# Patient Record
Sex: FEMALE | Race: WHITE | NOT HISPANIC OR LATINO | Employment: OTHER | URBAN - METROPOLITAN AREA
[De-identification: names, ages, dates, MRNs, and addresses within clinical notes are randomized per-mention and may not be internally consistent; named-entity substitution may affect disease eponyms.]

---

## 2017-03-01 ENCOUNTER — LAB REQUISITION (OUTPATIENT)
Dept: LAB | Facility: HOSPITAL | Age: 72
End: 2017-03-01
Payer: COMMERCIAL

## 2017-03-01 DIAGNOSIS — I10 ESSENTIAL (PRIMARY) HYPERTENSION: ICD-10-CM

## 2017-03-01 DIAGNOSIS — I50.9 HEART FAILURE (HCC): ICD-10-CM

## 2017-03-01 DIAGNOSIS — E11.9 TYPE 2 DIABETES MELLITUS WITHOUT COMPLICATIONS (HCC): ICD-10-CM

## 2017-03-01 LAB
ALBUMIN SERPL BCP-MCNC: 3.3 G/DL (ref 3.5–5)
ALP SERPL-CCNC: 77 U/L (ref 46–116)
ALT SERPL W P-5'-P-CCNC: 27 U/L (ref 12–78)
ANION GAP SERPL CALCULATED.3IONS-SCNC: 11 MMOL/L (ref 4–13)
AST SERPL W P-5'-P-CCNC: 18 U/L (ref 5–45)
BILIRUB SERPL-MCNC: 0.34 MG/DL (ref 0.2–1)
BUN SERPL-MCNC: 20 MG/DL (ref 5–25)
CALCIUM SERPL-MCNC: 9 MG/DL (ref 8.3–10.1)
CHLORIDE SERPL-SCNC: 106 MMOL/L (ref 100–108)
CO2 SERPL-SCNC: 24 MMOL/L (ref 21–32)
CREAT SERPL-MCNC: 0.83 MG/DL (ref 0.6–1.3)
EST. AVERAGE GLUCOSE BLD GHB EST-MCNC: 166 MG/DL
GFR SERPL CREATININE-BSD FRML MDRD: >60 ML/MIN/1.73SQ M
GLUCOSE SERPL-MCNC: 140 MG/DL (ref 65–140)
HBA1C MFR BLD: 7.4 % (ref 4.2–6.3)
POTASSIUM SERPL-SCNC: 3.9 MMOL/L (ref 3.5–5.3)
PROT SERPL-MCNC: 7.1 G/DL (ref 6.4–8.2)
SODIUM SERPL-SCNC: 141 MMOL/L (ref 136–145)

## 2017-03-01 PROCEDURE — 83036 HEMOGLOBIN GLYCOSYLATED A1C: CPT | Performed by: INTERNAL MEDICINE

## 2017-03-01 PROCEDURE — 80053 COMPREHEN METABOLIC PANEL: CPT | Performed by: INTERNAL MEDICINE

## 2017-05-01 ENCOUNTER — GENERIC CONVERSION - ENCOUNTER (OUTPATIENT)
Dept: OTHER | Facility: OTHER | Age: 72
End: 2017-05-01

## 2017-05-11 ENCOUNTER — GENERIC CONVERSION - ENCOUNTER (OUTPATIENT)
Dept: OTHER | Facility: OTHER | Age: 72
End: 2017-05-11

## 2017-10-30 ENCOUNTER — ALLSCRIPTS OFFICE VISIT (OUTPATIENT)
Dept: OTHER | Facility: OTHER | Age: 72
End: 2017-10-30

## 2017-10-30 ENCOUNTER — LAB REQUISITION (OUTPATIENT)
Dept: LAB | Facility: HOSPITAL | Age: 72
End: 2017-10-30
Payer: COMMERCIAL

## 2017-10-30 DIAGNOSIS — Z01.419 ENCOUNTER FOR GYNECOLOGICAL EXAMINATION WITHOUT ABNORMAL FINDING: ICD-10-CM

## 2017-10-30 PROCEDURE — G0145 SCR C/V CYTO,THINLAYER,RESCR: HCPCS | Performed by: OBSTETRICS & GYNECOLOGY

## 2017-10-31 ENCOUNTER — TRANSCRIBE ORDERS (OUTPATIENT)
Dept: LAB | Facility: HOSPITAL | Age: 72
End: 2017-10-31

## 2017-10-31 ENCOUNTER — APPOINTMENT (OUTPATIENT)
Dept: LAB | Facility: HOSPITAL | Age: 72
End: 2017-10-31
Payer: COMMERCIAL

## 2017-10-31 DIAGNOSIS — I10 ESSENTIAL HYPERTENSION, BENIGN: ICD-10-CM

## 2017-10-31 DIAGNOSIS — E55.9 VITAMIN D DEFICIENCY: ICD-10-CM

## 2017-10-31 DIAGNOSIS — E11.9 DIABETES MELLITUS WITHOUT COMPLICATION (HCC): Primary | ICD-10-CM

## 2017-10-31 DIAGNOSIS — E11.9 DIABETES MELLITUS WITHOUT COMPLICATION (HCC): ICD-10-CM

## 2017-10-31 LAB
25(OH)D3 SERPL-MCNC: 30.5 NG/ML (ref 30–100)
ALBUMIN SERPL BCP-MCNC: 3.4 G/DL (ref 3.5–5)
ALP SERPL-CCNC: 82 U/L (ref 46–116)
ALT SERPL W P-5'-P-CCNC: 31 U/L (ref 12–78)
ANION GAP SERPL CALCULATED.3IONS-SCNC: 7 MMOL/L (ref 4–13)
AST SERPL W P-5'-P-CCNC: 25 U/L (ref 5–45)
BILIRUB SERPL-MCNC: 0.38 MG/DL (ref 0.2–1)
BUN SERPL-MCNC: 19 MG/DL (ref 5–25)
CALCIUM SERPL-MCNC: 9.7 MG/DL (ref 8.3–10.1)
CHLORIDE SERPL-SCNC: 101 MMOL/L (ref 100–108)
CO2 SERPL-SCNC: 28 MMOL/L (ref 21–32)
CREAT SERPL-MCNC: 1.11 MG/DL (ref 0.6–1.3)
EST. AVERAGE GLUCOSE BLD GHB EST-MCNC: 163 MG/DL
GFR SERPL CREATININE-BSD FRML MDRD: 49 ML/MIN/1.73SQ M
GLUCOSE P FAST SERPL-MCNC: 158 MG/DL (ref 65–99)
HBA1C MFR BLD: 7.3 % (ref 4.2–6.3)
POTASSIUM SERPL-SCNC: 4.3 MMOL/L (ref 3.5–5.3)
PROT SERPL-MCNC: 8.1 G/DL (ref 6.4–8.2)
SODIUM SERPL-SCNC: 136 MMOL/L (ref 136–145)
TSH SERPL DL<=0.05 MIU/L-ACNC: 1.92 UIU/ML (ref 0.36–3.74)

## 2017-10-31 PROCEDURE — 82306 VITAMIN D 25 HYDROXY: CPT

## 2017-10-31 PROCEDURE — 80053 COMPREHEN METABOLIC PANEL: CPT

## 2017-10-31 PROCEDURE — 84443 ASSAY THYROID STIM HORMONE: CPT

## 2017-10-31 PROCEDURE — 36415 COLL VENOUS BLD VENIPUNCTURE: CPT

## 2017-10-31 PROCEDURE — 83036 HEMOGLOBIN GLYCOSYLATED A1C: CPT

## 2017-10-31 NOTE — PROGRESS NOTES
Assessment  1  History of Encounter for routine gynecological examination (V72 31) (Z01 419)   2  Encounter for routine gynecological examination (V72 31) (Z01 419)   3  History of Pap smear, as part of routine gynecological examination (V76 2) (Z01 419)   4  Pap smear, as part of routine gynecological examination (V76 2) (Z01 419)   5  Screening for osteoporosis (V82 81) (Z13 820)   6  Visit for screening mammogram (V76 12) (Z12 31)    Plan  Encounter for routine gynecological examination    · Follow-up visit in 1 year Evaluation and Treatment  Follow-up  Status: Hold For -  Scheduling  Requested for: [id number]   Ordered; For: Encounter for routine gynecological examination; Ordered By: Shavon Ortiz Performed:  Due: [id number]  Encounter for routine gynecological examination, Pap smear, as part of routine  gynecological examination    · (1) THIN PREP PAP WITH IMAGING; Status: In Progress - Specimen/Data  Collected,Retrospective By Protocol Authorization;   Done: [id number]   Perform:[facility] Lab In Office Collection; Order Comments:Cervical/Endocervical; WMU:[id number]; Last Updated By:Ragini Rios; 10/30/2017 9:15:07 AM;Ordered;For:Encounter for routine gynecological examination, Pap smear, as part of routine gynecological examination; Ordered By:Philly Trinidad; Maturation index required? : No  HPV? : Not Requested  Screening for osteoporosis    · * DXA BONE DENSITY SPINE HIP AND PELVIS; Status:Hold For - Scheduling,Exact  Date,Retrospective By Protocol Authorization; Requested for:May 2018; Perform:[facility]; QJC:[id number]; Last Updated By:Ragini Rios; 10/30/2017 9:15:07 AM;Ordered; For:Screening for osteoporosis; Ordered By:Philly Trinidad; Visit for screening mammogram    · * MAMMO SCREENING BILATERAL W CAD; Status:Hold For - Scheduling,Exact  Date,Retrospective By Protocol Authorization; Requested for:May 2018; Perform:[facility]; HKM:[id number];  Last Updated By:Gabriel [facility]; 10/30/2017 9:15:07 AM;Ordered; For:Visit for screening mammogram; Ordered By:Robles Trinidad; Unlinked    · MetFORMIN HCl TABS   Dispense: 0 Days ; #: Sufficient Tablet; Refill: 0; ALENA = N; Record; Last Updated By: Felisa Mittal; 10/30/2017 9:11:51 AM    Discussion/Summary  healthy adult female Currently, she eats a healthy diet  cervical cancer screening is current Pap test was done today Breast cancer screening: monthly self breast exam was advised, mammogram is current and mammogram has been ordered  Colorectal cancer screening: colorectal cancer screening is current  Osteoporosis screening: bone mineral density testing is current and bone mineral density testing has been ordered  Advice and education were given regarding nutrition, aerobic exercise, calcium supplements, vitamin D supplements and sunscreen use  Normal GYN Exam  Stressed  ordered  [facility]  GYN Exam in 1 year  if any problems develop during the interim    The patient has the current Goals: [address]  The patent has the current Barriers: None  Patient is able to Self-Care  PATIENT EDUCATION RECORD She was given the following educational materials:  Ideas for a Healthy Life Style  The treatment plan was reviewed with the patient/guardian  The patient/guardian understands and agrees with the treatment plan     Self Referrals: No      Chief Complaint  Annual Visithas no problem or concernis doing very good      History of Present Illness  HPI: Followed medically for HTN * DM    Denies any vaginal bleeding    No vasomotor symptoms  Bowel & Bladder habits  any pelvic or abdominal pain      GYN , Adult Female [facility]: The patient is being seen for a gynecology evaluation  The last health maintenance visit was 2 year(s) ago  General Health: The patient's health since the last visit is described as good  She has regular dental visits  -- She denies vision problems  -- She denies hearing loss  Lifestyle:   She consumes a diverse and healthy diet  -- She does not have any weight concerns  -- She exercises regularly  -- She does not use tobacco -- She denies drug use  Reproductive health: the patient is postmenopausal    Screening: cancer screening reviewed and current  metabolic screening reviewed and current  risk screening reviewed and current  Review of Systems    Constitutional: No fever, no chills, feels well, no tiredness, no recent weight gain or loss  ENT: no ear ache, no loss of hearing, no nosebleeds or nasal discharge, no sore throat or hoarseness  Cardiovascular: DM * HTN, but-- no complaints of slow or fast heart rate, no chest pain, no palpitations, no leg claudication or lower extremity edema  Respiratory: no complaints of shortness of breath, no wheezing, no dyspnea on exertion, no orthopnea or PND  Breasts: no complaints of breast pain, breast lump or nipple discharge  Gastrointestinal: no complaints of abdominal pain, no constipation, no nausea or diarrhea, no vomiting, no bloody stools  Genitourinary: no complaints of dysuria, no incontinence, no pelvic pain, no dysmenorrhea, no vaginal discharge or abnormal vaginal bleeding  Musculoskeletal: no complaints of arthralgia, no myalgia, no joint swelling or stiffness, no limb pain or swelling  Integumentary: no complaints of skin rash or lesion, no itching or dry skin, no skin wounds  Neurological: no complaints of headache, no confusion, no numbness or tingling, no dizziness or fainting  Active Problems  1  Abnormal finding on mammography (793 80) (R92 8)   2  Encounter for routine gynecological examination (V72 31) (Z01 419)   3  Pap smear, as part of routine gynecological examination (V76 2) (Z01 419)   4  Screening for osteoporosis (V82 81) (Z13 820)   5   Visit for screening mammogram (V76 12) (Z12 31)    Past Medical History   · History of Encounter for routine gynecological examination (V72 31) (Z01 419)   · History of diastolic dysfunction (V12 59) (Z86 79)   · History of esophageal reflux (V12 79) (Z87 19)   · History of hypertension (V12 59) (Z86 79)   · History of Pap smear, as part of routine gynecological examination (V76 2) (Z01 419)   · History of Umbilical hernia (316 1) (K42 9)    Surgical History   · History of Umbilical Hernia Repair    Family History  Mother    · No pertinent family history    Social History   · Never a smoker    Current Meds   1  Atenolol 25 MG Oral Tablet; TAKE 1 TABLET DAILY; Therapy: (Sandria Breath) to Recorded   2  Calcium + D TABS; Therapy: (Sandria Breath) to Recorded   3  Lasix 20 MG Oral Tablet; Therapy: (Sandria Breath) to Recorded   4  MetFORMIN HCl TABS; TAKE 1 TABLET TWICE DAILY  MDD:400mg; Therapy: (Sandria Breath) to Recorded   5  Moduretic 5-50 MG TABS; TAKE TABLET  TDD:0 25; Therapy: (Sandria Breath) to Recorded   6  Multi-Vitamin TABS; Therapy: (Sandria Breath) to Recorded   7  Vitamin D 1000 UNIT CAPS; Therapy: (Recorded:35Uit4210) to Recorded    Allergies  1  No Known Drug Allergies    Vitals   Recorded: 16MEK1122 02:41FT   Systolic 731   Diastolic 72   Height 5 ft    Weight 232 lb    BMI Calculated 45 31   BSA Calculated 1 99     Physical Exam    Constitutional   General appearance: No acute distress, well appearing and well nourished  Neck   Neck: Normal, supple, trachea midline, no masses  Thyroid: Normal, no thyromegaly  Pulmonary   Respiratory effort: No increased work of breathing or signs of respiratory distress  Auscultation of lungs: Clear to auscultation  Cardiovascular   Auscultation of heart: Normal rate and rhythm, normal S1 and S2, no murmurs  Peripheral vascular exam: Normal pulses Throughout  Genitourinary   External genitalia: Normal and no lesions appreciated  Vagina: Normal, no lesions or dryness appreciated      Urethra: Normal     Urethral meatus: Normal     Bladder: Normal, soft, non-tender and no prolapse or masses appreciated  Cervix: Normal, no palpable masses  Uterus: Normal, non-tender, not enlarged, and no palpable masses  Adnexa/parametria: Normal, non-tender and no fullness or masses appreciated  Anus, perineum, and rectum: Normal sphincter tone, no masses, and no prolapse  Chest   Breasts: Normal and no dimpling or skin changes noted  Abdomen   Abdomen: Normal, non-tender, and no organomegaly noted  Liver and spleen: No hepatomegaly or splenomegaly  Examination for hernias: No hernias appreciated  Stool sample for occult blood: Negative  Lymphatic   Palpation of lymph nodes in neck, axillae, groin and/or other locations: No lymphadenopathy or masses noted  Skin   Skin and subcutaneous tissue: Normal skin turgor and no rashes      Palpation of skin and subcutaneous tissue: Normal     Psychiatric   Orientation to person, place, and time: Normal     Mood and affect: Normal        Provider Comments  Provider Comments:   Family doing well      Signatures   Electronically signed by : JAYME Leon ; Oct 30 2017 12:34PM EST                       (Author)

## 2017-11-03 LAB
LAB AP GYN PRIMARY INTERPRETATION: NORMAL
Lab: NORMAL

## 2017-11-07 ENCOUNTER — GENERIC CONVERSION - ENCOUNTER (OUTPATIENT)
Dept: OTHER | Facility: OTHER | Age: 72
End: 2017-11-07

## 2018-01-14 VITALS
WEIGHT: 232 LBS | DIASTOLIC BLOOD PRESSURE: 72 MMHG | SYSTOLIC BLOOD PRESSURE: 120 MMHG | HEIGHT: 60 IN | BODY MASS INDEX: 45.55 KG/M2

## 2018-01-15 NOTE — MISCELLANEOUS
Message  Spoke with patient regarding mammogram results  New opacity noted in L breast on mammogram, but not imaged on L breast U/S  Follow-up diagnostic mammogram of L breast in 6 months  Patient has appt scheduled for May  Slip will be mailed        Plan  Abnormal finding on mammography    · MAMMO DIAGNOSTIC LEFT W CAD; Status:Hold For - Scheduling; Requested  for:18Nov2016;     Signatures   Electronically signed by : Roxy Hinkle [facility]; Nov 18 2016  4:29PM EST                       (Author)

## 2018-01-16 NOTE — MISCELLANEOUS
Message  Spoke with patient regarding L breast mammogram results  Density is stable  F/u diagnostic mammogram of L breast at time of screening mammogram in October        Signatures   Electronically signed by : [facility]; May 11 2017  4:16PM EST                       (Author)

## 2018-02-19 ENCOUNTER — LAB REQUISITION (OUTPATIENT)
Dept: LAB | Facility: HOSPITAL | Age: 73
End: 2018-02-19
Payer: COMMERCIAL

## 2018-02-19 DIAGNOSIS — I34.0 NONRHEUMATIC MITRAL VALVE INSUFFICIENCY: ICD-10-CM

## 2018-02-19 DIAGNOSIS — I10 ESSENTIAL (PRIMARY) HYPERTENSION: ICD-10-CM

## 2018-02-19 DIAGNOSIS — E11.9 TYPE 2 DIABETES MELLITUS WITHOUT COMPLICATIONS (HCC): ICD-10-CM

## 2018-02-19 DIAGNOSIS — I50.9 HEART FAILURE (HCC): ICD-10-CM

## 2018-02-19 DIAGNOSIS — H66.90 OTITIS MEDIA: ICD-10-CM

## 2018-02-19 LAB
ANION GAP SERPL CALCULATED.3IONS-SCNC: 9 MMOL/L (ref 4–13)
BUN SERPL-MCNC: 23 MG/DL (ref 5–25)
CALCIUM SERPL-MCNC: 9.1 MG/DL (ref 8.3–10.1)
CHLORIDE SERPL-SCNC: 100 MMOL/L (ref 100–108)
CO2 SERPL-SCNC: 29 MMOL/L (ref 21–32)
CREAT SERPL-MCNC: 0.81 MG/DL (ref 0.6–1.3)
EST. AVERAGE GLUCOSE BLD GHB EST-MCNC: 171 MG/DL
GFR SERPL CREATININE-BSD FRML MDRD: 72 ML/MIN/1.73SQ M
GLUCOSE SERPL-MCNC: 118 MG/DL (ref 65–140)
HBA1C MFR BLD: 7.6 % (ref 4.2–6.3)
POTASSIUM SERPL-SCNC: 4 MMOL/L (ref 3.5–5.3)
SODIUM SERPL-SCNC: 138 MMOL/L (ref 136–145)

## 2018-02-19 PROCEDURE — 83036 HEMOGLOBIN GLYCOSYLATED A1C: CPT | Performed by: INTERNAL MEDICINE

## 2018-02-19 PROCEDURE — 80048 BASIC METABOLIC PNL TOTAL CA: CPT | Performed by: INTERNAL MEDICINE

## 2018-05-01 DIAGNOSIS — Z12.31 ENCOUNTER FOR SCREENING MAMMOGRAM FOR MALIGNANT NEOPLASM OF BREAST: ICD-10-CM

## 2018-05-01 DIAGNOSIS — Z13.820 ENCOUNTER FOR SCREENING FOR OSTEOPOROSIS: ICD-10-CM

## 2018-07-02 ENCOUNTER — LAB REQUISITION (OUTPATIENT)
Dept: LAB | Facility: HOSPITAL | Age: 73
End: 2018-07-02
Payer: COMMERCIAL

## 2018-07-02 DIAGNOSIS — E11.9 TYPE 2 DIABETES MELLITUS WITHOUT COMPLICATIONS (HCC): ICD-10-CM

## 2018-07-02 LAB
ALBUMIN SERPL BCP-MCNC: 3.2 G/DL (ref 3.5–5)
ALP SERPL-CCNC: 77 U/L (ref 46–116)
ALT SERPL W P-5'-P-CCNC: 26 U/L (ref 12–78)
ANION GAP SERPL CALCULATED.3IONS-SCNC: 10 MMOL/L (ref 4–13)
AST SERPL W P-5'-P-CCNC: 19 U/L (ref 5–45)
BILIRUB SERPL-MCNC: 0.34 MG/DL (ref 0.2–1)
BUN SERPL-MCNC: 22 MG/DL (ref 5–25)
CALCIUM SERPL-MCNC: 8.8 MG/DL (ref 8.3–10.1)
CHLORIDE SERPL-SCNC: 104 MMOL/L (ref 100–108)
CO2 SERPL-SCNC: 24 MMOL/L (ref 21–32)
CREAT SERPL-MCNC: 0.9 MG/DL (ref 0.6–1.3)
EST. AVERAGE GLUCOSE BLD GHB EST-MCNC: 189 MG/DL
GFR SERPL CREATININE-BSD FRML MDRD: 63 ML/MIN/1.73SQ M
GLUCOSE P FAST SERPL-MCNC: 154 MG/DL (ref 65–99)
HBA1C MFR BLD: 8.2 % (ref 4.2–6.3)
LDLC SERPL DIRECT ASSAY-MCNC: 126 MG/DL (ref 0–100)
POTASSIUM SERPL-SCNC: 4.2 MMOL/L (ref 3.5–5.3)
PROT SERPL-MCNC: 6.8 G/DL (ref 6.4–8.2)
SODIUM SERPL-SCNC: 138 MMOL/L (ref 136–145)

## 2018-07-02 PROCEDURE — 83721 ASSAY OF BLOOD LIPOPROTEIN: CPT | Performed by: INTERNAL MEDICINE

## 2018-07-02 PROCEDURE — 83036 HEMOGLOBIN GLYCOSYLATED A1C: CPT | Performed by: INTERNAL MEDICINE

## 2018-07-02 PROCEDURE — 80053 COMPREHEN METABOLIC PANEL: CPT | Performed by: INTERNAL MEDICINE

## 2018-07-27 PROBLEM — I10 ESSENTIAL HYPERTENSION: Status: ACTIVE | Noted: 2018-07-27

## 2018-07-27 PROBLEM — I51.89 DIASTOLIC DYSFUNCTION: Status: ACTIVE | Noted: 2018-07-27

## 2018-07-27 PROBLEM — K21.9 GERD (GASTROESOPHAGEAL REFLUX DISEASE): Status: ACTIVE | Noted: 2018-07-27

## 2018-07-27 PROBLEM — E66.813 OBESITY, CLASS III, BMI 40-49.9 (MORBID OBESITY): Status: ACTIVE | Noted: 2018-07-27

## 2018-07-27 PROBLEM — E66.9 DIABETES MELLITUS TYPE 2 IN OBESE (HCC): Status: ACTIVE | Noted: 2018-07-27

## 2018-07-27 PROBLEM — E11.69 DIABETES MELLITUS TYPE 2 IN OBESE (HCC): Status: ACTIVE | Noted: 2018-07-27

## 2018-07-27 PROBLEM — E55.9 VITAMIN D DEFICIENCY: Status: ACTIVE | Noted: 2018-07-27

## 2018-07-27 PROBLEM — E66.01 OBESITY, CLASS III, BMI 40-49.9 (MORBID OBESITY) (HCC): Status: ACTIVE | Noted: 2018-07-27

## 2018-07-27 NOTE — ASSESSMENT & PLAN NOTE
Lab Results   Component Value Date    HGBA1C 8 2 (H) 07/02/2018       -not optimally controlled  Unable to tolerate Metformin   Was off of meds for last year and a half and recently started on Jardiance last month by PCP  -avoid refined carbohydrates  -likely to improve with weight loss  -recommended she obtain glucometer as she should monitor BS with weight loss to ensure avoidance of hypoglycemia

## 2018-07-27 NOTE — PROGRESS NOTES
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) ([address] )  -Discussed options of HealthyCORE-Intensive Lifestyle Intervention Program, Very Low Calorie Diet-VLCD and Conservative Program and the role of weight loss medications  Patient reports she will not have any surgeries and did not want me to discuss surgical options  -Initial weight loss goal of 5-10% weight loss for improved health  -Screening labs: reviewed, LDL and HgbA1c noted to be elevated  -Patient is interested in pursuing conservative program + menu planning  Interest in doing partial meal replacement  Sample chocolate shake and pudding given as she may want to use our products  For now will use 2 premier protein shakes    +STOP BANG (4/8):  -reviewed risks of undiagnosed/untreated sleep apnea  -Recommended referral to sleep medicine provider for further evaluation   -Patient declined and would like to see if risks improve with weight loss  Repeat STOP BANG 4 months    Essential hypertension  -continue antihypertensives  -likely will improve with 5-10% weight loss    Hyperlipidemia  -LDL elevated  -may improve with weight loss  -continue follow up w/ PCP    Vitamin D deficiency  -continue on daily D3 1000 IU, last level WNL    Goals:    Food log (ie ) www myfitnesspal com,sparkpeople  com,loseit com,calorieking  com,etc    No sugary beverages  At least 64oz of water daily  Increase physical activity by 10 minutes daily   Gradually increase physical activity to a goal of 5 days per week for 30 minutes of MODERATE intensity PLUS 2 days per week of FULL BODY resistance training  9252-0349 calories per day  5-10 servings of fruits and vegetables per day  2 premier protein shakes to replace 2 of your meals + serving of fruit or vegetable      Follow up in approximately 2 weeks with Non-Surgical Dietician  6 weeks with PA-C    Diagnoses and all orders for this visit:    Obesity, Class III, 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) ([facility])    Essential hypertension    Diabetes mellitus type 2 in obese ([facility])    Hyperlipidemia, unspecified hyperlipidemia type    Other orders  -     AMILoride-hydrochlorothiazide (MODURETIC) 5-50 mg per tablet; daily 1/2 tab daily   -     Discontinue: atorvastatin (LIPITOR) 10 mg tablet; atorvastatin 10 mg tablet  -     Discontinue: azithromycin (ZITHROMAX) 250 mg tablet; azithromycin 250 mg tablet  -     chlorhexidine (PERIDEX) 0 12 % solution; chlorhexidine gluconate 0 12 % mouthwash  -     Discontinue: clonazePAM (KlonoPIN) 0 5 mg tablet; clonazepam 0 5 mg tablet  -     Discontinue: cyclobenzaprine (FLEXERIL) 10 mg tablet; cyclobenzaprine 10 mg tablet  -     Discontinue: diazepam (VALIUM) 10 mg tablet; diazepam 10 mg tablet  -     Sodium Hyaluronate (EUFLEXXA) 20 MG/2ML SOSY; 4 mL  -     Omega-3 Fatty Acids (FISH OIL) 1,000 mg; Fish Oil  -     Discontinue: gabapentin (NEURONTIN) 300 mg capsule; gabapentin 300 mg capsule  -     hydrocortisone 2 5 % cream; as needed  -     Empagliflozin (JARDIANCE) 10 MG TABS; 10 mg Daily    -     ketoconazole (NIZORAL) 2 % cream; as needed  -     Discontinue: potassium chloride (KLOR-CON) 20 mEq packet; Klor-Con 20 mEq oral packet  -     losartan (COZAAR) 50 mg tablet; losartan 50 mg tablet  -     Discontinue: methocarbamol (ROBAXIN) 750 mg tablet; methocarbamol 750 mg tablet  -     metoprolol succinate (TOPROL-XL) 100 mg 24 hr tablet;  Take 100 mg by mouth daily  -     Discontinue: morphine (MS CONTIN) 15 mg 12 hr tablet; morphine ER 15 mg tablet,extended release  -     Multiple Vitamins-Minerals (CENTRUM SILVER PO); multivitamin  -     Discontinue: naproxen (NAPROSYN) 500 mg tablet; naproxen 500 mg tablet  -     Discontinue: ondansetron (ZOFRAN) 4 mg tablet; ondansetron HCl 4 mg tablet  -     Discontinue: oxyCODONE-acetaminophen (PERCOCET) 5-325 mg per tablet; oxycodone-acetaminophen 5 mg-325 mg tablet  -     Discontinue: promethazine (PHENERGAN) 25 mg/mL injection; GA- suppos i q 6 hrs prn nausea/vomiting  -     KLOR-CON M20 20 MEQ tablet; Take 20 mEq by mouth as needed    -     Discontinue: pravastatin (PRAVACHOL) 10 mg tablet; pravastatin 10 mg tablet  -     SODIUM FLUORIDE, DENTAL GEL, (PREVIDENT) 1 1 % GEL; PreviDent 5000 Booster Plus 1 1 % dental paste  -     Discontinue: sulfamethoxazole-trimethoprim (BACTRIM DS) 800-160 mg per tablet; sulfamethoxazole 800 mg-trimethoprim 160 mg tablet  -     Discontinue: topiramate (TOPAMAX) 25 mg tablet; topiramate 25 mg tablet  -     Cholecalciferol (VITAMIN D3) 1000 units CAPS; Take by mouth daily    -     Discontinue: lidocaine, cardiac, (XYLOCAINE) 10 mg/mL; 4 mL  -     ibuprofen (MOTRIN) 200 mg tablet; Take by mouth every 6 (six) hours as needed for mild pain          Subjective:   Chief Complaint   Patient presents with    Consult     Pt is here for MWM consult       Patient ID: Solange Sarah  is a 75 y o  female with excess weight/obesity here to pursue weight managment  Past Medical History:   Diagnosis Date    Arthritis     Colorectal polyps     Constipation     Diabetes mellitus ([facility] [address] )     Diverticulosis of colon     Hiatal hernia     Hypertension     Increased urinary frequency     Lumbar disc disease     SOB (shortness of breath)     Stress incontinence        HPI:  Obesity/Excess Weight:  Severity: Severe  Onset:   In 20's after pregnancy  Modifiers: Physician Supervised Weight Loss Program and reports losing 82 pounds between 7901-5504 - started gaining when no longer following w/ physician, weight watchers  Contributing factors: Pregnancy and 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  Associated symptoms: comorbid conditions and fatigue    Goals: to get to a healthy weight    Breakfast: protein shake premier or 2 eggs white hardboiled  Snack: none  Lunch: half of ham or turkey/cheese  + mustard sandwich rye or italian bread, chips  Snack: none  Dinner: protein + starch + salad  Snack: ice cream or SF jello    Eats out for 4-5x per week  Fluid: 16 oz water, 64 oz seltzer water, unsweetened ice tea 8 oz    The following portions of the patient's history were reviewed and updated as appropriate: allergies, current medications, past family history, past medical history, past social history, past surgical history and problem list     Review of Systems   Constitutional: Negative for chills and fever  HENT: Negative for sore throat  Respiratory: Positive for shortness of breath (if walks too far or goes up steps)  Negative for cough  Cardiovascular: Negative for chest pain and palpitations  Gastrointestinal: Negative for abdominal pain, constipation, diarrhea, nausea and vomiting  Genitourinary: Negative for dysuria  Musculoskeletal: Positive for arthralgias (knee pain) and back pain (3 back surgeries in 2010)  Skin: Negative for rash  Neurological: Negative for headaches  Psychiatric/Behavioral: The patient is not nervous/anxious  Denies depression     Objective:    /72 (BP Location: Left arm, Patient Position: Sitting, Cuff Size: Large)   Pulse 60   Temp 98 °F (36 7 °C) (Tympanic)   Resp 16   Ht 5' 3 5" (1 613 m)   Wt 105 kg (231 lb 2 oz)   BMI 40 30 kg/m²     Physical Exam   Nursing note and vitals reviewed  Constitutional   General appearance: Abnormal   well developed and morbidly obese  Eyes No conjunctival pallor     Ears, Nose, Mouth, and Throat Oral mucosa moist    Pulmonary   Respiratory effort: No increased work of breathing or signs of respiratory distress  Auscultation of lungs: Clear to auscultation, equal breath sounds bilaterally, no wheezes, no rales, no rhonci  Cardiovascular   Auscultation of heart: Normal rate and rhythm, normal S1 and S2, without murmurs  Examination of extremities for edema and/or varicosities: Normal   +LE edema bilaterally  Abdomen   Abdomen: Abnormal   The abdomen was obese  Bowel sounds were normal  The abdomen was soft and nontender     Musculoskeletal   Gait and station: Normal     Psychiatric   Orientation to person, place and time: Normal     Affect: appropriate

## 2018-07-27 NOTE — ASSESSMENT & PLAN NOTE
-Discussed options of HealthyCORE-Intensive Lifestyle Intervention Program, Very Low Calorie Diet-VLCD and Conservative Program and the role of weight loss medications  Patient reports she will not have any surgeries and did not want me to discuss surgical options  -Initial weight loss goal of 5-10% weight loss for improved health  -Screening labs: reviewed, LDL and HgbA1c noted to be elevated  -Patient is interested in pursuing conservative program + menu planning  Interest in doing partial meal replacement  Sample chocolate shake and pudding given as she may want to use our products  For now will use 2 premier protein shakes    +STOP BANG (4/8):  -reviewed risks of undiagnosed/untreated sleep apnea  -Recommended referral to sleep medicine provider for further evaluation   -Patient declined and would like to see if risks improve with weight loss   Repeat STOP BANG 4 months

## 2018-07-30 ENCOUNTER — OFFICE VISIT (OUTPATIENT)
Dept: BARIATRICS | Facility: CLINIC | Age: 73
End: 2018-07-30
Payer: COMMERCIAL

## 2018-07-30 VITALS
TEMPERATURE: 98 F | BODY MASS INDEX: 39.46 KG/M2 | HEART RATE: 60 BPM | SYSTOLIC BLOOD PRESSURE: 120 MMHG | WEIGHT: 231.13 LBS | HEIGHT: 64 IN | RESPIRATION RATE: 16 BRPM | DIASTOLIC BLOOD PRESSURE: 72 MMHG

## 2018-07-30 DIAGNOSIS — E78.5 HYPERLIPIDEMIA, UNSPECIFIED HYPERLIPIDEMIA TYPE: ICD-10-CM

## 2018-07-30 DIAGNOSIS — I10 ESSENTIAL HYPERTENSION: ICD-10-CM

## 2018-07-30 DIAGNOSIS — E66.9 DIABETES MELLITUS TYPE 2 IN OBESE (HCC): ICD-10-CM

## 2018-07-30 DIAGNOSIS — E66.01 OBESITY, CLASS III, BMI 40-49.9 (MORBID OBESITY) (HCC): Primary | ICD-10-CM

## 2018-07-30 DIAGNOSIS — E11.69 DIABETES MELLITUS TYPE 2 IN OBESE (HCC): ICD-10-CM

## 2018-07-30 PROCEDURE — 99204 OFFICE O/P NEW MOD 45 MIN: CPT | Performed by: PHYSICIAN ASSISTANT

## 2018-07-30 RX ORDER — CYCLOBENZAPRINE HCL 10 MG
TABLET ORAL
COMMUNITY
End: 2018-07-30 | Stop reason: ALTCHOICE

## 2018-07-30 RX ORDER — HYALURONATE SODIUM 10 MG/ML
4 SYRINGE (ML) INTRAARTICULAR
COMMUNITY
End: 2021-01-13

## 2018-07-30 RX ORDER — METHOCARBAMOL 750 MG/1
TABLET, FILM COATED ORAL
COMMUNITY
End: 2018-07-30 | Stop reason: ALTCHOICE

## 2018-07-30 RX ORDER — MORPHINE SULFATE 15 MG/1
TABLET, FILM COATED, EXTENDED RELEASE ORAL
COMMUNITY
End: 2018-07-30 | Stop reason: ALTCHOICE

## 2018-07-30 RX ORDER — DIAZEPAM 10 MG/1
TABLET ORAL
COMMUNITY
End: 2018-07-30 | Stop reason: ALTCHOICE

## 2018-07-30 RX ORDER — KETOCONAZOLE 20 MG/G
CREAM TOPICAL
Refills: 3 | COMMUNITY
Start: 2018-06-11

## 2018-07-30 RX ORDER — ATORVASTATIN CALCIUM 10 MG/1
TABLET, FILM COATED ORAL
COMMUNITY
End: 2018-07-30 | Stop reason: ALTCHOICE

## 2018-07-30 RX ORDER — CHLORHEXIDINE GLUCONATE 0.12 MG/ML
RINSE ORAL
COMMUNITY
End: 2021-01-13

## 2018-07-30 RX ORDER — PROMETHAZINE HYDROCHLORIDE 25 MG/ML
INJECTION, SOLUTION INTRAMUSCULAR; INTRAVENOUS
COMMUNITY
End: 2018-07-30 | Stop reason: ALTCHOICE

## 2018-07-30 RX ORDER — PRAVASTATIN SODIUM 10 MG
TABLET ORAL
COMMUNITY
End: 2018-07-30 | Stop reason: ALTCHOICE

## 2018-07-30 RX ORDER — OXYCODONE HYDROCHLORIDE AND ACETAMINOPHEN 5; 325 MG/1; MG/1
TABLET ORAL
COMMUNITY
End: 2018-07-30 | Stop reason: ALTCHOICE

## 2018-07-30 RX ORDER — NAPROXEN 500 MG/1
TABLET ORAL
COMMUNITY
End: 2018-07-30 | Stop reason: ALTCHOICE

## 2018-07-30 RX ORDER — IBUPROFEN 200 MG
TABLET ORAL EVERY 6 HOURS PRN
COMMUNITY

## 2018-07-30 RX ORDER — LOSARTAN POTASSIUM 50 MG/1
100 TABLET ORAL
COMMUNITY

## 2018-07-30 RX ORDER — CLONAZEPAM 0.5 MG/1
TABLET ORAL
COMMUNITY
End: 2018-07-30 | Stop reason: ALTCHOICE

## 2018-07-30 RX ORDER — SULFAMETHOXAZOLE AND TRIMETHOPRIM 800; 160 MG/1; MG/1
TABLET ORAL
COMMUNITY
End: 2018-07-30 | Stop reason: ALTCHOICE

## 2018-07-30 RX ORDER — SODIUM FLUORIDE 5 MG/G
GEL, DENTIFRICE DENTAL
COMMUNITY
End: 2021-01-13

## 2018-07-30 RX ORDER — GABAPENTIN 300 MG/1
CAPSULE ORAL
COMMUNITY
End: 2018-07-30 | Stop reason: ALTCHOICE

## 2018-07-30 RX ORDER — METOPROLOL SUCCINATE 100 MG/1
100 TABLET, EXTENDED RELEASE ORAL DAILY
Refills: 3 | COMMUNITY
Start: 2018-07-05

## 2018-07-30 RX ORDER — TOPIRAMATE 25 MG/1
TABLET ORAL
COMMUNITY
End: 2018-07-30 | Stop reason: ALTCHOICE

## 2018-07-30 RX ORDER — ONDANSETRON 4 MG/1
TABLET, FILM COATED ORAL
COMMUNITY
End: 2018-07-30 | Stop reason: ALTCHOICE

## 2018-07-30 RX ORDER — AZITHROMYCIN 250 MG/1
TABLET, FILM COATED ORAL
COMMUNITY
End: 2018-07-30 | Stop reason: ALTCHOICE

## 2018-07-30 RX ORDER — POTASSIUM CHLORIDE 1500 MG/1
20 TABLET, EXTENDED RELEASE ORAL AS NEEDED
Refills: 3 | COMMUNITY
Start: 2018-04-23

## 2018-07-30 RX ORDER — POTASSIUM CHLORIDE 1.5 G/1
POWDER, FOR SOLUTION ORAL
COMMUNITY
End: 2018-07-30 | Stop reason: SDUPTHER

## 2018-07-30 RX ORDER — BIOTIN 1 MG
TABLET ORAL DAILY
COMMUNITY

## 2018-07-30 RX ORDER — CHLORAL HYDRATE 500 MG
CAPSULE ORAL
COMMUNITY
End: 2021-01-13

## 2018-07-30 RX ORDER — AMILORIDE HYDROCHLORIDE AND HYDROCHLOROTHIAZIDE 5; 50 MG/1; MG/1
TABLET ORAL DAILY
COMMUNITY

## 2018-07-30 NOTE — PATIENT INSTRUCTIONS
Goals: Food log (ie ) www myfitnesspal com,sparkpeople  com,loseit com,calorieking  com,etc    No sugary beverages  At least 64oz of water daily  Increase physical activity by 10 minutes daily   Gradually increase physical activity to a goal of 5 days per week for 30 minutes of MODERATE intensity PLUS 2 days per week of FULL BODY resistance training  0738-6595 calories per day  5-10 servings of fruits and vegetables per day  2 premier protein shakes to replace 2 of your meals + serving of fruit or vegetable

## 2018-09-05 ENCOUNTER — OFFICE VISIT (OUTPATIENT)
Dept: NEUROLOGY | Facility: CLINIC | Age: 73
End: 2018-09-05
Payer: COMMERCIAL

## 2018-09-05 VITALS
BODY MASS INDEX: 39.61 KG/M2 | HEIGHT: 64 IN | SYSTOLIC BLOOD PRESSURE: 169 MMHG | DIASTOLIC BLOOD PRESSURE: 76 MMHG | WEIGHT: 232 LBS | HEART RATE: 102 BPM

## 2018-09-05 DIAGNOSIS — R25.1 TREMOR OF HANDS AND FACE: ICD-10-CM

## 2018-09-05 DIAGNOSIS — R26.9 UNSPECIFIED ABNORMALITIES OF GAIT AND MOBILITY: ICD-10-CM

## 2018-09-05 DIAGNOSIS — G25.0 BENIGN ESSENTIAL TREMOR: Primary | ICD-10-CM

## 2018-09-05 PROCEDURE — 99205 OFFICE O/P NEW HI 60 MIN: CPT | Performed by: PSYCHIATRY & NEUROLOGY

## 2018-09-05 RX ORDER — DIAZEPAM 10 MG/1
TABLET ORAL
Qty: 2 TABLET | Refills: 0 | Status: SHIPPED | OUTPATIENT
Start: 2018-09-05 | End: 2019-05-23 | Stop reason: ALTCHOICE

## 2018-09-05 NOTE — PROGRESS NOTES
Patient ID: Kelly Barragan is a 75 y o  female  Assessment/Plan:   Problem List Items Addressed This Visit        Nervous and Auditory    Benign essential tremor - Primary       Other    Unspecified abnormalities of gait and mobility    Relevant Medications    diazepam (VALIUM) 10 mg tablet    Other Relevant Orders    CT head wo contrast    Tremor of hands and face    Relevant Orders    CT head wo contrast         Today I had the pleasure of seeing your patient, Jacobo Grimm, in consultation at [address]  Mrs Butch Ingram has presented for evaluation of balance disturbances with head and arm tremor  Her clinical presentation is consistent with benign essential tremor with no signs of parkinsonism noted on exam    Patient has been on metoprolol, propranolol will not be advised, and primidone side effects are not in favor at this point, so patient agreed to have observational approach to her tremors  MRI was not able to be tolerated due to severe claustrophobia, patient will be scheduled for CT head only  Patient has significant lumbar disorder and knee arthropathy with abnormal gait is likely related to her underlying arthralgia  No focal weakness or numbness has been noted on her otherwise  Patient refused Balance therapy at this point  Follow up in 6 months - if progression of tremors noted, she will follow with Movement disorder specialist      Greater than 50% of the 60 minutes evaluation was a face-to-face discussion regarding  the pathophysiology of his current symptoms and further plan, as well as counseling, educating, and coordinating the patient's care  Subjective:balance dysfunction, head tremor    HPI/History of Present Illness  Mrs Jaquez is a 75year old right handed female who presents for gait disturbance  Patient complains of balance problems, tremors of head and hands  Patient has had like symptoms x 1 year  Family hx of Parkinson's disease      Referred by Dr Dasia Bradford   Mrs Jaquez has a history of HTN, diastolic dysfunction, HLD, arthralgia, lumbar spondylosis  S/p 3 surgery; DM with HbA1C 8 2, who presented for evaluations of tremor and balance disorder  Balance issue, with leaning to either side while walking  Balance disorder is not related to distance; patient has a walker for her lumbar surgery  2009, 2010 twice she had lumbar surgery for lower back and legs  Pain  Surgery was repeated due to worsening pain in left leg as she was barely walking  Lumbar vertebra fusion with cadaver bone was completed; Patient has knees arthroscopy and injections required on frequent bases  Patient has head twitches and hands tremors for 4-5 years, with no worsening noted, and no ADLs issues  Head tremor is random  No weakness or numbness in arms, some mild urge incontinence  Patient has diabetes with HbA1C 8 2  B12 538  The following portions of the patient's history were reviewed and updated as appropriate: She  has a past medical history of Arthritis; Colorectal polyps; Constipation; Diabetes mellitus ([facility] ); Diverticulosis of colon; Hiatal hernia; Hypertension; Increased urinary frequency; Lumbar disc disease; SOB (shortness of breath); and Stress incontinence  She   Patient Active Problem List    Diagnosis Date Noted    Unspecified abnormalities of gait and mobility 09/05/2018    Tremor of hands and face 09/05/2018    Benign essential tremor 09/05/2018    Hyperlipidemia 07/30/2018    Diabetes mellitus type 2 in obese ([facility] ) 07/27/2018    Obesity, Class III, 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) ([facility] ) 07/27/2018    Essential hypertension 10/11/4014    Diastolic dysfunction 04/61/3847    GERD (gastroesophageal reflux disease) 07/27/2018    Vitamin D deficiency 07/27/2018     She  has a past surgical history that includes Back surgery; Tubal ligation; Hernia repair; pr colonoscopy flx dx w/collj spec when pfrmd (N/A, 2/24/2016);  Colonoscopy w/ endoscopic US (N/A, 2/24/2016); Cholecystectomy; Carpal tunnel release (Left); Cataract extraction (Bilateral); and Tonsillectomy  Her family history includes Diabetes in her mother; Heart attack in her mother; Hypertension in her mother; No Known Problems in her brother; Prostate cancer in her father; Stroke in her maternal grandmother, paternal grandfather, and paternal grandmother  She  reports that she has never smoked  She has never used smokeless tobacco  She reports that she does not drink alcohol or use drugs  Current Outpatient Prescriptions   Medication Sig Dispense Refill    AMILoride-hydrochlorothiazide (MODURETIC) 5-50 mg per tablet daily 1/2 tab daily       Cholecalciferol (VITAMIN D3) 1000 units CAPS Take by mouth daily        Empagliflozin (JARDIANCE) 10 MG TABS 10 mg Daily        furosemide (LASIX) 40 mg tablet Take 40 mg by mouth as needed        ibuprofen (MOTRIN) 200 mg tablet Take by mouth every 6 (six) hours as needed for mild pain      KLOR-CON M20 20 MEQ tablet Take 20 mEq by mouth as needed    3    losartan (COZAAR) 50 mg tablet losartan 50 mg tablet      metoprolol succinate (TOPROL-XL) 100 mg 24 hr tablet Take 100 mg by mouth daily  3    Multiple Vitamins-Minerals (CENTRUM SILVER PO) multivitamin      chlorhexidine (PERIDEX) 0 12 % solution chlorhexidine gluconate 0 12 % mouthwash      diazepam (VALIUM) 10 mg tablet Take 1 tab 30 min prior to imaging with a second dose immediately prior to imaging 2 tablet 0    hydrocortisone 2 5 % cream as needed  3    ketoconazole (NIZORAL) 2 % cream as needed  3    Omega-3 Fatty Acids (FISH OIL) 1,000 mg Fish Oil      SODIUM FLUORIDE, DENTAL GEL, (PREVIDENT) 1 1 % GEL PreviDent 5000 Booster Plus 1 1 % dental paste      Sodium Hyaluronate (EUFLEXXA) 20 MG/2ML SOSY 4 mL       No current facility-administered medications for this visit        Current Outpatient Prescriptions on File Prior to Visit   Medication Sig    AMILoride-hydrochlorothiazide (MODURETIC) 5-50 mg per tablet daily 1/2 tab daily     Cholecalciferol (VITAMIN D3) 1000 units CAPS Take by mouth daily      Empagliflozin (JARDIANCE) 10 MG TABS 10 mg Daily      furosemide (LASIX) 40 mg tablet Take 40 mg by mouth as needed      ibuprofen (MOTRIN) 200 mg tablet Take by mouth every 6 (six) hours as needed for mild pain    KLOR-CON M20 20 MEQ tablet Take 20 mEq by mouth as needed      losartan (COZAAR) 50 mg tablet losartan 50 mg tablet    metoprolol succinate (TOPROL-XL) 100 mg 24 hr tablet Take 100 mg by mouth daily    Multiple Vitamins-Minerals (CENTRUM SILVER PO) multivitamin    chlorhexidine (PERIDEX) 0 12 % solution chlorhexidine gluconate 0 12 % mouthwash    hydrocortisone 2 5 % cream as needed    ketoconazole (NIZORAL) 2 % cream as needed    Omega-3 Fatty Acids (FISH OIL) 1,000 mg Fish Oil    SODIUM FLUORIDE, DENTAL GEL, (PREVIDENT) 1 1 % GEL PreviDent 5000 Booster Plus 1 1 % dental paste    Sodium Hyaluronate (EUFLEXXA) 20 MG/2ML SOSY 4 mL     No current facility-administered medications on file prior to visit  She is allergic to other            Objective:    Blood pressure 169/76, pulse 102, height 5' 3 5" (1 613 m), weight 105 kg (232 lb)  Physical Exam/Neurological Exam  CONSTITUTIONAL: NAD, pleasant  NECK: supple, no lymphadenopathy, no thyromegaly, no JVD  CARDIOVASCULAR: RRR, normal S1S2, no murmurs, no rubs  RESP: clear to auscultation bilaterally, no wheezes/rhonchi/rales  ABDOMEN: soft, non tender, non distended  SKIN: no rash or skin lesions  EXTREMITIES: no edema, pulses 2+bilaterally  PSYCH: appropriate mood and affect  NEUROLOGIC COMPREHENSIVE EXAM: Patient is oriented to person, place and time, NAD; appropriate affect  CN II, III, IV, V, VI, VII,VIII,IX,X,XI-XII intact with EOMI, PERRLA, OKN intact, VF grossly intact, fundi poorly visualized secondary to pupillary constriction; symmetric face noted   Motor: 5/5 UE/LE bilateral symmetric; Sensory: intact to light touch and pinprick bilaterally; normal vibration sensation feet bilaterally; Coordination within normal limits on FTN and OTONIEL testing; DTR: 2/4 through, no Babinski, no clonus  Tandem gait is abnormal  Romberg: negative  ROS:  12 points of review of system was reviewed with the patient and was unremarkable with exception: see HPI  Review of Systems   Constitutional: Negative  Negative for appetite change and fever  HENT: Negative  Negative for hearing loss, tinnitus, trouble swallowing and voice change  Eyes: Negative  Negative for photophobia and pain  Dry eyes   Respiratory: Negative  Negative for shortness of breath  Cardiovascular: Negative  Negative for palpitations  Gastrointestinal: Negative  Negative for nausea and vomiting  Endocrine: Negative  Negative for cold intolerance and heat intolerance  Genitourinary: Negative  Negative for dysuria, frequency and urgency  Loss of bladder control   Musculoskeletal: Positive for back pain and gait problem  Negative for myalgias and neck pain  Joint pain and pain when walking   Skin: Negative  Negative for rash  Neurological: Positive for tremors  Negative for dizziness, seizures, syncope, facial asymmetry, speech difficulty, weakness, light-headedness, numbness and headaches  Twitching, waking up at night and trouble falling asleep   Hematological: Negative  Does not bruise/bleed easily  Psychiatric/Behavioral: Negative  Negative for confusion, hallucinations and sleep disturbance

## 2018-09-24 ENCOUNTER — HOSPITAL ENCOUNTER (OUTPATIENT)
Dept: RADIOLOGY | Facility: HOSPITAL | Age: 73
Discharge: HOME | End: 2018-09-24
Attending: PSYCHIATRY & NEUROLOGY
Payer: COMMERCIAL

## 2018-09-24 ENCOUNTER — TELEPHONE (OUTPATIENT)
Dept: NEUROLOGY | Facility: CLINIC | Age: 73
End: 2018-09-24

## 2018-09-24 DIAGNOSIS — R26.9 UNSPECIFIED ABNORMALITIES OF GAIT AND MOBILITY: ICD-10-CM

## 2018-09-24 DIAGNOSIS — R25.1 TREMOR OF HANDS AND FACE: ICD-10-CM

## 2018-09-24 DIAGNOSIS — I63.513 CEREBROVASCULAR ACCIDENT (CVA) DUE TO BILATERAL STENOSIS OF MIDDLE CEREBRAL ARTERIES (HCC): Primary | ICD-10-CM

## 2018-09-24 PROCEDURE — 70450 CT HEAD/BRAIN W/O DYE: CPT

## 2018-09-24 NOTE — TELEPHONE ENCOUNTER
Called phone number- discussed results of CT head with her daughter - patient has chronic small lacunar strokes in basal ganglia , likely de to HTN  Patient was recommended to start aspirin 81 mg unless it is contraindicated       Please help the patient scheduling Carotid Doppler US

## 2018-09-26 NOTE — TELEPHONE ENCOUNTER
Pt called and advised pt of all of the below  She verbalized clear understanding of all instructions  Carotid doppler US scheduled 10/2/18 @ noon

## 2018-10-02 ENCOUNTER — HOSPITAL ENCOUNTER (OUTPATIENT)
Dept: NON INVASIVE DIAGNOSTICS | Facility: CLINIC | Age: 73
Discharge: HOME | End: 2018-10-02
Payer: COMMERCIAL

## 2018-10-02 DIAGNOSIS — I63.513 CEREBROVASCULAR ACCIDENT (CVA) DUE TO BILATERAL STENOSIS OF MIDDLE CEREBRAL ARTERIES (HCC): ICD-10-CM

## 2018-10-02 PROCEDURE — 93880 EXTRACRANIAL BILAT STUDY: CPT

## 2018-10-02 PROCEDURE — 93880 EXTRACRANIAL BILAT STUDY: CPT | Performed by: SURGERY

## 2018-11-26 ENCOUNTER — TRANSCRIBE ORDERS (OUTPATIENT)
Dept: ADMINISTRATIVE | Facility: HOSPITAL | Age: 73
End: 2018-11-26

## 2018-11-26 ENCOUNTER — LAB REQUISITION (OUTPATIENT)
Dept: LAB | Facility: HOSPITAL | Age: 73
End: 2018-11-26
Payer: COMMERCIAL

## 2018-11-26 DIAGNOSIS — E03.9 HYPOTHYROIDISM: ICD-10-CM

## 2018-11-26 DIAGNOSIS — I34.8 MYXOID TRANSFORMATION OF MITRAL VALVE: ICD-10-CM

## 2018-11-26 DIAGNOSIS — E78.5 HYPERLIPIDEMIA: ICD-10-CM

## 2018-11-26 DIAGNOSIS — I50.9 HEART FAILURE (HCC): ICD-10-CM

## 2018-11-26 DIAGNOSIS — E11.9 TYPE 2 DIABETES MELLITUS WITHOUT COMPLICATIONS (HCC): ICD-10-CM

## 2018-11-26 DIAGNOSIS — I34.0 NONRHEUMATIC MITRAL VALVE INSUFFICIENCY: ICD-10-CM

## 2018-11-26 DIAGNOSIS — I10 ESSENTIAL (PRIMARY) HYPERTENSION: ICD-10-CM

## 2018-11-26 DIAGNOSIS — I50.9 HEART FAILURE, UNSPECIFIED HF CHRONICITY, UNSPECIFIED HEART FAILURE TYPE (HCC): Primary | ICD-10-CM

## 2018-12-28 ENCOUNTER — HOSPITAL ENCOUNTER (OUTPATIENT)
Dept: NON INVASIVE DIAGNOSTICS | Facility: CLINIC | Age: 73
Discharge: HOME | End: 2018-12-28
Payer: COMMERCIAL

## 2018-12-28 DIAGNOSIS — I50.9 HEART FAILURE, UNSPECIFIED HF CHRONICITY, UNSPECIFIED HEART FAILURE TYPE (HCC): ICD-10-CM

## 2018-12-28 DIAGNOSIS — I34.8 MYXOID TRANSFORMATION OF MITRAL VALVE: ICD-10-CM

## 2018-12-28 PROCEDURE — 93306 TTE W/DOPPLER COMPLETE: CPT | Performed by: INTERNAL MEDICINE

## 2018-12-28 PROCEDURE — 93306 TTE W/DOPPLER COMPLETE: CPT

## 2019-03-22 ENCOUNTER — LAB REQUISITION (OUTPATIENT)
Dept: LAB | Facility: HOSPITAL | Age: 74
End: 2019-03-22
Payer: COMMERCIAL

## 2019-03-22 DIAGNOSIS — E78.5 HYPERLIPIDEMIA: ICD-10-CM

## 2019-03-22 DIAGNOSIS — I10 ESSENTIAL (PRIMARY) HYPERTENSION: ICD-10-CM

## 2019-03-22 DIAGNOSIS — E11.65 TYPE 2 DIABETES MELLITUS WITH HYPERGLYCEMIA (HCC): ICD-10-CM

## 2019-03-22 LAB
ALBUMIN SERPL BCP-MCNC: 3.6 G/DL (ref 3.5–5)
ALP SERPL-CCNC: 97 U/L (ref 46–116)
ALT SERPL W P-5'-P-CCNC: 26 U/L (ref 12–78)
ANION GAP SERPL CALCULATED.3IONS-SCNC: 7 MMOL/L (ref 4–13)
AST SERPL W P-5'-P-CCNC: 16 U/L (ref 5–45)
BILIRUB SERPL-MCNC: 0.28 MG/DL (ref 0.2–1)
BUN SERPL-MCNC: 23 MG/DL (ref 5–25)
CALCIUM SERPL-MCNC: 9.3 MG/DL (ref 8.3–10.1)
CHLORIDE SERPL-SCNC: 103 MMOL/L (ref 100–108)
CO2 SERPL-SCNC: 26 MMOL/L (ref 21–32)
CREAT SERPL-MCNC: 1.09 MG/DL (ref 0.6–1.3)
EST. AVERAGE GLUCOSE BLD GHB EST-MCNC: 171 MG/DL
GFR SERPL CREATININE-BSD FRML MDRD: 50 ML/MIN/1.73SQ M
GLUCOSE SERPL-MCNC: 132 MG/DL (ref 65–140)
HBA1C MFR BLD: 7.6 % (ref 4.2–6.3)
LDLC SERPL DIRECT ASSAY-MCNC: 161 MG/DL (ref 0–100)
POTASSIUM SERPL-SCNC: 4.6 MMOL/L (ref 3.5–5.3)
PROT SERPL-MCNC: 7.4 G/DL (ref 6.4–8.2)
SODIUM SERPL-SCNC: 136 MMOL/L (ref 136–145)

## 2019-03-22 PROCEDURE — 80053 COMPREHEN METABOLIC PANEL: CPT | Performed by: INTERNAL MEDICINE

## 2019-03-22 PROCEDURE — 83721 ASSAY OF BLOOD LIPOPROTEIN: CPT | Performed by: INTERNAL MEDICINE

## 2019-03-22 PROCEDURE — 83036 HEMOGLOBIN GLYCOSYLATED A1C: CPT | Performed by: INTERNAL MEDICINE

## 2019-05-23 ENCOUNTER — OFFICE VISIT (OUTPATIENT)
Dept: NEUROLOGY | Facility: CLINIC | Age: 74
End: 2019-05-23
Payer: COMMERCIAL

## 2019-05-23 VITALS — HEIGHT: 63 IN | WEIGHT: 226 LBS | BODY MASS INDEX: 40.04 KG/M2

## 2019-05-23 DIAGNOSIS — I63.9 CEREBROVASCULAR ACCIDENT (CVA), UNSPECIFIED MECHANISM (HCC): ICD-10-CM

## 2019-05-23 DIAGNOSIS — R26.9 GAIT DISTURBANCE: ICD-10-CM

## 2019-05-23 DIAGNOSIS — G25.0 TREMOR, ESSENTIAL: Primary | ICD-10-CM

## 2019-05-23 PROBLEM — I63.00 CEREBROVASCULAR ACCIDENT (CVA) DUE TO THROMBOSIS OF PRECEREBRAL ARTERY (HCC): Status: ACTIVE | Noted: 2019-05-23

## 2019-05-23 PROCEDURE — 99214 OFFICE O/P EST MOD 30 MIN: CPT | Performed by: PHYSICIAN ASSISTANT

## 2019-07-22 ENCOUNTER — LAB REQUISITION (OUTPATIENT)
Dept: LAB | Facility: HOSPITAL | Age: 74
End: 2019-07-22
Payer: COMMERCIAL

## 2019-07-22 DIAGNOSIS — I10 ESSENTIAL (PRIMARY) HYPERTENSION: ICD-10-CM

## 2019-07-22 DIAGNOSIS — E78.5 HYPERLIPIDEMIA: ICD-10-CM

## 2019-07-22 DIAGNOSIS — E11.9 TYPE 2 DIABETES MELLITUS WITHOUT COMPLICATIONS (HCC): ICD-10-CM

## 2019-07-22 LAB
ALBUMIN SERPL BCP-MCNC: 3.3 G/DL (ref 3.5–5)
ALP SERPL-CCNC: 88 U/L (ref 46–116)
ALT SERPL W P-5'-P-CCNC: 21 U/L (ref 12–78)
ANION GAP SERPL CALCULATED.3IONS-SCNC: 10 MMOL/L (ref 4–13)
AST SERPL W P-5'-P-CCNC: 18 U/L (ref 5–45)
BILIRUB SERPL-MCNC: 0.35 MG/DL (ref 0.2–1)
BUN SERPL-MCNC: 22 MG/DL (ref 5–25)
CALCIUM SERPL-MCNC: 8.9 MG/DL (ref 8.3–10.1)
CHLORIDE SERPL-SCNC: 107 MMOL/L (ref 100–108)
CO2 SERPL-SCNC: 24 MMOL/L (ref 21–32)
CREAT SERPL-MCNC: 1.01 MG/DL (ref 0.6–1.3)
EST. AVERAGE GLUCOSE BLD GHB EST-MCNC: 169 MG/DL
GFR SERPL CREATININE-BSD FRML MDRD: 55 ML/MIN/1.73SQ M
GLUCOSE SERPL-MCNC: 128 MG/DL (ref 65–140)
HBA1C MFR BLD: 7.5 % (ref 4.2–6.3)
LDLC SERPL DIRECT ASSAY-MCNC: 136 MG/DL (ref 0–100)
POTASSIUM SERPL-SCNC: 4.2 MMOL/L (ref 3.5–5.3)
PROT SERPL-MCNC: 6.6 G/DL (ref 6.4–8.2)
SODIUM SERPL-SCNC: 141 MMOL/L (ref 136–145)

## 2019-07-22 PROCEDURE — 80053 COMPREHEN METABOLIC PANEL: CPT | Performed by: INTERNAL MEDICINE

## 2019-07-22 PROCEDURE — 83036 HEMOGLOBIN GLYCOSYLATED A1C: CPT | Performed by: INTERNAL MEDICINE

## 2019-07-22 PROCEDURE — 83721 ASSAY OF BLOOD LIPOPROTEIN: CPT | Performed by: INTERNAL MEDICINE

## 2019-12-17 ENCOUNTER — LAB REQUISITION (OUTPATIENT)
Dept: LAB | Facility: HOSPITAL | Age: 74
End: 2019-12-17
Payer: COMMERCIAL

## 2019-12-17 DIAGNOSIS — E11.9 TYPE 2 DIABETES MELLITUS WITHOUT COMPLICATIONS (HCC): ICD-10-CM

## 2019-12-17 DIAGNOSIS — I10 ESSENTIAL (PRIMARY) HYPERTENSION: ICD-10-CM

## 2019-12-17 DIAGNOSIS — E87.6 HYPOKALEMIA: ICD-10-CM

## 2019-12-17 DIAGNOSIS — E78.5 HYPERLIPIDEMIA, UNSPECIFIED: ICD-10-CM

## 2019-12-17 LAB
ALBUMIN SERPL BCP-MCNC: 3.6 G/DL (ref 3.5–5)
ALP SERPL-CCNC: 91 U/L (ref 46–116)
ALT SERPL W P-5'-P-CCNC: 27 U/L (ref 12–78)
ANION GAP SERPL CALCULATED.3IONS-SCNC: 7 MMOL/L (ref 4–13)
AST SERPL W P-5'-P-CCNC: 17 U/L (ref 5–45)
BILIRUB SERPL-MCNC: 0.39 MG/DL (ref 0.2–1)
BUN SERPL-MCNC: 24 MG/DL (ref 5–25)
CALCIUM SERPL-MCNC: 9.6 MG/DL (ref 8.3–10.1)
CHLORIDE SERPL-SCNC: 105 MMOL/L (ref 100–108)
CO2 SERPL-SCNC: 26 MMOL/L (ref 21–32)
CREAT SERPL-MCNC: 0.94 MG/DL (ref 0.6–1.3)
EST. AVERAGE GLUCOSE BLD GHB EST-MCNC: 180 MG/DL
GFR SERPL CREATININE-BSD FRML MDRD: 60 ML/MIN/1.73SQ M
GLUCOSE SERPL-MCNC: 114 MG/DL (ref 65–140)
HBA1C MFR BLD: 7.9 % (ref 4.2–6.3)
LDLC SERPL DIRECT ASSAY-MCNC: 142 MG/DL (ref 0–100)
POTASSIUM SERPL-SCNC: 4.1 MMOL/L (ref 3.5–5.3)
PROT SERPL-MCNC: 7.5 G/DL (ref 6.4–8.2)
SODIUM SERPL-SCNC: 138 MMOL/L (ref 136–145)

## 2019-12-17 PROCEDURE — 80053 COMPREHEN METABOLIC PANEL: CPT | Performed by: INTERNAL MEDICINE

## 2019-12-17 PROCEDURE — 83721 ASSAY OF BLOOD LIPOPROTEIN: CPT | Performed by: INTERNAL MEDICINE

## 2019-12-17 PROCEDURE — 83036 HEMOGLOBIN GLYCOSYLATED A1C: CPT | Performed by: INTERNAL MEDICINE

## 2020-06-22 ENCOUNTER — LAB REQUISITION (OUTPATIENT)
Dept: LAB | Facility: HOSPITAL | Age: 75
End: 2020-06-22
Payer: COMMERCIAL

## 2020-06-22 DIAGNOSIS — E11.9 TYPE 2 DIABETES MELLITUS WITHOUT COMPLICATIONS (HCC): ICD-10-CM

## 2020-06-22 DIAGNOSIS — I10 ESSENTIAL (PRIMARY) HYPERTENSION: ICD-10-CM

## 2020-06-22 DIAGNOSIS — E78.5 HYPERLIPIDEMIA, UNSPECIFIED: ICD-10-CM

## 2020-06-22 LAB
ALBUMIN SERPL BCP-MCNC: 3.5 G/DL (ref 3.5–5)
ALP SERPL-CCNC: 90 U/L (ref 46–116)
ALT SERPL W P-5'-P-CCNC: 20 U/L (ref 12–78)
ANION GAP SERPL CALCULATED.3IONS-SCNC: 8 MMOL/L (ref 4–13)
AST SERPL W P-5'-P-CCNC: 16 U/L (ref 5–45)
BILIRUB SERPL-MCNC: 0.38 MG/DL (ref 0.2–1)
BUN SERPL-MCNC: 24 MG/DL (ref 5–25)
CALCIUM SERPL-MCNC: 9.8 MG/DL (ref 8.3–10.1)
CHLORIDE SERPL-SCNC: 103 MMOL/L (ref 100–108)
CHOLEST SERPL-MCNC: 249 MG/DL (ref 50–200)
CO2 SERPL-SCNC: 24 MMOL/L (ref 21–32)
CREAT SERPL-MCNC: 1.03 MG/DL (ref 0.6–1.3)
EST. AVERAGE GLUCOSE BLD GHB EST-MCNC: 163 MG/DL
GFR SERPL CREATININE-BSD FRML MDRD: 53 ML/MIN/1.73SQ M
GLUCOSE SERPL-MCNC: 111 MG/DL (ref 65–140)
HBA1C MFR BLD: 7.3 %
HDLC SERPL-MCNC: 46 MG/DL
LDLC SERPL CALC-MCNC: 135 MG/DL (ref 0–100)
NONHDLC SERPL-MCNC: 203 MG/DL
POTASSIUM SERPL-SCNC: 4.5 MMOL/L (ref 3.5–5.3)
PROT SERPL-MCNC: 7.1 G/DL (ref 6.4–8.2)
SODIUM SERPL-SCNC: 135 MMOL/L (ref 136–145)
TRIGL SERPL-MCNC: 340 MG/DL

## 2020-06-22 PROCEDURE — 80061 LIPID PANEL: CPT | Performed by: INTERNAL MEDICINE

## 2020-06-22 PROCEDURE — 83036 HEMOGLOBIN GLYCOSYLATED A1C: CPT | Performed by: INTERNAL MEDICINE

## 2020-06-22 PROCEDURE — 80053 COMPREHEN METABOLIC PANEL: CPT | Performed by: INTERNAL MEDICINE

## 2020-10-30 ENCOUNTER — LAB REQUISITION (OUTPATIENT)
Dept: LAB | Facility: HOSPITAL | Age: 75
End: 2020-10-30
Payer: COMMERCIAL

## 2020-10-30 DIAGNOSIS — E11.9 TYPE 2 DIABETES MELLITUS WITHOUT COMPLICATIONS (HCC): ICD-10-CM

## 2020-10-30 DIAGNOSIS — E78.5 HYPERLIPIDEMIA, UNSPECIFIED: ICD-10-CM

## 2020-10-30 LAB
ANION GAP SERPL CALCULATED.3IONS-SCNC: 7 MMOL/L (ref 4–13)
BUN SERPL-MCNC: 21 MG/DL (ref 5–25)
CALCIUM SERPL-MCNC: 9 MG/DL (ref 8.3–10.1)
CHLORIDE SERPL-SCNC: 102 MMOL/L (ref 100–108)
CO2 SERPL-SCNC: 28 MMOL/L (ref 21–32)
CREAT SERPL-MCNC: 0.97 MG/DL (ref 0.6–1.3)
EST. AVERAGE GLUCOSE BLD GHB EST-MCNC: 146 MG/DL
GFR SERPL CREATININE-BSD FRML MDRD: 57 ML/MIN/1.73SQ M
GLUCOSE SERPL-MCNC: 86 MG/DL (ref 65–140)
HBA1C MFR BLD: 6.7 %
POTASSIUM SERPL-SCNC: 4.6 MMOL/L (ref 3.5–5.3)
SODIUM SERPL-SCNC: 137 MMOL/L (ref 136–145)

## 2020-10-30 PROCEDURE — 80048 BASIC METABOLIC PNL TOTAL CA: CPT | Performed by: INTERNAL MEDICINE

## 2020-10-30 PROCEDURE — 83036 HEMOGLOBIN GLYCOSYLATED A1C: CPT | Performed by: INTERNAL MEDICINE

## 2020-11-20 ENCOUNTER — NURSE TRIAGE (OUTPATIENT)
Dept: OTHER | Facility: OTHER | Age: 75
End: 2020-11-20

## 2020-11-20 DIAGNOSIS — Z11.59 SPECIAL SCREENING EXAMINATION FOR VIRAL DISEASE: Primary | ICD-10-CM

## 2020-11-23 DIAGNOSIS — Z11.59 SPECIAL SCREENING EXAMINATION FOR VIRAL DISEASE: ICD-10-CM

## 2020-11-23 PROCEDURE — U0003 INFECTIOUS AGENT DETECTION BY NUCLEIC ACID (DNA OR RNA); SEVERE ACUTE RESPIRATORY SYNDROME CORONAVIRUS 2 (SARS-COV-2) (CORONAVIRUS DISEASE [COVID-19]), AMPLIFIED PROBE TECHNIQUE, MAKING USE OF HIGH THROUGHPUT TECHNOLOGIES AS DESCRIBED BY CMS-2020-01-R: HCPCS | Performed by: FAMILY MEDICINE

## 2020-11-24 LAB — SARS-COV-2 RNA SPEC QL NAA+PROBE: NOT DETECTED

## 2020-11-27 ENCOUNTER — TELEPHONE (OUTPATIENT)
Dept: OTHER | Facility: OTHER | Age: 75
End: 2020-11-27

## 2020-11-30 ENCOUNTER — TELEPHONE (OUTPATIENT)
Dept: OTHER | Facility: OTHER | Age: 75
End: 2020-11-30

## 2020-12-01 ENCOUNTER — TELEPHONE (OUTPATIENT)
Dept: OTHER | Facility: OTHER | Age: 75
End: 2020-12-01

## 2021-01-08 DIAGNOSIS — R07.0 THROAT PAIN: ICD-10-CM

## 2021-01-08 DIAGNOSIS — R05.9 COUGH: ICD-10-CM

## 2021-01-08 PROCEDURE — U0003 INFECTIOUS AGENT DETECTION BY NUCLEIC ACID (DNA OR RNA); SEVERE ACUTE RESPIRATORY SYNDROME CORONAVIRUS 2 (SARS-COV-2) (CORONAVIRUS DISEASE [COVID-19]), AMPLIFIED PROBE TECHNIQUE, MAKING USE OF HIGH THROUGHPUT TECHNOLOGIES AS DESCRIBED BY CMS-2020-01-R: HCPCS | Performed by: INTERNAL MEDICINE

## 2021-01-08 PROCEDURE — U0005 INFEC AGEN DETEC AMPLI PROBE: HCPCS | Performed by: INTERNAL MEDICINE

## 2021-01-09 LAB — SARS-COV-2 RNA SPEC QL NAA+PROBE: DETECTED

## 2021-01-12 RX ORDER — ALBUTEROL SULFATE 90 UG/1
3 AEROSOL, METERED RESPIRATORY (INHALATION) ONCE AS NEEDED
Status: DISCONTINUED | OUTPATIENT
Start: 2021-01-13 | End: 2021-01-16 | Stop reason: HOSPADM

## 2021-01-12 RX ORDER — ACETAMINOPHEN 325 MG/1
650 TABLET ORAL ONCE AS NEEDED
Status: DISCONTINUED | OUTPATIENT
Start: 2021-01-13 | End: 2021-01-16 | Stop reason: HOSPADM

## 2021-01-12 RX ORDER — SODIUM CHLORIDE 9 MG/ML
20 INJECTION, SOLUTION INTRAVENOUS CONTINUOUS
Status: DISCONTINUED | OUTPATIENT
Start: 2021-01-13 | End: 2021-01-16 | Stop reason: HOSPADM

## 2021-01-12 NOTE — PROGRESS NOTES
Called and confirmed appt with pt for tomorrow  Pt has paper orders that were faxed to pharmacy and scanned into Epic on 1/12/2021  Positive test results with in time frame parameters

## 2021-01-13 ENCOUNTER — HOSPITAL ENCOUNTER (OUTPATIENT)
Dept: INFUSION CENTER | Facility: HOSPITAL | Age: 76
Discharge: HOME | End: 2021-01-13
Payer: COMMERCIAL

## 2021-01-13 VITALS
OXYGEN SATURATION: 96 % | RESPIRATION RATE: 17 BRPM | HEART RATE: 69 BPM | TEMPERATURE: 97.5 F | SYSTOLIC BLOOD PRESSURE: 181 MMHG | DIASTOLIC BLOOD PRESSURE: 72 MMHG

## 2021-01-13 PROCEDURE — M0239 BAMLANIVIMAB-XXXX INFUSION: HCPCS | Performed by: INTERNAL MEDICINE

## 2021-01-13 RX ORDER — GLIMEPIRIDE 1 MG/1
1 TABLET ORAL
COMMUNITY

## 2021-01-13 RX ADMIN — SODIUM CHLORIDE 700 MG: 9 INJECTION, SOLUTION INTRAVENOUS at 10:42

## 2021-01-13 RX ADMIN — SODIUM CHLORIDE 700 MG: 9 INJECTION, SOLUTION INTRAVENOUS at 11:47

## 2021-01-13 RX ADMIN — SODIUM CHLORIDE 20 ML/HR: 0.9 INJECTION, SOLUTION INTRAVENOUS at 10:29

## 2021-01-13 NOTE — PROGRESS NOTES
Patient arrived to infusion center, verbal consent given by patient, provided EUA, patient has no questions or concerns, IV established, fluids infusing, pending arrival of Feliciano Davenport [phone] from pharmacy

## 2021-01-13 NOTE — PLAN OF CARE
Problem: SAFETY ADULT  Goal: Patient will remain free of falls  Description: INTERVENTIONS:  - Assess patient frequently for physical needs  -  Identify cognitive and physical deficits and behaviors that affect risk of falls    -  [place] fall precautions as indicated by assessment   - Educate patient/family on patient safety including physical limitations  - Instruct patient to call for assistance with activity based on assessment  - Modify environment to reduce risk of injury  - Consider OT/PT consult to assist with strengthening/mobility  Outcome: Progressing     Problem: SAFETY ADULT  Goal: Maintain or return to baseline ADL function  Description: INTERVENTIONS:  -  Assess patient's ability to carry out ADLs; assess patient's baseline for ADL function and identify physical deficits which impact ability to perform ADLs (bathing, care of mouth/teeth, toileting, grooming, dressing, etc )  - Assess/evaluate cause of self-care deficits   - Assess range of motion  - Assess patient's mobility; develop plan if impaired  - Assess patient's need for assistive devices and provide as appropriate  - Encourage maximum independence but intervene and supervise when necessary  - Involve family in performance of ADLs  - Assess for home care needs following discharge   - Consider OT consult to assist with ADL evaluation and planning for discharge  - Provide patient education as appropriate  Outcome: Progressing     Problem: SAFETY ADULT  Goal: Maintain or return mobility status to optimal level  Description: INTERVENTIONS:  - Assess patient's baseline mobility status (ambulation, transfers, stairs, etc )    - Identify cognitive and physical deficits and behaviors that affect mobility  - Identify mobility aids required to assist with transfers and/or ambulation (gait belt, sit-to-stand, lift, walker, cane, etc )  - [place] fall precautions as indicated by assessment  - Record patient progress and toleration of activity level on Mobility SBAR; progress patient to next Phase/Stage  - Instruct patient to call for assistance with activity based on assessment  - Consider rehabilitation consult to assist with strengthening/weightbearing, etc   Outcome: Progressing

## 2021-01-13 NOTE — PROGRESS NOTES
Drug noted to be disconnected from patients IV, puddle noted to floor, patient did not receive any of drug, pharmacy aware and remixing and sending new drug

## 2021-01-13 NOTE — PROGRESS NOTES
[employer] completed at this time, patient offers no complaints, no immediate adverse reactions noted, one hour observation in progress

## 2021-01-20 DIAGNOSIS — Z23 ENCOUNTER FOR IMMUNIZATION: ICD-10-CM

## 2021-03-10 ENCOUNTER — LAB REQUISITION (OUTPATIENT)
Dept: LAB | Facility: HOSPITAL | Age: 76
End: 2021-03-10
Payer: COMMERCIAL

## 2021-03-10 DIAGNOSIS — E78.5 HYPERLIPIDEMIA, UNSPECIFIED: ICD-10-CM

## 2021-03-10 DIAGNOSIS — I10 ESSENTIAL (PRIMARY) HYPERTENSION: ICD-10-CM

## 2021-03-10 DIAGNOSIS — E11.9 TYPE 2 DIABETES MELLITUS WITHOUT COMPLICATIONS (HCC): ICD-10-CM

## 2021-03-10 LAB
ALBUMIN SERPL BCP-MCNC: 3.6 G/DL (ref 3.5–5)
ALP SERPL-CCNC: 95 U/L (ref 46–116)
ALT SERPL W P-5'-P-CCNC: 22 U/L (ref 12–78)
ANION GAP SERPL CALCULATED.3IONS-SCNC: 6 MMOL/L (ref 4–13)
AST SERPL W P-5'-P-CCNC: 14 U/L (ref 5–45)
BILIRUB SERPL-MCNC: 0.51 MG/DL (ref 0.2–1)
BUN SERPL-MCNC: 20 MG/DL (ref 5–25)
CALCIUM SERPL-MCNC: 9.2 MG/DL (ref 8.3–10.1)
CHLORIDE SERPL-SCNC: 107 MMOL/L (ref 100–108)
CHOLEST SERPL-MCNC: 243 MG/DL (ref 50–200)
CO2 SERPL-SCNC: 28 MMOL/L (ref 21–32)
CREAT SERPL-MCNC: 0.97 MG/DL (ref 0.6–1.3)
EST. AVERAGE GLUCOSE BLD GHB EST-MCNC: 134 MG/DL
GFR SERPL CREATININE-BSD FRML MDRD: 57 ML/MIN/1.73SQ M
GLUCOSE SERPL-MCNC: 89 MG/DL (ref 65–140)
HBA1C MFR BLD: 6.3 %
HDLC SERPL-MCNC: 51 MG/DL
LDLC SERPL CALC-MCNC: 154 MG/DL (ref 0–100)
NONHDLC SERPL-MCNC: 192 MG/DL
POTASSIUM SERPL-SCNC: 4.5 MMOL/L (ref 3.5–5.3)
PROT SERPL-MCNC: 7.4 G/DL (ref 6.4–8.2)
SODIUM SERPL-SCNC: 141 MMOL/L (ref 136–145)
TRIGL SERPL-MCNC: 190 MG/DL

## 2021-03-10 PROCEDURE — 80053 COMPREHEN METABOLIC PANEL: CPT | Performed by: INTERNAL MEDICINE

## 2021-03-10 PROCEDURE — 80061 LIPID PANEL: CPT | Performed by: INTERNAL MEDICINE

## 2021-03-10 PROCEDURE — 83036 HEMOGLOBIN GLYCOSYLATED A1C: CPT | Performed by: INTERNAL MEDICINE

## 2021-04-12 ENCOUNTER — IMMUNIZATIONS (OUTPATIENT)
Dept: FAMILY MEDICINE CLINIC | Facility: HOSPITAL | Age: 76
End: 2021-04-12

## 2021-04-12 DIAGNOSIS — Z23 ENCOUNTER FOR IMMUNIZATION: Primary | ICD-10-CM

## 2021-04-12 PROCEDURE — 91300 SARS-COV-2 / COVID-19 MRNA VACCINE (PFIZER-BIONTECH) 30 MCG: CPT

## 2021-04-12 PROCEDURE — 0001A SARS-COV-2 / COVID-19 MRNA VACCINE (PFIZER-BIONTECH) 30 MCG: CPT

## 2021-04-13 ENCOUNTER — APPOINTMENT (EMERGENCY)
Dept: RADIOLOGY | Facility: HOSPITAL | Age: 76
End: 2021-04-13
Payer: COMMERCIAL

## 2021-04-13 ENCOUNTER — HOSPITAL ENCOUNTER (EMERGENCY)
Facility: HOSPITAL | Age: 76
Discharge: HOME | End: 2021-04-13
Attending: EMERGENCY MEDICINE | Admitting: EMERGENCY MEDICINE
Payer: COMMERCIAL

## 2021-04-13 VITALS
BODY MASS INDEX: 41.1 KG/M2 | DIASTOLIC BLOOD PRESSURE: 78 MMHG | RESPIRATION RATE: 17 BRPM | OXYGEN SATURATION: 99 % | WEIGHT: 232 LBS | SYSTOLIC BLOOD PRESSURE: 182 MMHG | HEART RATE: 68 BPM | TEMPERATURE: 98.8 F

## 2021-04-13 DIAGNOSIS — M54.9 BACK PAIN: Primary | ICD-10-CM

## 2021-04-13 DIAGNOSIS — Z79.82 ASPIRIN LONG-TERM USE: ICD-10-CM

## 2021-04-13 DIAGNOSIS — S09.90XA CLOSED HEAD INJURY, INITIAL ENCOUNTER: ICD-10-CM
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
## 2021-04-13 PROCEDURE — 99284 EMERGENCY DEPT VISIT MOD MDM: CPT | Performed by: EMERGENCY MEDICINE

## 2021-04-13 PROCEDURE — 99284 EMERGENCY DEPT VISIT MOD MDM: CPT

## 2021-04-13 PROCEDURE — 72125 CT NECK SPINE W/O DYE: CPT

## 2021-04-13 PROCEDURE — 70450 CT HEAD/BRAIN W/O DYE: CPT

## 2021-04-13 PROCEDURE — G1004 CDSM NDSC: HCPCS

## 2021-04-13 PROCEDURE — 72128 CT CHEST SPINE W/O DYE: CPT

## 2021-04-13 RX ORDER — ASPIRIN 81 MG/1
81 TABLET, CHEWABLE ORAL DAILY
COMMUNITY

## 2021-04-13 RX ORDER — ACETAMINOPHEN 325 MG/1
650 TABLET ORAL ONCE
Status: COMPLETED | OUTPATIENT
Start: 2021-04-13 | End: 2021-04-13

## 2021-04-13 RX ORDER — LIDOCAINE 50 MG/G
1 PATCH TOPICAL ONCE
Status: DISCONTINUED | OUTPATIENT
Start: 2021-04-13 | End: 2021-04-14 | Stop reason: HOSPADM

## 2021-04-13 RX ADMIN — ACETAMINOPHEN 650 MG: 325 TABLET ORAL at 20:58

## 2021-04-13 RX ADMIN — LIDOCAINE 1 PATCH: 50 PATCH TOPICAL at 21:53

## 2021-04-13 NOTE — Clinical Note
Roni Matute was seen and treated in our emergency department on 4/13/2021  Diagnosis:     Lynn Corporal    She may return on this date: 04/15/2021    Or sooner     If you have any questions or concerns, please don't hesitate to call        Lisa Nazario MD    ______________________________           _______________          _______________  Hospital Representative                              Date                                Time

## 2021-04-13 NOTE — Clinical Note
accompanied Bam Morales to the emergency department on 4/13/2021  Return date if applicable: 07/20/0651    Or sooner    If you have any questions or concerns, please don't hesitate to call        Krystal Duane, MD

## 2021-04-13 NOTE — Clinical Note
accompanied Deisi Zaidi to the emergency department on 4/13/2021  Return date if applicable: 52/94/6100    Or sooner    If you have any questions or concerns, please don't hesitate to call        Philip Sam MD

## 2021-04-13 NOTE — Clinical Note
accompanied Edis Velázquez to the emergency department on 4/13/2021  Return date if applicable: If you have any questions or concerns, please don't hesitate to call        Candelaria Gilliland MD

## 2021-04-13 NOTE — Clinical Note
accompanied Bess Fitch to the emergency department on 4/13/2021  Return date if applicable: If you have any questions or concerns, please don't hesitate to call        Mari Hinton MD

## 2021-04-14 NOTE — ED ATTENDING ATTESTATION
4/13/2021  IHodan MD, saw and evaluated the patient  I have discussed the patient with the resident/non-physician practitioner and agree with the resident's/non-physician practitioner's findings, Plan of Care, and MDM as documented in the resident's/non-physician practitioner's note, except where noted  All available labs and Radiology studies were reviewed  I was present for key portions of any procedure(s) performed by the resident/non-physician practitioner and I was immediately available to provide assistance  At this point I agree with the current assessment done in the Emergency Department  I have conducted an independent evaluation of this patient a history and physical is as follows:  65-year-old female with prior stroke which causes gait instability  Patient states that she lost her balance, which is common for her, falling backwards and striking her head  Patient states she did not lose consciousness  Complains of posterior head pain as well as severe thoracic pain  Patient states that her back pain is severe, and when she moves, it radiates towards her chest   Patient denies numbness, tingling, weakness, or shortness of breath  No other complaints at this time  On exam the patient has a hematoma to her occiput  She also has midline thoracic tenderness  The remainder of her exam is unremarkable    Will plan to do imaging, symptomatic treatment  ED Course         Critical Care Time  Procedures

## 2021-04-14 NOTE — DISCHARGE INSTRUCTIONS
Patient Instructions: Today you were seen in the emergency department for after a fall  We reviewed your Cts and determined that you would be able to be discharged  You should used lidocaine patches and tylenol as needed for your pain  You should follow up with your primary care doctor  Please return to the emergency department if your symptoms get worse, you develop a fever, or you have any other symptoms we discussed today prior to discharge  Nice to meet you! Best of luck with everything!

## 2021-04-14 NOTE — ED CARE HANDOFF
Emergency Department Sign Out Note        Sign out and transfer of care from  Dr Gabino Hanson  See Separate Emergency Department note  The patient, Jaciel Kuhn, was evaluated by the previous provider for  Fall  On aspirin  Workup Completed:  CT head, CT Cervical, CT thoracic    ED Course / Workup Pending (followup):    Pending formal read of CT scans, pain control,  Have to walk patient and make sure that she is at her baseline ambulatory status                                  ED Course as of Apr 14 0553   Tue Apr 13, 2021   2136 66 yo ASA, baseline ambulatory dysfunction   [ ] pending CT        2140 No evidence of acute thoracic spine fracture or paraspinal hematoma  CT thoracic spine without contrast   2143 No acute intracranial abnormality       CT head without contrast     Procedures  MDM  Number of Diagnoses or Management Options  Aspirin long-term use:   Back pain:   Closed head injury, initial encounter:   Fall, initial encounter:   Diagnosis management comments:  21-year-old female who fell  Patient is on aspirin  She did complain of back pain as well  Her CT scans have been done, but not formally read  CT head as read by me is grossly negative  CTs as read by Radiology are negative  Patient able to ambulate using a walker  Discussed with her daughter and and her that she can go home or be admitted to the hospital for observation and PT/ OT evaluation  Patient prefers to go home  She still says that she is able to use a walker at home  She says that she has pain medications from 3 prior back surgeries  She notes improvement with the lidocaine patch  Patient was reassessed  Vital signs stable  Patient and/or family given discharge instructions and return precautions  Patient and/or family was reassured  The patient and/or family vocalizes understanding  Answered all of the patient's and/or family's questions  Will follow up with  her primary care doctor   Patient and/or family are agreeable to the plan  Amount and/or Complexity of Data Reviewed  Tests in the radiology section of CPT®: reviewed  Review and summarize past medical records: yes  Independent visualization of images, tracings, or specimens: yes        Disposition  Final diagnoses:   Back pain   Closed head injury, initial encounter   Fall, initial encounter   Aspirin long-term use     Time reflects when diagnosis was documented in both MDM as applicable and the Disposition within this note     Time User Action Codes Description Comment    4/13/2021 10:15 PM Jenna Mallorie Add [M54 9] Back pain     4/13/2021 10:15 PM Jenna Mallorie Add [S09 90XA] Closed head injury, initial encounter     4/13/2021 10:15 PM Jenna Mallorie Add Arabella Giron Beckers Fall, initial encounter     4/13/2021 10:15 PM Jenna Mallorie Add [Z79 82] Aspirin long-term use       ED Disposition     ED Disposition Condition Date/Time Comment    Discharge Stable Tue Apr 13, 2021 10:15 PM Thee Jaquez discharge to home/self care              Follow-up Information     Follow up With Specialties Details Why Aniceto Cooney MD Internal Medicine Call   [address] [facility] [address] [place] [address]          Discharge Medication List as of 4/13/2021 10:19 PM      CONTINUE these medications which have NOT CHANGED    Details   AMILoride-hydrochlorothiazide (MODURETIC) 5-50 mg per tablet daily 1/2 tab daily , Historical Med      aspirin 81 mg chewable tablet Chew 81 mg daily, Historical Med      Cholecalciferol (VITAMIN D3) 1000 units CAPS Take by mouth daily  , Historical Med      furosemide (LASIX) 40 mg tablet Take 40 mg by mouth as needed  , Historical Med      glimepiride (AMARYL) 1 mg tablet Take 1 mg by mouth every morning before breakfast, Historical Med      ibuprofen (MOTRIN) 200 mg tablet Take by mouth every 6 (six) hours as needed for mild pain, Historical Med      ketoconazole (NIZORAL) 2 % cream as needed, Historical Med      KLOR-CON M20 20 MEQ tablet Take 20 mEq by mouth as needed  , Starting Mon 4/23/2018, Historical Med      losartan (COZAAR) 50 mg tablet losartan 50 mg tablet, Historical Med      metoprolol succinate (TOPROL-XL) 100 mg 24 hr tablet Take 100 mg by mouth daily, Starting Thu 7/5/2018, Historical Med      Multiple Vitamins-Minerals (CENTRUM SILVER PO) multivitamin, Historical Med      hydrocortisone 2 5 % cream as needed, Historical Med           No discharge procedures on file         ED Provider  Electronically Signed by     Philip Sam MD  04/14/21 8295

## 2021-04-16 NOTE — ED PROVIDER NOTES
Emergency Department Trauma Note  Michelle Garcia 67 y o  female MRN: [id number]  Unit/Bed#: ED 09/ED 09 Encounter: [id number]      Trauma Alert: Trauma Acuity: C  Model of Arrival:   via    Trauma Team: Current Providers  Attending Provider: Olivia Key MD  Resident: Aiden Patel MD  Registered Nurse: Kimberly Laguerre RN  Resident: Zack Mc MD  Registered Nurse: Andreina Ross RN  Consultants: None      History of Present Illness     Chief Complaint:   Chief Complaint   Patient presents with   [facility] Fall     fall from standing at home onto carpeting  - LOC + head strike daily ASA  c/o head and back pain      HPI:  Michelle Garcia is a 67 y o  female who presents with back pain after mechanical fall at home  Mechanism:Details of Incident: fall from standing in her home head strike on ASA, complains of head and back pain Injury Date: 04/13/21 Injury Time: 200      65-year-old female with past medical history of HTN, DM, prior strokes (residual balance problems, on ASA daily), and back surgery presents to the ED via EMS after mechanical fall at home this evening  She states that she has chronic balance problems at baseline, thought to be due to prior strokes for which she is on aspirin daily  She notes that she was at home carpeted floor, with cement under the floor, when she lost her balance and fell backwards, striking the back of her head against the floor  She currently complains of mild posterior head pain as well as severe mid upper back pain that radiates through to her chest at times  She denies any loss of consciousness and any prodromal symptoms prior to, during, or after the fall  She normally ambulates with a walking stick at baseline, however she does admit that she should use it more frequently  She denies fever, chills, shortness of breath, abdominal pain, N/V/D, urinary symptoms, neck pain, numbness, and focal weakness            Review of Systems  Review of Systems Constitutional: Negative for chills and fever  HENT: Negative for congestion, rhinorrhea and sore throat  Respiratory: Negative for cough and shortness of breath  Cardiovascular: Negative for palpitations and leg swelling  Pain radiation from back to chest s/p fall  Gastrointestinal: Negative for abdominal pain, diarrhea, nausea and vomiting  Genitourinary: Negative for dysuria and hematuria  Musculoskeletal: Positive for back pain  Negative for neck pain  Skin: Negative for color change and rash  Neurological: Positive for headaches  Negative for dizziness, syncope, weakness, light-headedness and numbness  All other systems reviewed and are negative  Historical Information     Immunizations:   Immunization History   Administered Date(s) Administered    SARS-CoV-2 / COVID-19 mRNA IM ([employer]) 04/12/2021       Past Medical History:   Diagnosis Date    Arthritis     Colorectal polyps     Constipation     Diabetes mellitus ([facility] [address] [facility]     Diverticulosis of colon     Hiatal hernia     Hypertension     Increased urinary frequency     Lumbar disc disease     SOB (shortness of breath)     Stress incontinence        Family History   Problem Relation Age of Onset    Hypertension Mother     Heart attack Mother     Diabetes Mother     Prostate cancer Father     No Known Problems Brother     Stroke Maternal Grandmother     Stroke Paternal Grandmother     Stroke Paternal Grandfather     Thyroid cancer Neg Hx      Past Surgical History:   Procedure Laterality Date    BACK SURGERY      CARPAL TUNNEL RELEASE Left     CATARACT EXTRACTION Bilateral     CHOLECYSTECTOMY      COLONOSCOPY W/ ENDOSCOPIC US N/A 2/24/2016    Procedure: ANAL ENDOSCOPIC U/S;  Surgeon: Chantel Jean MD;  [facility];   Service:     HERNIA REPAIR      NV COLONOSCOPY FLX DX W/COLLJ SPEC WHEN PFRMD N/A 2/24/2016    Procedure: COLONOSCOPY;  Surgeon: Chantel Jean MD;  [facility];  Service: Colorectal    TONSILLECTOMY      TUBAL LIGATION       Social History     Tobacco Use    Smoking status: Never Smoker    Smokeless tobacco: Never Used   Substance Use Topics    Alcohol use: No    Drug use: No     E-Cigarette/Vaping     E-Cigarette/Vaping Substances       Family History: non-contributory    Meds/Allergies   Prior to Admission Medications   Prescriptions Last Dose Informant Patient Reported? Taking?    AMILoride-hydrochlorothiazide (MODURETIC) 5-50 mg per tablet  Self Yes Yes   Sig: daily 1/2 tab daily    Cholecalciferol (VITAMIN D3) 1000 units CAPS  Self Yes Yes   Sig: Take by mouth daily     KLOR-CON M20 20 MEQ tablet  Self Yes Yes   Sig: Take 20 mEq by mouth as needed     Multiple Vitamins-Minerals (CENTRUM SILVER PO)  Self Yes Yes   Sig: multivitamin   aspirin 81 mg chewable tablet   Yes Yes   Sig: Chew 81 mg daily   furosemide (LASIX) 40 mg tablet  Self Yes Yes   Sig: Take 40 mg by mouth as needed     glimepiride (AMARYL) 1 mg tablet   Yes Yes   Sig: Take 1 mg by mouth every morning before breakfast   hydrocortisone 2 5 % cream  Self Yes No   Sig: as needed   ibuprofen (MOTRIN) 200 mg tablet  Self Yes Yes   Sig: Take by mouth every 6 (six) hours as needed for mild pain   ketoconazole (NIZORAL) 2 % cream  Self Yes Yes   Sig: as needed   losartan (COZAAR) 50 mg tablet  Self Yes Yes   Sig: losartan 50 mg tablet   metoprolol succinate (TOPROL-XL) 100 mg 24 hr tablet  Self Yes Yes   Sig: Take 100 mg by mouth daily      Facility-Administered Medications: None       Allergies   Allergen Reactions    Other Cough and Sneezing     Seasonal allergies       PHYSICAL EXAM    PE limited by: none    Objective   Vitals:   First set: Temperature: 98 8 °F (37 1 °C) (04/13/21 2004)  Pulse: 78 (04/13/21 2004)  Respirations: 18 (04/13/21 2004)  Blood Pressure: 167/86 (04/13/21 2004)  SpO2: 99 % (04/13/21 2004)    Primary Survey:   (A) Airway: Intact, patent  (B) Breathing: spontaneous, unlabored  (C) Circulation: Pulses:   normal  (D) Disabliity:  GCS Total:  15  (E) Expose:  Completed    Secondary Survey: (Click on Physical Exam tab above)  Physical Exam  Physical Exam  Vitals signs and nursing note reviewed  Constitutional:       Appearance: Normal appearance  She is obese  She is not diaphoretic  HENT:      Head: Normocephalic  Comments: Posterior occipital scalp abrasion, no active bleeding, no bony deformity, non-boggy  Right Ear: External ear normal       Left Ear: External ear normal       Nose: Nose normal       Mouth/Throat:      Mouth: Mucous membranes are moist       Pharynx: Oropharynx is clear  Eyes:      Extraocular Movements: Extraocular movements intact  Conjunctiva/sclera: Conjunctivae normal       Pupils: Pupils are equal, round, and reactive to light  Neck:      Musculoskeletal: Normal range of motion and neck supple  No muscular tenderness  Cardiovascular:      Rate and Rhythm: Normal rate and regular rhythm  Pulses: Normal pulses  Heart sounds: Normal heart sounds  No murmur  Pulmonary:      Effort: Pulmonary effort is normal  No respiratory distress  Breath sounds: Normal breath sounds  No wheezing or rales  Chest:      Chest wall: No tenderness  Abdominal:      General: Abdomen is flat  Bowel sounds are normal       Palpations: Abdomen is soft  Tenderness: There is no abdominal tenderness  There is no right CVA tenderness, left CVA tenderness or guarding  Musculoskeletal:         General: No swelling  Comments: No midline cervical spine tenderness  Diffuse midline thoracic spine tenderness without bony step-off  Mild lower lumbar paraspinal tenderness, unchanged from baseline  No bony deformity of the bilateral upper/lower extremities  Pelvis stable  FROM  Back pain with bilateral leg lifts  Skin:     General: Skin is warm and dry  Capillary Refill: Capillary refill takes less than 2 seconds     Neurological: General: No focal deficit present  Mental Status: She is alert and oriented to person, place, and time  Mental status is at baseline  Cranial Nerves: No cranial nerve deficit  Sensory: No sensory deficit  Motor: No weakness  Cervical spine cleared by clinical criteria? Yes     Invasive Devices     None                 Lab Results:   Results Reviewed     None                 Imaging Studies:   Direct to CT: Yes  CT head without contrast   Final Result by Daniel Wu MD (04/13 2140)      No acute intracranial abnormality  Workstation performed: [id number]         CT cervical spine without contrast   Final Result by Daniel Wu MD (04/13 2143)      No acute spine fracture or traumatic malalignment  Workstation performed: [id number]         CT thoracic spine without contrast   Final Result by Daniel Wu MD (04/13 2138)      No evidence of acute thoracic spine fracture or paraspinal hematoma  Workstation performed: [id number]               Procedures  Procedures         ED Course           MDM  Number of Diagnoses or Management Options  Aspirin long-term use:   Back pain:   Closed head injury, initial encounter:   Fall, initial encounter:   Diagnosis management comments: 67 y F with hx of HTN, DM, prior strokes (residual balance problems, on ASA daily), and back surgery presenting to the ED via EMS after mechanical fall at home this evening  She has chronic balance problems at baseline, thought to be due to prior strokes for which she is on aspirin daily  She was at home carpeted floor, with cement under the floor, when she lost her balance and fell backwards, striking the back of her head against the floor  She currently complains of mild posterior head pain as well as severe mid upper back pain that radiates through to her chest at times    She denies any loss of consciousness and any prodromal symptoms prior to, during, or after the fall  She normally ambulates with a walking stick at baseline, however she does admit that she should use it more frequently  No other symptoms or complaints  On exam she is afebrile, hypertensive, otherwise VSS, with a posterior occipital scalp abrasion, no active bleeding, no bony deformity, non-boggy  No midline cervical spine tenderness  Diffuse midline thoracic spine tenderness without bony step-off  Mild lower lumbar paraspinal tenderness, unchanged from baseline  No bony deformity of the bilateral upper/lower extremities  Pelvis stable  FROM  Back pain with bilateral leg lifts  No focal neurological deficits  Care for the patient was signed over to Dr Kayleen Loya prior to completion of imaging results  Imaging studies negative for acute injury  Pain controlled, patient able to ambulate, discharged with return precautions and outpatient follow up instructions  Disposition  [facility] Transfer: No  Final diagnoses:   Back pain   Closed head injury, initial encounter   Fall, initial encounter   Aspirin long-term use     Time reflects when diagnosis was documented in both MDM as applicable and the Disposition within this note     Time User Action Codes Description Comment    4/13/2021 10:15 PM Lige [place] Add [M54 9] Back pain     4/13/2021 10:15 PM Lige [place] Add [S09 90XA] Closed head injury, initial encounter     4/13/2021 10:15 PM Lige [place] Add [facility] Fall, initial encounter     4/13/2021 10:15 PM Lige [place] Add [Z79 82] Aspirin long-term use       ED Disposition     ED Disposition Condition Date/Time Comment    Discharge Stable Tue Apr 13, 2021 10:15 PM Kyaw Jaquez discharge to home/self care              Follow-up Information     Follow up With Specialties Details Why Contact Info    Lilly Carter MD Internal Medicine Call   [address]  [phone]          Discharge Medication List as of 4/13/2021 10:19 PM      CONTINUE these medications which have NOT CHANGED    Details   AMILoride-hydrochlorothiazide (MODURETIC) 5-50 mg per tablet daily 1/2 tab daily , Historical Med      aspirin 81 mg chewable tablet Chew 81 mg daily, Historical Med      Cholecalciferol (VITAMIN D3) 1000 units CAPS Take by mouth daily  , Historical Med      furosemide (LASIX) 40 mg tablet Take 40 mg by mouth as needed  , Historical Med      glimepiride (AMARYL) 1 mg tablet Take 1 mg by mouth every morning before breakfast, Historical Med      ibuprofen (MOTRIN) 200 mg tablet Take by mouth every 6 (six) hours as needed for mild pain, Historical Med      ketoconazole (NIZORAL) 2 % cream as needed, Historical Med      KLOR-CON M20 20 MEQ tablet Take 20 mEq by mouth as needed  , Starting Mon 4/23/2018, Historical Med      losartan (COZAAR) 50 mg tablet losartan 50 mg tablet, Historical Med      metoprolol succinate (TOPROL-XL) 100 mg 24 hr tablet Take 100 mg by mouth daily, Starting Thu 7/5/2018, Historical Med      Multiple Vitamins-Minerals (CENTRUM SILVER PO) multivitamin, Historical Med      hydrocortisone 2 5 % cream as needed, Historical Med           No discharge procedures on file      PDMP Review     None          ED Provider  Electronically Signed by         Carito Borja MD  04/16/21 9537

## 2021-05-03 ENCOUNTER — OFFICE VISIT (OUTPATIENT)
Dept: WOUND CARE | Facility: HOSPITAL | Age: 76
End: 2021-05-03
Payer: COMMERCIAL

## 2021-05-03 VITALS
RESPIRATION RATE: 20 BRPM | TEMPERATURE: 96.2 F | DIASTOLIC BLOOD PRESSURE: 74 MMHG | HEIGHT: 63 IN | BODY MASS INDEX: 40.75 KG/M2 | HEART RATE: 62 BPM | WEIGHT: 230 LBS | SYSTOLIC BLOOD PRESSURE: 176 MMHG

## 2021-05-03 DIAGNOSIS — S31.109A OPEN WOUND OF ABDOMEN, INITIAL ENCOUNTER: Primary | ICD-10-CM

## 2021-05-03 PROCEDURE — 99213 OFFICE O/P EST LOW 20 MIN: CPT | Performed by: SURGERY

## 2021-05-03 PROCEDURE — 17250 CHEM CAUT OF GRANLTJ TISSUE: CPT | Performed by: SURGERY

## 2021-05-03 PROCEDURE — 99203 OFFICE O/P NEW LOW 30 MIN: CPT | Performed by: SURGERY

## 2021-05-03 PROCEDURE — G0463 HOSPITAL OUTPT CLINIC VISIT: HCPCS | Performed by: SURGERY

## 2021-05-03 RX ORDER — LIDOCAINE HYDROCHLORIDE 40 MG/ML
5 SOLUTION TOPICAL ONCE
Status: COMPLETED | OUTPATIENT
Start: 2021-05-03 | End: 2021-05-03

## 2021-05-03 RX ADMIN — LIDOCAINE HYDROCHLORIDE 5 ML: 40 SOLUTION TOPICAL at 13:25

## 2021-05-03 NOTE — PROGRESS NOTES
Patient ID: Ulises Baez is a 67 y o  female Date of Birth 1945       Chief Complaint   Patient presents with    New Patient Visit     abdominal wound        Allergies: Other    Diagnosis:  1  Open wound of abdomen, initial encounter  Assessment & Plan:  Right sided wound slightly hypergranular so silver nitrate applied, will use Puracol    Orders:  -     lidocaine (XYLOCAINE) 4 % topical solution 5 mL  -     Wound cleansing and dressings; Future     Diagnosis ICD-10-CM Associated Orders   1  Open wound of abdomen, initial encounter  S31 109A lidocaine (XYLOCAINE) 4 % topical solution 5 mL     Wound cleansing and dressings          Subjective:   67year old female who notices a wound on her right side  Seen by family doctor and dermatology and sent here for further care      Here for wound follow up  The following portions of the patient's history were reviewed and updated as appropriate:   Patient Active Problem List   Diagnosis    Diabetes mellitus type 2 in obese ([facility] [address] )    Obesity, Class III, 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) ([facility] [address] )    Essential hypertension    Diastolic dysfunction    GERD (gastroesophageal reflux disease)    Vitamin D deficiency    Hyperlipidemia    Unspecified abnormalities of gait and mobility    Tremor of hands and face    Benign essential tremor    Cerebrovascular accident (CVA) due to thrombosis of precerebral artery ([address] )    Open wound of abdomen     Past Medical History:   Diagnosis Date    Arthritis     Colorectal polyps     Constipation     Diabetes mellitus ([address]     Diverticulosis of colon     Hiatal hernia     Hypertension     Increased urinary frequency     Lumbar disc disease     SOB (shortness of breath)     Stress incontinence      Past Surgical History:   Procedure Laterality Date    BACK SURGERY      CARPAL TUNNEL RELEASE Left     CATARACT EXTRACTION Bilateral     CHOLECYSTECTOMY      COLONOSCOPY W/ ENDOSCOPIC US N/A 2/24/2016    Procedure: ANAL ENDOSCOPIC U/S;  Surgeon: Laird Baumgarten, MD;  [facility]; Service:     HERNIA REPAIR      PA COLONOSCOPY FLX DX W/COLLJ SPEC WHEN PFRMD N/A 2/24/2016    Procedure: COLONOSCOPY;  Surgeon: Laird Baumgarten, MD;  [facility];   Service: Colorectal    TONSILLECTOMY      TUBAL LIGATION       Social History     Socioeconomic History    Marital status: /Civil Union     Spouse name: Not on file    Number of children: Not on file    Years of education: Not on file    Highest education level: Not on file   Occupational History    Not on file   Social Needs    Financial resource strain: Not on file    Food insecurity     Worry: Not on file     Inability: Not on file   [employer] needs     Medical: Not on file     Non-medical: Not on file   Tobacco Use    Smoking status: Never Smoker    Smokeless tobacco: Never Used   Substance and Sexual Activity    Alcohol use: No    Drug use: No    Sexual activity: Not on file   Lifestyle    Physical activity     Days per week: Not on file     Minutes per session: Not on file    Stress: Not on file   Relationships    Social connections     Talks on phone: Not on file     Gets together: Not on file     Attends Zoroastrianism service: Not on file     Active member of club or organization: Not on file     Attends meetings of clubs or organizations: Not on file     Relationship status: Not on file    Intimate partner violence     Fear of current or ex partner: Not on file     Emotionally abused: Not on file     Physically abused: Not on file     Forced sexual activity: Not on file   Other Topics Concern    Not on file   Social History Narrative    Not on file        Current Outpatient Medications:     AMILoride-hydrochlorothiazide (MODURETIC) 5-50 mg per tablet, daily 1/2 tab daily , Disp: , Rfl:     aspirin 81 mg chewable tablet, Chew 81 mg daily, Disp: , Rfl:     Cholecalciferol (VITAMIN D3) 1000 units CAPS, Take by mouth daily  , Disp: , Rfl:     furosemide (LASIX) 40 mg tablet, Take 40 mg by mouth as needed  , Disp: , Rfl:     glimepiride (AMARYL) 1 mg tablet, Take 1 mg by mouth every morning before breakfast, Disp: , Rfl:     ibuprofen (MOTRIN) 200 mg tablet, Take by mouth every 6 (six) hours as needed for mild pain, Disp: , Rfl:     ketoconazole (NIZORAL) 2 % cream, as needed, Disp: , Rfl: 3    KLOR-CON M20 20 MEQ tablet, Take 20 mEq by mouth as needed  , Disp: , Rfl: 3    losartan (COZAAR) 50 mg tablet, losartan 50 mg tablet, Disp: , Rfl:     metoprolol succinate (TOPROL-XL) 100 mg 24 hr tablet, Take 100 mg by mouth daily, Disp: , Rfl: 3    Multiple Vitamins-Minerals (CENTRUM SILVER PO), multivitamin, Disp: , Rfl:     hydrocortisone 2 5 % cream, as needed, Disp: , Rfl: 3  No current facility-administered medications for this visit  Family History   Problem Relation Age of Onset    Hypertension Mother     Heart attack Mother     Diabetes Mother     Prostate cancer Father     No Known Problems Brother     Stroke Maternal Grandmother     Stroke Paternal Grandmother     Stroke Paternal Grandfather     Thyroid cancer Neg Hx       Review of Systems   Constitutional: Negative for chills and fever  HENT: Negative for trouble swallowing and voice change  Eyes: Negative for pain and visual disturbance  Respiratory: Negative for cough and shortness of breath  Cardiovascular: Negative for chest pain and leg swelling  Gastrointestinal: Negative for abdominal pain, nausea and vomiting  Endocrine: Negative for cold intolerance, heat intolerance, polydipsia, polyphagia and polyuria  Genitourinary: Negative for difficulty urinating and flank pain  Musculoskeletal: Negative for arthralgias and gait problem  Skin: Negative for rash and wound  Allergic/Immunologic: Negative for food allergies  Neurological: Negative for seizures, syncope, weakness and headaches  Hematological: Negative for adenopathy  Psychiatric/Behavioral: Negative for confusion  All other systems reviewed and are negative  Objective:  BP (!) 176/74   Pulse 62   Temp (!) 96 2 °F (35 7 °C)   Resp 20   Ht 5' 3" (1 6 m)   Wt 104 kg (230 lb)   BMI 40 74 kg/m²     Physical Exam  Vitals signs and nursing note reviewed  Constitutional:       General: She is not in acute distress  Appearance: She is well-developed  She is not diaphoretic  HENT:      Head: Normocephalic and atraumatic  Right Ear: External ear normal       Left Ear: External ear normal    Eyes:      General: No scleral icterus  Conjunctiva/sclera: Conjunctivae normal    Neck:      Musculoskeletal: Neck supple  Thyroid: No thyromegaly  Trachea: No tracheal deviation  Cardiovascular:      Rate and Rhythm: Normal rate and regular rhythm  Heart sounds: Normal heart sounds  No murmur  No friction rub  Pulmonary:      Effort: Pulmonary effort is normal  No respiratory distress  Breath sounds: Normal breath sounds  No wheezing or rales  Abdominal:      General: There is no distension  Palpations: Abdomen is soft  There is no mass  Tenderness: There is no abdominal tenderness  There is no guarding or rebound  Musculoskeletal: Normal range of motion  Lymphadenopathy:      Cervical: No cervical adenopathy  Skin:     General: Skin is warm and dry  Comments: Right side of abdomen with open, granulating wound   Neurological:      Mental Status: She is alert and oriented to person, place, and time  Psychiatric:         Behavior: Behavior normal          Thought Content: Thought content normal          Judgment: Judgment normal              Wound 05/03/21 Abdomen Right;Upper;Quadrant (Active)   Wound Image   05/03/21 1310   Wound Description Granulation tissue; Hypergranulation;Yellow;Pink 05/03/21 1315   Cheri-wound Assessment Intact; Pink 05/03/21 1315   Wound Length (cm) 1 cm 05/03/21 1315   Wound Width (cm) 2 9 cm 05/03/21 1315   Wound Depth (cm) 0 2 cm 05/03/21 1315   Wound Surface Area (cm^2) 2 9 cm^2 05/03/21 1315   Wound Volume (cm^3) 0 58 cm^3 05/03/21 1315   Calculated Wound Volume (cm^3) 0 58 cm^3 05/03/21 1315   Drainage Amount Moderate 05/03/21 1315   Drainage Description Serosanguineous 05/03/21 1315   Non-staged Wound Description Full thickness 05/03/21 1315                         Procedures   After permission and placement of topical local anesthetic, 1 silver nitrate sticks were used and applied to the open areas of the wound  A dressing was applied  Patient tolerated procedure well  Wound Instructions:  Orders Placed This Encounter   Procedures    Wound cleansing and dressings     Abdominal wound     Wash your hands with soap and water  Remove old dressing, discard into plastic bag and place in trash  Cleanse the wound with mild soap and water prior to applying a clean dressing  Do not use tissue or cotton balls  Do not scrub the wound  Pat dry using gauze  Shower yes     Apply puracol to wound bed  Cut to fit  Cover with guaze  Secure with medfix tape   Change dressing daily   Wound mechanically debrided with saline moistened gauze    This was performed at wound care center today     Standing Status:   Future     Standing Expiration Date:   5/3/2022         Racheal Marte MD      Portions of the record may have been created with voice recognition software  Occasional wrong word or "sound a like" substitutions may have occurred due to the inherent limitations of voice recognition software  Read the chart carefully and recognize, using context, where substitutions have occurred

## 2021-05-03 NOTE — PATIENT INSTRUCTIONS
Orders Placed This Encounter   Procedures    Wound cleansing and dressings     Abdominal wound     Wash your hands with soap and water  Remove old dressing, discard into plastic bag and place in trash  Cleanse the wound with mild soap and water prior to applying a clean dressing  Do not use tissue or cotton balls  Do not scrub the wound  Pat dry using gauze  Shower yes     Apply puracol to wound bed  Cut to fit  Cover with guaze    Secure with medfix tape   Change dressing daily   Wound mechanically debrided with saline moistened gauze    This was performed at wound care center today     Standing Status:   Future     Standing Expiration Date:   5/3/2022

## 2021-05-06 ENCOUNTER — IMMUNIZATIONS (OUTPATIENT)
Dept: FAMILY MEDICINE CLINIC | Facility: HOSPITAL | Age: 76
End: 2021-05-06

## 2021-05-06 DIAGNOSIS — Z23 ENCOUNTER FOR IMMUNIZATION: Primary | ICD-10-CM

## 2021-05-06 PROCEDURE — 0002A SARS-COV-2 / COVID-19 MRNA VACCINE (PFIZER-BIONTECH) 30 MCG: CPT

## 2021-05-06 PROCEDURE — 91300 SARS-COV-2 / COVID-19 MRNA VACCINE (PFIZER-BIONTECH) 30 MCG: CPT

## 2021-05-17 ENCOUNTER — OFFICE VISIT (OUTPATIENT)
Dept: WOUND CARE | Facility: HOSPITAL | Age: 76
End: 2021-05-17
Payer: COMMERCIAL

## 2021-05-17 VITALS
HEART RATE: 64 BPM | TEMPERATURE: 97.4 F | RESPIRATION RATE: 18 BRPM | DIASTOLIC BLOOD PRESSURE: 88 MMHG | SYSTOLIC BLOOD PRESSURE: 134 MMHG

## 2021-05-17 DIAGNOSIS — S31.109A OPEN WOUND OF ABDOMEN, INITIAL ENCOUNTER: Primary | ICD-10-CM

## 2021-05-17 PROCEDURE — 99213 OFFICE O/P EST LOW 20 MIN: CPT | Performed by: SURGERY

## 2021-05-17 PROCEDURE — G0463 HOSPITAL OUTPT CLINIC VISIT: HCPCS | Performed by: SURGERY

## 2021-05-17 PROCEDURE — 99212 OFFICE O/P EST SF 10 MIN: CPT | Performed by: SURGERY

## 2021-05-17 RX ORDER — LIDOCAINE HYDROCHLORIDE 40 MG/ML
5 SOLUTION TOPICAL ONCE
Status: COMPLETED | OUTPATIENT
Start: 2021-05-17 | End: 2021-05-17

## 2021-05-17 RX ADMIN — LIDOCAINE HYDROCHLORIDE 5 ML: 40 SOLUTION TOPICAL at 11:35

## 2021-05-17 NOTE — PATIENT INSTRUCTIONS
Orders Placed This Encounter   Procedures    Wound cleansing and dressings     Abdominal wound      Wash your hands with soap and water  Remove old dressing, discard into plastic bag and place in trash  Cleanse the wound with mild soap and water prior to applying a clean dressing  Do not use tissue or cotton balls  Do not scrub the wound  Pat dry using gauze  Shower yes      Apply puracol to wound bed  Cut to fit  Cover with guaze    Secure with medfix tape   Change dressing daily   Wound mechanically debrided with saline moistened gauze     This was performed at wound care center today    Follow up in 3 weeks     Standing Status:   Future     Standing Expiration Date:   5/17/2022

## 2021-05-17 NOTE — PROGRESS NOTES
Patient ID: Katharina Baig is a 67 y o  female Date of Birth 1945       Chief Complaint   Patient presents with    Follow Up Wound Care Visit     Abdominal wound       Allergies: Other    Diagnosis:  1  Open wound of abdomen, initial encounter  Assessment & Plan:  Wound clean and granulating well, measures smaller, continue Puracol    Orders:  -     lidocaine (XYLOCAINE) 4 % topical solution 5 mL  -     Wound cleansing and dressings; Future     Diagnosis ICD-10-CM Associated Orders   1  Open wound of abdomen, initial encounter  S31 109A lidocaine (XYLOCAINE) 4 % topical solution 5 mL     Wound cleansing and dressings          Subjective:   HPI  Here for wound follow up  The following portions of the patient's history were reviewed and updated as appropriate:   Patient Active Problem List   Diagnosis    Diabetes mellitus type 2 in obese [facility]    Obesity, Class III, 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) ([facility] )    Essential hypertension    Diastolic dysfunction    GERD (gastroesophageal reflux disease)    Vitamin D deficiency    Hyperlipidemia    Unspecified abnormalities of gait and mobility    Tremor of hands and face    Benign essential tremor    Cerebrovascular accident (CVA) due to thrombosis of precerebral artery ([facility] )    Open wound of abdomen     Past Medical History:   Diagnosis Date    Arthritis     Colorectal polyps     Constipation     Diabetes mellitus ([facility]     Diverticulosis of colon     Hiatal hernia     Hypertension     Increased urinary frequency     Lumbar disc disease     SOB (shortness of breath)     Stress incontinence      Past Surgical History:   Procedure Laterality Date    BACK SURGERY      CARPAL TUNNEL RELEASE Left     CATARACT EXTRACTION Bilateral     CHOLECYSTECTOMY      COLONOSCOPY W/ ENDOSCOPIC US N/A 2/24/2016    Procedure: ANAL ENDOSCOPIC U/S;  Surgeon: Matthias Gandhi MD;  [facility];   Service:     HERNIA REPAIR      [place] COLONOSCOPY FLX DX W/COLLJ [facility] WHEN PFRMD N/A 2/24/2016    Procedure: COLONOSCOPY;  Surgeon: Solitario Greer MD;  [facility];   Service: Colorectal    TONSILLECTOMY      TUBAL LIGATION       Social History     Socioeconomic History    Marital status: /Civil Union     Spouse name: None    Number of children: None    Years of education: None    Highest education level: None   Occupational History    None   Social Needs    Financial resource strain: None    Food insecurity     Worry: None     Inability: None    Transportation needs     Medical: None     Non-medical: None   Tobacco Use    Smoking status: Never Smoker    Smokeless tobacco: Never Used   Substance and Sexual Activity    Alcohol use: No    Drug use: No    Sexual activity: None   Lifestyle    Physical activity     Days per week: None     Minutes per session: None    Stress: None   Relationships    Social connections     Talks on phone: None     Gets together: None     Attends Sabianist service: None     Active member of club or organization: None     Attends meetings of clubs or organizations: None     Relationship status: None    Intimate partner violence     Fear of current or ex partner: None     Emotionally abused: None     Physically abused: None     Forced sexual activity: None   Other Topics Concern    None   Social History Narrative    None        Current Outpatient Medications:     AMILoride-hydrochlorothiazide (MODURETIC) 5-50 mg per tablet, daily 1/2 tab daily , Disp: , Rfl:     aspirin 81 mg chewable tablet, Chew 81 mg daily, Disp: , Rfl:     Cholecalciferol (VITAMIN D3) 1000 units CAPS, Take by mouth daily  , Disp: , Rfl:     furosemide (LASIX) 40 mg tablet, Take 40 mg by mouth as needed  , Disp: , Rfl:     glimepiride (AMARYL) 1 mg tablet, Take 1 mg by mouth every morning before breakfast, Disp: , Rfl:     hydrocortisone 2 5 % cream, as needed, Disp: , Rfl: 3    ibuprofen (MOTRIN) 200 mg tablet, Take by mouth every 6 (six) hours as needed for mild pain, Disp: , Rfl:     ketoconazole (NIZORAL) 2 % cream, as needed, Disp: , Rfl: 3    KLOR-CON M20 20 MEQ tablet, Take 20 mEq by mouth as needed  , Disp: , Rfl: 3    losartan (COZAAR) 50 mg tablet, losartan 50 mg tablet, Disp: , Rfl:     metoprolol succinate (TOPROL-XL) 100 mg 24 hr tablet, Take 100 mg by mouth daily, Disp: , Rfl: 3    Multiple Vitamins-Minerals (CENTRUM SILVER PO), multivitamin, Disp: , Rfl:   No current facility-administered medications for this visit  Family History   Problem Relation Age of Onset    Hypertension Mother     Heart attack Mother     Diabetes Mother     Prostate cancer Father     No Known Problems Brother     Stroke Maternal Grandmother     Stroke Paternal Grandmother     Stroke Paternal Grandfather     Thyroid cancer Neg Hx       Review of Systems      Objective:  /88   Pulse 64   Temp (!) 97 4 °F (36 3 °C)   Resp 18     Physical Exam        Wound 05/03/21 Abdomen Right;Upper;Quadrant (Active)   Wound Image   05/17/21 1130   Wound Description Yellow;Pink 05/17/21 1130   Cheri-wound Assessment Scar Tissue 05/17/21 1130   Wound Length (cm) 0 7 cm 05/17/21 1130   Wound Width (cm) 1 5 cm 05/17/21 1130   Wound Depth (cm) 0 1 cm 05/17/21 1130   Wound Surface Area (cm^2) 1 05 cm^2 05/17/21 1130   Wound Volume (cm^3) 0 11 cm^3 05/17/21 1130   Calculated Wound Volume (cm^3) 0 11 cm^3 05/17/21 1130   Change in Wound Size % 81 03 05/17/21 1130   Drainage Amount Scant 05/17/21 1130   Drainage Description Serosanguineous 05/17/21 1130   Non-staged Wound Description Full thickness 05/17/21 1130     Right upper quadrant wound clean and superficial                    Procedures             Wound Instructions:  Orders Placed This Encounter   Procedures    Wound cleansing and dressings     Abdominal wound      Wash your hands with soap and water  Remove old dressing, discard into plastic bag and place in trash      Cleanse the wound with mild soap and water prior to applying a clean dressing  Do not use tissue or cotton balls  Do not scrub the wound  Pat dry using gauze  Shower yes      Apply puracol to wound bed  Cut to fit  Cover with guaze  Secure with medfix tape   Change dressing daily   Wound mechanically debrided with saline moistened gauze     This was performed at wound care center today    Follow up in 3 weeks     Standing Status:   Future     Standing Expiration Date:   5/17/2022         Yasir Hewitt MD      Portions of the record may have been created with voice recognition software  Occasional wrong word or "sound a like" substitutions may have occurred due to the inherent limitations of voice recognition software  Read the chart carefully and recognize, using context, where substitutions have occurred

## 2021-06-07 ENCOUNTER — OFFICE VISIT (OUTPATIENT)
Dept: WOUND CARE | Facility: HOSPITAL | Age: 76
End: 2021-06-07
Payer: COMMERCIAL

## 2021-06-07 VITALS
RESPIRATION RATE: 16 BRPM | SYSTOLIC BLOOD PRESSURE: 172 MMHG | DIASTOLIC BLOOD PRESSURE: 84 MMHG | HEART RATE: 73 BPM | TEMPERATURE: 97.7 F

## 2021-06-07 DIAGNOSIS — S31.109A OPEN WOUND OF ABDOMEN, INITIAL ENCOUNTER: Primary | ICD-10-CM

## 2021-06-07 PROCEDURE — 99212 OFFICE O/P EST SF 10 MIN: CPT | Performed by: SURGERY

## 2021-06-07 PROCEDURE — G0463 HOSPITAL OUTPT CLINIC VISIT: HCPCS | Performed by: SURGERY

## 2021-06-07 NOTE — PROGRESS NOTES
Patient ID: Ulises Baez is a 67 y o  female Date of Birth 1945       Chief Complaint   Patient presents with    Follow Up Wound Care Visit     abd wound       Allergies: Other    Diagnosis:  1  Open wound of abdomen, initial encounter  Assessment & Plan:  Wound healed,  See back if needed    Orders:  -     Wound cleansing and dressings; Future     Diagnosis ICD-10-CM Associated Orders   1  Open wound of abdomen, initial encounter  S31 109A Wound cleansing and dressings          Subjective:   HPI  Here for wound follow up  The following portions of the patient's history were reviewed and updated as appropriate:   Patient Active Problem List   Diagnosis    [facility]    Obesity, Class III, 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) ([facility] )    Essential hypertension    Diastolic dysfunction    GERD (gastroesophageal reflux disease)    Vitamin D deficiency    Hyperlipidemia    Unspecified abnormalities of gait and mobility    Tremor of hands and face    Benign essential tremor    Cerebrovascular accident (CVA) due to thrombosis of precerebral artery ([facility] )    Open wound of abdomen     Past Medical History:   Diagnosis Date    Arthritis     Colorectal polyps     Constipation     [facility] mellitus [facility]     Diverticulosis of colon     Hiatal hernia     Hypertension     Increased urinary frequency     Lumbar disc disease     SOB (shortness of breath)     Stress incontinence      Past Surgical History:   Procedure Laterality Date    BACK SURGERY      CARPAL TUNNEL RELEASE Left     CATARACT EXTRACTION Bilateral     CHOLECYSTECTOMY      COLONOSCOPY W/ ENDOSCOPIC US N/A 2/24/2016    Procedure: ANAL ENDOSCOPIC U/S;  Surgeon: Sanchez Mccormick MD;  [facility]; Service:     HERNIA REPAIR      [place] COLONOSCOPY FLX DX W/COLLJ SPEC WHEN PFRMD N/A 2/24/2016    Procedure: COLONOSCOPY;  Surgeon: Sanchez Mccormick MD;  [facility];   Service: Colorectal    TONSILLECTOMY      TUBAL LIGATION       Social History     Socioeconomic History    Marital status: /Civil Union     Spouse name: Not on file    Number of children: Not on file    Years of education: Not on file    Highest education level: Not on file   Occupational History    Not on file   Social Needs    Financial resource strain: Not on file    Food insecurity     Worry: Not on file     Inability: Not on file    Transportation needs     Medical: Not on file     Non-medical: Not on file   Tobacco Use    Smoking status: Never Smoker    Smokeless tobacco: Never Used   Substance and Sexual Activity    Alcohol use: No    Drug use: No    Sexual activity: Not on file   Lifestyle    Physical activity     Days per week: Not on file     Minutes per session: Not on file    Stress: Not on file   Relationships    Social connections     Talks on phone: Not on file     Gets together: Not on file     Attends Yazdanism service: Not on file     Active member of club or organization: Not on file     Attends meetings of clubs or organizations: Not on file     Relationship status: Not on file    Intimate partner violence     Fear of current or ex partner: Not on file     Emotionally abused: Not on file     Physically abused: Not on file     Forced sexual activity: Not on file   Other Topics Concern    Not on file   Social History Narrative    Not on file        Current Outpatient Medications:     losartan (COZAAR) 50 mg tablet, 100 mg , Disp: , Rfl:     AMILoride-hydrochlorothiazide (MODURETIC) 5-50 mg per tablet, daily 1/2 tab daily , Disp: , Rfl:     aspirin 81 mg chewable tablet, Chew 81 mg daily, Disp: , Rfl:     Cholecalciferol (VITAMIN D3) 1000 units CAPS, Take by mouth daily  , Disp: , Rfl:     furosemide (LASIX) 40 mg tablet, Take 40 mg by mouth as needed  , Disp: , Rfl:     glimepiride (AMARYL) 1 mg tablet, Take 1 mg by mouth every morning before breakfast, Disp: , Rfl:     hydrocortisone 2 5 % cream, as needed, Disp: , Rfl: 3    ibuprofen (MOTRIN) 200 mg tablet, Take by mouth every 6 (six) hours as needed for mild pain, Disp: , Rfl:     ketoconazole (NIZORAL) 2 % cream, as needed, Disp: , Rfl: 3    KLOR-CON M20 20 MEQ tablet, Take 20 mEq by mouth as needed  , Disp: , Rfl: 3    metoprolol succinate (TOPROL-XL) 100 mg 24 hr tablet, Take 100 mg by mouth daily, Disp: , Rfl: 3    Multiple Vitamins-Minerals (CENTRUM SILVER PO), multivitamin, Disp: , Rfl:   Family History   Problem Relation Age of Onset    Hypertension Mother     Heart attack Mother     Diabetes Mother     Prostate cancer Father     No Known Problems Brother     Stroke Maternal Grandmother     Stroke Paternal Grandmother     Stroke Paternal Grandfather     Thyroid cancer Neg Hx       Review of Systems      Objective:  BP (!) 172/84   Pulse 73   Temp 97 7 °F (36 5 °C)   Resp 16     Physical Exam        Wound 05/03/21 Abdomen Right;Upper;Quadrant (Active)   Wound Image   06/07/21 1413   Wound Description Pink 06/07/21 1414   Cheri-wound Assessment Dry;Pink;Scar Tissue 06/07/21 1414   Wound Length (cm) 0 cm 06/07/21 1414   Wound Width (cm) 0 cm 06/07/21 1414   Wound Depth (cm) 0 cm 06/07/21 1414   Wound Surface Area (cm^2) 0 cm^2 06/07/21 1414   Wound Volume (cm^3) 0 cm^3 06/07/21 1414   Calculated Wound Volume (cm^3) 0 cm^3 06/07/21 1414   Change in Wound Size % 100 06/07/21 1414   Drainage Amount None 06/07/21 1414   Drainage Description Serosanguineous 05/17/21 1130   Non-staged Wound Description Full thickness 05/17/21 1130   Dressing Status Intact 06/07/21 1414     Abdominal wound healed                    Procedures             Wound Instructions:  Orders Placed This Encounter   Procedures    Wound cleansing and dressings     You wound is healed  Apply sunscreen if in the sun and be careful with fragile new skin  Thank you for visiting the [place] wound center       Standing Status:   Future     Standing Expiration Date:   6/7/2022         Nasim White MD      Portions of the record may have been created with voice recognition software  Occasional wrong word or "sound a like" substitutions may have occurred due to the inherent limitations of voice recognition software  Read the chart carefully and recognize, using context, where substitutions have occurred

## 2021-06-07 NOTE — PATIENT INSTRUCTIONS
Orders Placed This Encounter   Procedures    Wound cleansing and dressings     You wound is healed  Apply sunscreen if in the sun and be careful with fragile new skin  Thank you for visiting the [place] wound center       Standing Status:   Future     Standing Expiration Date:   6/7/2022

## 2021-10-12 ENCOUNTER — TELEPHONE (OUTPATIENT)
Dept: OTHER | Facility: OTHER | Age: 76
End: 2021-10-12

## 2021-10-15 ENCOUNTER — LAB REQUISITION (OUTPATIENT)
Dept: LAB | Facility: HOSPITAL | Age: 76
End: 2021-10-15
Payer: COMMERCIAL

## 2021-10-15 DIAGNOSIS — E78.5 HYPERLIPIDEMIA, UNSPECIFIED: ICD-10-CM

## 2021-10-15 DIAGNOSIS — I10 ESSENTIAL (PRIMARY) HYPERTENSION: ICD-10-CM

## 2021-10-15 DIAGNOSIS — E11.9 TYPE 2 DIABETES MELLITUS WITHOUT COMPLICATIONS (HCC): ICD-10-CM

## 2021-10-15 LAB
ALBUMIN SERPL BCP-MCNC: 3.5 G/DL (ref 3.5–5)
ALP SERPL-CCNC: 93 U/L (ref 46–116)
ALT SERPL W P-5'-P-CCNC: 20 U/L (ref 12–78)
ANION GAP SERPL CALCULATED.3IONS-SCNC: 8 MMOL/L (ref 4–13)
AST SERPL W P-5'-P-CCNC: 14 U/L (ref 5–45)
BILIRUB SERPL-MCNC: 0.44 MG/DL (ref 0.2–1)
BUN SERPL-MCNC: 22 MG/DL (ref 5–25)
CALCIUM SERPL-MCNC: 9.5 MG/DL (ref 8.3–10.1)
CHLORIDE SERPL-SCNC: 103 MMOL/L (ref 100–108)
CO2 SERPL-SCNC: 25 MMOL/L (ref 21–32)
CREAT SERPL-MCNC: 1.06 MG/DL (ref 0.6–1.3)
EST. AVERAGE GLUCOSE BLD GHB EST-MCNC: 143 MG/DL
GFR SERPL CREATININE-BSD FRML MDRD: 51 ML/MIN/1.73SQ M
GLUCOSE SERPL-MCNC: 109 MG/DL (ref 65–140)
HBA1C MFR BLD: 6.6 %
LDLC SERPL DIRECT ASSAY-MCNC: 129 MG/DL (ref 0–100)
POTASSIUM SERPL-SCNC: 4.3 MMOL/L (ref 3.5–5.3)
PROT SERPL-MCNC: 7.6 G/DL (ref 6.4–8.2)
SODIUM SERPL-SCNC: 136 MMOL/L (ref 136–145)

## 2021-10-15 PROCEDURE — 83721 ASSAY OF BLOOD LIPOPROTEIN: CPT | Performed by: INTERNAL MEDICINE

## 2021-10-15 PROCEDURE — 83036 HEMOGLOBIN GLYCOSYLATED A1C: CPT | Performed by: INTERNAL MEDICINE

## 2021-10-15 PROCEDURE — 80053 COMPREHEN METABOLIC PANEL: CPT | Performed by: INTERNAL MEDICINE

## 2021-10-18 ENCOUNTER — PREPPED CHART (OUTPATIENT)
Dept: URBAN - METROPOLITAN AREA CLINIC 6 | Facility: CLINIC | Age: 76
End: 2021-10-18

## 2021-11-13 ENCOUNTER — IMMUNIZATIONS (OUTPATIENT)
Dept: FAMILY MEDICINE CLINIC | Facility: HOSPITAL | Age: 76
End: 2021-11-13

## 2021-11-13 DIAGNOSIS — Z23 ENCOUNTER FOR IMMUNIZATION: Primary | ICD-10-CM

## 2021-11-13 PROCEDURE — 91300 COVID-19 PFIZER VACC 0.3 ML: CPT

## 2021-11-13 PROCEDURE — 0001A COVID-19 PFIZER VACC 0.3 ML: CPT

## 2021-12-06 ENCOUNTER — IOP CHECK (OUTPATIENT)
Dept: URBAN - METROPOLITAN AREA CLINIC 6 | Facility: CLINIC | Age: 76
End: 2021-12-06

## 2021-12-06 DIAGNOSIS — Z96.1: ICD-10-CM

## 2021-12-06 DIAGNOSIS — H40.1131: ICD-10-CM

## 2021-12-06 PROCEDURE — 92020 GONIOSCOPY: CPT

## 2021-12-06 PROCEDURE — G8427 DOCREV CUR MEDS BY ELIG CLIN: HCPCS

## 2021-12-06 PROCEDURE — 92014 COMPRE OPH EXAM EST PT 1/>: CPT

## 2021-12-06 PROCEDURE — 92202 OPSCPY EXTND ON/MAC DRAW: CPT

## 2021-12-06 PROCEDURE — 92133 CPTRZD OPH DX IMG PST SGM ON: CPT

## 2021-12-06 ASSESSMENT — VISUAL ACUITY
OS_CC: 20/20
OD_CC: 20/20-1
OU_CC: J1+

## 2021-12-06 ASSESSMENT — TONOMETRY
OD_IOP_MMHG: 16
OS_IOP_MMHG: 15

## 2022-02-22 ENCOUNTER — LAB REQUISITION (OUTPATIENT)
Dept: LAB | Facility: HOSPITAL | Age: 77
End: 2022-02-22
Payer: COMMERCIAL

## 2022-02-22 DIAGNOSIS — E11.65 TYPE 2 DIABETES MELLITUS WITH HYPERGLYCEMIA (HCC): ICD-10-CM

## 2022-02-22 DIAGNOSIS — I10 ESSENTIAL (PRIMARY) HYPERTENSION: ICD-10-CM

## 2022-02-22 LAB
ANION GAP SERPL CALCULATED.3IONS-SCNC: 8 MMOL/L (ref 4–13)
BUN SERPL-MCNC: 49 MG/DL (ref 5–25)
CALCIUM SERPL-MCNC: 9.7 MG/DL (ref 8.3–10.1)
CHLORIDE SERPL-SCNC: 108 MMOL/L (ref 100–108)
CO2 SERPL-SCNC: 21 MMOL/L (ref 21–32)
CREAT SERPL-MCNC: 1.22 MG/DL (ref 0.6–1.3)
EST. AVERAGE GLUCOSE BLD GHB EST-MCNC: 146 MG/DL
GFR SERPL CREATININE-BSD FRML MDRD: 42 ML/MIN/1.73SQ M
GLUCOSE SERPL-MCNC: 100 MG/DL (ref 65–140)
HBA1C MFR BLD: 6.7 %
POTASSIUM SERPL-SCNC: 5 MMOL/L (ref 3.5–5.3)
SODIUM SERPL-SCNC: 137 MMOL/L (ref 136–145)

## 2022-02-22 PROCEDURE — 83036 HEMOGLOBIN GLYCOSYLATED A1C: CPT | Performed by: INTERNAL MEDICINE

## 2022-02-22 PROCEDURE — 80048 BASIC METABOLIC PNL TOTAL CA: CPT | Performed by: INTERNAL MEDICINE

## 2022-03-21 ENCOUNTER — TRANSCRIBE ORDERS (OUTPATIENT)
Dept: LAB | Facility: CLINIC | Age: 77
End: 2022-03-21

## 2022-03-21 ENCOUNTER — APPOINTMENT (OUTPATIENT)
Dept: LAB | Facility: CLINIC | Age: 77
End: 2022-03-21
Payer: COMMERCIAL

## 2022-03-21 DIAGNOSIS — N39.0 URINARY TRACT INFECTION WITHOUT HEMATURIA, SITE UNSPECIFIED: ICD-10-CM

## 2022-03-21 DIAGNOSIS — I10 ESSENTIAL HYPERTENSION: Primary | ICD-10-CM

## 2022-03-21 DIAGNOSIS — I10 ESSENTIAL HYPERTENSION: ICD-10-CM

## 2022-03-21 LAB
ANION GAP SERPL CALCULATED.3IONS-SCNC: 5 MMOL/L (ref 4–13)
BUN SERPL-MCNC: 26 MG/DL (ref 5–25)
CALCIUM SERPL-MCNC: 9.8 MG/DL (ref 8.3–10.1)
CHLORIDE SERPL-SCNC: 109 MMOL/L (ref 100–108)
CO2 SERPL-SCNC: 24 MMOL/L (ref 21–32)
CREAT SERPL-MCNC: 1.14 MG/DL (ref 0.6–1.3)
ERYTHROCYTE [DISTWIDTH] IN BLOOD BY AUTOMATED COUNT: 14.7 % (ref 11.6–15.1)
GFR SERPL CREATININE-BSD FRML MDRD: 46 ML/MIN/1.73SQ M
GLUCOSE P FAST SERPL-MCNC: 122 MG/DL (ref 65–99)
HCT VFR BLD AUTO: 42.5 % (ref 34.8–46.1)
HGB BLD-MCNC: 13.6 G/DL (ref 11.5–15.4)
MCH RBC QN AUTO: 27.6 PG (ref 26.8–34.3)
MCHC RBC AUTO-ENTMCNC: 32 G/DL (ref 31.4–37.4)
MCV RBC AUTO: 86 FL (ref 82–98)
PLATELET # BLD AUTO: 337 THOUSANDS/UL (ref 149–390)
PMV BLD AUTO: 9.1 FL (ref 8.9–12.7)
POTASSIUM SERPL-SCNC: 4.7 MMOL/L (ref 3.5–5.3)
RBC # BLD AUTO: 4.93 MILLION/UL (ref 3.81–5.12)
SODIUM SERPL-SCNC: 138 MMOL/L (ref 136–145)
WBC # BLD AUTO: 7.66 THOUSAND/UL (ref 4.31–10.16)

## 2022-03-21 PROCEDURE — 87186 SC STD MICRODIL/AGAR DIL: CPT

## 2022-03-21 PROCEDURE — 87086 URINE CULTURE/COLONY COUNT: CPT

## 2022-03-21 PROCEDURE — 36415 COLL VENOUS BLD VENIPUNCTURE: CPT

## 2022-03-21 PROCEDURE — 80048 BASIC METABOLIC PNL TOTAL CA: CPT

## 2022-03-21 PROCEDURE — 85027 COMPLETE CBC AUTOMATED: CPT

## 2022-03-21 PROCEDURE — 87077 CULTURE AEROBIC IDENTIFY: CPT

## 2022-03-23 LAB
BACTERIA UR CULT: ABNORMAL
BACTERIA UR CULT: ABNORMAL

## 2022-05-10 ENCOUNTER — IOP CHECK (OUTPATIENT)
Dept: URBAN - METROPOLITAN AREA CLINIC 6 | Facility: CLINIC | Age: 77
End: 2022-05-10

## 2022-05-10 DIAGNOSIS — H40.1131: ICD-10-CM

## 2022-05-10 DIAGNOSIS — Z96.1: ICD-10-CM

## 2022-05-10 PROCEDURE — 92012 INTRM OPH EXAM EST PATIENT: CPT

## 2022-05-10 PROCEDURE — 92083 EXTENDED VISUAL FIELD XM: CPT

## 2022-05-10 ASSESSMENT — VISUAL ACUITY
OD_CC: 20/20
OS_CC: 20/20-2
OU_CC: J1+

## 2022-05-10 ASSESSMENT — TONOMETRY
OD_IOP_MMHG: 21
OS_IOP_MMHG: 19

## 2022-05-16 ENCOUNTER — OFFICE VISIT (OUTPATIENT)
Dept: WOUND CARE | Facility: HOSPITAL | Age: 77
End: 2022-05-16
Payer: COMMERCIAL

## 2022-05-16 VITALS
DIASTOLIC BLOOD PRESSURE: 83 MMHG | HEART RATE: 98 BPM | RESPIRATION RATE: 20 BRPM | BODY MASS INDEX: 41.11 KG/M2 | WEIGHT: 232 LBS | TEMPERATURE: 97.3 F | SYSTOLIC BLOOD PRESSURE: 177 MMHG | HEIGHT: 63 IN

## 2022-05-16 DIAGNOSIS — S31.109A OPEN WOUND OF ABDOMEN, INITIAL ENCOUNTER: Primary | ICD-10-CM

## 2022-05-16 PROCEDURE — 99203 OFFICE O/P NEW LOW 30 MIN: CPT | Performed by: SURGERY

## 2022-05-16 PROCEDURE — 99213 OFFICE O/P EST LOW 20 MIN: CPT | Performed by: SURGERY

## 2022-05-16 PROCEDURE — G0463 HOSPITAL OUTPT CLINIC VISIT: HCPCS | Performed by: SURGERY

## 2022-05-16 RX ORDER — LIDOCAINE 40 MG/G
CREAM TOPICAL ONCE
Status: COMPLETED | OUTPATIENT
Start: 2022-05-16 | End: 2022-05-16

## 2022-05-16 RX ADMIN — LIDOCAINE 1 APPLICATION: 40 CREAM TOPICAL at 15:48

## 2022-05-16 NOTE — PROGRESS NOTES
Patient ID: Kristian Barrios is a 65 y o  female Date of Birth 1945       Chief Complaint   Patient presents with   [address] [facility] Patient Visit     Abdominal wound       Allergies: Other    Diagnosis:  1  Open wound of abdomen, initial encounter  Assessment & Plan:  Wound is fairly clean but dry  Will use [place] daily    Orders:  -     lidocaine (LMX) 4 % cream  -     Wound cleansing and dressings; Future       Diagnosis ICD-10-CM Associated Orders   1  Open wound of abdomen, initial encounter  S31 109A lidocaine (LMX) 4 % cream     Wound cleansing and dressings          Subjective:   65year old female who previously had a wound right lower quadrant which opened again a few days ago    Seen by family doctor and referred here for further care    Here for wound follow up  The following portions of the patient's history were reviewed and updated as appropriate:   Patient Active Problem List   Diagnosis    Diabetes mellitus type 2 in obese ([facility]    Obesity, Class III, 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) ([facility]    Essential hypertension    Diastolic dysfunction    GERD (gastroesophageal reflux disease)    Vitamin D deficiency    Hyperlipidemia    Unspecified abnormalities of gait and mobility    Tremor of hands and face    Benign essential tremor    Cerebrovascular accident (CVA) due to thrombosis of precerebral artery ([facility]    Open wound of abdomen     Past Medical History:   Diagnosis Date    Arthritis     Colorectal polyps     Constipation     Diabetes mellitus ([facility]     Diverticulosis of colon     Hiatal hernia     Hypertension     Increased urinary frequency     Lumbar disc disease     SOB (shortness of breath)     Stress incontinence      Past Surgical History:   Procedure Laterality Date    BACK SURGERY      CARPAL TUNNEL RELEASE Left     CATARACT EXTRACTION Bilateral     CHOLECYSTECTOMY      COLONOSCOPY W/ ENDOSCOPIC US N/A 2/24/2016    Procedure: ANAL ENDOSCOPIC U/S;  Surgeon: Michaelle Gruber Sisto Siemens, MD;  [facility]; Service:     HERNIA REPAIR      [place] COLONOSCOPY FLX DX W/COLLJ SPEC WHEN PFRMD N/A 2/24/2016    Procedure: COLONOSCOPY;  Surgeon: Sarmad Garber MD;  [facility];   Service: Colorectal    TONSILLECTOMY      TUBAL LIGATION       Social History     Socioeconomic History    Marital status: /Civil Union     Spouse name: None    Number of children: None    Years of education: None    Highest education level: None   Occupational History    None   Tobacco Use    Smoking status: Never Smoker    Smokeless tobacco: Never Used   Substance and Sexual Activity    Alcohol use: No    Drug use: No    Sexual activity: None   Other Topics Concern    None   Social History Narrative    None     Social Determinants of Health     Financial Resource Strain: Not on file   Food Insecurity: Not on file   Transportation Needs: Not on file   Physical Activity: Not on file   Stress: Not on file   Social Connections: Not on file   Intimate Partner Violence: Not on file   Housing Stability: Not on file        Current Outpatient Medications:     AMILoride-hydrochlorothiazide (MODURETIC) 5-50 mg per tablet, daily 1/2 tab daily , Disp: , Rfl:     Cholecalciferol (VITAMIN D3) 1000 units CAPS, Take by mouth daily  , Disp: , Rfl:     furosemide (LASIX) 40 mg tablet, Take 40 mg by mouth as needed  , Disp: , Rfl:     glimepiride (AMARYL) 1 mg tablet, Take 1 mg by mouth every morning before breakfast, Disp: , Rfl:     hydrocortisone 2 5 % cream, as needed, Disp: , Rfl: 3    ibuprofen (MOTRIN) 200 mg tablet, Take by mouth every 6 (six) hours as needed for mild pain, Disp: , Rfl:     ketoconazole (NIZORAL) 2 % cream, as needed, Disp: , Rfl: 3    KLOR-CON M20 20 MEQ tablet, Take 20 mEq by mouth as needed  , Disp: , Rfl: 3    losartan (COZAAR) 50 mg tablet, 100 mg , Disp: , Rfl:     metoprolol succinate (TOPROL-XL) 100 mg 24 hr tablet, Take 100 mg by mouth daily, Disp: , Rfl: 3   Multiple Vitamins-Minerals (CENTRUM SILVER PO), multivitamin, Disp: , Rfl:     aspirin 81 mg chewable tablet, Chew 81 mg daily (Patient not taking: Reported on 5/16/2022), Disp: , Rfl:   No current facility-administered medications for this visit  Family History   Problem Relation Age of Onset    Hypertension Mother     Heart attack Mother     Diabetes Mother     Prostate cancer Father     No Known Problems Brother     Stroke Maternal Grandmother     Stroke Paternal Grandmother     Stroke Paternal Grandfather     Thyroid cancer Neg Hx       Review of Systems   Constitutional: Negative for chills and fever  HENT: Negative for trouble swallowing and voice change  Eyes: Negative for pain and visual disturbance  Respiratory: Negative for cough and shortness of breath  Cardiovascular: Negative for chest pain and leg swelling  Gastrointestinal: Negative for abdominal pain, nausea and vomiting  Endocrine: Negative for cold intolerance, heat intolerance, polydipsia, polyphagia and polyuria  Genitourinary: Negative for difficulty urinating and flank pain  Musculoskeletal: Negative for arthralgias and gait problem  Skin: Negative for rash and wound  Allergic/Immunologic: Negative for food allergies  Neurological: Negative for seizures, syncope, weakness and headaches  Hematological: Negative for adenopathy  Psychiatric/Behavioral: Negative for confusion  All other systems reviewed and are negative  Objective:  BP (!) 177/83   Pulse 98   Temp (!) 97 3 °F (36 3 °C)   Resp 20   Ht 5' 3" (1 6 m)   Wt 105 kg (232 lb)   BMI 41 10 kg/m²     Physical Exam  Vitals and nursing note reviewed  Constitutional:       General: She is not in acute distress  Appearance: She is well-developed  She is not diaphoretic  HENT:      Head: Normocephalic and atraumatic  Right Ear: External ear normal       Left Ear: External ear normal    Eyes:      General: No scleral icterus  Conjunctiva/sclera: Conjunctivae normal    Neck:      Thyroid: No thyromegaly  Trachea: No tracheal deviation  Cardiovascular:      Rate and Rhythm: Normal rate and regular rhythm  Heart sounds: Normal heart sounds  No murmur heard  No friction rub  Pulmonary:      Effort: Pulmonary effort is normal  No respiratory distress  Breath sounds: Normal breath sounds  No wheezing or rales  Abdominal:      General: There is no distension  Palpations: Abdomen is soft  There is no mass  Tenderness: There is no abdominal tenderness  There is no guarding or rebound  Musculoskeletal:         General: Normal range of motion  Cervical back: Neck supple  Lymphadenopathy:      Cervical: No cervical adenopathy  Skin:     General: Skin is warm and dry  Comments: See wound documentation   Neurological:      Mental Status: She is alert and oriented to person, place, and time  Psychiatric:         Behavior: Behavior normal          Thought Content: Thought content normal          Judgment: Judgment normal              Wound 05/16/22 Abdomen Right;Upper (Active)   Wound Image   05/16/22 1526   Wound Description Epithelialization; Beefy red;Yellow 05/16/22 1528   Cheri-wound Assessment Scar Tissue 05/16/22 1528   Wound Length (cm) 1 cm 05/16/22 1528   Wound Width (cm) 2 2 cm 05/16/22 1528   Wound Depth (cm) 0 1 cm 05/16/22 1528   Wound Surface Area (cm^2) 2 2 cm^2 05/16/22 1528   Wound Volume (cm^3) 0 22 cm^3 05/16/22 1528   Calculated Wound Volume (cm^3) 0 22 cm^3 05/16/22 1528   Drainage Amount Small 05/16/22 1528   Drainage Description Serosanguineous 05/16/22 1528   Non-staged Wound Description Full thickness 05/16/22 1528   Dressing Status Intact 05/16/22 1528                         Procedures             Wound Instructions:  Orders Placed This Encounter   Procedures    Wound cleansing and dressings     Abdominal wound:  Wash your hands with soap and water      Cleanse the wound with mild soap and water, rinse well and pad dry prior to applying a clean dressing  Shower yes     Apply Woundress to the wound  Cover with gauze and tape  Change dressing daily and as needed    Wound mechanically debrided today with gauze and NSS      Treatments above were completed today at the [facility]      Supplies ordered with [employer]---#[phone]     Standing Status:   Future     Standing Expiration Date:   5/16/2023         Angelina Ghosh MD      Portions of the record may have been created with voice recognition software  Occasional wrong word or "sound a like" substitutions may have occurred due to the inherent limitations of voice recognition software  Read the chart carefully and recognize, using context, where substitutions have occurred

## 2022-05-16 NOTE — PATIENT INSTRUCTIONS
Orders Placed This Encounter   Procedures    Wound cleansing and dressings     Abdominal wound:  Wash your hands with soap and water  Cleanse the wound with mild soap and water, rinse well and pad dry prior to applying a clean dressing  Shower yes     Apply Woundress to the wound    Cover with gauze and tape  Change dressing daily and as needed    Wound mechanically debrided today with gauze and NSS      Treatments above were completed today at the [facility]      Supplies ordered with [facility]---#[phone]     Standing Status:   Future     Standing Expiration Date:   5/16/2023

## 2022-06-06 ENCOUNTER — OFFICE VISIT (OUTPATIENT)
Dept: WOUND CARE | Facility: HOSPITAL | Age: 77
End: 2022-06-06
Payer: COMMERCIAL

## 2022-06-06 VITALS
DIASTOLIC BLOOD PRESSURE: 72 MMHG | TEMPERATURE: 97 F | SYSTOLIC BLOOD PRESSURE: 171 MMHG | RESPIRATION RATE: 18 BRPM | HEART RATE: 63 BPM

## 2022-06-06 DIAGNOSIS — S31.109A OPEN WOUND OF ABDOMEN, INITIAL ENCOUNTER: Primary | ICD-10-CM

## 2022-06-06 PROCEDURE — G0463 HOSPITAL OUTPT CLINIC VISIT: HCPCS | Performed by: SURGERY

## 2022-06-06 PROCEDURE — 99212 OFFICE O/P EST SF 10 MIN: CPT | Performed by: SURGERY

## 2022-06-06 PROCEDURE — 99213 OFFICE O/P EST LOW 20 MIN: CPT | Performed by: SURGERY

## 2022-06-06 RX ORDER — LIDOCAINE 40 MG/G
CREAM TOPICAL ONCE
Status: COMPLETED | OUTPATIENT
Start: 2022-06-06 | End: 2022-06-06

## 2022-06-06 RX ADMIN — LIDOCAINE 1 APPLICATION: 40 CREAM TOPICAL at 08:26

## 2022-06-06 NOTE — PROGRESS NOTES
Patient ID: Zahira Hamilton is a 65 y o  female Date of Birth 1945       Chief Complaint   Patient presents with    Follow Up Wound Care Visit     Abdominal wound       Allergies: Other    Diagnosis:  1  Open wound of abdomen, initial encounter  Assessment & Plan:  Wound looks pink, will continue Woun'Dres    Orders:  -     lidocaine (LMX) 4 % cream  -     Wound cleansing and dressings; Future       Diagnosis ICD-10-CM Associated Orders   1  Open wound of abdomen, initial encounter  S31 109A lidocaine (LMX) 4 % cream     Wound cleansing and dressings          Subjective:   HPI  Here for wound follow up  The following portions of the patient's history were reviewed and updated as appropriate:   Patient Active Problem List   Diagnosis    [facility] mellitus type 2 in obese [facility]    Obesity, Class III, 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) [facility]    Essential hypertension    Diastolic dysfunction    GERD (gastroesophageal reflux disease)    Vitamin D deficiency    Hyperlipidemia    Unspecified abnormalities of gait and mobility    Tremor of hands and face    Benign essential tremor    Cerebrovascular accident (CVA) due to thrombosis of precerebral artery ([facility] )    Open wound of abdomen     Past Medical History:   Diagnosis Date    Arthritis     Colorectal polyps     Constipation     Diabetes mellitus [facility]     Diverticulosis of colon     Hiatal hernia     Hypertension     Increased urinary frequency     Lumbar disc disease     SOB (shortness of breath)     Stress incontinence      Past Surgical History:   Procedure Laterality Date    BACK SURGERY      CARPAL TUNNEL RELEASE Left     CATARACT EXTRACTION Bilateral     CHOLECYSTECTOMY      COLONOSCOPY W/ ENDOSCOPIC US N/A 2/24/2016    Procedure: ANAL ENDOSCOPIC U/S;  Surgeon: Tona Quick MD;  [facility];   Service:     HERNIA REPAIR      [place] COLONOSCOPY FLX DX W/COLLJ SPEC WHEN PFRMD N/A 2/24/2016    Procedure: COLONOSCOPY;  Surgeon: Milton Landeros Crow Lantigua MD;  [facility];   Service: Colorectal    TONSILLECTOMY      TUBAL LIGATION       Social History     Socioeconomic History    Marital status: /Civil Union     Spouse name: None    Number of children: None    Years of education: None    Highest education level: None   Occupational History    None   Tobacco Use    Smoking status: Never Smoker    Smokeless tobacco: Never Used   Substance and Sexual Activity    Alcohol use: No    Drug use: No    Sexual activity: None   Other Topics Concern    None   Social History Narrative    None     Social Determinants of Health     Financial Resource Strain: Not on file   Food Insecurity: Not on file   Transportation Needs: Not on file   Physical Activity: Not on file   Stress: Not on file   Social Connections: Not on file   Intimate Partner Violence: Not on file   Housing Stability: Not on file        Current Outpatient Medications:     AMILoride-hydrochlorothiazide (MODURETIC) 5-50 mg per tablet, daily 1/2 tab daily , Disp: , Rfl:     aspirin 81 mg chewable tablet, Chew 81 mg daily (Patient not taking: Reported on 5/16/2022), Disp: , Rfl:     Cholecalciferol (VITAMIN D3) 1000 units CAPS, Take by mouth daily  , Disp: , Rfl:     furosemide (LASIX) 40 mg tablet, Take 40 mg by mouth as needed  , Disp: , Rfl:     glimepiride (AMARYL) 1 mg tablet, Take 1 mg by mouth every morning before breakfast, Disp: , Rfl:     hydrocortisone 2 5 % cream, as needed, Disp: , Rfl: 3    ibuprofen (MOTRIN) 200 mg tablet, Take by mouth every 6 (six) hours as needed for mild pain, Disp: , Rfl:     ketoconazole (NIZORAL) 2 % cream, as needed, Disp: , Rfl: 3    KLOR-CON M20 20 MEQ tablet, Take 20 mEq by mouth as needed  , Disp: , Rfl: 3    losartan (COZAAR) 50 mg tablet, 100 mg , Disp: , Rfl:     metoprolol succinate (TOPROL-XL) 100 mg 24 hr tablet, Take 100 mg by mouth daily, Disp: , Rfl: 3    Multiple Vitamins-Minerals (CENTRUM SILVER PO), multivitamin, Disp: , Rfl:   No current facility-administered medications for this visit  Family History   Problem Relation Age of Onset    Hypertension Mother     Heart attack Mother     Diabetes Mother     Prostate cancer Father     No Known Problems Brother     Stroke Maternal Grandmother     Stroke Paternal Grandmother     Stroke Paternal Grandfather     Thyroid cancer Neg Hx       Review of Systems      Objective:  BP (!) 171/72   Pulse 63   Temp (!) 97 °F (36 1 °C)   Resp 18     Physical Exam        Wound 05/16/22 Abdomen Right;Upper (Active)   Wound Image   06/06/22 0824   Wound Description Granulation tissue; Epithelialization;Yellow;Slough 06/06/22 0823   Cheri-wound Assessment Scar Tissue 06/06/22 0823   Wound Length (cm) 0 9 cm 06/06/22 0823   Wound Width (cm) 2 1 cm 06/06/22 0823   Wound Depth (cm) 0 1 cm 06/06/22 0823   Wound Surface Area (cm^2) 1 89 cm^2 06/06/22 0823   Wound Volume (cm^3) 0 189 cm^3 06/06/22 0823   Calculated Wound Volume (cm^3) 0 19 cm^3 06/06/22 0823   Change in Wound Size % 13 64 06/06/22 0823   Drainage Amount Small 06/06/22 0823   Drainage Description Serosanguineous 06/06/22 0823   Non-staged Wound Description Full thickness 06/06/22 0823   Dressing Status Intact 06/06/22 0823         Right abdominal wall wound mostly pink with very little slough, periwound clean                Procedures             Wound Instructions:  Orders Placed This Encounter   Procedures    Wound cleansing and dressings     Abdominal wound:  Wash your hands with soap and water  Cleanse the wound with mild soap and water, rinse well and pad dry prior to applying a clean dressing        Shower yes      Apply Woundress to the wound    Cover with gauze and tape  Change dressing daily and as needed           Treatments above were completed today at the [facility]           Standing Status:   Future     Standing Expiration Date:   6/6/2023         Jerzy Nelson MD      Portions of the record may have been created with voice recognition software  Occasional wrong word or "sound a like" substitutions may have occurred due to the inherent limitations of voice recognition software  Read the chart carefully and recognize, using context, where substitutions have occurred

## 2022-06-06 NOTE — PATIENT INSTRUCTIONS
Orders Placed This Encounter   Procedures    Wound cleansing and dressings     Abdominal wound:  Wash your hands with soap and water  Cleanse the wound with mild soap and water, rinse well and pad dry prior to applying a clean dressing  Shower yes      Apply Woundress to the wound    Cover with gauze and tape  Change dressing daily and as needed           Treatments above were completed today at the [facility]           Standing Status:   Future     Standing Expiration Date:   6/6/2023

## 2022-07-11 ENCOUNTER — OFFICE VISIT (OUTPATIENT)
Dept: WOUND CARE | Facility: HOSPITAL | Age: 77
End: 2022-07-11
Payer: COMMERCIAL

## 2022-07-11 VITALS
DIASTOLIC BLOOD PRESSURE: 75 MMHG | HEART RATE: 80 BPM | TEMPERATURE: 97.4 F | SYSTOLIC BLOOD PRESSURE: 140 MMHG | RESPIRATION RATE: 16 BRPM

## 2022-07-11 DIAGNOSIS — S31.109A OPEN WOUND OF ABDOMEN, INITIAL ENCOUNTER: Primary | ICD-10-CM

## 2022-07-11 PROCEDURE — 97597 DBRDMT OPN WND 1ST 20 CM/<: CPT | Performed by: SURGERY

## 2022-07-11 NOTE — PATIENT INSTRUCTIONS
Orders Placed This Encounter   Procedures    Wound cleansing and dressings     Abdominal wound:    Wash your hands with soap and water  Remove old dressing, discard into plastic bag and place in trash  Cleanse the wound with mild soap and water prior to applying a clean dressing  Do not use tissue or cotton balls  Do not scrub the wound  Pat dry using gauze  Shower yes  Apply woundres to the wound bed  Cover with dry gauze  Secure with paper tape or medfix tape  Change dressing daily  Follow up at the wound center in two weeks                      Standing Status:   Future     Standing Expiration Date:   7/11/2023

## 2022-07-11 NOTE — PROGRESS NOTES
Patient ID: Carlitos Arroyo is a 65 y o  female Date of Birth 1945       Chief Complaint   Patient presents with    Follow Up Wound Care Visit     Abdominal wound       Allergies: Other    Diagnosis:  1  Open wound of abdomen, initial encounter  Assessment & Plan:  Wound fairly clean, will continue Woun'Dres    Orders:  -     Wound cleansing and dressings; Future  -     Debridement       Diagnosis ICD-10-CM Associated Orders   1  Open wound of abdomen, initial encounter  S31 109A Wound cleansing and dressings     Debridement          Subjective:   HPI  Here for wound follow up  The following portions of the patient's history were reviewed and updated as appropriate:   Patient Active Problem List   Diagnosis    Diabetes mellitus type 2 in obese ([facility] )    Obesity, Class III, 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    Essential hypertension    Diastolic dysfunction    GERD (gastroesophageal reflux disease)    Vitamin D deficiency    Hyperlipidemia    Unspecified abnormalities of gait and mobility    Tremor of hands and face    Benign essential tremor    Cerebrovascular accident (CVA) due to thrombosis of precerebral artery [facility]    Open wound of abdomen     Past Medical History:   Diagnosis Date    Arthritis     Colorectal polyps     Constipation     Diabetes mellitus ([facility]     Diverticulosis of colon     Hiatal hernia     Hypertension     Increased urinary frequency     Lumbar disc disease     SOB (shortness of breath)     Stress incontinence      Past Surgical History:   Procedure Laterality Date    BACK SURGERY      CARPAL TUNNEL RELEASE Left     CATARACT EXTRACTION Bilateral     CHOLECYSTECTOMY      COLONOSCOPY W/ ENDOSCOPIC US N/A 2/24/2016    Procedure: ANAL ENDOSCOPIC U/S;  Surgeon: Keily Benson MD;  [facility];   Service:     HERNIA REPAIR      [place] COLONOSCOPY FLX DX W/COLLJ SPEC WHEN PFRMD N/A 2/24/2016    Procedure: COLONOSCOPY;  Surgeon: Keily Benson MD; [facility];   Service: Colorectal    TONSILLECTOMY      TUBAL LIGATION       Social History     Socioeconomic History    Marital status: /Civil Union     Spouse name: None    Number of children: None    Years of education: None    Highest education level: None   Occupational History    None   Tobacco Use    Smoking status: Never Smoker    Smokeless tobacco: Never Used   Substance and Sexual Activity    Alcohol use: No    Drug use: No    Sexual activity: None   Other Topics Concern    None   Social History Narrative    None     Social Determinants of Health     Financial Resource Strain: Not on file   Food Insecurity: Not on file   Transportation Needs: Not on file   Physical Activity: Not on file   Stress: Not on file   Social Connections: Not on file   Intimate Partner Violence: Not on file   Housing Stability: Not on file        Current Outpatient Medications:     AMILoride-hydrochlorothiazide (MODURETIC) 5-50 mg per tablet, daily 1/2 tab daily , Disp: , Rfl:     aspirin 81 mg chewable tablet, Chew 81 mg daily (Patient not taking: Reported on 5/16/2022), Disp: , Rfl:     Cholecalciferol (VITAMIN D3) 1000 units CAPS, Take by mouth daily  , Disp: , Rfl:     furosemide (LASIX) 40 mg tablet, Take 40 mg by mouth as needed  , Disp: , Rfl:     glimepiride (AMARYL) 1 mg tablet, Take 1 mg by mouth every morning before breakfast, Disp: , Rfl:     hydrocortisone 2 5 % cream, as needed, Disp: , Rfl: 3    ibuprofen (MOTRIN) 200 mg tablet, Take by mouth every 6 (six) hours as needed for mild pain, Disp: , Rfl:     ketoconazole (NIZORAL) 2 % cream, as needed, Disp: , Rfl: 3    KLOR-CON M20 20 MEQ tablet, Take 20 mEq by mouth as needed  , Disp: , Rfl: 3    losartan (COZAAR) 50 mg tablet, 100 mg , Disp: , Rfl:     metoprolol succinate (TOPROL-XL) 100 mg 24 hr tablet, Take 100 mg by mouth daily, Disp: , Rfl: 3    Multiple Vitamins-Minerals (CENTRUM SILVER PO), multivitamin, Disp: , Rfl: Family History   Problem Relation Age of Onset    Hypertension Mother     Heart attack Mother     Diabetes Mother     Prostate cancer Father     No Known Problems Brother     Stroke Maternal Grandmother     Stroke Paternal Grandmother     Stroke Paternal Grandfather     Thyroid cancer Neg Hx       Review of Systems      Objective:  /75   Pulse 80   Temp (!) 97 4 °F (36 3 °C)   Resp 16     Physical Exam        Wound 05/16/22 Abdomen Right;Upper (Active)   Wound Image   07/11/22 0813   Wound Description Pink;Granulation tissue; Yellow 07/11/22 0812   Cheri-wound Assessment Intact; Pink 07/11/22 0812   Wound Length (cm) 0 5 cm 07/11/22 0812   Wound Width (cm) 1 1 cm 07/11/22 0812   Wound Depth (cm) 0 1 cm 07/11/22 0812   Wound Surface Area (cm^2) 0 55 cm^2 07/11/22 0812   Wound Volume (cm^3) 0 055 cm^3 07/11/22 0812   Calculated Wound Volume (cm^3) 0 06 cm^3 07/11/22 0812   Change in Wound Size % 72 73 07/11/22 0812   Drainage Amount Scant 07/11/22 0812   Drainage Description Serosanguineous 07/11/22 0812   Non-staged Wound Description Full thickness 07/11/22 0812   Dressing Status Intact 06/06/22 0823         Right lateral abdomen wound with pink tissue present, minimal slough                Debridement   Wound 05/16/22 Abdomen Right;Upper    Universal Protocol:  Consent: Verbal consent not obtained  Written consent obtained  Risks and benefits: risks, benefits and alternatives were discussed  Consent given by: patient  Time out: Immediately prior to procedure a "time out" was called to verify the correct patient, procedure, equipment, support staff and site/side marked as required    Patient identity confirmed: verbally with patient      Performed by: physician  Debridement type: selective  Pain control: lidocaine 4%  Post-debridement measurements  Length (cm): 0 5  Width (cm): 1 1  Depth (cm): 0 1  Percent debrided: 100%  Surface Area (cm^2): 0 55  Area debrided (cm^2): 0 55  Volume (cm^3): 0 06  Devitalized tissue debrided: slough  Instrument(s) utilized: curette  Bleeding: small  Hemostasis obtained with: pressure  Procedural pain (0-10): 0  Post-procedural pain: 0   Response to treatment: procedure was tolerated well                   Wound Instructions:  Orders Placed This Encounter   Procedures    Wound cleansing and dressings     Abdominal wound:    Wash your hands with soap and water  Remove old dressing, discard into plastic bag and place in trash  Cleanse the wound with mild soap and water prior to applying a clean dressing  Do not use tissue or cotton balls  Do not scrub the wound  Pat dry using gauze  Shower yes  Apply woundres to the wound bed  Cover with dry gauze  Secure with paper tape or medfix tape  Change dressing daily  Follow up at the wound center in two weeks                     Standing Status:   Future     Standing Expiration Date:   7/11/2023    Debridement     This order was created via procedure documentation         Bee Steele MD      Portions of the record may have been created with voice recognition software  Occasional wrong word or "sound a like" substitutions may have occurred due to the inherent limitations of voice recognition software  Read the chart carefully and recognize, using context, where substitutions have occurred

## 2022-07-25 ENCOUNTER — OFFICE VISIT (OUTPATIENT)
Dept: WOUND CARE | Facility: HOSPITAL | Age: 77
End: 2022-07-25
Payer: COMMERCIAL

## 2022-07-25 ENCOUNTER — LAB REQUISITION (OUTPATIENT)
Dept: LAB | Facility: HOSPITAL | Age: 77
End: 2022-07-25
Payer: COMMERCIAL

## 2022-07-25 VITALS
SYSTOLIC BLOOD PRESSURE: 178 MMHG | TEMPERATURE: 97.2 F | DIASTOLIC BLOOD PRESSURE: 77 MMHG | HEART RATE: 57 BPM | RESPIRATION RATE: 20 BRPM

## 2022-07-25 DIAGNOSIS — E11.9 TYPE 2 DIABETES MELLITUS WITHOUT COMPLICATIONS (HCC): ICD-10-CM

## 2022-07-25 DIAGNOSIS — I10 ESSENTIAL (PRIMARY) HYPERTENSION: ICD-10-CM

## 2022-07-25 DIAGNOSIS — S31.109A OPEN WOUND OF ABDOMEN, INITIAL ENCOUNTER: Primary | ICD-10-CM

## 2022-07-25 LAB
ALBUMIN SERPL BCP-MCNC: 3.3 G/DL (ref 3.5–5)
ALP SERPL-CCNC: 94 U/L (ref 46–116)
ALT SERPL W P-5'-P-CCNC: 22 U/L (ref 12–78)
ANION GAP SERPL CALCULATED.3IONS-SCNC: 6 MMOL/L (ref 4–13)
AST SERPL W P-5'-P-CCNC: 15 U/L (ref 5–45)
BILIRUB SERPL-MCNC: 0.64 MG/DL (ref 0.2–1)
BUN SERPL-MCNC: 29 MG/DL (ref 5–25)
CALCIUM ALBUM COR SERPL-MCNC: 10.1 MG/DL (ref 8.3–10.1)
CALCIUM SERPL-MCNC: 9.5 MG/DL (ref 8.3–10.1)
CHLORIDE SERPL-SCNC: 104 MMOL/L (ref 96–108)
CO2 SERPL-SCNC: 26 MMOL/L (ref 21–32)
CREAT SERPL-MCNC: 1.12 MG/DL (ref 0.6–1.3)
GFR SERPL CREATININE-BSD FRML MDRD: 47 ML/MIN/1.73SQ M
GLUCOSE SERPL-MCNC: 75 MG/DL (ref 65–140)
LDLC SERPL DIRECT ASSAY-MCNC: 142 MG/DL (ref 0–100)
POTASSIUM SERPL-SCNC: 4.9 MMOL/L (ref 3.5–5.3)
PROT SERPL-MCNC: 7 G/DL (ref 6.4–8.4)
SODIUM SERPL-SCNC: 136 MMOL/L (ref 135–147)

## 2022-07-25 PROCEDURE — G0463 HOSPITAL OUTPT CLINIC VISIT: HCPCS | Performed by: SURGERY

## 2022-07-25 PROCEDURE — 83721 ASSAY OF BLOOD LIPOPROTEIN: CPT | Performed by: INTERNAL MEDICINE

## 2022-07-25 PROCEDURE — 99213 OFFICE O/P EST LOW 20 MIN: CPT | Performed by: SURGERY

## 2022-07-25 PROCEDURE — 99212 OFFICE O/P EST SF 10 MIN: CPT | Performed by: SURGERY

## 2022-07-25 PROCEDURE — 83036 HEMOGLOBIN GLYCOSYLATED A1C: CPT | Performed by: INTERNAL MEDICINE

## 2022-07-25 PROCEDURE — 80053 COMPREHEN METABOLIC PANEL: CPT | Performed by: INTERNAL MEDICINE

## 2022-07-25 RX ORDER — LIDOCAINE 40 MG/G
CREAM TOPICAL ONCE
Status: COMPLETED | OUTPATIENT
Start: 2022-07-25 | End: 2022-07-25

## 2022-07-25 RX ADMIN — LIDOCAINE 1 APPLICATION: 40 CREAM TOPICAL at 08:29

## 2022-07-25 NOTE — PROGRESS NOTES
Patient ID: Michelle Garcia is a 65 y o  female Date of Birth 1945       No chief complaint on file  Allergies: Other    Diagnosis:  1  Open wound of abdomen, initial encounter  Assessment & Plan:  Wound is clean, will continue Woun'Dres  Explained she needs to avoid clothing rubbing on that area    Orders:  -     lidocaine (LMX) 4 % cream  -     Wound cleansing and dressings; Future       Diagnosis ICD-10-CM Associated Orders   1  Open wound of abdomen, initial encounter  S31 109A lidocaine (LMX) 4 % cream     Wound cleansing and dressings          Subjective:   HPI  Here for wound follow up  The following portions of the patient's history were reviewed and updated as appropriate:   Patient Active Problem List   Diagnosis    [facility] mellitus type [facility] obese [facility]    Obesity, Class III, 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) ([facility]    Essential hypertension    Diastolic dysfunction    GERD (gastroesophageal reflux disease)    Vitamin D deficiency    Hyperlipidemia    Unspecified abnormalities of gait and mobility    Tremor of hands and face    Benign essential tremor    [facility]    Open wound of abdomen     Past Medical History:   Diagnosis Date    Arthritis     Colorectal polyps     Constipation     Diabetes mellitus [facility]     Diverticulosis of colon     Hiatal hernia     Hypertension     Increased urinary frequency     Lumbar disc disease     SOB (shortness of breath)     Stress incontinence      Past Surgical History:   Procedure Laterality Date    BACK SURGERY      CARPAL TUNNEL RELEASE Left     CATARACT EXTRACTION Bilateral     CHOLECYSTECTOMY      COLONOSCOPY W/ ENDOSCOPIC US N/A 2/24/2016    Procedure: ANAL ENDOSCOPIC U/S;  Surgeon: Og Mendes MD;  [facility];   Service:     HERNIA REPAIR      [place] COLONOSCOPY FLX DX W/COLLJ SPEC WHEN PFRMD N/A 2/24/2016    Procedure: COLONOSCOPY;  Surgeon: Bud Harrison Minoo Canales MD;  [facility];   Service: Colorectal    TONSILLECTOMY      TUBAL LIGATION       Social History     Socioeconomic History    Marital status: /Civil Union     Spouse name: None    Number of children: None    Years of education: None    Highest education level: None   Occupational History    None   Tobacco Use    Smoking status: Never Smoker    Smokeless tobacco: Never Used   Substance and Sexual Activity    Alcohol use: No    Drug use: No    Sexual activity: None   Other Topics Concern    None   Social History Narrative    None     Social Determinants of Health     Financial Resource Strain: Not on file   Food Insecurity: Not on file   Transportation Needs: Not on file   Physical Activity: Not on file   Stress: Not on file   Social Connections: Not on file   Intimate Partner Violence: Not on file   Housing Stability: Not on file        Current Outpatient Medications:     AMILoride-hydrochlorothiazide (MODURETIC) 5-50 mg per tablet, daily 1/2 tab daily , Disp: , Rfl:     aspirin 81 mg chewable tablet, Chew 81 mg daily (Patient not taking: Reported on 5/16/2022), Disp: , Rfl:     Cholecalciferol (VITAMIN D3) 1000 units CAPS, Take by mouth daily  , Disp: , Rfl:     furosemide (LASIX) 40 mg tablet, Take 40 mg by mouth as needed  , Disp: , Rfl:     glimepiride (AMARYL) 1 mg tablet, Take 1 mg by mouth every morning before breakfast, Disp: , Rfl:     hydrocortisone 2 5 % cream, as needed, Disp: , Rfl: 3    ibuprofen (MOTRIN) 200 mg tablet, Take by mouth every 6 (six) hours as needed for mild pain, Disp: , Rfl:     ketoconazole (NIZORAL) 2 % cream, as needed, Disp: , Rfl: 3    KLOR-CON M20 20 MEQ tablet, Take 20 mEq by mouth as needed  , Disp: , Rfl: 3    losartan (COZAAR) 50 mg tablet, 100 mg , Disp: , Rfl:     metoprolol succinate (TOPROL-XL) 100 mg 24 hr tablet, Take 100 mg by mouth daily, Disp: , Rfl: 3    Multiple Vitamins-Minerals (CENTRUM SILVER PO), multivitamin, Disp: , Rfl:   No current facility-administered medications for this visit  Family History   Problem Relation Age of Onset    Hypertension Mother     Heart attack Mother     Diabetes Mother     Prostate cancer Father     No Known Problems Brother     Stroke Maternal Grandmother     Stroke Paternal Grandmother     Stroke Paternal Grandfather     Thyroid cancer Neg Hx       Review of Systems      Objective:  BP (!) 178/77   Pulse 57   Temp (!) 97 2 °F (36 2 °C)   Resp 20     Physical Exam        Wound 05/16/22 Abdomen Right;Upper (Active)   Wound Image   07/25/22 0824   Wound Description Pink;Granulation tissue; Yellow 07/25/22 0824   Cheri-wound Assessment Scar Tissue;Pink; Intact 07/25/22 0824   Wound Length (cm) 0 5 cm 07/25/22 0824   Wound Width (cm) 1 5 cm 07/25/22 0824   Wound Depth (cm) 0 1 cm 07/25/22 0824   Wound Surface Area (cm^2) 0 75 cm^2 07/25/22 0824   Wound Volume (cm^3) 0 075 cm^3 07/25/22 0824   Calculated Wound Volume (cm^3) 0 08 cm^3 07/25/22 0824   Change in Wound Size % 63 64 07/25/22 0824   Drainage Amount Scant 07/25/22 0824   Drainage Description Serosanguineous 07/25/22 0824   Non-staged Wound Description Full thickness 07/25/22 0824   Dressing Status Intact 07/25/22 0824         Right lower quadrant wounds with clean pink tissue on base, minimal slough                Procedures             Wound Instructions:  Orders Placed This Encounter   Procedures    Wound cleansing and dressings     Abdominal wound:     Wash your hands with soap and water  Remove old dressing, discard into plastic bag and place in trash  Cleanse the wound with mild soap and water prior to applying a clean dressing  Shower yes         Apply woundres to the wound bed  Cover with dry gauze  Secure with paper tape or medfix tape    Change dressing daily                    Standing Status:   Future     Standing Expiration Date:   7/25/2023         Jose Cruz MD      Portions of the record may have been created with voice recognition software  Occasional wrong word or "sound a like" substitutions may have occurred due to the inherent limitations of voice recognition software  Read the chart carefully and recognize, using context, where substitutions have occurred

## 2022-07-25 NOTE — PATIENT INSTRUCTIONS
Orders Placed This Encounter   Procedures    Wound cleansing and dressings     Abdominal wound:     Wash your hands with soap and water  Remove old dressing, discard into plastic bag and place in trash  Cleanse the wound with mild soap and water prior to applying a clean dressing  Shower yes  Apply woundres to the wound bed  Cover with dry gauze  Secure with paper tape or medfix tape  Change dressing daily                     Standing Status:   Future     Standing Expiration Date:   7/25/2023

## 2022-07-26 LAB
EST. AVERAGE GLUCOSE BLD GHB EST-MCNC: 143 MG/DL
HBA1C MFR BLD: 6.6 %

## 2022-08-22 ENCOUNTER — OFFICE VISIT (OUTPATIENT)
Dept: WOUND CARE | Facility: HOSPITAL | Age: 77
End: 2022-08-22
Payer: COMMERCIAL

## 2022-08-22 VITALS
HEART RATE: 79 BPM | RESPIRATION RATE: 18 BRPM | TEMPERATURE: 98 F | DIASTOLIC BLOOD PRESSURE: 79 MMHG | SYSTOLIC BLOOD PRESSURE: 185 MMHG

## 2022-08-22 DIAGNOSIS — S31.109A OPEN WOUND OF ABDOMEN, INITIAL ENCOUNTER: Primary | ICD-10-CM

## 2022-08-22 PROCEDURE — 99212 OFFICE O/P EST SF 10 MIN: CPT | Performed by: SURGERY

## 2022-08-22 PROCEDURE — G0463 HOSPITAL OUTPT CLINIC VISIT: HCPCS | Performed by: SURGERY

## 2022-08-22 NOTE — PROGRESS NOTES
Patient ID: Salvador hare a 65 y o  female Date of Birth 1945       Chief Complaint   Patient presents with    Follow Up Wound Care Visit     abdomen       Allergies: Other    Diagnosis:  1  Open wound of abdomen, initial encounter  Assessment & Plan:  Wound almost closed, continue Woun'Dres    Orders:  -     Wound cleansing and dressings; Future       Diagnosis ICD-10-CM Associated Orders   1  Open wound of abdomen, initial encounter  S31 109A Wound cleansing and dressings          Subjective:   HPI  Here for wound follow up  The following portions of the patient's history were reviewed and updated as appropriate:   Patient Active Problem List   Diagnosis    Diabetes mellitus type 2 in obese ([facility] [place] [facility] )    Obesity, Class III, 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) ([place] [address] )    Essential hypertension    Diastolic dysfunction    GERD (gastroesophageal reflux disease)    Vitamin D deficiency    Hyperlipidemia    Unspecified abnormalities of gait and mobility    Tremor of hands and face    Benign essential tremor    Cerebrovascular accident (CVA) due to thrombosis of precerebral artery ([facility] [place] [facility] )    Open wound of abdomen     Past Medical History:   Diagnosis Date    Arthritis     Colorectal polyps     Constipation     Diabetes mellitus ([facility] [place] [facility] )     Diverticulosis of colon     Hiatal hernia     Hypertension     Increased urinary frequency     Lumbar disc disease     SOB (shortness of breath)     Stress incontinence      Past Surgical History:   Procedure Laterality Date    BACK SURGERY      CARPAL TUNNEL RELEASE Left     CATARACT EXTRACTION Bilateral     CHOLECYSTECTOMY      COLONOSCOPY W/ ENDOSCOPIC US N/A 2/24/2016    Procedure: ANAL ENDOSCOPIC U/S;  Surgeon: Nanette Mustafa MD;  [facility]; Service:     HERNIA REPAIR      [place] COLONOSCOPY FLX DX W/COLLJ SPEC WHEN PFRMD N/A 2/24/2016    Procedure: COLONOSCOPY;  Surgeon: Nanette Mustafa MD;  [facility];   Service: Colorectal    TONSILLECTOMY      TUBAL LIGATION       Social History     Socioeconomic History    Marital status: /Civil Union     Spouse name: None    Number of children: None    Years of education: None    Highest education level: None   Occupational History    None   Tobacco Use    Smoking status: Never Smoker    Smokeless tobacco: Never Used   Substance and Sexual Activity    Alcohol use: No    Drug use: No    Sexual activity: None   Other Topics Concern    None   Social History Narrative    None     Social Determinants of Health     Financial Resource Strain: Not on file   Food Insecurity: Not on file   Transportation Needs: Not on file   Physical Activity: Not on file   Stress: Not on file   Social Connections: Not on file   Intimate Partner Violence: Not on file   Housing Stability: Not on file        Current Outpatient Medications:     AMILoride-hydrochlorothiazide (MODURETIC) 5-50 mg per tablet, daily 1/2 tab daily , Disp: , Rfl:     aspirin 81 mg chewable tablet, Chew 81 mg daily (Patient not taking: Reported on 5/16/2022), Disp: , Rfl:     Cholecalciferol (VITAMIN D3) 1000 units CAPS, Take by mouth daily  , Disp: , Rfl:     furosemide (LASIX) 40 mg tablet, Take 40 mg by mouth as needed  , Disp: , Rfl:     glimepiride (AMARYL) 1 mg tablet, Take 1 mg by mouth every morning before breakfast, Disp: , Rfl:     hydrocortisone 2 5 % cream, as needed, Disp: , Rfl: 3    ibuprofen (MOTRIN) 200 mg tablet, Take by mouth every 6 (six) hours as needed for mild pain, Disp: , Rfl:     ketoconazole (NIZORAL) 2 % cream, as needed, Disp: , Rfl: 3    KLOR-CON M20 20 MEQ tablet, Take 20 mEq by mouth as needed  , Disp: , Rfl: 3    losartan (COZAAR) 50 mg tablet, 100 mg , Disp: , Rfl:     metoprolol succinate (TOPROL-XL) 100 mg 24 hr tablet, Take 100 mg by mouth daily, Disp: , Rfl: 3    Multiple Vitamins-Minerals (CENTRUM SILVER PO), multivitamin, Disp: , Rfl:   Family History   Problem Relation Age of Onset  Hypertension Mother     Heart attack Mother     Diabetes Mother     Prostate cancer Father     No Known Problems Brother     Stroke Maternal Grandmother     Stroke Paternal Grandmother     Stroke Paternal Grandfather     Thyroid cancer Neg Hx       Review of Systems      Objective:  BP (!) 185/79   Pulse 79   Temp 98 °F (36 7 °C)   Resp 18     Physical Exam        Wound 05/16/22 Abdomen Right;Upper (Active)   Wound Image   08/22/22 0828   Wound Description Pink;Granulation tissue; Yellow 08/22/22 0836   Elisha-wound Assessment Scar Tissue;Pink; Intact 08/22/22 0836   Wound Length (cm) 0 1 cm 08/22/22 0836   Wound Width (cm) 1 cm 08/22/22 0836   Wound Depth (cm) 0 1 cm 08/22/22 0836   Wound Surface Area (cm^2) 0 1 cm^2 08/22/22 0836   Wound Volume (cm^3) 0 01 cm^3 08/22/22 0836   Calculated Wound Volume (cm^3) 0 01 cm^3 08/22/22 0836   Change in Wound Size % 95 45 08/22/22 0836   Drainage Amount Scant 08/22/22 0836   Drainage Description Serosanguineous 08/22/22 0836   Non-staged Wound Description Full thickness 08/22/22 0836   Dressing Status Intact 08/22/22 0836     Right lower quadrant abdominal wound with very small, superficial open area with good granulation tissue, elisha wound clean                    Procedures             Wound Instructions:  Orders Placed This Encounter   Procedures    Wound cleansing and dressings     Abdominal wound:     Ok to remove dressing and shower  Redress immediately after shower  Apply woundres to the wound bed  Cover with dry gauze  Secure with paper tape or medfix tape  Change dressing daily  This was applied today             Standing Status:   Future     Standing Expiration Date:   8/22/2023         Yasir Hewitt MD      Portions of the record may have been created with voice recognition software  Occasional wrong word or "sound a like" substitutions may have occurred due to the inherent limitations of voice recognition software   Read the chart carefully and recognize, using context, where substitutions have occurred

## 2022-08-22 NOTE — PATIENT INSTRUCTIONS
Orders Placed This Encounter   Procedures    Wound cleansing and dressings     Abdominal wound:     Ok to remove dressing and shower  Redress immediately after shower  Apply woundres to the wound bed  Cover with dry gauze  Secure with paper tape or medfix tape  Change dressing daily  This was applied today              Standing Status:   Future     Standing Expiration Date:   8/22/2023

## 2022-09-12 ENCOUNTER — OFFICE VISIT (OUTPATIENT)
Dept: WOUND CARE | Facility: HOSPITAL | Age: 77
End: 2022-09-12
Payer: COMMERCIAL

## 2022-09-12 VITALS
TEMPERATURE: 97.5 F | RESPIRATION RATE: 16 BRPM | HEART RATE: 66 BPM | DIASTOLIC BLOOD PRESSURE: 65 MMHG | SYSTOLIC BLOOD PRESSURE: 151 MMHG

## 2022-09-12 DIAGNOSIS — S31.109A OPEN WOUND OF ABDOMEN, INITIAL ENCOUNTER: Primary | ICD-10-CM

## 2022-09-12 PROCEDURE — 99212 OFFICE O/P EST SF 10 MIN: CPT | Performed by: SURGERY

## 2022-09-12 PROCEDURE — G0463 HOSPITAL OUTPT CLINIC VISIT: HCPCS | Performed by: SURGERY

## 2022-09-12 NOTE — PATIENT INSTRUCTIONS
Orders Placed This Encounter   Procedures    Wound cleansing and dressings     Your wound is healed and you are discharged from the wound center today    Continue to cover the healed area with a silicone dressing to protect the healed area      Thank you for choosing [facility] center     Standing Status:   Future     Standing Expiration Date:   9/12/2023

## 2022-09-12 NOTE — PROGRESS NOTES
Patient ID: Silvestre Smith is a 65 y o  female Date of Birth 1945       Chief Complaint   Patient presents with    Follow Up Wound Care Visit     Abdomen       Allergies: Other    Diagnosis:  1  Open wound of abdomen, initial encounter  Assessment & Plan:  Wound now healed, may use protective dressing to prevent this from opening again    Orders:  -     Wound cleansing and dressings; Future       Diagnosis ICD-10-CM Associated Orders   1  Open wound of abdomen, initial encounter  S31 109A Wound cleansing and dressings          Subjective:   HPI  Here for wound follow up  The following portions of the patient's history were reviewed and updated as appropriate:   Patient Active Problem List   Diagnosis    Diabetes mellitus type 2 in obese ([facility] [place] [facility] )    Obesity, Class III, 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    Essential hypertension    Diastolic dysfunction    GERD (gastroesophageal reflux disease)    Vitamin D deficiency    Hyperlipidemia    Unspecified abnormalities of gait and mobility    Tremor of hands and face    Benign essential tremor    Cerebrovascular accident (CVA) due to thrombosis of precerebral artery [facility]    Open wound of abdomen     Past Medical History:   Diagnosis Date    Arthritis     Colorectal polyps     Constipation     Diabetes mellitus ([facility] [facility] )     Diverticulosis of colon     Hiatal hernia     Hypertension     Increased urinary frequency     Lumbar disc disease     SOB (shortness of breath)     Stress incontinence      Past Surgical History:   Procedure Laterality Date    BACK SURGERY      CARPAL TUNNEL RELEASE Left     CATARACT EXTRACTION Bilateral     CHOLECYSTECTOMY      COLONOSCOPY W/ ENDOSCOPIC US N/A 2/24/2016    Procedure: ANAL ENDOSCOPIC U/S;  Surgeon: Arnoldo Miguel MD;  [facility];   Service:     HERNIA REPAIR      [place] COLONOSCOPY FLX DX W/COLLJ SPEC WHEN PFRMD N/A 2/24/2016    Procedure: COLONOSCOPY;  Surgeon: Arnoldo Miguel MD;  [facility];   Service: Colorectal    TONSILLECTOMY      TUBAL LIGATION       Social History     Socioeconomic History    Marital status: /Civil Union     Spouse name: None    Number of children: None    Years of education: None    Highest education level: None   Occupational History    None   Tobacco Use    Smoking status: Never Smoker    Smokeless tobacco: Never Used   Substance and Sexual Activity    Alcohol use: No    Drug use: No    Sexual activity: None   Other Topics Concern    None   Social History Narrative    None     Social Determinants of Health     Financial Resource Strain: Not on file   Food Insecurity: Not on file   Transportation Needs: Not on file   Physical Activity: Not on file   Stress: Not on file   Social Connections: Not on file   Intimate Partner Violence: Not on file   Housing Stability: Not on file        Current Outpatient Medications:     AMILoride-hydrochlorothiazide (MODURETIC) 5-50 mg per tablet, daily 1/2 tab daily , Disp: , Rfl:     aspirin 81 mg chewable tablet, Chew 81 mg daily (Patient not taking: Reported on 5/16/2022), Disp: , Rfl:     Cholecalciferol (VITAMIN D3) 1000 units CAPS, Take by mouth daily  , Disp: , Rfl:     furosemide (LASIX) 40 mg tablet, Take 40 mg by mouth as needed  , Disp: , Rfl:     glimepiride (AMARYL) 1 mg tablet, Take 1 mg by mouth every morning before breakfast, Disp: , Rfl:     hydrocortisone 2 5 % cream, as needed, Disp: , Rfl: 3    ibuprofen (MOTRIN) 200 mg tablet, Take by mouth every 6 (six) hours as needed for mild pain, Disp: , Rfl:     ketoconazole (NIZORAL) 2 % cream, as needed, Disp: , Rfl: 3    KLOR-CON M20 20 MEQ tablet, Take 20 mEq by mouth as needed  , Disp: , Rfl: 3    losartan (COZAAR) 50 mg tablet, 100 mg , Disp: , Rfl:     metoprolol succinate (TOPROL-XL) 100 mg 24 hr tablet, Take 100 mg by mouth daily, Disp: , Rfl: 3    Multiple Vitamins-Minerals (CENTRUM SILVER PO), multivitamin, Disp: , Rfl:   Family History   Problem Relation Age of Onset    Hypertension Mother     Heart attack Mother     Diabetes Mother     Prostate cancer Father     No Known Problems Brother     Stroke Maternal Grandmother     Stroke Paternal Grandmother     Stroke Paternal Grandfather     Thyroid cancer Neg Hx       Review of Systems      Objective:  /65   Pulse 66   Temp 97 5 °F (36 4 °C)   Resp 16     Physical Exam        Wound 05/16/22 Abdomen Right;Upper (Active)   Wound Image   09/12/22 0815   Wound Description Epithelialization;Fragile 09/12/22 0800   Cheri-wound Assessment Intact;Scar Tissue 09/12/22 0800   Wound Length (cm) 0 cm 09/12/22 0800   Wound Width (cm) 0 cm 09/12/22 0800   Wound Depth (cm) 0 cm 09/12/22 0800   Wound Surface Area (cm^2) 0 cm^2 09/12/22 0800   Wound Volume (cm^3) 0 cm^3 09/12/22 0800   Calculated Wound Volume (cm^3) 0 cm^3 09/12/22 0800   Change in Wound Size % 100 09/12/22 0800   Drainage Amount None 09/12/22 0800   Drainage Description Serosanguineous 08/22/22 0836   Non-staged Wound Description Not applicable 02/60/67 9035   Dressing Status Intact 08/22/22 0836       Right abdomen wound covered with skin                  Procedures             Wound Instructions:  Orders Placed This Encounter   Procedures    Wound cleansing and dressings     Your wound is healed and you are discharged from the wound center today    Continue to cover the healed area with a silicone dressing to protect the healed area  Thank you for choosing [facility]     Standing Status:   Future     Standing Expiration Date:   9/12/2023         Yoli Gonzalez MD      Portions of the record may have been created with voice recognition software  Occasional wrong word or "sound a like" substitutions may have occurred due to the inherent limitations of voice recognition software  Read the chart carefully and recognize, using context, where substitutions have occurred

## 2022-11-22 ENCOUNTER — APPOINTMENT (OUTPATIENT)
Dept: LAB | Facility: CLINIC | Age: 77
End: 2022-11-22

## 2022-11-22 DIAGNOSIS — N39.0 URINARY TRACT INFECTION WITHOUT HEMATURIA, SITE UNSPECIFIED: ICD-10-CM

## 2022-11-23 LAB — BACTERIA UR CULT: NORMAL

## 2022-12-01 ENCOUNTER — IOP CHECK (OUTPATIENT)
Dept: URBAN - METROPOLITAN AREA CLINIC 6 | Facility: CLINIC | Age: 77
End: 2022-12-01

## 2022-12-01 DIAGNOSIS — Z96.1: ICD-10-CM

## 2022-12-01 DIAGNOSIS — H40.1131: ICD-10-CM

## 2022-12-01 PROCEDURE — 92014 COMPRE OPH EXAM EST PT 1/>: CPT

## 2022-12-01 PROCEDURE — 92202 OPSCPY EXTND ON/MAC DRAW: CPT

## 2022-12-01 PROCEDURE — 92133 CPTRZD OPH DX IMG PST SGM ON: CPT

## 2022-12-01 ASSESSMENT — TONOMETRY
OS_IOP_MMHG: 24
OD_IOP_MMHG: 24

## 2022-12-01 ASSESSMENT — VISUAL ACUITY
OS_CC: 20/25+2
OD_CC: 20/30-2

## 2023-02-13 ENCOUNTER — APPOINTMENT (OUTPATIENT)
Dept: LAB | Age: 78
End: 2023-02-13

## 2023-02-13 ENCOUNTER — HOSPITAL ENCOUNTER (OUTPATIENT)
Dept: RADIOLOGY | Age: 78
Discharge: HOME | End: 2023-02-13

## 2023-02-13 DIAGNOSIS — E11.9 TYPE 2 DIABETES MELLITUS WITHOUT COMPLICATION, WITHOUT LONG-TERM CURRENT USE OF INSULIN (HCC): ICD-10-CM

## 2023-02-13 DIAGNOSIS — I10 ESSENTIAL HYPERTENSION, BENIGN: ICD-10-CM

## 2023-02-13 DIAGNOSIS — N39.0 RECURRENT UTI: ICD-10-CM

## 2023-02-13 DIAGNOSIS — N39.0 URINARY TRACT INFECTION WITHOUT HEMATURIA, SITE UNSPECIFIED: ICD-10-CM

## 2023-02-13 LAB
ALBUMIN SERPL BCP-MCNC: 3.4 G/DL (ref 3.5–5)
ALP SERPL-CCNC: 88 U/L (ref 46–116)
ALT SERPL W P-5'-P-CCNC: 22 U/L (ref 12–78)
ANION GAP SERPL CALCULATED.3IONS-SCNC: 7 MMOL/L (ref 4–13)
AST SERPL W P-5'-P-CCNC: 20 U/L (ref 5–45)
BILIRUB SERPL-MCNC: 0.41 MG/DL (ref 0.2–1)
BUN SERPL-MCNC: 22 MG/DL (ref 5–25)
CALCIUM ALBUM COR SERPL-MCNC: 10.2 MG/DL (ref 8.3–10.1)
CALCIUM SERPL-MCNC: 9.7 MG/DL (ref 8.3–10.1)
CHLORIDE SERPL-SCNC: 105 MMOL/L (ref 96–108)
CO2 SERPL-SCNC: 24 MMOL/L (ref 21–32)
CREAT SERPL-MCNC: 1.03 MG/DL (ref 0.6–1.3)
GFR SERPL CREATININE-BSD FRML MDRD: 52 ML/MIN/1.73SQ M
GLUCOSE P FAST SERPL-MCNC: 108 MG/DL (ref 65–99)
POTASSIUM SERPL-SCNC: 4.7 MMOL/L (ref 3.5–5.3)
PROT SERPL-MCNC: 7.4 G/DL (ref 6.4–8.4)
SODIUM SERPL-SCNC: 136 MMOL/L (ref 135–147)

## 2023-02-15 LAB
BACTERIA UR CULT: ABNORMAL
BACTERIA UR CULT: ABNORMAL

## 2023-05-04 ENCOUNTER — IOP CHECK (OUTPATIENT)
Dept: URBAN - METROPOLITAN AREA CLINIC 6 | Facility: CLINIC | Age: 78
End: 2023-05-04

## 2023-05-04 DIAGNOSIS — Z96.1: ICD-10-CM

## 2023-05-04 DIAGNOSIS — H40.1131: ICD-10-CM

## 2023-05-04 PROCEDURE — 92012 INTRM OPH EXAM EST PATIENT: CPT

## 2023-05-04 PROCEDURE — 92083 EXTENDED VISUAL FIELD XM: CPT

## 2023-05-04 PROCEDURE — 92020 GONIOSCOPY: CPT

## 2023-05-04 ASSESSMENT — VISUAL ACUITY
OD_CC: 20/20
OS_CC: 20/20-2

## 2023-05-04 ASSESSMENT — TONOMETRY
OS_IOP_MMHG: 23
OD_IOP_MMHG: 25

## 2023-06-26 ENCOUNTER — APPOINTMENT (OUTPATIENT)
Dept: LAB | Facility: CLINIC | Age: 78
End: 2023-06-26
Payer: COMMERCIAL

## 2023-06-26 ENCOUNTER — TRANSCRIBE ORDERS (OUTPATIENT)
Dept: LAB | Facility: CLINIC | Age: 78
End: 2023-06-26

## 2023-06-26 DIAGNOSIS — I10 ESSENTIAL HYPERTENSION: Primary | ICD-10-CM

## 2023-06-26 DIAGNOSIS — I10 ESSENTIAL HYPERTENSION: ICD-10-CM

## 2023-06-26 DIAGNOSIS — E11.9 DIABETES MELLITUS, STABLE (HCC): ICD-10-CM

## 2023-06-26 LAB
ALBUMIN SERPL BCP-MCNC: 3 G/DL (ref 3.5–5)
ALP SERPL-CCNC: 82 U/L (ref 46–116)
ALT SERPL W P-5'-P-CCNC: 21 U/L (ref 12–78)
ANION GAP SERPL CALCULATED.3IONS-SCNC: 6 MMOL/L
AST SERPL W P-5'-P-CCNC: 12 U/L (ref 5–45)
BILIRUB SERPL-MCNC: 0.4 MG/DL (ref 0.2–1)
BUN SERPL-MCNC: 28 MG/DL (ref 5–25)
CALCIUM ALBUM COR SERPL-MCNC: 9.8 MG/DL (ref 8.3–10.1)
CALCIUM SERPL-MCNC: 9 MG/DL (ref 8.3–10.1)
CHLORIDE SERPL-SCNC: 111 MMOL/L (ref 96–108)
CO2 SERPL-SCNC: 24 MMOL/L (ref 21–32)
CREAT SERPL-MCNC: 1.09 MG/DL (ref 0.6–1.3)
ERYTHROCYTE [DISTWIDTH] IN BLOOD BY AUTOMATED COUNT: 14.5 % (ref 11.6–15.1)
GFR SERPL CREATININE-BSD FRML MDRD: 48 ML/MIN/1.73SQ M
GLUCOSE P FAST SERPL-MCNC: 109 MG/DL (ref 65–99)
HCT VFR BLD AUTO: 38 % (ref 34.8–46.1)
HGB BLD-MCNC: 11.8 G/DL (ref 11.5–15.4)
MCH RBC QN AUTO: 28.5 PG (ref 26.8–34.3)
MCHC RBC AUTO-ENTMCNC: 31.1 G/DL (ref 31.4–37.4)
MCV RBC AUTO: 92 FL (ref 82–98)
PLATELET # BLD AUTO: 306 THOUSANDS/UL (ref 149–390)
PMV BLD AUTO: 9.5 FL (ref 8.9–12.7)
POTASSIUM SERPL-SCNC: 4.2 MMOL/L (ref 3.5–5.3)
PROT SERPL-MCNC: 6.9 G/DL (ref 6.4–8.4)
RBC # BLD AUTO: 4.14 MILLION/UL (ref 3.81–5.12)
SODIUM SERPL-SCNC: 141 MMOL/L (ref 135–147)
WBC # BLD AUTO: 7.03 THOUSAND/UL (ref 4.31–10.16)

## 2023-06-26 PROCEDURE — 36415 COLL VENOUS BLD VENIPUNCTURE: CPT

## 2023-06-26 PROCEDURE — 85027 COMPLETE CBC AUTOMATED: CPT

## 2023-06-26 PROCEDURE — 80053 COMPREHEN METABOLIC PANEL: CPT

## 2023-10-02 ENCOUNTER — FOLLOW UP (OUTPATIENT)
Dept: URBAN - METROPOLITAN AREA CLINIC 6 | Facility: CLINIC | Age: 78
End: 2023-10-02

## 2023-10-02 DIAGNOSIS — Z96.1: ICD-10-CM

## 2023-10-02 DIAGNOSIS — H40.1131: ICD-10-CM

## 2023-10-02 DIAGNOSIS — E11.9: ICD-10-CM

## 2023-10-02 PROCEDURE — 92014 COMPRE OPH EXAM EST PT 1/>: CPT

## 2023-10-02 PROCEDURE — 92202 OPSCPY EXTND ON/MAC DRAW: CPT

## 2023-10-02 PROCEDURE — 92133 CPTRZD OPH DX IMG PST SGM ON: CPT

## 2023-10-02 ASSESSMENT — TONOMETRY
OS_IOP_MMHG: 17
OD_IOP_MMHG: 17

## 2023-10-02 ASSESSMENT — VISUAL ACUITY
OD_CC: 20/30
OS_CC: 20/25-2
OU_CC: 20/30

## 2023-10-10 ENCOUNTER — APPOINTMENT (OUTPATIENT)
Dept: LAB | Facility: CLINIC | Age: 78
End: 2023-10-10
Payer: COMMERCIAL

## 2023-10-10 DIAGNOSIS — N39.0 URINARY TRACT INFECTION WITHOUT HEMATURIA, SITE UNSPECIFIED: ICD-10-CM

## 2023-10-10 PROCEDURE — 87186 SC STD MICRODIL/AGAR DIL: CPT

## 2023-10-10 PROCEDURE — 87086 URINE CULTURE/COLONY COUNT: CPT

## 2023-10-10 PROCEDURE — 87077 CULTURE AEROBIC IDENTIFY: CPT

## 2023-10-12 LAB
BACTERIA UR CULT: ABNORMAL
BACTERIA UR CULT: ABNORMAL

## 2024-01-22 ENCOUNTER — LAB REQUISITION (OUTPATIENT)
Dept: LAB | Facility: HOSPITAL | Age: 79
End: 2024-01-22
Payer: COMMERCIAL

## 2024-01-22 DIAGNOSIS — E11.9 TYPE 2 DIABETES MELLITUS WITHOUT COMPLICATIONS (HCC): ICD-10-CM

## 2024-01-22 DIAGNOSIS — E78.5 HYPERLIPIDEMIA, UNSPECIFIED: ICD-10-CM

## 2024-01-22 DIAGNOSIS — I10 ESSENTIAL (PRIMARY) HYPERTENSION: ICD-10-CM

## 2024-01-22 LAB
ALBUMIN SERPL BCP-MCNC: 4 G/DL (ref 3.5–5)
ALP SERPL-CCNC: 85 U/L (ref 34–104)
ALT SERPL W P-5'-P-CCNC: 12 U/L (ref 7–52)
ANION GAP SERPL CALCULATED.3IONS-SCNC: 11 MMOL/L
AST SERPL W P-5'-P-CCNC: 16 U/L (ref 13–39)
BILIRUB SERPL-MCNC: 0.47 MG/DL (ref 0.2–1)
BUN SERPL-MCNC: 26 MG/DL (ref 5–25)
CALCIUM SERPL-MCNC: 9.4 MG/DL (ref 8.4–10.2)
CHLORIDE SERPL-SCNC: 104 MMOL/L (ref 96–108)
CO2 SERPL-SCNC: 25 MMOL/L (ref 21–32)
CREAT SERPL-MCNC: 1.07 MG/DL (ref 0.6–1.3)
GFR SERPL CREATININE-BSD FRML MDRD: 49 ML/MIN/1.73SQ M
GLUCOSE SERPL-MCNC: 125 MG/DL (ref 65–140)
HGB BLD-MCNC: 12.2 G/DL (ref 11.5–15.4)
LDLC SERPL DIRECT ASSAY-MCNC: 130 MG/DL (ref 0–100)
POTASSIUM SERPL-SCNC: 4.2 MMOL/L (ref 3.5–5.3)
PROT SERPL-MCNC: 7 G/DL (ref 6.4–8.4)
SODIUM SERPL-SCNC: 140 MMOL/L (ref 135–147)

## 2024-01-22 PROCEDURE — 83036 HEMOGLOBIN GLYCOSYLATED A1C: CPT | Performed by: INTERNAL MEDICINE

## 2024-01-22 PROCEDURE — 83721 ASSAY OF BLOOD LIPOPROTEIN: CPT | Performed by: INTERNAL MEDICINE

## 2024-01-22 PROCEDURE — 80053 COMPREHEN METABOLIC PANEL: CPT | Performed by: INTERNAL MEDICINE

## 2024-01-23 LAB
EST. AVERAGE GLUCOSE BLD GHB EST-MCNC: 151 MG/DL
HBA1C MFR BLD: 6.9 %

## 2024-01-26 ENCOUNTER — PROBLEM (OUTPATIENT)
Dept: URBAN - METROPOLITAN AREA CLINIC 6 | Facility: CLINIC | Age: 79
End: 2024-01-26

## 2024-01-26 DIAGNOSIS — H00.15: ICD-10-CM

## 2024-01-26 PROCEDURE — 92012 INTRM OPH EXAM EST PATIENT: CPT

## 2024-01-26 ASSESSMENT — TONOMETRY
OD_IOP_MMHG: 20
OS_IOP_MMHG: 18

## 2024-01-26 ASSESSMENT — VISUAL ACUITY
OS_CC: 20/20
OD_CC: 20/20-1

## 2024-02-12 ENCOUNTER — APPOINTMENT (OUTPATIENT)
Dept: LAB | Facility: CLINIC | Age: 79
End: 2024-02-12
Payer: COMMERCIAL

## 2024-02-12 DIAGNOSIS — N39.0 URINARY TRACT INFECTION WITHOUT HEMATURIA, SITE UNSPECIFIED: ICD-10-CM

## 2024-02-12 LAB
BACTERIA UR QL AUTO: ABNORMAL /HPF
BILIRUB UR QL STRIP: NEGATIVE
CLARITY UR: ABNORMAL
COLOR UR: ABNORMAL
GLUCOSE UR STRIP-MCNC: NEGATIVE MG/DL
HGB UR QL STRIP.AUTO: ABNORMAL
KETONES UR STRIP-MCNC: NEGATIVE MG/DL
LEUKOCYTE ESTERASE UR QL STRIP: ABNORMAL
MUCOUS THREADS UR QL AUTO: ABNORMAL
NITRITE UR QL STRIP: NEGATIVE
NON-SQ EPI CELLS URNS QL MICRO: ABNORMAL /HPF
PH UR STRIP.AUTO: 6 [PH]
PROT UR STRIP-MCNC: ABNORMAL MG/DL
RBC #/AREA URNS AUTO: ABNORMAL /HPF
SP GR UR STRIP.AUTO: 1.02 (ref 1–1.03)
UROBILINOGEN UR STRIP-ACNC: <2 MG/DL
WBC #/AREA URNS AUTO: ABNORMAL /HPF

## 2024-02-12 PROCEDURE — 87186 SC STD MICRODIL/AGAR DIL: CPT

## 2024-02-12 PROCEDURE — 87086 URINE CULTURE/COLONY COUNT: CPT

## 2024-02-12 PROCEDURE — 81001 URINALYSIS AUTO W/SCOPE: CPT

## 2024-02-12 PROCEDURE — 87077 CULTURE AEROBIC IDENTIFY: CPT

## 2024-02-14 LAB
BACTERIA UR CULT: ABNORMAL
BACTERIA UR CULT: ABNORMAL

## 2024-03-04 ENCOUNTER — APPOINTMENT (EMERGENCY)
Dept: RADIOLOGY | Facility: HOSPITAL | Age: 79
DRG: 552 | End: 2024-03-04
Payer: COMMERCIAL

## 2024-03-04 ENCOUNTER — HOSPITAL ENCOUNTER (INPATIENT)
Facility: HOSPITAL | Age: 79
LOS: 6 days | Discharge: HOME HEALTH | DRG: 552 | End: 2024-03-11
Attending: EMERGENCY MEDICINE | Admitting: FAMILY MEDICINE
Payer: COMMERCIAL

## 2024-03-04 DIAGNOSIS — I48.91 NEW ONSET ATRIAL FIBRILLATION (HCC): ICD-10-CM

## 2024-03-04 DIAGNOSIS — M54.16 LUMBAR RADICULOPATHY: ICD-10-CM

## 2024-03-04 DIAGNOSIS — I48.91 ATRIAL FIBRILLATION (HCC): ICD-10-CM

## 2024-03-04 DIAGNOSIS — M54.50 LOWER BACK PAIN: ICD-10-CM

## 2024-03-04 DIAGNOSIS — M25.552 LEFT HIP PAIN: Primary | ICD-10-CM

## 2024-03-04 DIAGNOSIS — R26.2 AMBULATORY DYSFUNCTION: ICD-10-CM

## 2024-03-04 PROBLEM — I50.32 CHRONIC DIASTOLIC CHF (CONGESTIVE HEART FAILURE) (HCC): Chronic | Status: ACTIVE | Noted: 2018-07-27

## 2024-03-04 PROBLEM — Z86.73 HISTORY OF STROKE: Status: ACTIVE | Noted: 2024-03-04

## 2024-03-04 LAB
ANION GAP SERPL CALCULATED.3IONS-SCNC: 10 MMOL/L
BASOPHILS # BLD AUTO: 0.03 THOUSANDS/ÂΜL (ref 0–0.1)
BASOPHILS NFR BLD AUTO: 0 % (ref 0–1)
BUN SERPL-MCNC: 24 MG/DL (ref 5–25)
CALCIUM SERPL-MCNC: 9.9 MG/DL (ref 8.4–10.2)
CHLORIDE SERPL-SCNC: 100 MMOL/L (ref 96–108)
CO2 SERPL-SCNC: 25 MMOL/L (ref 21–32)
CREAT SERPL-MCNC: 0.94 MG/DL (ref 0.6–1.3)
EOSINOPHIL # BLD AUTO: 0.17 THOUSAND/ÂΜL (ref 0–0.61)
EOSINOPHIL NFR BLD AUTO: 2 % (ref 0–6)
ERYTHROCYTE [DISTWIDTH] IN BLOOD BY AUTOMATED COUNT: 14 % (ref 11.6–15.1)
GFR SERPL CREATININE-BSD FRML MDRD: 57 ML/MIN/1.73SQ M
GLUCOSE SERPL-MCNC: 160 MG/DL (ref 65–140)
GLUCOSE SERPL-MCNC: 292 MG/DL (ref 65–140)
HCT VFR BLD AUTO: 39.7 % (ref 34.8–46.1)
HGB BLD-MCNC: 12.7 G/DL (ref 11.5–15.4)
IMM GRANULOCYTES # BLD AUTO: 0.05 THOUSAND/UL (ref 0–0.2)
IMM GRANULOCYTES NFR BLD AUTO: 0 % (ref 0–2)
LYMPHOCYTES # BLD AUTO: 1.18 THOUSANDS/ÂΜL (ref 0.6–4.47)
LYMPHOCYTES NFR BLD AUTO: 11 % (ref 14–44)
MCH RBC QN AUTO: 27.9 PG (ref 26.8–34.3)
MCHC RBC AUTO-ENTMCNC: 32 G/DL (ref 31.4–37.4)
MCV RBC AUTO: 87 FL (ref 82–98)
MONOCYTES # BLD AUTO: 0.36 THOUSAND/ÂΜL (ref 0.17–1.22)
MONOCYTES NFR BLD AUTO: 3 % (ref 4–12)
NEUTROPHILS # BLD AUTO: 9.45 THOUSANDS/ÂΜL (ref 1.85–7.62)
NEUTS SEG NFR BLD AUTO: 84 % (ref 43–75)
NRBC BLD AUTO-RTO: 0 /100 WBCS
PLATELET # BLD AUTO: 342 THOUSANDS/UL (ref 149–390)
PMV BLD AUTO: 9.2 FL (ref 8.9–12.7)
POTASSIUM SERPL-SCNC: 4.4 MMOL/L (ref 3.5–5.3)
RBC # BLD AUTO: 4.55 MILLION/UL (ref 3.81–5.12)
SODIUM SERPL-SCNC: 135 MMOL/L (ref 135–147)
WBC # BLD AUTO: 11.24 THOUSAND/UL (ref 4.31–10.16)

## 2024-03-04 PROCEDURE — 74176 CT ABD & PELVIS W/O CONTRAST: CPT

## 2024-03-04 PROCEDURE — 99285 EMERGENCY DEPT VISIT HI MDM: CPT

## 2024-03-04 PROCEDURE — 36415 COLL VENOUS BLD VENIPUNCTURE: CPT

## 2024-03-04 PROCEDURE — 99222 1ST HOSP IP/OBS MODERATE 55: CPT | Performed by: INTERNAL MEDICINE

## 2024-03-04 PROCEDURE — 85025 COMPLETE CBC W/AUTO DIFF WBC: CPT

## 2024-03-04 PROCEDURE — 96374 THER/PROPH/DIAG INJ IV PUSH: CPT

## 2024-03-04 PROCEDURE — 99285 EMERGENCY DEPT VISIT HI MDM: CPT | Performed by: EMERGENCY MEDICINE

## 2024-03-04 PROCEDURE — 80048 BASIC METABOLIC PNL TOTAL CA: CPT

## 2024-03-04 PROCEDURE — 82948 REAGENT STRIP/BLOOD GLUCOSE: CPT

## 2024-03-04 RX ORDER — LOSARTAN POTASSIUM 50 MG/1
100 TABLET ORAL DAILY
Status: DISCONTINUED | OUTPATIENT
Start: 2024-03-04 | End: 2024-03-11 | Stop reason: HOSPADM

## 2024-03-04 RX ORDER — AMILORIDE HYDROCHLORIDE 5 MG/1
2.5 TABLET ORAL DAILY
Status: DISCONTINUED | OUTPATIENT
Start: 2024-03-05 | End: 2024-03-11 | Stop reason: HOSPADM

## 2024-03-04 RX ORDER — OXYCODONE HYDROCHLORIDE 5 MG/1
5 TABLET ORAL EVERY 4 HOURS PRN
Status: DISCONTINUED | OUTPATIENT
Start: 2024-03-04 | End: 2024-03-11 | Stop reason: HOSPADM

## 2024-03-04 RX ORDER — ONDANSETRON 2 MG/ML
4 INJECTION INTRAMUSCULAR; INTRAVENOUS EVERY 6 HOURS PRN
Status: DISCONTINUED | OUTPATIENT
Start: 2024-03-04 | End: 2024-03-11 | Stop reason: HOSPADM

## 2024-03-04 RX ORDER — INSULIN LISPRO 100 [IU]/ML
1-6 INJECTION, SOLUTION INTRAVENOUS; SUBCUTANEOUS
Status: DISCONTINUED | OUTPATIENT
Start: 2024-03-05 | End: 2024-03-11 | Stop reason: HOSPADM

## 2024-03-04 RX ORDER — OXYCODONE HYDROCHLORIDE 5 MG/1
5 TABLET ORAL ONCE
Status: COMPLETED | OUTPATIENT
Start: 2024-03-04 | End: 2024-03-04

## 2024-03-04 RX ORDER — METHOCARBAMOL 500 MG/1
500 TABLET, FILM COATED ORAL EVERY 6 HOURS SCHEDULED
Status: DISCONTINUED | OUTPATIENT
Start: 2024-03-04 | End: 2024-03-11 | Stop reason: HOSPADM

## 2024-03-04 RX ORDER — ASPIRIN 81 MG/1
81 TABLET, CHEWABLE ORAL DAILY
Status: DISCONTINUED | OUTPATIENT
Start: 2024-03-04 | End: 2024-03-11 | Stop reason: HOSPADM

## 2024-03-04 RX ORDER — AMOXICILLIN 250 MG
1 CAPSULE ORAL
Status: DISCONTINUED | OUTPATIENT
Start: 2024-03-04 | End: 2024-03-11 | Stop reason: HOSPADM

## 2024-03-04 RX ORDER — ENOXAPARIN SODIUM 100 MG/ML
40 INJECTION SUBCUTANEOUS 2 TIMES DAILY
Status: DISCONTINUED | OUTPATIENT
Start: 2024-03-04 | End: 2024-03-05

## 2024-03-04 RX ORDER — ACETAMINOPHEN 325 MG/1
975 TABLET ORAL EVERY 8 HOURS SCHEDULED
Status: DISCONTINUED | OUTPATIENT
Start: 2024-03-04 | End: 2024-03-11 | Stop reason: HOSPADM

## 2024-03-04 RX ORDER — AMILORIDE HYDROCHLORIDE 5 MG/1
2.5 TABLET ORAL DAILY
COMMUNITY

## 2024-03-04 RX ORDER — BIMATOPROST 0.1 MG/ML
1 SOLUTION/ DROPS OPHTHALMIC
COMMUNITY

## 2024-03-04 RX ORDER — BIMATOPROST 0.3 MG/ML
1 SOLUTION/ DROPS OPHTHALMIC
Status: DISCONTINUED | OUTPATIENT
Start: 2024-03-04 | End: 2024-03-11 | Stop reason: HOSPADM

## 2024-03-04 RX ORDER — HYDROMORPHONE HCL IN WATER/PF 6 MG/30 ML
0.2 PATIENT CONTROLLED ANALGESIA SYRINGE INTRAVENOUS EVERY 2 HOUR PRN
Status: DISCONTINUED | OUTPATIENT
Start: 2024-03-04 | End: 2024-03-11 | Stop reason: HOSPADM

## 2024-03-04 RX ORDER — INSULIN LISPRO 100 [IU]/ML
1-5 INJECTION, SOLUTION INTRAVENOUS; SUBCUTANEOUS
Status: DISCONTINUED | OUTPATIENT
Start: 2024-03-04 | End: 2024-03-11 | Stop reason: HOSPADM

## 2024-03-04 RX ORDER — METOPROLOL SUCCINATE 100 MG/1
100 TABLET, EXTENDED RELEASE ORAL DAILY
Status: DISCONTINUED | OUTPATIENT
Start: 2024-03-04 | End: 2024-03-06

## 2024-03-04 RX ORDER — METHOCARBAMOL 500 MG/1
1000 TABLET, FILM COATED ORAL ONCE
Status: COMPLETED | OUTPATIENT
Start: 2024-03-04 | End: 2024-03-04

## 2024-03-04 RX ORDER — EZETIMIBE 10 MG/1
10 TABLET ORAL
Status: DISCONTINUED | OUTPATIENT
Start: 2024-03-05 | End: 2024-03-11 | Stop reason: HOSPADM

## 2024-03-04 RX ORDER — ACETAMINOPHEN 325 MG/1
975 TABLET ORAL ONCE
Status: COMPLETED | OUTPATIENT
Start: 2024-03-04 | End: 2024-03-04

## 2024-03-04 RX ORDER — DIAZEPAM 5 MG/ML
5 INJECTION, SOLUTION INTRAMUSCULAR; INTRAVENOUS ONCE
Status: COMPLETED | OUTPATIENT
Start: 2024-03-04 | End: 2024-03-04

## 2024-03-04 RX ORDER — AMLODIPINE BESYLATE 5 MG/1
5 TABLET ORAL
Status: DISCONTINUED | OUTPATIENT
Start: 2024-03-04 | End: 2024-03-11 | Stop reason: HOSPADM

## 2024-03-04 RX ADMIN — METHOCARBAMOL 1000 MG: 500 TABLET ORAL at 14:28

## 2024-03-04 RX ADMIN — ENOXAPARIN SODIUM 40 MG: 40 INJECTION SUBCUTANEOUS at 21:41

## 2024-03-04 RX ADMIN — LOSARTAN POTASSIUM 100 MG: 50 TABLET, FILM COATED ORAL at 21:42

## 2024-03-04 RX ADMIN — AMLODIPINE BESYLATE 5 MG: 5 TABLET ORAL at 21:39

## 2024-03-04 RX ADMIN — OXYCODONE HYDROCHLORIDE 5 MG: 5 TABLET ORAL at 21:41

## 2024-03-04 RX ADMIN — ACETAMINOPHEN 975 MG: 325 TABLET, FILM COATED ORAL at 14:28

## 2024-03-04 RX ADMIN — OXYCODONE HYDROCHLORIDE 5 MG: 5 TABLET ORAL at 14:28

## 2024-03-04 RX ADMIN — ASPIRIN 81 MG CHEWABLE TABLET 81 MG: 81 TABLET CHEWABLE at 21:40

## 2024-03-04 RX ADMIN — METHOCARBAMOL 500 MG: 500 TABLET ORAL at 21:44

## 2024-03-04 RX ADMIN — INSULIN LISPRO 2 UNITS: 100 INJECTION, SOLUTION INTRAVENOUS; SUBCUTANEOUS at 21:45

## 2024-03-04 RX ADMIN — BIMATOPROST 1 DROP: 0.3 SOLUTION/ DROPS OPHTHALMIC at 21:42

## 2024-03-04 RX ADMIN — DIAZEPAM 5 MG: 10 INJECTION, SOLUTION INTRAMUSCULAR; INTRAVENOUS at 15:30

## 2024-03-04 RX ADMIN — METOPROLOL SUCCINATE 100 MG: 100 TABLET, EXTENDED RELEASE ORAL at 21:45

## 2024-03-04 NOTE — ED ATTENDING ATTESTATION
"3/4/2024   I, Sakshi Jordan MD, saw and evaluated the patient. I have discussed the patient with the resident/non-physician practitioner and agree with the resident's/non-physician practitioner's findings, Plan of Care, and MDM as documented in the resident's/non-physician practitioner's note, except where noted. All available labs and Radiology studies were reviewed.  I was present for key portions of any procedure(s) performed by the resident/non-physician practitioner and I was immediately available to provide assistance.       At this point I agree with the current assessment done in the Emergency Department.  I have conducted an independent evaluation of this patient a history and physical is as follows:    Unit/Bed#: ED 07 Encounter: [id number]    Chief Complaint   Patient presents with    Hip Pain     Per ems pt gets weekly lidocaine and steroid shots into left hip for pain management, today she went to the dr for a shot when she got home she was unable to walk due to pain. Pt stated she \"has not gotten relief from the shots for the past 3 weeks.\"     75-year-old female with past medical history of diabetes, hypertension, arthritis, obesity, presenting with left hip and thigh pain.  Patient follows with [facility] and has undergone injections in left hip.  She could not get out of the car on the way home due to severity of pain.   No bowel or bladder incontinence.  Voltaren and Tylenol have not been helpful.     Per [facility] note: \"The patient has no improvement in pain since the last visit. She reports needing something for her pain. Has long discussion with the patient and her family. Continues to not be clearly hip related given distribution and severity of symptoms. She has not scheduled or been seen by spine team which was discussed last visit. She wants to try diagnostic injection which was completed today but severe positioning pain again raises concern for non hip related source. recommended if severity " "unchanged to potentially seek ER evaluation for better pain control and possible further diagnostic study to rule out lumbar source, atypical stress fractures or non MSK sources. \"    Physical Exam  ED Triage Vitals   Temperature Pulse Respirations Blood Pressure SpO2   03/04/24 1358 03/04/24 1354 03/04/24 1354 03/04/24 1354 03/04/24 1354   97.8 °F (36.6 °C) 73 20 166/72 98 %      Temp Source Heart Rate Source Patient Position - Orthostatic VS BP Location FiO2 (%)   03/04/24 1358 03/04/24 1354 03/04/24 1354 03/04/24 1354 --   Oral Monitor Lying Left arm       Pain Score       03/04/24 1354       10 - Worst Possible Pain           Vital signs and nursing notes reviewed    CONSTITUTIONAL: female appearing stated age resting in bed, appears uncomfortable due to left hip pain.  HEENT: atraumatic, normocephalic. Sclera anicteric, conjunctiva are not injected. Moist oral mucosa  CARDIOVASCULAR/CHEST: Appears well-perfused, tachycardic  PULMONARY: Breathing comfortably on RA. Breath sounds are equal and clear to auscultation  ABDOMEN: non-distended. BS present, normoactive. Non-tender  MSK: moves all extremities, no deformities, 2+ pitting peripheral edema in bilateral lower extremities, no calf asymmetry.  There is tenderness to palpation along left lateral greater trochanter and left anterior hip joint.  NEURO: Awake, alert, and oriented x 3. Face symmetric. Moves all extremities spontaneously, though movement in left lower extremity is limited by pain. No focal neurologic deficits  SKIN: Warm, appears well-perfused  MENTAL STATUS: Normal affect      Labs and Imaging  Labs Reviewed   CBC AND DIFFERENTIAL - Abnormal       Result Value Ref Range Status    WBC 11.24 (*) 4.31 - 10.16 Thousand/uL Final    RBC 4.55  3.81 - 5.12 Million/uL Final    Hemoglobin 12.7  11.5 - 15.4 g/dL Final    Hematocrit 39.7  34.8 - 46.1 % Final    MCV 87  82 - 98 fL Final    MCH 27.9  26.8 - 34.3 pg Final    MCHC 32.0  31.4 - 37.4 g/dL Final    " RDW 14.0  11.6 - 15.1 % Final    MPV 9.2  8.9 - 12.7 fL Final    Platelets 342  149 - 390 Thousands/uL Final    nRBC 0  /100 WBCs Final    Neutrophils Relative 84 (*) 43 - 75 % Final    Immat GRANS % 0  0 - 2 % Final    Lymphocytes Relative 11 (*) 14 - 44 % Final    Monocytes Relative 3 (*) 4 - 12 % Final    Eosinophils Relative 2  0 - 6 % Final    Basophils Relative 0  0 - 1 % Final    Neutrophils Absolute 9.45 (*) 1.85 - 7.62 Thousands/µL Final    Immature Grans Absolute 0.05  0.00 - 0.20 Thousand/uL Final    Lymphocytes Absolute 1.18  0.60 - 4.47 Thousands/µL Final    Monocytes Absolute 0.36  0.17 - 1.22 Thousand/µL Final    Eosinophils Absolute 0.17  0.00 - 0.61 Thousand/µL Final    Basophils Absolute 0.03  0.00 - 0.10 Thousands/µL Final   BASIC METABOLIC PANEL - Abnormal    Sodium 135  135 - 147 mmol/L Final    Potassium 4.4  3.5 - 5.3 mmol/L Final    Chloride 100  96 - 108 mmol/L Final    CO2 25  21 - 32 mmol/L Final    ANION GAP 10  mmol/L Final    BUN 24  5 - 25 mg/dL Final    Creatinine 0.94  0.60 - 1.30 mg/dL Final    Comment: Standardized to IDMS reference method    Glucose 160 (*) 65 - 140 mg/dL Final    Comment: If the patient is fasting, the ADA then defines impaired fasting glucose as > 100 mg/dL and diabetes as > or equal to 123 mg/dL.    Calcium 9.9  8.4 - 10.2 mg/dL Final    eGFR 57  ml/min/1.73sq m Final    Narrative:     National Kidney Disease Foundation guidelines for Chronic Kidney Disease (CKD):     Stage 1 with normal or high GFR (GFR > 90 mL/min/1.73 square meters)    Stage 2 Mild CKD (GFR = 60-89 mL/min/1.73 square meters)    Stage 3A Moderate CKD (GFR = 45-59 mL/min/1.73 square meters)    Stage 3B Moderate CKD (GFR = 30-44 mL/min/1.73 square meters)    Stage 4 Severe CKD (GFR = 15-29 mL/min/1.73 square meters)    Stage 5 End Stage CKD (GFR <15 mL/min/1.73 square meters)  Note: GFR calculation is accurate only with a steady state creatinine       CT abdomen pelvis wo contrast   Final  Result      1.  Diverticulosis coli with suggestion of trace stranding adjacent to the proximal sigmoid colon. Very mild acute diverticulitis is not excluded.      2.  No acute fracture.      3.  Chronic appearing seroma associated with the large anterior abdominal wall hernia mesh.      The study was marked in EPIC for immediate notification.      Workstation performed: [id number]         MRI lumbar spine w wo contrast    (Results Pending)         Procedures  Procedures        ED Course  Medications   acetaminophen (TYLENOL) tablet 975 mg (975 mg Oral Given 3/4/24 1428)   oxyCODONE (ROXICODONE) IR tablet 5 mg (5 mg Oral Given 3/4/24 1428)   methocarbamol (ROBAXIN) tablet 1,000 mg (1,000 mg Oral Given 3/4/24 1428)   diazepam (VALIUM) injection 5 mg (5 mg Intravenous Given 3/4/24 1530)     78-year-old female presenting with exacerbation of left hip pain.  Vital signs reviewed, tachycardic, within normal limits otherwise.  Differential diagnosis includes arthritis, lumbar radiculopathy, referred pain from intra-abdominal source, versus another etiology of symptoms.  Patient treated symptomatically as above but continues to have significant pain and still unable to get out of bed to ambulate.  Plan to admit for intractable pain and ambulatory dysfunction.

## 2024-03-05 LAB
ALBUMIN SERPL BCP-MCNC: 3.4 G/DL (ref 3.5–5)
ALP SERPL-CCNC: 75 U/L (ref 34–104)
ALT SERPL W P-5'-P-CCNC: 15 U/L (ref 7–52)
ANION GAP SERPL CALCULATED.3IONS-SCNC: 8 MMOL/L
APTT PPP: 33 SECONDS (ref 23–37)
AST SERPL W P-5'-P-CCNC: 15 U/L (ref 13–39)
BASOPHILS # BLD AUTO: 0.01 THOUSANDS/ÂΜL (ref 0–0.1)
BASOPHILS NFR BLD AUTO: 0 % (ref 0–1)
BILIRUB SERPL-MCNC: 0.42 MG/DL (ref 0.2–1)
BUN SERPL-MCNC: 32 MG/DL (ref 5–25)
CALCIUM ALBUM COR SERPL-MCNC: 9.3 MG/DL (ref 8.3–10.1)
CALCIUM SERPL-MCNC: 8.8 MG/DL (ref 8.4–10.2)
CHLORIDE SERPL-SCNC: 102 MMOL/L (ref 96–108)
CO2 SERPL-SCNC: 24 MMOL/L (ref 21–32)
CREAT SERPL-MCNC: 0.91 MG/DL (ref 0.6–1.3)
EOSINOPHIL # BLD AUTO: 0 THOUSAND/ÂΜL (ref 0–0.61)
EOSINOPHIL NFR BLD AUTO: 0 % (ref 0–6)
ERYTHROCYTE [DISTWIDTH] IN BLOOD BY AUTOMATED COUNT: 13.4 % (ref 11.6–15.1)
ERYTHROCYTE [DISTWIDTH] IN BLOOD BY AUTOMATED COUNT: 13.4 % (ref 11.6–15.1)
GFR SERPL CREATININE-BSD FRML MDRD: 60 ML/MIN/1.73SQ M
GLUCOSE P FAST SERPL-MCNC: 212 MG/DL (ref 65–99)
GLUCOSE SERPL-MCNC: 205 MG/DL (ref 65–140)
GLUCOSE SERPL-MCNC: 212 MG/DL (ref 65–140)
GLUCOSE SERPL-MCNC: 269 MG/DL (ref 65–140)
GLUCOSE SERPL-MCNC: 277 MG/DL (ref 65–140)
GLUCOSE SERPL-MCNC: 312 MG/DL (ref 65–140)
HCT VFR BLD AUTO: 36.3 % (ref 34.8–46.1)
HCT VFR BLD AUTO: 36.8 % (ref 34.8–46.1)
HGB BLD-MCNC: 11.6 G/DL (ref 11.5–15.4)
HGB BLD-MCNC: 11.7 G/DL (ref 11.5–15.4)
IMM GRANULOCYTES # BLD AUTO: 0.07 THOUSAND/UL (ref 0–0.2)
IMM GRANULOCYTES NFR BLD AUTO: 1 % (ref 0–2)
INR PPP: 1.09 (ref 0.84–1.19)
LYMPHOCYTES # BLD AUTO: 0.97 THOUSANDS/ÂΜL (ref 0.6–4.47)
LYMPHOCYTES NFR BLD AUTO: 10 % (ref 14–44)
MAGNESIUM SERPL-MCNC: 2 MG/DL (ref 1.9–2.7)
MCH RBC QN AUTO: 28.1 PG (ref 26.8–34.3)
MCH RBC QN AUTO: 28.2 PG (ref 26.8–34.3)
MCHC RBC AUTO-ENTMCNC: 31.8 G/DL (ref 31.4–37.4)
MCHC RBC AUTO-ENTMCNC: 32 G/DL (ref 31.4–37.4)
MCV RBC AUTO: 88 FL (ref 82–98)
MCV RBC AUTO: 89 FL (ref 82–98)
MONOCYTES # BLD AUTO: 0.48 THOUSAND/ÂΜL (ref 0.17–1.22)
MONOCYTES NFR BLD AUTO: 5 % (ref 4–12)
NEUTROPHILS # BLD AUTO: 8.44 THOUSANDS/ÂΜL (ref 1.85–7.62)
NEUTS SEG NFR BLD AUTO: 84 % (ref 43–75)
NRBC BLD AUTO-RTO: 0 /100 WBCS
PLATELET # BLD AUTO: 349 THOUSANDS/UL (ref 149–390)
PLATELET # BLD AUTO: 354 THOUSANDS/UL (ref 149–390)
PMV BLD AUTO: 9.1 FL (ref 8.9–12.7)
PMV BLD AUTO: 9.2 FL (ref 8.9–12.7)
POTASSIUM SERPL-SCNC: 4.3 MMOL/L (ref 3.5–5.3)
PROT SERPL-MCNC: 6.3 G/DL (ref 6.4–8.4)
PROTHROMBIN TIME: 14 SECONDS (ref 11.6–14.5)
RBC # BLD AUTO: 4.13 MILLION/UL (ref 3.81–5.12)
RBC # BLD AUTO: 4.15 MILLION/UL (ref 3.81–5.12)
SODIUM SERPL-SCNC: 134 MMOL/L (ref 135–147)
TSH SERPL DL<=0.05 MIU/L-ACNC: 0.64 UIU/ML (ref 0.45–4.5)
WBC # BLD AUTO: 13.47 THOUSAND/UL (ref 4.31–10.16)
WBC # BLD AUTO: 9.97 THOUSAND/UL (ref 4.31–10.16)

## 2024-03-05 PROCEDURE — 83735 ASSAY OF MAGNESIUM: CPT | Performed by: INTERNAL MEDICINE

## 2024-03-05 PROCEDURE — 99232 SBSQ HOSP IP/OBS MODERATE 35: CPT | Performed by: FAMILY MEDICINE

## 2024-03-05 PROCEDURE — 82948 REAGENT STRIP/BLOOD GLUCOSE: CPT

## 2024-03-05 PROCEDURE — 97163 PT EVAL HIGH COMPLEX 45 MIN: CPT

## 2024-03-05 PROCEDURE — 80053 COMPREHEN METABOLIC PANEL: CPT | Performed by: INTERNAL MEDICINE

## 2024-03-05 PROCEDURE — 85025 COMPLETE CBC W/AUTO DIFF WBC: CPT | Performed by: INTERNAL MEDICINE

## 2024-03-05 PROCEDURE — 85027 COMPLETE CBC AUTOMATED: CPT | Performed by: STUDENT IN AN ORGANIZED HEALTH CARE EDUCATION/TRAINING PROGRAM

## 2024-03-05 PROCEDURE — 85730 THROMBOPLASTIN TIME PARTIAL: CPT | Performed by: STUDENT IN AN ORGANIZED HEALTH CARE EDUCATION/TRAINING PROGRAM

## 2024-03-05 PROCEDURE — 99223 1ST HOSP IP/OBS HIGH 75: CPT | Performed by: PHYSICIAN ASSISTANT

## 2024-03-05 PROCEDURE — 85610 PROTHROMBIN TIME: CPT | Performed by: STUDENT IN AN ORGANIZED HEALTH CARE EDUCATION/TRAINING PROGRAM

## 2024-03-05 PROCEDURE — 93005 ELECTROCARDIOGRAM TRACING: CPT

## 2024-03-05 PROCEDURE — 97166 OT EVAL MOD COMPLEX 45 MIN: CPT

## 2024-03-05 PROCEDURE — 84443 ASSAY THYROID STIM HORMONE: CPT | Performed by: STUDENT IN AN ORGANIZED HEALTH CARE EDUCATION/TRAINING PROGRAM

## 2024-03-05 RX ORDER — HEPARIN SODIUM 1000 [USP'U]/ML
8800 INJECTION, SOLUTION INTRAVENOUS; SUBCUTANEOUS ONCE
Status: COMPLETED | OUTPATIENT
Start: 2024-03-05 | End: 2024-03-05

## 2024-03-05 RX ORDER — METOPROLOL TARTRATE 1 MG/ML
5 INJECTION, SOLUTION INTRAVENOUS EVERY 6 HOURS PRN
Status: DISCONTINUED | OUTPATIENT
Start: 2024-03-05 | End: 2024-03-11 | Stop reason: HOSPADM

## 2024-03-05 RX ORDER — DIAZEPAM 5 MG/ML
5 INJECTION, SOLUTION INTRAMUSCULAR; INTRAVENOUS
Status: DISCONTINUED | OUTPATIENT
Start: 2024-03-05 | End: 2024-03-11 | Stop reason: HOSPADM

## 2024-03-05 RX ORDER — HEPARIN SODIUM 1000 [USP'U]/ML
4400 INJECTION, SOLUTION INTRAVENOUS; SUBCUTANEOUS EVERY 6 HOURS PRN
Status: DISCONTINUED | OUTPATIENT
Start: 2024-03-05 | End: 2024-03-11

## 2024-03-05 RX ORDER — HEPARIN SODIUM 1000 [USP'U]/ML
8800 INJECTION, SOLUTION INTRAVENOUS; SUBCUTANEOUS EVERY 6 HOURS PRN
Status: DISCONTINUED | OUTPATIENT
Start: 2024-03-05 | End: 2024-03-11

## 2024-03-05 RX ORDER — HEPARIN SODIUM 10000 [USP'U]/100ML
3-30 INJECTION, SOLUTION INTRAVENOUS
Status: DISPENSED | OUTPATIENT
Start: 2024-03-05 | End: 2024-03-07

## 2024-03-05 RX ADMIN — METHOCARBAMOL 500 MG: 500 TABLET ORAL at 08:49

## 2024-03-05 RX ADMIN — INSULIN LISPRO 5 UNITS: 100 INJECTION, SOLUTION INTRAVENOUS; SUBCUTANEOUS at 11:28

## 2024-03-05 RX ADMIN — METHOCARBAMOL 500 MG: 500 TABLET ORAL at 11:28

## 2024-03-05 RX ADMIN — ACETAMINOPHEN 975 MG: 325 TABLET, FILM COATED ORAL at 14:51

## 2024-03-05 RX ADMIN — SENNOSIDES, DOCUSATE SODIUM 1 TABLET: 8.6; 5 TABLET ORAL at 21:57

## 2024-03-05 RX ADMIN — ASPIRIN 81 MG CHEWABLE TABLET 81 MG: 81 TABLET CHEWABLE at 08:48

## 2024-03-05 RX ADMIN — AMLODIPINE BESYLATE 5 MG: 5 TABLET ORAL at 21:57

## 2024-03-05 RX ADMIN — INSULIN LISPRO 2 UNITS: 100 INJECTION, SOLUTION INTRAVENOUS; SUBCUTANEOUS at 06:39

## 2024-03-05 RX ADMIN — INSULIN LISPRO 4 UNITS: 100 INJECTION, SOLUTION INTRAVENOUS; SUBCUTANEOUS at 17:23

## 2024-03-05 RX ADMIN — INSULIN LISPRO 2 UNITS: 100 INJECTION, SOLUTION INTRAVENOUS; SUBCUTANEOUS at 22:32

## 2024-03-05 RX ADMIN — ACETAMINOPHEN 975 MG: 325 TABLET, FILM COATED ORAL at 21:57

## 2024-03-05 RX ADMIN — ENOXAPARIN SODIUM 40 MG: 40 INJECTION SUBCUTANEOUS at 17:23

## 2024-03-05 RX ADMIN — EZETIMIBE 10 MG: 10 TABLET ORAL at 21:57

## 2024-03-05 RX ADMIN — HEPARIN SODIUM 18 UNITS/KG/HR: 10000 INJECTION, SOLUTION INTRAVENOUS at 19:58

## 2024-03-05 RX ADMIN — AMILORIDE HYDROCLORIDE 2.5 MG: 5 TABLET ORAL at 08:47

## 2024-03-05 RX ADMIN — ENOXAPARIN SODIUM 40 MG: 40 INJECTION SUBCUTANEOUS at 08:48

## 2024-03-05 RX ADMIN — METHOCARBAMOL 500 MG: 500 TABLET ORAL at 17:23

## 2024-03-05 RX ADMIN — HEPARIN SODIUM 8800 UNITS: 1000 INJECTION INTRAVENOUS; SUBCUTANEOUS at 19:58

## 2024-03-05 RX ADMIN — LOSARTAN POTASSIUM 100 MG: 50 TABLET, FILM COATED ORAL at 08:48

## 2024-03-05 RX ADMIN — METOPROLOL SUCCINATE 100 MG: 100 TABLET, EXTENDED RELEASE ORAL at 08:48

## 2024-03-05 RX ADMIN — METHOCARBAMOL 500 MG: 500 TABLET ORAL at 03:45

## 2024-03-05 RX ADMIN — BIMATOPROST 1 DROP: 0.3 SOLUTION/ DROPS OPHTHALMIC at 22:32

## 2024-03-05 NOTE — PLAN OF CARE
Problem: PHYSICAL THERAPY ADULT  Goal: Performs mobility at highest level of function for planned discharge setting.  See evaluation for individualized goals.  Description: Treatment/Interventions: ADL retraining, Functional transfer training, LE strengthening/ROM, Therapeutic exercise, Endurance training, Patient/family training, Equipment eval/education, Bed mobility, Gait training, Spoke to nursing, Spoke to case management, OT, Elevations  Equipment Recommended: Walker       See flowsheet documentation for full assessment, interventions and recommendations.  Outcome: Progressing  Note: Prognosis: Good  Problem List: Decreased strength, Decreased endurance, Impaired balance, Decreased mobility, Pain  Assessment: Pt is a 78 y.o. female seen for a high complexity PT evaluation due to Ongoing medical management for primary dx, Decreased activity tolerance compared to baseline, Fall risk, Increased assistance needed from caregiver at current time. Patient is s/p admit to [facility] on 3/4/2024 for Lower back pain (M54.50)  Hip pain (M25.559)  Left hip pain (M25.552)  Ambulatory dysfunction (R26.2). Patient  has a past medical history of Arthritis, Colorectal polyps, Constipation, Diabetes mellitus (HCC), Diverticulosis of colon, Hiatal hernia, Hypertension, Increased urinary frequency, Lumbar disc disease, SOB (shortness of breath), and Stress incontinence..     PT now consulted to assess functional mobility and needs for safe d/c planning. Prior to admission, pt independent with functional mobility, independent ADLs, and independent IADLs. Personal factors affecting status include fall risk, steps to enter home, steps to negotiate within home, limited caregiver/social support, assist for IADLs, and medical status     Currently pt requires moderate assistance x1-2 for bed mobility, minimal assistance x1 for functional transfers with rolling walker ; minimal assistance x1 for ambulation with rolling  walker. Pt presents functioning below baseline and w/ overall mobility deficits 2* to: decreased strength, decreased endurance, decreased mobility, impaired balance. These impairments place pt at risk for falls.     Pt will continue to benefit from skilled PT interventions to address stated impairments; to maximize functional potential; for ongoing pt/family education; and DME needs. The patient's AM-PAC Basic Mobility Inpatient Short Form Raw Score Is 14. PT is currently recommending Level 2 - Moderate Resource Intensity pending progress on d/c from hospital. Will continue to follow as able.        Rehab Resource Intensity Level, PT: II (Moderate Resource Intensity) (pending progress)    See flowsheet documentation for full assessment.

## 2024-03-05 NOTE — ASSESSMENT & PLAN NOTE
Left groin pain radiating down LLE since end of January 2024, gradually worsening  H/o 3 spinal surgeries in 2010 (L3-4 decompression, fusion) for recurrent disc herniation with LLE radiculopathy  Has been following with Dr Loja ortho at [facility] for bilateral knee OA and gets corticosteroid injections every 3 months  Xray hip had shown mild degenerative changes  At visit on 2/22/24 pain was not localized to knee and he advised spinal evaluation  Seen by Dr Loja today - pain did not seem hip related. Patient wanted to try diagnostic injection which was given but pain and ambulatory dysfunction worsened and her family called EMS  CT A/P - Diverticulosis coli with suggestion of trace stranding adjacent to the proximal sigmoid colon. No acute fracture. Chronic appearing seroma associated with the large anterior abdominal wall hernia mesh.  Pain control - scheduled Acetaminophen/Robaxin, Oxycodone/Dilaudid prn  Neurosurgery consult appreciated, pending final recommendation based on MRI L spine.  Pending MRI Lumbar spine.

## 2024-03-05 NOTE — H&P
"[facility]  H&P  Name: Jessica Jaquez 78 y.o. female I MRN: [id number]  Unit/Bed#: CW2 210-01 I Date of Admission: 3/4/2024   Date of Service: 3/4/2024 I Hospital Day: 0      Assessment/Plan   * Lumbar radiculopathy  Assessment & Plan  Left groin pain radiating down LLE since end of January 2024, gradually worsening  H/o 3 spinal surgeries in 2010 (L3-4 decompression, fusion) for recurrent disc herniation with LLE radiculopathy  Has been following with Dr Loja ortho at [facility] for bilateral knee OA and gets corticosteroid injections every 3 months  Xray hip had shown mild degenerative changes  At visit on 2/22/24 pain was not localized to knee and he advised spinal evaluation  Seen by Dr Loja today - pain did not seem hip related. Patient wanted to try diagnostic injection which was given but pain and ambulatory dysfunction worsened and her family called EMS  CT A/P - Diverticulosis coli with suggestion of trace stranding adjacent to the proximal sigmoid colon. No acute fracture. Chronic appearing seroma associated with the large anterior abdominal wall hernia mesh.  Check MRI lumbar spine  Pain control - scheduled Acetaminophen/Robaxin, Oxycodone/Dilaudid prn  Neurosurgery consult    Diabetes mellitus type 2 in obese   Assessment & Plan  Lab Results   Component Value Date    HGBA1C 6.9 (H) 01/22/2024       No results for input(s): \"POCGLU\" in the last 72 hours.    Blood Sugar Average: Last 72 hrs:    Home med Glimepiride held  SSI  Monitor blood sugars and adjust insulin accordingly    Essential hypertension  Assessment & Plan  Ct home meds Amiloride 2.5 mg daily, Losartan 100 mg daily, Metoprolol succinate 100 mg daily, Amlodipine 5 mg hs  Monitor BP    Chronic diastolic CHF (congestive heart failure) (HCC)  Assessment & Plan  Wt Readings from Last 3 Encounters:   03/04/24 110 kg (243 lb)   05/16/22 105 kg (232 lb)   05/03/21 104 kg (230 lb)     Echo(12/28/18) - EF 60%, " grade 1 diatolic dysfunction  Euvolemic  Ct home med Amiloride 2.5 mg daily  Also take Furosemide 20 mg and additional Amiloride 2.5 mg as needed at home for volume overload  Monitor volume status          Obesity, Class III, BMI 40-49.9 (morbid obesity) ([facility])  Assessment & Plan  Lifestyle modification    History of stroke  Assessment & Plan  Ct ASA, Zetia  Does not tolerate statins         VTE Pharmacologic Prophylaxis: VTE Score: 5 High Risk (Score >/= 5) - Pharmacological DVT Prophylaxis Ordered: enoxaparin (Lovenox). Sequential Compression Devices Ordered.  Code Status: Level 1 - Full Code   Discussion with family: Patient declined call to .     Anticipated Length of Stay: Patient will be admitted on an observation basis with an anticipated length of stay of less than 2 midnights secondary to worsening lumbar radiculopathy.    Total Time Spent on Date of Encounter in care of patient: 75 mins. This time was spent on one or more of the following: performing physical exam; counseling and coordination of care; obtaining or reviewing history; documenting in the medical record; reviewing/ordering tests, medications or procedures; communicating with other healthcare professionals and discussing with patient's family/caregivers.    Chief Complaint: Left lower extremity pain and difficulty walking    History of Present Illness:  Jessica Jaquez is a 78 y.o. 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 Her pain began in the end of January 2024 with left groin pain radiating down her left lower extremity to her foot. She was advised spinal evaluation by her orthopedic surgeon at visit on 2/22/24. He saw her again today and felt her pain was not hip related but she wanted to try a  diagnostic injection. On going home after her injection her pain worsened and she was unable to walk on her own. Her son called EMS and she was brought to the ED. No bowel or bladder issues. No fever or chills. Intermittent chronic low back pain.     Review of Systems:  Review of Systems   Musculoskeletal:  Positive for back pain.        Left lower extremity pain of lumbar radiculopathy   All other systems reviewed and are negative.      Past Medical and Surgical History:   Past Medical History:   Diagnosis Date    Arthritis     Colorectal polyps     Constipation     Diabetes mellitus (HCC)     Diverticulosis of colon     Hiatal hernia     Hypertension     Increased urinary frequency     Lumbar disc disease     SOB (shortness of breath)     Stress incontinence        Past Surgical History:   Procedure Laterality Date    BACK SURGERY      CARPAL TUNNEL RELEASE Left     CATARACT EXTRACTION Bilateral     CHOLECYSTECTOMY      COLONOSCOPY W/ ENDOSCOPIC US N/A 2/24/2016    Procedure: ANAL ENDOSCOPIC U/S;  Surgeon: HOLLIS Washington MD;  Location: [facility];  Service:     HERNIA REPAIR      KS COLONOSCOPY FLX DX W/COLLJ SPEC WHEN PFRMD N/A 2/24/2016    Procedure: COLONOSCOPY;  Surgeon: HOLLIS Washington MD;  Location: [facility];  Service: Colorectal    TONSILLECTOMY      TUBAL LIGATION         Meds/Allergies:  Prior to Admission medications    Medication Sig Start Date End Date Taking? Authorizing Provider   AMILoride-hydrochlorothiazide (MODURETIC) 5-50 mg per tablet daily 1/2 tab daily     Historical Provider, MD   aspirin 81 mg chewable tablet Chew 81 mg daily  Patient not taking: Reported on 5/16/2022    Historical Provider, MD   Cholecalciferol (VITAMIN D3) 1000 units CAPS Take by mouth daily      Historical Provider, MD   furosemide (LASIX) 40 mg tablet Take 40 mg by mouth as needed      Historical Provider, MD   glimepiride (AMARYL) 1 mg tablet Take 1 mg by mouth every morning before breakfast    Historical  Provider, MD   hydrocortisone 2.5 % cream as needed 6/11/18   Historical Provider, MD   ibuprofen (MOTRIN) 200 mg tablet Take by mouth every 6 (six) hours as needed for mild pain    Historical Provider, MD   ketoconazole (NIZORAL) 2 % cream as needed 6/11/18   Historical Provider, MD   KLOR-CON M20 20 MEQ tablet Take 20 mEq by mouth as needed   4/23/18   Historical Provider, MD   losartan (COZAAR) 50 mg tablet 100 mg     Historical Provider, MD   metoprolol succinate (TOPROL-XL) 100 mg 24 hr tablet Take 100 mg by mouth daily 7/5/18   Historical Provider, MD   Multiple Vitamins-Minerals (CENTRUM SILVER PO) multivitamin    Historical Provider, MD     I have reviewed home medications with patient personally.    Allergies:   Allergies   Allergen Reactions    Other Cough and Sneezing     Seasonal allergies       Social History:  Marital Status: /Civil Union   Substance Use History:   Social History     Substance and Sexual Activity   Alcohol Use No     Social History     Tobacco Use   Smoking Status Never   Smokeless Tobacco Never     Social History     Substance and Sexual Activity   Drug Use No       Family History:  Family History   Problem Relation Age of Onset    Hypertension Mother     Heart attack Mother     Diabetes Mother     Prostate cancer Father     No Known Problems Brother     Stroke Maternal Grandmother     Stroke Paternal Grandmother     Stroke Paternal Grandfather     Thyroid cancer Neg Hx        Physical Exam:     Vitals:   Blood Pressure: 138/92 (03/04/24 1838)  Pulse: (!) 125 (03/04/24 1838)  Temperature: 97.8 °F (36.6 °C) (03/04/24 1358)  Temp Source: Oral (03/04/24 1358)  Respirations: 20 (03/04/24 1354)  Weight - Scale: 110 kg (243 lb) (03/04/24 1836)  SpO2: 96 % (03/04/24 1838)    Physical Exam  Vitals reviewed.   HENT:      Head: Normocephalic.      Nose: Nose normal.      Mouth/Throat:      Mouth: Mucous membranes are moist.   Eyes:      Extraocular Movements: Extraocular movements  intact.   Cardiovascular:      Rate and Rhythm: Normal rate and regular rhythm.   Pulmonary:      Effort: Pulmonary effort is normal. No respiratory distress.      Breath sounds: Normal breath sounds. No wheezing.   Abdominal:      General: Bowel sounds are normal. There is no distension.      Palpations: Abdomen is soft.      Tenderness: There is no abdominal tenderness.   Musculoskeletal:         General: No swelling.      Cervical back: Neck supple.   Skin:     General: Skin is warm and dry.   Neurological:      General: No focal deficit present.      Mental Status: She is oriented to person, place, and time.   Psychiatric:         Mood and Affect: Mood normal.         Behavior: Behavior normal.          Additional Data:     Lab Results:  Results from last 7 days   Lab Units 03/04/24  1527   WBC Thousand/uL 11.24*   HEMOGLOBIN g/dL 12.7   HEMATOCRIT % 39.7   PLATELETS Thousands/uL 342   NEUTROS PCT % 84*   LYMPHS PCT % 11*   MONOS PCT % 3*   EOS PCT % 2     Results from last 7 days   Lab Units 03/04/24  1527   SODIUM mmol/L 135   POTASSIUM mmol/L 4.4   CHLORIDE mmol/L 100   CO2 mmol/L 25   BUN mg/dL 24   CREATININE mg/dL 0.94   ANION GAP mmol/L 10   CALCIUM mg/dL 9.9   GLUCOSE RANDOM mg/dL 160*                       Lines/Drains:  Invasive Devices       Peripheral Intravenous Line  Duration             Peripheral IV 03/04/24 Left Antecubital <1 day                        Imaging: Reviewed radiology reports from this admission including: abdominal/pelvic CT  CT abdomen pelvis wo contrast   Final Result by Rodolfo Friend MD (03/04 3925)      1.  Diverticulosis coli with suggestion of trace stranding adjacent to the proximal sigmoid colon. Very mild acute diverticulitis is not excluded.      2.  No acute fracture.      3.  Chronic appearing seroma associated with the large anterior abdominal wall hernia mesh.      The study was marked in EPIC for immediate notification.      Workstation performed: [id number]          MRI inpatient order    (Results Pending)         ** Please Note: This note has been constructed using a voice recognition system. **

## 2024-03-05 NOTE — ED PROVIDER NOTES
"History  Chief Complaint   Patient presents with    Hip Pain     Per ems pt gets weekly lidocaine and steroid shots into left hip for pain management, today she went to the dr for a shot when she got home she was unable to walk due to pain. Pt stated she \"has not gotten relief from the shots for the past 3 weeks.\"     78-year-old woman with relevant past medical history of chronic bilateral knee pain presents with left hip and back pain.  Patient reports a history of bilateral knee pain which she sees orthopedics for.  In the last couple days she has developed worsening left hip pain.  It comes from the buttocks and wraps around to the anterior thigh.  She has not had numbness tingling of her lower extremity.  She has not had bowel or bladder incontinence.  She has not had fever.  Patient has tried multiple different medications for the pain with no improvement.  She went to a grocery store earlier and was walking around and overexerted herself.  She was unable to get back into the car, so EMS was called due to severe left-sided back pain.  Patient has a history of back surgery.  She also gets knee injections with orthopedics.  Today she got a left hip injection earlier today.  She denies dysuria.  She presents today due to concerns of something else being wrong besides her chronic pain.        Prior to Admission Medications   Prescriptions Last Dose Informant Patient Reported? Taking?   AMILoride 5 mg tablet   Yes Yes   Sig: Take 2.5 mg by mouth daily   Cholecalciferol (VITAMIN D3) 1000 units CAPS  Self Yes No   Sig: Take by mouth daily     KLOR-CON M20 20 MEQ tablet  Self Yes No   Sig: Take 20 mEq by mouth as needed     Multiple Vitamins-Minerals (CENTRUM SILVER PO)  Self Yes No   Sig: multivitamin   aspirin 81 mg chewable tablet   Yes No   Sig: Chew 81 mg daily   Patient not taking: Reported on 5/16/2022   bimatoprost (Lumigan) 0.01 % ophthalmic drops   Yes Yes   Sig: Administer 1 drop to both eyes daily at " bedtime   furosemide (LASIX) 40 mg tablet  Self Yes No   Sig: Take 40 mg by mouth as needed     glimepiride (AMARYL) 1 mg tablet   Yes No   Sig: Take 1 mg by mouth every morning before breakfast   hydrocortisone 2.5 % cream  Self Yes No   Sig: as needed   ibuprofen (MOTRIN) 200 mg tablet  Self Yes No   Sig: Take by mouth every 6 (six) hours as needed for mild pain   ketoconazole (NIZORAL) 2 % cream  Self Yes No   Sig: as needed   losartan (COZAAR) 50 mg tablet  Self Yes No   Si mg    metoprolol succinate (TOPROL-XL) 100 mg 24 hr tablet  Self Yes No   Sig: Take 100 mg by mouth daily      Facility-Administered Medications: None       Past Medical History:   Diagnosis Date    Arthritis     Colorectal polyps     Constipation     Diabetes mellitus (HCC)     Diverticulosis of colon     Hiatal hernia     Hypertension     Increased urinary frequency     Lumbar disc disease     SOB (shortness of breath)     Stress incontinence        Past Surgical History:   Procedure Laterality Date    BACK SURGERY      CARPAL TUNNEL RELEASE Left     CATARACT EXTRACTION Bilateral     CHOLECYSTECTOMY      COLONOSCOPY W/ ENDOSCOPIC US N/A 2016    Procedure: ANAL ENDOSCOPIC U/S;  Surgeon: HOLLIS Washington MD;  Location: [facility];  Service:     HERNIA REPAIR      GA COLONOSCOPY FLX DX W/COLLJ SPEC WHEN PFRMD N/A 2016    Procedure: COLONOSCOPY;  Surgeon: HOLLIS Washington MD;  Location: [facility];  Service: Colorectal    TONSILLECTOMY      TUBAL LIGATION         Family History   Problem Relation Age of Onset    Hypertension Mother     Heart attack Mother     Diabetes Mother     Prostate cancer Father     No Known Problems Brother     Stroke Maternal Grandmother     Stroke Paternal Grandmother     Stroke Paternal Grandfather     Thyroid cancer Neg Hx      I have reviewed and agree with the history as documented.    E-Cigarette/Vaping     E-Cigarette/Vaping Substances     Social History     Tobacco Use    Smoking status: Never     Smokeless tobacco: Never   Substance Use Topics    Alcohol use: No    Drug use: No        Review of Systems    Physical Exam  ED Triage Vitals   Temperature Pulse Respirations Blood Pressure SpO2   03/04/24 1358 03/04/24 1354 03/04/24 1354 03/04/24 1354 03/04/24 1354   97.8 °F (36.6 °C) 73 20 166/72 98 %      Temp Source Heart Rate Source Patient Position - Orthostatic VS BP Location FiO2 (%)   03/04/24 1358 03/04/24 1354 03/04/24 1354 03/04/24 1354 --   Oral Monitor Lying Left arm       Pain Score       03/04/24 1354       10 - Worst Possible Pain             Orthostatic Vital Signs  Vitals:    03/04/24 1354 03/04/24 1838   BP: 166/72 138/92   Pulse: 73 (!) 125   Patient Position - Orthostatic VS: Lying        Physical Exam  Vitals and nursing note reviewed.   Constitutional:       Appearance: Normal appearance. She is obese. She is not ill-appearing.   HENT:      Head: Normocephalic and atraumatic.      Right Ear: External ear normal.      Left Ear: External ear normal.      Nose: Nose normal. No congestion.   Cardiovascular:      Rate and Rhythm: Normal rate.   Pulmonary:      Effort: Pulmonary effort is normal. No respiratory distress.      Breath sounds: Normal breath sounds.   Abdominal:      Palpations: Abdomen is soft.      Tenderness: There is no abdominal tenderness. There is no guarding or rebound.   Musculoskeletal:      Cervical back: Normal range of motion and neck supple.      Comments: Will not allow manipulation of the left hip.  Palpable tenderness of the left anterior thigh.  Tenderness of the left lower back.  No lumbar spinal tenderness.  5 out of 5 strength of the bilateral dorsiflexors and plantar flexors.  Sensation of the lower extremities intact bilaterally.  Band-Aid present overlying the injection of the left hip.  No hematoma of the left hip.   Skin:     General: Skin is warm and dry.   Neurological:      Mental Status: She is alert and oriented to person, place, and time. Mental status  is at baseline.   Psychiatric:         Mood and Affect: Mood normal.         Behavior: Behavior normal.         ED Medications  Medications   AMILoride tablet 2.5 mg (has no administration in time range)   losartan (COZAAR) tablet 100 mg (has no administration in time range)   metoprolol succinate (TOPROL-XL) 24 hr tablet 100 mg (has no administration in time range)   enoxaparin (LOVENOX) subcutaneous injection 40 mg (has no administration in time range)   ondansetron (ZOFRAN) injection 4 mg (has no administration in time range)   acetaminophen (TYLENOL) tablet 975 mg (has no administration in time range)   insulin lispro (HumALOG/ADMELOG) 100 units/mL subcutaneous injection 1-6 Units (has no administration in time range)   insulin lispro (HumALOG/ADMELOG) 100 units/mL subcutaneous injection 1-5 Units (has no administration in time range)   oxyCODONE (ROXICODONE) split tablet 2.5 mg (has no administration in time range)     Or   oxyCODONE (ROXICODONE) IR tablet 5 mg (has no administration in time range)   HYDROmorphone HCl (DILAUDID) injection 0.2 mg (has no administration in time range)   senna-docusate sodium (SENOKOT S) 8.6-50 mg per tablet 1 tablet (has no administration in time range)   methocarbamol (ROBAXIN) tablet 500 mg (has no administration in time range)   bimatoprost (LUMIGAN) 0.03 % ophthalmic drops 1 drop (has no administration in time range)   ezetimibe (ZETIA) tablet 10 mg (has no administration in time range)   aspirin chewable tablet 81 mg (has no administration in time range)   amLODIPine (NORVASC) tablet 5 mg (has no administration in time range)   acetaminophen (TYLENOL) tablet 975 mg (975 mg Oral Given 3/4/24 1428)   oxyCODONE (ROXICODONE) IR tablet 5 mg (5 mg Oral Given 3/4/24 1428)   methocarbamol (ROBAXIN) tablet 1,000 mg (1,000 mg Oral Given 3/4/24 1428)   diazepam (VALIUM) injection 5 mg (5 mg Intravenous Given 3/4/24 1530)       Diagnostic Studies  Results Reviewed       Procedure  Component Value Units Date/Time    Basic metabolic panel [[id number]]  (Abnormal) Collected: 03/04/24 1527    Lab Status: Final result Specimen: Blood from Arm, Left Updated: 03/04/24 1707     Sodium 135 mmol/L      Potassium 4.4 mmol/L      Chloride 100 mmol/L      CO2 25 mmol/L      ANION GAP 10 mmol/L      BUN 24 mg/dL      Creatinine 0.94 mg/dL      Glucose 160 mg/dL      Calcium 9.9 mg/dL      eGFR 57 ml/min/1.73sq m     Narrative:      National Kid[employer] Disease Foundation guidelines for Chronic Kidney Disease (CKD):     Stage 1 with normal or high GFR (GFR > 90 mL/min/1.73 square meters)    Stage 2 Mild CKD (GFR = 60-89 mL/min/1.73 square meters)    Stage 3A Moderate CKD (GFR = 45-59 mL/min/1.73 square meters)    Stage 3B Moderate CKD (GFR = 30-44 mL/min/1.73 square meters)    Stage 4 Severe CKD (GFR = 15-29 mL/min/1.73 square meters)    Stage 5 End Stage CKD (GFR <15 mL/min/1.73 square meters)  Note: GFR calculation is accurate only with a steady state creatinine    CBC and differential [[id number]]  (Abnormal) Collected: 03/04/24 1527    Lab Status: Final result Specimen: Blood from Arm, Left Updated: 03/04/24 1537     WBC 11.24 Thousand/uL      RBC 4.55 Million/uL      Hemoglobin 12.7 g/dL      Hematocrit 39.7 %      MCV 87 fL      MCH 27.9 pg      MCHC 32.0 g/dL      RDW 14.0 %      MPV 9.2 fL      Platelets 342 Thousands/uL      nRBC 0 /100 WBCs      Neutrophils Relative 84 %      Immat GRANS % 0 %      Lymphocytes Relative 11 %      Monocytes Relative 3 %      Eosinophils Relative 2 %      Basophils Relative 0 %      Neutrophils Absolute 9.45 Thousands/µL      Immature Grans Absolute 0.05 Thousand/uL      Lymphocytes Absolute 1.18 Thousands/µL      Monocytes Absolute 0.36 Thousand/µL      Eosinophils Absolute 0.17 Thousand/µL      Basophils Absolute 0.03 Thousands/µL                    CT abdomen pelvis wo contrast   Final Result by Rodolfo Friend MD (03/04 1615)      1.  Diverticulosis coli with  "suggestion of trace stranding adjacent to the proximal sigmoid colon. Very mild acute diverticulitis is not excluded.      2.  No acute fracture.      3.  Chronic appearing seroma associated with the large anterior abdominal wall hernia mesh.      The study was marked in EPIC for immediate notification.      Workstation performed: [id number]         MRI inpatient order    (Results Pending)         Procedures  Procedures      ED Course                             SBIRT 20yo+      Flowsheet Row Most Recent Value   Initial Alcohol Screen: US AUDIT-C     1. How often do you have a drink containing alcohol? 0 Filed at: 03/04/2024 6263   Audit-C Score 0 Filed at: 03/04/2024 1354   SHANNON: How many times in the past year have you...    Used an illegal drug or used a prescription medication for non-medical reasons? Never Filed at: 03/04/2024 0198                  Medical Decision Making  Presents with worsening left lower back and hip pain.  Patient unable to tolerate or comply with exam.  Will obtain CT scan to evaluate for other cause of her back pain.  Will treat symptoms.    CT scan shows no emergent cause of her symptoms.  This questionable diverticulitis is likely not the cause of her symptoms.  This abdominal wall seroma is likely chronic.  Patient given multiple different medications and still unable to get out of bed and ambulate.  She will be admitted for ambulatory dysfunction from acute exacerbation of low back pain. Patient in agreement with plan and questions were answered.     Previous charting was reviewed.  History obtained from patient.    Portions or all of this note were generated using voice recognition software.  Occasional wrong word or \"sound a like\" substitutions may have occurred due to the inherent limitations of voice recognition software.  Please interpret any errors within the intended context of the whole sentence or idea.      Amount and/or Complexity of Data Reviewed  Labs: " ordered.  Radiology: ordered.    Risk  OTC drugs.  Prescription drug management.  Decision regarding hospitalization.          Disposition  Final diagnoses:   Left hip pain   Lower back pain   Ambulatory dysfunction     Time reflects when diagnosis was documented in both MDM as applicable and the Disposition within this note       Time User Action Codes Description Comment    3/4/2024  5:35 PM Jorden Sigala [M25.552] Left hip pain     3/4/2024  5:35 PM Jorden Sigala [M54.50] Lower back pain     3/4/2024  5:35 PM Jorden Sigala [R26.2] Ambulatory dysfunction           ED Disposition       ED Disposition   Admit    Condition   Stable    Date/Time   Mon Mar 4, 2024 8168    Comment   Case was discussed with AMARI and the patient's admission status was agreed to be Admission Status: observation status to the service of Dr. Fierro .               Follow-up Information    None         Current Discharge Medication List        CONTINUE these medications which have NOT CHANGED    Details   AMILoride 5 mg tablet Take 2.5 mg by mouth daily      bimatoprost (Lumigan) 0.01 % ophthalmic drops Administer 1 drop to both eyes daily at bedtime      aspirin 81 mg chewable tablet Chew 81 mg daily      Cholecalciferol (VITAMIN D3) 1000 units CAPS Take by mouth daily        furosemide (LASIX) 40 mg tablet Take 40 mg by mouth as needed        glimepiride (AMARYL) 1 mg tablet Take 1 mg by mouth every morning before breakfast      hydrocortisone 2.5 % cream as needed  Refills: 3      ibuprofen (MOTRIN) 200 mg tablet Take by mouth every 6 (six) hours as needed for mild pain      ketoconazole (NIZORAL) 2 % cream as needed  Refills: 3      KLOR-CON M20 20 MEQ tablet Take 20 mEq by mouth as needed    Refills: 3      losartan (COZAAR) 50 mg tablet 100 mg       metoprolol succinate (TOPROL-XL) 100 mg 24 hr tablet Take 100 mg by mouth daily  Refills: 3      Multiple Vitamins-Minerals (CENTRUM SILVER PO) multivitamin            No discharge procedures on file.    PDMP Review       None             ED Provider  Attending physically available and evaluated Jessica Jaquez. I managed the patient along with the ED Attending.    Electronically Signed by           Jorden Sigala MD  03/04/24 2028

## 2024-03-05 NOTE — ASSESSMENT & PLAN NOTE
Ct home meds Amiloride 2.5 mg daily, Losartan 100 mg daily, Metoprolol succinate 100 mg daily, Amlodipine 5 mg hs  Monitor BP

## 2024-03-05 NOTE — ASSESSMENT & PLAN NOTE
Acute on chronic LBP w/ LLE radiculopathy   Presented to ED on 3/4/24 due to gradually worsening back, hip, and L thigh pain x 3 weeks   Describes a shooting/sharp pain from the L low back into the L hip and L groin/thigh   Denies any radiation of this pain to her lower leg, weakness, numbness, BBI, urinary retention, or saddle anesthesia   Hx of 3 prior lumbar surgeries at [facility] w/ Dr. Hicks in 2010   pt is unsure which levels but knows they were all lumbar  Reports prior to surgery she has similar sx with L sided LBP radiating to the L thigh   Has been receiving regular corticosteroid injections in carroll hips and knees at [facility]  most recently underwent L hip injection yesterday, 3/4, prior to arrival in the ER --> no relief   Pain severely limiting her ability to walk/function so she came to ER for further evaluation     Imaging:   MRI pending   3/4 CT c/a/p: No acute fracture or suspicious osseous lesion. There are pedicle screws at L3 and L4 which appear intact.     Plan:   Continue to closely monitor neuro exam   Frequent neuro checks per primary team   Maintain normotensive BP goals, MAP > 65   Recommend further evaluation with an MRI L-spine w/wo contrast   Complaints appear to be consistent with an L2/L3 radiculopathy   Pt does have previous spinal hardware at L3-4, possible component of adjacent segment disease at the level above this fusion   Recommend multimodal pain medication regimen   Currently getting:   Tylenol 975mg PO q 8hrs   Robaxin 500mg PO q 6hrs   Prn oxy 2.5/5mg and prn dilaudid 0.2mg IV   Could consider adding gabapentin scheduled and a short course of steroids   Would recommend holding pt's ASA 81mg should MRI reveal surgical pathology   DVT ppx: SCDs, SQ lovenox   Medical management per primary team   Pain control per primary team --> noted above   PT/OT   Social work following for assistance with dispo once medically cleared     Neurosurgery will follow from the periphery and review MRI once  completed. Please reach out with any further questions or concerns.

## 2024-03-05 NOTE — ASSESSMENT & PLAN NOTE
Left groin pain radiating down LLE since end of January 2024, gradually worsening  H/o 3 spinal surgeries in 2010 (L3-4 decompression, fusion) for recurrent disc herniation with LLE radiculopathy  Has been following with Dr Loja ortho at [facility] for bilateral knee OA and gets corticosteroid injections every 3 months  Xray hip had shown mild degenerative changes  At visit on 2/22/24 pain was not localized to knee and he advised spinal evaluation  Seen by Dr Loja today - pain did not seem hip related. Patient wanted to try diagnostic injection which was given but pain and ambulatory dysfunction worsened and her family called EMS  CT A/P - Diverticulosis coli with suggestion of trace stranding adjacent to the proximal sigmoid colon. No acute fracture. Chronic appearing seroma associated with the large anterior abdominal wall hernia mesh.  Check MRI lumbar spine  Pain control - scheduled Acetaminophen/Robaxin, Oxycodone/Dilaudid prn  Neurosurgery consult

## 2024-03-05 NOTE — PHYSICAL THERAPY NOTE
Physical Therapy Evaluation     Patient's Name: Jessica Jaquez    Admitting Diagnosis  Lower back pain [M54.50]  Hip pain [M25.559]  Left hip pain [M25.552]  Ambulatory dysfunction [R26.2]    Problem List  Patient Active Problem List   Diagnosis    Diabetes mellitus type 2 in obese     Obesity, Class III, BMI 40-49.9 (morbid obesity) (HCC)    Essential hypertension    Chronic diastolic CHF (congestive heart failure) (HCC)    GERD (gastroesophageal reflux disease)    Vitamin D deficiency    Hyperlipidemia    Unspecified abnormalities of gait and mobility    Tremor of hands and face    Benign essential tremor    Cerebrovascular accident (CVA) due to thrombosis of precerebral artery ([facility])    Open wound of abdomen    Lumbar radiculopathy    History of stroke       Past Medical History  Past Medical History:   Diagnosis Date    Arthritis     Colorectal polyps     Constipation     Diabetes mellitus (HCC)     Diverticulosis of colon     Hiatal hernia     Hypertension     Increased urinary frequency     Lumbar disc disease     SOB (shortness of breath)     Stress incontinence        Past Surgical History  Past Surgical History:   Procedure Laterality Date    BACK SURGERY      CARPAL TUNNEL RELEASE Left     CATARACT EXTRACTION Bilateral     CHOLECYSTECTOMY      COLONOSCOPY W/ ENDOSCOPIC US N/A 2/24/2016    Procedure: ANAL ENDOSCOPIC U/S;  Surgeon: HOLLIS Washington MD;  Location: [facility];  Service:     HERNIA REPAIR      MD COLONOSCOPY FLX DX W/COLLJ SPEC WHEN PFRMD N/A 2/24/2016    Procedure: COLONOSCOPY;  Surgeon: HOLLIS Washington MD;  Location: [facility];  Service: Colorectal    TONSILLECTOMY      TUBAL LIGATION            03/05/24 0955   PT Last Visit   PT Visit Date 03/05/24   Note Type   Note type Evaluation   Pain Assessment   Pain Assessment Tool 0-10   Pain Onset/Description Onset: Ongoing;Descriptor: Sharp;Descriptor: Stabbing   Effect of Pain on Daily Activities limits comfort and mobility   Patient's Stated  Pain Goal No pain   Hospital Pain Intervention(s) Repositioned;Ambulation/increased activity;Emotional support   Restrictions/Precautions   Weight Bearing Precautions Per Order No   Other Precautions Fall Risk;Pain   Home Living   Type of Home House   Home Layout Multi-level;Other (Comment);1/2 bath on main level;Bed/bath upstairs;Access  (split level home - 0 [address] through garage which leads to living room level and has 6 steps to kitchen and additional 6 steps to main bed/bath; has 1/2 bath on 1st floor. Normally sleeps in recliner.)   Bathroom Shower/Tub Walk-in shower  (& tub/shower)   Bathroom Toilet Standard   Bathroom Equipment Shower chair;Grab bars in shower;Grab bars around toilet   Bathroom Accessibility Accessible   Home Equipment Cane;Walker  (used PRN)   Prior Function   Level of [place] Independent with ADLs;Independent with functional mobility;Needs assistance with IADLS   Lives With Spouse  (reports spouse has dementia)   Receives Help From Family  (son lives 5 mins away and checks in daily; dght in law also stops in.)   IADLs Independent with driving;Independent with medication management;Independent with meal prep  (has cleaning lady once every 2 weeks to assist w/ laundry/cleaning. pt drives and goes grocery shopping herself)   Falls in the last 6 months 0   Vocational Retired   Cognition   Overall Cognitive Status WFL   Arousal/Participation Alert   Attention Within functional limits   Orientation Level Oriented X4   Memory Within functional limits   Following Commands Follows all commands and directions without difficulty   Comments Patient pleasant and cooperative   Subjective   Subjective Patient agreeable to PT eval   RUE Assessment   RUE Assessment WFL   LUE Assessment   LUE Assessment WFL   RLE Assessment   RLE Assessment X   Strength RLE   RLE Overall Strength 3+/5   LLE Assessment   LLE Assessment X   Strength LLE   LLE Overall Strength 3+/5   Vision-Basic Assessment   Current Vision  Wears glasses all the time   Bed Mobility   Supine to Sit 3  Moderate assistance   Additional items Assist x 2;Increased time required;Verbal cues;LE management;HOB elevated;Bedrails   Sit to Supine 3  Moderate assistance   Additional items Assist x 1;Increased time required;Verbal cues;LE management;HOB elevated;Bedrails   Additional Comments pt sat EOB w/ CGA for sitting balance/trunk control   Transfers   Sit to Stand 4  Minimal assistance   Additional items Assist x 1;Increased time required;Verbal cues   Stand to Sit 4  Minimal assistance   Additional items Assist x 1;Increased time required;Verbal cues   Additional Comments RW   Ambulation/Elevation   Gait pattern Decreased foot clearance;Shuffling;Short stride;Excessively slow   Gait Assistance 4  Minimal assist   Additional items Assist x 1;Verbal cues   Assistive Device Rolling walker   Distance 50'x2   Stair Management Assistance Not tested   Balance   Static Sitting Fair   Dynamic Sitting Fair -   Static Standing Poor +   Dynamic Standing Poor +   Ambulatory Poor +   Endurance Deficit   Endurance Deficit Yes   Endurance Deficit Description fatigue, weakness, pain   Activity Tolerance   Activity Tolerance Patient limited by fatigue;Patient limited by pain   Medical Staff Made Aware OT & NsxPuja. via OT Puja GONZALEZ who reported it was reasonable for therapy to work with pt prior to further NSx evaluation as long as pt could tolerate session.   Nurse Made Aware RN cleared   Assessment   Prognosis Good   Problem List Decreased strength;Decreased endurance;Impaired balance;Decreased mobility;Pain   Assessment Pt is a 78 y.o. female seen for a high complexity PT evaluation due to Ongoing medical management for primary dx, Decreased activity tolerance compared to baseline, Fall risk, Increased assistance needed from caregiver at current time. Patient is s/p admit to [facility] on 3/4/2024 for Lower back pain (M54.50)  Hip pain (M25.559)  Left hip pain  (M25.552)  Ambulatory dysfunction (R26.2). Patient  has a past medical history of Arthritis, Colorectal polyps, Constipation, Diabetes mellitus (HCC), Diverticulosis of colon, Hiatal hernia, Hypertension, Increased urinary frequency, Lumbar disc disease, SOB (shortness of breath), and Stress incontinence..     PT now consulted to assess functional mobility and needs for safe d/c planning. Prior to admission, pt independent with functional mobility, independent ADLs, and independent IADLs. Personal factors affecting status include fall risk, steps to enter home, steps to negotiate within home, limited caregiver/social support, assist for IADLs, and medical status     Currently pt requires moderate assistance x1-2 for bed mobility, minimal assistance x1 for functional transfers with rolling walker ; minimal assistance x1 for ambulation with rolling walker. Pt presents functioning below baseline and w/ overall mobility deficits 2* to: decreased strength, decreased endurance, decreased mobility, impaired balance. These impairments place pt at risk for falls.     Pt will continue to benefit from skilled PT interventions to address stated impairments; to maximize functional potential; for ongoing pt/family education; and DME needs. The patient's AM-PAC Basic Mobility Inpatient Short Form Raw Score Is 14. PT is currently recommending Level 2 - Moderate Resource Intensity pending progress on d/c from hospital. Will continue to follow as able.   Goals   Patient Goals to have less pain   STG Expiration Date 03/19/24   Short Term Goal #1 In 14 days, patient will 1) increase strength in BUE/BLE by 1/2 to 1 full grade for increased strength and stability needed for functional mobility 2) improve bed mobility to MI for improved mobility and decreased need for assist 3) sit EOB x30' with MI to facilitate trunk stability and safety for completion of ADL tasks 4) increase functional transfers to MI for improved safety and functional  mobility 5) ambulate 250ft with MI using LRAD for increased endurance and safety ambulating home and community environments 6) improve balance by 1 grade for improved safety and stability and decreased risk for falls. 7) ascend/descend at least 6 stairs using HR with MI in order to safely enter home   PT Treatment Day 0   Plan   Treatment/Interventions ADL retraining;Functional transfer training;LE strengthening/ROM;Therapeutic exercise;Endurance training;Patient/family training;Equipment eval/education;Bed mobility;Gait training;Spoke to nursing;Spoke to case management;OT;Elevations   PT Frequency 3-5x/wk   Discharge Recommendation   Rehab Resource Intensity Level, PT II (Moderate Resource Intensity)  (pending progress)   Equipment Recommended Walker   Walker Package Recommended Wheeled walker   AM-PAC Basic Mobility Inpatient   Turning in Flat Bed Without Bedrails 2   Lying on Back to Sitting on Edge of Flat Bed Without Bedrails 1   Moving Bed to Chair 3   Standing Up From Chair Using Arms 3   Walk in Room 3   Climb 3-5 Stairs With Railing 2   Basic Mobility Inpatient Raw Score 14   Basic Mobility Standardized Score 35.55   Highest Level Of Mobility   JH-HLM Goal 4: Move to chair/commode   JH-HLM Achieved 7: Walk 25 feet or more   Modified Chadwick Scale   Modified Gilberto Scale 4   End of Consult   Patient Position at End of Consult All needs within reach;Supine;Bed/Chair alarm activated         Hermila Son, PT, DPT

## 2024-03-05 NOTE — UTILIZATION REVIEW
"Initial Clinical Review    OBSERVATION WRITTEN 3/4/24 @ 1736 CONVERTED TO INPATIENT ADMISSION 3/5/24 @ 1522 DUE TO FURTHER DIAGNOSTIC WORKUP REQUIRED FOR LUMBAR RADICULOPATHY AND NEED FOR MRI, REQUIRING AT LEAST A 2 MIDNIGHT STAY.    Admission: Date/Time/Statement:   Admission Orders (From admission, onward)       Ordered        03/05/24 1522  Inpatient Admission  Once            03/04/24 1736  Place in Observation  Once                          Orders Placed This Encounter   Procedures    Inpatient Admission     Standing Status:   Standing     Number of Occurrences:   1     Order Specific Question:   Level of Care     Answer:   Med Surg [16]     Order Specific Question:   Estimated length of stay     Answer:   More than 2 Midnights     Order Specific Question:   Certification     Answer:   I certify that inpatient services are medically necessary for this patient for a duration of greater than two midnights. See H&P and MD Progress Notes for additional information about the patient's course of treatment.     ED Arrival Information       Expected   -    Arrival   3/4/2024 13:40    Acuity   Less Urgent              Means of arrival   Ambulance    Escorted by   [facility]    Service   Hospitalist    Admission type   Emergency              Arrival complaint   Hip Pain             Chief Complaint   Patient presents with    Hip Pain     Per ems pt gets weekly lidocaine and steroid shots into left hip for pain management, today she went to the dr for a shot when she got home she was unable to walk due to pain. Pt stated she \"has not gotten relief from the shots for the past 3 weeks.\"       Initial Presentation: 78 y.o. female who presented to [facility] ED via EMs initially admitted Observation status then converted to Inpatient due to Lumbar radiculopathy.  Presented due to severe left lower extremity pain since January 2024 with left groin pain radiating down her left lower extremity to her foot. " Has been seeing orthopedic surgeon as outpt with visit on 2/22/24. He saw her again today and felt her pain was not hip related but she wanted to try a diagnostic injection. On going home after her injection her pain worsened and she was unable to walk on her own.   PMH: chronic diastolic heart failure, recurrent lumbar disc herniations s/p 3 surgeries in 2010, bilateral knee osteoarthritis on 3-monthly corticosteroid injections, Type 2 diabetes, hypertension, glaucoma Plan: med surg, Neurosurgery consult, order MRI lumbar spine, continue pain control with scheduled Acetaminophen/Robaxin, Oxycodone/Dilaudid prn. Start accuchecks w/ SSI, monitor BP and continue home antihypertensives, monitor volume status and continue home lasix and add amiloride prn for volume overload. PT OT bala.    3/5/2024 Inpatient Admission:  Per Neurosurgery: Acute on chronic LBP w/ LLE radiculopathy: await MRI Lspine, freq neuro checks, further recommends following review of MRI. Continue pain control, consider gabapentin for pain control, PT OT bala. Hold home ASA.     3/5 Hospitalist Update: Notified by RN that EKG obtained for tachycardia showed A fib with HR in the 120s. Reviewed EKG and a fib confirmed. No other EKG on chart for comparison. I have asked RN to upload EKG to chart. No hx of a fib. On metoprolol 100 mg daily, continue. Add lopressor IV PRN, order TTE, TSH and obtain cardiology consult. Start telemetry. Heparin gtt ordered since high brit vasc and hx of stroke.     Date:  3/6 Day 3: Has surpassed a 2nd midnight with active treatments and services, which include MRI Lumbar spine was now ordered with anesthesia dt severe claustrophobia but now not medically clear for anesthesia as below with new onset a-fib with RVR. Continue telemetry, Cardiology following, continue IV heparin infusion, repeat echo, monitor volume status. PT, OT bala, CM following for dispo planning.     3/6 Await Cardiology consult and MRI which is  currently not scheduled.     ED Triage Vitals   Temperature Pulse Respirations Blood Pressure SpO2   03/04/24 1358 03/04/24 1354 03/04/24 1354 03/04/24 1354 03/04/24 1354   97.8 °F (36.6 °C) 73 20 166/72 98 %      Temp Source Heart Rate Source Patient Position - Orthostatic VS BP Location FiO2 (%)   03/04/24 1358 03/04/24 1354 03/04/24 1354 03/04/24 1354 --   Oral Monitor Lying Left arm       Pain Score       03/04/24 1354       10 - Worst Possible Pain          Wt Readings from Last 1 Encounters:   03/06/24 109 kg (240 lb 4.8 oz)     Additional Vital Signs:   Date/Time Temp Pulse Resp BP MAP (mmHg) SpO2 O2 Device Patient Position - Orthostatic VS   03/06/24 07:14:19 97.6 °F (36.4 °C) 88 18 149/99 116 96 % -- --   03/06/24 05:59:47 98.1 °F (36.7 °C) 110 Abnormal  16 153/82 106 96 % -- --   03/06/24 01:57:24 97.7 °F (36.5 °C) 121 Abnormal  18 133/69 90 94 % -- --   03/05/24 21:56:03 -- 115 Abnormal  -- 135/70 92 94 % -- --   03/05/24 2030 -- -- -- -- -- -- None (Room air) --   03/05/24 1914 -- 126 Abnormal  -- -- -- 96 % -- --   03/05/24 18:57:47 98.1 °F (36.7 °C) 100 16 139/68 92 94 % -- --   03/05/24 15:35:50 -- 96 -- 135/65 88 95 % -- --   03/05/24 0840 -- -- -- -- -- -- None (Room air) --   03/05/24 07:18:11 97.9 °F (36.6 °C) 115 Abnormal  -- 141/87 105 94 % -- --   03/04/24 22:56:39 98.3 °F (36.8 °C) 125 Abnormal  -- 116/66 83 93 % None (Room air) Lying   03/04/24 2141 -- -- -- -- -- -- None (Room air) --   03/04/24 21:37:19 -- 137 Abnormal  -- 148/109 Abnormal  122 92 % -- --   03/04/24 18:38:33 -- 125 Abnormal  -- 138/92 107 96 % -- --   03/04/24 1358 97.8 °F (36.6 °C) -- -- -- -- -- -- --   03/04/24 1354 -- 73 20 166/72 103 98 % None (Room air) Lying     Pertinent Labs/Diagnostic Test Results:   3/5 ECHO:      CT abdomen pelvis wo contrast   Final Result by Rodolfo Friend MD (03/04 8296)      1.  Diverticulosis coli with suggestion of trace stranding adjacent to the proximal sigmoid colon. Very mild acute  diverticulitis is not excluded.      2.  No acute fracture.      3.  Chronic appearing seroma associated with the large anterior abdominal wall hernia mesh.      The study was marked in EPIC for immediate notification.      Workstation performed: [id number]         MRI lumbar spine w wo contrast    (Results Pending)         Results from last 7 days   Lab Units 03/06/24 0422 03/05/24 2008 03/05/24  0643 03/04/24  1527   WBC Thousand/uL 13.68* 13.47* 9.97 11.24*   HEMOGLOBIN g/dL 11.8 11.6 11.7 12.7   HEMATOCRIT % 35.6 36.3 36.8 39.7   PLATELETS Thousands/uL 370 349 354 342   NEUTROS ABS Thousands/µL  --   --  8.44* 9.45*         Results from last 7 days   Lab Units 03/06/24 0422 03/05/24  0645 03/04/24  1527   SODIUM mmol/L 133* 134* 135   POTASSIUM mmol/L 4.4 4.3 4.4   CHLORIDE mmol/L 101 102 100   CO2 mmol/L 24 24 25   ANION GAP mmol/L 8 8 10   BUN mg/dL 32* 32* 24   CREATININE mg/dL 0.99 0.91 0.94   EGFR ml/min/1.73sq m 54 60 57   CALCIUM mg/dL 8.8 8.8 9.9   MAGNESIUM mg/dL  --  2.0  --      Results from last 7 days   Lab Units 03/05/24  0645   AST U/L 15   ALT U/L 15   ALK PHOS U/L 75   TOTAL PROTEIN g/dL 6.3*   ALBUMIN g/dL 3.4*   TOTAL BILIRUBIN mg/dL 0.42     Results from last 7 days   Lab Units 03/06/24  1133 03/06/24  0712 03/05/24 2015 03/05/24  1703 03/05/24  1118 03/05/24  0637 03/04/24  2049   POC GLUCOSE mg/dl 180* 190* 269* 277* 312* 205* 292*     Results from last 7 days   Lab Units 03/06/24 0422 03/05/24  0645 03/04/24  1527   GLUCOSE RANDOM mg/dL 211* 212* 160*       ED Treatment:   Medication Administration from 03/04/2024 1340 to 03/04/2024 1835         Date/Time Order Dose Route Action     03/04/2024 1428 EST acetaminophen (TYLENOL) tablet 975 mg 975 mg Oral Given     03/04/2024 1428 EST oxyCODONE (ROXICODONE) IR tablet 5 mg 5 mg Oral Given     03/04/2024 1428 EST methocarbamol (ROBAXIN) tablet 1,000 mg 1,000 mg Oral Given     03/04/2024 1530 EST diazepam (VALIUM) injection 5 mg 5 mg  Intravenous Given          Past Medical History:   Diagnosis Date    Arthritis     Colorectal polyps     Constipation     Diabetes mellitus (HCC)     Diverticulosis of colon     Hiatal hernia     Hypertension     Increased urinary frequency     Lumbar disc disease     SOB (shortness of breath)     Stress incontinence     Vitamin D deficiency 07/27/2018     Present on Admission:   Lumbar radiculopathy   Diabetes mellitus type 2 in obese    Essential hypertension   Chronic diastolic CHF (congestive heart failure) ([facility])   Obesity, Class III, BMI 40-49.9 (morbid obesity) ([facility])   Tremor of hands and face      Admitting Diagnosis: Lower back pain [M54.50]  Hip pain [M25.559]  Left hip pain [M25.552]  Ambulatory dysfunction [R26.2]  Age/Sex: 78 y.o. female  Admission Orders:  Scheduled Medications:  acetaminophen, 975 mg, Oral, Q8H NALINI  AMILoride, 2.5 mg, Oral, Daily  amLODIPine, 5 mg, Oral, HS  aspirin, 81 mg, Oral, Daily  bimatoprost, 1 drop, Both Eyes, HS  diazepam, 5 mg, Intravenous, 30 min pre-procedure  ezetimibe, 10 mg, Oral, HS  gabapentin, 100 mg, Oral, TID  insulin lispro, 1-5 Units, Subcutaneous, HS  insulin lispro, 1-6 Units, Subcutaneous, TID AC  losartan, 100 mg, Oral, Daily  methocarbamol, 500 mg, Oral, Q6H NALINI  [START ON 3/7/2024] methylPREDNISolone, 20 mg, Oral, Daily   Followed by  [START ON 3/8/2024] methylPREDNISolone, 16 mg, Oral, Daily   Followed by  [START ON 3/9/2024] methylPREDNISolone, 12 mg, Oral, Daily   Followed by  [START ON 3/10/2024] methylPREDNISolone, 8 mg, Oral, Daily   Followed by  [START ON 3/11/2024] methylPREDNISolone, 4 mg, Oral, Daily  metoprolol succinate, 100 mg, Oral, Daily  senna-docusate sodium, 1 tablet, Oral, HS      Continuous IV Infusions:  heparin (porcine), 3-30 Units/kg/hr (Order-Specific), Intravenous, Titrated      PRN Meds:  heparin (porcine), 4,400 Units, Intravenous, Q6H PRN  heparin (porcine), 8,800 Units, Intravenous, Q6H PRN  HYDROmorphone, 0.2 mg, Intravenous,  Q2H PRN  metoprolol, 5 mg, Intravenous, Q6H PRN  ondansetron, 4 mg, Intravenous, Q6H PRN  oxyCODONE, 2.5 mg, Oral, Q4H PRN   Or  oxyCODONE, 5 mg, Oral, Q4H PRN x1 thus far    Scd  IO, daily wts        IP CONSULT TO NEUROSURGERY  IP CONSULT TO CARDIOLOGY    Network Utilization Review Department  ATTENTION: Please call with any questions or concerns to [phone] and carefully listen to the prompts so that you are directed to the right person. All voicemails are confidential.   For Discharge needs, contact Care Management DC Support Team at [phone] opt. 2  Send all requests for admission clinical reviews, approved or denied determinations and any other requests to dedicated fax number below belonging to the campus where the patient is receiving treatment. List of dedicated fax numbers for the Facilities:  FACILITY NAME UR FAX NUMBER   ADMISSION DENIALS (Administrative/Medical Necessity) [phone]   DISCHARGE SUPPORT TEAM (NETWORK) [phone]   PARENT CHILD HEALTH (Maternity/NICU/Pediatrics) [phone]   [facility] [phone]   [facility] [phone]   [facility] [phone]   [facility] [phone]   [facility] [phone]   [facility] [phone]   [facility] [phone]   [facility] [phone]   [facility] [phone]   [facility] [phone]   [facility] [phone]   [facility] [phone]

## 2024-03-05 NOTE — ASSESSMENT & PLAN NOTE
"Lab Results   Component Value Date    HGBA1C 6.9 (H) 01/22/2024       No results for input(s): \"POCGLU\" in the last 72 hours.    Blood Sugar Average: Last 72 hrs:    Home med Glimepiride held  SSI  Monitor blood sugars and adjust insulin accordingly  "

## 2024-03-05 NOTE — CONSULTS
[facility]  Consult  Name: Jessica Jaquez 78 y.o. female I MRN: [id number]  Unit/Bed#: CW2 210-01 I Date of Admission: 3/4/2024   Date of Service: 3/5/2024 I Hospital Day: 0    Inpatient consult to Neurosurgery  Consult performed by: Puja Centeno PA-C  Consult ordered by: Lady Cherry MD        Assessment/Plan   * Lumbar radiculopathy  Assessment & Plan  Acute on chronic LBP w/ LLE radiculopathy   Presented to ED on 3/4/24 due to gradually worsening back, hip, and L thigh pain x 3 weeks   Describes a shooting/sharp pain from the L low back into the L hip and L groin/thigh   Denies any radiation of this pain to her lower leg, weakness, numbness, BBI, urinary retention, or saddle anesthesia   Hx of 3 prior lumbar surgeries at [facility] w/ Dr. Hicks in 2010   pt is unsure which levels but knows they were all lumbar  Reports prior to surgery she has similar sx with L sided LBP radiating to the L thigh   Has been receiving regular corticosteroid injections in carroll hips and knees at [facility]  most recently underwent L hip injection yesterday, 3/4, prior to arrival in the ER --> no relief   Pain severely limiting her ability to walk/function so she came to ER for further evaluation     Imaging:   MRI pending   3/4 CT c/a/p: No acute fracture or suspicious osseous lesion. There are pedicle screws at L3 and L4 which appear intact.     Plan:   Continue to closely monitor neuro exam   Frequent neuro checks per primary team   Maintain normotensive BP goals, MAP > 65   Recommend further evaluation with an MRI L-spine w/wo contrast   Complaints appear to be consistent with an L2/L3 radiculopathy   Pt does have previous spinal hardware at L3-4, possible component of adjacent segment disease at the level above this fusion   Recommend multimodal pain medication regimen   Currently getting:   Tylenol 975mg PO q 8hrs   Robaxin 500mg PO q 6hrs   Prn oxy 2.5/5mg and prn dilaudid 0.2mg IV  [TextBox_4] : 47 year old female with PAF, asthma AQUILES came for initial evaluation.\par PMHX: HTN\par She brought her past medical records.\par Pulmonary notes from Dr Reilly reviewed: she was on PAP until 2019 when it was stopped.\par Asthma was well controlled on Q kris.\par Last PFT's 2019: my interpretation: Normal spirometry, normal TLC and DLCO\par Sleep study 2017: moderate AQUILES AHI 17. Titrated to CPAP 11 cm h2O\par \par New labs: CBC with increased eos > 500\par Echo: normal PA pressures, normal EF\par \par She is here to establish care after she moved in [place].\par She feels that her asthma is well controlled on Q kris 80 2 puffs once daily. Last time she saw Dr Reilly was in 2019.\par She exercises without dyspnea. Has some tightness when she goes up hill.\par No exacerbations past year.\par No hospitalizations. \par Has seasonal allergies, treated with Flonase and Claritin.\par \par She is not on AQUILES treatment for 3 years. She was not compliant with PAP and tried MAD no success.\par Lost weight 30 lbs since the last sleep study.\par Does not know if she snores.\par BT 1 am\par WT 9 am\par SLT short\par Has mild nocturia.\par No daytime somnolence no naps.\par No leg movements.\par \par SHX: never smoked\par FHX: sleep apnea\par Has cats and dogs "  Could consider adding gabapentin scheduled and a short course of steroids   Would recommend holding pt's ASA 81mg should MRI reveal surgical pathology   DVT ppx: SCDs, SQ lovenox   Medical management per primary team   Pain control per primary team --> noted above   PT/OT   Social work following for assistance with dispo once medically cleared     Neurosurgery will follow from the periphery and review MRI once completed. Please reach out with any further questions or concerns.          History of Present Illness   HPI: Jessica Jaquez is a 78 y.o. female with PMH including DMT2, chronic diastolic HF, HTN, B/L knee OA, and lumbar disc herniations s/p 3 prior lumbar surgeries (one of which being a L3-4 decompression and fusion) in 2010 who presents with a complaint of lower back pain radiating into her L hip, groin, and anterior thigh. She states that this pain first began in January of this year and had been gradually worsening over the last 3 weeks until yesterday when it became unbearable and was unable to ambulate. She describes it as \"sharp and stabbing.\" She did see her pain management doctor yesterday and received a CSI in her L hip. The doctor advised her to go to the ED if her pain did not resolve. She admits to having this same type of pain once before just prior to her first lumbar surgery and it resolved after surgery.     Today she rates her pain a 5/10 compared to a 10/10 yesterday. The pain medication has been helping, while ambulating and moving her L leg exacerbates the pain. She was mobilizing with PT this morning and says it felt okay once she got out of bed. She denies numbness or tingling in her upper or lower extremities, weakness, changes in bladder and bowel habits including retention and incontinence, saddle anesthesia, headache, dizziness, N/V, chest pain, shortness of breath, and recent falls or trauma to the area.     Review of Systems   Constitutional:  Negative for activity change, " [ESS] : 6 chills and fatigue.   Respiratory:  Negative for shortness of breath.    Cardiovascular:  Positive for leg swelling. Negative for chest pain.   Gastrointestinal:  Negative for abdominal distention.   Genitourinary:  Negative for decreased urine volume and difficulty urinating.        No urinary or bladder incontinence or retention.    Musculoskeletal:  Positive for arthralgias (B/L knee OA and L hip pain), back pain (lumbar region) and joint swelling. Negative for neck pain.   Skin:  Negative for color change and wound.   Neurological:  Negative for dizziness, tremors, speech difficulty, weakness, light-headedness, numbness and headaches.   Psychiatric/Behavioral:  Negative for agitation, behavioral problems and confusion.      Historical Information   Past Medical History:   Diagnosis Date    Arthritis     Colorectal polyps     Constipation     Diabetes mellitus (HCC)     Diverticulosis of colon     Hiatal hernia     Hypertension     Increased urinary frequency     Lumbar disc disease     SOB (shortness of breath)     Stress incontinence      Past Surgical History:   Procedure Laterality Date    BACK SURGERY      CARPAL TUNNEL RELEASE Left     CATARACT EXTRACTION Bilateral     CHOLECYSTECTOMY      COLONOSCOPY W/ ENDOSCOPIC US N/A 2/24/2016    Procedure: ANAL ENDOSCOPIC U/S;  Surgeon: HOLLIS Washington MD;  Location: [facility];  Service:     HERNIA REPAIR      [place] COLONOSCOPY FLX DX W/COLLJ SPEC WHEN PFRMD N/A 2/24/2016    Procedure: COLONOSCOPY;  Surgeon: HOLLIS Washington MD;  Location: [facility];  Service: Colorectal    TONSILLECTOMY      TUBAL LIGATION       Social History     Substance and Sexual Activity   Alcohol Use No     Social History     Substance and Sexual Activity   Drug Use No     Social History     Tobacco Use   Smoking Status Never   Smokeless Tobacco Never     Family History   Problem Relation Age of Onset    Hypertension Mother     Heart attack Mother     Diabetes Mother     Prostate cancer Father      No Known Problems Brother     Stroke Maternal Grandmother     Stroke Paternal Grandmother     Stroke Paternal Grandfather     Thyroid cancer Neg Hx      Meds/Allergies   all current active meds have been reviewed and current meds:   Current Facility-Administered Medications   Medication Dose Route Frequency    acetaminophen (TYLENOL) tablet 975 mg  975 mg Oral Q8H NALINI    AMILoride tablet 2.5 mg  2.5 mg Oral Daily    amLODIPine (NORVASC) tablet 5 mg  5 mg Oral HS    aspirin chewable tablet 81 mg  81 mg Oral Daily    bimatoprost (LUMIGAN) 0.03 % ophthalmic drops 1 drop  1 drop Both Eyes HS    diazepam (VALIUM) injection 5 mg  5 mg Intravenous 30 min pre-procedure    enoxaparin (LOVENOX) subcutaneous injection 40 mg  40 mg Subcutaneous BID    ezetimibe (ZETIA) tablet 10 mg  10 mg Oral HS    HYDROmorphone HCl (DILAUDID) injection 0.2 mg  0.2 mg Intravenous Q2H PRN    insulin lispro (HumALOG/ADMELOG) 100 units/mL subcutaneous injection 1-5 Units  1-5 Units Subcutaneous HS    insulin lispro (HumALOG/ADMELOG) 100 units/mL subcutaneous injection 1-6 Units  1-6 Units Subcutaneous TID AC    losartan (COZAAR) tablet 100 mg  100 mg Oral Daily    methocarbamol (ROBAXIN) tablet 500 mg  500 mg Oral Q6H NALINI    metoprolol succinate (TOPROL-XL) 24 hr tablet 100 mg  100 mg Oral Daily    ondansetron (ZOFRAN) injection 4 mg  4 mg Intravenous Q6H PRN    oxyCODONE (ROXICODONE) split tablet 2.5 mg  2.5 mg Oral Q4H PRN    Or    oxyCODONE (ROXICODONE) IR tablet 5 mg  5 mg Oral Q4H PRN    senna-docusate sodium (SENOKOT S) 8.6-50 mg per tablet 1 tablet  1 tablet Oral HS     Allergies   Allergen Reactions    Other Cough and Sneezing     Seasonal allergies     Objective   I/O         03/03 0701 03/04 0700 03/04 0701 03/05 0700 03/05 0701 03/06 0700    P.O.   540    Total Intake(mL/kg)   540 (4.8)    Urine (mL/kg/hr)  300     Total Output  300     Net  -300 +540                 Physical Exam  Constitutional:       General: She is not in  acute distress.     Appearance: Normal appearance. She is obese. She is not ill-appearing or toxic-appearing.      Comments: Pleasant, obese, elderly female sitting up comfortably in bed  No acute distress   HENT:      Head: Normocephalic and atraumatic.      Right Ear: External ear normal.      Left Ear: External ear normal.      Nose: Nose normal. No congestion.      Mouth/Throat:      Mouth: Mucous membranes are moist.   Eyes:      General: No scleral icterus.        Right eye: No discharge.         Left eye: No discharge.      Extraocular Movements: Extraocular movements intact.      Conjunctiva/sclera: Conjunctivae normal.      Pupils: Pupils are equal, round, and reactive to light.   Cardiovascular:      Rate and Rhythm: Normal rate.   Pulmonary:      Effort: Pulmonary effort is normal. No respiratory distress.      Comments: No resp distress on room air   Abdominal:      General: Abdomen is flat.   Musculoskeletal:         General: Tenderness (Lumbar region tender to palpation. Hip and anterior thigh tender to touch.) present.      Cervical back: Normal range of motion and neck supple. No rigidity or tenderness.      Left lower leg: Edema present.      Comments: Chronic carroll LE pitting edema, rated 2+ carroll LE     Mild tenderness noted over midline lumbar spine    Skin:     General: Skin is warm and dry.      Capillary Refill: Capillary refill takes less than 2 seconds.      Comments: Previous vertical, midline lumbar incision is well healed   No surrounding erythema, edema, evidence of dehiscence, drainage    Neurological:      General: No focal deficit present.      Mental Status: She is alert and oriented to person, place, and time. Mental status is at baseline.      Cranial Nerves: No cranial nerve deficit.      Sensory: No sensory deficit.      Motor: No weakness.      Coordination: Coordination normal. Romberg Test normal.      Comments: GCS 15   A&Ox3   Appropriately answering questions and following  "commands   No dysarthria or aphasia   No appreciated CN deficits   Strength 5/5 throughout to carroll UE   RLE: strength intact, 5/5 throughout   LLE: pain limited weakness, unable to lift off bed  - with support, strength is intact at each muscle group   Sensation intact to light touch to carroll UE and carroll LE   No drift or ataxia appreciated carroll      Psychiatric:         Mood and Affect: Mood normal.         Behavior: Behavior normal.         Thought Content: Thought content normal.         Judgment: Judgment normal.       Neurologic Exam     Mental Status   Oriented to person, place, and time.   Attention: normal.     Cranial Nerves     CN III, IV, VI   Pupils are equal, round, and reactive to light.    Motor Exam   Muscle bulk: normal  Overall muscle tone: normal    Sensory Exam   Light touch normal.   Grossly intact to crude touch x4     Gait, Coordination, and Reflexes     Coordination   Romberg: negative    Tremor   Resting tremor: absent    Reflexes   Right Davila: absent  Right ankle clonus: absent      Vitals:Blood pressure 141/87, pulse (!) 115, temperature 97.9 °F (36.6 °C), resp. rate 20, weight 113 kg (249 lb 1.9 oz), SpO2 94%.,Body mass index is 44.13 kg/m².     Lab Results:   Results from last 7 days   Lab Units 03/05/24  0643 03/04/24  1527   WBC Thousand/uL 9.97 11.24*   HEMOGLOBIN g/dL 11.7 12.7   HEMATOCRIT % 36.8 39.7   PLATELETS Thousands/uL 354 342   NEUTROS PCT % 84* 84*   MONOS PCT % 5 3*   EOS PCT % 0 2     Results from last 7 days   Lab Units 03/05/24  0645 03/04/24  1527   POTASSIUM mmol/L 4.3 4.4   CHLORIDE mmol/L 102 100   CO2 mmol/L 24 25   BUN mg/dL 32* 24   CREATININE mg/dL 0.91 0.94   CALCIUM mg/dL 8.8 9.9   ALK PHOS U/L 75  --    ALT U/L 15  --    AST U/L 15  --      Results from last 7 days   Lab Units 03/05/24  0645   MAGNESIUM mg/dL 2.0             No results found for: \"TROPONINT\"  ABG:No results found for: \"PHART\", \"UWP3YSM\", \"PO2ART\", \"GIX1SWO\", \"E7UZWREP\", \"BEART\", " "\"SOURCE\"    Imaging Studies: I have personally reviewed pertinent reports.      CT abdomen pelvis wo contrast    Result Date: 3/4/2024  Impression: 1.  Diverticulosis coli with suggestion of trace stranding adjacent to the proximal sigmoid colon. Very mild acute diverticulitis is not excluded. 2.  No acute fracture. 3.  Chronic appearing seroma associated with the large anterior abdominal wall hernia mesh. The study was marked in EPIC for immediate notification. Workstation performed: [id number]     EKG, Pathology, and Other Studies: I have personally reviewed pertinent reports.      VTE Prophylaxis: Sequential compression device (Venodyne)  and Enoxaparin (Lovenox)    Code Status: Level 1 - Full Code  Advance Directive and Living Will:      Power of :    POLST:      Counseling / Coordination of Care  I spent 30 minutes with the patient.       "

## 2024-03-05 NOTE — CASE MANAGEMENT
Case Management Assessment & Discharge Planning Note    Patient name Jessica Jaquez  Location 2 210/2 210- MRN [id number]  : 1945 Date 3/5/2024       Current Admission Date: 3/4/2024  Current Admission Diagnosis:Lumbar radiculopathy   Patient Active Problem List    Diagnosis Date Noted    Lumbar radiculopathy 2024    History of stroke 2024    Open wound of abdomen 2021    Cerebrovascular accident (CVA) due to thrombosis of precerebral artery ([facility]) 2019    Unspecified abnormalities of gait and mobility 2018    Tremor of hands and face 2018    Benign essential tremor 2018    Hyperlipidemia 2018    Diabetes mellitus type 2 in obese  2018    Obesity, Class III, BMI 40-49.9 (morbid obesity) ([facility]) 2018    Essential hypertension 2018    Chronic diastolic CHF (congestive heart failure) ([facility]) 2018    GERD (gastroesophageal reflux disease) 2018    Vitamin D deficiency 2018      LOS (days): 0  Geometric Mean LOS (GMLOS) (days):   Days to GMLOS:     OBJECTIVE:    Risk of Unplanned Readmission Score: 7.82         Current admission status: Inpatient       Preferred Pharmacy:   [id number] - [place] - [address] [address]  Phone: [phone] Fax: [phone]    Primary Care Provider: Jorden Holt MD    Primary Insurance: [employer] [facility]  Secondary Insurance:     ASSESSMENT:  Active Health Care Proxies    There are no active Health Care Proxies on file.         Readmission Root Cause  30 Day Readmission: No    Patient Information  Admitted from:: Home  Mental Status: Alert  During Assessment patient was accompanied by: Not accompanied during assessment  Assessment information provided by:: Patient  Primary Caregiver: Self  Support Systems: Family members  Type of Current Residence: Other (Comment) (4 level home)  Living Arrangements: Lives w/ Spouse/significant  other    Activities of Daily Living Prior to Admission  Functional Status: Independent  Completes ADLs independently?: Yes  Ambulates independently?: Yes  Does patient use assisted devices?: Yes  Assisted Devices (DME) used: Walker (Walking Stick)  Does patient have a history of Outpatient Therapy (PT/OT)?: No  Does the patient have a history of Short-Term Rehab?: No  Does patient have a history of HHC?: No  Does patient currently have HHC?: No         Patient Information Continued  Income Source: SSI/SSD  Does patient receive dialysis treatments?: No  Does patient have a history of substance abuse?: No  Does patient have a history of Mental Health Diagnosis?: No         Means of Transportation  Means of Transport to Appts:: Drives Self      Social Determinants of Health (SDOH)      Flowsheet Row Most Recent Value   Housing Stability    In the last 12 months, was there a time when you were not able to pay the mortgage or rent on time? N   In the last 12 months, how many places have you lived? 1   In the last 12 months, was there a time when you did not have a steady place to sleep or slept in a shelter (including now)? N   Transportation Needs    In the past 12 months, has lack of transportation kept you from medical appointments or from getting medications? no   In the past 12 months, has lack of transportation kept you from meetings, work, or from getting things needed for daily living? No   Food Insecurity    Within the past 12 months, you worried that your food would run out before you got the money to buy more. Never true   Within the past 12 months, the food you bought just didn't last and you didn't have money to get more. Never true   Utilities    In the past 12 months has the electric, gas, oil, or water company threatened to shut off services in your home? No            DISCHARGE DETAILS:    Discharge planning discussed with:: Patient  Freedom of Choice: Yes  Comments - Freedom of Choice: CM spoke to  patient regarding PT recommendations. Patient is only agreeable to [facility] for PT/OT. Patient prefers [facility].  CM contacted family/caregiver?: No- see comments (Patient alert)          Requested Home Health Care         Is the patient interested in HHC at discharge?: Yes  Home Health Discipline requested:: Occupational Therapy, Physical Therapy         Other Referral/Resources/Interventions Provided:  Interventions: HHC  Referral Comments: [facility] referral being placed for HHC.               Additional Comments: CM met with patient at bedside and introduced self and explained role. Patient lives in [address] [place] home with her . Patient reports that  has dementia. Patient reports that she was independent prior to admission.

## 2024-03-05 NOTE — ASSESSMENT & PLAN NOTE
Wt Readings from Last 3 Encounters:   03/05/24 113 kg (249 lb 1.9 oz)   05/16/22 105 kg (232 lb)   05/03/21 104 kg (230 lb)     Echo(12/28/18) - EF 60%, grade 1 diatolic dysfunction  Euvolemic  Ct home med Amiloride 2.5 mg daily  Also take Furosemide 20 mg and additional Amiloride 2.5 mg as needed at home for volume overload  Monitor volume status

## 2024-03-05 NOTE — PLAN OF CARE
Problem: OCCUPATIONAL THERAPY ADULT  Goal: Performs self-care activities at highest level of function for planned discharge setting.  See evaluation for individualized goals.  Description: Treatment Interventions: ADL retraining, Functional transfer training, Endurance training, Equipment evaluation/education, Compensatory technique education, Continued evaluation, Energy conservation, Activityengagement          See flowsheet documentation for full assessment, interventions and recommendations.   Note: Limitation: Decreased ADL status, Decreased endurance, Decreased high-level ADLs, Decreased self-care trans  Prognosis: Fair  Assessment: Pt is a 77 y/o female seen for OT eval s/p adm to [facility] w/ severe LLE pain. Pt is dx'd w/ lumbar radiculopathy. Pt  has a past medical history of Arthritis, Colorectal polyps, Constipation, Diabetes mellitus (HCC), Diverticulosis of colon, Hiatal hernia, Hypertension, Increased urinary frequency, Lumbar disc disease, SOB (shortness of breath), and Stress incontinence. Pt with active OT orders and activity as tolerated orders. Pt lives with her spouse in split level home w/ 0 [place]; 6 steps between floors; main bed/bath w/ 12 steps up. Pt was I w/  ADLS and IADLS (except cleaning), drove, & required occasional use of DME PTA including SPC. Pt is currently demonstrating the following occupational deficits: S UB ADLS, Min A LB ADLS, Mod A x1-2 bed mobility, Min A transfers and functional mobility w/ RW. These deficits that are impacting pt's baseline areas of occupation are a result of the following impairments: pain, endurance, activity tolerance, functional mobility, forward functional reach, balance, trunk control, functional standing tolerance, and decreased I w/ ADLS/IADLS.The following Occupational Performance Areas to address include: bathing/shower, toilet hygiene, dressing, functional mobility, community mobility, clothing management, and household maintenance. Recommend inpt  rehab vs. Home OT, pending continued progress and pain control, upon D/C. Pt to continue to benefit from acute immediate OT services to address the following goals 2-3x/week to  w/in 10-14 days:     Rehab Resource Intensity Level, OT: II (Moderate Resource Intensity)   Priti Mcqueen MS, OTR/L

## 2024-03-05 NOTE — ASSESSMENT & PLAN NOTE
Wt Readings from Last 3 Encounters:   03/04/24 110 kg (243 lb)   05/16/22 105 kg (232 lb)   05/03/21 104 kg (230 lb)     Echo(12/28/18) - EF 60%, grade 1 diatolic dysfunction  Euvolemic  Ct home med Amiloride 2.5 mg daily  Also take Furosemide 20 mg and additional Amiloride 2.5 mg as needed at home for volume overload  Monitor volume status

## 2024-03-05 NOTE — ASSESSMENT & PLAN NOTE
Lab Results   Component Value Date    HGBA1C 6.9 (H) 01/22/2024       Recent Labs     03/04/24 2049 03/05/24  0637 03/05/24  1118   POCGLU 292* 205* 312*       Blood Sugar Average: Last 72 hrs:  (P) [id number]  Home med Glimepiride held  SSI  Monitor blood sugars and adjust insulin accordingly

## 2024-03-05 NOTE — OCCUPATIONAL THERAPY NOTE
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.9 (morbid obesity) ([facility])    Essential hypertension    Chronic diastolic CHF (congestive heart failure) ([facility])    History of stroke    Past Medical History  Past Medical History:   Diagnosis Date    Arthritis     Colorectal polyps     Constipation     Diabetes mellitus (HCC)     Diverticulosis of colon     Hiatal hernia     Hypertension     Increased urinary frequency     Lumbar disc disease     SOB (shortness of breath)     Stress incontinence      Past Surgical History  Past Surgical History:   Procedure Laterality Date    BACK SURGERY      CARPAL TUNNEL RELEASE Left     CATARACT EXTRACTION Bilateral     CHOLECYSTECTOMY      COLONOSCOPY W/ ENDOSCOPIC US N/A 2/24/2016    Procedure: ANAL ENDOSCOPIC U/S;  Surgeon: HOLLIS Washington MD;  Location: [facility];  Service:     HERNIA REPAIR      MD COLONOSCOPY FLX DX W/COLLJ SPEC WHEN PFRMD N/A 2/24/2016    Procedure: COLONOSCOPY;  Surgeon: HOLLIS Washington MD;  Location: [facility];  Service: Colorectal    TONSILLECTOMY      TUBAL LIGATION           03/05/24 0950   OT Last Visit   OT Visit Date 03/05/24   Note Type   Note type Evaluation   Pain Assessment   Pain Assessment Tool 0-10   Pain Score 5   Pain Location/Orientation Orientation: Left;Location: Leg   Pain Onset/Description Onset: Ongoing;Descriptor: Sharp;Descriptor: Stabbing   Patient's Stated Pain Goal No pain   Hospital Pain Intervention(s) Repositioned;Ambulation/increased activity   Restrictions/Precautions   Weight Bearing Precautions Per Order No   Other Precautions Fall Risk;Pain   Home Living   Type of Home House   Home Layout Multi-level;Other (Comment);1/2 bath on main level;Bed/bath upstairs  (0 CYNTHIA through garage)   Bathroom Shower/Tub Walk-in shower  (& tub/shower)    Bathroom Toilet Standard   Bathroom Equipment Shower chair;Grab bars in shower;Grab bars around toilet   Home Equipment Cane  (used PRN)   Additional Comments Pt reports living in split level home w/ 0 CYNTHIA through garage; enters to living room level and has 6 steps to kitchen and additional 6 steps to main bed/bath; has 1/2 bath on 1st floor. Normally sleeps in recliner.   Prior Function   Level of [place] Independent with ADLs;Independent with functional mobility;Needs assistance with IADLS   Lives With Spouse;Other (Comment)  (reports spouse has dementia)   Receives Help From Family;Other (Comment)  (son lives 5 mins away and checks in daily; dght in law also stops in.)   IADLs Independent with driving;Independent with medication management;Independent with meal prep  (has cleaning lady once every 2 weeks to assist w/ laundry/cleaning. pt drives and goes grocery shopping herself)   Falls in the last 6 months 0   Vocational Retired   Comments I w/ ADLS and most IADLS aside from cleaning lady; (+) ; I w/ transfers and functional mobility PTA   Lifestyle   Autonomy I w/ ADLS, assist IADLS, Mod I w/ transfers and functional mobility w/ occasional use of SPC   Reciprocal Relationships Pt lives w/ her spouse who is has dementia; son lives nearby   Service to Others Retired RN; worked for [employer]   Intrinsic Gratification Cooking   ADL   Eating Assistance 5  Supervision/Setup   Grooming Assistance 5  Supervision/Setup   UB Bathing Assistance 5  Supervision/Setup   LB Bathing Assistance 4  Minimal Assistance   UB Dressing Assistance 5  Supervision/Setup   LB Dressing Assistance 4  Minimal Assistance   Toileting Assistance  4  Minimal Assistance   Functional Assistance 4  Minimal Assistance   Functional Deficit Steadying;Verbal cueing;Supervision/safety;Increased time to complete   Bed Mobility   Supine to Sit 3  Moderate assistance   Additional items Assist x 2;HOB elevated;Increased time required;Verbal cues;LE  management   Sit to Supine 3  Moderate assistance   Additional items Assist x 1;Increased time required;Verbal cues;LE management   Additional Comments pt sat EOB w/ CGA for sitting balance/trunk control   Transfers   Sit to Stand 4  Minimal assistance   Additional items Assist x 1;Increased time required;Verbal cues   Stand to Sit 4  Minimal assistance   Additional items Assist x 1;Increased time required;Verbal cues   Additional Comments w/ RW   Functional Mobility   Functional Mobility 4  Minimal assistance   Additional Comments pt engaged in short household distance functional mobility w/ Min A x1; use of RW   Additional items Rolling walker   Balance   Static Sitting Fair   Dynamic Sitting Fair -   Static Standing Poor +   Dynamic Standing Poor +   Ambulatory Poor +   Activity Tolerance   Activity Tolerance Patient limited by fatigue;Patient limited by pain   Medical Staff Made Aware PT & Nsx, Pjua. TTPuja who reported it was reasonable for therapy to work with pt prior to further NSx evaluation as long as pt could tolerate session.   Nurse Made Aware yes   RUE Assessment   RUE Assessment WFL   LUE Assessment   LUE Assessment WFL   Hand Function   Gross Motor Coordination Functional   Fine Motor Coordination Functional   Sensation   Light Touch No apparent deficits   Vision-Basic Assessment   Current Vision Wears glasses all the time   Psychosocial   Psychosocial (WDL) WDL   Cognition   Overall Cognitive Status WFL   Arousal/Participation Responsive;Cooperative   Attention Within functional limits   Orientation Level Oriented X4   Memory Within functional limits   Following Commands Follows all commands and directions without difficulty   Comments pt is pleasant and cooperative   Assessment   Limitation Decreased ADL status;Decreased endurance;Decreased high-level ADLs;Decreased self-care trans   Prognosis Fair   Assessment Pt is a 77 y/o female seen for OT eval s/p adm to [facility] w/ severe LLE pain. Pt is dx'd  w/ lumbar radiculopathy. Pt  has a past medical history of Arthritis, Colorectal polyps, Constipation, Diabetes mellitus (HCC), Diverticulosis of colon, Hiatal hernia, Hypertension, Increased urinary frequency, Lumbar disc disease, SOB (shortness of breath), and Stress incontinence. Pt with active OT orders and activity as tolerated orders. Pt lives with her spouse in split level home w/ 0 CYNTHIA; 6 steps between floors; main bed/bath w/ 12 steps up. Pt was I w/  ADLS and IADLS (except cleaning), drove, & required occasional use of DME PTA including SPC. Pt is currently demonstrating the following occupational deficits: S UB ADLS, Min A LB ADLS, Mod A x1-2 bed mobility, Min A transfers and functional mobility w/ RW. These deficits that are impacting pt's baseline areas of occupation are a result of the following impairments: pain, endurance, activity tolerance, functional mobility, forward functional reach, balance, trunk control, functional standing tolerance, and decreased I w/ ADLS/IADLS.The following Occupational Performance Areas to address include: bathing/shower, toilet hygiene, dressing, functional mobility, community mobility, clothing management, and household maintenance. Recommend inpt rehab vs. Home OT, pending continued progress and pain control, upon D/C. Pt to continue to benefit from acute immediate OT services to address the following goals 2-3x/week to  w/in 10-14 days:   Goals   Patient Goals to have less pain   LTG Time Frame 10-14   Long Term Goal #1 see below listed goals   Plan   Treatment Interventions ADL retraining;Functional transfer training;Endurance training;Equipment evaluation/education;Compensatory technique education;Continued evaluation;Energy conservation;Activityengagement   Goal Expiration Date 24   OT Frequency 2-3x/wk   Discharge Recommendation   Rehab Resource Intensity Level, OT II (Moderate Resource Intensity)   Additional Comments  The patient's raw score on the  AM-PAC Daily Activity Inpatient Short Form is 21. A raw score of greater than or equal to 19 suggests the patient may benefit from discharge to home. Please refer to the recommendation of the Occupational Therapist for safe discharge planning.   Additional Comments 2 Pt seen as a co-session due to the patient's co-morbidities, clinically unstable presentation, and present impairments which are a regression from the patient's baseline   AM-PAC Daily Activity Inpatient   Lower Body Dressing 3   Bathing 3   Toileting 3   Upper Body Dressing 4   Grooming 4   Eating 4   Daily Activity Raw Score 21   Daily Activity Standardized Score (Calc for Raw Score >=11) 44.27   AM-PAC Applied Cognition Inpatient   Following a Speech/Presentation 4   Understanding Ordinary Conversation 4   Taking Medications 4   Remembering Where Things Are Placed or Put Away 4   Remembering List of 4-5 Errands 4   Taking Care of Complicated Tasks 4   Applied Cognition Raw Score 24   Applied Cognition Standardized Score 62.21   End of Consult   Education Provided Yes   Patient Position at End of Consult Supine;All needs within reach   Nurse Communication Nurse aware of consult     GOALS    1) Pt will improve activity tolerance to G for 30 min txment sessions for increase engagement in functional tasks  2) Pt will complete UB/LB dressing/self care w/ mod I using adaptive device and DME as needed  3) Pt will complete bathing w/ Mod I w/ use of AE and DME as needed  4) Pt will complete toileting w/ mod I w/ G hygiene/thoroughness using DME as needed  5) Pt will improve functional transfers to Mod I on/off all surfaces using DME as needed w/ G balance/safety   6) Pt will improve functional mobility during ADL/IADL/leisure tasks to Mod I using DME as needed w/ G balance/safety   7) Pt will participate in simulated IADL management task to increase independence to Mod I w/ G safety and endurance  8) Pt will be attentive 100% of the time during ongoing  cognitive assessment w/ G participation to assist w/ safe d/c planning/recommendations  9) Pt will demonstrate G carryover of pt/caregiver education and training as appropriate w/o cues w/ good tolerance to increase safety during functional tasks  10) Pt will demonstrate 100% carryover of energy conservation techniques t/o functional I/ADL/leisure tasks w/o cues s/p skilled education to increase endurance during functional tasks     Priti Mcqueen MS, OTR/L

## 2024-03-05 NOTE — PROGRESS NOTES
[facility]  Progress Note  Name: Jessica Jaquez I  MRN: [id number]  Unit/Bed#: CW2 210-01 I Date of Admission: 3/4/2024   Date of Service: 3/5/2024 I Hospital Day: 0    Assessment/Plan   History of stroke  Assessment & Plan  Ct ASA, Zetia  Does not tolerate statins    Chronic diastolic CHF (congestive heart failure) ([facility])  Assessment & Plan  Wt Readings from Last 3 Encounters:   03/05/24 113 kg (249 lb 1.9 oz)   05/16/22 105 kg (232 lb)   05/03/21 104 kg (230 lb)     Echo(12/28/18) - EF 60%, grade 1 diatolic dysfunction  Euvolemic  Ct home med Amiloride 2.5 mg daily  Also take Furosemide 20 mg and additional Amiloride 2.5 mg as needed at home for volume overload  Monitor volume status          Essential hypertension  Assessment & Plan  Ct home meds Amiloride 2.5 mg daily, Losartan 100 mg daily, Metoprolol succinate 100 mg daily, Amlodipine 5 mg hs  Monitor BP    Obesity, Class III, BMI 40-49.9 (morbid obesity) ([facility])  Assessment & Plan  Lifestyle modification    Diabetes mellitus type 2 in obese   Assessment & Plan  Lab Results   Component Value Date    HGBA1C 6.9 (H) 01/22/2024       Recent Labs     03/04/24  2049 03/05/24  0637 03/05/24  1118   POCGLU 292* 205* 312*       Blood Sugar Average: Last 72 hrs:  (P) [id number]  Home med Glimepiride held  SSI  Monitor blood sugars and adjust insulin accordingly    * Lumbar radiculopathy  Assessment & Plan  Left groin pain radiating down LLE since end of January 2024, gradually worsening  H/o 3 spinal surgeries in 2010 (L3-4 decompression, fusion) for recurrent disc herniation with LLE radiculopathy  Has been following with Dr Loja ortho at [facility] for bilateral knee OA and gets corticosteroid injections every 3 months  Xray hip had shown mild degenerative changes  At visit on 2/22/24 pain was not localized to knee and he advised spinal evaluation  Seen by Dr Loja today - pain did not seem hip related. Patient wanted to  try diagnostic injection which was given but pain and ambulatory dysfunction worsened and her family called EMS  CT A/P - Diverticulosis coli with suggestion of trace stranding adjacent to the proximal sigmoid colon. No acute fracture. Chronic appearing seroma associated with the large anterior abdominal wall hernia mesh.  Pain control - scheduled Acetaminophen/Robaxin, Oxycodone/Dilaudid prn  Neurosurgery consult appreciated, pending final recommendation based on MRI L spine.  Pending MRI Lumbar spine.            VTE Pharmacologic Prophylaxis:   Pharmacologic: Enoxaparin (Lovenox)  Mechanical VTE Prophylaxis in Place: Yes    Patient Centered Rounds: I have performed bedside rounds with nursing staff today.       Current Length of Stay: 0 day(s)    Current Patient Status: Inpatient   Certification Statement: The patient will continue to require additional inpatient hospital stay due to pending MRI lumbar spine, final neurosx recs    Discharge Plan: 24 hours    Code Status: Level 1 - Full Code      Subjective:     This morning patient reports she had slight improvement in pain.  She was able to sleep overnight after a long time as she had difficulty sleeping at home due to pain.    Objective:     Vitals:   Temp (24hrs), Av.1 °F (36.7 °C), Min:97.9 °F (36.6 °C), Max:98.3 °F (36.8 °C)    Temp:  [97.9 °F (36.6 °C)-98.3 °F (36.8 °C)] 97.9 °F (36.6 °C)  HR:  [115-137] 115  BP: (116-148)/() 141/87  SpO2:  [92 %-96 %] 94 %  Body mass index is 44.13 kg/m².     Input and Output Summary (last 24 hours):       Intake/Output Summary (Last 24 hours) at 3/5/2024 1523  Last data filed at 3/5/2024 1100  Gross per 24 hour   Intake 540 ml   Output 300 ml   Net 240 ml       Physical Exam:     Physical Exam  Constitutional:       Appearance: She is well-developed. She is obese.   HENT:      Head: Normocephalic and atraumatic.   Pulmonary:      Effort: Pulmonary effort is normal. No respiratory distress.      Breath sounds:  Normal breath sounds.   Musculoskeletal:      Cervical back: Normal range of motion.   Skin:     General: Skin is warm and dry.            Additional Data:     Labs:    Results from last 7 days   Lab Units 03/05/24  0643   WBC Thousand/uL 9.97   HEMOGLOBIN g/dL 11.7   HEMATOCRIT % 36.8   PLATELETS Thousands/uL 354   NEUTROS PCT % 84*   LYMPHS PCT % 10*   MONOS PCT % 5   EOS PCT % 0     Results from last 7 days   Lab Units 03/05/24  0645   SODIUM mmol/L 134*   POTASSIUM mmol/L 4.3   CHLORIDE mmol/L 102   CO2 mmol/L 24   BUN mg/dL 32*   CREATININE mg/dL 0.91   ANION GAP mmol/L 8   CALCIUM mg/dL 8.8   ALBUMIN g/dL 3.4*   TOTAL BILIRUBIN mg/dL 0.42   ALK PHOS U/L 75   ALT U/L 15   AST U/L 15   GLUCOSE RANDOM mg/dL 212*         Results from last 7 days   Lab Units 03/05/24  1118 03/05/24  0637 03/04/24  2049   POC GLUCOSE mg/dl 312* 205* 292*                        Recent Cultures (last 7 days):           Last 24 Hours Medication List:   Current Facility-Administered Medications   Medication Dose Route Frequency Provider Last Rate    acetaminophen  975 mg Oral Q8H NALINI Cherry MD      AMILoride  2.5 mg Oral Daily Lady Cherry MD      amLODIPine  5 mg Oral HS Lady Cherry MD      aspirin  81 mg Oral Daily Lady Cherry MD      bimatoprost  1 drop Both Eyes HS Lady Cherry MD      diazepam  5 mg Intravenous 30 min pre-procedure Aly Fierro MD      enoxaparin  40 mg Subcutaneous BID Lady Cherry MD      ezetimibe  10 mg Oral HS Lady Cherry MD      HYDROmorphone  0.2 mg Intravenous Q2H PRN Lady Cherry MD      insulin lispro  1-5 Units Subcutaneous HS Lady Cherry MD      insulin lispro  1-6 Units Subcutaneous TID AC Lady Cherry MD      losartan  100 mg Oral Daily Lady Cherry MD      methocarbamol  500 mg Oral Q6H NALINI Lady Cherry MD      metoprolol succinate  100 mg Oral Daily Lady Cherry MD      ondansetron  4 mg Intravenous Q6H PRN Lady Cherry  MD      oxyCODONE  2.5 mg Oral Q4H PRN Lady Cherry MD      Or    oxyCODONE  5 mg Oral Q4H PRN Lady Cherry MD      senna-docusate sodium  1 tablet Oral HS Lady Cherry MD          Today, Patient Was Seen By: Aly Fierro MD    ** Please Note: Dictation voice to text software may have been used in the creation of this document. **

## 2024-03-06 ENCOUNTER — ANESTHESIA EVENT (OUTPATIENT)
Dept: ANESTHESIOLOGY | Facility: HOSPITAL | Age: 79
End: 2024-03-06

## 2024-03-06 ENCOUNTER — APPOINTMENT (INPATIENT)
Dept: NON INVASIVE DIAGNOSTICS | Facility: HOSPITAL | Age: 79
DRG: 552 | End: 2024-03-06
Attending: STUDENT IN AN ORGANIZED HEALTH CARE EDUCATION/TRAINING PROGRAM
Payer: COMMERCIAL

## 2024-03-06 ENCOUNTER — ANESTHESIA (OUTPATIENT)
Dept: ANESTHESIOLOGY | Facility: HOSPITAL | Age: 79
End: 2024-03-06

## 2024-03-06 ENCOUNTER — HOME HEALTH ADMISSION (OUTPATIENT)
Dept: HOME HEALTH SERVICES | Facility: HOME HEALTHCARE | Age: 79
End: 2024-03-06
Payer: COMMERCIAL

## 2024-03-06 PROBLEM — I48.91 NEW ONSET ATRIAL FIBRILLATION (HCC): Status: ACTIVE | Noted: 2024-03-06

## 2024-03-06 PROBLEM — E55.9 VITAMIN D DEFICIENCY: Status: RESOLVED | Noted: 2018-07-27 | Resolved: 2024-03-06

## 2024-03-06 LAB
ANION GAP SERPL CALCULATED.3IONS-SCNC: 8 MMOL/L
AORTIC ROOT: 2.9 CM
APICAL FOUR CHAMBER EJECTION FRACTION: 59 %
APTT PPP: >210 SECONDS (ref 23–37)
APTT PPP: >210 SECONDS (ref 23–37)
ASCENDING AORTA: 3.2 CM
BSA FOR ECHO PROCEDURE: 2.09 M2
BUN SERPL-MCNC: 32 MG/DL (ref 5–25)
CALCIUM SERPL-MCNC: 8.8 MG/DL (ref 8.4–10.2)
CHLORIDE SERPL-SCNC: 101 MMOL/L (ref 96–108)
CO2 SERPL-SCNC: 24 MMOL/L (ref 21–32)
CREAT SERPL-MCNC: 0.99 MG/DL (ref 0.6–1.3)
ERYTHROCYTE [DISTWIDTH] IN BLOOD BY AUTOMATED COUNT: 13.5 % (ref 11.6–15.1)
FRACTIONAL SHORTENING: 28 (ref 28–44)
GFR SERPL CREATININE-BSD FRML MDRD: 54 ML/MIN/1.73SQ M
GLUCOSE SERPL-MCNC: 180 MG/DL (ref 65–140)
GLUCOSE SERPL-MCNC: 190 MG/DL (ref 65–140)
GLUCOSE SERPL-MCNC: 211 MG/DL (ref 65–140)
GLUCOSE SERPL-MCNC: 252 MG/DL (ref 65–140)
GLUCOSE SERPL-MCNC: 319 MG/DL (ref 65–140)
HCT VFR BLD AUTO: 35.6 % (ref 34.8–46.1)
HGB BLD-MCNC: 11.8 G/DL (ref 11.5–15.4)
INTERVENTRICULAR SEPTUM IN DIASTOLE (PARASTERNAL SHORT AXIS VIEW): 1.3 CM
INTERVENTRICULAR SEPTUM: 1.3 CM (ref 0.6–1.1)
IVC: 18 MM
LAAS-AP2: 18.9 CM2
LAAS-AP4: 20.1 CM2
LEFT ATRIUM SIZE: 3.5 CM
LEFT ATRIUM VOLUME (MOD BIPLANE): 50 ML
LEFT ATRIUM VOLUME INDEX (MOD BIPLANE): 23.9 ML/M2
LEFT INTERNAL DIMENSION IN SYSTOLE: 2.9 CM (ref 2.1–4)
LEFT VENTRICULAR INTERNAL DIMENSION IN DIASTOLE: 4 CM (ref 3.5–6)
LEFT VENTRICULAR POSTERIOR WALL IN END DIASTOLE: 1.3 CM
LEFT VENTRICULAR STROKE VOLUME: 36 ML
LVSV (TEICH): 36 ML
MCH RBC QN AUTO: 28.4 PG (ref 26.8–34.3)
MCHC RBC AUTO-ENTMCNC: 33.1 G/DL (ref 31.4–37.4)
MCV RBC AUTO: 86 FL (ref 82–98)
PLATELET # BLD AUTO: 370 THOUSANDS/UL (ref 149–390)
PMV BLD AUTO: 9.2 FL (ref 8.9–12.7)
POTASSIUM SERPL-SCNC: 4.4 MMOL/L (ref 3.5–5.3)
RA PRESSURE ESTIMATED: 8 MMHG
RBC # BLD AUTO: 4.16 MILLION/UL (ref 3.81–5.12)
RIGHT ATRIUM AREA SYSTOLE A4C: 14.9 CM2
RIGHT VENTRICLE ID DIMENSION: 3.2 CM
RV PSP: 22 MMHG
SL CV LEFT ATRIUM LENGTH A2C: 6.3 CM
SL CV LV EF: 60
SL CV PED ECHO LEFT VENTRICLE DIASTOLIC VOLUME (MOD BIPLANE) 2D: 69 ML
SL CV PED ECHO LEFT VENTRICLE SYSTOLIC VOLUME (MOD BIPLANE) 2D: 33 ML
SODIUM SERPL-SCNC: 133 MMOL/L (ref 135–147)
TR MAX PG: 14 MMHG
TR PEAK VELOCITY: 1.9 M/S
TRICUSPID ANNULAR PLANE SYSTOLIC EXCURSION: 2 CM
TRICUSPID VALVE PEAK REGURGITATION VELOCITY: 1.86 M/S
WBC # BLD AUTO: 13.68 THOUSAND/UL (ref 4.31–10.16)

## 2024-03-06 PROCEDURE — 80048 BASIC METABOLIC PNL TOTAL CA: CPT | Performed by: FAMILY MEDICINE

## 2024-03-06 PROCEDURE — 93306 TTE W/DOPPLER COMPLETE: CPT | Performed by: INTERNAL MEDICINE

## 2024-03-06 PROCEDURE — 93306 TTE W/DOPPLER COMPLETE: CPT

## 2024-03-06 PROCEDURE — 99232 SBSQ HOSP IP/OBS MODERATE 35: CPT | Performed by: INTERNAL MEDICINE

## 2024-03-06 PROCEDURE — 85730 THROMBOPLASTIN TIME PARTIAL: CPT | Performed by: FAMILY MEDICINE

## 2024-03-06 PROCEDURE — 99223 1ST HOSP IP/OBS HIGH 75: CPT | Performed by: INTERNAL MEDICINE

## 2024-03-06 PROCEDURE — 82948 REAGENT STRIP/BLOOD GLUCOSE: CPT

## 2024-03-06 PROCEDURE — 85027 COMPLETE CBC AUTOMATED: CPT | Performed by: FAMILY MEDICINE

## 2024-03-06 RX ORDER — GABAPENTIN 100 MG/1
100 CAPSULE ORAL 3 TIMES DAILY
Status: DISCONTINUED | OUTPATIENT
Start: 2024-03-06 | End: 2024-03-11 | Stop reason: HOSPADM

## 2024-03-06 RX ORDER — METHYLPREDNISOLONE 4 MG/1
12 TABLET ORAL DAILY
Status: COMPLETED | OUTPATIENT
Start: 2024-03-09 | End: 2024-03-09

## 2024-03-06 RX ORDER — METHYLPREDNISOLONE 4 MG/1
8 TABLET ORAL DAILY
Status: COMPLETED | OUTPATIENT
Start: 2024-03-10 | End: 2024-03-10

## 2024-03-06 RX ORDER — METOPROLOL SUCCINATE 100 MG/1
100 TABLET, EXTENDED RELEASE ORAL 2 TIMES DAILY
Status: DISCONTINUED | OUTPATIENT
Start: 2024-03-06 | End: 2024-03-08

## 2024-03-06 RX ORDER — METHYLPREDNISOLONE 4 MG/1
16 TABLET ORAL DAILY
Status: COMPLETED | OUTPATIENT
Start: 2024-03-08 | End: 2024-03-08

## 2024-03-06 RX ORDER — METHYLPREDNISOLONE 4 MG/1
4 TABLET ORAL DAILY
Status: COMPLETED | OUTPATIENT
Start: 2024-03-11 | End: 2024-03-11

## 2024-03-06 RX ADMIN — GABAPENTIN 100 MG: 100 CAPSULE ORAL at 17:53

## 2024-03-06 RX ADMIN — INSULIN LISPRO 3 UNITS: 100 INJECTION, SOLUTION INTRAVENOUS; SUBCUTANEOUS at 17:54

## 2024-03-06 RX ADMIN — METHOCARBAMOL 500 MG: 500 TABLET ORAL at 11:15

## 2024-03-06 RX ADMIN — METHYLPREDNISOLONE 24 MG: 16 TABLET ORAL at 11:16

## 2024-03-06 RX ADMIN — GABAPENTIN 100 MG: 100 CAPSULE ORAL at 09:53

## 2024-03-06 RX ADMIN — SENNOSIDES, DOCUSATE SODIUM 1 TABLET: 8.6; 5 TABLET ORAL at 22:19

## 2024-03-06 RX ADMIN — METHOCARBAMOL 500 MG: 500 TABLET ORAL at 17:53

## 2024-03-06 RX ADMIN — METOROPROLOL TARTRATE 5 MG: 5 INJECTION, SOLUTION INTRAVENOUS at 19:39

## 2024-03-06 RX ADMIN — METHOCARBAMOL 500 MG: 500 TABLET ORAL at 23:35

## 2024-03-06 RX ADMIN — INSULIN LISPRO 3 UNITS: 100 INJECTION, SOLUTION INTRAVENOUS; SUBCUTANEOUS at 22:21

## 2024-03-06 RX ADMIN — HEPARIN SODIUM 15 UNITS/KG/HR: 10000 INJECTION, SOLUTION INTRAVENOUS at 09:56

## 2024-03-06 RX ADMIN — AMILORIDE HYDROCLORIDE 2.5 MG: 5 TABLET ORAL at 11:17

## 2024-03-06 RX ADMIN — ASPIRIN 81 MG CHEWABLE TABLET 81 MG: 81 TABLET CHEWABLE at 09:53

## 2024-03-06 RX ADMIN — GABAPENTIN 100 MG: 100 CAPSULE ORAL at 22:19

## 2024-03-06 RX ADMIN — EZETIMIBE 10 MG: 10 TABLET ORAL at 22:19

## 2024-03-06 RX ADMIN — OXYCODONE HYDROCHLORIDE 5 MG: 5 TABLET ORAL at 19:39

## 2024-03-06 RX ADMIN — ACETAMINOPHEN 975 MG: 325 TABLET, FILM COATED ORAL at 22:19

## 2024-03-06 RX ADMIN — METOPROLOL SUCCINATE 100 MG: 100 TABLET, EXTENDED RELEASE ORAL at 16:51

## 2024-03-06 RX ADMIN — METHOCARBAMOL 500 MG: 500 TABLET ORAL at 00:39

## 2024-03-06 RX ADMIN — AMLODIPINE BESYLATE 5 MG: 5 TABLET ORAL at 22:19

## 2024-03-06 RX ADMIN — BIMATOPROST 1 DROP: 0.3 SOLUTION/ DROPS OPHTHALMIC at 22:30

## 2024-03-06 RX ADMIN — LOSARTAN POTASSIUM 100 MG: 50 TABLET, FILM COATED ORAL at 09:53

## 2024-03-06 RX ADMIN — METOPROLOL SUCCINATE 100 MG: 100 TABLET, EXTENDED RELEASE ORAL at 09:53

## 2024-03-06 NOTE — QUICK NOTE
Notified by RN that EKG obtained for tachycardia showed A fib with HR in the 120s. Reviewed EKG and a fib confirmed. No other EKG on chart for comparison. I have asked RN to upload EKG to chart. No hx of a fib. BRIT VASC 8 (age, sex,chf,htn,dm)  On metoprolol 100 mg daily, continue. Add lopressor IV PRN, order TTE, TSH and obtain cardiology consult. Start telemetry. Heparin gtt ordered since high brit vasc and hx of stroke. D/w patient

## 2024-03-06 NOTE — PROGRESS NOTES
[facility]  Progress Note  Name: Jessica Jaquez I  MRN: [id number]  Unit/Bed#: [facility] 626-01 I Date of Admission: 3/4/2024   Date of Service: 3/6/2024 I Hospital Day: 1    Assessment/Plan   * Lumbar radiculopathy  Assessment & Plan  Presented with left groin pain radiating down LLE since end of January 2024, gradually worsening  H/o 3 spinal surgeries in 2010 (L3-4 decompression, fusion) for recurrent disc herniation with LLE radiculopathy  Has been following with Dr Freedom bonilla at [facility] for bilateral knee OA and gets corticosteroid injections every 3 months  Xray hip had shown mild degenerative changes  CT A/P - Diverticulosis coli with suggestion of trace stranding adjacent to the proximal sigmoid colon. No acute fracture. Chronic appearing seroma associated with the large anterior abdominal wall hernia mesh.  Pain control - scheduled Acetaminophen/Robaxin, Oxycodone/Dilaudid prn  Added gabapentin and Medrol dose holger  Neurosurgery consult appreciated, pending final recommendation based on MRI L spine which was ordered with anesthesia due to severe claustrophobia  Now not medically clear for anesthesia as below with new onset a-fib with RVR    New onset atrial fibrillation (HCC)  Assessment & Plan  With RVR; noted on 3/5 evening. Patient without known history of afib  TSH WNL  Pending echo  Given stroke hx was started on IV heparin  Continue home Toprol XL, PRN Lopressor  Continue telemetry, monitor electrolytes   Cards consult pending--will need cardiac clearance for MRI with anesthesia     History of stroke  Assessment & Plan  Ct ASA, Zetia  Does not tolerate statins  On IV heparin now with new onset afib with RVR     Tremor of hands and face  Assessment & Plan  Noted hx, previously followed with neurology few years ago  Not on medication     Chronic diastolic CHF (congestive heart failure) (HCC)  Assessment & Plan  Wt Readings from Last 3 Encounters:   03/06/24 109 kg (240  lb 4.8 oz)   05/16/22 105 kg (232 lb)   05/03/21 104 kg (230 lb)     Echo(12/28/18) - EF 60%, grade 1 diatolic dysfunction  Euvolemic  Ct home med Amiloride 2.5 mg daily  Also take Furosemide 20 mg and additional Amiloride 2.5 mg as needed at home for volume overload  Monitor volume status  Repeat echo pending due to afib with RVR           Essential hypertension  Assessment & Plan  Ct home meds Amiloride 2.5 mg daily, Losartan 100 mg daily, Metoprolol succinate 100 mg daily, Amlodipine 5 mg hs  Monitor BP    Obesity, Class III, BMI 40-49.9 (morbid obesity) ([facility])  Assessment & Plan  Lifestyle modification    Diabetes mellitus type 2 in obese   Assessment & Plan  Lab Results   Component Value Date    HGBA1C 6.9 (H) 01/22/2024       Recent Labs     03/05/24  1118 03/05/24  1703 03/05/24 2015 03/06/24  0712   POCGLU 312* 277* 269* 190*         Blood Sugar Average: Last 72 hrs:  (P) 257.5  Home med Glimepiride held  SSI  Monitor blood sugars and adjust insulin accordingly             VTE Pharmacologic Prophylaxis: VTE Score: 5 Moderate Risk (Score 3-4) - Pharmacological DVT Prophylaxis Ordered: heparin drip.    Mobility:   Basic Mobility Inpatient Raw Score: 14  JH-HLM Goal: 4: Move to chair/commode  JH-HLM Achieved: 4: Move to chair/commode  HLM Goal achieved. Continue to encourage appropriate mobility.    Patient Centered Rounds: I performed bedside rounds with nursing staff today.   Discussions with Specialists or Other Care Team Provider: appreciate cards and Nsx input, d/w attending     Education and Discussions with Family / Patient: Patient declined call to .     Total Time Spent on Date of Encounter in care of patient: 46 mins. This time was spent on one or more of the following: performing physical exam; counseling and coordination of care; obtaining or reviewing history; documenting in the medical record; reviewing/ordering tests, medications or procedures; communicating with other healthcare  professionals and discussing with patient's family/caregivers.    Current Length of Stay: 1 day(s)  Current Patient Status: Inpatient   Certification Statement: The patient will continue to require additional inpatient hospital stay due to afib with RVR, pain control   Discharge Plan: Anticipate discharge in >72 hrs to discharge location to be determined pending rehab evaluations.    Code Status: Level 1 - Full Code    Subjective:   Pt feels okay today. Pain in LLE and groin is improved slightly. She denies any symptoms whatsoever from her new onset afib.     Objective:     Vitals:   Temp (24hrs), Av.8 °F (36.6 °C), Min:97.5 °F (36.4 °C), Max:98.1 °F (36.7 °C)    Temp:  [97.5 °F (36.4 °C)-98.1 °F (36.7 °C)] 97.5 °F (36.4 °C)  HR:  [] 102  Resp:  [16-18] 18  BP: (133-160)/(65-99) 160/99  SpO2:  [94 %-96 %] 94 %  Body mass index is 42.57 kg/m².     Input and Output Summary (last 24 hours):     Intake/Output Summary (Last 24 hours) at 3/6/2024 1113  Last data filed at 3/6/2024 1000  Gross per 24 hour   Intake 236.34 ml   Output 1800 ml   Net -1563.66 ml       Physical Exam:   Physical Exam  Vitals and nursing note reviewed.   Constitutional:       Appearance: She is obese.      Comments: On RA    Cardiovascular:      Rate and Rhythm: Tachycardia present. Rhythm irregular.   Pulmonary:      Effort: No respiratory distress.   Abdominal:      General: Bowel sounds are normal. There is no distension.      Palpations: Abdomen is soft.   Musculoskeletal:      Right lower leg: Edema present.      Left lower leg: Edema present.   Skin:     Coloration: Skin is pale.   Neurological:      Mental Status: She is alert and oriented to person, place, and time.      Comments: Baseline tremors of upper extremities and face    Psychiatric:         Mood and Affect: Mood normal.          Additional Data:     Labs:  Results from last 7 days   Lab Units 24  0422 24  0643   WBC Thousand/uL 13.68*   < >  9.97   HEMOGLOBIN g/dL 11.8   < > 11.7   HEMATOCRIT % 35.6   < > 36.8   PLATELETS Thousands/uL 370   < > 354   NEUTROS PCT %  --   --  84*   LYMPHS PCT %  --   --  10*   MONOS PCT %  --   --  5   EOS PCT %  --   --  0    < > = values in this interval not displayed.     Results from last 7 days   Lab Units 03/06/24  0422 03/05/24  0645   SODIUM mmol/L 133* 134*   POTASSIUM mmol/L 4.4 4.3   CHLORIDE mmol/L 101 102   CO2 mmol/L 24 24   BUN mg/dL 32* 32*   CREATININE mg/dL 0.99 0.91   ANION GAP mmol/L 8 8   CALCIUM mg/dL 8.8 8.8   ALBUMIN g/dL  --  3.4*   TOTAL BILIRUBIN mg/dL  --  0.42   ALK PHOS U/L  --  75   ALT U/L  --  15   AST U/L  --  15   GLUCOSE RANDOM mg/dL 211* 212*     Results from last 7 days   Lab Units 03/05/24 2008   INR  1.09     Results from last 7 days   Lab Units 03/06/24  0712 03/05/24 2015 03/05/24  1703 03/05/24  1118 03/05/24  0637 03/04/24  2049   POC GLUCOSE mg/dl 190* 269* 277* 312* 205* 292*               Lines/Drains:  Invasive Devices       Peripheral Intravenous Line  Duration             Peripheral IV 03/04/24 Left Antecubital 1 day              Drain  Duration             External Urinary Catheter <1 day                      Telemetry:  Telemetry Orders (From admission, onward)               24 Hour Telemetry Monitoring  Continuous x 24 Hours (Telem)        Question:  Reason for 24 Hour Telemetry  Answer:  Arrhythmias requiring acute medical intervention / PPM or ICD malfunction                     Telemetry Reviewed: Atrial fibrillation. HR averaging 120s  Indication for Continued Telemetry Use: Arrthymias requiring medical therapy             Imaging: Reviewed radiology reports from this admission including: abdominal/pelvic CT    Recent Cultures (last 7 days):         Last 24 Hours Medication List:   Current Facility-Administered Medications   Medication Dose Route Frequency Provider Last Rate    acetaminophen  975 mg Oral Q8H Pending sale to [facility] Lady Cherry MD      AMILoride  2.5 mg Oral  Daily Lady Cherry MD      amLODIPine  5 mg Oral HS Lady Cherry MD      aspirin  81 mg Oral Daily Lady Cherry MD      bimatoprost  1 drop Both Eyes HS Lady Cherry MD      diazepam  5 mg Intravenous 30 min pre-procedure Aly Fierro MD      ezetimibe  10 mg Oral HS Lady Cherry MD      gabapentin  100 mg Oral TID Cathy Morgan PA-C      heparin (porcine)  3-30 Units/kg/hr (Order-Specific) Intravenous Titrated Sabi Naik MD 15 Units/kg/hr (03/06/24 0956)    heparin (porcine)  4,400 Units Intravenous Q6H PRN Sabi Naik MD      heparin (porcine)  8,800 Units Intravenous Q6H PRN Sabi Naik MD      HYDROmorphone  0.2 mg Intravenous Q2H PRN Lady Cherry MD      insulin lispro  1-5 Units Subcutaneous HS Lady Cherry MD      insulin lispro  1-6 Units Subcutaneous TID AC Lady Cherry MD      losartan  100 mg Oral Daily Lady Cherry MD      methocarbamol  500 mg Oral Q6H NALINI Lady Cherry MD      methylPREDNISolone  24 mg Oral Daily Cathy Morgan PA-C      Followed by    [START ON 3/7/2024] methylPREDNISolone  20 mg Oral Daily Cathy Morgan PA-C      Followed by    [START ON 3/8/2024] methylPREDNISolone  16 mg Oral Daily Cathy Morgan PA-C      Followed by    [START ON 3/9/2024] methylPREDNISolone  12 mg Oral Daily Cathy Morgan PA-C      Followed by    [START ON 3/10/2024] methylPREDNISolone  8 mg Oral Daily Cathy Morgan PA-C      Followed by    [START ON 3/11/2024] methylPREDNISolone  4 mg Oral Daily Cathy Mogran PA-C      metoprolol  5 mg Intravenous Q6H PRN Sabi Naik MD      metoprolol succinate  100 mg Oral Daily Lady Cherry MD      ondansetron  4 mg Intravenous Q6H PRN Lady Cherry MD      oxyCODONE  2.5 mg Oral Q4H PRN Lady Cherry MD      Or    oxyCODONE  5 mg Oral Q4H PRN Lady Cherry MD      senna-docusate sodium  1 tablet Oral HS Lady Cherry MD          Today, Patient Was Seen By: Cathy Morgan,  PADemetriusC    **Please Note: This note may have been constructed using a voice recognition system.**

## 2024-03-06 NOTE — PROGRESS NOTES
Patient:    MRN:  [id number]    Imtiaz Request ID:  [id number]    Level of care reserved:  Home Health Agency    Partner Reserved:  [facility], [place] [address] [phone]    Clinical needs requested:    Geography searched:  [phone]    Start of Service:    Request sent:  4:40pm EST on 3/5/2024 by Yolette Cabrera    Partner reserved:  11:21am EST on 3/6/2024 by Lety Li    Choice list shared:  11:20am EST on 3/6/2024 by Lety Li

## 2024-03-06 NOTE — CONSULTS
Consultation - Cardiology  Jessica Jaquez 78 y.o. female MRN: [id number]  Unit/Bed#: [facility] 626-01 Encounter: [id number]      Inpatient consult to Cardiology  Consult performed by: Shahram Mckinney MD  Consult ordered by: Sabi Naik MD          History of Present Illness   Physician Requesting Consult: Kate Phillips MD  Reason for Consult / Principal Problem: afib    Assessment/Plan   Assessment/Plan:  Jessica Jaquez is a 78 y.o. year old female with past medical history of HFpEF, hx of CVA, DM2, HTN, multiple back surgeries for lumbar disc herniation, who presented for the left lower extremity pain found to have new diagnosis of atrial fibrillation.    #Afib, new diagnosis  [id number] (age X2, stroke x 2 ,female, CHF, hypertension, diabetes)  Patient found to have A-fib with RVR which was completely asymptomatic.  She could have been having A-fib prior to this hospitalization.  Will start with rate control.  AC has been started with heparin drip. TSH wnl  Echo shows LVEF 60% with mildly dilated right atrium  -metoprolol succinate increased to 100mg BID  -if further rate control needed start diltiazem 30mg q6hr.   -continue heparin gtt. Will eventually transition to apixaban once neurosurgery plan established.   -avoid rhythm control unless PHU done given unclear start time of afib.     #HTN  Home meds: metoprolol 100mg daily, losartan 50mg daily,  amiloride 2.5mg daily, lasix 40mg daily PRN  Will increase metoprolol as above.     #HFpEF: takes lasix 40mg daily prn  at home.  She is euvolemic now.    #DM2    # Lumbar radiculopathy  Presented with severe leg pain  Neurosurgery following.  MRI pending    #hx of stroke  Patient had an incidental stroke seen on imaging about 15 years ago after she was hypotensive and severely anemic in the setting of internal bleeding after ventral hernia repair.    HPI: Jessica Jaquez is a 78 y.o. year old female with past medical history of HFpEF, hx of CVA, DM2,  [Dear  ___] : Dear  [unfilled], HTN, multiple back surgeries for lumbar disc herniation, who presented for the left lower extremity pain found to have new diagnosis of atrial fibrillation.  EKG showed afib with RVR with known RBBB.   Patient was completely asymptomatic from an A-fib perspective.  She denies any palpitations, shortness of breath, lightheadedness.  When she told her heart rate was high she had no symptoms of this.  In regards to her leg pain she is still feeling significant pain.  She was going to have an MRI last night but was stopped because she needs anesthesia and it was not done due to her A-fib with RVR.    Historical Information   Past Medical History:   Diagnosis Date    Arthritis     Colorectal polyps     Constipation     Diabetes mellitus (HCC)     Diverticulosis of colon     Hiatal hernia     Hypertension     Increased urinary frequency     Lumbar disc disease     SOB (shortness of breath)     Stress incontinence     Vitamin D deficiency 07/27/2018     Past Surgical History:   Procedure Laterality Date    BACK SURGERY      CARPAL TUNNEL RELEASE Left     CATARACT EXTRACTION Bilateral     CHOLECYSTECTOMY      COLONOSCOPY W/ ENDOSCOPIC US N/A 2/24/2016    Procedure: ANAL ENDOSCOPIC U/S;  Surgeon: HOLLIS Washington MD;  Location: [facility];  Service:     HERNIA REPAIR      WY COLONOSCOPY FLX DX W/COLLJ SPEC WHEN PFRMD N/A 2/24/2016    Procedure: COLONOSCOPY;  Surgeon: HOLLIS Washington MD;  Location: [facility];  Service: Colorectal    TONSILLECTOMY      TUBAL LIGATION       Social History     Substance and Sexual Activity   Alcohol Use No     Social History     Substance and Sexual Activity   Drug Use No     Social History     Tobacco Use   Smoking Status Never   Smokeless Tobacco Never     Family History: mother had heart attack    Meds/Allergies   Hospital Medications:   Current Facility-Administered Medications   Medication Dose Route Frequency    acetaminophen (TYLENOL) tablet 975 mg  975 mg Oral Q8H NALINI    AMILoride  [Consult Letter:] : I had the pleasure of evaluating your patient, [unfilled]. [Please see my note below.] : Please see my note below. tablet 2.5 mg  2.5 mg Oral Daily    amLODIPine (NORVASC) tablet 5 mg  5 mg Oral HS    aspirin chewable tablet 81 mg  81 mg Oral Daily    bimatoprost (LUMIGAN) 0.03 % ophthalmic drops 1 drop  1 drop Both Eyes HS    diazepam (VALIUM) injection 5 mg  5 mg Intravenous 30 min pre-procedure    ezetimibe (ZETIA) tablet 10 mg  10 mg Oral HS    gabapentin (NEURONTIN) capsule 100 mg  100 mg Oral TID    heparin (porcine) 25,000 units in 0.45% NaCl 250 mL infusion (premix)  3-30 Units/kg/hr (Order-Specific) Intravenous Titrated    heparin (porcine) injection 4,400 Units  4,400 Units Intravenous Q6H PRN    heparin (porcine) injection 8,800 Units  8,800 Units Intravenous Q6H PRN    HYDROmorphone HCl (DILAUDID) injection 0.2 mg  0.2 mg Intravenous Q2H PRN    insulin lispro (HumALOG/ADMELOG) 100 units/mL subcutaneous injection 1-5 Units  1-5 Units Subcutaneous HS    insulin lispro (HumALOG/ADMELOG) 100 units/mL subcutaneous injection 1-6 Units  1-6 Units Subcutaneous TID AC    losartan (COZAAR) tablet 100 mg  100 mg Oral Daily    methocarbamol (ROBAXIN) tablet 500 mg  500 mg Oral Q6H NALINI    methylprednisolone (MEDROL) tablet 24 mg  24 mg Oral Daily    Followed by    [START ON 3/7/2024] methylprednisolone (MEDROL) tablet 20 mg  20 mg Oral Daily    Followed by    [START ON 3/8/2024] methylprednisolone (MEDROL) tablet 16 mg  16 mg Oral Daily    Followed by    [START ON 3/9/2024] methylprednisolone (MEDROL) tablet 12 mg  12 mg Oral Daily    Followed by    [START ON 3/10/2024] methylprednisolone (MEDROL) tablet 8 mg  8 mg Oral Daily    Followed by    [START ON 3/11/2024] methylprednisolone (MEDROL) tablet 4 mg  4 mg Oral Daily    metoprolol (LOPRESSOR) injection 5 mg  5 mg Intravenous Q6H PRN    metoprolol succinate (TOPROL-XL) 24 hr tablet 100 mg  100 mg Oral Daily    ondansetron (ZOFRAN) injection 4 mg  4 mg Intravenous Q6H PRN    oxyCODONE (ROXICODONE) split tablet 2.5 mg  2.5 mg Oral Q4H PRN    Or    oxyCODONE (ROXICODONE) IR tablet  "5 mg  5 mg Oral Q4H PRN    senna-docusate sodium (SENOKOT S) 8.6-50 mg per tablet 1 tablet  1 tablet Oral HS     Home Medications:   Medications Prior to Admission   Medication    AMILoride 5 mg tablet    bimatoprost (Lumigan) 0.01 % ophthalmic drops    aspirin 81 mg chewable tablet    Cholecalciferol (VITAMIN D3) 1000 units CAPS    furosemide (LASIX) 40 mg tablet    glimepiride (AMARYL) 1 mg tablet    hydrocortisone 2.5 % cream    ibuprofen (MOTRIN) 200 mg tablet    ketoconazole (NIZORAL) 2 % cream    KLOR-CON M20 20 MEQ tablet    losartan (COZAAR) 50 mg tablet    metoprolol succinate (TOPROL-XL) 100 mg 24 hr tablet    Multiple Vitamins-Minerals (CENTRUM SILVER PO)       Allergies   Allergen Reactions    Other Cough and Sneezing     Seasonal allergies       Objective   Vitals: Blood pressure 160/99, pulse 102, temperature 97.5 °F (36.4 °C), resp. rate 18, height 5' 3\" (1.6 m), weight 109 kg (240 lb 4.8 oz), SpO2 94%.  Orthostatic Blood Pressures      Flowsheet Row Most Recent Value   Blood Pressure 160/99 filed at 03/06/2024 1000   Patient Position - Orthostatic VS Lying filed at 03/04/2024 2256              Intake/Output Summary (Last 24 hours) at 3/6/2024 1114  Last data filed at 3/6/2024 1000  Gross per 24 hour   Intake 236.34 ml   Output 1800 ml   Net -1563.66 ml       Invasive Devices       Peripheral Intravenous Line  Duration             Peripheral IV 03/04/24 Left Antecubital 1 day              Drain  Duration             External Urinary Catheter <1 day                    Review of Systems:  ROS  ROS as noted above, otherwise 12 point review of systems was performed and is negative.     Physical Exam:   Physical Exam  Constitutional:       Appearance: Normal appearance.   HENT:      Head: Normocephalic and atraumatic.   Neck:      Vascular: No JVD   Cardiovascular:      Rate and Rhythm: irregularly irreg, no murmurs  Pulmonary:      Effort: CTA-b  Abdominal:      General: Abdomen is flat. Bowel sounds are " [Consult Closing:] : Thank you very much for allowing me to participate in the care of this patient.  If you have any questions, please do not hesitate to contact me. [Sincerely,] : Sincerely, normal.      Palpations: Abdomen is soft.   Musculoskeletal:     No edema  Skin:     General: Skin is warm.   Neurological:      Mental Status: alert and oriented to person, place, and time.   Psychiatric:         Behavior: Behavior normal.       Lab Results: I have personally reviewed pertinent lab results.    Results from last 7 days   Lab Units 03/06/24 0422 03/05/24 2008 03/05/24  0643   WBC Thousand/uL 13.68* 13.47* 9.97   HEMOGLOBIN g/dL 11.8 11.6 11.7   HEMATOCRIT % 35.6 36.3 36.8   PLATELETS Thousands/uL 370 349 354     Results from last 7 days   Lab Units 03/06/24  0422 03/05/24  0645 03/04/24  1527   POTASSIUM mmol/L 4.4 4.3 4.4   CHLORIDE mmol/L 101 102 100   CO2 mmol/L 24 24 25   BUN mg/dL 32* 32* 24   CREATININE mg/dL 0.99 0.91 0.94   CALCIUM mg/dL 8.8 8.8 9.9     Results from last 7 days   Lab Units 03/06/24  0957 03/06/24  0207 03/05/24 2008   INR   --   --  1.09   PTT seconds >210* >210* 33     Results from last 7 days   Lab Units 03/05/24  0645   MAGNESIUM mg/dL 2.0        [FreeTextEntry3] : Samson Rice MD

## 2024-03-06 NOTE — ASSESSMENT & PLAN NOTE
With RVR; noted on 3/5 evening. Patient without known history of afib  TSH WNL  Pending echo  Given stroke hx was started on IV heparin  Continue home Toprol XL, PRN Lopressor  Continue telemetry, monitor electrolytes   Cards consult pending--will need cardiac clearance for MRI with anesthesia

## 2024-03-06 NOTE — ASSESSMENT & PLAN NOTE
Wt Readings from Last 3 Encounters:   03/06/24 109 kg (240 lb 4.8 oz)   05/16/22 105 kg (232 lb)   05/03/21 104 kg (230 lb)     Echo(12/28/18) - EF 60%, grade 1 diatolic dysfunction  Euvolemic  Ct home med Amiloride 2.5 mg daily  Also take Furosemide 20 mg and additional Amiloride 2.5 mg as needed at home for volume overload  Monitor volume status  Repeat echo pending due to afib with RVR

## 2024-03-06 NOTE — PLAN OF CARE
Problem: Prexisting or High Potential for Compromised Skin Integrity  Goal: Skin integrity is maintained or improved  Description: INTERVENTIONS:  - Identify patients at risk for skin breakdown  - Assess and monitor skin integrity  - Assess and monitor nutrition and hydration status  - Monitor labs   - Assess for incontinence   - Turn and reposition patient  - Assist with mobility/ambulation  - Relieve pressure over bony prominences  - Avoid friction and shearing  - Provide appropriate hygiene as needed including keeping skin clean and dry  - Evaluate need for skin moisturizer/barrier cream  - Collaborate with interdisciplinary team   - Patient/family teaching  - Consider wound care consult   Outcome: Progressing     Problem: PAIN - ADULT  Goal: Verbalizes/displays adequate comfort level or baseline comfort level  Description: Interventions:  - Encourage patient to monitor pain and request assistance  - Assess pain using appropriate pain scale  - Administer analgesics based on type and severity of pain and evaluate response  - Implement non-pharmacological measures as appropriate and evaluate response  - Consider cultural and social influences on pain and pain management  - Notify physician/advanced practitioner if interventions unsuccessful or patient reports new pain  Outcome: Progressing     Problem: INFECTION - ADULT  Goal: Absence or prevention of progression during hospitalization  Description: INTERVENTIONS:  - Assess and monitor for signs and symptoms of infection  - Monitor lab/diagnostic results  - Monitor all insertion sites, i.e. indwelling lines, tubes, and drains  - Monitor endotracheal if appropriate and nasal secretions for changes in amount and color  - [place] appropriate cooling/warming therapies per order  - Administer medications as ordered  - Instruct and encourage patient and family to use good hand hygiene technique  - Identify and instruct in appropriate isolation precautions for  identified infection/condition  Outcome: Progressing  Goal: Absence of fever/infection during neutropenic period  Description: INTERVENTIONS:  - Monitor WBC    Outcome: Progressing

## 2024-03-06 NOTE — QUICK NOTE
Patient NPO per notes for tentative MRI with anesthesia. Called MRI, patient is not currently on the schedule for today.  I further explained that she has A-fib with RVR so she would not be medically cleared for anesthesia anyway.  Pending cardiac clearance.    Per MRI, can make n.p.o. at midnight in the event that patient is medically cleared and that they have availability for tomorrow.    Full note to follow.    D/w attending.    Cathy Morgan PA-C

## 2024-03-06 NOTE — ASSESSMENT & PLAN NOTE
Lab Results   Component Value Date    HGBA1C 6.9 (H) 01/22/2024       Recent Labs     03/05/24  1118 03/05/24  1703 03/05/24 2015 03/06/24  0712   POCGLU 312* 277* 269* 190*         Blood Sugar Average: Last 72 hrs:  (P) 257.5  Home med Glimepiride held  SSI  Monitor blood sugars and adjust insulin accordingly

## 2024-03-06 NOTE — ASSESSMENT & PLAN NOTE
Presented with left groin pain radiating down LLE since end of January 2024, gradually worsening  H/o 3 spinal surgeries in 2010 (L3-4 decompression, fusion) for recurrent disc herniation with LLE radiculopathy  Has been following with Dr Freedom bonilla at [facility] for bilateral knee OA and gets corticosteroid injections every 3 months  Xray hip had shown mild degenerative changes  CT A/P - Diverticulosis coli with suggestion of trace stranding adjacent to the proximal sigmoid colon. No acute fracture. Chronic appearing seroma associated with the large anterior abdominal wall hernia mesh.  Pain control - scheduled Acetaminophen/Robaxin, Oxycodone/Dilaudid prn  Added gabapentin and Medrol dose holger  Neurosurgery consult appreciated, pending final recommendation based on MRI L spine which was ordered with anesthesia due to severe claustrophobia  Now not medically clear for anesthesia as below with new onset a-fib with RVR

## 2024-03-06 NOTE — PLAN OF CARE
Problem: Prexisting or High Potential for Compromised Skin Integrity  Goal: Skin integrity is maintained or improved  Description: INTERVENTIONS:  - Identify patients at risk for skin breakdown  - Assess and monitor skin integrity  - Assess and monitor nutrition and hydration status  - Monitor labs   - Assess for incontinence   - Turn and reposition patient  - Assist with mobility/ambulation  - Relieve pressure over bony prominences  - Avoid friction and shearing  - Provide appropriate hygiene as needed including keeping skin clean and dry  - Evaluate need for skin moisturizer/barrier cream  - Collaborate with interdisciplinary team   - Patient/family teaching  - Consider wound care consult   3/6/2024 1050 by Kem Maria RN  Outcome: Progressing  3/6/2024 1049 by Kem Maria RN  Outcome: Progressing     Problem: PAIN - ADULT  Goal: Verbalizes/displays adequate comfort level or baseline comfort level  Description: Interventions:  - Encourage patient to monitor pain and request assistance  - Assess pain using appropriate pain scale  - Administer analgesics based on type and severity of pain and evaluate response  - Implement non-pharmacological measures as appropriate and evaluate response  - Consider cultural and social influences on pain and pain management  - Notify physician/advanced practitioner if interventions unsuccessful or patient reports new pain  3/6/2024 1050 by Kem Maria RN  Outcome: Progressing  3/6/2024 1049 by Kem Maria RN  Outcome: Progressing     Problem: SAFETY ADULT  Goal: Patient will remain free of falls  Description: INTERVENTIONS:  - Educate patient/family on patient safety including physical limitations  - Instruct patient to call for assistance with activity   - Consult OT/PT to assist with strengthening/mobility   - Keep Call bell within reach  - Keep bed low and locked with side rails adjusted as appropriate  - Keep care items and personal belongings  within reach  - Initiate and maintain comfort rounds  - Make Fall Risk Sign visible to staff  - Offer Toileting every 2 Hours, in advance of need  - Initiate/Maintain on alarm  - Obtain necessary fall risk management equipment:   - Apply yellow socks and bracelet for high fall risk patients  - Consider moving patient to room near nurses station  3/6/2024 1050 by Kem Maria RN  Outcome: Progressing  3/6/2024 1049 by Kem Maria RN  Outcome: Progressing  Goal: Maintain or return to baseline ADL function  Description: INTERVENTIONS:  -  Assess patient's ability to carry out ADLs; assess patient's baseline for ADL function and identify physical deficits which impact ability to perform ADLs (bathing, care of mouth/teeth, toileting, grooming, dressing, etc.)  - Assess/evaluate cause of self-care deficits   - Assess range of motion  - Assess patient's mobility; develop plan if impaired  - Assess patient's need for assistive devices and provide as appropriate  - Encourage maximum independence but intervene and supervise when necessary  - Involve family in performance of ADLs  - Assess for home care needs following discharge   - Consider OT consult to assist with ADL evaluation and planning for discharge  - Provide patient education as appropriate  3/6/2024 1050 by Kem Maria RN  Outcome: Progressing  3/6/2024 1049 by Kem Maria RN  Outcome: Progressing  Goal: Maintains/Returns to pre admission functional level  Description: INTERVENTIONS:  - Perform AM-PAC 6 Click Basic Mobility/ Daily Activity assessment daily.  - Set and communicate daily mobility goal to care team and patient/family/caregiver.   - Collaborate with rehabilitation services on mobility goals if consulted  - Perform Range of Motion 3 times a day.  - Reposition patient every 3 hours.  - Dangle patient 3 times a day  - Stand patient 3 times a day  - Ambulate patient 3 times a day  - Out of bed to chair 3 times a day   - Out of  bed for meals 3 times a day  - Out of bed for toileting  - Record patient progress and toleration of activity level   3/6/2024 1050 by Kem Maria RN  Outcome: Progressing  3/6/2024 1049 by Kem Maria RN  Outcome: Progressing     Problem: CARDIOVASCULAR - ADULT  Goal: Maintains optimal cardiac output and hemodynamic stability  Description: INTERVENTIONS:  - Monitor I/O, vital signs and rhythm  - Monitor for S/S and trends of decreased cardiac output  - Administer and titrate ordered vasoactive medications to optimize hemodynamic stability  - Assess quality of pulses, skin color and temperature  - Assess for signs of decreased coronary artery perfusion  - Instruct patient to report change in severity of symptoms  Outcome: Progressing  Goal: Absence of cardiac dysrhythmias or at baseline rhythm  Description: INTERVENTIONS:  - Continuous cardiac monitoring, vital signs, obtain 12 lead EKG if ordered  - Administer antiarrhythmic and heart rate control medications as ordered  - Monitor electrolytes and administer replacement therapy as ordered  Outcome: Progressing     Problem: HEMATOLOGIC - ADULT  Goal: Maintains hematologic stability  Description: INTERVENTIONS  - Assess for signs and symptoms of bleeding or hemorrhage  - Monitor labs  - Administer supportive blood products/factors as ordered and appropriate  Outcome: Progressing

## 2024-03-06 NOTE — CASE MANAGEMENT
Case Management Discharge Planning Note    Patient name Jessica Jaquez  Location [facility] 626/[facility] 6[facility]-01 MRN [id number]  : 1945 Date 3/6/2024       Current Admission Date: 3/4/2024  Current Admission Diagnosis:Lumbar radiculopathy   Patient Active Problem List    Diagnosis Date Noted    New onset atrial fibrillation (HCC) 2024    Lumbar radiculopathy 2024    History of stroke 2024    Open wound of abdomen 2021    Cerebrovascular accident (CVA) due to thrombosis of precerebral artery (HCC) 2019    Unspecified abnormalities of gait and mobility 2018    Tremor of hands and face 2018    Benign essential tremor 2018    Hyperlipidemia 2018    Diabetes mellitus type 2 in obese  2018    Obesity, Class III, BMI 40-49.9 (morbid obesity) ([facility]) 2018    Essential hypertension 2018    Chronic diastolic CHF (congestive heart failure) ([facility]) 2018      LOS (days): 1  Geometric Mean LOS (GMLOS) (days): 2.9  Days to GMLOS:2.1     OBJECTIVE:  Risk of Unplanned Readmission Score: 8.27         Current admission status: Inpatient   Preferred Pharmacy:   [facility]/pharmacy #[id number] - [place], [place] - [address] [address]  Phone: [phone] Fax: [phone]    Primary Care Provider: Jorden Holt MD    Primary Insurance: [facility]  Secondary Insurance:     DISCHARGE DETAILS:    Discharge planning discussed with:: Patient  Freedom of Choice: Yes  Comments - Freedom of Choice: Discussed FOC  CM contacted family/caregiver?: No- see comments (Declined)  Were Treatment Team discharge recommendations reviewed with patient/caregiver?: Yes  Did patient/caregiver verbalize understanding of patient care needs?: Yes  Were patient/caregiver advised of the risks associated with not following Treatment Team discharge recommendations?: Yes    Requested Home Health Care         Is the patient interested in [facility] at  discharge?: Yes  Home Health Discipline requested:: Occupational Therapy, Physical Therapy  Home Health Agency Name:: [facility]  Home Health Follow-Up Provider:: PCP  Home Health Services Needed:: Evaluate Functional Status and Safety, Gait/ADL Training, Strengthening/Theraputic Exercises to Improve Function  Homebound Criteria Met:: Requires the Assistance of Another Person for Safe Ambulation or to Leave the Home, Uses an Assist Device (i.e. cane, walker, etc)  Supporting Clincal Findings:: Limited Endurance, Fatigues Easliy in Short Distances    Other Referral/Resources/Interventions Provided:  Referral Comments: This CM discussed with patient therapy rec for STR, patient refusing, aware of risks of not following therapy recommendations.  Patient would like HHC, blanket referral to multiple agencies.   HHC able to accept, which is patient's preference, reserved in [facility]

## 2024-03-07 ENCOUNTER — APPOINTMENT (OUTPATIENT)
Dept: RADIOLOGY | Facility: HOSPITAL | Age: 79
DRG: 552 | End: 2024-03-07
Payer: COMMERCIAL

## 2024-03-07 ENCOUNTER — ANESTHESIA (INPATIENT)
Dept: RADIOLOGY | Facility: HOSPITAL | Age: 79
DRG: 552 | End: 2024-03-07
Payer: COMMERCIAL

## 2024-03-07 ENCOUNTER — ANESTHESIA EVENT (INPATIENT)
Dept: RADIOLOGY | Facility: HOSPITAL | Age: 79
DRG: 552 | End: 2024-03-07
Payer: COMMERCIAL

## 2024-03-07 LAB
ANION GAP SERPL CALCULATED.3IONS-SCNC: 10 MMOL/L
APTT PPP: 111 SECONDS (ref 23–37)
APTT PPP: 202 SECONDS (ref 23–37)
APTT PPP: 48 SECONDS (ref 23–37)
BASOPHILS # BLD AUTO: 0.02 THOUSANDS/ÂΜL (ref 0–0.1)
BASOPHILS NFR BLD AUTO: 0 % (ref 0–1)
BUN SERPL-MCNC: 36 MG/DL (ref 5–25)
CALCIUM SERPL-MCNC: 9.2 MG/DL (ref 8.4–10.2)
CHLORIDE SERPL-SCNC: 100 MMOL/L (ref 96–108)
CO2 SERPL-SCNC: 23 MMOL/L (ref 21–32)
CREAT SERPL-MCNC: 1.01 MG/DL (ref 0.6–1.3)
EOSINOPHIL # BLD AUTO: 0 THOUSAND/ÂΜL (ref 0–0.61)
EOSINOPHIL NFR BLD AUTO: 0 % (ref 0–6)
ERYTHROCYTE [DISTWIDTH] IN BLOOD BY AUTOMATED COUNT: 13.2 % (ref 11.6–15.1)
GFR SERPL CREATININE-BSD FRML MDRD: 53 ML/MIN/1.73SQ M
GLUCOSE SERPL-MCNC: 184 MG/DL (ref 65–140)
GLUCOSE SERPL-MCNC: 202 MG/DL (ref 65–140)
GLUCOSE SERPL-MCNC: 224 MG/DL (ref 65–140)
GLUCOSE SERPL-MCNC: 244 MG/DL (ref 65–140)
GLUCOSE SERPL-MCNC: 354 MG/DL (ref 65–140)
HCT VFR BLD AUTO: 39.4 % (ref 34.8–46.1)
HGB BLD-MCNC: 12.4 G/DL (ref 11.5–15.4)
IMM GRANULOCYTES # BLD AUTO: 0.13 THOUSAND/UL (ref 0–0.2)
IMM GRANULOCYTES NFR BLD AUTO: 1 % (ref 0–2)
LYMPHOCYTES # BLD AUTO: 2 THOUSANDS/ÂΜL (ref 0.6–4.47)
LYMPHOCYTES NFR BLD AUTO: 13 % (ref 14–44)
MCH RBC QN AUTO: 27.9 PG (ref 26.8–34.3)
MCHC RBC AUTO-ENTMCNC: 31.5 G/DL (ref 31.4–37.4)
MCV RBC AUTO: 89 FL (ref 82–98)
MONOCYTES # BLD AUTO: 0.87 THOUSAND/ÂΜL (ref 0.17–1.22)
MONOCYTES NFR BLD AUTO: 6 % (ref 4–12)
NEUTROPHILS # BLD AUTO: 12.34 THOUSANDS/ÂΜL (ref 1.85–7.62)
NEUTS SEG NFR BLD AUTO: 80 % (ref 43–75)
NRBC BLD AUTO-RTO: 0 /100 WBCS
PLATELET # BLD AUTO: 404 THOUSANDS/UL (ref 149–390)
PMV BLD AUTO: 9.1 FL (ref 8.9–12.7)
POTASSIUM SERPL-SCNC: 4.8 MMOL/L (ref 3.5–5.3)
RBC # BLD AUTO: 4.44 MILLION/UL (ref 3.81–5.12)
SODIUM SERPL-SCNC: 133 MMOL/L (ref 135–147)
WBC # BLD AUTO: 15.36 THOUSAND/UL (ref 4.31–10.16)

## 2024-03-07 PROCEDURE — 97116 GAIT TRAINING THERAPY: CPT

## 2024-03-07 PROCEDURE — 97112 NEUROMUSCULAR REEDUCATION: CPT

## 2024-03-07 PROCEDURE — 99232 SBSQ HOSP IP/OBS MODERATE 35: CPT | Performed by: PHYSICIAN ASSISTANT

## 2024-03-07 PROCEDURE — 85730 THROMBOPLASTIN TIME PARTIAL: CPT | Performed by: FAMILY MEDICINE

## 2024-03-07 PROCEDURE — 99232 SBSQ HOSP IP/OBS MODERATE 35: CPT | Performed by: INTERNAL MEDICINE

## 2024-03-07 PROCEDURE — A9585 GADOBUTROL INJECTION: HCPCS | Performed by: INTERNAL MEDICINE

## 2024-03-07 PROCEDURE — 97530 THERAPEUTIC ACTIVITIES: CPT

## 2024-03-07 PROCEDURE — 80048 BASIC METABOLIC PNL TOTAL CA: CPT | Performed by: INTERNAL MEDICINE

## 2024-03-07 PROCEDURE — 82948 REAGENT STRIP/BLOOD GLUCOSE: CPT

## 2024-03-07 PROCEDURE — 72158 MRI LUMBAR SPINE W/O & W/DYE: CPT

## 2024-03-07 PROCEDURE — 85025 COMPLETE CBC W/AUTO DIFF WBC: CPT | Performed by: INTERNAL MEDICINE

## 2024-03-07 RX ORDER — PROPOFOL 10 MG/ML
INJECTION, EMULSION INTRAVENOUS AS NEEDED
Status: DISCONTINUED | OUTPATIENT
Start: 2024-03-07 | End: 2024-03-07

## 2024-03-07 RX ORDER — GADOBUTROL 604.72 MG/ML
10 INJECTION INTRAVENOUS
Status: COMPLETED | OUTPATIENT
Start: 2024-03-07 | End: 2024-03-07

## 2024-03-07 RX ORDER — DEXAMETHASONE SODIUM PHOSPHATE 10 MG/ML
INJECTION, SOLUTION INTRAMUSCULAR; INTRAVENOUS AS NEEDED
Status: DISCONTINUED | OUTPATIENT
Start: 2024-03-07 | End: 2024-03-07

## 2024-03-07 RX ORDER — ONDANSETRON 2 MG/ML
INJECTION INTRAMUSCULAR; INTRAVENOUS AS NEEDED
Status: DISCONTINUED | OUTPATIENT
Start: 2024-03-07 | End: 2024-03-07

## 2024-03-07 RX ORDER — METOPROLOL TARTRATE 1 MG/ML
INJECTION, SOLUTION INTRAVENOUS AS NEEDED
Status: DISCONTINUED | OUTPATIENT
Start: 2024-03-07 | End: 2024-03-07

## 2024-03-07 RX ORDER — SODIUM CHLORIDE, SODIUM LACTATE, POTASSIUM CHLORIDE, CALCIUM CHLORIDE 600; 310; 30; 20 MG/100ML; MG/100ML; MG/100ML; MG/100ML
INJECTION, SOLUTION INTRAVENOUS CONTINUOUS PRN
Status: DISCONTINUED | OUTPATIENT
Start: 2024-03-07 | End: 2024-03-07

## 2024-03-07 RX ADMIN — METOROPROLOL TARTRATE 2.5 MG: 5 INJECTION, SOLUTION INTRAVENOUS at 12:41

## 2024-03-07 RX ADMIN — INSULIN LISPRO 3 UNITS: 100 INJECTION, SOLUTION INTRAVENOUS; SUBCUTANEOUS at 17:21

## 2024-03-07 RX ADMIN — METHOCARBAMOL 500 MG: 500 TABLET ORAL at 17:19

## 2024-03-07 RX ADMIN — GABAPENTIN 100 MG: 100 CAPSULE ORAL at 21:23

## 2024-03-07 RX ADMIN — METHOCARBAMOL 500 MG: 500 TABLET ORAL at 23:25

## 2024-03-07 RX ADMIN — PROPOFOL 130 MG: 10 INJECTION, EMULSION INTRAVENOUS at 12:42

## 2024-03-07 RX ADMIN — INSULIN LISPRO 3 UNITS: 100 INJECTION, SOLUTION INTRAVENOUS; SUBCUTANEOUS at 21:21

## 2024-03-07 RX ADMIN — AMLODIPINE BESYLATE 5 MG: 5 TABLET ORAL at 21:23

## 2024-03-07 RX ADMIN — HEPARIN SODIUM 6 UNITS/KG/HR: 10000 INJECTION, SOLUTION INTRAVENOUS at 10:08

## 2024-03-07 RX ADMIN — SODIUM CHLORIDE, SODIUM LACTATE, POTASSIUM CHLORIDE, AND CALCIUM CHLORIDE: .6; .31; .03; .02 INJECTION, SOLUTION INTRAVENOUS at 12:34

## 2024-03-07 RX ADMIN — SENNOSIDES, DOCUSATE SODIUM 1 TABLET: 8.6; 5 TABLET ORAL at 21:23

## 2024-03-07 RX ADMIN — PHENYLEPHRINE HYDROCHLORIDE 50 MCG/MIN: 10 INJECTION INTRAVENOUS at 12:51

## 2024-03-07 RX ADMIN — GADOBUTROL 10 ML: 604.72 INJECTION INTRAVENOUS at 14:15

## 2024-03-07 RX ADMIN — BIMATOPROST 1 DROP: 0.3 SOLUTION/ DROPS OPHTHALMIC at 21:33

## 2024-03-07 RX ADMIN — ACETAMINOPHEN 975 MG: 325 TABLET, FILM COATED ORAL at 23:24

## 2024-03-07 RX ADMIN — ACETAMINOPHEN 975 MG: 325 TABLET, FILM COATED ORAL at 17:20

## 2024-03-07 RX ADMIN — GABAPENTIN 100 MG: 100 CAPSULE ORAL at 17:20

## 2024-03-07 RX ADMIN — METOPROLOL SUCCINATE 100 MG: 100 TABLET, EXTENDED RELEASE ORAL at 21:23

## 2024-03-07 RX ADMIN — DEXAMETHASONE SODIUM PHOSPHATE 4 MG: 10 INJECTION, SOLUTION INTRAMUSCULAR; INTRAVENOUS at 12:44

## 2024-03-07 RX ADMIN — METOROPROLOL TARTRATE 5 MG: 5 INJECTION, SOLUTION INTRAVENOUS at 18:05

## 2024-03-07 RX ADMIN — APIXABAN 5 MG: 5 TABLET, FILM COATED ORAL at 17:20

## 2024-03-07 RX ADMIN — PROPOFOL 20 MG: 10 INJECTION, EMULSION INTRAVENOUS at 12:43

## 2024-03-07 RX ADMIN — ONDANSETRON 4 MG: 2 INJECTION INTRAMUSCULAR; INTRAVENOUS at 12:41

## 2024-03-07 RX ADMIN — PHENYLEPHRINE HYDROCHLORIDE 100 MCG: 10 INJECTION INTRAVENOUS at 12:43

## 2024-03-07 RX ADMIN — EZETIMIBE 10 MG: 10 TABLET ORAL at 21:23

## 2024-03-07 NOTE — ASSESSMENT & PLAN NOTE
New onset a-fib with RVR (3/5/24)  Cardiology consulted and appreciate their input.  Continue with metoprolol 100 mg BID  Transitioned from IV heparin to Eliquis  Plan for PHU/DCCV tomorrow

## 2024-03-07 NOTE — ASSESSMENT & PLAN NOTE
"Acute on chronic LBP w/ LLE radiculopathy   Presented to ED on 3/4/24 due to gradually worsening back, hip, and L thigh pain x 3 weeks   Describes a shooting/sharp pain from the L low back into the L hip and L groin/thigh   Denies any radiation of this pain to her lower leg, weakness, numbness, BBI, urinary retention, or saddle anesthesia   Hx of 3 prior lumbar surgeries at [facility] include [facility] beds at the [facility] w/ Dr. Hicks in 2010   pt is unsure which levels but knows they were all lumbar  Reports prior to surgery she has similar sx with L sided LBP radiating to the L thigh   Has been receiving regular corticosteroid injections in carroll hips and knees at [facility] include 234 beds at the [facility]  most recently underwent L hip injection yesterday, 3/4, prior to arrival in the ER --> no relief   Pt reassessed today after her MRI and reports she is doing better. She states the pain medications have been helping and she has now 4-5/10 pain at max. She was able to ambulate in the halls as well as on the stairs with PT/OT. Pt continues with some pain limited weakness at the L hip but otherwise intact without \"red flags\"     Imaging:   3/7 MRI L-spine w/wo: pending final radiology read   3/4 CT c/a/p: No acute fracture or suspicious osseous lesion. There are pedicle screws at L3 and L4 which appear intact.     Plan:   Continue to closely monitor neuro exam   Frequent neuro checks per primary team   Maintain normotensive BP goals, MAP > 65   No acute neurosurgical intervention indicated at this time   Case and imaging reviewed this afternoon with Dr. Goldberg   MRI pending final radiology read, but per our review, pt with noted previous L3-4 posterior fusion that appears intact. She has some chronic appearing degenerative changes at the level above and below as well as the L3-4 level itself.  There is no significant central stenosis or compression noted.  These degenerative changes do appear more pronounced on the right side versus the left side.  There is some left-sided foraminal stenosis at " L3-4 that may be contributing to the patient's pain however I do not feel that these entirely explain the patient's symptoms.  Patient doing better today with pain medications and is able to ambulate on the stairs and in the hallway with PT/OT today.  Pt reports improved pain.  Continues to deny any red flag signs or symptoms.  Recommend ongoing conservative management at this time  Patient with new onset A-fib this admission requiring heparin infusion and cardioversion which is planned for tomorrow with cardiology team.  Given this new diagnosis and plan cardioversion, patient is not medically cleared for neurosurgical intervention at this time.  Per cardiology, patient will require strict anticoagulation for at least 1 month post cardioversion for not a candidate for hold for surgery.  Patient also poor candidate due to elevated BMI and other medical comorbidities.  Given her improvement, recommend a trial of ongoing conservative management.  Patient was in agreement with this.  Per discussion with patient, she would like to avoid any additional neurosurgical procedures  Recommend ongoing multimodal pain medication regimen, recommend referral to [facility]'s spine and pain upon discharge.  Will plan to follow-up with the patient about 4 to 6 weeks in the neurosurgery office to assess her progress with conservative management  Recommend multimodal pain medication regimen   Currently getting:   Tylenol 975mg PO q 8hrs   Robaxin 500mg PO q 6hrs   Gabapentin 100mg PO TID   Complete steroid course   Prn oxy 2.5/5mg and prn dilaudid 0.2mg IV   Okay for AC/AP meds at this time from a nsgy standpoint   DVT ppx: SCDs, heparin gtt   Medical management per primary team   Pain control per primary team --> noted above   PT/OT   Social work following for assistance with dispo once medically cleared     Neurosurgery will sign off at this time. Will plan to follow-up with the pt in approx 4-6 weeks to assess her progress with  conservative measures. Please reach out with any further questions or concerns.

## 2024-03-07 NOTE — ASSESSMENT & PLAN NOTE
Stable on Amiloride 2.5 mg daily, Losartan 100 mg daily, Metoprolol succinate 100 mg daily, Amlodipine 5 mg daily.

## 2024-03-07 NOTE — ASSESSMENT & PLAN NOTE
Presented with left groin pain radiating down LLE since end of January 2024, gradually worsening  H/o 3 spinal surgeries in 2010 (L3-4 decompression, fusion) for recurrent disc herniation with LLE radiculopathy  Has been following with Dr Freedom bonilla at [facility] for bilateral knee OA and gets corticosteroid injections every 3 months  Xray hip had shown mild degenerative changes  CT A/P - Diverticulosis coli with suggestion of trace stranding adjacent to the proximal sigmoid colon. No acute fracture. Chronic appearing seroma associated with the large anterior abdominal wall hernia mesh.  Improved pain control on current regimen:  Scheduled Acetaminophen 975 mg every 8 hours and Robaxin 500 mg every 6 hours.  Medrol dose pack  Gabapentin 100 mg TID  Neurosurgery consulted and appreciate their recommendations.  MRI lumbar spine done today.  Official read is pending but per neurosurgery, there are no significant findings to suggest need for surgery.  They will follow up as an out-patient.

## 2024-03-07 NOTE — PHYSICAL THERAPY NOTE
Physical Therapy Progress Note     03/07/24 1156   PT Last Visit   PT Visit Date 03/07/24   Note Type   Note Type Treatment   Pain Assessment   Pain Assessment Tool FLACC   Pain Rating: FLACC (Rest) - Face 1   Pain Rating: FLACC (Rest) - Legs 0   Pain Rating: FLACC (Rest) - Activity 0   Pain Rating: FLACC (Rest) - Cry 1   Pain Rating: FLACC (Rest) - Consolability 0   Score: FLACC (Rest) 2   Pain Rating: FLACC (Activity) - Face 2   Pain Rating: FLACC (Activity) - Legs 1   Pain Rating: FLACC (Activity) - Activity 1   Pain Rating: FLACC (Activity) - Cry 1   Pain Rating: FLACC (Activity) - Consolability 1   Score: FLACC (Activity) 6   Restrictions/Precautions   Other Precautions Pain;Fall Risk;Multiple lines;Telemetry   Subjective   Subjective Pt encountered seated in recliner, pleasant and agreeable to treatment.  Reports improving back pain & no new complaints since last session.  Is hopeful to get MRI & get answers as to where pain is coming from.  Transport arrived & pt assisted to stretcher post session with RN informed of the same.   Bed Mobility   Supine to Sit Unable to assess   Sit to Supine 4  Minimal assistance   Additional items Assist x 1;Increased time required;LE management   Transfers   Sit to Stand 5  Supervision   Additional items Assist x 1;Armrests;Increased time required   Stand to Sit 5  Supervision   Additional items Assist x 1;Armrests;Increased time required   Toilet transfer 5  Supervision   Additional items Assist x 1;Increased time required;Standard toilet;Other  (wall rail)   Ambulation/Elevation   Gait pattern Short stride;Inconsistent marley;Foward flexed;Shuffling;Decreased foot clearance;Antalgic;Improper Weight shift   Gait Assistance 5  Supervision   Additional items Assist x 1   Assistive Device Rolling walker   Distance 40' x 2   Stair Management Assistance 4  Minimal assist   Additional items Assist x 1   Stair Management Technique One rail L;Step to  pattern;Foreward;Sideways  (forward ascending/sideways descending)   Number of Stairs 4   Balance   Static Sitting Fair   Static Standing Fair   Ambulatory Fair -   Activity Tolerance   Activity Tolerance Patient tolerated treatment well;Patient limited by fatigue;Patient limited by pain   Nurse Made Aware NIR [place]   Assessment   Prognosis Good   Problem List Decreased strength;Decreased endurance;Impaired balance;Decreased mobility;Pain   Assessment pt demonstrated improvement in all aspects of mobilty this session, performing transfers & ambulating up to household distances with supervision assist & no LOB.  Remains reliant on RW at this time, but did so without incident despite pain & reported LE weakness.  She was instructed in & negotiated steps as noted above without incident, especially after corrections made for LE sequencing to descend to reduce strain on affected LLE safely.  Primary concern from mobiilty perspective is pt's available social support given that her  has dementia & she is primary caregiver to him.  If pt can secure requisite social support to assist at d/c, she may be able to return home with follow up services there vs rehab if this is unavailable at that time.  PT will continue to follow and treat as appropriate to ensure safe return to OF pending medical POC likely after results of anticipated MRI.   Goals   Patient Goals to know where the pain is coming from   STG Expiration Date 03/19/24   PT Treatment Day 1   Plan   Treatment/Interventions Functional transfer training;LE strengthening/ROM;Elevations;Therapeutic exercise;Endurance training;Patient/family training;Equipment eval/education;Bed mobility;Gait training   Progress Progressing toward goals   PT Frequency 3-5x/wk   Discharge Recommendation   Rehab Resource Intensity Level, PT II (Moderate Resource Intensity)   Equipment Recommended Walker   Walker Package Recommended Wheeled walker   AM-PAC Basic Mobility Inpatient    Turning in Flat Bed Without Bedrails 2   Lying on Back to Sitting on Edge of Flat Bed Without Bedrails 2   Moving Bed to Chair 3   Standing Up From Chair Using Arms 3   Walk in Room 3   Climb 3-5 Stairs With Railing 3   Basic Mobility Inpatient Raw Score 16   Basic Mobility Standardized Score 38.32   Highest Level Of Mobility   JH-HLM Goal 5: Stand one or more mins   JH-HLM Achieved 7: Walk 25 feet or more     Sravan Farley PTA    An [facility] Basic Mobility Raw Score less than 17 suggests pt would benefit from post acute rehab.  Please also refer to the recommendation of the Physical Therapist for safe discharge planning.

## 2024-03-07 NOTE — ASSESSMENT & PLAN NOTE
Lab Results   Component Value Date    HGBA1C 6.9 (H) 01/22/2024     Recent Labs     03/06/24  1750 03/06/24  2115 03/07/24  0731 03/07/24  1149   POCGLU 252* 319* 202* 184*       Blood Sugar Average: Last 72 hrs:  (P) 243  Hold Glimepiride during hospitalization.  Resume at discharge.  Patient with steroid-induced hyperglycemia.  Continue SSI  Monitor blood sugars and adjust insulin accordingly

## 2024-03-07 NOTE — PROGRESS NOTES
"Progress Note - Jessica Jaquez 78 y.o. female MRN: [id number]    [facility] Encounter: [id number]    EVENTS  MRI done with anesthesia today.   No plan for intervention per neurosurgery right now.   Plan for PHU DCCV tomorrow.     Assessment/Plan  Jessica Jaquez is a 78 y.o. year old female with past medical history of HFpEF, hx of CVA, DM2, HTN, multiple back surgeries for lumbar disc herniation, who presented for the left lower extremity pain found to have new diagnosis of atrial fibrillation.     #Afib, new diagnosis  [id number] (age X2, stroke x 2 ,female, CHF, hypertension, diabetes)  Patient found to have A-fib with RVR which was completely asymptomatic.   AC has been started with heparin drip. TSH wnl  Echo shows LVEF 60% with mildly dilated right atrium  Patient still has elevated HR with higher dose of metoprolol. Will not increase further right now given plan for PHU/DCCV tomorrow.   -heparin gtt--> apixaban 5mg BID tonight  -PHU/DCCV tomorrow, npo midnight.   -continue metoprolol 100mg BID.      #HTN  Home meds: metoprolol 100mg daily, losartan 50mg daily,  amiloride 2.5mg daily, lasix 40mg daily PRN     #HFpEF: takes lasix 40mg daily prn  at home.  She is euvolemic now.     #DM2     # Lumbar radiculopathy  Presented with severe leg pain which is improving now.   Neurosurgery following- no plan for acute intervention- they plan to see in follow up in one month.     #hx of stroke  Patient had an incidental stroke seen on imaging about 15 years ago after she was hypotensive and severely anemic in the setting of internal bleeding after ventral hernia repair.    Subjective:   Patient still without cardiac symptoms. Still having leg and back pain which is improving.     Objective:     Vitals: Blood pressure 110/78, pulse 65, temperature 97.5 °F (36.4 °C), resp. rate 18, height 5' 3\" (1.6 m), weight 109 kg (240 lb 4.8 oz), SpO2 97%.,Body mass index is 42.57 kg/m².      Intake/Output Summary " (Last 24 hours) at 3/7/2024 1523  Last data filed at 3/7/2024 1416  Gross per 24 hour   Intake 250 ml   Output 2725 ml   Net -2475 ml       Physical Exam:   Constitutional:       Appearance: Normal appearance.   HENT:      Head: Normocephalic and atraumatic.   Neck:      Vascular: No JVD   Cardiovascular:      Rate and Rhythm: tachycardic, irregular.   Pulmonary:      Effort: CTA-b  Abdominal:      General: Abdomen is flat. Bowel sounds are normal.      Palpations: Abdomen is soft.   Musculoskeletal:     No edema  Skin:     General: Skin is warm.   Neurological:      Mental Status: alert and oriented to person, place, and time.   Psychiatric:         Behavior: Behavior normal.

## 2024-03-07 NOTE — PLAN OF CARE
Problem: Prexisting or High Potential for Compromised Skin Integrity  Goal: Skin integrity is maintained or improved  Description: INTERVENTIONS:  - Identify patients at risk for skin breakdown  - Assess and monitor skin integrity  - Assess and monitor nutrition and hydration status  - Monitor labs   - Assess for incontinence   - Turn and reposition patient  - Assist with mobility/ambulation  - Relieve pressure over bony prominences  - Avoid friction and shearing  - Provide appropriate hygiene as needed including keeping skin clean and dry  - Evaluate need for skin moisturizer/barrier cream  - Collaborate with interdisciplinary team   - Patient/family teaching  - Consider wound care consult   Outcome: Progressing     Problem: PAIN - ADULT  Goal: Verbalizes/displays adequate comfort level or baseline comfort level  Description: Interventions:  - Encourage patient to monitor pain and request assistance  - Assess pain using appropriate pain scale  - Administer analgesics based on type and severity of pain and evaluate response  - Implement non-pharmacological measures as appropriate and evaluate response  - Consider cultural and social influences on pain and pain management  - Notify physician/advanced practitioner if interventions unsuccessful or patient reports new pain  Outcome: Progressing     Problem: INFECTION - ADULT  Goal: Absence or prevention of progression during hospitalization  Description: INTERVENTIONS:  - Assess and monitor for signs and symptoms of infection  - Monitor lab/diagnostic results  - Monitor all insertion sites, i.e. indwelling lines, tubes, and drains  - Monitor endotracheal if appropriate and nasal secretions for changes in amount and color  - [place] appropriate cooling/warming therapies per order  - Administer medications as ordered  - Instruct and encourage patient and family to use good hand hygiene technique  - Identify and instruct in appropriate isolation precautions for  identified infection/condition  Outcome: Progressing  Goal: Absence of fever/infection during neutropenic period  Description: INTERVENTIONS:  - Monitor WBC    Outcome: Progressing     Problem: SAFETY ADULT  Goal: Patient will remain free of falls  Description: INTERVENTIONS:  - Educate patient/family on patient safety including physical limitations  - Instruct patient to call for assistance with activity   - Consult OT/PT to assist with strengthening/mobility   - Keep Call bell within reach  - Keep bed low and locked with side rails adjusted as appropriate  - Keep care items and personal belongings within reach  - Initiate and maintain comfort rounds  - Make Fall Risk Sign visible to staff  - Offer Toileting every 2 Hours, in advance of need  - Initiate/Maintain on alarm  - Obtain necessary fall risk management equipment:   - Apply yellow socks and bracelet for high fall risk patients  - Consider moving patient to room near nurses station  Outcome: Progressing  Goal: Maintain or return to baseline ADL function  Description: INTERVENTIONS:  -  Assess patient's ability to carry out ADLs; assess patient's baseline for ADL function and identify physical deficits which impact ability to perform ADLs (bathing, care of mouth/teeth, toileting, grooming, dressing, etc.)  - Assess/evaluate cause of self-care deficits   - Assess range of motion  - Assess patient's mobility; develop plan if impaired  - Assess patient's need for assistive devices and provide as appropriate  - Encourage maximum independence but intervene and supervise when necessary  - Involve family in performance of ADLs  - Assess for home care needs following discharge   - Consider OT consult to assist with ADL evaluation and planning for discharge  - Provide patient education as appropriate  Outcome: Progressing  Goal: Maintains/Returns to pre admission functional level  Description: INTERVENTIONS:  - Perform AM-PAC 6 Click Basic Mobility/ Daily Activity  assessment daily.  - Set and communicate daily mobility goal to care team and patient/family/caregiver.   - Collaborate with rehabilitation services on mobility goals if consulted  - Perform Range of Motion 3 times a day.  - Reposition patient every 3 hours.  - Dangle patient 3 times a day  - Stand patient 3 times a day  - Ambulate patient 3 times a day  - Out of bed to chair 3 times a day   - Out of bed for meals 3 times a day  - Out of bed for toileting  - Record patient progress and toleration of activity level   Outcome: Progressing     Problem: DISCHARGE PLANNING  Goal: Discharge to home or other facility with appropriate resources  Description: INTERVENTIONS:  - Identify barriers to discharge w/patient and caregiver  - Arrange for needed discharge resources and transportation as appropriate  - Identify discharge learning needs (meds, wound care, etc.)  - Arrange for interpretive services to assist at discharge as needed  - Refer to Case Management Department for coordinating discharge planning if the patient needs post-hospital services based on physician/advanced practitioner order or complex needs related to functional status, cognitive ability, or social support system  Outcome: Progressing     Problem: Knowledge Deficit  Goal: Patient/family/caregiver demonstrates understanding of disease process, treatment plan, medications, and discharge instructions  Description: Complete learning assessment and assess knowledge base.  Interventions:  - Provide teaching at level of understanding  - Provide teaching via preferred learning methods  Outcome: Progressing     Problem: CARDIOVASCULAR - ADULT  Goal: Maintains optimal cardiac output and hemodynamic stability  Description: INTERVENTIONS:  - Monitor I/O, vital signs and rhythm  - Monitor for S/S and trends of decreased cardiac output  - Administer and titrate ordered vasoactive medications to optimize hemodynamic stability  - Assess quality of pulses, skin color  and temperature  - Assess for signs of decreased coronary artery perfusion  - Instruct patient to report change in severity of symptoms  Outcome: Progressing  Goal: Absence of cardiac dysrhythmias or at baseline rhythm  Description: INTERVENTIONS:  - Continuous cardiac monitoring, vital signs, obtain 12 lead EKG if ordered  - Administer antiarrhythmic and heart rate control medications as ordered  - Monitor electrolytes and administer replacement therapy as ordered  Outcome: Progressing     Problem: HEMATOLOGIC - ADULT  Goal: Maintains hematologic stability  Description: INTERVENTIONS  - Assess for signs and symptoms of bleeding or hemorrhage  - Monitor labs  - Administer supportive blood products/factors as ordered and appropriate  Outcome: Progressing

## 2024-03-07 NOTE — ASSESSMENT & PLAN NOTE
Wt Readings from Last 3 Encounters:   03/06/24 109 kg (240 lb 4.8 oz)   05/16/22 105 kg (232 lb)   05/03/21 104 kg (230 lb)     Echo(12/28/18) - EF 60%, grade 1 diatolic dysfunction  Euvolemic  Continue Amiloride 2.5 mg daily  Also take Furosemide 20 mg and additional Amiloride 2.5 mg as needed at home for volume overload  Monitor volume status  TTE (3/6/24): EF 60%.  Diastolic function unable to be assessed secondary to A-fib  PHU scheduled 3/8/24

## 2024-03-07 NOTE — ANESTHESIA POSTPROCEDURE EVALUATION
Post-Op Assessment Note    CV Status:  Stable    Pain management: adequate       Mental Status:  Alert and awake   Hydration Status:  Euvolemic   PONV Controlled:  Controlled   Airway Patency:  Patent     Post Op Vitals Reviewed: Yes    No anethesia notable event occurred.    Staff: Anesthesiologist, CRNA               BP   137/82   Temp  98.6   Pulse  129   Resp   14   SpO2   98

## 2024-03-07 NOTE — PLAN OF CARE
Problem: PHYSICAL THERAPY ADULT  Goal: Performs mobility at highest level of function for planned discharge setting.  See evaluation for individualized goals.  Description: Treatment/Interventions: ADL retraining, Functional transfer training, LE strengthening/ROM, Therapeutic exercise, Endurance training, Patient/family training, Equipment eval/education, Bed mobility, Gait training, Spoke to nursing, Spoke to case management, OT, Elevations  Equipment Recommended: Walker       See flowsheet documentation for full assessment, interventions and recommendations.  Outcome: Progressing  Note: Prognosis: Good  Problem List: Decreased strength, Decreased endurance, Impaired balance, Decreased mobility, Pain  Assessment: pt demonstrated improvement in all aspects of mobilty this session, performing transfers & ambulating up to household distances with supervision assist & no LOB.  Remains reliant on RW at this time, but did so without incident despite pain & reported LE weakness.  She was instructed in & negotiated steps as noted above without incident, especially after corrections made for LE sequencing to descend to reduce strain on affected LLE safely.  Primary concern from mobiilty perspective is pt's available social support given that her  has dementia & she is primary caregiver to him.  If pt can secure requisite social support to assist at d/c, she may be able to return home with follow up services there vs rehab if this is unavailable at that time.  PT will continue to follow and treat as appropriate to ensure safe return to [facility] pending medical POC likely after results of anticipated MRI.        Rehab Resource Intensity Level, PT: II (Moderate Resource Intensity)    See flowsheet documentation for full assessment.

## 2024-03-07 NOTE — PROGRESS NOTES
"APTT collected on patient at 1922. Clinical Lab called at 2301 to inquire about results not being posted. Clinical lab informed RN that sample collected was \"lost\". It was noted by staff/lab that pneumatic tube station was down shortly after collection/send and may have contributed to lab being lost.  Incoming RN was notified, repeat APTT was put in STAT and is to be drawn immediately.   "

## 2024-03-07 NOTE — ASSESSMENT & PLAN NOTE
Stable on ASA, Zetia  Does not tolerate statins  Was on IV heparin, now transitioned to Eliquis given new onset A-fib

## 2024-03-07 NOTE — PROGRESS NOTES
[facility]  Progress Note  Name: Jessica Jaquez I  MRN: [id number]  Unit/Bed#: [facility] 626-01 I Date of Admission: 3/4/2024   Date of Service: 3/7/2024 I Hospital Day: 2    Assessment/Plan   * New onset atrial fibrillation (HCC)  Assessment & Plan  New onset a-fib with RVR (3/5/24)  Cardiology consulted and appreciate their input.  Continue with metoprolol 100 mg BID  Transitioned from IV heparin to Eliquis  Plan for PHU/DCCV tomorrow    History of stroke  Assessment & Plan  Stable on ASA, Zetia  Does not tolerate statins  Was on IV heparin, now transitioned to Eliquis given new onset A-fib    Lumbar radiculopathy  Assessment & Plan  Presented with left groin pain radiating down LLE since end of January 2024, gradually worsening  H/o 3 spinal surgeries in 2010 (L3-4 decompression, fusion) for recurrent disc herniation with LLE radiculopathy  Has been following with Dr Freedom bonilla at [facility] for bilateral knee OA and gets corticosteroid injections every 3 months  Xray hip had shown mild degenerative changes  CT A/P - Diverticulosis coli with suggestion of trace stranding adjacent to the proximal sigmoid colon. No acute fracture. Chronic appearing seroma associated with the large anterior abdominal wall hernia mesh.  Improved pain control on current regimen:  Scheduled Acetaminophen 975 mg every 8 hours and Robaxin 500 mg every 6 hours.  Medrol dose pack  Gabapentin 100 mg TID  Neurosurgery consulted and appreciate their recommendations.  MRI lumbar spine done today.  Official read is pending but per neurosurgery, there are no significant findings to suggest need for surgery.  They will follow up as an out-patient.    Chronic diastolic CHF (congestive heart failure) (HCC)  Assessment & Plan  Wt Readings from Last 3 Encounters:   03/06/24 109 kg (240 lb 4.8 oz)   05/16/22 105 kg (232 lb)   05/03/21 104 kg (230 lb)     Echo(12/28/18) - EF 60%, grade 1 diatolic  dysfunction  Euvolemic  Continue Amiloride 2.5 mg daily  Also take Furosemide 20 mg and additional Amiloride 2.5 mg as needed at home for volume overload  Monitor volume status  TTE (3/6/24): EF 60%.  Diastolic function unable to be assessed secondary to A-fib  PHU scheduled 3/8/24    Essential hypertension  Assessment & Plan  Stable on Amiloride 2.5 mg daily, Losartan 100 mg daily, Metoprolol succinate 100 mg daily, Amlodipine 5 mg daily.    Obesity, Class III, BMI 40-49.9 (morbid obesity) (HCC)  Assessment & Plan  Lifestyle modification    Diabetes mellitus type 2 in obese   Assessment & Plan  Lab Results   Component Value Date    HGBA1C 6.9 (H) 01/22/2024     Recent Labs     03/06/24  1750 03/06/24  2115 03/07/24  0731 03/07/24  1149   POCGLU 252* 319* 202* 184*       Blood Sugar Average: Last 72 hrs:  (P) 243  Hold Glimepiride during hospitalization.  Resume at discharge.  Patient with steroid-induced hyperglycemia.  Continue SSI  Monitor blood sugars and adjust insulin accordingly      VTE Pharmacologic Prophylaxis: VTE Score: 5 High Risk (Score >/= 5) - Pharmacological DVT Prophylaxis Ordered: apixaban (Eliquis). Sequential Compression Devices Ordered.    Mobility:   Basic Mobility Inpatient Raw Score: 14  -HLM Goal: 4: Move to chair/commode  JH-HLM Achieved: 2: Bed activities/Dependent transfer  HLM Goal achieved. Continue to encourage appropriate mobility.    Patient Centered Rounds: I evaluated the patient without nursing staff present due to nurse unavailable    Discussions with Specialists or Other Care Team Provider: None    Education and Discussions with Family / Patient: Patient declined call to .     Total Time Spent on Date of Encounter in care of patient:  mins. This time was spent on one or more of the following: performing physical exam; counseling and coordination of care; obtaining or reviewing history; documenting in the medical record; reviewing/ordering tests, medications or  procedures; communicating with other healthcare professionals and discussing with patient's family/caregivers.    Current Length of Stay: 2 day(s)  Current Patient Status: Inpatient   Certification Statement: The patient will continue to require additional inpatient hospital stay due to need for PHU  Discharge Plan: Anticipate discharge in 24-48 hrs to home.    Code Status: Level 1 - Full Code    Subjective:   Patient returned from MRI without complaints.  Feels her pain is improving.    Objective:     Vitals:   Temp (24hrs), Av.6 °F (36.4 °C), Min:97.2 °F (36.2 °C), Max:98.6 °F (37 °C)    Temp:  [97.2 °F (36.2 °C)-98.6 °F (37 °C)] 97.5 °F (36.4 °C)  HR:  [] 65  Resp:  [17-21] 18  BP: (108-151)/(78-98) 110/78  SpO2:  [94 %-97 %] 97 %  Body mass index is 42.57 kg/m².     Input and Output Summary (last 24 hours):     Intake/Output Summary (Last 24 hours) at 3/7/2024 1633  Last data filed at 3/7/2024 1416  Gross per 24 hour   Intake 250 ml   Output 2725 ml   Net -2475 ml       Physical Exam:   Physical Exam  Vitals and nursing note reviewed.   Constitutional:       General: She is not in acute distress.     Appearance: She is well-developed.   HENT:      Head: Normocephalic and atraumatic.   Eyes:      Conjunctiva/sclera: Conjunctivae normal.   Cardiovascular:      Rate and Rhythm: Normal rate. Rhythm irregular.      Heart sounds: No murmur heard.  Pulmonary:      Effort: Pulmonary effort is normal. No respiratory distress.      Breath sounds: Normal breath sounds. No wheezing, rhonchi or rales.   Abdominal:      General: Bowel sounds are normal. There is no distension.      Palpations: Abdomen is soft.      Tenderness: There is no abdominal tenderness.   Skin:     General: Skin is warm and dry.   Neurological:      Mental Status: She is alert.   Psychiatric:         Mood and Affect: Mood normal.          Additional Data:     Labs:  Results from last 7 days   Lab Units 24  0434   WBC Thousand/uL 15.36*    HEMOGLOBIN g/dL 12.4   HEMATOCRIT % 39.4   PLATELETS Thousands/uL 404*   NEUTROS PCT % 80*   LYMPHS PCT % 13*   MONOS PCT % 6   EOS PCT % 0     Results from last 7 days   Lab Units 03/07/24  0434 03/06/24  0422 03/05/24  0645   SODIUM mmol/L 133*   < > 134*   POTASSIUM mmol/L 4.8   < > 4.3   CHLORIDE mmol/L 100   < > 102   CO2 mmol/L 23   < > 24   BUN mg/dL 36*   < > 32*   CREATININE mg/dL 1.01   < > 0.91   ANION GAP mmol/L 10   < > 8   CALCIUM mg/dL 9.2   < > 8.8   ALBUMIN g/dL  --   --  3.4*   TOTAL BILIRUBIN mg/dL  --   --  0.42   ALK PHOS U/L  --   --  75   ALT U/L  --   --  15   AST U/L  --   --  15   GLUCOSE RANDOM mg/dL 224*   < > 212*    < > = values in this interval not displayed.     Results from last 7 days   Lab Units 03/05/24  2008   INR  1.09     Results from last 7 days   Lab Units 03/07/24  1149 03/07/24  0731 03/06/24  2115 03/06/24  1750 03/06/24  1133 03/06/24  0712 03/05/24  2015 03/05/24  1703 03/05/24  1118 03/05/24  0637 03/04/24  2049   POC GLUCOSE mg/dl 184* 202* 319* 252* 180* 190* 269* 277* 312* 205* 292*               Lines/Drains:  Invasive Devices       Peripheral Intravenous Line  Duration             Peripheral IV 03/04/24 Left Antecubital 3 days    Peripheral IV 03/07/24 Right Hand <1 day              Drain  Duration             External Urinary Catheter 1 day                      Telemetry:  Telemetry Orders (From admission, onward)               24 Hour Telemetry Monitoring  Continuous x 24 Hours (Telem)        Question:  Reason for 24 Hour Telemetry  Answer:  Arrhythmias requiring acute medical intervention / PPM or ICD malfunction                     Telemetry Reviewed: Atrial fibrillation. HR averaging 90s  Indication for Continued Telemetry Use: Arrthymias requiring medical therapy             Imaging: Reviewed radiology reports from this admission including: MRI spine    Recent Cultures (last 7 days):         Last 24 Hours Medication List:   Current Facility-Administered  Medications   Medication Dose Route Frequency Provider Last Rate    acetaminophen  975 mg Oral Q8H [facility] Lady Cherry MD      AMILoride  2.5 mg Oral Daily Lady Cherry MD      amLODIPine  5 mg Oral HS Lady Cherry MD      apixaban  5 mg Oral BID Shahram Mckinney MD      aspirin  81 mg Oral Daily Lady Cherry MD      bimatoprost  1 drop Both Eyes HS Lady Cherry MD      diazepam  5 mg Intravenous 30 min pre-procedure Aly Fierro MD      ezetimibe  10 mg Oral HS Lady Cherry MD      gabapentin  100 mg Oral TID Cathy Morgan PA-C      heparin (porcine)  3-30 Units/kg/hr (Order-Specific) Intravenous Titrated Shahram Mckinney MD 6 Units/kg/hr (03/07/24 1232)    heparin (porcine)  4,400 Units Intravenous Q6H PRN Sabi Naik MD      heparin (porcine)  8,800 Units Intravenous Q6H PRN Sabi Naik MD      HYDROmorphone  0.2 mg Intravenous Q2H PRN Lady Cherry MD      insulin lispro  1-5 Units Subcutaneous HS Lady Cherry MD      insulin lispro  1-6 Units Subcutaneous TID AC Lady Cherry MD      losartan  100 mg Oral Daily Lady Cherry MD      methocarbamol  500 mg Oral Q6H [facility] Lady Cherry MD      methylPREDNISolone  20 mg Oral Daily Cathy Morgan PA-C      Followed by    [START ON 3/8/2024] methylPREDNISolone  16 mg Oral Daily Cathy Morgan PA-C      Followed by    [START ON 3/9/2024] methylPREDNISolone  12 mg Oral Daily Cathy Morgan PA-C      Followed by    [START ON 3/10/2024] methylPREDNISolone  8 mg Oral Daily Cathy Morgan PA-C      Followed by    [START ON 3/11/2024] methylPREDNISolone  4 mg Oral Daily Cathy Morgan PA-C      metoprolol  5 mg Intravenous Q6H PRN Sabi Naik MD      metoprolol succinate  100 mg Oral BID Shahram Mckinney MD      ondansetron  4 mg Intravenous Q6H PRN Lady Cherry MD      oxyCODONE  2.5 mg Oral Q4H PRN Lady Cherry MD      Or    oxyCODONE  5 mg Oral Q4H PRN Lady Cherry MD       senna-docusate sodium  1 tablet Oral HS Lady Cherry MD          Today, Patient Was Seen By: Cathy Wolfe PA-C    **Please Note: This note may have been constructed using a voice recognition system.**

## 2024-03-07 NOTE — NURSING NOTE
Lumbar spine MRI with and w/o contrast completed and patient tolerated procedure well. Post-procedure vital signs taken and recorded. Report given to P6 nurse Madonna via phone. Patient placed in for transport to be taken back to room 626. Patient's glasses given back to patient and she placed them on her face. Patient offers no complaints or verbalizes any issues upon leaving MRI.

## 2024-03-07 NOTE — PROGRESS NOTES
"[facility]  Progress Note  Name: Jessica Jaquez I  MRN: [id number]  Unit/Bed#: [facility]PHP 626-01 I Date of Admission: 3/4/2024   Date of Service: 3/7/2024 I Hospital Day: 2    Assessment/Plan   * Lumbar radiculopathy  Assessment & Plan  Acute on chronic LBP w/ LLE radiculopathy   Presented to ED on 3/4/24 due to gradually worsening back, hip, and L thigh pain x 3 weeks   Describes a shooting/sharp pain from the L low back into the L hip and L groin/thigh   Denies any radiation of this pain to her lower leg, weakness, numbness, BBI, urinary retention, or saddle anesthesia   Hx of 3 prior lumbar surgeries at [facility] w/ Dr. Hicks in 2010   pt is unsure which levels but knows they were all lumbar  Reports prior to surgery she has similar sx with L sided LBP radiating to the L thigh   Has been receiving regular corticosteroid injections in [place]roll hips and knees at [facility]  most recently underwent L hip injection yesterday, 3/4, prior to arrival in the ER --> no relief   Pt reassessed today after her MRI and reports she is doing better. She states the pain medications have been helping and she has now 4-5/10 pain at max. She was able to ambulate in the halls as well as on the stairs with PT/OT. Pt continues with some pain limited weakness at the L hip but otherwise intact without \"red flags\"     Imaging:   3/7 MRI L-spine w/wo: pending final radiology read   3/4 CT c/a/p: No acute fracture or suspicious osseous lesion. There are pedicle screws at L3 and L4 which appear intact.     Plan:   Continue to closely monitor neuro exam   Frequent neuro checks per primary team   Maintain normotensive BP goals, MAP > 65   No acute neurosurgical intervention indicated at this time   Case and imaging reviewed this afternoon with Dr. Goldberg   MRI pending final radiology read, but per our review, pt with noted previous L3-4 posterior fusion that appears intact. She has some chronic appearing degenerative " changes at the level above and below as well as the L3-4 level itself.  There is no significant central stenosis or compression noted.  These degenerative changes do appear more pronounced on the right side versus the left side.  There is some left-sided foraminal stenosis at L3-4 that may be contributing to the patient's pain however I do not feel that these entirely explain the patient's symptoms.  Patient doing better today with pain medications and is able to ambulate on the stairs and in the hallway with PT/OT today.  Pt reports improved pain.  Continues to deny any red flag signs or symptoms.  Recommend ongoing conservative management at this time  Patient with new onset A-fib this admission requiring heparin infusion and cardioversion which is planned for tomorrow with cardiology team.  Given this new diagnosis and plan cardioversion, patient is not medically cleared for neurosurgical intervention at this time.  Per cardiology, patient will require strict anticoagulation for at least 1 month post cardioversion for not a candidate for hold for surgery.  Patient also poor candidate due to elevated BMI and other medical comorbidities.  Given her improvement, recommend a trial of ongoing conservative management.  Patient was in agreement with this.  Per discussion with patient, she would like to avoid any additional neurosurgical procedures  Recommend ongoing multimodal pain medication regimen, recommend referral to [facility] upon discharge.  Will plan to follow-up with the patient about 4 to 6 weeks in the neurosurgery office to assess her progress with conservative management  Recommend multimodal pain medication regimen   Currently getting:   Tylenol 975mg PO q 8hrs   Robaxin 500mg PO q 6hrs   Gabapentin 100mg PO TID   Complete steroid course   Prn oxy 2.5/5mg and prn dilaudid 0.2mg IV   Okay for AC/AP meds at this time from a nsgy standpoint   DVT ppx: SCDs, heparin gtt   Medical management  "per primary team   Pain control per primary team --> noted above   PT/OT   Social work following for assistance with dispo once medically cleared     Neurosurgery will sign off at this time. Will plan to follow-up with the pt in approx 4-6 weeks to assess her progress with conservative measures. Please reach out with any further questions or concerns.         Subjective/Objective   Chief Complaint: \" Good to see you again\"    Subjective: Pt seen and examined this afternoon.  No acute events overnight.  Patient reports improvement to her pain with current pain medication regimen.  She states she was able to ambulate in the hallway with PT and was able to ambulate on the stairs without significant difficulty.  Patient reports her pain is like a 4 out of 10 when it was previously a 10 out of 10.  Patient continues to deny any red flag signs or symptoms.    Objective: Pleasant, obese, elderly female sitting up comfortably in the chair.  No acute distress.    I/O         03/05 0701 03/06 0700 03/06 0701 03/07 0700 03/07 0701 03/08 0700    P.O. 540 480 0    I.V. (mL/kg)  253.9 (2.3) 250 (2.3)    Total Intake(mL/kg) 540 (5) 733.9 (6.7) 250 (2.3)    Urine (mL/kg/hr) 1000 (0.4) 2800 (1.1) 725 (0.7)    Stool  0 0    Total Output 1000 2800 725    Net -460 -2066.1 -475           Unmeasured Urine Occurrence  1 x 4 x    Unmeasured Stool Occurrence  0 x 0 x          Invasive Devices       Peripheral Intravenous Line  Duration             Peripheral IV 03/04/24 Left Antecubital 3 days    Peripheral IV 03/07/24 Right Hand <1 day              Drain  Duration             External Urinary Catheter 1 day                  Physical Exam:  Vitals: Blood pressure 110/78, pulse 65, temperature 97.5 °F (36.4 °C), resp. rate 18, height 5' 3\" (1.6 m), weight 109 kg (240 lb 4.8 oz), SpO2 97%.,Body mass index is 42.57 kg/m².    General appearance: alert, appears stated age, cooperative and no distress  Head: Normocephalic, without obvious " abnormality, atraumatic  Eyes: EOMI, PERRL  Neck: supple, symmetrical, trachea midline and NT  Back: no kyphosis present, no tenderness to percussion or palpation  -Previous midline lumbar incision is clean, dry, intact and well-healed surgical scar  Lungs: non labored breathing, no respiratory distress on room air  Heart: regular heart rate  Neurologic:   Mental status: Alert, oriented x3, thought content appropriate  Cranial nerves: grossly intact (Cranial nerves II-XII)  Sensory: normal to light touch bilaterally throughout  Motor: moving all extremities without focal weakness   -Some pain limited weakness noted at the left hip that improves with pain medications and support, but otherwise strength is intact rated 5/5 throughout  Reflexes: 2+ and symmetric  Coordination: finger to nose normal bilaterally, no drift bilaterally    Lab Results:  Results from last 7 days   Lab Units 03/07/24 0434 03/06/24 0422 03/05/24 2008 03/05/24 0643 03/04/24  1527   WBC Thousand/uL 15.36* 13.68* 13.47* 9.97 11.24*   HEMOGLOBIN g/dL 12.4 11.8 11.6 11.7 12.7   HEMATOCRIT % 39.4 35.6 36.3 36.8 39.7   PLATELETS Thousands/uL 404* 370 349 354 342   NEUTROS PCT % 80*  --   --  84* 84*   MONOS PCT % 6  --   --  5 3*   EOS PCT % 0  --   --  0 2     Results from last 7 days   Lab Units 03/07/24 0434 03/06/24 0422 03/05/24  0645   SODIUM mmol/L 133* 133* 134*   POTASSIUM mmol/L 4.8 4.4 4.3   CHLORIDE mmol/L 100 101 102   CO2 mmol/L 23 24 24   BUN mg/dL 36* 32* 32*   CREATININE mg/dL 1.01 0.99 0.91   CALCIUM mg/dL 9.2 8.8 8.8   ALK PHOS U/L  --   --  75   ALT U/L  --   --  15   AST U/L  --   --  15     Results from last 7 days   Lab Units 03/05/24  0645   MAGNESIUM mg/dL 2.0         Results from last 7 days   Lab Units 03/07/24  0735 03/06/24  2338 03/06/24  0957 03/06/24  0207 03/05/24 2008   INR   --   --   --   --  1.09   PTT seconds 111* 202* >210*   < > 33    < > = values in this interval not displayed.     No results found for:  "\"TROPONINT\"  ABG:No results found for: \"PHART\", \"VOH8MKB\", \"PO2ART\", \"IQG8MZX\", \"W3YOKDMY\", \"BEART\", \"SOURCE\"    Imaging Studies: I have personally reviewed pertinent reports.   and I have personally reviewed pertinent films in PACS    CT abdomen pelvis wo contrast    Result Date: 3/4/2024  Impression: 1.  Diverticulosis coli with suggestion of trace stranding adjacent to the proximal sigmoid colon. Very mild acute diverticulitis is not excluded. 2.  No acute fracture. 3.  Chronic appearing seroma associated with the large anterior abdominal wall hernia mesh. The study was marked in EPIC for immediate notification. Workstation performed: [id number]     EKG, Pathology, and Other Studies: I have personally reviewed pertinent reports.      VTE Pharmacologic Prophylaxis: Sequential compression device (Venodyne)  and Heparin    VTE Mechanical Prophylaxis: sequential compression device   "

## 2024-03-07 NOTE — ANESTHESIA PREPROCEDURE EVALUATION
Procedure:  MRI LUMBAR SPINE W WO CONTRAST    Relevant Problems   ANESTHESIA (within normal limits)      CARDIO   (+) Essential hypertension   (+) Hyperlipidemia   (+) New onset atrial fibrillation (HCC)      ENDO   (+) Diabetes mellitus type 2 in obese       GI/HEPATIC (within normal limits)      /RENAL (within normal limits)      HEMATOLOGY (within normal limits)      NEURO/PSYCH   (+) Cerebrovascular accident (CVA) due to thrombosis of precerebral artery (HCC)      PULMONARY (within normal limits)      Cardiovascular and Mediastinum   (+) Chronic diastolic CHF (congestive heart failure) (HCC)      Nervous and Auditory   (+) Lumbar radiculopathy      Other   (+) History of stroke   (+) Obesity, Class III, BMI 40-49.9 (morbid obesity) (HCC)   (+) Tremor of hands and face   (+) Unspecified abnormalities of gait and mobility      Heparin gtt  Metoprolol 100 mg PO last 3/6/24 @ 1651    TTE 3/6/2024:    Left Ventricle: Left ventricular cavity size is normal. Wall thickness is mildly increased. There is mild concentric hypertrophy. The left ventricular ejection fraction is 60%. Systolic function is normal. Although no diagnostic regional wall motion abnormality was identified, this possibility cannot be completely excluded on the basis of this study.    Right Ventricle: Right ventricular cavity size is normal. Systolic function is normal.    Right Atrium: The atrium is mildly dilated.    Aortic Valve: There is aortic valve sclerosis.    Mitral Valve: There is annular calcification. There is mild regurgitation.    Tricuspid Valve: There is mild regurgitation.    CTAP 3/4/2024:  1.  Diverticulosis coli with suggestion of trace stranding adjacent to the proximal sigmoid colon. Very mild acute diverticulitis is not excluded.     2.  No acute fracture.     3.  Chronic appearing seroma associated with the large anterior abdominal wall hernia mesh.       Lab Results   Component Value Date    WBC 15.36 (H) 03/07/2024    HGB  12.4 03/07/2024    HCT 39.4 03/07/2024    MCV 89 03/07/2024     (H) 03/07/2024     Lab Results   Component Value Date    SODIUM 133 (L) 03/07/2024    K 4.8 03/07/2024     03/07/2024    CO2 23 03/07/2024    BUN 36 (H) 03/07/2024    CREATININE 1.01 03/07/2024    GLUC 224 (H) 03/07/2024    CALCIUM 9.2 03/07/2024     Lab Results   Component Value Date    INR 1.09 03/05/2024    PROTIME 14.0 03/05/2024     Lab Results   Component Value Date    HGBA1C 6.9 (H) 01/22/2024          Physical Exam    Airway    Mallampati score: II  TM Distance: >3 FB  Neck ROM: full     Dental       Cardiovascular  Cardiovascular exam normal    Pulmonary  Pulmonary exam normal     Other Findings  post-pubertal.      Anesthesia Plan  ASA Score- 3     Anesthesia Type- general with ASA Monitors.         Additional Monitors:     Airway Plan: LMA.           Plan Factors-Exercise tolerance (METS): >4 METS.    Chart reviewed. EKG reviewed. Imaging results reviewed. Existing labs reviewed. Patient summary reviewed.                  Induction- intravenous.    Postoperative Plan-     Informed Consent- Anesthetic plan and risks discussed with patient.  I personally reviewed this patient with the CRNA. Discussed and agreed on the Anesthesia Plan with the CRNA..

## 2024-03-08 ENCOUNTER — APPOINTMENT (INPATIENT)
Dept: NON INVASIVE DIAGNOSTICS | Facility: HOSPITAL | Age: 79
DRG: 552 | End: 2024-03-08
Payer: COMMERCIAL

## 2024-03-08 ENCOUNTER — TELEPHONE (OUTPATIENT)
Dept: NEUROSURGERY | Facility: CLINIC | Age: 79
End: 2024-03-08

## 2024-03-08 LAB
ANION GAP SERPL CALCULATED.3IONS-SCNC: 8 MMOL/L
AORTIC ROOT: 2.5 CM
BUN SERPL-MCNC: 40 MG/DL (ref 5–25)
CALCIUM SERPL-MCNC: 9.3 MG/DL (ref 8.4–10.2)
CHLORIDE SERPL-SCNC: 99 MMOL/L (ref 96–108)
CO2 SERPL-SCNC: 25 MMOL/L (ref 21–32)
CREAT SERPL-MCNC: 1.18 MG/DL (ref 0.6–1.3)
GFR SERPL CREATININE-BSD FRML MDRD: 43 ML/MIN/1.73SQ M
GLUCOSE SERPL-MCNC: 178 MG/DL (ref 65–140)
GLUCOSE SERPL-MCNC: 183 MG/DL (ref 65–140)
GLUCOSE SERPL-MCNC: 190 MG/DL (ref 65–140)
GLUCOSE SERPL-MCNC: 261 MG/DL (ref 65–140)
GLUCOSE SERPL-MCNC: 309 MG/DL (ref 65–140)
MAGNESIUM SERPL-MCNC: 2.2 MG/DL (ref 1.9–2.7)
POTASSIUM SERPL-SCNC: 4.9 MMOL/L (ref 3.5–5.3)
SL CV LV EF: 55
SODIUM SERPL-SCNC: 132 MMOL/L (ref 135–147)

## 2024-03-08 PROCEDURE — 93312 ECHO TRANSESOPHAGEAL: CPT

## 2024-03-08 PROCEDURE — 5A2204Z RESTORATION OF CARDIAC RHYTHM, SINGLE: ICD-10-PCS | Performed by: INTERNAL MEDICINE

## 2024-03-08 PROCEDURE — 80048 BASIC METABOLIC PNL TOTAL CA: CPT | Performed by: PHYSICIAN ASSISTANT

## 2024-03-08 PROCEDURE — 93312 ECHO TRANSESOPHAGEAL: CPT | Performed by: INTERNAL MEDICINE

## 2024-03-08 PROCEDURE — 93325 DOPPLER ECHO COLOR FLOW MAPG: CPT | Performed by: INTERNAL MEDICINE

## 2024-03-08 PROCEDURE — 82948 REAGENT STRIP/BLOOD GLUCOSE: CPT

## 2024-03-08 PROCEDURE — 93005 ELECTROCARDIOGRAM TRACING: CPT

## 2024-03-08 PROCEDURE — 83735 ASSAY OF MAGNESIUM: CPT | Performed by: PHYSICIAN ASSISTANT

## 2024-03-08 PROCEDURE — 99232 SBSQ HOSP IP/OBS MODERATE 35: CPT | Performed by: PHYSICIAN ASSISTANT

## 2024-03-08 PROCEDURE — 99232 SBSQ HOSP IP/OBS MODERATE 35: CPT | Performed by: INTERNAL MEDICINE

## 2024-03-08 PROCEDURE — 92960 CARDIOVERSION ELECTRIC EXT: CPT

## 2024-03-08 PROCEDURE — 93320 DOPPLER ECHO COMPLETE: CPT | Performed by: INTERNAL MEDICINE

## 2024-03-08 PROCEDURE — B24BZZ4 ULTRASONOGRAPHY OF HEART WITH AORTA, TRANSESOPHAGEAL: ICD-10-PCS | Performed by: INTERNAL MEDICINE

## 2024-03-08 PROCEDURE — 92960 CARDIOVERSION ELECTRIC EXT: CPT | Performed by: INTERNAL MEDICINE

## 2024-03-08 RX ORDER — METOPROLOL SUCCINATE 100 MG/1
100 TABLET, EXTENDED RELEASE ORAL DAILY
Status: DISCONTINUED | OUTPATIENT
Start: 2024-03-09 | End: 2024-03-11 | Stop reason: HOSPADM

## 2024-03-08 RX ORDER — SODIUM CHLORIDE 9 MG/ML
INJECTION, SOLUTION INTRAVENOUS CONTINUOUS PRN
Status: DISCONTINUED | OUTPATIENT
Start: 2024-03-08 | End: 2024-03-08

## 2024-03-08 RX ORDER — PROPOFOL 10 MG/ML
INJECTION, EMULSION INTRAVENOUS AS NEEDED
Status: DISCONTINUED | OUTPATIENT
Start: 2024-03-08 | End: 2024-03-08

## 2024-03-08 RX ADMIN — AMLODIPINE BESYLATE 5 MG: 5 TABLET ORAL at 22:12

## 2024-03-08 RX ADMIN — GABAPENTIN 100 MG: 100 CAPSULE ORAL at 15:08

## 2024-03-08 RX ADMIN — ACETAMINOPHEN 975 MG: 325 TABLET, FILM COATED ORAL at 22:13

## 2024-03-08 RX ADMIN — ASPIRIN 81 MG CHEWABLE TABLET 81 MG: 81 TABLET CHEWABLE at 08:49

## 2024-03-08 RX ADMIN — SENNOSIDES, DOCUSATE SODIUM 1 TABLET: 8.6; 5 TABLET ORAL at 22:12

## 2024-03-08 RX ADMIN — GABAPENTIN 100 MG: 100 CAPSULE ORAL at 08:50

## 2024-03-08 RX ADMIN — EZETIMIBE 10 MG: 10 TABLET ORAL at 22:12

## 2024-03-08 RX ADMIN — INSULIN LISPRO 2 UNITS: 100 INJECTION, SOLUTION INTRAVENOUS; SUBCUTANEOUS at 22:14

## 2024-03-08 RX ADMIN — ACETAMINOPHEN 975 MG: 325 TABLET, FILM COATED ORAL at 15:08

## 2024-03-08 RX ADMIN — AMILORIDE HYDROCLORIDE 2.5 MG: 5 TABLET ORAL at 08:52

## 2024-03-08 RX ADMIN — PROPOFOL 50 MG: 10 INJECTION, EMULSION INTRAVENOUS at 13:49

## 2024-03-08 RX ADMIN — LOSARTAN POTASSIUM 100 MG: 50 TABLET, FILM COATED ORAL at 08:50

## 2024-03-08 RX ADMIN — APIXABAN 5 MG: 5 TABLET, FILM COATED ORAL at 08:50

## 2024-03-08 RX ADMIN — METHOCARBAMOL 500 MG: 500 TABLET ORAL at 05:04

## 2024-03-08 RX ADMIN — GABAPENTIN 100 MG: 100 CAPSULE ORAL at 22:13

## 2024-03-08 RX ADMIN — METOPROLOL SUCCINATE 100 MG: 100 TABLET, EXTENDED RELEASE ORAL at 08:50

## 2024-03-08 RX ADMIN — PROPOFOL 20 MG: 10 INJECTION, EMULSION INTRAVENOUS at 13:56

## 2024-03-08 RX ADMIN — METHOCARBAMOL 500 MG: 500 TABLET ORAL at 17:38

## 2024-03-08 RX ADMIN — APIXABAN 5 MG: 5 TABLET, FILM COATED ORAL at 17:38

## 2024-03-08 RX ADMIN — ACETAMINOPHEN 975 MG: 325 TABLET, FILM COATED ORAL at 05:04

## 2024-03-08 RX ADMIN — METHYLPREDNISOLONE 16 MG: 4 TABLET ORAL at 08:51

## 2024-03-08 RX ADMIN — PROPOFOL 100 MG: 10 INJECTION, EMULSION INTRAVENOUS at 13:47

## 2024-03-08 RX ADMIN — SODIUM CHLORIDE: 0.9 INJECTION, SOLUTION INTRAVENOUS at 13:17

## 2024-03-08 RX ADMIN — INSULIN LISPRO 1 UNITS: 100 INJECTION, SOLUTION INTRAVENOUS; SUBCUTANEOUS at 12:06

## 2024-03-08 RX ADMIN — INSULIN LISPRO 4 UNITS: 100 INJECTION, SOLUTION INTRAVENOUS; SUBCUTANEOUS at 17:38

## 2024-03-08 RX ADMIN — PROPOFOL 50 MG: 10 INJECTION, EMULSION INTRAVENOUS at 13:53

## 2024-03-08 RX ADMIN — BIMATOPROST 1 DROP: 0.3 SOLUTION/ DROPS OPHTHALMIC at 22:14

## 2024-03-08 RX ADMIN — METHOCARBAMOL 500 MG: 500 TABLET ORAL at 12:05

## 2024-03-08 RX ADMIN — INSULIN LISPRO 2 UNITS: 100 INJECTION, SOLUTION INTRAVENOUS; SUBCUTANEOUS at 08:54

## 2024-03-08 RX ADMIN — METHOCARBAMOL 500 MG: 500 TABLET ORAL at 23:35

## 2024-03-08 NOTE — ASSESSMENT & PLAN NOTE
Presented with left groin pain radiating down LLE since end of January 2024, gradually worsening  H/o 3 spinal surgeries in 2010 (L3-4 decompression, fusion) for recurrent disc herniation with LLE radiculopathy  Has been following with Dr Freedom bonilla at [facility] for bilateral knee OA and gets corticosteroid injections every 3 months  Xray hip had shown mild degenerative changes  CT A/P - Diverticulosis coli with suggestion of trace stranding adjacent to the proximal sigmoid colon. No acute fracture. Chronic appearing seroma associated with the large anterior abdominal wall hernia mesh.  Improved pain control on current regimen:  Scheduled Acetaminophen 975 mg every 8 hours and Robaxin 500 mg every 6 hours.  Medrol dose pack  Gabapentin 100 mg TID  Neurosurgery consulted and will follow up with patient as an out-patient.  MRI lumbar spine: Disc and facet degenerative changes throughout the lumbar spine, most prominent at L4-5.

## 2024-03-08 NOTE — ASSESSMENT & PLAN NOTE
Lab Results   Component Value Date    HGBA1C 6.9 (H) 01/22/2024     Recent Labs     03/07/24  1149 03/07/24  1641 03/07/24  2109 03/08/24  0838   POCGLU 184* 244* 354* 190*       Blood Sugar Average: Last 72 hrs:  (P) 243  Hold Glimepiride during hospitalization.  Resume at discharge.  Patient with steroid-induced hyperglycemia.  Continue SSI  Monitor blood sugars and adjust insulin accordingly

## 2024-03-08 NOTE — PROGRESS NOTES
Spiritual Care Progress Note    3/8/2024  Patient: Jessica Jaquez : 1945  Admission Date & Time: 3/4/2024 1341  MRN: [id number] CSN: [id number]      Fr Nguyễn met with the pt and conducted a pastoral visit.  also administered prayers and blessings. No further needs were expressed at this time.    Chaplains still remain available.       24 1100   Clinical Encounter Type   Visited With Patient   Synagogue Encounters   Synagogue Needs Prayer

## 2024-03-08 NOTE — ASSESSMENT & PLAN NOTE
Wt Readings from Last 3 Encounters:   03/08/24 105 kg (231 lb 0.7 oz)   05/16/22 105 kg (232 lb)   05/03/21 104 kg (230 lb)     Echo(12/28/18) - EF 60%, grade 1 diatolic dysfunction  Euvolemic  Continue Amiloride 2.5 mg daily  Also take Furosemide 20 mg and additional Amiloride 2.5 mg as needed at home for volume overload  Monitor volume status  TTE (3/6/24): EF 60%.  Diastolic function unable to be assessed secondary to A-fib  PHU scheduled today (3/8/24)

## 2024-03-08 NOTE — PROGRESS NOTES
[facility]  Progress Note  Name: Jessica Jaquez I  MRN: [id number]  Unit/Bed#: [facility] 626-01 I Date of Admission: 3/4/2024   Date of Service: 3/8/2024 I Hospital Day: 3    Assessment/Plan   * New onset atrial fibrillation (HCC)  Assessment & Plan  New onset a-fib with RVR (3/5/24)  Cardiology consulted and appreciate their input.  Continue with metoprolol 100 mg BID  Transitioned from IV heparin to Eliquis.  I sent an Rx to pharmacy for price check.  Plan for PHU/DCCV today.    History of stroke  Assessment & Plan  Stable on ASA, Zetia  Does not tolerate statins    Lumbar radiculopathy  Assessment & Plan  Presented with left groin pain radiating down LLE since end of January 2024, gradually worsening  H/o 3 spinal surgeries in 2010 (L3-4 decompression, fusion) for recurrent disc herniation with LLE radiculopathy  Has been following with Dr Freedom bonilla at [facility] for bilateral knee OA and gets corticosteroid injections every 3 months  Xray hip had shown mild degenerative changes  CT A/P - Diverticulosis coli with suggestion of trace stranding adjacent to the proximal sigmoid colon. No acute fracture. Chronic appearing seroma associated with the large anterior abdominal wall hernia mesh.  Improved pain control on current regimen:  Scheduled Acetaminophen 975 mg every 8 hours and Robaxin 500 mg every 6 hours.  Medrol dose pack  Gabapentin 100 mg TID  Neurosurgery consulted and will follow up with patient as an out-patient.  MRI lumbar spine: Disc and facet degenerative changes throughout the lumbar spine, most prominent at L4-5.    Tremor of hands and face  Assessment & Plan  Noted hx, previously followed with neurology few years ago  Not on medication     Chronic diastolic CHF (congestive heart failure) (HCC)  Assessment & Plan  Wt Readings from Last 3 Encounters:   03/08/24 105 kg (231 lb 0.7 oz)   05/16/22 105 kg (232 lb)   05/03/21 104 kg (230 lb)     Echo(12/28/18) - EF 60%,  grade 1 diatolic dysfunction  Euvolemic  Continue Amiloride 2.5 mg daily  Also take Furosemide 20 mg and additional Amiloride 2.5 mg as needed at home for volume overload  Monitor volume status  TTE (3/6/24): EF 60%.  Diastolic function unable to be assessed secondary to A-fib  PHU scheduled today (3/8/24)    Essential hypertension  Assessment & Plan  Stable on Amiloride 2.5 mg daily, Losartan 100 mg daily, Metoprolol succinate 100 mg daily, Amlodipine 5 mg daily.    Obesity, Class III, BMI 40-49.9 (morbid obesity) ([facility])  Assessment & Plan  Lifestyle modification    Diabetes mellitus type 2 in obese   Assessment & Plan  Lab Results   Component Value Date    HGBA1C 6.9 (H) 01/22/2024     Recent Labs     03/07/24  1149 03/07/24  1641 03/07/24  2109 03/08/24  0838   POCGLU 184* 244* 354* 190*       Blood Sugar Average: Last 72 hrs:  (P) 243  Hold Glimepiride during hospitalization.  Resume at discharge.  Patient with steroid-induced hyperglycemia.  Continue SSI  Monitor blood sugars and adjust insulin accordingly      VTE Pharmacologic Prophylaxis: VTE Score: 5 High Risk (Score >/= 5) - Pharmacological DVT Prophylaxis Ordered: apixaban (Eliquis). Sequential Compression Devices Ordered.    Mobility:   Basic Mobility Inpatient Raw Score: 16  JH-HLM Goal: 5: Stand one or more mins  JH-HLM Achieved: 6: Walk 10 steps or more  HLM Goal achieved. Continue to encourage appropriate mobility.    Patient Centered Rounds: I performed bedside rounds with nursing staff today.   Discussions with Specialists or Other Care Team Provider: None    Education and Discussions with Family / Patient: Patient declined call to .     Total Time Spent on Date of Encounter in care of patient:  mins. This time was spent on one or more of the following: performing physical exam; counseling and coordination of care; obtaining or reviewing history; documenting in the medical record; reviewing/ordering tests, medications or procedures;  communicating with other healthcare professionals and discussing with patient's family/caregivers.    Current Length of Stay: 3 day(s)  Current Patient Status: Inpatient   Certification Statement: The patient will continue to require additional inpatient hospital stay due to need for cardioversion  Discharge Plan: Anticipate discharge tomorrow to home.    Code Status: Level 1 - Full Code    Subjective:   Patient is doing well overall stating her left leg pain is much improved.  She is ready to have the cardioversion done and hopes it works.    Objective:     Vitals:   Temp (24hrs), Av.4 °F (36.3 °C), Min:97.1 °F (36.2 °C), Max:97.6 °F (36.4 °C)    Temp:  [97.1 °F (36.2 °C)-97.6 °F (36.4 °C)] 97.4 °F (36.3 °C)  HR:  [] 54  Resp:  [16-18] 18  BP: (110-141)/(62-93) 123/62  SpO2:  [94 %-97 %] 97 %  Body mass index is 40.93 kg/m².     Input and Output Summary (last 24 hours):     Intake/Output Summary (Last 24 hours) at 3/8/2024 1423  Last data filed at 3/8/2024 1400  Gross per 24 hour   Intake 488.2 ml   Output 1100 ml   Net -611.8 ml       Physical Exam:   Physical Exam  Vitals and nursing note reviewed.   Constitutional:       General: She is not in acute distress.     Appearance: She is well-developed.   HENT:      Head: Normocephalic and atraumatic.   Eyes:      Conjunctiva/sclera: Conjunctivae normal.   Cardiovascular:      Rate and Rhythm: Normal rate. Rhythm irregular.      Heart sounds: No murmur heard.  Pulmonary:      Effort: Pulmonary effort is normal. No respiratory distress.      Breath sounds: Normal breath sounds. No wheezing, rhonchi or rales.   Abdominal:      General: Bowel sounds are normal. There is no distension.      Palpations: Abdomen is soft.      Tenderness: There is no abdominal tenderness.   Skin:     General: Skin is warm and dry.   Neurological:      Mental Status: She is alert.   Psychiatric:         Mood and Affect: Mood normal.          Additional Data:     Labs:  Results from  last 7 days   Lab Units 03/07/24  0434   WBC Thousand/uL 15.36*   HEMOGLOBIN g/dL 12.4   HEMATOCRIT % 39.4   PLATELETS Thousands/uL 404*   NEUTROS PCT % 80*   LYMPHS PCT % 13*   MONOS PCT % 6   EOS PCT % 0     Results from last 7 days   Lab Units 03/08/24  0906 03/06/24  0422 03/05/24  0645   SODIUM mmol/L 132*   < > 134*   POTASSIUM mmol/L 4.9   < > 4.3   CHLORIDE mmol/L 99   < > 102   CO2 mmol/L 25   < > 24   BUN mg/dL 40*   < > 32*   CREATININE mg/dL 1.18   < > 0.91   ANION GAP mmol/L 8   < > 8   CALCIUM mg/dL 9.3   < > 8.8   ALBUMIN g/dL  --   --  3.4*   TOTAL BILIRUBIN mg/dL  --   --  0.42   ALK PHOS U/L  --   --  75   ALT U/L  --   --  15   AST U/L  --   --  15   GLUCOSE RANDOM mg/dL 178*   < > 212*    < > = values in this interval not displayed.     Results from last 7 days   Lab Units 03/05/24  2008   INR  1.09     Results from last 7 days   Lab Units 03/08/24  1128 03/08/24  0838 03/07/24  2109 03/07/24  1641 03/07/24  1149 03/07/24  0731 03/06/24  2115 03/06/24  1750 03/06/24  1133 03/06/24  0712 03/05/24  2015 03/05/24  1703   POC GLUCOSE mg/dl 183* 190* 354* 244* 184* 202* 319* 252* 180* 190* 269* 277*               Lines/Drains:  Invasive Devices       Peripheral Intravenous Line  Duration             Peripheral IV 03/04/24 Left Antecubital 3 days              Drain  Duration             External Urinary Catheter 2 days                      Telemetry:  Telemetry Orders (From admission, onward)               24 Hour Telemetry Monitoring  Continuous x 24 Hours (Telem)        Question:  Reason for 24 Hour Telemetry  Answer:  Arrhythmias requiring acute medical intervention / PPM or ICD malfunction                     Telemetry Reviewed: Atrial fibrillation. HR averaging 80s  Indication for Continued Telemetry Use: Arrthymias requiring medical therapy             Imaging: No pertinent imaging reviewed.    Recent Cultures (last 7 days):         Last 24 Hours Medication List:   Current Facility-Administered  Medications   Medication Dose Route Frequency Provider Last Rate    acetaminophen  975 mg Oral Q8H [facility] Lady Cherry MD      AMILoride  2.5 mg Oral Daily Lady Cherry MD      amLODIPine  5 mg Oral HS Lady Cherry MD      apixaban  5 mg Oral BID Shahram Mckinney MD      aspirin  81 mg Oral Daily Lady Cherry MD      bimatoprost  1 drop Both Eyes HS Lady Cherry MD      diazepam  5 mg Intravenous 30 min pre-procedure Aly Fierro MD      ezetimibe  10 mg Oral HS Lady Cherry MD      gabapentin  100 mg Oral TID Cathy Morgan PA-C      heparin (porcine)  4,400 Units Intravenous Q6H PRN Sabi Naik MD      heparin (porcine)  8,800 Units Intravenous Q6H PRN Sabi Naik MD      HYDROmorphone  0.2 mg Intravenous Q2H PRN Lady Cherry MD      insulin lispro  1-5 Units Subcutaneous HS Lady Cherry MD      insulin lispro  1-6 Units Subcutaneous TID AC Lady Cherry MD      losartan  100 mg Oral Daily Lady Cherry MD      methocarbamol  500 mg Oral Q6H [facility] Lady Cherry MD      [START ON 3/9/2024] methylPREDNISolone  12 mg Oral Daily Cathy Morgan PA-C      Followed by    [START ON 3/10/2024] methylPREDNISolone  8 mg Oral Daily Cathy Morgan PA-C      Followed by    [START ON 3/11/2024] methylPREDNISolone  4 mg Oral Daily Cathy Morgan PA-C      metoprolol  5 mg Intravenous Q6H PRN Sabi Naik MD      metoprolol succinate  100 mg Oral BID Shahram Mckinney MD      ondansetron  4 mg Intravenous Q6H PRN Lady Cherry MD      oxyCODONE  2.5 mg Oral Q4H PRN Lady Cherry MD      Or    oxyCODONE  5 mg Oral Q4H PRN Lady Cherry MD      senna-docusate sodium  1 tablet Oral  Lady Cherry MD          Today, Patient Was Seen By: Cathy Wolfe PA-C    **Please Note: This note may have been constructed using a voice recognition system.**

## 2024-03-08 NOTE — ANESTHESIA PREPROCEDURE EVALUATION
Procedure:  PHU    Relevant Problems   CARDIO   (+) Essential hypertension   (+) Hyperlipidemia   (+) New onset atrial fibrillation (HCC)      ENDO   (+) Diabetes mellitus type 2 in obese       NEURO/PSYCH   (+) Cerebrovascular accident (CVA) due to thrombosis of precerebral artery (HCC)      Cardiovascular and Mediastinum   (+) Chronic diastolic CHF (congestive heart failure) (HCC)      Other   (+) History of stroke   (+) Obesity, Class III, BMI 40-49.9 (morbid obesity) (HCC)      TTE 3/6/24      Left Ventricle: Left ventricular cavity size is normal. Wall thickness is mildly increased. There is mild concentric hypertrophy. The left ventricular ejection fraction is 60%. Systolic function is normal. Although no diagnostic regional wall motion abnormality was identified, this possibility cannot be completely excluded on the basis of this study.    Right Ventricle: Right ventricular cavity size is normal. Systolic function is normal.    Right Atrium: The atrium is mildly dilated.    Aortic Valve: There is aortic valve sclerosis.    Mitral Valve: There is annular calcification. There is mild regurgitation.    Tricuspid Valve: There is mild regurgitation.  Physical Exam    Airway    Mallampati score: II  TM Distance: >3 FB  Neck ROM: full     Dental       Cardiovascular      Pulmonary      Other Findings  post-pubertal.      Anesthesia Plan  ASA Score- 3     Anesthesia Type- IV sedation with anesthesia with ASA Monitors.         Additional Monitors:     Airway Plan:     Comment: Recent labs personally reviewed:  Lab Results       Component                Value               Date                       WBC                      15.36 (H)           03/07/2024                 HGB                      12.4                03/07/2024                 PLT                      404 (H)             03/07/2024            Lab Results       Component                Value               Date                       NA                        135 (L)             06/09/2015                 K                        4.9                 03/08/2024                 BUN                      40 (H)              03/08/2024                 CREATININE               1.18                03/08/2024                 GLUCOSE                  109                 06/09/2015            Lab Results       Component                Value               Date                       PTT                      48 (H)              03/07/2024             Lab Results       Component                Value               Date                       INR                      1.09                03/05/2024              Blood type     I, Sparkle Murray MD, have personally seen and evaluated the patient prior to anesthetic care.  I have reviewed the pre-anesthetic record, medical history, allergies, medications and any other medical records if appropriate to the anesthetic care.  If a CRNA is involved in the case, I have reviewed the CRNA assessment, if present, and agree. Patient consented for IV Sedation, general anesthesia as back up. Discussed risks of aspiration, IV infiltration, indications for conversion to general anesthesia. All questions and concerns addressed.     .       Plan Factors-Exercise tolerance (METS): >4 METS.    Chart reviewed. EKG reviewed. Imaging results reviewed. Existing labs reviewed. Patient summary reviewed.    Patient is not a current smoker.  Patient did not smoke on day of surgery.    Obstructive sleep apnea risk education given perioperatively.        Induction- intravenous.    Postoperative Plan-     Informed Consent- Anesthetic plan and risks discussed with patient.  I personally reviewed this patient with the CRNA. Discussed and agreed on the Anesthesia Plan with the CRNA..

## 2024-03-08 NOTE — ASSESSMENT & PLAN NOTE
New onset a-fib with RVR (3/5/24)  Cardiology consulted and appreciate their input.  Continue with metoprolol 100 mg BID  Transitioned from IV heparin to Eliquis.  I sent an Rx to pharmacy for price check.  Plan for PHU/DCCV today.

## 2024-03-08 NOTE — PROGRESS NOTES
"Progress Note - Jessica Jaquez 78 y.o. female MRN: [id number]    [facility] Encounter: [id number]      Assessment/Plan  Jessica Jaquez is a 78 y.o. year old female with past medical history of HFpEF, hx of CVA, DM2, HTN, multiple back surgeries for lumbar disc herniation, who presented for the left lower extremity pain found to have new diagnosis of atrial fibrillation.     #Afib, new diagnosis  [id number] (age X2, stroke x 2 ,female, CHF, hypertension, diabetes)  Patient found to have A-fib with RVR which was completely asymptomatic.   Echo shows LVEF 60% with mildly dilated right atrium  Patient cardioverted today to NSR.   -apixaban 5mg BID   -continue metoprolol 100mg daily.   -outpatient cardiology follow up.   -zio outpatient given she is asymptomatic when in afib.      #HTN  Home meds: metoprolol 100mg daily, losartan 50mg daily,  amiloride 2.5mg daily, lasix 40mg daily PRN     #HFpEF: takes lasix 40mg daily prn  at home.  She is euvolemic now.     #DM2     # Lumbar radiculopathy  Presented with severe leg pain which is improving now.   Neurosurgery following- no plan for acute intervention- they plan to see in follow up in one month.     #hx of stroke  Patient had an incidental stroke seen on imaging about 15 years ago after she was hypotensive and severely anemic in the setting of internal bleeding after ventral hernia repair.       Subjective:     Patient feeling well today. No lightheadedness or palpitations.   Objective:     Vitals: Blood pressure 141/65, pulse (!) 54, temperature (!) 97.4 °F (36.3 °C), resp. rate 18, height 5' 3\" (1.6 m), weight 105 kg (231 lb), SpO2 95%.,Body mass index is 40.92 kg/m².      Intake/Output Summary (Last 24 hours) at 3/8/2024 1644  Last data filed at 3/8/2024 1400  Gross per 24 hour   Intake 488.2 ml   Output 1100 ml   Net -611.8 ml       Physical Exam:   Constitutional:       Appearance: Normal appearance.   HENT:      Head: Normocephalic and " atraumatic.   Neck:      Vascular: No JVD   Cardiovascular:      Rate and Rhythm: RRR, no murmurs  Pulmonary:      Effort: CTA-b  Abdominal:      General: Abdomen is flat. Bowel sounds are normal.      Palpations: Abdomen is soft.   Musculoskeletal:     No edema  Skin:     General: Skin is warm.   Neurological:      Mental Status: alert and oriented to person, place, and time.   Psychiatric:         Behavior: Behavior normal.

## 2024-03-08 NOTE — ANESTHESIA POSTPROCEDURE EVALUATION
Post-Op Assessment Note    CV Status:  Unstable    Pain management: adequate       Mental Status:  Alert and awake   Hydration Status:  Euvolemic   PONV Controlled:  Controlled   Airway Patency:  Patent     Post Op Vitals Reviewed: Yes    No anethesia notable event occurred.    Staff: CRNA               BP      Temp      Pulse     Resp      SpO2

## 2024-03-08 NOTE — PHYSICAL THERAPY NOTE
Physical Therapy Cancellation Note     03/08/24 5868   Note Type   Note Type Cancelled Session   Cancel Reasons Patient off floor/test  (pt being transported off floor for scheduled PHU at lunchtime, PT to follow and treat as appropriate pending medical POC & LOS)         Sravan Farley, PTA     Wound Care (No Sutures): Petrolatum

## 2024-03-08 NOTE — PLAN OF CARE
Problem: Prexisting or High Potential for Compromised Skin Integrity  Goal: Skin integrity is maintained or improved  Description: INTERVENTIONS:  - Identify patients at risk for skin breakdown  - Assess and monitor skin integrity  - Assess and monitor nutrition and hydration status  - Monitor labs   - Assess for incontinence   - Turn and reposition patient  - Assist with mobility/ambulation  - Relieve pressure over bony prominences  - Avoid friction and shearing  - Provide appropriate hygiene as needed including keeping skin clean and dry  - Evaluate need for skin moisturizer/barrier cream  - Collaborate with interdisciplinary team   - Patient/family teaching  - Consider wound care consult   Outcome: Progressing     Problem: PAIN - ADULT  Goal: Verbalizes/displays adequate comfort level or baseline comfort level  Description: Interventions:  - Encourage patient to monitor pain and request assistance  - Assess pain using appropriate pain scale  - Administer analgesics based on type and severity of pain and evaluate response  - Implement non-pharmacological measures as appropriate and evaluate response  - Consider cultural and social influences on pain and pain management  - Notify physician/advanced practitioner if interventions unsuccessful or patient reports new pain  Outcome: Progressing     Problem: INFECTION - ADULT  Goal: Absence or prevention of progression during hospitalization  Description: INTERVENTIONS:  - Assess and monitor for signs and symptoms of infection  - Monitor lab/diagnostic results  - Monitor all insertion sites, i.e. indwelling lines, tubes, and drains  - Monitor endotracheal if appropriate and nasal secretions for changes in amount and color  - [place] appropriate cooling/warming therapies per order  - Administer medications as ordered  - Instruct and encourage patient and family to use good hand hygiene technique  - Identify and instruct in appropriate isolation precautions for  identified infection/condition  Outcome: Progressing  Goal: Absence of fever/infection during neutropenic period  Description: INTERVENTIONS:  - Monitor WBC    Outcome: Progressing     Problem: SAFETY ADULT  Goal: Patient will remain free of falls  Description: INTERVENTIONS:  - Educate patient/family on patient safety including physical limitations  - Instruct patient to call for assistance with activity   - Consult OT/PT to assist with strengthening/mobility   - Keep Call bell within reach  - Keep bed low and locked with side rails adjusted as appropriate  - Keep care items and personal belongings within reach  - Initiate and maintain comfort rounds  - Make Fall Risk Sign visible to staff  - Offer Toileting every 2 Hours, in advance of need  - Initiate/Maintain on alarm  - Obtain necessary fall risk management equipment:   - Apply yellow socks and bracelet for high fall risk patients  - Consider moving patient to room near nurses station  Outcome: Progressing  Goal: Maintain or return to baseline ADL function  Description: INTERVENTIONS:  -  Assess patient's ability to carry out ADLs; assess patient's baseline for ADL function and identify physical deficits which impact ability to perform ADLs (bathing, care of mouth/teeth, toileting, grooming, dressing, etc.)  - Assess/evaluate cause of self-care deficits   - Assess range of motion  - Assess patient's mobility; develop plan if impaired  - Assess patient's need for assistive devices and provide as appropriate  - Encourage maximum independence but intervene and supervise when necessary  - Involve family in performance of ADLs  - Assess for home care needs following discharge   - Consider OT consult to assist with ADL evaluation and planning for discharge  - Provide patient education as appropriate  Outcome: Progressing  Goal: Maintains/Returns to pre admission functional level  Description: INTERVENTIONS:  - Perform AM-PAC 6 Click Basic Mobility/ Daily Activity  assessment daily.  - Set and communicate daily mobility goal to care team and patient/family/caregiver.   - Collaborate with rehabilitation services on mobility goals if consulted  - Perform Range of Motion 3 times a day.  - Reposition patient every 3 hours.  - Dangle patient 3 times a day  - Stand patient 3 times a day  - Ambulate patient 3 times a day  - Out of bed to chair 3 times a day   - Out of bed for meals 3 times a day  - Out of bed for toileting  - Record patient progress and toleration of activity level   Outcome: Progressing     Problem: DISCHARGE PLANNING  Goal: Discharge to home or other facility with appropriate resources  Description: INTERVENTIONS:  - Identify barriers to discharge w/patient and caregiver  - Arrange for needed discharge resources and transportation as appropriate  - Identify discharge learning needs (meds, wound care, etc.)  - Arrange for interpretive services to assist at discharge as needed  - Refer to Case Management Department for coordinating discharge planning if the patient needs post-hospital services based on physician/advanced practitioner order or complex needs related to functional status, cognitive ability, or social support system  Outcome: Progressing     Problem: Knowledge Deficit  Goal: Patient/family/caregiver demonstrates understanding of disease process, treatment plan, medications, and discharge instructions  Description: Complete learning assessment and assess knowledge base.  Interventions:  - Provide teaching at level of understanding  - Provide teaching via preferred learning methods  Outcome: Progressing     Problem: CARDIOVASCULAR - ADULT  Goal: Maintains optimal cardiac output and hemodynamic stability  Description: INTERVENTIONS:  - Monitor I/O, vital signs and rhythm  - Monitor for S/S and trends of decreased cardiac output  - Administer and titrate ordered vasoactive medications to optimize hemodynamic stability  - Assess quality of pulses, skin color  and temperature  - Assess for signs of decreased coronary artery perfusion  - Instruct patient to report change in severity of symptoms  Outcome: Progressing  Goal: Absence of cardiac dysrhythmias or at baseline rhythm  Description: INTERVENTIONS:  - Continuous cardiac monitoring, vital signs, obtain 12 lead EKG if ordered  - Administer antiarrhythmic and heart rate control medications as ordered  - Monitor electrolytes and administer replacement therapy as ordered  Outcome: Progressing     Problem: HEMATOLOGIC - ADULT  Goal: Maintains hematologic stability  Description: INTERVENTIONS  - Assess for signs and symptoms of bleeding or hemorrhage  - Monitor labs  - Administer supportive blood products/factors as ordered and appropriate  Outcome: Progressing

## 2024-03-08 NOTE — CASE MANAGEMENT
Case Management Progress Note    Patient name Jessica Jaquez  Location [facility] 626/[facility] 626-01 MRN [id number]  : 1945 Date 3/8/2024       LOS (days): 3  Geometric Mean LOS (GMLOS) (days): 2.9  Days to GMLOS:-0.1        OBJECTIVE:        Current admission status: Inpatient  Preferred Pharmacy:   [facility]/pharmacy #[id number] - [place], [place] - [address]  [address] [place] [address]  [place] [address]  Phone: [phone] Fax: [phone]    [facility] [place] - [place] - [address]  [address]  [place] [address]  Phone: [phone] Fax: [phone]    Primary Care Provider: Jorden Holt MD    [facility]  Secondary Insurance:     PROGRESS NOTE: Chanel sent to [facility], price is 24.30. Patient aware and in agreement

## 2024-03-08 NOTE — TELEPHONE ENCOUNTER
3/8/24 - PT IN [facility]  3/29/24 SNPX W/SPINE SURGEON 4-6 WK HFU NO IMAGING    NAYELI [facility] [place] [facility]  Hi,  Can you please schedule this patient for an approximate 4-6 week hospital follow-up appointment?  Can be a clinical appointment with no new imaging.  Can be scheduled as a joint appointment with an AP and any spine surgeon.    Thank you,  Puja

## 2024-03-08 NOTE — PLAN OF CARE
Problem: Prexisting or High Potential for Compromised Skin Integrity  Goal: Skin integrity is maintained or improved  Description: INTERVENTIONS:  - Identify patients at risk for skin breakdown  - Assess and monitor skin integrity  - Assess and monitor nutrition and hydration status  - Monitor labs   - Assess for incontinence   - Turn and reposition patient  - Assist with mobility/ambulation  - Relieve pressure over bony prominences  - Avoid friction and shearing  - Provide appropriate hygiene as needed including keeping skin clean and dry  - Evaluate need for skin moisturizer/barrier cream  - Collaborate with interdisciplinary team   - Patient/family teaching  - Consider wound care consult   Outcome: Progressing     Problem: PAIN - ADULT  Goal: Verbalizes/displays adequate comfort level or baseline comfort level  Description: Interventions:  - Encourage patient to monitor pain and request assistance  - Assess pain using appropriate pain scale  - Administer analgesics based on type and severity of pain and evaluate response  - Implement non-pharmacological measures as appropriate and evaluate response  - Consider cultural and social influences on pain and pain management  - Notify physician/advanced practitioner if interventions unsuccessful or patient reports new pain  Outcome: Progressing     Problem: INFECTION - ADULT  Goal: Absence or prevention of progression during hospitalization  Description: INTERVENTIONS:  - Assess and monitor for signs and symptoms of infection  - Monitor lab/diagnostic results  - Monitor all insertion sites, i.e. indwelling lines, tubes, and drains  - Monitor endotracheal if appropriate and nasal secretions for changes in amount and color  - [place] appropriate cooling/warming therapies per order  - Administer medications as ordered  - Instruct and encourage patient and family to use good hand hygiene technique  - Identify and instruct in appropriate isolation precautions for  identified infection/condition  Outcome: Progressing  Goal: Absence of fever/infection during neutropenic period  Description: INTERVENTIONS:  - Monitor WBC    Outcome: Progressing     Problem: SAFETY ADULT  Goal: Patient will remain free of falls  Description: INTERVENTIONS:  - Educate patient/family on patient safety including physical limitations  - Instruct patient to call for assistance with activity   - Consult OT/PT to assist with strengthening/mobility   - Keep Call bell within reach  - Keep bed low and locked with side rails adjusted as appropriate  - Keep care items and personal belongings within reach  - Initiate and maintain comfort rounds  - Make Fall Risk Sign visible to staff  - Offer Toileting every 2 Hours, in advance of need  - Initiate/Maintain on alarm  - Obtain necessary fall risk management equipment:   - Apply yellow socks and bracelet for high fall risk patients  - Consider moving patient to room near nurses station  Outcome: Progressing  Goal: Maintain or return to baseline ADL function  Description: INTERVENTIONS:  -  Assess patient's ability to carry out ADLs; assess patient's baseline for ADL function and identify physical deficits which impact ability to perform ADLs (bathing, care of mouth/teeth, toileting, grooming, dressing, etc.)  - Assess/evaluate cause of self-care deficits   - Assess range of motion  - Assess patient's mobility; develop plan if impaired  - Assess patient's need for assistive devices and provide as appropriate  - Encourage maximum independence but intervene and supervise when necessary  - Involve family in performance of ADLs  - Assess for home care needs following discharge   - Consider OT consult to assist with ADL evaluation and planning for discharge  - Provide patient education as appropriate  Outcome: Progressing  Goal: Maintains/Returns to pre admission functional level  Description: INTERVENTIONS:  - Perform AM-PAC 6 Click Basic Mobility/ Daily Activity  assessment daily.  - Set and communicate daily mobility goal to care team and patient/family/caregiver.   - Collaborate with rehabilitation services on mobility goals if consulted  - Perform Range of Motion 3 times a day.  - Reposition patient every 3 hours.  - Dangle patient 3 times a day  - Stand patient 3 times a day  - Ambulate patient 3 times a day  - Out of bed to chair 3 times a day   - Out of bed for meals 3 times a day  - Out of bed for toileting  - Record patient progress and toleration of activity level   Outcome: Progressing     Problem: DISCHARGE PLANNING  Goal: Discharge to home or other facility with appropriate resources  Description: INTERVENTIONS:  - Identify barriers to discharge w/patient and caregiver  - Arrange for needed discharge resources and transportation as appropriate  - Identify discharge learning needs (meds, wound care, etc.)  - Arrange for interpretive services to assist at discharge as needed  - Refer to Case Management Department for coordinating discharge planning if the patient needs post-hospital services based on physician/advanced practitioner order or complex needs related to functional status, cognitive ability, or social support system  Outcome: Progressing     Problem: Knowledge Deficit  Goal: Patient/family/caregiver demonstrates understanding of disease process, treatment plan, medications, and discharge instructions  Description: Complete learning assessment and assess knowledge base.  Interventions:  - Provide teaching at level of understanding  - Provide teaching via preferred learning methods  Outcome: Progressing     Problem: CARDIOVASCULAR - ADULT  Goal: Maintains optimal cardiac output and hemodynamic stability  Description: INTERVENTIONS:  - Monitor I/O, vital signs and rhythm  - Monitor for S/S and trends of decreased cardiac output  - Administer and titrate ordered vasoactive medications to optimize hemodynamic stability  - Assess quality of pulses, skin color  and temperature  - Assess for signs of decreased coronary artery perfusion  - Instruct patient to report change in severity of symptoms  Outcome: Progressing  Goal: Absence of cardiac dysrhythmias or at baseline rhythm  Description: INTERVENTIONS:  - Continuous cardiac monitoring, vital signs, obtain 12 lead EKG if ordered  - Administer antiarrhythmic and heart rate control medications as ordered  - Monitor electrolytes and administer replacement therapy as ordered  Outcome: Progressing     Problem: HEMATOLOGIC - ADULT  Goal: Maintains hematologic stability  Description: INTERVENTIONS  - Assess for signs and symptoms of bleeding or hemorrhage  - Monitor labs  - Administer supportive blood products/factors as ordered and appropriate  Outcome: Progressing

## 2024-03-09 PROBLEM — N17.9 AKI (ACUTE KIDNEY INJURY) (HCC): Status: ACTIVE | Noted: 2024-03-09

## 2024-03-09 LAB
ANION GAP SERPL CALCULATED.3IONS-SCNC: 8 MMOL/L
BASOPHILS # BLD AUTO: 0.03 THOUSANDS/ÂΜL (ref 0–0.1)
BASOPHILS NFR BLD AUTO: 0 % (ref 0–1)
BUN SERPL-MCNC: 51 MG/DL (ref 5–25)
CALCIUM SERPL-MCNC: 9 MG/DL (ref 8.4–10.2)
CHLORIDE SERPL-SCNC: 100 MMOL/L (ref 96–108)
CO2 SERPL-SCNC: 24 MMOL/L (ref 21–32)
CREAT SERPL-MCNC: 1.38 MG/DL (ref 0.6–1.3)
EOSINOPHIL # BLD AUTO: 0.01 THOUSAND/ÂΜL (ref 0–0.61)
EOSINOPHIL NFR BLD AUTO: 0 % (ref 0–6)
ERYTHROCYTE [DISTWIDTH] IN BLOOD BY AUTOMATED COUNT: 13.7 % (ref 11.6–15.1)
GFR SERPL CREATININE-BSD FRML MDRD: 36 ML/MIN/1.73SQ M
GLUCOSE SERPL-MCNC: 144 MG/DL (ref 65–140)
GLUCOSE SERPL-MCNC: 158 MG/DL (ref 65–140)
GLUCOSE SERPL-MCNC: 193 MG/DL (ref 65–140)
GLUCOSE SERPL-MCNC: 271 MG/DL (ref 65–140)
GLUCOSE SERPL-MCNC: 296 MG/DL (ref 65–140)
HCT VFR BLD AUTO: 39.5 % (ref 34.8–46.1)
HGB BLD-MCNC: 12.8 G/DL (ref 11.5–15.4)
IMM GRANULOCYTES # BLD AUTO: 0.15 THOUSAND/UL (ref 0–0.2)
IMM GRANULOCYTES NFR BLD AUTO: 1 % (ref 0–2)
LYMPHOCYTES # BLD AUTO: 2.53 THOUSANDS/ÂΜL (ref 0.6–4.47)
LYMPHOCYTES NFR BLD AUTO: 14 % (ref 14–44)
MCH RBC QN AUTO: 27.8 PG (ref 26.8–34.3)
MCHC RBC AUTO-ENTMCNC: 32.4 G/DL (ref 31.4–37.4)
MCV RBC AUTO: 86 FL (ref 82–98)
MONOCYTES # BLD AUTO: 1.24 THOUSAND/ÂΜL (ref 0.17–1.22)
MONOCYTES NFR BLD AUTO: 7 % (ref 4–12)
NEUTROPHILS # BLD AUTO: 14.75 THOUSANDS/ÂΜL (ref 1.85–7.62)
NEUTS SEG NFR BLD AUTO: 78 % (ref 43–75)
NRBC BLD AUTO-RTO: 0 /100 WBCS
PLATELET # BLD AUTO: 411 THOUSANDS/UL (ref 149–390)
PMV BLD AUTO: 8.7 FL (ref 8.9–12.7)
POTASSIUM SERPL-SCNC: 5.2 MMOL/L (ref 3.5–5.3)
RBC # BLD AUTO: 4.6 MILLION/UL (ref 3.81–5.12)
SODIUM SERPL-SCNC: 132 MMOL/L (ref 135–147)
WBC # BLD AUTO: 18.71 THOUSAND/UL (ref 4.31–10.16)

## 2024-03-09 PROCEDURE — 85025 COMPLETE CBC W/AUTO DIFF WBC: CPT | Performed by: PHYSICIAN ASSISTANT

## 2024-03-09 PROCEDURE — 80048 BASIC METABOLIC PNL TOTAL CA: CPT | Performed by: PHYSICIAN ASSISTANT

## 2024-03-09 PROCEDURE — 82948 REAGENT STRIP/BLOOD GLUCOSE: CPT

## 2024-03-09 PROCEDURE — 99232 SBSQ HOSP IP/OBS MODERATE 35: CPT | Performed by: NURSE PRACTITIONER

## 2024-03-09 RX ADMIN — METHOCARBAMOL 500 MG: 500 TABLET ORAL at 23:37

## 2024-03-09 RX ADMIN — INSULIN LISPRO 4 UNITS: 100 INJECTION, SOLUTION INTRAVENOUS; SUBCUTANEOUS at 17:07

## 2024-03-09 RX ADMIN — AMLODIPINE BESYLATE 5 MG: 5 TABLET ORAL at 21:43

## 2024-03-09 RX ADMIN — BIMATOPROST 1 DROP: 0.3 SOLUTION/ DROPS OPHTHALMIC at 21:55

## 2024-03-09 RX ADMIN — GABAPENTIN 100 MG: 100 CAPSULE ORAL at 17:07

## 2024-03-09 RX ADMIN — APIXABAN 5 MG: 5 TABLET, FILM COATED ORAL at 17:07

## 2024-03-09 RX ADMIN — SENNOSIDES, DOCUSATE SODIUM 1 TABLET: 8.6; 5 TABLET ORAL at 21:43

## 2024-03-09 RX ADMIN — METOPROLOL SUCCINATE 100 MG: 100 TABLET, EXTENDED RELEASE ORAL at 08:23

## 2024-03-09 RX ADMIN — GABAPENTIN 100 MG: 100 CAPSULE ORAL at 08:23

## 2024-03-09 RX ADMIN — METHYLPREDNISOLONE 12 MG: 4 TABLET ORAL at 08:23

## 2024-03-09 RX ADMIN — LOSARTAN POTASSIUM 100 MG: 50 TABLET, FILM COATED ORAL at 08:23

## 2024-03-09 RX ADMIN — ACETAMINOPHEN 975 MG: 325 TABLET, FILM COATED ORAL at 05:27

## 2024-03-09 RX ADMIN — INSULIN LISPRO 4 UNITS: 100 INJECTION, SOLUTION INTRAVENOUS; SUBCUTANEOUS at 11:42

## 2024-03-09 RX ADMIN — EZETIMIBE 10 MG: 10 TABLET ORAL at 21:43

## 2024-03-09 RX ADMIN — ACETAMINOPHEN 975 MG: 325 TABLET, FILM COATED ORAL at 21:43

## 2024-03-09 RX ADMIN — AMILORIDE HYDROCLORIDE 2.5 MG: 5 TABLET ORAL at 08:23

## 2024-03-09 RX ADMIN — METHOCARBAMOL 500 MG: 500 TABLET ORAL at 11:27

## 2024-03-09 RX ADMIN — GABAPENTIN 100 MG: 100 CAPSULE ORAL at 21:44

## 2024-03-09 RX ADMIN — OXYCODONE HYDROCHLORIDE 5 MG: 5 TABLET ORAL at 11:28

## 2024-03-09 RX ADMIN — METHOCARBAMOL 500 MG: 500 TABLET ORAL at 17:07

## 2024-03-09 RX ADMIN — INSULIN LISPRO 1 UNITS: 100 INJECTION, SOLUTION INTRAVENOUS; SUBCUTANEOUS at 21:53

## 2024-03-09 RX ADMIN — METHOCARBAMOL 500 MG: 500 TABLET ORAL at 05:27

## 2024-03-09 RX ADMIN — APIXABAN 5 MG: 5 TABLET, FILM COATED ORAL at 08:23

## 2024-03-09 RX ADMIN — ASPIRIN 81 MG CHEWABLE TABLET 81 MG: 81 TABLET CHEWABLE at 08:22

## 2024-03-09 RX ADMIN — ACETAMINOPHEN 975 MG: 325 TABLET, FILM COATED ORAL at 13:22

## 2024-03-09 RX ADMIN — OXYCODONE HYDROCHLORIDE 5 MG: 5 TABLET ORAL at 02:19

## 2024-03-09 NOTE — ASSESSMENT & PLAN NOTE
Pt with somewhat chronic kidney disease , however today noted to have slight increase   Sbp remains stable   Would monitor medications   Chk labs in am   Pt does take diuretics at home with hx of chronic congestive heart failure

## 2024-03-09 NOTE — PLAN OF CARE
Problem: Prexisting or High Potential for Compromised Skin Integrity  Goal: Skin integrity is maintained or improved  Description: INTERVENTIONS:  - Identify patients at risk for skin breakdown  - Assess and monitor skin integrity  - Assess and monitor nutrition and hydration status  - Monitor labs   - Assess for incontinence   - Turn and reposition patient  - Assist with mobility/ambulation  - Relieve pressure over bony prominences  - Avoid friction and shearing  - Provide appropriate hygiene as needed including keeping skin clean and dry  - Evaluate need for skin moisturizer/barrier cream  - Collaborate with interdisciplinary team   - Patient/family teaching  - Consider wound care consult   Outcome: Progressing     Problem: PAIN - ADULT  Goal: Verbalizes/displays adequate comfort level or baseline comfort level  Description: Interventions:  - Encourage patient to monitor pain and request assistance  - Assess pain using appropriate pain scale  - Administer analgesics based on type and severity of pain and evaluate response  - Implement non-pharmacological measures as appropriate and evaluate response  - Consider cultural and social influences on pain and pain management  - Notify physician/advanced practitioner if interventions unsuccessful or patient reports new pain  Outcome: Progressing     Problem: INFECTION - ADULT  Goal: Absence or prevention of progression during hospitalization  Description: INTERVENTIONS:  - Assess and monitor for signs and symptoms of infection  - Monitor lab/diagnostic results  - Monitor all insertion sites, i.e. indwelling lines, tubes, and drains  - Monitor endotracheal if appropriate and nasal secretions for changes in amount and color  - [place] appropriate cooling/warming therapies per order  - Administer medications as ordered  - Instruct and encourage patient and family to use good hand hygiene technique  - Identify and instruct in appropriate isolation precautions for  identified infection/condition  Outcome: Progressing  Goal: Absence of fever/infection during neutropenic period  Description: INTERVENTIONS:  - Monitor WBC    Outcome: Progressing     Problem: SAFETY ADULT  Goal: Patient will remain free of falls  Description: INTERVENTIONS:  - Educate patient/family on patient safety including physical limitations  - Instruct patient to call for assistance with activity   - Consult OT/PT to assist with strengthening/mobility   - Keep Call bell within reach  - Keep bed low and locked with side rails adjusted as appropriate  - Keep care items and personal belongings within reach  - Initiate and maintain comfort rounds  - Make Fall Risk Sign visible to staff  - Offer Toileting every 2 Hours, in advance of need  - Initiate/Maintain on alarm  - Obtain necessary fall risk management equipment:   - Apply yellow socks and bracelet for high fall risk patients  - Consider moving patient to room near nurses station  Outcome: Progressing  Goal: Maintain or return to baseline ADL function  Description: INTERVENTIONS:  -  Assess patient's ability to carry out ADLs; assess patient's baseline for ADL function and identify physical deficits which impact ability to perform ADLs (bathing, care of mouth/teeth, toileting, grooming, dressing, etc.)  - Assess/evaluate cause of self-care deficits   - Assess range of motion  - Assess patient's mobility; develop plan if impaired  - Assess patient's need for assistive devices and provide as appropriate  - Encourage maximum independence but intervene and supervise when necessary  - Involve family in performance of ADLs  - Assess for home care needs following discharge   - Consider OT consult to assist with ADL evaluation and planning for discharge  - Provide patient education as appropriate  Outcome: Progressing  Goal: Maintains/Returns to pre admission functional level  Description: INTERVENTIONS:  - Perform AM-PAC 6 Click Basic Mobility/ Daily Activity  assessment daily.  - Set and communicate daily mobility goal to care team and patient/family/caregiver.   - Collaborate with rehabilitation services on mobility goals if consulted  - Perform Range of Motion 3 times a day.  - Reposition patient every 3 hours.  - Dangle patient 3 times a day  - Stand patient 3 times a day  - Ambulate patient 3 times a day  - Out of bed to chair 3 times a day   - Out of bed for meals 3 times a day  - Out of bed for toileting  - Record patient progress and toleration of activity level   Outcome: Progressing     Problem: DISCHARGE PLANNING  Goal: Discharge to home or other facility with appropriate resources  Description: INTERVENTIONS:  - Identify barriers to discharge w/patient and caregiver  - Arrange for needed discharge resources and transportation as appropriate  - Identify discharge learning needs (meds, wound care, etc.)  - Arrange for interpretive services to assist at discharge as needed  - Refer to Case Management Department for coordinating discharge planning if the patient needs post-hospital services based on physician/advanced practitioner order or complex needs related to functional status, cognitive ability, or social support system  Outcome: Progressing     Problem: Knowledge Deficit  Goal: Patient/family/caregiver demonstrates understanding of disease process, treatment plan, medications, and discharge instructions  Description: Complete learning assessment and assess knowledge base.  Interventions:  - Provide teaching at level of understanding  - Provide teaching via preferred learning methods  Outcome: Progressing     Problem: CARDIOVASCULAR - ADULT  Goal: Maintains optimal cardiac output and hemodynamic stability  Description: INTERVENTIONS:  - Monitor I/O, vital signs and rhythm  - Monitor for S/S and trends of decreased cardiac output  - Administer and titrate ordered vasoactive medications to optimize hemodynamic stability  - Assess quality of pulses, skin color  and temperature  - Assess for signs of decreased coronary artery perfusion  - Instruct patient to report change in severity of symptoms  Outcome: Progressing  Goal: Absence of cardiac dysrhythmias or at baseline rhythm  Description: INTERVENTIONS:  - Continuous cardiac monitoring, vital signs, obtain 12 lead EKG if ordered  - Administer antiarrhythmic and heart rate control medications as ordered  - Monitor electrolytes and administer replacement therapy as ordered  Outcome: Progressing     Problem: HEMATOLOGIC - ADULT  Goal: Maintains hematologic stability  Description: INTERVENTIONS  - Assess for signs and symptoms of bleeding or hemorrhage  - Monitor labs  - Administer supportive blood products/factors as ordered and appropriate  Outcome: Progressing

## 2024-03-09 NOTE — ASSESSMENT & PLAN NOTE
New onset a-fib with RVR (3/5/24)  Cardiology consulted and appreciate their input.  Continue with metoprolol 100 mg BID  Transitioned from IV heparin to Eliquis. Sent an Rx to pharmacy for price check.  Plan for PHU/DCCV completed with successful conversion  to sinus rhythm

## 2024-03-09 NOTE — ASSESSMENT & PLAN NOTE
Lab Results   Component Value Date    HGBA1C 6.9 (H) 01/22/2024     Recent Labs     03/08/24  2047 03/09/24  0728 03/09/24  1140 03/09/24  1609   POCGLU 261* 144* 271* 296*       Blood Sugar Average: Last 72 hrs:  (P) 243  Hold Glimepiride during hospitalization.  Resume at discharge.  Patient with steroid-induced hyperglycemia.  Continue SSI  Monitor blood sugars and adjust insulin accordingly

## 2024-03-09 NOTE — ASSESSMENT & PLAN NOTE
Presented with left groin pain radiating down LLE since end of January 2024, gradually worsening  H/o 3 spinal surgeries in 2010 (L3-4 decompression, fusion) for recurrent disc herniation with LLE radiculopathy  [facility] and gets corticosteroid injections every 3 months  Xray hip had shown mild degenerative changes  CT A/P - Diverticulosis coli with suggestion of trace stranding adjacent to the proximal sigmoid colon. No acute fracture. Chronic appearing seroma associated with the large anterior abdominal wall hernia mesh.  Improved pain control on current regimen:  Scheduled Acetaminophen 975 mg every 8 hours and Robaxin 500 mg every 6 hours.  Medrol dose pack  Gabapentin 100 mg TID  Neurosurgery consulted and will follow up with patient as an out-patient.  MRI lumbar spine: Disc and facet degenerative changes throughout the lumbar spine, most prominent at L4-5.

## 2024-03-09 NOTE — ASSESSMENT & PLAN NOTE
Wt Readings from Last 3 Encounters:   03/08/24 105 kg (231 lb)   05/16/22 105 kg (232 lb)   05/03/21 104 kg (230 lb)     Echo(12/28/18) - EF 60%, grade 1 diatolic dysfunction  Euvolemic  Continue Amiloride 2.5 mg daily  Also take Furosemide 20 mg and additional Amiloride 2.5 mg as needed at home for volume overload  Monitor volume status  TTE (3/6/24): EF 60%.  Diastolic function unable to be assessed secondary to A-fib  PHU scheduled today (3/8/24)

## 2024-03-09 NOTE — PROGRESS NOTES
[facility]  Progress Note  Name: Jessica Jaquez I  MRN: [id number]  Unit/Bed#: [facility]PHP 626-01 I Date of Admission: 3/4/2024   Date of Service: 3/9/2024 I Hospital Day: 4    Assessment/Plan   * New onset atrial fibrillation (HCC)  Assessment & Plan  New onset a-fib with RVR (3/5/24)  Cardiology consulted and appreciate their input.  Continue with metoprolol 100 mg BID  Transitioned from IV heparin to Eliquis. Sent an Rx to pharmacy for price check.  Plan for PHU/DCCV completed with successful conversion  to sinus rhythm     GIOVANNI (acute kidney injury) (HCC)  Assessment & Plan  Pt with somewhat chronic kidney disease , however today noted to have slight increase   Sbp remains stable   Would monitor medications   Chk labs in am   Pt does take diuretics at home with hx of chronic congestive heart failure     History of stroke  Assessment & Plan  Stable on ASA, Zetia  Does not tolerate statins    Lumbar radiculopathy  Assessment & Plan  Presented with left groin pain radiating down LLE since end of January 2024, gradually worsening  H/o 3 spinal surgeries in 2010 (L3-4 decompression, fusion) for recurrent disc herniation with LLE radiculopathy  Has been following with Dr Freedom bonilla at [facility] for bilateral knee OA and gets corticosteroid injections every 3 months  Xray hip had shown mild degenerative changes  CT A/P - Diverticulosis coli with suggestion of trace stranding adjacent to the proximal sigmoid colon. No acute fracture. Chronic appearing seroma associated with the large anterior abdominal wall hernia mesh.  Improved pain control on current regimen:  Scheduled Acetaminophen 975 mg every 8 hours and Robaxin 500 mg every 6 hours.  Medrol dose pack  Gabapentin 100 mg TID  Neurosurgery consulted and will follow up with patient as an out-patient.  MRI lumbar spine: Disc and facet degenerative changes throughout the lumbar spine, most prominent at L4-5.    Tremor of hands and  face  Assessment & Plan  Noted hx, previously followed with neurology few years ago  Not on medication     Chronic diastolic CHF (congestive heart failure) ([facility])  Assessment & Plan  Wt Readings from Last 3 Encounters:   03/08/24 105 kg (231 lb)   05/16/22 105 kg (232 lb)   05/03/21 104 kg (230 lb)     Echo(12/28/18) - EF 60%, grade 1 diatolic dysfunction  Euvolemic  Continue Amiloride 2.5 mg daily  Also take Furosemide 20 mg and additional Amiloride 2.5 mg as needed at home for volume overload  Monitor volume status  TTE (3/6/24): EF 60%.  Diastolic function unable to be assessed secondary to A-fib  PHU scheduled today (3/8/24)    Essential hypertension  Assessment & Plan  Stable on Amiloride 2.5 mg daily, Losartan 100 mg daily, Metoprolol succinate 100 mg daily, Amlodipine 5 mg daily.    Obesity, Class III, BMI 40-49.9 (morbid obesity) ([facility])  Assessment & Plan  Lifestyle modification    Diabetes mellitus type 2 in obese   Assessment & Plan  Lab Results   Component Value Date    HGBA1C 6.9 (H) 01/22/2024     Recent Labs     03/08/24  2047 03/09/24  0728 03/09/24  1140 03/09/24  1609   POCGLU 261* 144* 271* 296*       Blood Sugar Average: Last 72 hrs:  (P) 243  Hold Glimepiride during hospitalization.  Resume at discharge.  Patient with steroid-induced hyperglycemia.  Continue SSI  Monitor blood sugars and adjust insulin accordingly             VTE Pharmacologic Prophylaxis: VTE Score: 5 High Risk (Score >/= 5) - Pharmacological DVT Prophylaxis Ordered: apixaban (Eliquis). Sequential Compression Devices Ordered.    Mobility:   Basic Mobility Inpatient Raw Score: 16  JH-HLM Goal: 5: Stand one or more mins  JH-HLM Achieved: 6: Walk 10 steps or more  HLM Goal achieved. Continue to encourage appropriate mobility.    Patient Centered Rounds: I performed bedside rounds with nursing staff today.   Discussions with Specialists or Other Care Team Provider: nursing     Education and Discussions with Family / Patient: Patient  declined call to .     Total Time Spent on Date of Encounter in care of patient: 40 mins. This time was spent on one or more of the following: performing physical exam; counseling and coordination of care; obtaining or reviewing history; documenting in the medical record; reviewing/ordering tests, medications or procedures; communicating with other healthcare professionals and discussing with patient's family/caregivers.    Current Length of Stay: 4 day(s)  Current Patient Status: Inpatient   Certification Statement: The patient will continue to require additional inpatient hospital stay due to ongoing need to monitor renal function and pain   Discharge Plan: Anticipate discharge in 24-48 hrs to home with home services.    Code Status: Level 1 - Full Code    Subjective:   Extensive amount of time spent with patient.  She reports she still continues to have pain in left anterior to lateral upper thigh hip area.  At rest this is stable.  She reports edema significantly improved in lower extremity.  She denies any diarrhea/stool with blood or GI symptomology.  She is aware of history of diverticulosis she does not feel she has diverticulitis, patient is a nurse.   We discussed her voiding she does not report any urinary symptoms.  She is afraid of going back into atrial fibrillation status post her procedure.  She states she was never aware that she was in atrial for until she came here to the hospital for her back pain    Objective:     Vitals:   Temp (24hrs), Av.5 °F (36.4 °C), Min:97.3 °F (36.3 °C), Max:98 °F (36.7 °C)    Temp:  [97.3 °F (36.3 °C)-98 °F (36.7 °C)] 98 °F (36.7 °C)  HR:  [57-65] 57  Resp:  [18-20] 20  BP: (135-144)/(60-74) 136/70  SpO2:  [91 %-99 %] 99 %  Body mass index is 40.92 kg/m².     Input and Output Summary (last 24 hours):     Intake/Output Summary (Last 24 hours) at 3/9/2024 1623  Last data filed at 3/9/2024 1300  Gross per 24 hour   Intake 685 ml   Output 1250 ml   Net  -565 ml       Physical Exam:   Physical Exam  Constitutional:       General: She is not in acute distress.     Appearance: She is obese. She is not ill-appearing, toxic-appearing or diaphoretic.   HENT:      Head: Normocephalic and atraumatic.   Eyes:      General:         Right eye: No discharge.         Left eye: No discharge.   Cardiovascular:      Rate and Rhythm: Normal rate.      Heart sounds: No murmur heard.     No friction rub. No gallop.   Pulmonary:      Effort: No respiratory distress.      Breath sounds: No stridor. Rales present. No wheezing or rhonchi.   Chest:      Chest wall: No tenderness.   Abdominal:      General: There is no distension.      Palpations: There is no mass.      Tenderness: There is no abdominal tenderness. There is no guarding or rebound.      Hernia: No hernia is present.   Musculoskeletal:         General: No swelling, tenderness, deformity or signs of injury.      Right lower leg: No edema.      Left lower leg: No edema.   Skin:     Coloration: Skin is not jaundiced or pale.      Findings: No bruising, erythema, lesion or rash.   Neurological:      Mental Status: She is alert and oriented to person, place, and time.   Psychiatric:         Behavior: Behavior normal.          Additional Data:     Labs:  Results from last 7 days   Lab Units 03/09/24  0533   WBC Thousand/uL 18.71*   HEMOGLOBIN g/dL 12.8   HEMATOCRIT % 39.5   PLATELETS Thousands/uL 411*   NEUTROS PCT % 78*   LYMPHS PCT % 14   MONOS PCT % 7   EOS PCT % 0     Results from last 7 days   Lab Units 03/09/24  0533 03/06/24  0422 03/05/24  0645   SODIUM mmol/L 132*   < > 134*   POTASSIUM mmol/L 5.2   < > 4.3   CHLORIDE mmol/L 100   < > 102   CO2 mmol/L 24   < > 24   BUN mg/dL 51*   < > 32*   CREATININE mg/dL 1.38*   < > 0.91   ANION GAP mmol/L 8   < > 8   CALCIUM mg/dL 9.0   < > 8.8   ALBUMIN g/dL  --   --  3.4*   TOTAL BILIRUBIN mg/dL  --   --  0.42   ALK PHOS U/L  --   --  75   ALT U/L  --   --  15   AST U/L  --   --  15    GLUCOSE RANDOM mg/dL 158*   < > 212*    < > = values in this interval not displayed.     Results from last 7 days   Lab Units 24   INR  1.09     Results from last 7 days   Lab Units 24  1609 24  1140 24  0728 24  2047 24  1649 24  1128 24  0838 24  2109 24  1641 24  1149 24  0731 24  2115   POC GLUCOSE mg/dl 296* 271* 144* 261* 309* 183* 190* 354* 244* 184* 202* 319*               Lines/Drains:  Invasive Devices       Peripheral Intravenous Line  Duration             Peripheral IV 24 Left Antecubital 5 days    Peripheral IV 24 Right Antecubital <1 day              Drain  Duration             External Urinary Catheter 3 days                      Telemetry:  Telemetry Orders (From admission, onward)               24 Hour Telemetry Monitoring  Continuous x 24 Hours (Telem)           Question:  Reason for 24 Hour Telemetry  Answer:  Arrhythmias requiring acute medical intervention / PPM or ICD malfunction                     Telemetry Reviewed: Normal Sinus Rhythm  Indication for Continued Telemetry Use: Arrthymias requiring medical therapy             Imaging: Reviewed radiology reports from this admission including: MRI spine    Recent Cultures (last 7 days):         Last 24 Hours Medication List:   Current Facility-Administered Medications   Medication Dose Route Frequency Provider Last Rate    acetaminophen  975 mg Oral Q8H [facility] Lady Cherry MD      AMILoride  2.5 mg Oral Daily Lady Cherry MD      amLODIPine  5 mg Oral HS Lady Cherry MD      apixaban  5 mg Oral BID Shahram Mckinney MD      aspirin  81 mg Oral Daily Lady Cherry MD      bimatoprost  1 drop Both Eyes HS Lady Cherry MD      diazepam  5 mg Intravenous 30 min pre-procedure Aly Fierro MD      ezetimibe  10 mg Oral HS Lady Cherry MD      gabapentin  100 mg Oral TID Cathy Morgan PA-C      heparin (porcine)  4,400  Units Intravenous Q6H PRN Sabi Naik MD      heparin (porcine)  8,800 Units Intravenous Q6H PRN Sabi Naik MD      HYDROmorphone  0.2 mg Intravenous Q2H PRN Lady Cherry MD      insulin lispro  1-5 Units Subcutaneous HS Lady Cherry MD      insulin lispro  1-6 Units Subcutaneous TID AC Lady Cherry MD      losartan  100 mg Oral Daily Lady Cherry MD      methocarbamol  500 mg Oral Q6H NALINI Lady Cherry MD      [START ON 3/10/2024] methylPREDNISolone  8 mg Oral Daily Cathy Morgan PA-C      Followed by    [START ON 3/11/2024] methylPREDNISolone  4 mg Oral Daily Cathy Morgan PA-C      metoprolol  5 mg Intravenous Q6H PRN Sabi Naik MD      metoprolol succinate  100 mg Oral Daily Shahram Mckinney MD      ondansetron  4 mg Intravenous Q6H PRN Lady Cherry MD      oxyCODONE  2.5 mg Oral Q4H PRN Lady Cherry MD      Or    oxyCODONE  5 mg Oral Q4H PRN Lady Cherry MD      senna-docusate sodium  1 tablet Oral HS Lady Cherry MD          Today, Patient Was Seen By: VIJI Gomez    **Please Note: This note may have been constructed using a voice recognition system.**

## 2024-03-10 LAB
ALBUMIN SERPL BCP-MCNC: 3.4 G/DL (ref 3.5–5)
ALP SERPL-CCNC: 57 U/L (ref 34–104)
ALT SERPL W P-5'-P-CCNC: 15 U/L (ref 7–52)
ANION GAP SERPL CALCULATED.3IONS-SCNC: 6 MMOL/L
AST SERPL W P-5'-P-CCNC: 13 U/L (ref 13–39)
ATRIAL RATE: 141 BPM
ATRIAL RATE: 57 BPM
BASOPHILS # BLD AUTO: 0.02 THOUSANDS/ÂΜL (ref 0–0.1)
BASOPHILS NFR BLD AUTO: 0 % (ref 0–1)
BILIRUB SERPL-MCNC: 0.42 MG/DL (ref 0.2–1)
BUN SERPL-MCNC: 48 MG/DL (ref 5–25)
CALCIUM ALBUM COR SERPL-MCNC: 9.3 MG/DL (ref 8.3–10.1)
CALCIUM SERPL-MCNC: 8.8 MG/DL (ref 8.4–10.2)
CHLORIDE SERPL-SCNC: 103 MMOL/L (ref 96–108)
CO2 SERPL-SCNC: 22 MMOL/L (ref 21–32)
CREAT SERPL-MCNC: 1.14 MG/DL (ref 0.6–1.3)
EOSINOPHIL # BLD AUTO: 0.11 THOUSAND/ÂΜL (ref 0–0.61)
EOSINOPHIL NFR BLD AUTO: 1 % (ref 0–6)
ERYTHROCYTE [DISTWIDTH] IN BLOOD BY AUTOMATED COUNT: 13.8 % (ref 11.6–15.1)
GFR SERPL CREATININE-BSD FRML MDRD: 45 ML/MIN/1.73SQ M
GLUCOSE SERPL-MCNC: 144 MG/DL (ref 65–140)
GLUCOSE SERPL-MCNC: 168 MG/DL (ref 65–140)
GLUCOSE SERPL-MCNC: 183 MG/DL (ref 65–140)
GLUCOSE SERPL-MCNC: 221 MG/DL (ref 65–140)
GLUCOSE SERPL-MCNC: 286 MG/DL (ref 65–140)
HCT VFR BLD AUTO: 38.7 % (ref 34.8–46.1)
HGB BLD-MCNC: 12.8 G/DL (ref 11.5–15.4)
IMM GRANULOCYTES # BLD AUTO: 0.13 THOUSAND/UL (ref 0–0.2)
IMM GRANULOCYTES NFR BLD AUTO: 1 % (ref 0–2)
LYMPHOCYTES # BLD AUTO: 2.95 THOUSANDS/ÂΜL (ref 0.6–4.47)
LYMPHOCYTES NFR BLD AUTO: 18 % (ref 14–44)
MCH RBC QN AUTO: 28.3 PG (ref 26.8–34.3)
MCHC RBC AUTO-ENTMCNC: 33.1 G/DL (ref 31.4–37.4)
MCV RBC AUTO: 86 FL (ref 82–98)
MONOCYTES # BLD AUTO: 1.07 THOUSAND/ÂΜL (ref 0.17–1.22)
MONOCYTES NFR BLD AUTO: 7 % (ref 4–12)
NEUTROPHILS # BLD AUTO: 12.17 THOUSANDS/ÂΜL (ref 1.85–7.62)
NEUTS SEG NFR BLD AUTO: 73 % (ref 43–75)
NRBC BLD AUTO-RTO: 0 /100 WBCS
P AXIS: 11 DEGREES
PLATELET # BLD AUTO: 376 THOUSANDS/UL (ref 149–390)
PMV BLD AUTO: 8.8 FL (ref 8.9–12.7)
POTASSIUM SERPL-SCNC: 4.8 MMOL/L (ref 3.5–5.3)
PR INTERVAL: 150 MS
PROT SERPL-MCNC: 6.2 G/DL (ref 6.4–8.4)
QRS AXIS: -23 DEGREES
QRS AXIS: -24 DEGREES
QRS AXIS: -31 DEGREES
QRSD INTERVAL: 120 MS
QRSD INTERVAL: 120 MS
QRSD INTERVAL: 122 MS
QT INTERVAL: 328 MS
QT INTERVAL: 350 MS
QT INTERVAL: 406 MS
QTC INTERVAL: 395 MS
QTC INTERVAL: 471 MS
QTC INTERVAL: 494 MS
RBC # BLD AUTO: 4.52 MILLION/UL (ref 3.81–5.12)
SODIUM SERPL-SCNC: 131 MMOL/L (ref 135–147)
T WAVE AXIS: -2 DEGREES
T WAVE AXIS: -39 DEGREES
T WAVE AXIS: 122 DEGREES
VENTRICULAR RATE: 120 BPM
VENTRICULAR RATE: 124 BPM
VENTRICULAR RATE: 57 BPM
WBC # BLD AUTO: 16.45 THOUSAND/UL (ref 4.31–10.16)

## 2024-03-10 PROCEDURE — 80053 COMPREHEN METABOLIC PANEL: CPT | Performed by: NURSE PRACTITIONER

## 2024-03-10 PROCEDURE — 99232 SBSQ HOSP IP/OBS MODERATE 35: CPT | Performed by: NURSE PRACTITIONER

## 2024-03-10 PROCEDURE — 82948 REAGENT STRIP/BLOOD GLUCOSE: CPT

## 2024-03-10 PROCEDURE — 85025 COMPLETE CBC W/AUTO DIFF WBC: CPT | Performed by: NURSE PRACTITIONER

## 2024-03-10 PROCEDURE — 93010 ELECTROCARDIOGRAM REPORT: CPT | Performed by: INTERNAL MEDICINE

## 2024-03-10 RX ADMIN — AMLODIPINE BESYLATE 5 MG: 5 TABLET ORAL at 21:16

## 2024-03-10 RX ADMIN — GABAPENTIN 100 MG: 100 CAPSULE ORAL at 08:39

## 2024-03-10 RX ADMIN — APIXABAN 5 MG: 5 TABLET, FILM COATED ORAL at 08:39

## 2024-03-10 RX ADMIN — INSULIN LISPRO 2 UNITS: 100 INJECTION, SOLUTION INTRAVENOUS; SUBCUTANEOUS at 21:17

## 2024-03-10 RX ADMIN — ACETAMINOPHEN 975 MG: 325 TABLET, FILM COATED ORAL at 13:04

## 2024-03-10 RX ADMIN — ACETAMINOPHEN 975 MG: 325 TABLET, FILM COATED ORAL at 21:16

## 2024-03-10 RX ADMIN — LOSARTAN POTASSIUM 100 MG: 50 TABLET, FILM COATED ORAL at 08:39

## 2024-03-10 RX ADMIN — EZETIMIBE 10 MG: 10 TABLET ORAL at 21:16

## 2024-03-10 RX ADMIN — BIMATOPROST 1 DROP: 0.3 SOLUTION/ DROPS OPHTHALMIC at 21:17

## 2024-03-10 RX ADMIN — METHOCARBAMOL 500 MG: 500 TABLET ORAL at 17:34

## 2024-03-10 RX ADMIN — METHOCARBAMOL 500 MG: 500 TABLET ORAL at 12:24

## 2024-03-10 RX ADMIN — METHYLPREDNISOLONE 8 MG: 4 TABLET ORAL at 08:39

## 2024-03-10 RX ADMIN — ASPIRIN 81 MG CHEWABLE TABLET 81 MG: 81 TABLET CHEWABLE at 08:39

## 2024-03-10 RX ADMIN — INSULIN LISPRO 1 UNITS: 100 INJECTION, SOLUTION INTRAVENOUS; SUBCUTANEOUS at 08:40

## 2024-03-10 RX ADMIN — GABAPENTIN 100 MG: 100 CAPSULE ORAL at 21:16

## 2024-03-10 RX ADMIN — GABAPENTIN 100 MG: 100 CAPSULE ORAL at 17:34

## 2024-03-10 RX ADMIN — INSULIN LISPRO 1 UNITS: 100 INJECTION, SOLUTION INTRAVENOUS; SUBCUTANEOUS at 12:25

## 2024-03-10 RX ADMIN — APIXABAN 5 MG: 5 TABLET, FILM COATED ORAL at 17:34

## 2024-03-10 RX ADMIN — METOPROLOL SUCCINATE 100 MG: 100 TABLET, EXTENDED RELEASE ORAL at 08:39

## 2024-03-10 RX ADMIN — ACETAMINOPHEN 975 MG: 325 TABLET, FILM COATED ORAL at 05:19

## 2024-03-10 RX ADMIN — METHOCARBAMOL 500 MG: 500 TABLET ORAL at 05:20

## 2024-03-10 RX ADMIN — SENNOSIDES, DOCUSATE SODIUM 1 TABLET: 8.6; 5 TABLET ORAL at 21:16

## 2024-03-10 RX ADMIN — AMILORIDE HYDROCLORIDE 2.5 MG: 5 TABLET ORAL at 08:40

## 2024-03-10 NOTE — ASSESSMENT & PLAN NOTE
New onset a-fib with RVR (3/5/24)  Cardiology consulted and appreciate their input.  Continue with metoprolol 100 mg BID  Transitioned from IV heparin to Eliquis. Sent an Rx to pharmacy for price check.  Plan for PHU/DCCV completed with successful conversion  to sinus rhythm   3/10 at around 5am this morning pt with episode of tachycardia - pt reports sleeping at that time was not up to go to the bathroom . Did not have any palpitations or sob (pt has been asymptomatic during this whole time)

## 2024-03-10 NOTE — ASSESSMENT & PLAN NOTE
Pt with somewhat chronic kidney disease , however noted to have slight increase likely secondary to procedure   Sbp remains stable   Would monitor medications   Labs corrected this am continue current regimen   Pt does take diuretics at home with hx of chronic congestive heart failure

## 2024-03-10 NOTE — ASSESSMENT & PLAN NOTE
Presented with left groin pain radiating down LLE since end of January 2024, gradually worsening  H/o 3 spinal surgeries in 2010 (L3-4 decompression, fusion) for recurrent disc herniation with LLE radiculopathy  Has been following with Dr Freedom bonilla at [facility] for bilateral knee OA and gets corticosteroid injections every 3 months  Xray hip had shown mild degenerative changes  CT A/P - Diverticulosis coli with suggestion of trace stranding adjacent to the proximal sigmoid colon. No acute fracture. Chronic appearing seroma associated with the large anterior abdominal wall hernia mesh.  Improved pain control on current regimen:  Scheduled Acetaminophen 975 mg every 8 hours and Robaxin 500 mg every 6 hours.  Medrol dose pack  Gabapentin 100 mg TID  Neurosurgery consulted and will follow up with patient as an out-patient.  MRI lumbar spine: Disc and facet degenerative changes throughout the lumbar spine, most prominent at L4-5.

## 2024-03-10 NOTE — ASSESSMENT & PLAN NOTE
Wt Readings from Last 3 Encounters:   03/10/24 105 kg (231 lb)   05/16/22 105 kg (232 lb)   05/03/21 104 kg (230 lb)     Echo(12/28/18) - EF 60%, grade 1 diatolic dysfunction  Euvolemic  Continue Amiloride 2.5 mg daily  Also take Furosemide 20 mg and additional Amiloride 2.5 mg as needed at home for volume overload  Monitor volume status  TTE (3/6/24): EF 60%.  Diastolic function unable to be assessed secondary to A-fib  PHU scheduled today (3/8/24)

## 2024-03-10 NOTE — PLAN OF CARE
Problem: Prexisting or High Potential for Compromised Skin Integrity  Goal: Skin integrity is maintained or improved  Description: INTERVENTIONS:  - Identify patients at risk for skin breakdown  - Assess and monitor skin integrity  - Assess and monitor nutrition and hydration status  - Monitor labs   - Assess for incontinence   - Turn and reposition patient  - Assist with mobility/ambulation  - Relieve pressure over bony prominences  - Avoid friction and shearing  - Provide appropriate hygiene as needed including keeping skin clean and dry  - Evaluate need for skin moisturizer/barrier cream  - Collaborate with interdisciplinary team   - Patient/family teaching  - Consider wound care consult   Outcome: Progressing     Problem: PAIN - ADULT  Goal: Verbalizes/displays adequate comfort level or baseline comfort level  Description: Interventions:  - Encourage patient to monitor pain and request assistance  - Assess pain using appropriate pain scale  - Administer analgesics based on type and severity of pain and evaluate response  - Implement non-pharmacological measures as appropriate and evaluate response  - Consider cultural and social influences on pain and pain management  - Notify physician/advanced practitioner if interventions unsuccessful or patient reports new pain  Outcome: Progressing     Problem: INFECTION - ADULT  Goal: Absence or prevention of progression during hospitalization  Description: INTERVENTIONS:  - Assess and monitor for signs and symptoms of infection  - Monitor lab/diagnostic results  - Monitor all insertion sites, i.e. indwelling lines, tubes, and drains  - Monitor endotracheal if appropriate and nasal secretions for changes in amount and color  - Myrtle appropriate cooling/warming therapies per order  - Administer medications as ordered  - Instruct and encourage patient and family to use good hand hygiene technique  - Identify and instruct in appropriate isolation precautions for  identified infection/condition  Outcome: Progressing  Goal: Absence of fever/infection during neutropenic period  Description: INTERVENTIONS:  - Monitor WBC    Outcome: Progressing     Problem: SAFETY ADULT  Goal: Patient will remain free of falls  Description: INTERVENTIONS:  - Educate patient/family on patient safety including physical limitations  - Instruct patient to call for assistance with activity   - Consult OT/PT to assist with strengthening/mobility   - Keep Call bell within reach  - Keep bed low and locked with side rails adjusted as appropriate  - Keep care items and personal belongings within reach  - Initiate and maintain comfort rounds  - Make Fall Risk Sign visible to staff  - Offer Toileting every 2 Hours, in advance of need  - Initiate/Maintain on alarm  - Obtain necessary fall risk management equipment:   - Apply yellow socks and bracelet for high fall risk patients  - Consider moving patient to room near nurses station  Outcome: Progressing  Goal: Maintain or return to baseline ADL function  Description: INTERVENTIONS:  -  Assess patient's ability to carry out ADLs; assess patient's baseline for ADL function and identify physical deficits which impact ability to perform ADLs (bathing, care of mouth/teeth, toileting, grooming, dressing, etc.)  - Assess/evaluate cause of self-care deficits   - Assess range of motion  - Assess patient's mobility; develop plan if impaired  - Assess patient's need for assistive devices and provide as appropriate  - Encourage maximum independence but intervene and supervise when necessary  - Involve family in performance of ADLs  - Assess for home care needs following discharge   - Consider OT consult to assist with ADL evaluation and planning for discharge  - Provide patient education as appropriate  Outcome: Progressing  Goal: Maintains/Returns to pre admission functional level  Description: INTERVENTIONS:  - Perform AM-PAC 6 Click Basic Mobility/ Daily Activity  assessment daily.  - Set and communicate daily mobility goal to care team and patient/family/caregiver.   - Collaborate with rehabilitation services on mobility goals if consulted  - Perform Range of Motion 3 times a day.  - Reposition patient every 3 hours.  - Dangle patient 3 times a day  - Stand patient 3 times a day  - Ambulate patient 3 times a day  - Out of bed to chair 3 times a day   - Out of bed for meals 3 times a day  - Out of bed for toileting  - Record patient progress and toleration of activity level   Outcome: Progressing     Problem: DISCHARGE PLANNING  Goal: Discharge to home or other facility with appropriate resources  Description: INTERVENTIONS:  - Identify barriers to discharge w/patient and caregiver  - Arrange for needed discharge resources and transportation as appropriate  - Identify discharge learning needs (meds, wound care, etc.)  - Arrange for interpretive services to assist at discharge as needed  - Refer to Case Management Department for coordinating discharge planning if the patient needs post-hospital services based on physician/advanced practitioner order or complex needs related to functional status, cognitive ability, or social support system  Outcome: Progressing     Problem: Knowledge Deficit  Goal: Patient/family/caregiver demonstrates understanding of disease process, treatment plan, medications, and discharge instructions  Description: Complete learning assessment and assess knowledge base.  Interventions:  - Provide teaching at level of understanding  - Provide teaching via preferred learning methods  Outcome: Progressing     Problem: CARDIOVASCULAR - ADULT  Goal: Maintains optimal cardiac output and hemodynamic stability  Description: INTERVENTIONS:  - Monitor I/O, vital signs and rhythm  - Monitor for S/S and trends of decreased cardiac output  - Administer and titrate ordered vasoactive medications to optimize hemodynamic stability  - Assess quality of pulses, skin color  and temperature  - Assess for signs of decreased coronary artery perfusion  - Instruct patient to report change in severity of symptoms  Outcome: Progressing  Goal: Absence of cardiac dysrhythmias or at baseline rhythm  Description: INTERVENTIONS:  - Continuous cardiac monitoring, vital signs, obtain 12 lead EKG if ordered  - Administer antiarrhythmic and heart rate control medications as ordered  - Monitor electrolytes and administer replacement therapy as ordered  Outcome: Progressing     Problem: HEMATOLOGIC - ADULT  Goal: Maintains hematologic stability  Description: INTERVENTIONS  - Assess for signs and symptoms of bleeding or hemorrhage  - Monitor labs  - Administer supportive blood products/factors as ordered and appropriate  Outcome: Progressing

## 2024-03-10 NOTE — PROGRESS NOTES
[facility]  Progress Note  Name: Jessica Jaquez I  MRN: [id number]  Unit/Bed#: [facility] 626-01 I Date of Admission: 3/4/2024   Date of Service: 3/10/2024 I Hospital Day: 5    Assessment/Plan   * New onset atrial fibrillation (HCC)  Assessment & Plan  New onset a-fib with RVR (3/5/24)  Cardiology consulted and appreciate their input.  Continue with metoprolol 100 mg BID  Transitioned from IV heparin to Eliquis. Sent an Rx to pharmacy for price check.  Plan for PHU/DCCV completed with successful conversion  to sinus rhythm   3/10 at around 5am this morning pt with episode of tachycardia - pt reports sleeping at that time was not up to go to the bathroom . Did not have any palpitations or sob (pt has been asymptomatic during this whole time)     GIOVANNI (acute kidney injury) (HCC)  Assessment & Plan  Pt with somewhat chronic kidney disease , however noted to have slight increase likely secondary to procedure   Sbp remains stable   Would monitor medications   Labs corrected this am continue current regimen   Pt does take diuretics at home with hx of chronic congestive heart failure       History of stroke  Assessment & Plan  Stable on ASA, Zetia  Does not tolerate statins    Lumbar radiculopathy  Assessment & Plan  Presented with left groin pain radiating down LLE since end of January 2024, gradually worsening  H/o 3 spinal surgeries in 2010 (L3-4 decompression, fusion) for recurrent disc herniation with LLE radiculopathy  Has been following with [facility] Freedom bonilla [facility] for [facility] and gets corticosteroid injections every 3 months  Xray hip had shown mild degenerative changes  CT A/P - Diverticulosis coli with suggestion of trace stranding adjacent to the proximal sigmoid colon. No acute fracture. Chronic appearing seroma associated with the large anterior abdominal wall hernia mesh.  Improved pain control on current regimen:  Scheduled Acetaminophen 975 mg every 8 hours and  Robaxin 500 mg every 6 hours.  Medrol dose pack  Gabapentin 100 mg TID  Neurosurgery consulted and will follow up with patient as an out-patient.  MRI lumbar spine: Disc and facet degenerative changes throughout the lumbar spine, most prominent at L4-5.    Tremor of hands and face  Assessment & Plan  Noted hx, previously followed with neurology few years ago  Not on medication     Chronic diastolic CHF (congestive heart failure) ([facility])  Assessment & Plan  Wt Readings from Last 3 Encounters:   03/10/24 105 kg (231 lb)   05/16/22 105 kg (232 lb)   05/03/21 104 kg (230 lb)     Echo(12/28/18) - EF 60%, grade 1 diatolic dysfunction  Euvolemic  Continue Amiloride 2.5 mg daily  Also take Furosemide 20 mg and additional Amiloride 2.5 mg as needed at home for volume overload  Monitor volume status  TTE (3/6/24): EF 60%.  Diastolic function unable to be assessed secondary to A-fib  PHU scheduled today (3/8/24)    Essential hypertension  Assessment & Plan  Stable on Amiloride 2.5 mg daily, Losartan 100 mg daily, Metoprolol succinate 100 mg daily, Amlodipine 5 mg daily.    Obesity, Class III, BMI 40-49.9 (morbid obesity) ([facility])  Assessment & Plan  Lifestyle modification    Diabetes mellitus type 2 in obese   Assessment & Plan  Lab Results   Component Value Date    HGBA1C 6.9 (H) 01/22/2024     Recent Labs     03/09/24  1140 03/09/24  1609 03/09/24  2138 03/10/24  0822   POCGLU 271* 296* 193* 168*       Blood Sugar Average: Last 72 hrs:  (P) 243  Hold Glimepiride during hospitalization.  Resume at discharge.  Patient with steroid-induced hyperglycemia.  Continue SSI  Monitor blood sugars and adjust insulin accordingly             VTE Pharmacologic Prophylaxis: VTE Score: 5 High Risk (Score >/= 5) - Pharmacological DVT Prophylaxis Ordered: apixaban (Eliquis). Sequential Compression Devices Ordered.    Mobility:   Basic Mobility Inpatient Raw Score: 16  JH-HLM Goal: 5: Stand one or more mins  JH-HLM Achieved: 6: Walk 10 steps or  more  HLM Goal achieved. Continue to encourage appropriate mobility.    Patient Centered Rounds: I performed bedside rounds with nursing staff today.   Discussions with Specialists or Other Care Team Provider: nursing / EP cardiology     Education and Discussions with Family / Patient: Patient declined call to .     Total Time Spent on Date of Encounter in care of patient: 38 mins. This time was spent on one or more of the following: performing physical exam; counseling and coordination of care; obtaining or reviewing history; documenting in the medical record; reviewing/ordering tests, medications or procedures; communicating with other healthcare professionals and discussing with patient's family/caregivers.    Current Length of Stay: 5 day(s)  Current Patient Status: Inpatient   Certification Statement: The patient will continue to require additional inpatient hospital stay due to ongoing monitoring   Discharge Plan: Anticipate discharge tomorrow to home with home services.    Code Status: Level 1 - Full Code    Subjective:   Patient is resting in bed.  Discussion was in regards to tachycardia on telemetry.  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  She did not feel short of breath or any palpitations at all.  She was not up ambulating to the bathroom    Objective:     Vitals:   Temp (24hrs), Av.6 °F (36.4 °C), Min:97.1 °F (36.2 °C), Max:98 °F (36.7 °C)    Temp:  [97.1 °F (36.2 °C)-98 °F (36.7 °C)] 97.1 °F (36.2 °C)  HR:  [56-71] 68  Resp:  [16-20] 18  BP: (132-172)/(69-75) 160/73  SpO2:  [91 %-98 %] 96 %  Body mass index is 40.92 kg/m².     Input and Output Summary (last 24 hours):     Intake/Output Summary (Last 24 hours) at 3/10/2024 1034  Last data filed at 3/10/2024 0522  Gross per 24 hour   Intake 240 ml   Output 2300 ml   Net -2060 ml       Physical Exam:   Physical  Exam  Constitutional:       General: She is not in acute distress.     Appearance: She is obese. She is not ill-appearing, toxic-appearing or diaphoretic.   HENT:      Head: Normocephalic and atraumatic.      Mouth/Throat:      Pharynx: Oropharynx is clear.   Eyes:      General:         Right eye: No discharge.         Left eye: No discharge.   Cardiovascular:      Rate and Rhythm: Normal rate.      Heart sounds: No murmur heard.     No friction rub. No gallop.   Pulmonary:      Effort: No respiratory distress.      Breath sounds: No stridor. No wheezing, rhonchi or rales.   Chest:      Chest wall: No tenderness.   Abdominal:      General: There is no distension.      Palpations: There is no mass.      Tenderness: There is no abdominal tenderness. There is no guarding or rebound.      Hernia: No hernia is present.   Musculoskeletal:         General: No swelling, tenderness, deformity or signs of injury.      Right lower leg: No edema.      Left lower leg: No edema.   Skin:     Coloration: Skin is not jaundiced or pale.      Findings: No bruising, erythema, lesion or rash.   Neurological:      Mental Status: She is alert and oriented to person, place, and time.   Psychiatric:         Behavior: Behavior normal.          Additional Data:     Labs:  Results from last 7 days   Lab Units 03/10/24  0911   WBC Thousand/uL 16.45*   HEMOGLOBIN g/dL 12.8   HEMATOCRIT % 38.7   PLATELETS Thousands/uL 376   NEUTROS PCT % 73   LYMPHS PCT % 18   MONOS PCT % 7   EOS PCT % 1     Results from last 7 days   Lab Units 03/10/24  0911   SODIUM mmol/L 131*   POTASSIUM mmol/L 4.8   CHLORIDE mmol/L 103   CO2 mmol/L 22   BUN mg/dL 48*   CREATININE mg/dL 1.14   ANION GAP mmol/L 6   CALCIUM mg/dL 8.8   ALBUMIN g/dL 3.4*   TOTAL BILIRUBIN mg/dL 0.42   ALK PHOS U/L 57   ALT U/L 15   AST U/L 13   GLUCOSE RANDOM mg/dL 183*     Results from last 7 days   Lab Units 03/05/24 2008   INR  1.09     Results from last 7 days   Lab Units 03/10/24  0822  03/09/24  2138 03/09/24  1609 03/09/24  1140 03/09/24  0728 03/08/24  2047 03/08/24  1649 03/08/24  1128 03/08/24  0838 03/07/24  2109 03/07/24  1641 03/07/24  1149   POC GLUCOSE mg/dl 168* 193* 296* 271* 144* 261* 309* 183* 190* 354* 244* 184*               Lines/Drains:  Invasive Devices       Peripheral Intravenous Line  Duration             Peripheral IV 03/09/24 Right Antecubital <1 day              Drain  Duration             External Urinary Catheter 4 days                      Telemetry:  Telemetry Orders (From admission, onward)               24 Hour Telemetry Monitoring  Continuous x 24 Hours (Telem)        Question:  Reason for 24 Hour Telemetry  Answer:  Arrhythmias requiring acute medical intervention / PPM or ICD malfunction                     Telemetry Reviewed: Normal Sinus Rhythm  Indication for Continued Telemetry Use: Arrthymias requiring medical therapy             Imaging: No pertinent imaging reviewed.    Recent Cultures (last 7 days):         Last 24 Hours Medication List:   Current Facility-Administered Medications   Medication Dose Route Frequency Provider Last Rate    acetaminophen  975 mg Oral Q8H NALINI Lady Cherry MD      AMILoride  2.5 mg Oral Daily Lady Cherry MD      amLODIPine  5 mg Oral HS Lady Cherry MD      apixaban  5 mg Oral BID Shahram Mckinney MD      aspirin  81 mg Oral Daily Lady Cherry MD      bimatoprost  1 drop Both Eyes HS Lady Cherry MD      diazepam  5 mg Intravenous 30 min pre-procedure Aly Fierro MD      ezetimibe  10 mg Oral HS Lady Cherry MD      gabapentin  100 mg Oral TID Cathy Morgan PA-C      heparin (porcine)  4,400 Units Intravenous Q6H PRN Sabi Naki MD      heparin (porcine)  8,800 Units Intravenous Q6H PRN Sabi Naik MD      HYDROmorphone  0.2 mg Intravenous Q2H PRN Lady Cherry MD      insulin lispro  1-5 Units Subcutaneous HS Lady Cherry MD      insulin lispro  1-6 Units Subcutaneous TID  AC Lady Cherry MD      losartan  100 mg Oral Daily Lady Cherry MD      methocarbamol  500 mg Oral Q6H NALINI Lady Cherry MD      [START ON 3/11/2024] methylPREDNISolone  4 mg Oral Daily Cathy Morgan PA-C      metoprolol  5 mg Intravenous Q6H PRN Sabi Naik MD      metoprolol succinate  100 mg Oral Daily Shahram Mckinney MD      ondansetron  4 mg Intravenous Q6H PRN Lady Cherry MD      oxyCODONE  2.5 mg Oral Q4H PRN Lady Cherry MD      Or    oxyCODONE  5 mg Oral Q4H PRN Lady Cherry MD      senna-docusate sodium  1 tablet Oral HS Lady Cherry MD          Today, Patient Was Seen By: VIJI Gomez    **Please Note: This note may have been constructed using a voice recognition system.**

## 2024-03-11 VITALS
SYSTOLIC BLOOD PRESSURE: 115 MMHG | RESPIRATION RATE: 18 BRPM | OXYGEN SATURATION: 98 % | HEART RATE: 67 BPM | BODY MASS INDEX: 38.48 KG/M2 | TEMPERATURE: 97.5 F | DIASTOLIC BLOOD PRESSURE: 59 MMHG | HEIGHT: 63 IN | WEIGHT: 217.15 LBS

## 2024-03-11 PROBLEM — N17.9 AKI (ACUTE KIDNEY INJURY) (HCC): Status: RESOLVED | Noted: 2024-03-09 | Resolved: 2024-03-11

## 2024-03-11 LAB
GLUCOSE SERPL-MCNC: 136 MG/DL (ref 65–140)
GLUCOSE SERPL-MCNC: 223 MG/DL (ref 65–140)

## 2024-03-11 PROCEDURE — 99239 HOSP IP/OBS DSCHRG MGMT >30: CPT | Performed by: INTERNAL MEDICINE

## 2024-03-11 PROCEDURE — 82948 REAGENT STRIP/BLOOD GLUCOSE: CPT

## 2024-03-11 RX ORDER — OXYCODONE HYDROCHLORIDE 5 MG/1
5 TABLET ORAL EVERY 6 HOURS PRN
Qty: 10 TABLET | Refills: 0 | Status: SHIPPED | OUTPATIENT
Start: 2024-03-11 | End: 2024-03-19

## 2024-03-11 RX ORDER — GABAPENTIN 100 MG/1
100 CAPSULE ORAL 3 TIMES DAILY
Qty: 90 CAPSULE | Refills: 2 | Status: SHIPPED | OUTPATIENT
Start: 2024-03-11

## 2024-03-11 RX ORDER — AMLODIPINE BESYLATE 5 MG/1
5 TABLET ORAL
Qty: 60 TABLET | Refills: 0 | Status: SHIPPED | OUTPATIENT
Start: 2024-03-11 | End: 2024-03-19

## 2024-03-11 RX ORDER — EZETIMIBE 10 MG/1
10 TABLET ORAL
Qty: 60 TABLET | Refills: 0 | Status: SHIPPED | OUTPATIENT
Start: 2024-03-11

## 2024-03-11 RX ADMIN — ASPIRIN 81 MG CHEWABLE TABLET 81 MG: 81 TABLET CHEWABLE at 08:10

## 2024-03-11 RX ADMIN — METOPROLOL SUCCINATE 100 MG: 100 TABLET, EXTENDED RELEASE ORAL at 08:11

## 2024-03-11 RX ADMIN — LOSARTAN POTASSIUM 100 MG: 50 TABLET, FILM COATED ORAL at 08:10

## 2024-03-11 RX ADMIN — METHOCARBAMOL 500 MG: 500 TABLET ORAL at 05:20

## 2024-03-11 RX ADMIN — METHOCARBAMOL 500 MG: 500 TABLET ORAL at 12:23

## 2024-03-11 RX ADMIN — GABAPENTIN 100 MG: 100 CAPSULE ORAL at 08:10

## 2024-03-11 RX ADMIN — METHOCARBAMOL 500 MG: 500 TABLET ORAL at 00:06

## 2024-03-11 RX ADMIN — INSULIN LISPRO 2 UNITS: 100 INJECTION, SOLUTION INTRAVENOUS; SUBCUTANEOUS at 12:24

## 2024-03-11 RX ADMIN — AMILORIDE HYDROCLORIDE 2.5 MG: 5 TABLET ORAL at 08:12

## 2024-03-11 RX ADMIN — SODIUM CHLORIDE 500 ML: 0.9 INJECTION, SOLUTION INTRAVENOUS at 09:37

## 2024-03-11 RX ADMIN — ACETAMINOPHEN 975 MG: 325 TABLET, FILM COATED ORAL at 05:20

## 2024-03-11 RX ADMIN — METHYLPREDNISOLONE 4 MG: 4 TABLET ORAL at 08:10

## 2024-03-11 RX ADMIN — APIXABAN 5 MG: 5 TABLET, FILM COATED ORAL at 08:10

## 2024-03-11 NOTE — ASSESSMENT & PLAN NOTE
New onset a-fib with RVR (3/5/24)  Cardiology consulted and appreciate their input.  Continue with metoprolol 100 mg BID  Transitioned from IV heparin to Eliquis.  Price check completed  Plan for PHU/DCCV completed with successful conversion  to sinus rhythm   Outpatient cardiology follow-up

## 2024-03-11 NOTE — CASE MANAGEMENT
Case Management Discharge Planning Note    Patient name Jessica Jaquez  Location [facility] 626/[facility] 626-01 MRN [id number]  : 1945 Date 3/11/2024       Current Admission Date: 3/4/2024  Current Admission Diagnosis:New onset atrial fibrillation (HCC)   Patient Active Problem List    Diagnosis Date Noted    GIOVANNI (acute kidney injury) ([facility]) 2024    New onset atrial fibrillation (HCC) 2024    Lumbar radiculopathy 2024    History of stroke 2024    Open wound of abdomen 2021    Cerebrovascular accident (CVA) due to thrombosis of precerebral artery ([facility]) 2019    Unspecified abnormalities of gait and mobility 2018    Tremor of hands and face 2018    Benign essential tremor 2018    Hyperlipidemia 2018    Diabetes mellitus type 2 in obese  2018    Obesity, Class III, BMI 40-49.9 (morbid obesity) ([facility]) 2018    Essential hypertension 2018    Chronic diastolic CHF (congestive heart failure) ([facility]) 2018      LOS (days): 6  Geometric Mean LOS (GMLOS) (days): 2.9  Days to GMLOS:-2.9     OBJECTIVE:  Risk of Unplanned Readmission Score: 10.47         Current admission status: Inpatient   Preferred Pharmacy:   [facility]/pharmacy #[id number] - [place], [place] - [address] [facility]  [place] [address]  Phone: [phone] Fax: [phone]    [facility] [place] - [place] - [address]  [address]  [place] [address]  Phone: [phone] Fax: [phone]    Primary Care Provider: Jorden Holt MD    Primary Insurance: [employer] [facility]  Secondary Insurance:     DISCHARGE DETAILS:    IMM Given (Date):: 24  IMM Given to:: Patient     IMM reviewed with patient, patient agrees with discharge determination.

## 2024-03-11 NOTE — ASSESSMENT & PLAN NOTE
Lab Results   Component Value Date    HGBA1C 6.9 (H) 01/22/2024     Recent Labs     03/10/24  1717 03/10/24  2108 03/11/24  0806 03/11/24  1201   POCGLU 144* 286* 136 223*     Blood Sugar Average: Last 72 hrs:  (P) 243  Hold Glimepiride during hospitalization.  Resume at discharge.

## 2024-03-11 NOTE — PLAN OF CARE
Problem: Prexisting or High Potential for Compromised Skin Integrity  Goal: Skin integrity is maintained or improved  Description: INTERVENTIONS:  - Identify patients at risk for skin breakdown  - Assess and monitor skin integrity  - Assess and monitor nutrition and hydration status  - Monitor labs   - Assess for incontinence   - Turn and reposition patient  - Assist with mobility/ambulation  - Relieve pressure over bony prominences  - Avoid friction and shearing  - Provide appropriate hygiene as needed including keeping skin clean and dry  - Evaluate need for skin moisturizer/barrier cream  - Collaborate with interdisciplinary team   - Patient/family teaching  - Consider wound care consult   Outcome: Progressing     Problem: PAIN - ADULT  Goal: Verbalizes/displays adequate comfort level or baseline comfort level  Description: Interventions:  - Encourage patient to monitor pain and request assistance  - Assess pain using appropriate pain scale  - Administer analgesics based on type and severity of pain and evaluate response  - Implement non-pharmacological measures as appropriate and evaluate response  - Consider cultural and social influences on pain and pain management  - Notify physician/advanced practitioner if interventions unsuccessful or patient reports new pain  Outcome: Progressing     Problem: INFECTION - ADULT  Goal: Absence or prevention of progression during hospitalization  Description: INTERVENTIONS:  - Assess and monitor for signs and symptoms of infection  - Monitor lab/diagnostic results  - Monitor all insertion sites, i.e. indwelling lines, tubes, and drains  - Monitor endotracheal if appropriate and nasal secretions for changes in amount and color  - [place] appropriate cooling/warming therapies per order  - Administer medications as ordered  - Instruct and encourage patient and family to use good hand hygiene technique  - Identify and instruct in appropriate isolation precautions for  identified infection/condition  Outcome: Progressing     Problem: SAFETY ADULT  Goal: Patient will remain free of falls  Description: INTERVENTIONS:  - Educate patient/family on patient safety including physical limitations  - Instruct patient to call for assistance with activity   - Consult OT/PT to assist with strengthening/mobility   - Keep Call bell within reach  - Keep bed low and locked with side rails adjusted as appropriate  - Keep care items and personal belongings within reach  - Initiate and maintain comfort rounds  - Make Fall Risk Sign visible to staff  - Offer Toileting every 2 Hours, in advance of need  - Initiate/Maintain on alarm  - Obtain necessary fall risk management equipment:   - Apply yellow socks and bracelet for high fall risk patients  - Consider moving patient to room near nurses station  Outcome: Progressing

## 2024-03-11 NOTE — DISCHARGE SUMMARY
[facility]  Discharge- Jessica Jaquez 1945, 78 y.o. female MRN: [id number]  [facility] Encounter: [id number]  Primary Care Provider: Jorden Holt MD   Date and time admitted to hospital: 3/4/2024  1:41 PM    History of stroke  Assessment & Plan  Stable on ASA, Zetia  Does not tolerate statins    Lumbar radiculopathy  Assessment & Plan  Presented with left groin pain radiating down LLE since end of January 2024, gradually worsening  H/o 3 spinal surgeries in 2010 (L3-4 decompression, fusion) for recurrent disc herniation with LLE radiculopathy  Has been following with Dr Freedom bonilla at [facility] for bilateral knee OA and gets corticosteroid injections every 3 months  Xray hip had shown mild degenerative changes  CT A/P - Diverticulosis coli with suggestion of trace stranding adjacent to the proximal sigmoid colon. No acute fracture. Chronic appearing seroma associated with the large anterior abdominal wall hernia mesh.  Improved pain control on current regimen:  Scheduled Acetaminophen 975 mg every 8 hours and Robaxin 500 mg every 6 hours.  Medrol dose pack  Gabapentin 100 mg TID  Neurosurgery consulted and will follow up with patient as an out-patient.  MRI lumbar spine: Disc and facet degenerative changes throughout the lumbar spine, most prominent at L4-5.    Evaluated by PT/OT-going to home with outpatient PT  Outpatient neurosurgery follow-up  Pain regimen optimized      Tremor of hands and face  Assessment & Plan  Noted hx, previously followed with neurology few years ago  Not on medication     Chronic diastolic CHF (congestive heart failure) (HCC)  Assessment & Plan  Wt Readings from Last 3 Encounters:   03/11/24 98.5 kg (217 lb 2.5 oz)   05/16/22 105 kg (232 lb)   05/03/21 104 kg (230 lb)     Echo(12/28/18) - EF 60%, grade 1 diatolic dysfunction  Euvolemic  Continue Amiloride 2.5 mg daily  Also take Furosemide 20 mg and additional Amiloride 2.5 mg as  "needed at home for volume overload  Monitor volume status  TTE (3/6/24): EF 60%.  Diastolic function unable to be assessed secondary to A-fib  Status post PHU on 3/8    Essential hypertension  Assessment & Plan  Stable on Amiloride 2.5 mg daily, Losartan 100 mg daily, Metoprolol succinate 100 mg daily, Amlodipine 5 mg daily.  /59   Pulse 67   Temp 97.5 °F (36.4 °C)   Resp 18   Ht 5' 3\" (1.6 m)   Wt 98.5 kg (217 lb 2.5 oz)   SpO2 98%   BMI 38.47 kg/m²       Obesity, Class III, BMI 40-49.9 (morbid obesity) (HCC)  Assessment & Plan  Lifestyle modifications advised    Diabetes mellitus type 2 in obese   Assessment & Plan  Lab Results   Component Value Date    HGBA1C 6.9 (H) 01/22/2024     Recent Labs     03/10/24  1717 03/10/24  2108 03/11/24  0806 03/11/24  1201   POCGLU 144* 286* 136 223*     Blood Sugar Average: Last 72 hrs:  (P) 243  Hold Glimepiride during hospitalization.  Resume at discharge.      * New onset atrial fibrillation (HCC)  Assessment & Plan  New onset a-fib with RVR (3/5/24)  Cardiology consulted and appreciate their input.  Continue with metoprolol 100 mg BID  Transitioned from IV heparin to Eliquis.  Price check completed  Plan for PHU/DCCV completed with successful conversion  to sinus rhythm   Outpatient cardiology follow-up    GIOVANNI (acute kidney injury) (HCC)-resolved as of 3/11/2024  Assessment & Plan  Pt with somewhat chronic kidney disease , however noted to have slight increase likely secondary to procedure   Sbp remains stable   Would monitor medications   Labs corrected this am continue current regimen   Pt does take diuretics at home with hx of chronic congestive heart failure     Recent Labs     03/09/24  0533 03/10/24  0911   CREATININE 1.38* 1.14   EGFR 36 45     Estimated Creatinine Clearance: 44.7 mL/min (by C-G formula based on SCr of 1.14 mg/dL).                 Medical Problems       Resolved Problems  Date Reviewed: 3/11/2024            Resolved    GIOVANNI (acute kidney " injury) ([facility]) 3/11/2024     Resolved by  Natalie Dudley MD          Admission Date:   Admission Orders (From admission, onward)       Ordered        03/05/24 1522  Inpatient Admission  Once            03/04/24 1736  Place in Observation  Once                            Admitting Diagnosis: Lower back pain [M54.50]  Hip pain [M25.559]  Left hip pain [M25.552]  Ambulatory dysfunction [R26.2]    HPI: as per, Lady Cherry MD , on 3/4 Jessica Jaquez is a 78 y.o. 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 Her pain began in the end of January 2024 with left groin pain radiating down her left lower extremity to her foot. She was advised spinal evaluation by her orthopedic surgeon at visit on 2/22/24. He saw her again today and felt her pain was not hip related but she wanted to try a diagnostic injection. On going home after her injection her pain worsened and she was unable to walk on her own. Her son called EMS and she was brought to the ED. No bowel or bladder issues. No fever or chills. Intermittent chronic low back pain.     Procedures Performed: No orders of the defined types were placed in this encounter.      Summary of Hospital Course: Patient presented with acute on chronic lower back ache secondary to lumbar radiculopathy.  He was she was evaluated by neurosurgery.  Imaging was reviewed.  Her pain regimen was optimized.  She was recommended PT/OT and outpatient neurosurgery follow-up.  She was also noted to have new onset, A-fib with RVR and was evaluated by cardiology.  She was started on Eliquis after price check.  She remained hemodynamically stable throughout her stay.  She was noted to have moderate hyponatremia.  She presented with GIOVANNI that resolved with conservative  management.  She is advised to repeat BMP within 1 week.  Discussed results with PCP.  And resume diuretics on discharge.  For more details, please see daily progress notes.  Significant Findings, Care, Treatment and Services Provided: see above     Complications: none    Condition at Discharge: stable         Discharge instructions/Information to patient and family:   See after visit summary for information provided to patient and family.      Provisions for Follow-Up Care:  See after visit summary for information related to follow-up care and any pertinent home health orders.      PCP: Jorden Holt MD    Disposition: Home with [facility] and outpatient PT    Planned Readmission: No    Discharge Statement   I spent 36 minutes discharging the patient. This time was spent on the day of discharge. I had direct contact with the patient on the day of discharge. Additional documentation is required if more than 30 minutes were spent on discharge.     Discharge Medications:  See after visit summary for reconciled discharge medications provided to patient and family.

## 2024-03-11 NOTE — ASSESSMENT & PLAN NOTE
Wt Readings from Last 3 Encounters:   03/11/24 98.5 kg (217 lb 2.5 oz)   05/16/22 105 kg (232 lb)   05/03/21 104 kg (230 lb)     Echo(12/28/18) - EF 60%, grade 1 diatolic dysfunction  Euvolemic  Continue Amiloride 2.5 mg daily  Also take Furosemide 20 mg and additional Amiloride 2.5 mg as needed at home for volume overload  Monitor volume status  TTE (3/6/24): EF 60%.  Diastolic function unable to be assessed secondary to A-fib  Status post PHU on 3/8

## 2024-03-11 NOTE — DISCHARGE INSTR - AVS FIRST PAGE
Please see your PCP within 7 days of discharge to discuss recent hospitalization  Please repeat BMP within 1 week  Continue outpatient follow-up with cardiology and neurosurgery

## 2024-03-11 NOTE — CASE MANAGEMENT
Case Management Discharge Planning Note    Patient name Jessica Jaquez  Location [facility] 626/[facility] 626-01 MRN [id number]  : 1945 Date 3/11/2024       Current Admission Date: 3/4/2024  Current Admission Diagnosis:New onset atrial fibrillation (HCC)   Patient Active Problem List    Diagnosis Date Noted    GIOVANNI (acute kidney injury) (HCC) 2024    New onset atrial fibrillation (HCC) 2024    Lumbar radiculopathy 2024    History of stroke 2024    Open wound of abdomen 2021    Cerebrovascular accident (CVA) due to thrombosis of precerebral artery ([facility]) 2019    Unspecified abnormalities of gait and mobility 2018    Tremor of hands and face 2018    Benign essential tremor 2018    Hyperlipidemia 2018    Diabetes mellitus type 2 in obese  2018    Obesity, Class III, BMI 40-49.9 (morbid obesity) ([facility]) 2018    Essential hypertension 2018    Chronic diastolic CHF (congestive heart failure) ([facility]) 2018      LOS (days): 6  Geometric Mean LOS (GMLOS) (days): 2.9  Days to GMLOS:-2.9     OBJECTIVE:  Risk of Unplanned Readmission Score: 10.47         Current admission status: Inpatient   Preferred Pharmacy:   [facility]/pharmacy #[id number] - [place], [place] - [address]  [address] [facility] [place] [address]  Phone: [phone] Fax: [phone]    [facility] [place] - [address] [facility] [address]  [address] [facility] [address]  [place] [address]  Phone: [phone] Fax: [phone]    Primary Care Provider: Jorden Holt MD    Primary Insurance: [employer] [facility]  Secondary Insurance:     DISCHARGE DETAILS:    Requested Home Health Care         Is the patient interested in HHC at discharge?: Yes  Home Health Discipline requested:: Nursing, Occupational Therapy, Physical Therapy  Home Health Agency Name:: [facility]  Home Health Follow-Up Provider:: PCP  Home Health Services Needed:: Evaluate Functional Status and  Safety, Gait/ADL Training, Strengthening/Theraputic Exercises to Improve Function, Other (comment) (new onset afib, medication management, blood draws)  Homebound Criteria Met:: Requires the Assistance of Another Person for Safe Ambulation or to Leave the Home, Uses an Assist Device (i.e. cane, walker, etc)  Supporting Clincal Findings:: Fatigues Easliy in Short Distances, Limited Endurance    Message sent via [employer] to [facility], to add SN to referral.                                                        IMM Given (Date):: 03/11/24  IMM Given to:: Patient

## 2024-03-11 NOTE — ASSESSMENT & PLAN NOTE
"Stable on Amiloride 2.5 mg daily, Losartan 100 mg daily, Metoprolol succinate 100 mg daily, Amlodipine 5 mg daily.  /59   Pulse 67   Temp 97.5 °F (36.4 °C)   Resp 18   Ht 5' 3\" (1.6 m)   Wt 98.5 kg (217 lb 2.5 oz)   SpO2 98%   BMI 38.47 kg/m²     "

## 2024-03-11 NOTE — ASSESSMENT & PLAN NOTE
Presented with left groin pain radiating down LLE since end of January 2024, gradually worsening  H/o 3 spinal surgeries in 2010 (L3-4 decompression, fusion) for recurrent disc herniation with LLE radiculopathy  Has been following with Dr Freedom bonilla at [facility] for bilateral knee OA and gets corticosteroid injections every 3 months  Xray hip had shown mild degenerative changes  CT A/P - Diverticulosis coli with suggestion of trace stranding adjacent to the proximal sigmoid colon. No acute fracture. Chronic appearing seroma associated with the large anterior abdominal wall hernia mesh.  Improved pain control on current regimen:  Scheduled Acetaminophen 975 mg every 8 hours and Robaxin 500 mg every 6 hours.  Medrol dose pack  Gabapentin 100 mg TID  Neurosurgery consulted and will follow up with patient as an out-patient.  MRI lumbar spine: Disc and facet degenerative changes throughout the lumbar spine, most prominent at L4-5.    Evaluated by PT/OT-going to home with outpatient PT  Outpatient neurosurgery follow-up  Pain regimen optimized

## 2024-03-11 NOTE — ASSESSMENT & PLAN NOTE
Pt with somewhat chronic kidney disease , however noted to have slight increase likely secondary to procedure   Sbp remains stable   Would monitor medications   Labs corrected this am continue current regimen   Pt does take diuretics at home with hx of chronic congestive heart failure     Recent Labs     03/09/24  0533 03/10/24  0911   CREATININE 1.38* 1.14   EGFR 36 45     Estimated Creatinine Clearance: 44.7 mL/min (by C-G formula based on SCr of 1.14 mg/dL).

## 2024-03-12 ENCOUNTER — HOME CARE VISIT (OUTPATIENT)
Dept: HOME HEALTH SERVICES | Facility: HOME HEALTHCARE | Age: 79
End: 2024-03-12

## 2024-03-12 NOTE — PROGRESS NOTES
Spiritual Care Progress Note    3/12/2024  Patient: Jessica Jaquez : 1945  Admission Date & Time: 3/4/2024 1341  MRN: [id number] CSN: [id number]        Fr Nguyễn met with the pt and provided prayers and blessings. No further needs were expressed at this time.    Chaplains still remain available.       24 1300   Clinical Encounter Type   Visited With Patient   Mu-ism Encounters   Mu-ism Needs Prayer

## 2024-03-13 ENCOUNTER — HOME CARE VISIT (OUTPATIENT)
Dept: HOME HEALTH SERVICES | Facility: HOME HEALTHCARE | Age: 79
End: 2024-03-13
Payer: COMMERCIAL

## 2024-03-13 VITALS
SYSTOLIC BLOOD PRESSURE: 116 MMHG | HEART RATE: 69 BPM | TEMPERATURE: 97.2 F | OXYGEN SATURATION: 98 % | DIASTOLIC BLOOD PRESSURE: 70 MMHG

## 2024-03-13 PROCEDURE — G0151 HHCP-SERV OF PT,EA 15 MIN: HCPCS

## 2024-03-13 PROCEDURE — 400013 VN SOC

## 2024-03-14 ENCOUNTER — TRANSCRIBE ORDERS (OUTPATIENT)
Dept: LAB | Facility: CLINIC | Age: 79
End: 2024-03-14

## 2024-03-14 ENCOUNTER — APPOINTMENT (OUTPATIENT)
Dept: LAB | Facility: CLINIC | Age: 79
DRG: 291 | End: 2024-03-14
Payer: COMMERCIAL

## 2024-03-14 DIAGNOSIS — I50.9 HEART FAILURE, UNSPECIFIED HF CHRONICITY, UNSPECIFIED HEART FAILURE TYPE (HCC): Primary | ICD-10-CM

## 2024-03-14 LAB
ANION GAP SERPL CALCULATED.3IONS-SCNC: 8 MMOL/L (ref 4–13)
BUN SERPL-MCNC: 39 MG/DL (ref 5–25)
CALCIUM SERPL-MCNC: 9.3 MG/DL (ref 8.4–10.2)
CHLORIDE SERPL-SCNC: 105 MMOL/L (ref 96–108)
CO2 SERPL-SCNC: 22 MMOL/L (ref 21–32)
CREAT SERPL-MCNC: 1.21 MG/DL (ref 0.6–1.3)
GFR SERPL CREATININE-BSD FRML MDRD: 42 ML/MIN/1.73SQ M
GLUCOSE P FAST SERPL-MCNC: 90 MG/DL (ref 65–99)
POTASSIUM SERPL-SCNC: 4.7 MMOL/L (ref 3.5–5.3)
SODIUM SERPL-SCNC: 135 MMOL/L (ref 135–147)

## 2024-03-14 PROCEDURE — 36415 COLL VENOUS BLD VENIPUNCTURE: CPT

## 2024-03-14 PROCEDURE — 80048 BASIC METABOLIC PNL TOTAL CA: CPT

## 2024-03-14 NOTE — CASE COMMUNICATION
"[facility] has admitted your patient to Home Health service with the following disciplines: PT and OT  This report is informational only, no response is needed  Primary focus of home health care: emphasis on control over her CHF/Afib with daily weighing and increased mobility  Patient stated goals of care: \"I would like to be able to go out of my house and walk even if it's just a block or even down my driveway. I would like to be a ble to get back into my car without pain.\"  Anticipated visit pattern and next visit date 2w4. next visit 3/15/24  See medication list - major interaction between amiloride & losartan, apixaban & ibuprofen, apixaban & ketoconazole, oxycodone & ketoconazole, aspirin & apixaban, amiloride & klor-con M20  Significant clinical findings: decline in balance, LE weakness, disrupted gait pattern, heavy reliance on UE support during ambulation   Potential barriers to goal achievement: n/a    Thank you for allowing us to participate in the care of your patient.  "

## 2024-03-15 ENCOUNTER — APPOINTMENT (EMERGENCY)
Dept: RADIOLOGY | Facility: HOSPITAL | Age: 79
DRG: 291 | End: 2024-03-15
Payer: COMMERCIAL

## 2024-03-15 ENCOUNTER — HOME CARE VISIT (OUTPATIENT)
Dept: HOME HEALTH SERVICES | Facility: HOME HEALTHCARE | Age: 79
End: 2024-03-15
Payer: COMMERCIAL

## 2024-03-15 ENCOUNTER — HOSPITAL ENCOUNTER (INPATIENT)
Facility: HOSPITAL | Age: 79
LOS: 4 days | Discharge: HOME HEALTH | DRG: 291 | End: 2024-03-19
Attending: EMERGENCY MEDICINE | Admitting: FAMILY MEDICINE
Payer: COMMERCIAL

## 2024-03-15 DIAGNOSIS — R06.02 SOB (SHORTNESS OF BREATH): ICD-10-CM

## 2024-03-15 DIAGNOSIS — I50.9 CHF EXACERBATION (HCC): ICD-10-CM

## 2024-03-15 DIAGNOSIS — I50.9 ACUTE ON CHRONIC CONGESTIVE HEART FAILURE, UNSPECIFIED HEART FAILURE TYPE (HCC): ICD-10-CM

## 2024-03-15 DIAGNOSIS — I50.9 ACUTE CHF (CONGESTIVE HEART FAILURE) (HCC): Primary | ICD-10-CM

## 2024-03-15 PROBLEM — G25.0 BENIGN ESSENTIAL TREMOR: Status: RESOLVED | Noted: 2018-09-05 | Resolved: 2024-03-15

## 2024-03-15 LAB
2HR DELTA HS TROPONIN: 0 NG/L
4HR DELTA HS TROPONIN: 1 NG/L
ALBUMIN SERPL BCP-MCNC: 3.6 G/DL (ref 3.5–5)
ALP SERPL-CCNC: 68 U/L (ref 34–104)
ALT SERPL W P-5'-P-CCNC: 26 U/L (ref 7–52)
ANION GAP SERPL CALCULATED.3IONS-SCNC: 7 MMOL/L (ref 4–13)
AST SERPL W P-5'-P-CCNC: 23 U/L (ref 13–39)
ATRIAL RATE: 68 BPM
BASOPHILS # BLD AUTO: 0.04 THOUSANDS/ÂΜL (ref 0–0.1)
BASOPHILS NFR BLD AUTO: 1 % (ref 0–1)
BILIRUB SERPL-MCNC: 0.37 MG/DL (ref 0.2–1)
BUN SERPL-MCNC: 42 MG/DL (ref 5–25)
CALCIUM SERPL-MCNC: 9.1 MG/DL (ref 8.4–10.2)
CARDIAC TROPONIN I PNL SERPL HS: 8 NG/L
CARDIAC TROPONIN I PNL SERPL HS: 8 NG/L
CARDIAC TROPONIN I PNL SERPL HS: 9 NG/L
CHLORIDE SERPL-SCNC: 104 MMOL/L (ref 96–108)
CO2 SERPL-SCNC: 25 MMOL/L (ref 21–32)
CREAT SERPL-MCNC: 1.33 MG/DL (ref 0.6–1.3)
EOSINOPHIL # BLD AUTO: 0.54 THOUSAND/ÂΜL (ref 0–0.61)
EOSINOPHIL NFR BLD AUTO: 7 % (ref 0–6)
ERYTHROCYTE [DISTWIDTH] IN BLOOD BY AUTOMATED COUNT: 14.2 % (ref 11.6–15.1)
GFR SERPL CREATININE-BSD FRML MDRD: 38 ML/MIN/1.73SQ M
GLUCOSE SERPL-MCNC: 195 MG/DL (ref 65–140)
GLUCOSE SERPL-MCNC: 62 MG/DL (ref 65–140)
GLUCOSE SERPL-MCNC: 76 MG/DL (ref 65–140)
GLUCOSE SERPL-MCNC: 80 MG/DL (ref 65–140)
HCT VFR BLD AUTO: 40.5 % (ref 34.8–46.1)
HGB BLD-MCNC: 12.5 G/DL (ref 11.5–15.4)
IMM GRANULOCYTES # BLD AUTO: 0.03 THOUSAND/UL (ref 0–0.2)
IMM GRANULOCYTES NFR BLD AUTO: 0 % (ref 0–2)
LYMPHOCYTES # BLD AUTO: 1.49 THOUSANDS/ÂΜL (ref 0.6–4.47)
LYMPHOCYTES NFR BLD AUTO: 19 % (ref 14–44)
MCH RBC QN AUTO: 28.3 PG (ref 26.8–34.3)
MCHC RBC AUTO-ENTMCNC: 30.9 G/DL (ref 31.4–37.4)
MCV RBC AUTO: 92 FL (ref 82–98)
MONOCYTES # BLD AUTO: 0.59 THOUSAND/ÂΜL (ref 0.17–1.22)
MONOCYTES NFR BLD AUTO: 8 % (ref 4–12)
NEUTROPHILS # BLD AUTO: 5.22 THOUSANDS/ÂΜL (ref 1.85–7.62)
NEUTS SEG NFR BLD AUTO: 65 % (ref 43–75)
NRBC BLD AUTO-RTO: 0 /100 WBCS
P AXIS: 63 DEGREES
PLATELET # BLD AUTO: 298 THOUSANDS/UL (ref 149–390)
PMV BLD AUTO: 8.9 FL (ref 8.9–12.7)
POTASSIUM SERPL-SCNC: 4.2 MMOL/L (ref 3.5–5.3)
PR INTERVAL: 160 MS
PROT SERPL-MCNC: 6.9 G/DL (ref 6.4–8.4)
QRS AXIS: -17 DEGREES
QRSD INTERVAL: 120 MS
QT INTERVAL: 402 MS
QTC INTERVAL: 427 MS
RBC # BLD AUTO: 4.41 MILLION/UL (ref 3.81–5.12)
SODIUM SERPL-SCNC: 136 MMOL/L (ref 135–147)
T WAVE AXIS: -18 DEGREES
VENTRICULAR RATE: 68 BPM
WBC # BLD AUTO: 7.91 THOUSAND/UL (ref 4.31–10.16)

## 2024-03-15 PROCEDURE — 85025 COMPLETE CBC W/AUTO DIFF WBC: CPT | Performed by: EMERGENCY MEDICINE

## 2024-03-15 PROCEDURE — 36415 COLL VENOUS BLD VENIPUNCTURE: CPT

## 2024-03-15 PROCEDURE — 82948 REAGENT STRIP/BLOOD GLUCOSE: CPT

## 2024-03-15 PROCEDURE — 93005 ELECTROCARDIOGRAM TRACING: CPT

## 2024-03-15 PROCEDURE — 84484 ASSAY OF TROPONIN QUANT: CPT | Performed by: EMERGENCY MEDICINE

## 2024-03-15 PROCEDURE — 80053 COMPREHEN METABOLIC PANEL: CPT | Performed by: EMERGENCY MEDICINE

## 2024-03-15 PROCEDURE — 99223 1ST HOSP IP/OBS HIGH 75: CPT | Performed by: FAMILY MEDICINE

## 2024-03-15 PROCEDURE — 99285 EMERGENCY DEPT VISIT HI MDM: CPT | Performed by: EMERGENCY MEDICINE

## 2024-03-15 PROCEDURE — 93010 ELECTROCARDIOGRAM REPORT: CPT | Performed by: INTERNAL MEDICINE

## 2024-03-15 PROCEDURE — 71045 X-RAY EXAM CHEST 1 VIEW: CPT

## 2024-03-15 PROCEDURE — 99285 EMERGENCY DEPT VISIT HI MDM: CPT

## 2024-03-15 RX ORDER — FUROSEMIDE 10 MG/ML
100 INJECTION INTRAMUSCULAR; INTRAVENOUS 2 TIMES DAILY
Status: DISCONTINUED | OUTPATIENT
Start: 2024-03-15 | End: 2024-03-15

## 2024-03-15 RX ORDER — LOSARTAN POTASSIUM 50 MG/1
100 TABLET ORAL DAILY
Status: DISCONTINUED | OUTPATIENT
Start: 2024-03-15 | End: 2024-03-17

## 2024-03-15 RX ORDER — AMILORIDE HYDROCHLORIDE 5 MG/1
2.5 TABLET ORAL DAILY
Status: DISCONTINUED | OUTPATIENT
Start: 2024-03-15 | End: 2024-03-19 | Stop reason: HOSPADM

## 2024-03-15 RX ORDER — ACETAMINOPHEN 325 MG/1
650 TABLET ORAL EVERY 6 HOURS PRN
Status: DISCONTINUED | OUTPATIENT
Start: 2024-03-15 | End: 2024-03-19 | Stop reason: HOSPADM

## 2024-03-15 RX ORDER — EZETIMIBE 10 MG/1
10 TABLET ORAL
Status: DISCONTINUED | OUTPATIENT
Start: 2024-03-15 | End: 2024-03-19 | Stop reason: HOSPADM

## 2024-03-15 RX ORDER — GABAPENTIN 100 MG/1
100 CAPSULE ORAL 3 TIMES DAILY
Status: DISCONTINUED | OUTPATIENT
Start: 2024-03-15 | End: 2024-03-19 | Stop reason: HOSPADM

## 2024-03-15 RX ORDER — ASPIRIN 81 MG/1
81 TABLET, CHEWABLE ORAL DAILY
Status: DISCONTINUED | OUTPATIENT
Start: 2024-03-15 | End: 2024-03-19 | Stop reason: HOSPADM

## 2024-03-15 RX ORDER — INSULIN LISPRO 100 [IU]/ML
1-5 INJECTION, SOLUTION INTRAVENOUS; SUBCUTANEOUS
Status: DISCONTINUED | OUTPATIENT
Start: 2024-03-15 | End: 2024-03-19 | Stop reason: HOSPADM

## 2024-03-15 RX ORDER — BIMATOPROST 0.3 MG/ML
1 SOLUTION/ DROPS OPHTHALMIC DAILY
Status: DISCONTINUED | OUTPATIENT
Start: 2024-03-15 | End: 2024-03-19 | Stop reason: HOSPADM

## 2024-03-15 RX ORDER — FUROSEMIDE 10 MG/ML
100 INJECTION INTRAMUSCULAR; INTRAVENOUS 2 TIMES DAILY
Status: DISCONTINUED | OUTPATIENT
Start: 2024-03-16 | End: 2024-03-16

## 2024-03-15 RX ORDER — METOPROLOL SUCCINATE 100 MG/1
100 TABLET, EXTENDED RELEASE ORAL DAILY
Status: DISCONTINUED | OUTPATIENT
Start: 2024-03-15 | End: 2024-03-19 | Stop reason: HOSPADM

## 2024-03-15 RX ADMIN — AMILORIDE HYDROCLORIDE 2.5 MG: 5 TABLET ORAL at 17:48

## 2024-03-15 RX ADMIN — ACETAMINOPHEN 650 MG: 325 TABLET, FILM COATED ORAL at 18:31

## 2024-03-15 RX ADMIN — INSULIN LISPRO 1 UNITS: 100 INJECTION, SOLUTION INTRAVENOUS; SUBCUTANEOUS at 21:30

## 2024-03-15 RX ADMIN — FUROSEMIDE 100 MG: 10 INJECTION, SOLUTION INTRAMUSCULAR; INTRAVENOUS at 15:05

## 2024-03-15 RX ADMIN — APIXABAN 5 MG: 5 TABLET, FILM COATED ORAL at 18:31

## 2024-03-15 RX ADMIN — GABAPENTIN 100 MG: 100 CAPSULE ORAL at 18:31

## 2024-03-15 RX ADMIN — GABAPENTIN 100 MG: 100 CAPSULE ORAL at 23:24

## 2024-03-15 RX ADMIN — BIMATOPROST 1 DROP: 0.3 SOLUTION/ DROPS OPHTHALMIC at 21:29

## 2024-03-15 RX ADMIN — EZETIMIBE 10 MG: 10 TABLET ORAL at 21:30

## 2024-03-15 NOTE — ED ATTENDING ATTESTATION
3/15/2024  IGeovani DO, saw and evaluated the patient. I have discussed the patient with the resident/non-physician practitioner and agree with the resident's/non-physician practitioner's findings, Plan of Care, and MDM as documented in the resident's/non-physician practitioner's note, except where noted. All available labs and Radiology studies were reviewed.  I was present for key portions of any procedure(s) performed by the resident/non-physician practitioner and I was immediately available to provide assistance.       At this point I agree with the current assessment done in the Emergency Department.  I have conducted an independent evaluation of this patient a history and physical is as follows:    78-year-old female presents for complaint of acute on chronic dyspnea associated with 14 pound weight gain in 3 days, increased bilateral lower extremity edema, abdominal distention and general discomfort that is similar and consistent with recent admission for CHF exacerbation.  She presented for that hospitalization complaining of leg pain and was found to be in CHF with new onset atrial fibrillation with RVR for which she was given metoprolol, Eliquis and modified dosing of her Lasix and amiloride.  She was feeling well after losing 30 pounds during the hospital stay but had a rapid decline at home.  She reports cutting out extra salt in her diet.  She is unable to lay flat to sleep at night and sleeps in a recliner.  She does not use compression stockings.    No recent travel or sick contacts.    ROS: Denies f/c, HA, LH/dizziness, diaphoresis, abdominal pain, n/v/d. 12 system ROS o/w negative.     PE: Mild respiratory distress, appears uncomfortable, alert; PERRL, EOMI; MMM, no posterior oropharyngeal exudate, edema or erythema; HRR, no murmur, monitor shows sinus rhythm at 82 bpm; lungs audible wheezes at the bedside with additional coarse rales throughout the lung fields, POx 97% on RA (nl); abdomen  s/nt/nd, nl BS in all 4 quadrants; moderate, nonpitting, bilateral LE edema with mild, diffuse calf TTP, FROM extremities x4; skin p/w/d without bullae, weeping or disruption; CNs GI/NF, oriented.    MDM/DDx: Dyspnea/weight gain - acute on chronic CHF exacerbation, dysrhythmia, ACS/MI, less likely but at risk for pneumonia, pneumothorax or PE.     I independently reviewed and interpreted ordered labs and EKG from this encounter.    A/P: Will check cardiac w/u with BNP, treat symptoms with CHF protocol for Lasix, admit.    ED Course         Critical Care Time  Procedures

## 2024-03-15 NOTE — ASSESSMENT & PLAN NOTE
Wt Readings from Last 3 Encounters:   03/11/24 98.5 kg (217 lb 2.5 oz)   05/16/22 105 kg (232 lb)   05/03/21 104 kg (230 lb)     Recently admitted to the hospital earlier this month and was diuresed.  Since then patient has had an 18 pound increase.  Patient started feeling shortness of breath this Wednesday her PCP increased her Lasix from 40 mg daily to 40 mg twice a day  Patient experienced increasing shortness of breath initially on exertion now at rest.  As well as leg swelling.  Patient started on 100 mg of Lasix IV twice daily  Cardiology consulted  Strict intake and output  Daily weights  Cardiac diet  Follow-up morning labs including TSH and lipid panel

## 2024-03-15 NOTE — H&P
"[facility]  H&P  Name: Jessica Jaquez 78 y.o. female I MRN: [id number]  Unit/Bed#: QCE I Date of Admission: 3/15/2024   Date of Service: 3/15/2024 I Hospital Day: 0      Assessment/Plan   * CHF exacerbation (HCC)  Assessment & Plan  Wt Readings from Last 3 Encounters:   03/11/24 98.5 kg (217 lb 2.5 oz)   05/16/22 105 kg (232 lb)   05/03/21 104 kg (230 lb)     Recently admitted to the hospital earlier this month and was diuresed.  Since then patient has had an 18 pound increase.  Patient started feeling shortness of breath this Wednesday her PCP increased her Lasix from 40 mg daily to 40 mg twice a day  Patient experienced increasing shortness of breath initially on exertion now at rest.  As well as leg swelling.  Patient started on 100 mg of Lasix IV twice daily  Cardiology consulted  Strict intake and output  Daily weights  Cardiac diet  Follow-up morning labs including TSH and lipid panel          New onset atrial fibrillation (HCC)  Assessment & Plan  Rate controlled with metoprolol anticoagulated with Eliquis    Hyperlipidemia  Assessment & Plan  Continue Zetia    Essential hypertension  Assessment & Plan  Blood pressure at goal continue home medication    Diabetes mellitus type 2 in obese   Assessment & Plan  Lab Results   Component Value Date    HGBA1C 6.9 (H) 01/22/2024       No results for input(s): \"POCGLU\" in the last 72 hours.    Blood Sugar Average: Last 72 hrs:    Hold all oral antiglycemic medication  Start insulin sliding scale           VTE Pharmacologic Prophylaxis: VTE Score: 8 Moderate Risk (Score 3-4) - Pharmacological DVT Prophylaxis Ordered: apixaban (Eliquis).  Code Status: Level 3 - DNAR and DNI DNR/ DNI  Discussion with family: Updated  ( and daughter in law) at bedside.    Anticipated Length of Stay: Patient will be admitted on an inpatient basis with an anticipated length of stay of greater than 2 midnights secondary to CHF " exacerbation .    Total Time Spent on Date of Encounter in care of patient: 55 mins. This time was spent on one or more of the following: performing physical exam; counseling and coordination of care; obtaining or reviewing history; documenting in the medical record; reviewing/ordering tests, medications or procedures; communicating with other healthcare professionals and discussing with patient's family/caregivers.    Chief Complaint: Increasing shortness of breath and 18 pound weight gain    History of Present Illness:  Jessica Jaquez is a 78 y.o. female with a PMH of CHF, diabetes, hypertension, hyperlipidemia, atrial fibrillation who presents with shortness of breath.  Patient recently admitted to the hospital earlier this month was diuresed approximately 30 pounds.  Since getting home patient has gained 18 pounds and has been noticing a gradual shortness of breath.  Patient initially had shortness of breath on exertion now at rest starting Wednesday.  Patient's PCP increased her Lasix from 40 mg daily to 40 mg twice a day.  Patient also noticed bilateral leg edema.  Patient denies any chest pain nausea vomiting fever or chills.    Review of Systems:  Review of Systems   Constitutional:  Negative for chills and fever.   HENT:  Negative for ear pain and sore throat.    Eyes:  Negative for pain and visual disturbance.   Respiratory:  Positive for shortness of breath. Negative for cough.    Cardiovascular:  Positive for leg swelling. Negative for chest pain and palpitations.   Gastrointestinal:  Negative for abdominal pain and vomiting.   Genitourinary:  Negative for dysuria and hematuria.   Musculoskeletal:  Negative for arthralgias and back pain.   Skin:  Negative for color change and rash.   Neurological:  Negative for seizures and syncope.   All other systems reviewed and are negative.      Past Medical and Surgical History:   Past Medical History:   Diagnosis Date    Arthritis     Colorectal polyps      Constipation     Diabetes mellitus (HCC)     Diverticulosis of colon     Hiatal hernia     Hypertension     Increased urinary frequency     Lumbar disc disease     SOB (shortness of breath)     Stress incontinence     Vitamin D deficiency 07/27/2018       Past Surgical History:   Procedure Laterality Date    BACK SURGERY      CARPAL TUNNEL RELEASE Left     CATARACT EXTRACTION Bilateral     CHOLECYSTECTOMY      COLONOSCOPY W/ ENDOSCOPIC US N/A 2/24/2016    Procedure: ANAL ENDOSCOPIC U/S;  Surgeon: HOLLIS Washington MD;  Location: [facility];  Service:     HERNIA REPAIR      NJ COLONOSCOPY FLX DX W/COLLJ SPEC WHEN PFRMD N/A 2/24/2016    Procedure: COLONOSCOPY;  Surgeon: HOLLIS Washington MD;  Location: [facility];  Service: Colorectal    TONSILLECTOMY      TUBAL LIGATION         Meds/Allergies:  Prior to Admission medications    Medication Sig Start Date End Date Taking? Authorizing Provider   AMILoride 5 mg tablet Take 2.5 mg by mouth daily   Yes Historical Provider, MD   amLODIPine (NORVASC) 5 mg tablet Take 1 tablet (5 mg total) by mouth daily at bedtime 3/11/24  Yes Natalie Dudley MD   apixaban (ELIQUIS) 5 mg Take 1 tablet (5 mg total) by mouth 2 (two) times a day 3/8/24  Yes Cathy Wolfe PA-C   aspirin 81 mg chewable tablet Chew 81 mg daily   Yes Historical Provider, MD   bimatoprost (Lumigan) 0.01 % ophthalmic drops Administer 1 drop to both eyes daily at bedtime   Yes Historical Provider, MD   Cholecalciferol (VITAMIN D3) 1000 units CAPS Take by mouth daily     Yes Historical Provider, MD   ezetimibe (ZETIA) 10 mg tablet Take 1 tablet (10 mg total) by mouth daily at bedtime 3/11/24  Yes Natalie Dudley MD   furosemide (LASIX) 40 mg tablet Take 40 mg by mouth as needed     Yes Historical Provider, MD   gabapentin (NEURONTIN) 100 mg capsule Take 1 capsule (100 mg total) by mouth 3 (three) times a day 3/11/24  Yes Natalie Dudley MD   glimepiride (AMARYL) 1 mg tablet Take 1 mg by mouth every morning before breakfast   Yes  Historical Provider, MD   hydrocortisone 2.5 % cream as needed 6/11/18  Yes Historical Provider, MD   ibuprofen (MOTRIN) 200 mg tablet Take by mouth every 6 (six) hours as needed for mild pain   Yes Historical Provider, MD   ketoconazole (NIZORAL) 2 % cream as needed 6/11/18  Yes Historical Provider, MD   KLOR-CON M20 20 MEQ tablet Take 20 mEq by mouth as needed   4/23/18  Yes Historical Provider, MD   losartan (COZAAR) 50 mg tablet 100 mg    Yes Historical Provider, MD   metoprolol succinate (TOPROL-XL) 100 mg 24 hr tablet Take 100 mg by mouth daily 7/5/18  Yes Historical Provider, MD   Multiple Vitamins-Minerals (CENTRUM SILVER PO) multivitamin   Yes Historical Provider, MD   oxyCODONE (ROXICODONE) 5 immediate release tablet Take 1 tablet (5 mg total) by mouth every 6 (six) hours as needed for severe pain for up to 10 days Max Daily Amount: 20 mg  Patient not taking: Reported on 3/15/2024 3/11/24 3/21/24  Natalie Dudley MD     I have reviewed home medications with patient personally.    Allergies:   Allergies   Allergen Reactions    Other Cough and Sneezing     Seasonal allergies       Social History:  Marital Status: /Civil Union   Occupation: N/A  Patient Pre-hospital Living Situation: Home, With spouse  Patient Pre-hospital Level of Mobility: walks with walker  Patient Pre-hospital Diet Restrictions: Cardiac  Substance Use History:   Social History     Substance and Sexual Activity   Alcohol Use No     Social History     Tobacco Use   Smoking Status Never   Smokeless Tobacco Never     Social History     Substance and Sexual Activity   Drug Use No       Family History:  Family History   Problem Relation Age of Onset    Hypertension Mother     Heart attack Mother     Diabetes Mother     Prostate cancer Father     No Known Problems Brother     Stroke Maternal Grandmother     Stroke Paternal Grandmother     Stroke Paternal Grandfather     Thyroid cancer Neg Hx        Physical Exam:     Vitals:   Blood Pressure:  131/91 (03/15/24 1540)  Pulse: 62 (03/15/24 1540)  Temperature: 97.8 °F (36.6 °C) (03/15/24 1243)  Temp Source: Temporal (03/15/24 1243)  Respirations: 20 (03/15/24 1540)  SpO2: 97 % (03/15/24 1540)    Physical Exam  Vitals reviewed.   Constitutional:       General: She is not in acute distress.     Appearance: She is not ill-appearing.   HENT:      Head: Normocephalic.   Eyes:      Conjunctiva/sclera: Conjunctivae normal.   Cardiovascular:      Rate and Rhythm: Normal rate and regular rhythm.      Heart sounds: Murmur heard.   Pulmonary:      Effort: Pulmonary effort is normal.      Breath sounds: Rales present.   Abdominal:      General: Abdomen is flat.      Palpations: Abdomen is soft.      Tenderness: There is no abdominal tenderness.   Musculoskeletal:      Right lower leg: Edema present.      Left lower leg: Edema present.   Skin:     General: Skin is warm and dry.   Neurological:      Mental Status: She is alert. Mental status is at baseline.          Additional Data:     Lab Results:  Results from last 7 days   Lab Units 03/15/24  1308   WBC Thousand/uL 7.91   HEMOGLOBIN g/dL 12.5   HEMATOCRIT % 40.5   PLATELETS Thousands/uL 298   NEUTROS PCT % 65   LYMPHS PCT % 19   MONOS PCT % 8   EOS PCT % 7*     Results from last 7 days   Lab Units 03/15/24  1308   SODIUM mmol/L 136   POTASSIUM mmol/L 4.2   CHLORIDE mmol/L 104   CO2 mmol/L 25   BUN mg/dL 42*   CREATININE mg/dL 1.33*   ANION GAP mmol/L 7   CALCIUM mg/dL 9.1   ALBUMIN g/dL 3.6   TOTAL BILIRUBIN mg/dL 0.37   ALK PHOS U/L 68   ALT U/L 26   AST U/L 23   GLUCOSE RANDOM mg/dL 76         Results from last 7 days   Lab Units 03/11/24  1201 03/11/24  0806 03/10/24  2108 03/10/24  1717 03/10/24  1231 03/10/24  0822 03/09/24  2138 03/09/24  1609 03/09/24  1140 03/09/24  0728 03/08/24  2047 03/08/24  1649   POC GLUCOSE mg/dl 223* 136 286* 144* 221* 168* 193* 296* 271* 144* 261* 309*               Lines/Drains:  Invasive Devices       Peripheral Intravenous Line   Duration             Peripheral IV 03/15/24 Right Antecubital <1 day              Drain  Duration             External Urinary Catheter 9 days                        Imaging: Personally reviewed the following imaging: chest xray  XR chest 1 view portable   ED Interpretation by Geovani Barber DO (03/15 4369)   Poor penetration, poor inspiration, cephalization concerning for fluid overload          EKG and Other Studies Reviewed on Admission:   EKG: NSR. HR 68 personally reviewed EKG.    ** Please Note: This note has been constructed using a voice recognition system. **

## 2024-03-15 NOTE — CASE MANAGEMENT
Case Management Assessment & Discharge Planning Note    Patient name Jessica Jaquez  Location [facility] MRN [id number]  : 1945 Date 3/15/2024       Current Admission Date: 3/15/2024  Current Admission Diagnosis:CHF exacerbation (HCC)   Patient Active Problem List    Diagnosis Date Noted    CHF exacerbation (HCC) 03/15/2024    New onset atrial fibrillation (HCC) 2024    Lumbar radiculopathy 2024    History of stroke 2024    Open wound of abdomen 2021    Cerebrovascular accident (CVA) due to thrombosis of precerebral artery (HCC) 2019    Unspecified abnormalities of gait and mobility 2018    Tremor of hands and face 2018    Benign essential tremor 2018    Hyperlipidemia 2018    Diabetes mellitus type 2 in obese  2018    Obesity, Class III, BMI 40-49.9 (morbid obesity) ([facility]) 2018    Essential hypertension 2018    Chronic diastolic CHF (congestive heart failure) ([facility]) 2018      LOS (days): 0  Geometric Mean LOS (GMLOS) (days):   Days to GMLOS:     OBJECTIVE:  PATIENT READMITTED TO HOSPITAL            Current admission status: Inpatient       Preferred Pharmacy:   [facility]/pharmacy #[id number] - [place], [place] - [address]  [address]  [place] [address]  Phone: [phone] Fax: [phone]    [facility] [place] - [place] - [address]  [address]  [place] [address]  Phone: [phone] Fax: [phone]    Primary Care Provider: Jorden Holt MD    Primary Insurance: [facility] [employer] [facility]  Secondary Insurance:     ASSESSMENT:  Active Health Care Proxies    There are no active Health Care Proxies on file.         Readmission Root Cause  30 Day Readmission: Yes  Who directed you to return to the hospital?: PCP  Did you understand whom to contact if you had questions or problems?: Yes  Did you get your prescriptions before you left the hospital?: Yes  Were you able to get your  prescriptions filled when you left the hospital?: Yes  Did you take your medications as prescribed?: Yes  Were you able to get to your follow-up appointments?: Yes  During previous admission, was a post-acute recommendation made?: Yes  What post-acute resources were offered?: HHC, STR  Patient was readmitted due to: CHF exacerbation  Action Plan: TBD    Patient Information  Admitted from:: Home  Mental Status: Alert  During Assessment patient was accompanied by: Daughter, Spouse  Assessment information provided by:: Patient  Primary Caregiver: Self  Support Systems: Self, Family members, Spouse/significant other, Son  County of Residence: [place]  What city do you live in?: [place]  Home entry access options. Select all that apply.: Stairs  Number of steps to enter home.: 6  Do the steps have railings?: Yes  Type of Current Residence: Bi-level  Upon entering residence, is there a bedroom on the main floor (no further steps)?: No  A bedroom is located on the following floor levels of residence (select all that apply):: 2nd Floor  Upon entering residence, is there a bathroom on the main floor (no further steps)?: Yes  Number of steps to 2nd floor from main floor: 6  Living Arrangements: Lives w/ Spouse/significant other  Is patient a ?: No    Activities of Daily Living Prior to Admission  Functional Status: Independent  Completes ADLs independently?: Yes  Ambulates independently?: Yes  Does patient use assisted devices?: Yes  Assisted Devices (DME) used: Walker  Does patient currently own DME?: Yes  What DME does the patient currently own?: Walker  Does patient have a history of Outpatient Therapy (PT/OT)?: Yes  Does the patient have a history of Short-Term Rehab?: No  Does patient have a history of HHC?: Yes  Does patient currently have HHC?: Yes    Current Home Health Care  Type of Current Home Care Services: Home PT, Home OT, Nurse visit  Current Home Health Agency:: [facility]  Current Home Health  Follow-Up Provider:: PCP    Patient Information Continued  Income Source: Pension/detention  Does patient have prescription coverage?: Yes  Does patient receive dialysis treatments?: No  Does patient have a history of substance abuse?: No  Does patient have a history of Mental Health Diagnosis?: No    Means of Transportation  Means of Transport to Appts:: Drives Self      Social Determinants of Health (SDOH)      Flowsheet Row Most Recent Value   Housing Stability    In the last 12 months, was there a time when you were not able to pay the mortgage or rent on time? N   In the last 12 months, how many places have you lived? 1   In the last 12 months, was there a time when you did not have a steady place to sleep or slept in a shelter (including now)? N   Transportation Needs    In the past 12 months, has lack of transportation kept you from medical appointments or from getting medications? no   In the past 12 months, has lack of transportation kept you from meetings, work, or from getting things needed for daily living? No   Food Insecurity    Within the past 12 months, you worried that your food would run out before you got the money to buy more. Never true   Within the past 12 months, the food you bought just didn't last and you didn't have money to get more. Never true   Utilities    In the past 12 months has the electric, gas, oil, or water company threatened to shut off services in your home? No            DISCHARGE DETAILS:    Discharge planning discussed with:: Pt, spouse, and daughter-in-law at bedside  [place] of Choice: Yes     CM contacted family/caregiver?: Yes      Contacts  Patient Contacts: Percy Jaquez (spouse), Shelbi Jaquez (DIL)  Relationship to Patient:: Family  Contact Method: In Person  Reason/Outcome: Discharge Planning    Additional Comments: CM Student met with pt, spouse, and DIL at bedside to introduce role of CM and complete assessment.  Pt lives w/ spouse in a bi-level home w/ 6 STE.   Pt is independent with ADLs and IADLs and uses a walker.  Pt has no hx of STR or SNF and has already refused going if rec'd.  Pt has current services with [facility] for PT, OT, SN and is interested in continuing at d/c.  Pt is able to drive, however due to current conditions has not been driving.  CM Student answered pts questions appropriately and will remain available.

## 2024-03-15 NOTE — ASSESSMENT & PLAN NOTE
"Lab Results   Component Value Date    HGBA1C 6.9 (H) 01/22/2024       No results for input(s): \"POCGLU\" in the last 72 hours.    Blood Sugar Average: Last 72 hrs:    Hold all oral antiglycemic medication  Start insulin sliding scale  "

## 2024-03-16 ENCOUNTER — HOME CARE VISIT (OUTPATIENT)
Dept: HOME HEALTH SERVICES | Facility: HOME HEALTHCARE | Age: 79
End: 2024-03-16
Payer: COMMERCIAL

## 2024-03-16 LAB
ALBUMIN SERPL BCP-MCNC: 3.6 G/DL (ref 3.5–5)
ALP SERPL-CCNC: 71 U/L (ref 34–104)
ALT SERPL W P-5'-P-CCNC: 25 U/L (ref 7–52)
ANION GAP SERPL CALCULATED.3IONS-SCNC: 10 MMOL/L (ref 4–13)
ANION GAP SERPL CALCULATED.3IONS-SCNC: 15 MMOL/L (ref 4–13)
AST SERPL W P-5'-P-CCNC: 25 U/L (ref 13–39)
BASOPHILS # BLD AUTO: 0.02 THOUSANDS/ÂΜL (ref 0–0.1)
BASOPHILS NFR BLD AUTO: 0 % (ref 0–1)
BILIRUB SERPL-MCNC: 0.5 MG/DL (ref 0.2–1)
BNP SERPL-MCNC: 30 PG/ML (ref 0–100)
BUN SERPL-MCNC: 46 MG/DL (ref 5–25)
BUN SERPL-MCNC: 51 MG/DL (ref 5–25)
CALCIUM SERPL-MCNC: 8.8 MG/DL (ref 8.4–10.2)
CALCIUM SERPL-MCNC: 9.3 MG/DL (ref 8.4–10.2)
CHLORIDE SERPL-SCNC: 100 MMOL/L (ref 96–108)
CHLORIDE SERPL-SCNC: 104 MMOL/L (ref 96–108)
CHOLEST SERPL-MCNC: 159 MG/DL
CO2 SERPL-SCNC: 22 MMOL/L (ref 21–32)
CO2 SERPL-SCNC: 25 MMOL/L (ref 21–32)
CREAT SERPL-MCNC: 1.21 MG/DL (ref 0.6–1.3)
CREAT SERPL-MCNC: 1.39 MG/DL (ref 0.6–1.3)
EOSINOPHIL # BLD AUTO: 0.51 THOUSAND/ÂΜL (ref 0–0.61)
EOSINOPHIL NFR BLD AUTO: 6 % (ref 0–6)
ERYTHROCYTE [DISTWIDTH] IN BLOOD BY AUTOMATED COUNT: 14.6 % (ref 11.6–15.1)
GFR SERPL CREATININE-BSD FRML MDRD: 36 ML/MIN/1.73SQ M
GFR SERPL CREATININE-BSD FRML MDRD: 42 ML/MIN/1.73SQ M
GLUCOSE SERPL-MCNC: 101 MG/DL (ref 65–140)
GLUCOSE SERPL-MCNC: 114 MG/DL (ref 65–140)
GLUCOSE SERPL-MCNC: 141 MG/DL (ref 65–140)
GLUCOSE SERPL-MCNC: 199 MG/DL (ref 65–140)
GLUCOSE SERPL-MCNC: 212 MG/DL (ref 65–140)
GLUCOSE SERPL-MCNC: 220 MG/DL (ref 65–140)
HCT VFR BLD AUTO: 38.2 % (ref 34.8–46.1)
HDLC SERPL-MCNC: 45 MG/DL
HGB BLD-MCNC: 12.2 G/DL (ref 11.5–15.4)
IMM GRANULOCYTES # BLD AUTO: 0.03 THOUSAND/UL (ref 0–0.2)
IMM GRANULOCYTES NFR BLD AUTO: 0 % (ref 0–2)
LDLC SERPL CALC-MCNC: 85 MG/DL (ref 0–100)
LYMPHOCYTES # BLD AUTO: 2.03 THOUSANDS/ÂΜL (ref 0.6–4.47)
LYMPHOCYTES NFR BLD AUTO: 24 % (ref 14–44)
MAGNESIUM SERPL-MCNC: 2.1 MG/DL (ref 1.9–2.7)
MAGNESIUM SERPL-MCNC: 2.1 MG/DL (ref 1.9–2.7)
MCH RBC QN AUTO: 28.2 PG (ref 26.8–34.3)
MCHC RBC AUTO-ENTMCNC: 31.9 G/DL (ref 31.4–37.4)
MCV RBC AUTO: 88 FL (ref 82–98)
MONOCYTES # BLD AUTO: 0.79 THOUSAND/ÂΜL (ref 0.17–1.22)
MONOCYTES NFR BLD AUTO: 9 % (ref 4–12)
NEUTROPHILS # BLD AUTO: 5.24 THOUSANDS/ÂΜL (ref 1.85–7.62)
NEUTS SEG NFR BLD AUTO: 61 % (ref 43–75)
NRBC BLD AUTO-RTO: 0 /100 WBCS
PLATELET # BLD AUTO: 310 THOUSANDS/UL (ref 149–390)
PMV BLD AUTO: 8.9 FL (ref 8.9–12.7)
POTASSIUM SERPL-SCNC: 4.5 MMOL/L (ref 3.5–5.3)
POTASSIUM SERPL-SCNC: 4.8 MMOL/L (ref 3.5–5.3)
PROT SERPL-MCNC: 6.5 G/DL (ref 6.4–8.4)
RBC # BLD AUTO: 4.32 MILLION/UL (ref 3.81–5.12)
SODIUM SERPL-SCNC: 136 MMOL/L (ref 135–147)
SODIUM SERPL-SCNC: 140 MMOL/L (ref 135–147)
TRIGL SERPL-MCNC: 146 MG/DL
TSH SERPL DL<=0.05 MIU/L-ACNC: 0.43 UIU/ML (ref 0.45–4.5)
WBC # BLD AUTO: 8.62 THOUSAND/UL (ref 4.31–10.16)

## 2024-03-16 PROCEDURE — 99222 1ST HOSP IP/OBS MODERATE 55: CPT | Performed by: INTERNAL MEDICINE

## 2024-03-16 PROCEDURE — 85025 COMPLETE CBC W/AUTO DIFF WBC: CPT | Performed by: FAMILY MEDICINE

## 2024-03-16 PROCEDURE — 80048 BASIC METABOLIC PNL TOTAL CA: CPT | Performed by: PHYSICIAN ASSISTANT

## 2024-03-16 PROCEDURE — 82948 REAGENT STRIP/BLOOD GLUCOSE: CPT

## 2024-03-16 PROCEDURE — 83735 ASSAY OF MAGNESIUM: CPT | Performed by: PHYSICIAN ASSISTANT

## 2024-03-16 PROCEDURE — 80053 COMPREHEN METABOLIC PANEL: CPT | Performed by: FAMILY MEDICINE

## 2024-03-16 PROCEDURE — 83735 ASSAY OF MAGNESIUM: CPT | Performed by: FAMILY MEDICINE

## 2024-03-16 PROCEDURE — 84443 ASSAY THYROID STIM HORMONE: CPT | Performed by: FAMILY MEDICINE

## 2024-03-16 PROCEDURE — 80061 LIPID PANEL: CPT | Performed by: FAMILY MEDICINE

## 2024-03-16 PROCEDURE — 99233 SBSQ HOSP IP/OBS HIGH 50: CPT | Performed by: PHYSICIAN ASSISTANT

## 2024-03-16 PROCEDURE — 93005 ELECTROCARDIOGRAM TRACING: CPT

## 2024-03-16 PROCEDURE — 83880 ASSAY OF NATRIURETIC PEPTIDE: CPT | Performed by: PHYSICIAN ASSISTANT

## 2024-03-16 RX ORDER — POTASSIUM CHLORIDE 20 MEQ/1
40 TABLET, EXTENDED RELEASE ORAL DAILY
Status: DISCONTINUED | OUTPATIENT
Start: 2024-03-17 | End: 2024-03-17

## 2024-03-16 RX ORDER — POTASSIUM CHLORIDE 20 MEQ/1
20 TABLET, EXTENDED RELEASE ORAL ONCE
Qty: 1 TABLET | Refills: 0 | Status: COMPLETED | OUTPATIENT
Start: 2024-03-16 | End: 2024-03-16

## 2024-03-16 RX ORDER — FUROSEMIDE 10 MG/ML
80 INJECTION INTRAMUSCULAR; INTRAVENOUS
Status: DISCONTINUED | OUTPATIENT
Start: 2024-03-16 | End: 2024-03-17

## 2024-03-16 RX ADMIN — GABAPENTIN 100 MG: 100 CAPSULE ORAL at 08:31

## 2024-03-16 RX ADMIN — ACETAMINOPHEN 650 MG: 325 TABLET, FILM COATED ORAL at 21:02

## 2024-03-16 RX ADMIN — APIXABAN 5 MG: 5 TABLET, FILM COATED ORAL at 17:20

## 2024-03-16 RX ADMIN — AMILORIDE HYDROCLORIDE 2.5 MG: 5 TABLET ORAL at 08:33

## 2024-03-16 RX ADMIN — METOPROLOL SUCCINATE 100 MG: 100 TABLET, EXTENDED RELEASE ORAL at 08:32

## 2024-03-16 RX ADMIN — FUROSEMIDE 100 MG: 10 INJECTION, SOLUTION INTRAMUSCULAR; INTRAVENOUS at 08:33

## 2024-03-16 RX ADMIN — ACETAMINOPHEN 650 MG: 325 TABLET, FILM COATED ORAL at 12:30

## 2024-03-16 RX ADMIN — GABAPENTIN 100 MG: 100 CAPSULE ORAL at 21:00

## 2024-03-16 RX ADMIN — APIXABAN 5 MG: 5 TABLET, FILM COATED ORAL at 08:31

## 2024-03-16 RX ADMIN — INSULIN LISPRO 1 UNITS: 100 INJECTION, SOLUTION INTRAVENOUS; SUBCUTANEOUS at 21:00

## 2024-03-16 RX ADMIN — ASPIRIN 81 MG CHEWABLE TABLET 81 MG: 81 TABLET CHEWABLE at 08:31

## 2024-03-16 RX ADMIN — POTASSIUM CHLORIDE 20 MEQ: 1500 TABLET, EXTENDED RELEASE ORAL at 12:30

## 2024-03-16 RX ADMIN — LOSARTAN POTASSIUM 100 MG: 50 TABLET, FILM COATED ORAL at 08:32

## 2024-03-16 RX ADMIN — INSULIN LISPRO 2 UNITS: 100 INJECTION, SOLUTION INTRAVENOUS; SUBCUTANEOUS at 11:24

## 2024-03-16 RX ADMIN — ACETAMINOPHEN 650 MG: 325 TABLET, FILM COATED ORAL at 06:22

## 2024-03-16 RX ADMIN — GABAPENTIN 100 MG: 100 CAPSULE ORAL at 16:50

## 2024-03-16 RX ADMIN — EZETIMIBE 10 MG: 10 TABLET ORAL at 21:00

## 2024-03-16 NOTE — CONSULTS
Consult - Cardiology   Jessica Jaquez 78 y.o. female MRN: [id number]  Unit/Bed#: [facility] 525-01 Encounter: [id number]            ASSESSMENT:  Acute on chronic HFpEF  Recent TTE shows EF 60%, mild RA dilation  Outpatient diuretic: PO Lasix 40 mg daily PRN  Started on IV Lasix 100 mg twice daily + Amiloride 2.5mg QD  I&O monitoring: net -1.2L in last 24 hours  Recent onset atrial fibrillation  Dx 3/2024, asymptomatic  S/P successful PHU/DCCV 3/8/2024  Remains in SR; on Toprol- mg daily  [id number] score is 8; on Eliquis 5 mg twice daily  Hypertension  Controlled on Losartan 100 mg daily, Toprol- mg daily  BP on admission 113/56, last /60.  Hyperlipidemia  Controlled on Zetia 10mg QD   FLP 3/16/2024: , , HDL 45, LDL 85.  DM type II - A1c 6.9%  Lumbar radiculopathy  Hx of remote CVA, 15 years ago    PLAN/ DISCUSSION:     Check BNP; add to AM labs   She appears mildly volume overloaded on exam (LE edema, mild bibasilar crackles) and would benefit from 24 hours of additional IV diuretics at current dose. Reassess volume status tomorrow.   Check BMP tomorrow AM  She does admit to some dietary non-compliance at home; she was coached on sodium/ fluid restrictions. Needs to obtain home scale for daily readings.   Keep on telemetry while diuresing; keep K>4 and Mg>2         History of Present Illness:  Jessica Jaquez is a 78 y.o. female with past medical history of HFpEF, CVA, recent onset atrial fibrillation, DM 2, hypertension, hyperlipidemia who presents with progressive dyspnea and weight gain suspicious for acute CHF exacerbation.  Cardiology called for recommendations/ additional management.     Recently admitted [facility] 3/4-3/11 with LLE pain found to be secondary to lumbar radiculopathy. Was also discovered to be in AF with RVR. Received successful DCCV during this admission. Placed on Eliquis for CV prevention. Has hospital follow up with Dr Webb on 3/20. Planned for outpatient ZIO monitor  "x 14 days.     Since discharge, reports of having increased LE edema. She was weighed at PT and noted with wt gain of 18lbs compared to hospital weight. She did feel \"lousy\" at the time but denied any true dyspnea to me. No chest pain or discomfort.     Troponins 8/8/9. CXR no acute cardiopulmonary disease. SR with PAC, LVH with QRS widening. Telemetry reviewed personally; SR with PVCs and baseline artifact.     Past Medical History:        Past Medical History:   Diagnosis Date    Arthritis     Colorectal polyps     Constipation     Diabetes mellitus (HCC)     Diverticulosis of colon     Hiatal hernia     Hypertension     Increased urinary frequency     Lumbar disc disease     Pressure injury of skin     SOB (shortness of breath)     Stress incontinence     Vitamin D deficiency 07/27/2018      Past Surgical History:   Procedure Laterality Date    BACK SURGERY      CARPAL TUNNEL RELEASE Left     CATARACT EXTRACTION Bilateral     CHOLECYSTECTOMY      COLONOSCOPY W/ ENDOSCOPIC US N/A 2/24/2016    Procedure: ANAL ENDOSCOPIC U/S;  Surgeon: HOLLIS Washington MD;  Location: [facility];  Service:     HERNIA REPAIR      MT COLONOSCOPY FLX DX W/COLLJ SPEC WHEN PFRMD N/A 2/24/2016    Procedure: COLONOSCOPY;  Surgeon: HOLLIS Washington MD;  Location: [facility];  Service: Colorectal    TONSILLECTOMY      TUBAL LIGATION          Allergy:        Allergies   Allergen Reactions    Other Cough and Sneezing     Seasonal allergies       Medications:       Prior to Admission medications    Medication Sig Start Date End Date Taking? Authorizing Provider   AMILoride 5 mg tablet Take 2.5 mg by mouth daily   Yes Historical Provider, MD   amLODIPine (NORVASC) 5 mg tablet Take 1 tablet (5 mg total) by mouth daily at bedtime 3/11/24  Yes Natalie Dudley MD   apixaban (ELIQUIS) 5 mg Take 1 tablet (5 mg total) by mouth 2 (two) times a day 3/8/24  Yes Cathy Wolfe PA-C   aspirin 81 mg chewable tablet Chew 81 mg daily   Yes Historical Provider, MD "   bimatoprost (Lumigan) 0.01 % ophthalmic drops Administer 1 drop to both eyes daily at bedtime   Yes Historical Provider, MD   Cholecalciferol (VITAMIN D3) 1000 units CAPS Take by mouth daily     Yes Historical Provider, MD   ezetimibe (ZETIA) 10 mg tablet Take 1 tablet (10 mg total) by mouth daily at bedtime 3/11/24  Yes Natalie Dudley MD   furosemide (LASIX) 40 mg tablet Take 40 mg by mouth as needed     Yes Historical Provider, MD   gabapentin (NEURONTIN) 100 mg capsule Take 1 capsule (100 mg total) by mouth 3 (three) times a day 3/11/24  Yes Natalie Dudley MD   glimepiride (AMARYL) 1 mg tablet Take 1 mg by mouth every morning before breakfast   Yes Historical Provider, MD   hydrocortisone 2.5 % cream as needed 6/11/18  Yes Historical Provider, MD   ibuprofen (MOTRIN) 200 mg tablet Take by mouth every 6 (six) hours as needed for mild pain   Yes Historical Provider, MD   ketoconazole (NIZORAL) 2 % cream as needed 6/11/18  Yes Historical Provider, MD   KLOR-CON M20 20 MEQ tablet Take 20 mEq by mouth as needed   4/23/18  Yes Historical Provider, MD   losartan (COZAAR) 50 mg tablet 100 mg    Yes Historical Provider, MD   metoprolol succinate (TOPROL-XL) 100 mg 24 hr tablet Take 100 mg by mouth daily 7/5/18  Yes Historical Provider, MD   Multiple Vitamins-Minerals (CENTRUM SILVER PO) multivitamin   Yes Historical Provider, MD   oxyCODONE (ROXICODONE) 5 immediate release tablet Take 1 tablet (5 mg total) by mouth every 6 (six) hours as needed for severe pain for up to 10 days Max Daily Amount: 20 mg  Patient not taking: Reported on 3/15/2024 3/11/24 3/21/24  Natalie Dudley MD       Family History:     Family History   Problem Relation Age of Onset    Hypertension Mother     Heart attack Mother     Diabetes Mother     Prostate cancer Father     No Known Problems Brother     Stroke Maternal Grandmother     Stroke Paternal Grandmother     Stroke Paternal Grandfather     Thyroid cancer Neg Hx         Social History:       Social  History     Socioeconomic History    Marital status: /Civil Union     Spouse name: None    Number of children: None    Years of education: None    Highest education level: None   Occupational History    None   Tobacco Use    Smoking status: Never    Smokeless tobacco: Never   Substance and Sexual Activity    Alcohol use: Never    Drug use: No    Sexual activity: None   Other Topics Concern    None   Social History Narrative    None     Social Determinants of Health     Financial Resource Strain: Not on file   Food Insecurity: No Food Insecurity (3/15/2024)    Hunger Vital Sign     Worried About Running Out of Food in the Last Year: Never true     Ran Out of Food in the Last Year: Never true   Transportation Needs: No Transportation Needs (3/15/2024)    PRAPARE - Transportation     Lack of Transportation (Medical): No     Lack of Transportation (Non-Medical): No   Physical Activity: Not on file   Stress: Not on file   Social Connections: Not on file   Intimate Partner Violence: Not on file   Housing Stability: Low Risk  (3/15/2024)    Housing Stability Vital Sign     Unable to Pay for Housing in the Last Year: No     Number of Places Lived in the Last Year: 1     Unstable Housing in the Last Year: No       Weights/BMI:    Wt Readings from Last 3 Encounters:   03/16/24 102 kg (225 lb 8.5 oz)   03/11/24 98.5 kg (217 lb 2.5 oz)   05/16/22 105 kg (232 lb)   , Body mass index is 39.95 kg/m².      ROS:  14 point ROS negative except as outlined above  Remainder review of systems is negative    Exam:  General: Alert, oriented and in no acute distress, cooperative  Head: Normocephalic, atraumatic.  Eyes: PERRLA. No icterus. Normal Conjunctiva.   Oropharynx: Moist and normal-appearing mucosa  Neck: Supple, symmetrical, trachea midline, JVD not appreciated.   Heart: RRR, no murmur, rub or gallop, S1 & S2 normal   Lungs: Normal air entry, lungs clear to auscultation and no rales, rhonchi or wheezing   Abdomen: Flat,  normal findings: bowel sounds normal and soft, non-tender  Lower Limbs:  No pitting edema, 2+ peripheral pulses, capillary refill within normal limits  Musculoskeletal: ROM grossly normal

## 2024-03-16 NOTE — PROGRESS NOTES
[facility]  Progress Note  Name: Jessica Jaquez I  MRN: [id number]  Unit/Bed#: [facility] 525-01 I Date of Admission: 3/15/2024   Date of Service: 3/16/2024 I Hospital Day: 1    Assessment/Plan   New onset atrial fibrillation (HCC)  Assessment & Plan  Recently diagnosed earlier this month  S/p PHU/DCCV 3/8/34  Rate controlled with metoprolol   anticoagulated with Eliquis    Lumbar radiculopathy  Assessment & Plan  Chronic  Consult PT and case management for discharge planning needs    Hyperlipidemia  Assessment & Plan  Continue Zetia    Essential hypertension  Assessment & Plan  Blood pressure at goal 132/60  continue home medication  Monitor closely on higher doses of lasix    Diabetes mellitus type 2 in obese   Assessment & Plan  Lab Results   Component Value Date    HGBA1C 6.9 (H) 01/22/2024       Recent Labs     03/15/24  1725 03/15/24  1813 03/15/24  2040 03/16/24  0621   POCGLU 62* 80 195* 114       Blood Sugar Average: Last 72 hrs:  (P) 112.75  Hold all oral antiglycemic medication  Start insulin sliding scale    * CHF exacerbation (HCC)  Assessment & Plan  Wt Readings from Last 3 Encounters:   03/16/24 102 kg (225 lb 8.5 oz)   03/11/24 98.5 kg (217 lb 2.5 oz)   05/16/22 105 kg (232 lb)     Recently admitted to the hospital earlier this month and was diuresed.  Since then patient has had an 18 pound increase.  Patient started feeling shortness of breath this Wednesday her PCP increased her Lasix from 40 mg daily to 40 mg twice a day  Patient experienced increasing shortness of breath initially on exertion now at rest.  As well as leg swelling.  Patient started on 100 mg of Lasix IV twice daily on admission  Cardiology consulted- per consult- continue lasix 100 mg IV BID for now  Strict intake and output  Daily weights  Cardiac diet  Monitor labs closely                   VTE Pharmacologic Prophylaxis:   Pharmacologic: Apixaban (Eliquis)  Mechanical VTE Prophylaxis in Place:  No    Patient Centered Rounds: I have performed bedside rounds with nursing staff today.    Discussions with Specialists or Other Care Team Provider: reviewed cardiology notes    Education and Discussions with Family / Patient: patient updated, declined call to     Time Spent for Care: 45 minutes.  More than 50% of total time spent on counseling and coordination of care as described above.    Current Length of Stay: 1 day(s)    Current Patient Status: Inpatient   Certification Statement: The patient will continue to require additional inpatient hospital stay due to continue IV lasix, continue tele    Discharge Plan: home when ready, likely in next 24-48 hours    Code Status: Level 3 - DNAR and DNI      Subjective:   Patient sitting OOB in chair. She feels well, decreased SOB. Still with significant LE edema.     Objective:     Vitals:   Temp (24hrs), Av.6 °F (36.4 °C), Min:97.2 °F (36.2 °C), Max:98.1 °F (36.7 °C)    Temp:  [97.2 °F (36.2 °C)-98.1 °F (36.7 °C)] 97.4 °F (36.3 °C)  HR:  [60-93] 85  Resp:  [18-22] 20  BP: (111-146)/(54-91) 132/60  SpO2:  [84 %-98 %] 97 %  Body mass index is 39.95 kg/m².     Input and Output Summary (last 24 hours):       Intake/Output Summary (Last 24 hours) at 3/16/2024 1001  Last data filed at 3/16/2024 0815  Gross per 24 hour   Intake 800 ml   Output 1750 ml   Net -950 ml       Physical Exam:     Physical Exam  Vitals reviewed.   Constitutional:       General: She is not in acute distress.     Appearance: She is not ill-appearing or diaphoretic.   HENT:      Head: Normocephalic and atraumatic.      Nose: Nose normal.      Mouth/Throat:      Pharynx: Oropharynx is clear.   Cardiovascular:      Rate and Rhythm: Normal rate.   Pulmonary:      Effort: Pulmonary effort is normal. No respiratory distress.      Breath sounds: No wheezing.   Abdominal:      General: Bowel sounds are normal. There is no distension.      Palpations: Abdomen is soft.      Tenderness: There is  no abdominal tenderness.   Musculoskeletal:         General: No deformity or signs of injury.      Right lower leg: Edema present.      Left lower leg: Edema present.   Skin:     General: Skin is warm and dry.      Findings: No bruising or erythema.   Neurological:      Mental Status: She is alert and oriented to person, place, and time.           Additional Data:     Labs:    Results from last 7 days   Lab Units 03/16/24  0603   WBC Thousand/uL 8.62   HEMOGLOBIN g/dL 12.2   HEMATOCRIT % 38.2   PLATELETS Thousands/uL 310   NEUTROS PCT % 61   LYMPHS PCT % 24   MONOS PCT % 9   EOS PCT % 6     Results from last 7 days   Lab Units 03/16/24  0603   SODIUM mmol/L 136   POTASSIUM mmol/L 4.5   CHLORIDE mmol/L 104   CO2 mmol/L 22   BUN mg/dL 46*   CREATININE mg/dL 1.21   ANION GAP mmol/L 10   CALCIUM mg/dL 8.8   ALBUMIN g/dL 3.6   TOTAL BILIRUBIN mg/dL 0.50   ALK PHOS U/L 71   ALT U/L 25   AST U/L 25   GLUCOSE RANDOM mg/dL 101         Results from last 7 days   Lab Units 03/16/24  0621 03/15/24  2040 03/15/24  1813 03/15/24  1725 03/11/24  1201 03/11/24  0806 03/10/24  2108 03/10/24  1717 03/10/24  1231 03/10/24  0822 03/09/24  2138 03/09/24  1609   POC GLUCOSE mg/dl 114 195* 80 62* 223* 136 286* 144* 221* 168* 193* 296*                   * I Have Reviewed All Lab Data Listed Above.  * Additional Pertinent Lab Tests Reviewed: All Labs For Current Hospital Admission Reviewed        Recent Cultures (last 7 days):           Last 24 Hours Medication List:   Current Facility-Administered Medications   Medication Dose Route Frequency Provider Last Rate    acetaminophen  650 mg Oral Q6H PRN Houston Cervantes MD      AMILoride  2.5 mg Oral Daily Houston Cervantes MD      apixaban  5 mg Oral BID Houston Cervantes MD      aspirin  81 mg Oral Daily Houston Cervantes MD      bimatoprost  1 drop Ophthalmic Daily Houston Cervantes MD      ezetimibe  10 mg Oral HS Houston Cervantes MD      furosemide  100 mg Intravenous BID Houston Cervantes MD       gabapentin  100 mg Oral TID Houston Cervantes MD      insulin lispro  1-5 Units Subcutaneous TID AC Houston Cervantes MD      insulin lispro  1-5 Units Subcutaneous HS Houston Cervantes MD      losartan  100 mg Oral Daily Houston Cervantes MD      metoprolol succinate  100 mg Oral Daily Houston Cervantes MD          Today, Patient Was Seen By: Patty Alberts PA-C    ** Please Note: Dictation voice to text software may have been used in the creation of this document. **

## 2024-03-16 NOTE — ASSESSMENT & PLAN NOTE
Wt Readings from Last 3 Encounters:   03/16/24 102 kg (225 lb 8.5 oz)   03/11/24 98.5 kg (217 lb 2.5 oz)   05/16/22 105 kg (232 lb)     Recently admitted to the hospital earlier this month and was diuresed.  Since then patient has had an 18 pound increase.  Patient started feeling shortness of breath this Wednesday her PCP increased her Lasix from 40 mg daily to 40 mg twice a day  Patient experienced increasing shortness of breath initially on exertion now at rest.  As well as leg swelling.  Patient started on 100 mg of Lasix IV twice daily on admission  Cardiology consulted- per consult- continue lasix 100 mg IV BID for now  Strict intake and output  Daily weights  Cardiac diet  Monitor labs closely

## 2024-03-16 NOTE — UTILIZATION REVIEW
Initial Clinical Review    Admission: Date/Time/Statement:   Admission Orders (From admission, onward)       Ordered        03/15/24 1440  INPATIENT ADMISSION  Once                          Orders Placed This Encounter   Procedures    INPATIENT ADMISSION     Standing Status:   Standing     Number of Occurrences:   1     Order Specific Question:   Level of Care     Answer:   Med Surg [16]     Order Specific Question:   Estimated length of stay     Answer:   More than 2 Midnights     Order Specific Question:   Certification     Answer:   I certify that inpatient services are medically necessary for this patient for a duration of greater than two midnights. See H&P and MD Progress Notes for additional information about the patient's course of treatment.     ED Arrival Information       Expected   3/15/2024     Arrival   3/15/2024 12:35    Acuity   Urgent              Means of arrival   Wheelchair    Escorted by   Family Member    Service   Hospitalist    Admission type   Emergency              Arrival complaint   CHF exac             Chief Complaint   Patient presents with    Chest Pain     Was discharged home on Monday, had fluid taken off during hospital stay. Gained 14lbs since Tuesday       Initial Presentation: 78 y.o. female to ED presents for Shortness of breath. Recent admit earlier this month, was diuresed approx. 30lbs. Since getting home patient has gained 18 pounds and has been noticing a gradual shortness of breath. She initially had shortness of breath on exertion now at rest starting Wednesday. PCP increase her Lasix from 40 mg daily bid. Pt also noticed bilateral leg edema. PMH for CHF, DM, HTN, HLD, atrial fib on metoprolol anticoagulated with Eliquis. S/p PHU/DCCV.   Admit Inpatient level of care for CHF exacerbation. Started on Iv Lasix 100 mg bid. Cardiology consult. TSH and lipid panel. On exam; Rales. Edema to BLLE.     Date: 3/16   Day 2:   Cardiology cons; Acute on chronic HFpEF, Recent onset  atrial fib. Check BNP. Continue Iv Lasix at current dose. BMP in am 3/17. Tele monitoring.   Pt appears mildly volume overloaded on exam (LE edema, mild bibasilar crackles). She admits to some dietary non-compliance at home.     Progress notes; Continue Iv Lasix 100 mg bid for now. Monitor closely on higher doses of Lasix. Continue tele monitoring.   On exam; Edema to BLLE.    3/17  Day 3: Has surpassed a 2nd midnight with active treatments and services.  Creat trending up 1.2>1.39>1.44 with net positive last 24 hours.   Pt likely euvolemic to dry at this point. Hold off on further diuretics today. Recommend switching to PO torsemide possibly tomorrow if Cr is improved  Continue Toprol XL 100mg, amiloride 2.5 mg QD  Consider cutting back on Losartan in the setting of soft BP and GIOVANNI  Tele with occasional PVC/ PACs.   On exam; B/L upper lobe wheezing. Trace LE edema.       ED Triage Vitals   Temperature Pulse Respirations Blood Pressure SpO2   03/15/24 1243 03/15/24 1243 03/15/24 1243 03/15/24 1243 03/15/24 1243   97.8 °F (36.6 °C) 82 18 113/56 97 %      Temp Source Heart Rate Source Patient Position - Orthostatic VS BP Location FiO2 (%)   03/15/24 1243 03/15/24 1243 -- 03/15/24 1709 --   Temporal Monitor  Right arm       Pain Score       03/15/24 1243       No Pain          Wt Readings from Last 1 Encounters:   03/16/24 102 kg (225 lb 8.5 oz)     Additional Vital Signs:             3/16/24 10:55:30 97.8 °F (36.6 °C) 68 18 98/51 67 96 % --   03/16/24 08:32:24 -- 85 -- 132/60 84 97 % --   03/16/24 07:41:55 97.4 °F (36.3 °C) Abnormal  70 20 133/60 84 95 % --   03/16/24 02:16:05 -- 76 -- 129/58 82 98 % --   03/15/24 22:12:17 98.1 °F (36.7 °C) 69 18 111/54 73 96 % --   03/15/24 2000 -- -- -- -- -- 93 % None (Room air)   03/15/24 18:45:39 97.7 °F (36.5 °C) 68 -- 114/57 76 96 % --   03/15/24 1811 97.2 °F (36.2 °C) Abnormal  -- 22 131/63 -- -- None (Room air)   03/15/24 17:12:16 -- 93 -- 131/63 86 92 % --   03/15/24  17:09:05 97.2 °F (36.2 °C) Abnormal  71 -- 131/63 86 84 % Abnormal         Pertinent Labs/Diagnostic Test Results:   XR chest 1 view portable   ED Interpretation by Geovani Barber DO (03/15 1349)   Poor penetration, poor inspiration, cephalization concerning for fluid overload      Final Result by Monique Rodriguez MD (03/15 1807)      No acute cardiopulmonary disease.            Workstation performed: [id number]               Results from last 7 days   Lab Units 03/16/24  0603 03/15/24  1308   WBC Thousand/uL 8.62 7.91   HEMOGLOBIN g/dL 12.2 12.5   HEMATOCRIT % 38.2 40.5   PLATELETS Thousands/uL 310 298   NEUTROS ABS Thousands/µL 5.24 5.22         Results from last 7 days   Lab Units 03/16/24  1318 03/16/24  0603 03/15/24  1308   SODIUM mmol/L 140 136 136   POTASSIUM mmol/L 4.8 4.5 4.2   CHLORIDE mmol/L 100 104 104   CO2 mmol/L 25 22 25   ANION GAP mmol/L 15* 10 7   BUN mg/dL 51* 46* 42*   CREATININE mg/dL 1.39* 1.21 1.33*   EGFR ml/min/1.73sq m 36 42 38   CALCIUM mg/dL 9.3 8.8 9.1   MAGNESIUM mg/dL 2.1 2.1  --      Results from last 7 days   Lab Units 03/16/24  0603 03/15/24  1308   AST U/L 25 23   ALT U/L 25 26   ALK PHOS U/L 71 68   TOTAL PROTEIN g/dL 6.5 6.9   ALBUMIN g/dL 3.6 3.6   TOTAL BILIRUBIN mg/dL 0.50 0.37     Results from last 7 days   Lab Units 03/16/24  1534 03/16/24  1052 03/16/24  0621 03/15/24  2040 03/15/24  1813 03/15/24  1725   POC GLUCOSE mg/dl 141* 212* 114 195* 80 62*     Results from last 7 days   Lab Units 03/16/24  1318 03/16/24  0603 03/15/24  1308   GLUCOSE RANDOM mg/dL 220* 101 76       Results from last 7 days   Lab Units 03/15/24  1748 03/15/24  1505 03/15/24  1308   HS TNI 0HR ng/L  --   --  8   HS TNI 2HR ng/L  --  8  --    HSTNI D2 ng/L  --  0  --    HS TNI 4HR ng/L 9  --   --    HSTNI D4 ng/L 1  --   --              Results from last 7 days   Lab Units 03/16/24  0603   TSH 3RD GENERATON uIU/mL 0.427*       Results from last 7 days   Lab Units 03/16/24  0603   BNP pg/mL 30        ED Treatment:   Medication Administration from 03/15/2024 1218 to 03/15/2024 1701         Date/Time Order Dose Route Action     03/15/2024 1505 EDT furosemide (LASIX) 100 mg in dextrose 5 % 50 mL IVPB 100 mg Intravenous New Bag          Past Medical History:   Diagnosis Date    Arthritis     Colorectal polyps     Constipation     Diabetes mellitus (HCC)     Diverticulosis of colon     Hiatal hernia     Hypertension     Increased urinary frequency     Lumbar disc disease     Pressure injury of skin     SOB (shortness of breath)     Stress incontinence     Vitamin D deficiency 07/27/2018     Present on Admission:   CHF exacerbation (HCC)   (Resolved) Benign essential tremor   Diabetes mellitus type 2 in obese    Essential hypertension   Hyperlipidemia   New onset atrial fibrillation (HCC)   Lumbar radiculopathy      Admitting Diagnosis: Chest pain [R07.9]  SOB (shortness of breath) [R06.02]  Acute CHF (congestive heart failure) (HCC) [I50.9]  Acute on chronic congestive heart failure, unspecified heart failure type (HCC) [I50.9]  Age/Sex: 78 y.o. female    Admission Orders:  Scheduled Medications:  AMILoride, 2.5 mg, Oral, Daily  apixaban, 5 mg, Oral, BID  aspirin, 81 mg, Oral, Daily  bimatoprost, 1 drop, Ophthalmic, Daily  ezetimibe, 10 mg, Oral, HS  gabapentin, 100 mg, Oral, TID  insulin lispro, 1-5 Units, Subcutaneous, TID AC  insulin lispro, 1-5 Units, Subcutaneous, HS  losartan, 100 mg, Oral, Daily  metoprolol succinate, 100 mg, Oral, Daily  [START ON 3/17/2024] potassium chloride, 40 mEq, Oral, Daily    furosemide (LASIX) injection 80 mg  Dose: 80 mg  Freq: 2 times daily (diuretic) Route: IV  Start: 03/16/24 1600 End: 03/17/24 0853     Continuous IV Infusions: None     PRN Meds:  acetaminophen, 650 mg, Oral, Q6H PRN        IP CONSULT TO CARDIOLOGY  IP CONSULT TO NUTRITION SERVICES  IP CONSULT TO CASE MANAGEMENT    Network Utilization Review Department  ATTENTION: Please call with any questions or concerns  to [phone] and carefully listen to the prompts so that you are directed to the right person. All voicemails are confidential.   For Discharge needs, contact Care Management DC Support Team at [phone] opt. 2  Send all requests for admission clinical reviews, approved or denied determinations and any other requests to dedicated fax number below belonging to the campus where the patient is receiving treatment. List of dedicated fax numbers for the Facilities:  FACILITY NAME UR FAX NUMBER   ADMISSION DENIALS (Administrative/Medical Necessity) [phone]   DISCHARGE SUPPORT TEAM (NETWORK) [phone]   PARENT CHILD HEALTH (Maternity/NICU/Pediatrics) [phone]   [facility] [phone]   [facility] [phone]   [facility] [phone]   [facility] [phone]   [facility] [phone]   [facility] [phone]   [facility] [phone]   [facility] [phone]   [facility] [phone]   [facility] [phone]   [facility] [phone]   [facility] [phone]

## 2024-03-16 NOTE — ASSESSMENT & PLAN NOTE
Lab Results   Component Value Date    HGBA1C 6.9 (H) 01/22/2024       Recent Labs     03/15/24  1725 03/15/24  1813 03/15/24  2040 03/16/24  0621   POCGLU 62* 80 195* 114       Blood Sugar Average: Last 72 hrs:  (P) 112.75  Hold all oral antiglycemic medication  Start insulin sliding scale

## 2024-03-16 NOTE — ASSESSMENT & PLAN NOTE
Recently diagnosed earlier this month  S/p PHU/DCCV 3/8/34  Rate controlled with metoprolol   anticoagulated with Eliquis

## 2024-03-17 LAB
ANION GAP SERPL CALCULATED.3IONS-SCNC: 13 MMOL/L (ref 4–13)
ATRIAL RATE: 62 BPM
BUN SERPL-MCNC: 55 MG/DL (ref 5–25)
CALCIUM SERPL-MCNC: 9 MG/DL (ref 8.4–10.2)
CHLORIDE SERPL-SCNC: 100 MMOL/L (ref 96–108)
CO2 SERPL-SCNC: 21 MMOL/L (ref 21–32)
CREAT SERPL-MCNC: 1.44 MG/DL (ref 0.6–1.3)
ERYTHROCYTE [DISTWIDTH] IN BLOOD BY AUTOMATED COUNT: 14.4 % (ref 11.6–15.1)
GFR SERPL CREATININE-BSD FRML MDRD: 34 ML/MIN/1.73SQ M
GLUCOSE SERPL-MCNC: 149 MG/DL (ref 65–140)
GLUCOSE SERPL-MCNC: 154 MG/DL (ref 65–140)
GLUCOSE SERPL-MCNC: 177 MG/DL (ref 65–140)
GLUCOSE SERPL-MCNC: 186 MG/DL (ref 65–140)
GLUCOSE SERPL-MCNC: 214 MG/DL (ref 65–140)
HCT VFR BLD AUTO: 40.9 % (ref 34.8–46.1)
HGB BLD-MCNC: 12.7 G/DL (ref 11.5–15.4)
MAGNESIUM SERPL-MCNC: 2.1 MG/DL (ref 1.9–2.7)
MCH RBC QN AUTO: 28.2 PG (ref 26.8–34.3)
MCHC RBC AUTO-ENTMCNC: 31.1 G/DL (ref 31.4–37.4)
MCV RBC AUTO: 91 FL (ref 82–98)
P AXIS: 46 DEGREES
PLATELET # BLD AUTO: 323 THOUSANDS/UL (ref 149–390)
PMV BLD AUTO: 8.9 FL (ref 8.9–12.7)
POTASSIUM SERPL-SCNC: 4.6 MMOL/L (ref 3.5–5.3)
PR INTERVAL: 156 MS
QRS AXIS: -14 DEGREES
QRSD INTERVAL: 122 MS
QT INTERVAL: 434 MS
QTC INTERVAL: 440 MS
RBC # BLD AUTO: 4.51 MILLION/UL (ref 3.81–5.12)
SODIUM SERPL-SCNC: 134 MMOL/L (ref 135–147)
T WAVE AXIS: -32 DEGREES
VENTRICULAR RATE: 62 BPM
WBC # BLD AUTO: 11.02 THOUSAND/UL (ref 4.31–10.16)

## 2024-03-17 PROCEDURE — 80048 BASIC METABOLIC PNL TOTAL CA: CPT | Performed by: FAMILY MEDICINE

## 2024-03-17 PROCEDURE — 97163 PT EVAL HIGH COMPLEX 45 MIN: CPT

## 2024-03-17 PROCEDURE — 99233 SBSQ HOSP IP/OBS HIGH 50: CPT | Performed by: PHYSICIAN ASSISTANT

## 2024-03-17 PROCEDURE — 83735 ASSAY OF MAGNESIUM: CPT | Performed by: FAMILY MEDICINE

## 2024-03-17 PROCEDURE — 93010 ELECTROCARDIOGRAM REPORT: CPT | Performed by: INTERNAL MEDICINE

## 2024-03-17 PROCEDURE — 82948 REAGENT STRIP/BLOOD GLUCOSE: CPT

## 2024-03-17 PROCEDURE — 85027 COMPLETE CBC AUTOMATED: CPT | Performed by: FAMILY MEDICINE

## 2024-03-17 PROCEDURE — 99232 SBSQ HOSP IP/OBS MODERATE 35: CPT | Performed by: INTERNAL MEDICINE

## 2024-03-17 RX ORDER — LOSARTAN POTASSIUM 50 MG/1
50 TABLET ORAL DAILY
Status: DISCONTINUED | OUTPATIENT
Start: 2024-03-18 | End: 2024-03-18

## 2024-03-17 RX ADMIN — FUROSEMIDE 80 MG: 10 INJECTION, SOLUTION INTRAMUSCULAR; INTRAVENOUS at 08:18

## 2024-03-17 RX ADMIN — GABAPENTIN 100 MG: 100 CAPSULE ORAL at 08:10

## 2024-03-17 RX ADMIN — LOSARTAN POTASSIUM 100 MG: 50 TABLET, FILM COATED ORAL at 08:11

## 2024-03-17 RX ADMIN — BIMATOPROST 1 DROP: 0.3 SOLUTION/ DROPS OPHTHALMIC at 21:50

## 2024-03-17 RX ADMIN — EZETIMIBE 10 MG: 10 TABLET ORAL at 21:50

## 2024-03-17 RX ADMIN — INSULIN LISPRO 2 UNITS: 100 INJECTION, SOLUTION INTRAVENOUS; SUBCUTANEOUS at 21:51

## 2024-03-17 RX ADMIN — AMILORIDE HYDROCLORIDE 2.5 MG: 5 TABLET ORAL at 08:13

## 2024-03-17 RX ADMIN — BIMATOPROST 1 DROP: 0.3 SOLUTION/ DROPS OPHTHALMIC at 03:28

## 2024-03-17 RX ADMIN — METOPROLOL SUCCINATE 100 MG: 100 TABLET, EXTENDED RELEASE ORAL at 08:10

## 2024-03-17 RX ADMIN — GABAPENTIN 100 MG: 100 CAPSULE ORAL at 21:50

## 2024-03-17 RX ADMIN — APIXABAN 5 MG: 5 TABLET, FILM COATED ORAL at 08:09

## 2024-03-17 RX ADMIN — GABAPENTIN 100 MG: 100 CAPSULE ORAL at 16:47

## 2024-03-17 RX ADMIN — POTASSIUM CHLORIDE 40 MEQ: 1500 TABLET, EXTENDED RELEASE ORAL at 08:19

## 2024-03-17 RX ADMIN — INSULIN LISPRO 1 UNITS: 100 INJECTION, SOLUTION INTRAVENOUS; SUBCUTANEOUS at 12:13

## 2024-03-17 RX ADMIN — ASPIRIN 81 MG CHEWABLE TABLET 81 MG: 81 TABLET CHEWABLE at 08:17

## 2024-03-17 RX ADMIN — INSULIN LISPRO 1 UNITS: 100 INJECTION, SOLUTION INTRAVENOUS; SUBCUTANEOUS at 16:47

## 2024-03-17 RX ADMIN — APIXABAN 5 MG: 5 TABLET, FILM COATED ORAL at 16:47

## 2024-03-17 RX ADMIN — ACETAMINOPHEN 650 MG: 325 TABLET, FILM COATED ORAL at 21:59

## 2024-03-17 NOTE — ASSESSMENT & PLAN NOTE
Blood pressures running a bit soft, per cardiology recommendations will lower cozaar dose from 100 to 50 daily, continue hold parameters  Continue metoprolol, amiloride

## 2024-03-17 NOTE — PROGRESS NOTES
[facility]  Progress Note  Name: Jessica Jaquez I  MRN: [id number]  Unit/Bed#: [facility]PHP 525-01 I Date of Admission: 3/15/2024   Date of Service: 3/17/2024 I Hospital Day: 2    Assessment/Plan   New onset atrial fibrillation (HCC)  Assessment & Plan  Recently diagnosed earlier this month  S/p PHU/DCCV 3/8/34  Rate controlled with metoprolol   anticoagulated with Eliquis    Lumbar radiculopathy  Assessment & Plan  Chronic  Consult PT and case management for discharge planning needs    Hyperlipidemia  Assessment & Plan  Continue Zetia    Essential hypertension  Assessment & Plan  Blood pressures running a bit soft, per cardiology recommendations will lower cozaar dose from 100 to 50 daily, continue hold parameters  Continue metoprolol, amiloride      Diabetes mellitus type 2 in obese   Assessment & Plan  Lab Results   Component Value Date    HGBA1C 6.9 (H) 01/22/2024       Recent Labs     03/16/24  1534 03/16/24  2039 03/17/24  0636 03/17/24  1043   POCGLU 141* 199* 149* 186*         Blood Sugar Average: Last 72 hrs:  (P) 148.3542160882248593  Hold all oral antiglycemic medication  Continue insulin sliding scale    * CHF exacerbation (HCC)  Assessment & Plan  Wt Readings from Last 3 Encounters:   03/17/24 102 kg (225 lb 12 oz)   03/11/24 98.5 kg (217 lb 2.5 oz)   05/16/22 105 kg (232 lb)     Recently admitted to the hospital earlier this month and was diuresed.  Since then patient has had an 18 pound increase at home.  Patient started feeling shortness of breath this Wednesday her PCP increased her Lasix from 40 mg daily to 40 mg twice a day  Patient experienced increasing shortness of breath initially on exertion now at rest.  As well as leg swelling.  Patient started on 100 mg of Lasix IV twice daily on admission  Cardiology consulted- weight down today and creat up to 1.44. diuretics discontinued. Cardiology may resume PO torsemide tomorrow pending repeat labs  Strict intake and  output  Daily weights  Cardiac diet  Monitor labs closely                   VTE Pharmacologic Prophylaxis:   Pharmacologic: Apixaban (Eliquis)  Mechanical VTE Prophylaxis in Place: No    Patient Centered Rounds: I have performed bedside rounds with nursing staff today.    Discussions with Specialists or Other Care Team Provider: reviewed cardiology notes    Education and Discussions with Family / Patient: patient updated    Time Spent for Care: 30 minutes.  More than 50% of total time spent on counseling and coordination of care as described above.    Current Length of Stay: 2 day(s)    Current Patient Status: Inpatient   Certification Statement: The patient will continue to require additional inpatient hospital stay due to continue close monitoring of labs and fluid status    Discharge Plan: home when ready, likely in next 24-48 hours    Code Status: Level 3 - DNAR and DNI      Subjective:   Patient seen sitting in chair. Denies SOB. LE edema improved.     Objective:     Vitals:   Temp (24hrs), Av.8 °F (36.6 °C), Min:97.2 °F (36.2 °C), Max:98.4 °F (36.9 °C)    Temp:  [97.2 °F (36.2 °C)-98.4 °F (36.9 °C)] 97.9 °F (36.6 °C)  HR:  [67-83] 73  Resp:  [16-20] 18  BP: ()/(41-64) 108/55  SpO2:  [92 %-98 %] 95 %  Body mass index is 39.99 kg/m².     Input and Output Summary (last 24 hours):       Intake/Output Summary (Last 24 hours) at 3/17/2024 1217  Last data filed at 3/17/2024 1206  Gross per 24 hour   Intake 1200 ml   Output 668 ml   Net 532 ml       Physical Exam:     Physical Exam  Vitals reviewed.   Constitutional:       General: She is not in acute distress.     Appearance: She is not ill-appearing or diaphoretic.   HENT:      Head: Normocephalic and atraumatic.      Nose: Nose normal.      Mouth/Throat:      Pharynx: Oropharynx is clear.   Cardiovascular:      Rate and Rhythm: Normal rate.   Pulmonary:      Effort: Pulmonary effort is normal. No respiratory distress.      Breath sounds: Normal breath  sounds.   Musculoskeletal:         General: No deformity or signs of injury.      Comments: Maurilio LE edema improved   Skin:     General: Skin is warm and dry.      Findings: No bruising or erythema.   Neurological:      Mental Status: She is alert and oriented to person, place, and time.           Additional Data:     Labs:    Results from last 7 days   Lab Units 03/17/24  0320 03/16/24  0603   WBC Thousand/uL 11.02* 8.62   HEMOGLOBIN g/dL 12.7 12.2   HEMATOCRIT % 40.9 38.2   PLATELETS Thousands/uL 323 310   NEUTROS PCT %  --  61   LYMPHS PCT %  --  24   MONOS PCT %  --  9   EOS PCT %  --  6     Results from last 7 days   Lab Units 03/17/24  0320 03/16/24  1318 03/16/24  0603   SODIUM mmol/L 134*   < > 136   POTASSIUM mmol/L 4.6   < > 4.5   CHLORIDE mmol/L 100   < > 104   CO2 mmol/L 21   < > 22   BUN mg/dL 55*   < > 46*   CREATININE mg/dL 1.44*   < > 1.21   ANION GAP mmol/L 13   < > 10   CALCIUM mg/dL 9.0   < > 8.8   ALBUMIN g/dL  --   --  3.6   TOTAL BILIRUBIN mg/dL  --   --  0.50   ALK PHOS U/L  --   --  71   ALT U/L  --   --  25   AST U/L  --   --  25   GLUCOSE RANDOM mg/dL 154*   < > 101    < > = values in this interval not displayed.         Results from last 7 days   Lab Units 03/17/24  1043 03/17/24  0636 03/16/24  2039 03/16/24  1534 03/16/24  1052 03/16/24  0621 03/15/24  2040 03/15/24  1813 03/15/24  1725 03/11/24  1201 03/11/24  0806 03/10/24  2108   POC GLUCOSE mg/dl 186* 149* 199* 141* 212* 114 195* 80 62* 223* 136 286*                   * I Have Reviewed All Lab Data Listed Above.  * Additional Pertinent Lab Tests Reviewed: All Labs For Current Hospital Admission Reviewed      Recent Cultures (last 7 days):           Last 24 Hours Medication List:   Current Facility-Administered Medications   Medication Dose Route Frequency Provider Last Rate    acetaminophen  650 mg Oral Q6H PRN Houston Cervantes MD      AMILoride  2.5 mg Oral Daily Houston Cervantes MD      apixaban  5 mg Oral BID Houston Cervantes MD       aspirin  81 mg Oral Daily Houston Cervantes MD      bimatoprost  1 drop Ophthalmic Daily Houston Cervantes MD      ezetimibe  10 mg Oral HS Houston Cervantes MD      gabapentin  100 mg Oral TID Houston Cervantes MD      insulin lispro  1-5 Units Subcutaneous TID AC Houston Cervantes MD      insulin lispro  1-5 Units Subcutaneous HS Houston Cervantes MD      [START ON 3/18/2024] losartan  50 mg Oral Daily Patty Alberts PA-C      metoprolol succinate  100 mg Oral Daily Houston Cervantes MD          Today, Patient Was Seen By: Patty Alberts PA-C    ** Please Note: Dictation voice to text software may have been used in the creation of this document. **

## 2024-03-17 NOTE — ASSESSMENT & PLAN NOTE
Lab Results   Component Value Date    HGBA1C 6.9 (H) 01/22/2024       Recent Labs     03/16/24  1534 03/16/24 2039 03/17/24  0636 03/17/24  1043   POCGLU 141* 199* 149* 186*         Blood Sugar Average: Last 72 hrs:  (P) 148.3461851271471840  Hold all oral antiglycemic medication  Continue insulin sliding scale

## 2024-03-17 NOTE — CASE COMMUNICATION
Missed visit 3/15/24. Out of compliance with visit set this week.  Patient sent to ER with subsequent hospital admission

## 2024-03-17 NOTE — ASSESSMENT & PLAN NOTE
Wt Readings from Last 3 Encounters:   03/17/24 102 kg (225 lb 12 oz)   03/11/24 98.5 kg (217 lb 2.5 oz)   05/16/22 105 kg (232 lb)     Recently admitted to the hospital earlier this month and was diuresed.  Since then patient has had an 18 pound increase at home.  Patient started feeling shortness of breath this Wednesday her PCP increased her Lasix from 40 mg daily to 40 mg twice a day  Patient experienced increasing shortness of breath initially on exertion now at rest.  As well as leg swelling.  Patient started on 100 mg of Lasix IV twice daily on admission  Cardiology consulted- weight down today and creat up to 1.44. diuretics discontinued. Cardiology may resume PO torsemide tomorrow pending repeat labs  Strict intake and output  Daily weights  Cardiac diet  Monitor labs closely

## 2024-03-17 NOTE — PLAN OF CARE
Problem: PHYSICAL THERAPY ADULT  Goal: Performs mobility at highest level of function for planned discharge setting.  See evaluation for individualized goals.  Description: Treatment/Interventions: ADL retraining, Functional transfer training, LE strengthening/ROM, Therapeutic exercise, Endurance training, Patient/family training, Equipment eval/education, Bed mobility, Gait training, Spoke to nursing, Spoke to case management, OT, Elevations  Equipment Recommended:  (owns)       See flowsheet documentation for full assessment, interventions and recommendations.  Outcome: Progressing  Note: Prognosis: Good  Problem List: Decreased strength, Decreased endurance, Impaired balance, Decreased mobility, Pain, Obesity  Assessment: Pt is a 78 y.o. female seen for a high complexity PT evaluation due to Ongoing medical management for primary dx, Decreased activity tolerance compared to baseline, Fall risk. Patient is s/p admit to [facility] on 3/15/2024 for Chest pain (R07.9)  SOB (shortness of breath) (R06.02)  Acute CHF (congestive heart failure) (HCC) (I50.9)  Acute on chronic congestive heart failure, unspecified heart failure type (HCC) (I50.9). Patient  has a past medical history of Arthritis, Colorectal polyps, Constipation, Diabetes mellitus (HCC), Diverticulosis of colon, Hiatal hernia, Hypertension, Increased urinary frequency, Lumbar disc disease, Pressure injury of skin, SOB (shortness of breath), Stress incontinence, and Vitamin D deficiency..     PT now consulted to assess functional mobility and needs for safe d/c planning. Prior to admission, pt independent with functional mobility, independent ADLs, and independent IADLs. Personal factors affecting status include fall risk, steps to negotiate within home, limited caregiver/social support, and medical status     Currently pt requires supervision for functional transfers with rolling walker ; minimal assistance x1 for ambulation with rolling  walker. Pt presents functioning below baseline and w/ overall mobility deficits 2* to: decreased strength, decreased endurance, decreased mobility, impaired balance. These impairments place pt at risk for falls.     Pt will continue to benefit from skilled PT interventions to address stated impairments; to maximize functional potential; for ongoing pt/family education; and DME needs. The patient's AM-PAC Basic Mobility Inpatient Short Form Raw Score Is 17. PT is currently recommending Level 3 - Minimum Resource Intensity on d/c from hospital. Will continue to follow as able.  Barriers to Discharge: Inaccessible home environment     Rehab Resource Intensity Level, PT: III (Minimum Resource Intensity)    See flowsheet documentation for full assessment.

## 2024-03-17 NOTE — PROGRESS NOTES
"Cardiology Progress Note   Jessica Jaquez 78 y.o. female MRN: [id number]    Unit/Bed#: [facility] 525-01 Encounter: [id number]      Assessment:  Acute on chronic HFpEF  Recent TTE shows EF 60%, mild RA dilation  Outpatient diuretic: PO Lasix 40 mg daily  Started on IV Lasix 100 mg twice daily + Amiloride 2.5mg QD  I&O monitoring: net -668lbs.   Current Wt: 225lbs   Recent onset atrial fibrillation  Dx 3/2024, asymptomatic  S/P successful PHU/DCCV 3/8/2024  Remains in SR; on Toprol- mg daily  [id number] score is 8; on Eliquis 5 mg twice daily  Hypertension  Controlled on Losartan 100 mg daily, Toprol- mg daily  BP on admission 113/56, last /60.  Hyperlipidemia  Controlled on Zetia 10mg QD   FLP 3/16/2024: , , HDL 45, LDL 85.  GIOVANNI  DM type II - A1c 6.9%  Lumbar radiculopathy  Hx of remote CVA, 15 years ago    Plan/Discussion:  Cr up trending 1.2>1.39>1.44 with net positive last 24 hours. Patient likely euvolemic to dry at this point. Hold off on further diuretics today.    Recommend switching to PO torsemide possibly tomorrow if Cr is improved  Continue Toprol XL 100mg, amiloride 2.5 mg QD  Consider cutting back on Losartan in the setting of soft BP and GIOVANNI    Subjective:   Patient seen and examined. Overnight events reviewed. Developed \"gas pressure\" sensation overnight @ 2315. Lasted about 5 minutes and resolved on its own. Noted with low Hrs. EKG performed shows SR. No sustained arrhythmias noted on telemetry other then occasional PVC/PACs.     Objective:     Vitals: Blood pressure 110/57, pulse 78, temperature 98.1 °F (36.7 °C), resp. rate 18, weight 102 kg (225 lb 12 oz), SpO2 97%., Body mass index is 39.99 kg/m².,   Orthostatic Blood Pressures      Flowsheet Row Most Recent Value   Blood Pressure 110/57 filed at 03/17/2024 0719   Patient Position - Orthostatic VS Lying filed at 03/16/2024 1913              Intake/Output Summary (Last 24 hours) at 3/17/2024 0852  Last data filed at " 3/17/2024 0836  Gross per 24 hour   Intake 1300 ml   Output 868 ml   Net 432 ml         Physical Exam:    GEN: Jessica Jaquez appears well, alert and oriented x 3, pleasant and cooperative   HEENT: Sclera anicteric, conjunctivae pink, mucous membranes moist. Oropharynx clear.   NECK: supple, no significant JVD, Trachea midline, no thyromegaly.   HEART: regular rhythm, normal S1 and S2, no murmurs, clicks, gallops or rubs   LUNGS: +B/L upper lobe wheezing. No rales, or rhonchi. No increased work of breathing or signs of respiratory distress.   ABDOMEN: Soft, nontender, nondistended  EXTREMITIES: +trace LE edema. Skin warm and well perfused, no clubbing, cyanosis  NEURO: No focal findings. Normal speech. Mood and affect normal.   SKIN: Normal without suspicious lesions on exposed skin.      Medications:      Current Facility-Administered Medications:     acetaminophen (TYLENOL) tablet 650 mg, 650 mg, Oral, Q6H PRN, Houston Cervantes MD, 650 mg at 03/16/24 2102    AMILoride tablet 2.5 mg, 2.5 mg, Oral, Daily, Houston Cervantes MD, 2.5 mg at 03/17/24 0813    apixaban (ELIQUIS) tablet 5 mg, 5 mg, Oral, BID, Houston Cervantes MD, 5 mg at 03/17/24 0809    aspirin chewable tablet 81 mg, 81 mg, Oral, Daily, Housotn Cervantes MD, 81 mg at 03/17/24 0817    bimatoprost (LUMIGAN) 0.03 % ophthalmic drops 1 drop, 1 drop, Ophthalmic, Daily, Houston Cervantes MD, 1 drop at 03/17/24 0328    ezetimibe (ZETIA) tablet 10 mg, 10 mg, Oral, HS, Houston Cervantes MD, 10 mg at 03/16/24 2100    furosemide (LASIX) injection 80 mg, 80 mg, Intravenous, BID (diuretic), Celestino Carrera PA-C, 80 mg at 03/17/24 0818    gabapentin (NEURONTIN) capsule 100 mg, 100 mg, Oral, TID, Houston Cervantes MD, 100 mg at 03/17/24 0810    insulin lispro (HumALOG/ADMELOG) 100 units/mL subcutaneous injection 1-5 Units, 1-5 Units, Subcutaneous, TID AC, 2 Units at 03/16/24 1124 **AND** Fingerstick Glucose (POCT), , , TID AC, Houston Cervantes MD    insulin lispro (HumALOG/ADMELOG)  100 units/mL subcutaneous injection 1-5 Units, 1-5 Units, Subcutaneous, HS, Houston Cervantes MD, 1 Units at 03/16/24 2100    losartan (COZAAR) tablet 100 mg, 100 mg, Oral, Daily, Houston Cervantes MD, 100 mg at 03/17/24 0811    metoprolol succinate (TOPROL-XL) 24 hr tablet 100 mg, 100 mg, Oral, Daily, Houston Cervantes MD, 100 mg at 03/17/24 0810    potassium chloride (Klor-Con M20) CR tablet 40 mEq, 40 mEq, Oral, Daily, Patty Alberts PA-C, 40 mEq at 03/17/24 0819     Labs & Results:        Results from last 7 days   Lab Units 03/17/24  0320 03/16/24  0603 03/15/24  1308   WBC Thousand/uL 11.02* 8.62 7.91   HEMOGLOBIN g/dL 12.7 12.2 12.5   HEMATOCRIT % 40.9 38.2 40.5   PLATELETS Thousands/uL 323 310 298     Results from last 7 days   Lab Units 03/16/24  0603   TRIGLYCERIDES mg/dL 146   HDL mg/dL 45*     Results from last 7 days   Lab Units 03/17/24  0320 03/16/24  1318 03/16/24  0603 03/15/24  1308 03/14/24  0720 03/10/24  0911   POTASSIUM mmol/L 4.6 4.8 4.5 4.2   < > 4.8   CHLORIDE mmol/L 100 100 104 104   < > 103   CO2 mmol/L 21 25 22 25   < > 22   BUN mg/dL 55* 51* 46* 42*   < > 48*   CREATININE mg/dL 1.44* 1.39* 1.21 1.33*   < > 1.14   CALCIUM mg/dL 9.0 9.3 8.8 9.1   < > 8.8   ALK PHOS U/L  --   --  71 68  --  57   ALT U/L  --   --  25 26  --  15   AST U/L  --   --  25 23  --  13    < > = values in this interval not displayed.         Results from last 7 days   Lab Units 03/17/24  0320 03/16/24  1318 03/16/24  0603   MAGNESIUM mg/dL 2.1 2.1 2.1

## 2024-03-17 NOTE — PROGRESS NOTES
Pt requested that purewick be used today or she would not take her am lasix I instructed pt that the night nurse stated there was a sore area right labia that had slight bleeding last night Pt stated that she is aware and was insisting to use purewick  Pt stated she will only use during the day Purewick applied pt urine and been clear yellow no bleeding noted   pt sleeping now in recliner no complaints earlier

## 2024-03-17 NOTE — PLAN OF CARE
Problem: CARDIOVASCULAR - ADULT  Goal: Maintains optimal cardiac output and hemodynamic stability  Description: INTERVENTIONS:  - Monitor I/O, vital signs and rhythm  - Monitor for S/S and trends of decreased cardiac output  - Administer and titrate ordered vasoactive medications to optimize hemodynamic stability  - Assess quality of pulses, skin color and temperature  - Assess for signs of decreased coronary artery perfusion  - Instruct patient to report change in severity of symptoms  Outcome: Progressing  Goal: Absence of cardiac dysrhythmias or at baseline rhythm  Description: INTERVENTIONS:  - Continuous cardiac monitoring, vital signs, obtain 12 lead EKG if ordered  - Administer antiarrhythmic and heart rate control medications as ordered  - Monitor electrolytes and administer replacement therapy as ordered  Outcome: Progressing     Problem: RESPIRATORY - ADULT  Goal: Achieves optimal ventilation and oxygenation  Description: INTERVENTIONS:  - Assess for changes in respiratory status  - Assess for changes in mentation and behavior  - Position to facilitate oxygenation and minimize respiratory effort  - Oxygen administered by appropriate delivery if ordered  - Initiate smoking cessation education as indicated  - Encourage broncho-pulmonary hygiene including cough, deep breathe, Incentive Spirometry  - Assess the need for suctioning and aspirate as needed  - Assess and instruct to report SOB or any respiratory difficulty  - Respiratory Therapy support as indicated  Outcome: Progressing     Problem: METABOLIC, FLUID AND ELECTROLYTES - ADULT  Goal: Electrolytes maintained within normal limits  Description: INTERVENTIONS:  - Monitor labs and assess patient for signs and symptoms of electrolyte imbalances  - Administer electrolyte replacement as ordered  - Monitor response to electrolyte replacements, including repeat lab results as appropriate  - Instruct patient on fluid and nutrition as appropriate  Outcome:  Progressing     Problem: Prexisting or High Potential for Compromised Skin Integrity  Goal: Skin integrity is maintained or improved  Description: INTERVENTIONS:  - Identify patients at risk for skin breakdown  - Assess and monitor skin integrity  - Assess and monitor nutrition and hydration status  - Monitor labs   - Assess for incontinence   - Turn and reposition patient  - Assist with mobility/ambulation  - Relieve pressure over bony prominences  - Avoid friction and shearing  - Provide appropriate hygiene as needed including keeping skin clean and dry  - Evaluate need for skin moisturizer/barrier cream  - Collaborate with interdisciplinary team   - Patient/family teaching  - Consider wound care consult   Outcome: Progressing

## 2024-03-17 NOTE — PHYSICAL THERAPY NOTE
Physical Therapy Evaluation     Patient's Name: Jessica Jaquez    Admitting Diagnosis  Chest pain [R07.9]  SOB (shortness of breath) [R06.02]  Acute CHF (congestive heart failure) ([facility]) [I50.9]  Acute on chronic congestive heart failure, unspecified heart failure type ([facility]) [I50.9]    Problem List  Patient Active Problem List   Diagnosis    Diabetes mellitus type 2 in obese     Obesity, Class III, BMI 40-49.9 (morbid obesity) ([facility])    Essential hypertension    Chronic diastolic CHF (congestive heart failure) ([facility])    Hyperlipidemia    Unspecified abnormalities of gait and mobility    Tremor of hands and face    Cerebrovascular accident (CVA) due to thrombosis of precerebral artery ([facility])    Open wound of abdomen    Lumbar radiculopathy    History of stroke    New onset atrial fibrillation (HCC)    CHF exacerbation ([facility])       Past Medical History  Past Medical History:   Diagnosis Date    Arthritis     Colorectal polyps     Constipation     Diabetes mellitus (HCC)     Diverticulosis of colon     Hiatal hernia     Hypertension     Increased urinary frequency     Lumbar disc disease     Pressure injury of skin     SOB (shortness of breath)     Stress incontinence     Vitamin D deficiency 07/27/2018       Past Surgical History  Past Surgical History:   Procedure Laterality Date    BACK SURGERY      CARPAL TUNNEL RELEASE Left     CATARACT EXTRACTION Bilateral     CHOLECYSTECTOMY      COLONOSCOPY W/ ENDOSCOPIC US N/A 2/24/2016    Procedure: ANAL ENDOSCOPIC U/S;  Surgeon: HOLLIS Washington MD;  Location: [facility];  Service:     HERNIA REPAIR      SD COLONOSCOPY FLX DX W/COLLJ SPEC WHEN PFRMD N/A 2/24/2016    Procedure: COLONOSCOPY;  Surgeon: HOLLIS Washington MD;  Location: [facility];  Service: Colorectal    TONSILLECTOMY      TUBAL LIGATION            03/17/24 1043   PT Last Visit   PT Visit Date 03/17/24   Note Type   Note type Evaluation   Pain Assessment   Pain Assessment Tool 0-10   Pain Score 4    Pain Location/Orientation Orientation: Left;Location: Leg   Restrictions/Precautions   Weight Bearing Precautions Per Order No   Other Precautions Fall Risk;Pain;Telemetry   Home Living   Type of Home House   Home Layout Multi-level;1/2 bath on main level;Bed/bath upstairs;Access;Stairs to enter with rails  (split level home with 0STE, +6 steps to main living with bathroom/recliner, +6 to bathroom/bedroom)   Bathroom Shower/Tub Walk-in shower  (+ t/s)   Bathroom Toilet Standard   Bathroom Equipment Shower chair;Grab bars in shower   Bathroom Accessibility Accessible   Home Equipment Walker;Cane  (rw vs cane PTA)   Additional Comments patient sleeps in recliner, uses half bath on main level. 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)   Falls in the last 6 months 0   Vocational Retired   General   Family/Caregiver Present No   Cognition   Overall Cognitive Status WFL   Arousal/Participation Alert   Orientation Level Oriented X4   Memory Within functional limits   Following Commands Follows all commands and directions without difficulty   Comments Patient pleasant and cooperative   Subjective   Subjective Patient agreeable to PT eval   RUE Assessment   RUE Assessment WFL   LUE Assessment   LUE Assessment WFL   RLE Assessment   RLE Assessment X   Strength RLE   RLE Overall Strength 3+/5   LLE Assessment   LLE Assessment X   Strength LLE   LLE Overall Strength 3+/5   Bed Mobility   Additional Comments OOB in chair on arrival   Transfers   Sit to Stand 5  Supervision   Additional items Increased time required;Verbal cues   Stand to Sit 5  Supervision   Additional items  Increased time required;Verbal cues   Additional Comments RW   Ambulation/Elevation   Gait pattern Decreased foot clearance;Short stride;Excessively slow   Gait Assistance 4  Minimal assist   Additional items Assist x 1;Verbal cues   Assistive Device Rolling walker   Distance 5'x2   Ambulation/Elevation Additional Comments declines further ambulation d/t frequent urination from diuretic   Balance   Static Sitting Good   Dynamic Sitting Fair +   Static Standing Fair   Dynamic Standing Fair -   Ambulatory Fair -   Endurance Deficit   Endurance Deficit Yes   Endurance Deficit Description fatigue, weakness   Activity Tolerance   Activity Tolerance Patient limited by fatigue   Nurse Made Aware RN cleared   Assessment   Prognosis Good   Problem List Decreased strength;Decreased endurance;Impaired balance;Decreased mobility;Pain;Obesity   Assessment Pt is a 78 y.o. female seen for a high complexity PT evaluation due to Ongoing medical management for primary dx, Decreased activity tolerance compared to baseline, Fall risk. Patient is s/p [facility] for Chest pain (R07.9)  SOB (shortness of breath) (R06.02)  Acute CHF (congestive heart failure) (HCC) (I50.9)  Acute on chronic congestive heart failure, unspecified heart failure type (HCC) (I50.9). Patient  has a past medical history of Arthritis, Colorectal polyps, Constipation, Diabetes mellitus (HCC), Diverticulosis of colon, Hiatal hernia, Hypertension, Increased urinary frequency, Lumbar disc disease, Pressure injury of skin, SOB (shortness of breath), Stress incontinence, and Vitamin D deficiency..     PT now consulted to assess functional mobility and needs for safe d/c planning. Prior to admission, pt independent with functional mobility, independent ADLs, and independent IADLs. Personal factors affecting status include fall risk, steps to negotiate within home, limited caregiver/social support, and medical status     Currently pt  requires supervision for functional transfers with rolling walker ; minimal assistance x1 for ambulation with rolling walker. Pt presents functioning below baseline and w/ overall mobility deficits 2* to: decreased strength, decreased endurance, decreased mobility, impaired balance. These impairments place pt at risk for falls.     Pt will continue to benefit from skilled PT interventions to address stated impairments; to maximize functional potential; for ongoing pt/family education; and DME needs. The patient's AM-PAC Basic Mobility Inpatient Short Form Raw Score Is 17. PT is currently recommending Level 3 - Minimum Resource Intensity on d/c from hospital. Will continue to follow as able.   Barriers to Discharge Inaccessible home environment   Goals   Patient Goals to go home   STG Expiration Date 03/31/24   Short Term Goal #1 In 14 days, patient will 1) increase strength in BUE/BLE by 1/2 to 1 full grade for increased strength and stability needed for functional mobility 2) improve bed mobility to MI for improved mobility and decreased need for assist 3) sit EOB x30' with MI to facilitate trunk stability and safety for completion of ADL tasks 4) increase functional transfers to MI for improved safety and functional mobility 5) ambulate 250ft with MI using rolling walker for increased endurance and safety ambulating home and community environments 6) improve balance by 1 grade for improved safety and stability and decreased risk for falls. 7) ascend/descend at least 6 stairs using HR with MI in order to safely enter home   PT Treatment Day 0   Plan   Treatment/Interventions ADL retraining;Functional transfer training;LE strengthening/ROM;Therapeutic exercise;Endurance training;Patient/family training;Equipment eval/education;Bed mobility;Gait training;Spoke to nursing;Spoke to case management;OT;Elevations   PT Frequency 3-5x/wk   Discharge Recommendation   Rehab Resource Intensity Level, PT III (Minimum Resource  Intensity)   Equipment Recommended   (owns)   AM-PAC Basic Mobility Inpatient   Turning in Flat Bed Without Bedrails 3   Lying on Back to Sitting on Edge of Flat Bed Without Bedrails 3   Moving Bed to Chair 3   Standing Up From Chair Using Arms 3   Walk in Room 3   Climb 3-5 Stairs With Railing 2   Basic Mobility Inpatient Raw Score 17   Basic Mobility Standardized Score 39.67   [facility] Highest Level Of Mobility   -[facility] Goal 5: Stand one or more mins   -HLM Achieved 6: Walk 10 steps or more   Modified [place] Scale   Modified [place] Scale 4   End of Consult   Patient Position at End of Consult All needs within reach;Bedside chair         Hermila Son, PT, DPT

## 2024-03-18 LAB
ANION GAP SERPL CALCULATED.3IONS-SCNC: 9 MMOL/L (ref 4–13)
BUN SERPL-MCNC: 60 MG/DL (ref 5–25)
CALCIUM SERPL-MCNC: 8.5 MG/DL (ref 8.4–10.2)
CHLORIDE SERPL-SCNC: 99 MMOL/L (ref 96–108)
CO2 SERPL-SCNC: 22 MMOL/L (ref 21–32)
CREAT SERPL-MCNC: 1.48 MG/DL (ref 0.6–1.3)
GFR SERPL CREATININE-BSD FRML MDRD: 33 ML/MIN/1.73SQ M
GLUCOSE SERPL-MCNC: 127 MG/DL (ref 65–140)
GLUCOSE SERPL-MCNC: 174 MG/DL (ref 65–140)
GLUCOSE SERPL-MCNC: 176 MG/DL (ref 65–140)
GLUCOSE SERPL-MCNC: 211 MG/DL (ref 65–140)
GLUCOSE SERPL-MCNC: 237 MG/DL (ref 65–140)
POTASSIUM SERPL-SCNC: 5.3 MMOL/L (ref 3.5–5.3)
SODIUM SERPL-SCNC: 130 MMOL/L (ref 135–147)

## 2024-03-18 PROCEDURE — 80048 BASIC METABOLIC PNL TOTAL CA: CPT | Performed by: INTERNAL MEDICINE

## 2024-03-18 PROCEDURE — 99232 SBSQ HOSP IP/OBS MODERATE 35: CPT | Performed by: INTERNAL MEDICINE

## 2024-03-18 PROCEDURE — 82948 REAGENT STRIP/BLOOD GLUCOSE: CPT

## 2024-03-18 PROCEDURE — 99222 1ST HOSP IP/OBS MODERATE 55: CPT

## 2024-03-18 RX ORDER — TORSEMIDE 20 MG/1
20 TABLET ORAL DAILY
Status: DISCONTINUED | OUTPATIENT
Start: 2024-03-18 | End: 2024-03-19 | Stop reason: HOSPADM

## 2024-03-18 RX ADMIN — BIMATOPROST 1 DROP: 0.3 SOLUTION/ DROPS OPHTHALMIC at 21:28

## 2024-03-18 RX ADMIN — AMILORIDE HYDROCLORIDE 2.5 MG: 5 TABLET ORAL at 08:27

## 2024-03-18 RX ADMIN — APIXABAN 5 MG: 5 TABLET, FILM COATED ORAL at 16:59

## 2024-03-18 RX ADMIN — GABAPENTIN 100 MG: 100 CAPSULE ORAL at 16:54

## 2024-03-18 RX ADMIN — APIXABAN 5 MG: 5 TABLET, FILM COATED ORAL at 08:25

## 2024-03-18 RX ADMIN — LOSARTAN POTASSIUM 50 MG: 50 TABLET, FILM COATED ORAL at 08:25

## 2024-03-18 RX ADMIN — ASPIRIN 81 MG CHEWABLE TABLET 81 MG: 81 TABLET CHEWABLE at 08:24

## 2024-03-18 RX ADMIN — EZETIMIBE 10 MG: 10 TABLET ORAL at 21:28

## 2024-03-18 RX ADMIN — METOPROLOL SUCCINATE 100 MG: 100 TABLET, EXTENDED RELEASE ORAL at 08:24

## 2024-03-18 RX ADMIN — ACETAMINOPHEN 650 MG: 325 TABLET, FILM COATED ORAL at 18:22

## 2024-03-18 RX ADMIN — INSULIN LISPRO 1 UNITS: 100 INJECTION, SOLUTION INTRAVENOUS; SUBCUTANEOUS at 16:54

## 2024-03-18 RX ADMIN — INSULIN LISPRO 1 UNITS: 100 INJECTION, SOLUTION INTRAVENOUS; SUBCUTANEOUS at 21:26

## 2024-03-18 RX ADMIN — ACETAMINOPHEN 650 MG: 325 TABLET, FILM COATED ORAL at 08:28

## 2024-03-18 RX ADMIN — TORSEMIDE 20 MG: 20 TABLET ORAL at 13:45

## 2024-03-18 RX ADMIN — GABAPENTIN 100 MG: 100 CAPSULE ORAL at 08:24

## 2024-03-18 RX ADMIN — GABAPENTIN 100 MG: 100 CAPSULE ORAL at 21:28

## 2024-03-18 RX ADMIN — INSULIN LISPRO 2 UNITS: 100 INJECTION, SOLUTION INTRAVENOUS; SUBCUTANEOUS at 11:18

## 2024-03-18 NOTE — PLAN OF CARE
Problem: CARDIOVASCULAR - ADULT  Goal: Maintains optimal cardiac output and hemodynamic stability  Description: INTERVENTIONS:  - Monitor I/O, vital signs and rhythm  - Monitor for S/S and trends of decreased cardiac output  - Administer and titrate ordered vasoactive medications to optimize hemodynamic stability  - Assess quality of pulses, skin color and temperature  - Assess for signs of decreased coronary artery perfusion  - Instruct patient to report change in severity of symptoms  Outcome: Progressing  Goal: Absence of cardiac dysrhythmias or at baseline rhythm  Description: INTERVENTIONS:  - Continuous cardiac monitoring, vital signs, obtain 12 lead EKG if ordered  - Administer antiarrhythmic and heart rate control medications as ordered  - Monitor electrolytes and administer replacement therapy as ordered  Outcome: Progressing     Problem: RESPIRATORY - ADULT  Goal: Achieves optimal ventilation and oxygenation  Description: INTERVENTIONS:  - Assess for changes in respiratory status  - Assess for changes in mentation and behavior  - Position to facilitate oxygenation and minimize respiratory effort  - Oxygen administered by appropriate delivery if ordered  - Initiate smoking cessation education as indicated  - Encourage broncho-pulmonary hygiene including cough, deep breathe, Incentive Spirometry  - Assess the need for suctioning and aspirate as needed  - Assess and instruct to report SOB or any respiratory difficulty  - Respiratory Therapy support as indicated  Outcome: Progressing     Problem: METABOLIC, FLUID AND ELECTROLYTES - ADULT  Goal: Electrolytes maintained within normal limits  Description: INTERVENTIONS:  - Monitor labs and assess patient for signs and symptoms of electrolyte imbalances  - Administer electrolyte replacement as ordered  - Monitor response to electrolyte replacements, including repeat lab results as appropriate  - Instruct patient on fluid and nutrition as appropriate  Outcome:  Progressing     Problem: Prexisting or High Potential for Compromised Skin Integrity  Goal: Skin integrity is maintained or improved  Description: INTERVENTIONS:  - Identify patients at risk for skin breakdown  - Assess and monitor skin integrity  - Assess and monitor nutrition and hydration status  - Monitor labs   - Assess for incontinence   - Turn and reposition patient  - Assist with mobility/ambulation  - Relieve pressure over bony prominences  - Avoid friction and shearing  - Provide appropriate hygiene as needed including keeping skin clean and dry  - Evaluate need for skin moisturizer/barrier cream  - Collaborate with interdisciplinary team   - Patient/family teaching  - Consider wound care consult   Outcome: Progressing     Problem: PAIN - ADULT  Goal: Verbalizes/displays adequate comfort level or baseline comfort level  Description: Interventions:  - Encourage patient to monitor pain and request assistance  - Assess pain using appropriate pain scale  - Administer analgesics based on type and severity of pain and evaluate response  - Implement non-pharmacological measures as appropriate and evaluate response  - Consider cultural and social influences on pain and pain management  - Notify physician/advanced practitioner if interventions unsuccessful or patient reports new pain  Outcome: Progressing     Problem: INFECTION - ADULT  Goal: Absence or prevention of progression during hospitalization  Description: INTERVENTIONS:  - Assess and monitor for signs and symptoms of infection  - Monitor lab/diagnostic results  - Monitor all insertion sites, i.e. indwelling lines, tubes, and drains  - Monitor endotracheal if appropriate and nasal secretions for changes in amount and color  - [place] appropriate cooling/warming therapies per order  - Administer medications as ordered  - Instruct and encourage patient and family to use good hand hygiene technique  - Identify and instruct in appropriate isolation  precautions for identified infection/condition  Outcome: Progressing  Goal: Absence of fever/infection during neutropenic period  Description: INTERVENTIONS:  - Monitor WBC    Outcome: Progressing     Problem: DISCHARGE PLANNING  Goal: Discharge to home or other facility with appropriate resources  Description: INTERVENTIONS:  - Identify barriers to discharge w/patient and caregiver  - Arrange for needed discharge resources and transportation as appropriate  - Identify discharge learning needs (meds, wound care, etc.)  - Arrange for interpretive services to assist at discharge as needed  - Refer to Case Management Department for coordinating discharge planning if the patient needs post-hospital services based on physician/advanced practitioner order or complex needs related to functional status, cognitive ability, or social support system  Outcome: Progressing     Problem: Knowledge Deficit  Goal: Patient/family/caregiver demonstrates understanding of disease process, treatment plan, medications, and discharge instructions  Description: Complete learning assessment and assess knowledge base.  Interventions:  - Provide teaching at level of understanding  - Provide teaching via preferred learning methods  Outcome: Progressing

## 2024-03-18 NOTE — ED PROVIDER NOTES
History  Chief Complaint   Patient presents with    Chest Pain     Was discharged home on Monday, had fluid taken off during hospital stay. Gained 14lbs since Tuesday     HPI  Jessica Jaquez is a 78 y.o. female with PMH CHF, A-fib on Eliquis who presents to the emergency department with SOB and weight gain.  Patient was recently admitted to the hospital and had newly diagnosed A-fib and was diuresed approximately 30 pounds.  Patient was discharged several days ago but states she has gained 14 pounds since returning home and has had increased bilateral leg swelling.  She denies chest pain, palpitations, or lightheadedness.  She reports being compliant with her medications and has been limiting salt.    Prior to Admission Medications   Prescriptions Last Dose Informant Patient Reported? Taking?   AMILoride 5 mg tablet 3/15/2024  Yes Yes   Sig: Take 2.5 mg by mouth daily   Cholecalciferol (VITAMIN D3) 1000 units CAPS 3/14/2024 Self Yes Yes   Sig: Take by mouth daily     KLOR-CON M20 20 MEQ tablet 3/15/2024 Self Yes Yes   Sig: Take 20 mEq by mouth as needed     Multiple Vitamins-Minerals (CENTRUM SILVER PO) 3/14/2024 Self Yes Yes   Sig: multivitamin   amLODIPine (NORVASC) 5 mg tablet Past Week  No Yes   Sig: Take 1 tablet (5 mg total) by mouth daily at bedtime   apixaban (ELIQUIS) 5 mg 3/15/2024  No Yes   Sig: Take 1 tablet (5 mg total) by mouth 2 (two) times a day   aspirin 81 mg chewable tablet 3/15/2024  Yes Yes   Sig: Chew 81 mg daily   bimatoprost (Lumigan) 0.01 % ophthalmic drops 3/14/2024  Yes Yes   Sig: Administer 1 drop to both eyes daily at bedtime   ezetimibe (ZETIA) 10 mg tablet 3/14/2024  No Yes   Sig: Take 1 tablet (10 mg total) by mouth daily at bedtime   furosemide (LASIX) 40 mg tablet 3/15/2024 Self Yes Yes   Sig: Take 40 mg by mouth as needed     gabapentin (NEURONTIN) 100 mg capsule 3/15/2024  No Yes   Sig: Take 1 capsule (100 mg total) by mouth 3 (three) times a day   glimepiride (AMARYL) 1 mg  tablet 3/15/2024  Yes Yes   Sig: Take 1 mg by mouth every morning before breakfast   hydrocortisone 2.5 % cream Past Month Self Yes Yes   Sig: as needed   ibuprofen (MOTRIN) 200 mg tablet Past Month Self Yes Yes   Sig: Take by mouth every 6 (six) hours as needed for mild pain   ketoconazole (NIZORAL) 2 % cream Past Month Self Yes Yes   Sig: as needed   losartan (COZAAR) 50 mg tablet 3/15/2024 Self Yes Yes   Si mg    metoprolol succinate (TOPROL-XL) 100 mg 24 hr tablet 3/15/2024 Self Yes Yes   Sig: Take 100 mg by mouth daily   oxyCODONE (ROXICODONE) 5 immediate release tablet Not Taking  No No   Sig: Take 1 tablet (5 mg total) by mouth every 6 (six) hours as needed for severe pain for up to 10 days Max Daily Amount: 20 mg   Patient not taking: Reported on 3/15/2024      Facility-Administered Medications: None       Past Medical History:   Diagnosis Date    Arthritis     Colorectal polyps     Constipation     Diabetes mellitus (HCC)     Diverticulosis of colon     Hiatal hernia     Hypertension     Increased urinary frequency     Lumbar disc disease     Pressure injury of skin     SOB (shortness of breath)     Stress incontinence     Vitamin D deficiency 2018       Past Surgical History:   Procedure Laterality Date    BACK SURGERY      CARPAL TUNNEL RELEASE Left     CATARACT EXTRACTION Bilateral     CHOLECYSTECTOMY      COLONOSCOPY W/ ENDOSCOPIC US N/A 2016    Procedure: ANAL ENDOSCOPIC U/S;  Surgeon: HOLLIS Washington MD;  Location: [facility];  Service:     HERNIA REPAIR      [place] COLONOSCOPY FLX DX W/COLLJ SPEC WHEN PFRMD N/A 2016    Procedure: COLONOSCOPY;  Surgeon: HOLLIS Washington MD;  Location: [facility];  Service: Colorectal    TONSILLECTOMY      TUBAL LIGATION         Family History   Problem Relation Age of Onset    Hypertension Mother     Heart attack Mother     Diabetes Mother     Prostate cancer Father     No Known Problems Brother     Stroke Maternal Grandmother     Stroke Paternal  Grandmother     Stroke Paternal Grandfather     Thyroid cancer Neg Hx      I have reviewed and agree with the history as documented.    E-Cigarette/Vaping     E-Cigarette/Vaping Substances     Social History     Tobacco Use    Smoking status: Never    Smokeless tobacco: Never   Substance Use Topics    Alcohol use: Never    Drug use: No       Home medications:  Prior to Admission Medications   Prescriptions Last Dose Informant Patient Reported? Taking?   AMILoride 5 mg tablet 3/15/2024  Yes Yes   Sig: Take 2.5 mg by mouth daily   Cholecalciferol (VITAMIN D3) 1000 units CAPS 3/14/2024 Self Yes Yes   Sig: Take by mouth daily     KLOR-CON M20 20 MEQ tablet 3/15/2024 Self Yes Yes   Sig: Take 20 mEq by mouth as needed     Multiple Vitamins-Minerals (CENTRUM SILVER PO) 3/14/2024 Self Yes Yes   Sig: multivitamin   amLODIPine (NORVASC) 5 mg tablet Past Week  No Yes   Sig: Take 1 tablet (5 mg total) by mouth daily at bedtime   apixaban (ELIQUIS) 5 mg 3/15/2024  No Yes   Sig: Take 1 tablet (5 mg total) by mouth 2 (two) times a day   aspirin 81 mg chewable tablet 3/15/2024  Yes Yes   Sig: Chew 81 mg daily   bimatoprost (Lumigan) 0.01 % ophthalmic drops 3/14/2024  Yes Yes   Sig: Administer 1 drop to both eyes daily at bedtime   ezetimibe (ZETIA) 10 mg tablet 3/14/2024  No Yes   Sig: Take 1 tablet (10 mg total) by mouth daily at bedtime   furosemide (LASIX) 40 mg tablet 3/15/2024 Self Yes Yes   Sig: Take 40 mg by mouth as needed     gabapentin (NEURONTIN) 100 mg capsule 3/15/2024  No Yes   Sig: Take 1 capsule (100 mg total) by mouth 3 (three) times a day   glimepiride (AMARYL) 1 mg tablet 3/15/2024  Yes Yes   Sig: Take 1 mg by mouth every morning before breakfast   hydrocortisone 2.5 % cream Past Month Self Yes Yes   Sig: as needed   ibuprofen (MOTRIN) 200 mg tablet Past Month Self Yes Yes   Sig: Take by mouth every 6 (six) hours as needed for mild pain   ketoconazole (NIZORAL) 2 % cream Past Month Self Yes Yes   Sig: as needed    losartan (COZAAR) 50 mg tablet 3/15/2024 Self Yes Yes   Si mg    metoprolol succinate (TOPROL-XL) 100 mg 24 hr tablet 3/15/2024 Self Yes Yes   Sig: Take 100 mg by mouth daily   oxyCODONE (ROXICODONE) 5 immediate release tablet Not Taking  No No   Sig: Take 1 tablet (5 mg total) by mouth every 6 (six) hours as needed for severe pain for up to 10 days Max Daily Amount: 20 mg   Patient not taking: Reported on 3/15/2024      Facility-Administered Medications: None     Allergies:  Allergies   Allergen Reactions    Other Cough and Sneezing     Seasonal allergies        Review of Systems   Constitutional:  Negative for fever.   Respiratory:  Positive for shortness of breath.    Cardiovascular:  Positive for leg swelling. Negative for chest pain.   Gastrointestinal:  Negative for abdominal pain and vomiting.   All other systems reviewed and are negative.      Physical Exam  ED Triage Vitals   Temperature Pulse Respirations Blood Pressure SpO2   03/15/24 1243 03/15/24 1243 03/15/24 1243 03/15/24 1243 03/15/24 1243   97.8 °F (36.6 °C) 82 18 113/56 97 %      Temp Source Heart Rate Source Patient Position - Orthostatic VS BP Location FiO2 (%)   03/15/24 1243 03/15/24 1243 24 1902 03/15/24 1709 --   Temporal Monitor Lying Right arm       Pain Score       03/15/24 1243       No Pain             Orthostatic Vital Signs  Vitals:    24 0236 24 0742 24 1049 24 1345   BP: 108/57 124/51 119/51 115/52   Pulse: 70 71 66 74   Patient Position - Orthostatic VS: Lying          Physical Exam  Vitals and nursing note reviewed.   Constitutional:       General: She is not in acute distress.     Appearance: She is not toxic-appearing or diaphoretic.   HENT:      Head: Normocephalic.      Mouth/Throat:      Mouth: Mucous membranes are moist.   Eyes:      Pupils: Pupils are equal, round, and reactive to light.   Cardiovascular:      Rate and Rhythm: Normal rate and regular rhythm.   Pulmonary:      Effort:  Pulmonary effort is normal. No respiratory distress.      Breath sounds: Rales present. No wheezing or rhonchi.   Abdominal:      Palpations: Abdomen is soft.      Tenderness: There is no abdominal tenderness. There is no guarding or rebound.   Musculoskeletal:      Right lower leg: Edema present.      Left lower leg: Edema present.   Skin:     General: Skin is warm and dry.   Neurological:      Mental Status: She is alert.         ED Medications  Medications   AMILoride tablet 2.5 mg (2.5 mg Oral Given 3/18/24 0827)   apixaban (ELIQUIS) tablet 5 mg (5 mg Oral Given 3/18/24 0825)   aspirin chewable tablet 81 mg (81 mg Oral Given 3/18/24 0824)   bimatoprost (LUMIGAN) 0.03 % ophthalmic drops 1 drop (1 drop Ophthalmic Given 3/17/24 2150)   ezetimibe (ZETIA) tablet 10 mg (10 mg Oral Given 3/17/24 2150)   gabapentin (NEURONTIN) capsule 100 mg (100 mg Oral Given 3/18/24 0824)   metoprolol succinate (TOPROL-XL) 24 hr tablet 100 mg (100 mg Oral Given 3/18/24 0824)   insulin lispro (HumALOG/ADMELOG) 100 units/mL subcutaneous injection 1-5 Units (2 Units Subcutaneous Given 3/18/24 1118)   insulin lispro (HumALOG/ADMELOG) 100 units/mL subcutaneous injection 1-5 Units (2 Units Subcutaneous Given 3/17/24 2151)   acetaminophen (TYLENOL) tablet 650 mg (650 mg Oral Given 3/18/24 0828)   torsemide (DEMADEX) tablet 20 mg (20 mg Oral Given 3/18/24 1345)   furosemide (LASIX) 100 mg in dextrose 5 % 50 mL IVPB (0 mg Intravenous Stopped 3/15/24 1536)   potassium chloride (Klor-Con M20) CR tablet 20 mEq (20 mEq Oral Given 3/16/24 1230)       Diagnostic Studies  Results Reviewed       Procedure Component Value Units Date/Time    TSH, 3rd generation [[id number]]  (Abnormal) Collected: 03/16/24 0603    Lab Status: Final result Specimen: Blood from Arm, Right Updated: 03/16/24 0706     TSH 3RD GENERATON 0.427 uIU/mL     Comprehensive metabolic panel [[id number]]  (Abnormal) Collected: 03/16/24 0603    Lab Status: Final result Specimen: Blood  from Arm, Right Updated: 03/16/24 0655     Sodium 136 mmol/L      Potassium 4.5 mmol/L      Chloride 104 mmol/L      CO2 22 mmol/L      ANION GAP 10 mmol/L      BUN 46 mg/dL      Creatinine 1.21 mg/dL      Glucose 101 mg/dL      Calcium 8.8 mg/dL      AST 25 U/L      ALT 25 U/L      Alkaline Phosphatase 71 U/L      Total Protein 6.5 g/dL      Albumin 3.6 g/dL      Total Bilirubin 0.50 mg/dL      eGFR 42 ml/min/1.73sq m     Narrative:      National Kid[employer] Foundation guidelines for Chronic Kidney Disease (CKD):     Stage 1 with normal or high GFR (GFR > 90 mL/min/1.73 square meters)    Stage 2 Mild CKD (GFR = 60-89 mL/min/1.73 square meters)    Stage 3A Moderate CKD (GFR = 45-59 mL/min/1.73 square meters)    Stage 3B Moderate CKD (GFR = 30-44 mL/min/1.73 square meters)    Stage 4 Severe CKD (GFR = 15-29 mL/min/1.73 square meters)    Stage 5 End Stage CKD (GFR <15 mL/min/1.73 square meters)  Note: GFR calculation is accurate only with a steady state creatinine    Magnesium [[id number]]  (Normal) Collected: 03/16/24 0603    Lab Status: Final result Specimen: Blood from Arm, Right Updated: 03/16/24 0655     Magnesium 2.1 mg/dL     Lipid Panel with Direct LDL reflex [[id number]]  (Abnormal) Collected: 03/16/24 0603    Lab Status: Final result Specimen: Blood from Arm, Right Updated: 03/16/24 0655     Cholesterol 159 mg/dL      Triglycerides 146 mg/dL      HDL, Direct 45 mg/dL      LDL Calculated 85 mg/dL     CBC and differential [[id number]] Collected: 03/16/24 0603    Lab Status: Final result Specimen: Blood from Arm, Right Updated: 03/16/24 0620     WBC 8.62 Thousand/uL      RBC 4.32 Million/uL      Hemoglobin 12.2 g/dL      Hematocrit 38.2 %      MCV 88 fL      MCH 28.2 pg      MCHC 31.9 g/dL      RDW 14.6 %      MPV 8.9 fL      Platelets 310 Thousands/uL      nRBC 0 /100 WBCs      Neutrophils Relative 61 %      Immature Grans % 0 %      Lymphocytes Relative 24 %      Monocytes Relative 9 %      Eosinophils  Relative 6 %      Basophils Relative 0 %      Neutrophils Absolute 5.24 Thousands/µL      Absolute Immature Grans 0.03 Thousand/uL      Absolute Lymphocytes 2.03 Thousands/µL      Absolute Monocytes 0.79 Thousand/µL      Eosinophils Absolute 0.51 Thousand/µL      Basophils Absolute 0.02 Thousands/µL     HS Troponin I 4hr [[id number]]  (Normal) Collected: 03/15/24 1748    Lab Status: Final result Specimen: Blood from Arm, Right Updated: 03/15/24 1834     hs TnI 4hr 9 ng/L      Delta 4hr hsTnI 1 ng/L     HS Troponin I 2hr [[id number]]  (Normal) Collected: 03/15/24 1505    Lab Status: Final result Specimen: Blood from Arm, Right Updated: 03/15/24 1543     hs TnI 2hr 8 ng/L      Delta 2hr hsTnI 0 ng/L     CBC and differential [[id number]]  (Abnormal) Collected: 03/15/24 1308    Lab Status: Final result Specimen: Blood from Arm, Right Updated: 03/15/24 1506     WBC 7.91 Thousand/uL      RBC 4.41 Million/uL      Hemoglobin 12.5 g/dL      Hematocrit 40.5 %      MCV 92 fL      MCH 28.3 pg      MCHC 30.9 g/dL      RDW 14.2 %      MPV 8.9 fL      Platelets 298 Thousands/uL      nRBC 0 /100 WBCs      Neutrophils Relative 65 %      Immature Grans % 0 %      Lymphocytes Relative 19 %      Monocytes Relative 8 %      Eosinophils Relative 7 %      Basophils Relative 1 %      Neutrophils Absolute 5.22 Thousands/µL      Absolute Immature Grans 0.03 Thousand/uL      Absolute Lymphocytes 1.49 Thousands/µL      Absolute Monocytes 0.59 Thousand/µL      Eosinophils Absolute 0.54 Thousand/µL      Basophils Absolute 0.04 Thousands/µL     HS Troponin 0hr (reflex protocol) [[id number]]  (Normal) Collected: 03/15/24 1308    Lab Status: Final result Specimen: Blood from Arm, Right Updated: 03/15/24 1403     hs TnI 0hr 8 ng/L     Comprehensive metabolic panel [[id number]]  (Abnormal) Collected: 03/15/24 1308    Lab Status: Final result Specimen: Blood from Arm, Right Updated: 03/15/24 1347     Sodium 136 mmol/L      Potassium 4.2 mmol/L       Chloride 104 mmol/L      CO2 25 mmol/L      ANION GAP 7 mmol/L      BUN 42 mg/dL      Creatinine 1.33 mg/dL      Glucose 76 mg/dL      Calcium 9.1 mg/dL      AST 23 U/L      ALT 26 U/L      Alkaline Phosphatase 68 U/L      Total Protein 6.9 g/dL      Albumin 3.6 g/dL      Total Bilirubin 0.37 mg/dL      eGFR 38 ml/min/1.73sq m     Narrative:      National Kidney Disease Foundation guidelines for Chronic Kidney Disease (CKD):     Stage 1 with normal or high GFR (GFR > 90 mL/min/1.73 square meters)    Stage 2 Mild CKD (GFR = 60-89 mL/min/1.73 square meters)    Stage 3A Moderate CKD (GFR = 45-59 mL/min/1.73 square meters)    Stage 3B Moderate CKD (GFR = 30-44 mL/min/1.73 square meters)    Stage 4 Severe CKD (GFR = 15-29 mL/min/1.73 square meters)    Stage 5 End Stage CKD (GFR <15 mL/min/1.73 square meters)  Note: GFR calculation is accurate only with a steady state creatinine                   XR chest 1 view portable   ED Interpretation by Geovani Barber DO (03/15 1349)   Poor penetration, poor inspiration, cephalization concerning for fluid overload      Final Result by Monique Rodriguez MD (03/15 1807)      No acute cardiopulmonary disease.            Workstation performed: [id number]               Procedures  Procedures      ED Course             HEART Risk Score      Flowsheet Row Most Recent Value   Heart Score Risk Calculator    History 1 Filed at: 03/18/2024 1349   ECG 0 Filed at: 03/18/2024 1349   Age 2 Filed at: 03/18/2024 1349   Risk Factors 1 Filed at: 03/18/2024 1349   Troponin 0 Filed at: 03/18/2024 1349   HEART Score 4 Filed at: 03/18/2024 1349                                  MDM  Medical Decision Making  Amount and/or Complexity of Data Reviewed  Labs: ordered.  Radiology: ordered and independent interpretation performed.    Risk  Decision regarding hospitalization.      Jessica Jaquez is a 78 y.o. female with PMH CHF, A-fib on Eliquis who presents to the emergency department with SOB, bilateral leg  swelling, weight gain. Workup including vital signs, physical exam, EKG, CXR, and labs. EKG without acute ischemic changes.  Clinical exam and CXR consistent with volume overload. most likely diagnosis CHF exacerbation. Treatment including diuresis. Plan for admission to medicine .       Disposition  Final diagnoses:   Acute CHF (congestive heart failure) (HCC)   SOB (shortness of breath)     Time reflects when diagnosis was documented in both MDM as applicable and the Disposition within this note       Time User Action Codes Description Comment    3/15/2024  2:40 PM Mitch Iqbal Add [I50.9] Acute CHF (congestive heart failure) (HCC)     3/15/2024  2:40 PM Mitch Iqbal Add [R06.02] SOB (shortness of breath)     3/15/2024  2:58 PM Houston Cervantes Add [I50.9] Acute on chronic congestive heart failure, unspecified heart failure type (HCC)     3/16/2024  9:51 AM Patty Alberts Add [I50.9] CHF exacerbation (HCC)           ED Disposition       ED Disposition   Admit    Condition   Stable    Date/Time   Fri Mar 15, 2024 1440    Comment   Case was discussed with internal medicine and the patient's admission status was agreed to be Admission Status: inpatient status to the service of Dr. Cervantes.               Follow-up Information       Follow up With Specialties Details Why Contact Info    [facility]  Follow up  [address] [address] [phone]              Current Discharge Medication List        CONTINUE these medications which have NOT CHANGED    Details   AMILoride 5 mg tablet Take 2.5 mg by mouth daily      amLODIPine (NORVASC) 5 mg tablet Take 1 tablet (5 mg total) by mouth daily at bedtime  Qty: 60 tablet, Refills: 0    Associated Diagnoses: Left hip pain; Ambulatory dysfunction      apixaban (ELIQUIS) 5 mg Take 1 tablet (5 mg total) by mouth 2 (two) times a day  Qty: 60 tablet, Refills: 0    Comments: Price check with  insurance  Associated Diagnoses: New onset atrial fibrillation (HCC)      aspirin 81 mg chewable tablet Chew 81 mg daily      bimatoprost (Lumigan) 0.01 % ophthalmic drops Administer 1 drop to both eyes daily at bedtime      Cholecalciferol (VITAMIN D3) 1000 units CAPS Take by mouth daily        ezetimibe (ZETIA) 10 mg tablet Take 1 tablet (10 mg total) by mouth daily at bedtime  Qty: 60 tablet, Refills: 0    Associated Diagnoses: Left hip pain; Ambulatory dysfunction      furosemide (LASIX) 40 mg tablet Take 40 mg by mouth as needed        gabapentin (NEURONTIN) 100 mg capsule Take 1 capsule (100 mg total) by mouth 3 (three) times a day  Qty: 90 capsule, Refills: 2    Associated Diagnoses: Left hip pain; Ambulatory dysfunction      glimepiride (AMARYL) 1 mg tablet Take 1 mg by mouth every morning before breakfast      hydrocortisone 2.5 % cream as needed  Refills: 3      ibuprofen (MOTRIN) 200 mg tablet Take by mouth every 6 (six) hours as needed for mild pain      ketoconazole (NIZORAL) 2 % cream as needed  Refills: 3      KLOR-CON M20 20 MEQ tablet Take 20 mEq by mouth as needed    Refills: 3      losartan (COZAAR) 50 mg tablet 100 mg       metoprolol succinate (TOPROL-XL) 100 mg 24 hr tablet Take 100 mg by mouth daily  Refills: 3      Multiple Vitamins-Minerals (CENTRUM SILVER PO) multivitamin      oxyCODONE (ROXICODONE) 5 immediate release tablet Take 1 tablet (5 mg total) by mouth every 6 (six) hours as needed for severe pain for up to 10 days Max Daily Amount: 20 mg  Qty: 10 tablet, Refills: 0    Associated Diagnoses: Left hip pain; Ambulatory dysfunction             No discharge procedures on file.    PDMP Review         Value Time User    PDMP Reviewed  Yes 3/11/2024 11:21 AM Natalie Dudley MD             ED Provider  Attending physically available and evaluated Jessica Jaquez. I managed the patient along with the ED Attending.    Electronically Signed by    Portions of the record may have been created with  "voice recognition software.  Occasional wrong word or \"sound a like\" substitutions may have occurred due to the inherent limitations of voice recognition software.  Read the chart carefully and recognize, using context, where substitutions have occurred       Mitch Iqbal MD  03/18/24 1251       Geovani Barber DO  03/18/24 1350    "

## 2024-03-18 NOTE — PROGRESS NOTES
Patient:    MRN:  [id number]    [employer] Request ID:  [id number]    Level of care reserved:  Home Health Agency    Partner Reserved:  [facility], [place] [address] [phone]    Clinical needs requested:    Geography searched:  [id number]    Start of Service:    Request sent:  11:48am EDT on 3/18/2024 by Selvin Farrell    Partner reserved:  11:56am EDT on 3/18/2024 by Selvin Farrell    Choice list shared:  11:56am EDT on 3/18/2024 by Selvin Farrell

## 2024-03-18 NOTE — ASSESSMENT & PLAN NOTE
Blood pressures running a bit soft,   Continue metoprolol, amiloride  Creatinine noted to be mildly elevated from yesterday will hold losartan for now

## 2024-03-18 NOTE — PROGRESS NOTES
"[facility]    Cardiology Progress Note  Jessica Jaquez 78 y.o. female   YOB: 1945 MRN: [id number]  Unit/Bed#: [facility] 525-01 Encounter: [id number]      Subjective:   No significant events overnight.  No current complaints of chest pain, shortness of breath, palpitations or dizziness.  He reports good diuresis yesterday, but has not had much urine output since morning    Assessments  78-year-old female, retired [employer] nurse, currently admitted to the hospital complaining of increased shortness of breath and self-reported \"14 pound weight gain\" at home since recent discharge on 3/11/2024.  She was found to be volume overloaded and in heart failure and received a couple of doses of IV Lasix with good diuresis so far.  She has recently had new onset atrial fibrillation and had a successful PHU cardioversion procedure done on 3/8/24    Principal Problem:    CHF exacerbation (HCC)  Active Problems:    Diabetes mellitus type 2 in obese     Essential hypertension    Hyperlipidemia    Lumbar radiculopathy    New onset atrial fibrillation (HCC)        Plan  Acute on chronic diastolic heart failure exacerbation  Recent discharge weight was 217 lbs, currently up to 226 lbs  ?wt unchanged despite diuresis since admission  S/p IV lasix 100 + IV lasix 80 --> good diuresis, but both times had hypotension with SBP down to 80s later that day  Home diuretic: amiloride 2.5 mg daily + lasix 40 mg PRN  Only takes it about once/week  BP improved today, 119/51, but she has not had much urine output since morning  Cr overall slightly up, but stable at 1.48 today  Start torsemide 20 mg daily  Continue amiloride 2.5 mg daily  Monitor diuretic response today and reevaluate tomorrow based on response, BMP    Hypertension  Blood pressure was low yesterday, but is now improved back up to low normal today  Home medications include metoprolol succinate 100 mg daily, amiloride 2.5 mg daily plus as needed " Lasix  Continue metoprolol, amiloride   Torsemide 20 mg daily being added  Monitor    Discussed with primary medical service Dr. Powell    Review of Systems   All other systems reviewed and are negative.        Telemetry Review: No significant arrhythmias seen on telemetry review.  Normal sinus rhythm, heart rate 70-80    Objective:   Vitals: Blood pressure 119/51, pulse 66, temperature 97.6 °F (36.4 °C), resp. rate 16, weight 103 kg (226 lb 3.1 oz), SpO2 96%., Body mass index is 40.07 kg/m².,   Orthostatic Blood Pressures      Flowsheet Row Most Recent Value   Blood Pressure 119/51 filed at 2024 1049   Patient Position - Orthostatic VS Lying filed at 2024 0236           Systolic (24hrs), Av , Min:88 , Max:124     Diastolic (24hrs), Av, Min:39, Max:57    Wt Readings from Last 5 Encounters:   24 103 kg (226 lb 3.1 oz)   24 98.5 kg (217 lb 2.5 oz)   22 105 kg (232 lb)   21 104 kg (230 lb)   21 105 kg (232 lb)     I/O          0701   0700  0701   0700  0701   0700    P.O. 1300 1001 420    IV Piggyback       Total Intake(mL/kg) 1300 (12.7) 1001 (9.7) 420 (4.1)    Urine (mL/kg/hr) 718 (0.3) 1900 (0.8) 200 (0.3)    Stool 0 0     Total Output 718 1900 200    Net +582 -899 +220           Unmeasured Urine Occurrence 1 x  1 x    Unmeasured Stool Occurrence 1 x 1 x                 Physical Exam  Vitals and nursing note reviewed.   Constitutional:       General: She is not in acute distress.     Appearance: Normal appearance. She is well-developed. She is obese. She is not ill-appearing.   HENT:      Head: Normocephalic and atraumatic.      Nose: No congestion.   Eyes:      General: No scleral icterus.     Conjunctiva/sclera: Conjunctivae normal.   Neck:      Vascular: No carotid bruit or JVD.   Cardiovascular:      Rate and Rhythm: Normal rate and regular rhythm.      Pulses: Normal pulses.      Heart sounds: Normal heart sounds. No murmur heard.      No friction rub. No gallop.   Pulmonary:      Effort: Pulmonary effort is normal. No respiratory distress.      Breath sounds: Normal breath sounds. No rales.   Abdominal:      General: Abdomen is protuberant. There is distension.      Palpations: Abdomen is soft.      Tenderness: There is no abdominal tenderness.   Musculoskeletal:         General: No swelling or tenderness.      Cervical back: Neck supple.      Right lower leg: No edema.      Left lower leg: No edema.   Skin:     General: Skin is warm.   Neurological:      General: No focal deficit present.      Mental Status: She is alert and oriented to person, place, and time. Mental status is at baseline.   Psychiatric:         Mood and Affect: Mood normal.         Behavior: Behavior normal.         Thought Content: Thought content normal.         Laboratory Results: personally reviewed        CBC with diff:   Results from last 7 days   Lab Units 03/17/24  0320 03/16/24  0603 03/15/24  1308   WBC Thousand/uL 11.02* 8.62 7.91   HEMOGLOBIN g/dL 12.7 12.2 12.5   HEMATOCRIT % 40.9 38.2 40.5   MCV fL 91 88 92   PLATELETS Thousands/uL 323 310 298   RBC Million/uL 4.51 4.32 4.41   MCH pg 28.2 28.2 28.3   MCHC g/dL 31.1* 31.9 30.9*   RDW % 14.4 14.6 14.2   MPV fL 8.9 8.9 8.9   NRBC AUTO /100 WBCs  --  0 0         CMP:  Results from last 7 days   Lab Units 03/18/24  0929 03/17/24  0320 03/16/24  1318 03/16/24  0603 03/15/24  1308 03/14/24  0720   POTASSIUM mmol/L 5.3 4.6 4.8 4.5 4.2 4.7   CHLORIDE mmol/L 99 100 100 104 104 105   CO2 mmol/L 22 21 25 22 25 22   BUN mg/dL 60* 55* 51* 46* 42* 39*   CREATININE mg/dL 1.48* 1.44* 1.39* 1.21 1.33* 1.21   CALCIUM mg/dL 8.5 9.0 9.3 8.8 9.1 9.3   AST U/L  --   --   --  25 23  --    ALT U/L  --   --   --  25 26  --    ALK PHOS U/L  --   --   --  71 68  --    EGFR ml/min/1.73sq m 33 34 36 42 38 42         BMP:  Results from last 7 days   Lab Units 03/18/24  0929 03/17/24  0320 03/16/24  1318 03/16/24  0603 03/15/24  1308  03/14/24  0720   POTASSIUM mmol/L 5.3 4.6 4.8 4.5 4.2 4.7   CHLORIDE mmol/L 99 100 100 104 104 105   CO2 mmol/L 22 21 25 22 25 22   BUN mg/dL 60* 55* 51* 46* 42* 39*   CREATININE mg/dL 1.48* 1.44* 1.39* 1.21 1.33* 1.21   CALCIUM mg/dL 8.5 9.0 9.3 8.8 9.1 9.3       BNP:   Recent Labs     03/16/24  0603   BNP 30       Magnesium:   Results from last 7 days   Lab Units 03/17/24  0320 03/16/24  1318 03/16/24  0603   MAGNESIUM mg/dL 2.1 2.1 2.1       Coags:       TSH:        Hemoglobin A1C       Lipid Profile:   Results from last 7 days   Lab Units 03/16/24  0603   TRIGLYCERIDES mg/dL 146   HDL mg/dL 45*       Meds/Allergies   all current active meds have been reviewed and current meds:   Current Facility-Administered Medications   Medication Dose Route Frequency    acetaminophen (TYLENOL) tablet 650 mg  650 mg Oral Q6H PRN    AMILoride tablet 2.5 mg  2.5 mg Oral Daily    apixaban (ELIQUIS) tablet 5 mg  5 mg Oral BID    aspirin chewable tablet 81 mg  81 mg Oral Daily    bimatoprost (LUMIGAN) 0.03 % ophthalmic drops 1 drop  1 drop Ophthalmic Daily    ezetimibe (ZETIA) tablet 10 mg  10 mg Oral HS    gabapentin (NEURONTIN) capsule 100 mg  100 mg Oral TID    insulin lispro (HumALOG/ADMELOG) 100 units/mL subcutaneous injection 1-5 Units  1-5 Units Subcutaneous TID AC    insulin lispro (HumALOG/ADMELOG) 100 units/mL subcutaneous injection 1-5 Units  1-5 Units Subcutaneous HS    metoprolol succinate (TOPROL-XL) 24 hr tablet 100 mg  100 mg Oral Daily    torsemide (DEMADEX) tablet 20 mg  20 mg Oral Daily     Medications Prior to Admission   Medication    AMILoride 5 mg tablet    amLODIPine (NORVASC) 5 mg tablet    apixaban (ELIQUIS) 5 mg    aspirin 81 mg chewable tablet    bimatoprost (Lumigan) 0.01 % ophthalmic drops    Cholecalciferol (VITAMIN D3) 1000 units CAPS    ezetimibe (ZETIA) 10 mg tablet    furosemide (LASIX) 40 mg tablet    gabapentin (NEURONTIN) 100 mg capsule    glimepiride (AMARYL) 1 mg tablet    hydrocortisone 2.5  % cream    ibuprofen (MOTRIN) 200 mg tablet    ketoconazole (NIZORAL) 2 % cream    KLOR-CON M20 20 MEQ tablet    losartan (COZAAR) 50 mg tablet    metoprolol succinate (TOPROL-XL) 100 mg 24 hr tablet    Multiple Vitamins-Minerals (CENTRUM SILVER PO)    oxyCODONE (ROXICODONE) 5 immediate release tablet            Cardiac testing: reviewed  Results for orders placed during the hospital encounter of 18    Echo complete with contrast if indicated    Narrative  [place] [phone]    Transthoracic Echocardiogram  2D, M-mode, Doppler, and Color Doppler    Study date:  28-Dec-2018    Patient: HAN CARVAJAL  MR number: [id number]  Account number: [id number]  : 1945  Age: 73 years  Gender: Female  Status: Outpatient  Location: [address] [place] [address]  Height: 63 in  Weight: 232 lb  BP: 169/ 76 mmHg    Indications: Heart failure    Diagnoses: I50.9 - Heart failure, unspecified    Sonographer:  DANYELLE Ga  Interpreting Physician:  Kvng Aviles MD  Referring Physician:  Jorden Holt III, MD  Group:  [facility]  Cardiology Fellow:  Von Fuentes MD    SUMMARY    PROCEDURE INFORMATION:  This was a technically difficult study.    LEFT VENTRICLE:  Systolic function was normal. Ejection fraction was estimated to be 60 %.  There were no regional wall motion abnormalities.  Doppler parameters were consistent with abnormal left ventricular relaxation (grade 1 diastolic dysfunction).    MITRAL VALVE:  There was trace regurgitation.    TRICUSPID VALVE:  There was trace regurgitation.  Pulmonary artery systolic pressure was within the normal range.    PULMONIC VALVE:  There was trace regurgitation.    HISTORY: PRIOR HISTORY: Hypertension, hyperlipidemia, diastolic dysfunction, type 2 diabetes, GERD    PROCEDURE: The study was performed in the [address] [place] [address]. This was a routine  study. The transthoracic approach was used. The study included complete 2D imaging, M-mode, complete spectral Doppler, and color Doppler. This  was a technically difficult study.    LEFT VENTRICLE: Size was normal. Systolic function was normal. Ejection fraction was estimated to be 60 %. There were no regional wall motion abnormalities. Wall thickness was normal. DOPPLER: Doppler parameters were consistent with  abnormal left ventricular relaxation (grade 1 diastolic dysfunction).    RIGHT VENTRICLE: The size was normal. Systolic function was normal. Wall thickness was normal.    LEFT ATRIUM: Size was normal.    RIGHT ATRIUM: Size was normal.    MITRAL VALVE: Valve structure was normal. There was normal leaflet separation. DOPPLER: The transmitral velocity was within the normal range. There was no evidence for stenosis. There was trace regurgitation.    AORTIC VALVE: The valve was probably trileaflet. Leaflets exhibited normal cuspal separation. The valve was not well visualized. DOPPLER: Transaortic velocity was within the normal range. There was no evidence for stenosis. There was no  significant regurgitation.    TRICUSPID VALVE: The valve structure was normal. There was normal leaflet separation. DOPPLER: The transtricuspid velocity was within the normal range. There was no evidence for stenosis. There was trace regurgitation. Pulmonary artery  systolic pressure was within the normal range. Estimated peak PA pressure was 30 mmHg.    PULMONIC VALVE: Not well visualized. DOPPLER: The transpulmonic velocity was within the normal range. There was trace regurgitation.    PERICARDIUM: There was no pericardial effusion. A pericardial fat pad was present. The pericardium was normal in appearance.    AORTA: The root exhibited normal size.    SYSTEMIC VEINS: IVC: The inferior vena cava was normal in size. Respirophasic changes were normal.    SYSTEM MEASUREMENT TABLES    2D  %FS: 31.39 %  Ao Diam: 3.29 cm  EDV(Teich):  100.54 ml  EF(Cube): 67.7 %  EF(Teich): 59.26 %  ESV(Cube): 32.77 ml  ESV(Teich): 40.96 ml  IVSd: 0.99 cm  LA Area: 16.53 cm2  LA Diam: 3.39 cm  LVEDV MOD A4C: 94.35 ml  LVEF MOD A4C: 68.85 %  LVESV MOD A4C: 29.39 ml  LVIDd: 4.66 cm  LVIDs: 3.2 cm  LVLd A4C: 7.19 cm  LVLs A4C: 5.3 cm  LVPWd: 1.08 cm  RA Area: 13.64 cm2  RV Diam.: 2.6 cm  SV MOD A4C: 64.96 ml  SV(Cube): 68.69 ml  SV(Teich): 59.58 ml    CW  TR Vmax: 2.6 m/s  TR maxP.99 mmHg    MM  TAPSE: 1.84 cm    PW  E': 0.06 m/s  E/E': 14.64  MV A Toan: 0.97 m/s  MV Dec [place]: 3.14 m/s2  MV DecT: 267.31 ms  MV E Toan: 0.84 m/s  MV E/A Ratio: 0.86    [facility] Accredited Echocardiography Laboratory    Prepared and electronically signed by    Kvng Aviles MD  Signed 28-Dec-2018 11:05:05    No results found for this or any previous visit.    No results found for this or any previous visit.    No results found for this or any previous visit.

## 2024-03-18 NOTE — ASSESSMENT & PLAN NOTE
Wt Readings from Last 3 Encounters:   03/18/24 103 kg (226 lb 3.1 oz)   03/11/24 98.5 kg (217 lb 2.5 oz)   05/16/22 105 kg (232 lb)     Recently admitted to the hospital earlier this month and was diuresed.  Since then patient has had an 18 pound increase at home.  Patient started feeling shortness of breath this Wednesday her PCP increased her Lasix from 40 mg daily to 40 mg twice a day  Presented with peripheral edema and shortness of breath with exertion  Has since significantly improved with IV diuretics  Diuretics currently on hold given elevated kidney function  Plan to potentially  to start torsemide later today pending cardiology recs

## 2024-03-18 NOTE — NURSING NOTE
Pt inquring about UTI. Pt reports she has frequent UTIs and reports she had pelvic pain over the weekend, but less so now.    Urine is cloudy and malodorous.   Reports in February she took a prescription of Keflex for Uti then.

## 2024-03-18 NOTE — PROGRESS NOTES
[facility]  Progress Note  Name: Jessica Jaquez I  MRN: [id number]  Unit/Bed#: [facility] 525-01 I Date of Admission: 3/15/2024   Date of Service: 3/18/2024 I Hospital Day: 3    Assessment/Plan   * CHF exacerbation (HCC)  Assessment & Plan  Wt Readings from Last 3 Encounters:   03/18/24 103 kg (226 lb 3.1 oz)   03/11/24 98.5 kg (217 lb 2.5 oz)   05/16/22 105 kg (232 lb)     Recently admitted to the hospital earlier this month and was diuresed.  Since then patient has had an 18 pound increase at home.  Patient started feeling shortness of breath this Wednesday her PCP increased her Lasix from 40 mg daily to 40 mg twice a day  Presented with peripheral edema and shortness of breath with exertion  Has since significantly improved with IV diuretics  Diuretics currently on hold given elevated kidney function  Plan to potentially  to start torsemide later today pending cardiology recs    New onset atrial fibrillation (HCC)  Assessment & Plan  Recently diagnosed earlier this month  S/p PHU/DCCV 3/8/34  Currently rate controlled  Continue metoprolol  Continue Eliquis    Lumbar radiculopathy  Assessment & Plan  Continue conservative treatment    Hyperlipidemia  Assessment & Plan  Continue Zetia    Essential hypertension  Assessment & Plan  Blood pressures running a bit soft,   Continue metoprolol, amiloride  Creatinine noted to be mildly elevated from yesterday will hold losartan for now      Diabetes mellitus type 2 in obese   Assessment & Plan  Hold home p.o. agents  Sliding-scale insulin  Monitor blood glucose           VTE Pharmacologic Prophylaxis:   Pharmacologic: Apixaban (Eliquis)  Mechanical VTE Prophylaxis in Place: Yes    Patient Centered Rounds: I have performed bedside rounds with nursing staff today.    Discussions with Specialists or Other Care Team Provider: cm, nursing    Education and Discussions with Family / Patient: pt    Time Spent for Care: 30 minutes.  More than 50%  of total time spent on counseling and coordination of care as described above.    Current Length of Stay: 3 day(s)    Current Patient Status: Inpatient   Certification Statement: The patient will continue to require additional inpatient hospital stay due to see below    Discharge Plan: Pending stability of kidney function anticipate discharge in next 24 hours    Code Status: Level 3 - DNAR and DNI      Subjective:   Currently denies any acute complaints.  Denies shortness of breath, chest pain, fevers, chills    Objective:     Vitals:   Temp (24hrs), Av.4 °F (36.3 °C), Min:97.2 °F (36.2 °C), Max:97.6 °F (36.4 °C)    Temp:  [97.2 °F (36.2 °C)-97.6 °F (36.4 °C)] 97.6 °F (36.4 °C)  HR:  [63-80] 66  Resp:  [16-20] 16  BP: ()/(39-57) 119/51  SpO2:  [92 %-99 %] 96 %  Body mass index is 40.07 kg/m².     Input and Output Summary (last 24 hours):       Intake/Output Summary (Last 24 hours) at 3/18/2024 1211  Last data filed at 3/18/2024 1210  Gross per 24 hour   Intake 881 ml   Output 950 ml   Net -69 ml       Physical Exam:     Physical Exam  Constitutional:       General: She is not in acute distress.     Appearance: She is well-developed. She is not diaphoretic.   HENT:      Head: Normocephalic and atraumatic.      Nose: Nose normal.      Mouth/Throat:      Pharynx: No oropharyngeal exudate.   Eyes:      General: No scleral icterus.        Right eye: No discharge.         Left eye: No discharge.      Conjunctiva/sclera: Conjunctivae normal.   Neck:      Thyroid: No thyromegaly.      Vascular: No JVD.   Cardiovascular:      Rate and Rhythm: Normal rate and regular rhythm.      Heart sounds: Normal heart sounds. No murmur heard.     No friction rub. No gallop.   Pulmonary:      Effort: Pulmonary effort is normal. No respiratory distress.      Breath sounds: Normal breath sounds. No wheezing or rales.   Chest:      Chest wall: No tenderness.   Abdominal:      General: Bowel sounds are normal. There is no  distension.      Palpations: Abdomen is soft.      Tenderness: There is no abdominal tenderness. There is no guarding or rebound.   Musculoskeletal:         General: No tenderness or deformity. Normal range of motion.      Cervical back: Normal range of motion and neck supple.   Skin:     General: Skin is warm and dry.      Findings: No erythema or rash.   Neurological:      Mental Status: She is alert. Mental status is at baseline.      Cranial Nerves: No cranial nerve deficit.      Sensory: No sensory deficit.      Motor: No abnormal muscle tone.      Coordination: Coordination normal.           Additional Data:     Labs:    Results from last 7 days   Lab Units 03/17/24  0320 03/16/24  0603   WBC Thousand/uL 11.02* 8.62   HEMOGLOBIN g/dL 12.7 12.2   HEMATOCRIT % 40.9 38.2   PLATELETS Thousands/uL 323 310   NEUTROS PCT %  --  61   LYMPHS PCT %  --  24   MONOS PCT %  --  9   EOS PCT %  --  6     Results from last 7 days   Lab Units 03/18/24  0929 03/16/24  1318 03/16/24  0603   SODIUM mmol/L 130*   < > 136   POTASSIUM mmol/L 5.3   < > 4.5   CHLORIDE mmol/L 99   < > 104   CO2 mmol/L 22   < > 22   BUN mg/dL 60*   < > 46*   CREATININE mg/dL 1.48*   < > 1.21   ANION GAP mmol/L 9   < > 10   CALCIUM mg/dL 8.5   < > 8.8   ALBUMIN g/dL  --   --  3.6   TOTAL BILIRUBIN mg/dL  --   --  0.50   ALK PHOS U/L  --   --  71   ALT U/L  --   --  25   AST U/L  --   --  25   GLUCOSE RANDOM mg/dL 237*   < > 101    < > = values in this interval not displayed.         Results from last 7 days   Lab Units 03/18/24  1047 03/18/24  0639 03/17/24 2038 03/17/24  1543 03/17/24  1043 03/17/24  0636 03/16/24  2039 03/16/24  1534 03/16/24  1052 03/16/24  0621 03/15/24  2040 03/15/24  1813   POC GLUCOSE mg/dl 211* 127 214* 177* 186* 149* 199* 141* 212* 114 195* 80                   * I Have Reviewed All Lab Data Listed Above.  * Additional Pertinent Lab Tests Reviewed: All Labs Within Last 24 Hours Reviewed    Imaging:    Imaging Reports Reviewed  Today Include: na  Imaging Personally Reviewed by Myself Includes:  na    Recent Cultures (last 7 days):           Last 24 Hours Medication List:   Current Facility-Administered Medications   Medication Dose Route Frequency Provider Last Rate    acetaminophen  650 mg Oral Q6H PRN Houston Cervantes MD      AMILoride  2.5 mg Oral Daily Houston Cervantes MD      apixaban  5 mg Oral BID Houston Cervantes MD      aspirin  81 mg Oral Daily Houston Cervantes MD      bimatoprost  1 drop Ophthalmic Daily Houston Cervantes MD      ezetimibe  10 mg Oral HS Houston Cervantes MD      gabapentin  100 mg Oral TID Houston Cervantes MD      insulin lispro  1-5 Units Subcutaneous TID AC Houston Cervantes MD      insulin lispro  1-5 Units Subcutaneous HS Houston Cervantes MD      metoprolol succinate  100 mg Oral Daily Houston Cervantes MD          Today, Patient Was Seen By: Ralf Powell MD    ** Please Note: Dictation voice to text software may have been used in the creation of this document. **

## 2024-03-18 NOTE — ASSESSMENT & PLAN NOTE
Recently diagnosed earlier this month  S/p PHU/DCCV 3/8/34  Currently rate controlled  Continue metoprolol  Continue Eliquis

## 2024-03-18 NOTE — CONSULTS
Consultation - Wound Care   Jessica Jaquez 78 y.o. female MRN: [id number]  Unit/Bed#: [facility] 525-01 Encounter: [id number]    Assessment:  Irritant contact dermatitis due to urinary incontinence.  Type 2 diabetes without long-term use of insulin    Plan:  Left buttock with area of MASD/IAD.  Will recommend to apply Calazime cream 3 times daily and as needed.  No clinical s/s of infection present   A1C results reviewed with the patient today.    Result of 6.9 noted from 1/22/2024.  Managed by primary team   Will recommend preventative nursing skin care measures  Pressure relief- offloading of pressure with turning/repositioning as patient medically tolerates, heel elevation, foam wedges for offloading/repositioning, and waffle cushion to chair.  Nutrition is following  Patient verbalized understanding of plan of care.  Winters text wound care team with questions or concerns.   Routine wound care follow-up while admitted. AVS updated.   Patient declined referral to outpatient wound center.    History of Present Illness:  Patient is a 78-year-old female who was admitted to the hospital with CHF exacerbation.  Patient has a history of urinary incontinence, diabetes, obesity, hypertension, hyperlipidemia, and atrial fibrillation.  Wound care consulted for assessment of pressure injury to the sacral area noted on admission.  Patient cooperative and agreeable for the exam.  Patient states that she did not realize she had any wounds to her bottom until she was admitted and nursing reported them to her on admission.  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  Patient does state that she wears poise incontinence pads for urinary incontinence, and believes that the pads rubbed her skin at times, especially the longer pads that she wears at bedtime.  Patient states that she is diligent about changing her pads and keeping her skin clean and dry.  Patient states  that she has been using barrier cream during hospital stay which has helped. Denies fever, chills, or increased pain related to the wound.    Subjective:    Review of Systems   Constitutional:  Negative for chills and fever.   HENT:  Negative for congestion and sneezing.    Respiratory:  Negative for cough and shortness of breath.    Genitourinary:         Urinary incontinence.    Musculoskeletal:  Positive for gait problem.        Uses Walker.   Skin:  Positive for wound.   Psychiatric/Behavioral:  Negative for agitation.        Historical Information   Past Medical History:   Diagnosis Date    Arthritis     Colorectal polyps     Constipation     Diabetes mellitus (HCC)     Diverticulosis of colon     Hiatal hernia     Hypertension     Increased urinary frequency     Lumbar disc disease     Pressure injury of skin     SOB (shortness of breath)     Stress incontinence     Vitamin D deficiency 07/27/2018     Past Surgical History:   Procedure Laterality Date    BACK SURGERY      CARPAL TUNNEL RELEASE Left     CATARACT EXTRACTION Bilateral     CHOLECYSTECTOMY      COLONOSCOPY W/ ENDOSCOPIC US N/A 2/24/2016    Procedure: ANAL ENDOSCOPIC U/S;  Surgeon: HOLLIS Washington MD;  Location: [facility];  Service:     HERNIA REPAIR      NE COLONOSCOPY FLX DX W/COLLJ SPEC WHEN PFRMD N/A 2/24/2016    Procedure: COLONOSCOPY;  Surgeon: HOLLIS Washington MD;  Location: [facility];  Service: Colorectal    TONSILLECTOMY      TUBAL LIGATION       Social History   Social History     Substance and Sexual Activity   Alcohol Use Never     Social History     Substance and Sexual Activity   Drug Use No     E-Cigarette/Vaping     E-Cigarette/Vaping Substances     Social History     Tobacco Use   Smoking Status Never   Smokeless Tobacco Never     Family History:   Family History   Problem Relation Age of Onset    Hypertension Mother     Heart attack Mother     Diabetes Mother     Prostate cancer Father     No Known Problems Brother     Stroke  Maternal Grandmother     Stroke Paternal Grandmother     Stroke Paternal Grandfather     Thyroid cancer Neg Hx        Meds/Allergies   current meds:   Current Facility-Administered Medications   Medication Dose Route Frequency    acetaminophen (TYLENOL) tablet 650 mg  650 mg Oral Q6H PRN    AMILoride tablet 2.5 mg  2.5 mg Oral Daily    apixaban (ELIQUIS) tablet 5 mg  5 mg Oral BID    aspirin chewable tablet 81 mg  81 mg Oral Daily    bimatoprost (LUMIGAN) 0.03 % ophthalmic drops 1 drop  1 drop Ophthalmic Daily    ezetimibe (ZETIA) tablet 10 mg  10 mg Oral HS    gabapentin (NEURONTIN) capsule 100 mg  100 mg Oral TID    insulin lispro (HumALOG/ADMELOG) 100 units/mL subcutaneous injection 1-5 Units  1-5 Units Subcutaneous TID AC    insulin lispro (HumALOG/ADMELOG) 100 units/mL subcutaneous injection 1-5 Units  1-5 Units Subcutaneous HS    metoprolol succinate (TOPROL-XL) 24 hr tablet 100 mg  100 mg Oral Daily    torsemide (DEMADEX) tablet 20 mg  20 mg Oral Daily     Allergies   Allergen Reactions    Other Cough and Sneezing     Seasonal allergies       Objective   Vitals: Blood pressure 115/52, pulse 74, temperature 97.6 °F (36.4 °C), resp. rate 16, weight 103 kg (226 lb 3.1 oz), SpO2 95%.     Wounds:   Left buttock with MASD/IAD.  Wound presents over fleshy aspect of the buttocks.  Wound is irregular shaped partial-thickness with 100% moist pink/red tissue, that is producing scant amount of serosanguineous drainage.  Edges fragile attached without maceration.  Periwound/remainder of skin to the bilateral sacral buttocks is otherwise dry and intact with blanchable pink/hyperpigmented skin.  B/l heels are dry and intact without redness or wounds.       No induration, fluctuance, odor, warmth/temperature differences, redness, or purulence noted to the above mentioned wounds and skin areas assessed.  Patient tolerated assessment well- denies pain and no s/s of non-verbal pain or discomfort observed during the encounter.         Wound 03/10/24 Buttocks (Active)   Wound Image   03/18/24 1028   Wound Length (cm) 1 cm 03/18/24 1028   Wound Width (cm) 0.4 cm 03/18/24 1028   Wound Depth (cm) 0.1 cm 03/18/24 1028         Physical Exam  Constitutional:       General: She is awake. She is not in acute distress.     Appearance: She is obese. She is not diaphoretic.   HENT:      Head: Normocephalic and atraumatic.      Right Ear: External ear normal.      Left Ear: External ear normal.   Eyes:      Conjunctiva/sclera: Conjunctivae normal.   Pulmonary:      Effort: Pulmonary effort is normal. No respiratory distress.   Genitourinary:     Comments: Pure wick catheter in place for urinary management.  Patient denies fecal incontinence.  Musculoskeletal:      Comments: Patient seen out of bed in recliner chair.  Patient is able to stand for the assessment without assistance using walker.  Patient is sitting on offloading seating cushion.   Skin:     General: Skin is warm and dry.      Findings: Wound present.      Comments: Refer to wound section for assessment details.    Neurological:      Mental Status: She is alert and oriented to person, place, and time.      Gait: Gait abnormal.   Psychiatric:         Behavior: Behavior is cooperative.           Lab, Imaging and other studies: I have personally reviewed pertinent reports.      Code Status: Level 3 - DNAR and DNI      Counseling / Coordination of Care  Total time spent today:    Total time (face-to-face and non-face-to-face) spent on today's visit was 28 minutes. This includes preparation for the visits (H&P on 3/15/24, and SLIM note on 3/18/24) performance of a medically appropriate history and examination, and orders for medications/treatments or testing.  Discussed assessment findings, and plan of care/recommendations with patients RN.      Merary Ceja, VIJI, SUSANNA-C, DANITZA      Portions of the record may have been created with voice recognition software.  Occasional wrong word or  "\"sound a like\" substitutions may have occurred due to the inherent limitations of voice recognition software.  Read the chart carefully and recognize, using context, where substitutions have occurred      "

## 2024-03-19 VITALS
SYSTOLIC BLOOD PRESSURE: 115 MMHG | RESPIRATION RATE: 18 BRPM | WEIGHT: 224.43 LBS | HEIGHT: 63 IN | BODY MASS INDEX: 39.77 KG/M2 | HEART RATE: 76 BPM | DIASTOLIC BLOOD PRESSURE: 50 MMHG | TEMPERATURE: 97.7 F | OXYGEN SATURATION: 95 %

## 2024-03-19 PROBLEM — E87.1 HYPONATREMIA: Status: ACTIVE | Noted: 2024-03-19

## 2024-03-19 LAB
ANION GAP SERPL CALCULATED.3IONS-SCNC: 10 MMOL/L (ref 4–13)
BUN SERPL-MCNC: 58 MG/DL (ref 5–25)
CALCIUM SERPL-MCNC: 9.2 MG/DL (ref 8.4–10.2)
CHLORIDE SERPL-SCNC: 98 MMOL/L (ref 96–108)
CO2 SERPL-SCNC: 24 MMOL/L (ref 21–32)
CREAT SERPL-MCNC: 1.41 MG/DL (ref 0.6–1.3)
GFR SERPL CREATININE-BSD FRML MDRD: 35 ML/MIN/1.73SQ M
GLUCOSE SERPL-MCNC: 128 MG/DL (ref 65–140)
GLUCOSE SERPL-MCNC: 132 MG/DL (ref 65–140)
GLUCOSE SERPL-MCNC: 225 MG/DL (ref 65–140)
POTASSIUM SERPL-SCNC: 4.7 MMOL/L (ref 3.5–5.3)
SODIUM SERPL-SCNC: 132 MMOL/L (ref 135–147)

## 2024-03-19 PROCEDURE — 99232 SBSQ HOSP IP/OBS MODERATE 35: CPT | Performed by: INTERNAL MEDICINE

## 2024-03-19 PROCEDURE — 80048 BASIC METABOLIC PNL TOTAL CA: CPT | Performed by: INTERNAL MEDICINE

## 2024-03-19 PROCEDURE — 82948 REAGENT STRIP/BLOOD GLUCOSE: CPT

## 2024-03-19 PROCEDURE — 99239 HOSP IP/OBS DSCHRG MGMT >30: CPT | Performed by: STUDENT IN AN ORGANIZED HEALTH CARE EDUCATION/TRAINING PROGRAM

## 2024-03-19 RX ORDER — TORSEMIDE 20 MG/1
20 TABLET ORAL DAILY
Qty: 30 TABLET | Refills: 0 | Status: SHIPPED | OUTPATIENT
Start: 2024-03-20

## 2024-03-19 RX ADMIN — METOPROLOL SUCCINATE 100 MG: 100 TABLET, EXTENDED RELEASE ORAL at 08:43

## 2024-03-19 RX ADMIN — ACETAMINOPHEN 650 MG: 325 TABLET, FILM COATED ORAL at 11:57

## 2024-03-19 RX ADMIN — ASPIRIN 81 MG CHEWABLE TABLET 81 MG: 81 TABLET CHEWABLE at 08:44

## 2024-03-19 RX ADMIN — APIXABAN 5 MG: 5 TABLET, FILM COATED ORAL at 08:40

## 2024-03-19 RX ADMIN — GABAPENTIN 100 MG: 100 CAPSULE ORAL at 08:42

## 2024-03-19 RX ADMIN — TORSEMIDE 20 MG: 20 TABLET ORAL at 08:44

## 2024-03-19 RX ADMIN — AMILORIDE HYDROCLORIDE 2.5 MG: 5 TABLET ORAL at 08:41

## 2024-03-19 RX ADMIN — INSULIN LISPRO 2 UNITS: 100 INJECTION, SOLUTION INTRAVENOUS; SUBCUTANEOUS at 11:58

## 2024-03-19 NOTE — ASSESSMENT & PLAN NOTE
Hyponatremia due to diuresis  Monitor serially with labs  Sodium 132 at discharge, stable  Repeat BMP in 5 to 7 days for follow-up

## 2024-03-19 NOTE — ASSESSMENT & PLAN NOTE
Baseline creatinine around 1  Creatinine stabilized around 1.4-1.5, will need to likely tolerate higher creatinine for symptom relief  Monitor outpatient  Repeat BMP in 5 to 7 days

## 2024-03-19 NOTE — CASE MANAGEMENT
Case Management Discharge Note    Patient name Jessica Jaquez  Location [facility] 525-01 MRN [id number]  : 1945 Date 3/19/2024       Current Admission Date: 3/15/2024  Current Admission Diagnosis:CHF exacerbation (HCC)      LOS (days): 4  Geometric Mean LOS (GMLOS) (days): 3.9  Days to GMLOS:0     OBJECTIVE:  Risk of Unplanned Readmission Score: 11.68   Current admission status: Inpatient   Primary Insurance: [facility]    DISCHARGE DETAILS:  Discharge planning discussed with:: Patient  Freedom of Choice: Yes  CM contacted family/caregiver?: Yes  Were Treatment Team discharge recommendations reviewed with patient/caregiver?: Yes  Did patient/caregiver verbalize understanding of patient care needs?: Yes  Were patient/caregiver advised of the risks associated with not following Treatment Team discharge recommendations?: Yes    Contacts  Patient Contacts: Percy Landindanelle (pt's )  Relationship to Patient:: Family  Contact Method: Phone  Phone Number: [phone]  Reason/Outcome: Emergency Contact    Requested Home Health Care         Is the patient interested in HHC at discharge?: Yes  Home Health Discipline requested:: Nursing, Physical Therapy, Occupational Therapy  Home Health Agency Name:: [facility] Follow-Up Provider:: PCP  Home Health Services Needed:: Heart Failure Management, Evaluate Functional Status and Safety, Gait/ADL Training, Strengthening/Theraputic Exercises to Improve Function  Homebound Criteria Met:: Requires the Assistance of Another Person for Safe Ambulation or to Leave the Home  Supporting Clincal Findings:: Limited Endurance    Treatment Team Recommendation: Home with Home Health Care  Discharge Destination Plan:: Home with Home Health Care  Transport at Discharge : Family    Additional Comments: Pt is cleared for d/c by AMARI Jamil. NIR Mcconnell was notified of pt's d/c order. Pt is accepted for resumption of services  by KATHRYN for her aftercare plan. The pt and her  Percy Jaquez were both informed of d/c. Family will transport pt home later this day; pickup time is TBD. IMM signed by pt. No further CM needs.

## 2024-03-19 NOTE — PROGRESS NOTES
"[facility]    Cardiology Progress Note  Jessica Jaquez 78 y.o. female   YOB: 1945 MRN: [id number]  Unit/Bed#: [facility] 525-01 Encounter: [id number]      Subjective:   No significant events overnight.  No current complaints of chest pain, shortness of breath, palpitations or dizziness.  He reports good diuresis yesterday, but has not had much urine output since morning    Assessments  78-year-old female, retired [employer] nurse, currently admitted to the hospital complaining of increased shortness of breath and self-reported \"14 pound weight gain\" at home since recent discharge on 3/11/2024.  She was found to be volume overloaded and in heart failure and received a couple of doses of IV Lasix with good diuresis so far.  She has recently had new onset atrial fibrillation and had a successful PHU cardioversion procedure done on 3/8/24    Principal Problem:    CHF exacerbation (HCC)  Active Problems:    Diabetes mellitus type 2 in obese     Essential hypertension    Hyperlipidemia    Lumbar radiculopathy    New onset atrial fibrillation (HCC)        Plan  Acute on chronic diastolic heart failure exacerbation  Recent discharge weight was 217 lbs, currently up to 226 lbs  ?wt unchanged despite diuresis since admission  S/p IV lasix 100 + IV lasix 80 --> good diuresis, but both times had hypotension with SBP down to 80s later that day  Home diuretic: amiloride 2.5 mg daily + lasix 40 mg PRN  Only takes it about once/week  Started on torsemide 20 mg daily yesterday  Diuresed very well with in the afternoon yesterday - UO 1.6 L overnight  BP stable  Cr down to 1.41  Wt down to 224 lbs  Continue torsemide 20 + amiloride 2.5 mg daily  Check follow-up BMP in 5 days  She may eventually need to decrease torsemide dosing to 3-4 times per week, reassess outpatient  She has ambulated short distances without any shortness of breath or hypoxia    Hypertension  Blood pressure was low yesterday, but is " "now improved back up to low normal today  Home medications include metoprolol succinate 100 mg daily, amiloride 2.5 mg daily plus as needed Lasix  Continue metoprolol, amiloride   Torsemide 20 mg daily being added  Monitor    Stable for discharge from cardiac standpoint with outpatient follow-up with cardiologist.  She has an appointment with Helena Lopez tomorrow    Review of Systems   All other systems reviewed and are negative.        Telemetry Review: No significant arrhythmias seen on telemetry review.  Normal sinus rhythm, heart rate 70-80    Objective:   Vitals: Blood pressure 115/50, pulse 76, temperature 97.7 °F (36.5 °C), resp. rate 18, height 5' 3\" (1.6 m), weight 102 kg (224 lb 6.9 oz), SpO2 96%., Body mass index is 39.76 kg/m².,   Orthostatic Blood Pressures      Flowsheet Row Most Recent Value   Blood Pressure 115/50 filed at 2024 0746   Patient Position - Orthostatic VS Lying filed at 2024 2154           Systolic (24hrs), Av , Min:103 , Max:119     Diastolic (24hrs), Av, Min:48, Max:54    Wt Readings from Last 5 Encounters:   24 102 kg (224 lb 6.9 oz)   24 98.5 kg (217 lb 2.5 oz)   22 105 kg (232 lb)   21 104 kg (230 lb)   21 105 kg (232 lb)     I/O          0701   0700  0701   0700  0701   0700    P.O. 1300 1001 420    IV Piggyback       Total Intake(mL/kg) 1300 (12.7) 1001 (9.7) 420 (4.1)    Urine (mL/kg/hr) 718 (0.3) 1900 (0.8) 200 (0.3)    Stool 0 0     Total Output 718 1900 200    Net +582 -899 +220           Unmeasured Urine Occurrence 1 x  1 x    Unmeasured Stool Occurrence 1 x 1 x                 Physical Exam  Vitals and nursing note reviewed.   Constitutional:       General: She is not in acute distress.     Appearance: Normal appearance. She is well-developed. She is obese. She is not ill-appearing.   HENT:      Head: Normocephalic and atraumatic.      Nose: No congestion.   Eyes:      General: No scleral " icterus.     Conjunctiva/sclera: Conjunctivae normal.   Neck:      Vascular: No carotid bruit or JVD.   Cardiovascular:      Rate and Rhythm: Normal rate and regular rhythm.      Pulses: Normal pulses.      Heart sounds: Normal heart sounds. No murmur heard.     No friction rub. No gallop.   Pulmonary:      Effort: Pulmonary effort is normal. No respiratory distress.      Breath sounds: Normal breath sounds. No rales.   Abdominal:      General: Abdomen is protuberant. There is distension.      Palpations: Abdomen is soft.      Tenderness: There is no abdominal tenderness.   Musculoskeletal:         General: No swelling or tenderness.      Cervical back: Neck supple.      Right lower leg: No edema.      Left lower leg: No edema.   Skin:     General: Skin is warm.   Neurological:      General: No focal deficit present.      Mental Status: She is alert and oriented to person, place, and time. Mental status is at baseline.   Psychiatric:         Mood and Affect: Mood normal.         Behavior: Behavior normal.         Thought Content: Thought content normal.       Laboratory Results: personally reviewed        CBC with diff:   Results from last 7 days   Lab Units 03/17/24  0320 03/16/24  0603 03/15/24  1308   WBC Thousand/uL 11.02* 8.62 7.91   HEMOGLOBIN g/dL 12.7 12.2 12.5   HEMATOCRIT % 40.9 38.2 40.5   MCV fL 91 88 92   PLATELETS Thousands/uL 323 310 298   RBC Million/uL 4.51 4.32 4.41   MCH pg 28.2 28.2 28.3   MCHC g/dL 31.1* 31.9 30.9*   RDW % 14.4 14.6 14.2   MPV fL 8.9 8.9 8.9   NRBC AUTO /100 WBCs  --  0 0         CMP:  Results from last 7 days   Lab Units 03/19/24  0632 03/18/24  0929 03/17/24  0320 03/16/24  1318 03/16/24  0603 03/15/24  1308 03/14/24  0720   POTASSIUM mmol/L 4.7 5.3 4.6 4.8 4.5 4.2 4.7   CHLORIDE mmol/L 98 99 100 100 104 104 105   CO2 mmol/L 24 22 21 25 22 25 22   BUN mg/dL 58* 60* 55* 51* 46* 42* 39*   CREATININE mg/dL 1.41* 1.48* 1.44* 1.39* 1.21 1.33* 1.21   CALCIUM mg/dL 9.2 8.5 9.0 9.3  "8.8 9.1 9.3   AST U/L  --   --   --   --  25 23  --    ALT U/L  --   --   --   --  25 26  --    ALK PHOS U/L  --   --   --   --  71 68  --    EGFR ml/min/1.73sq m 35 33 34 36 42 38 42         BMP:  Results from last 7 days   Lab Units 03/19/24  0632 03/18/24  0929 03/17/24  0320 03/16/24  1318 03/16/24  0603 03/15/24  1308 03/14/24  0720   POTASSIUM mmol/L 4.7 5.3 4.6 4.8 4.5 4.2 4.7   CHLORIDE mmol/L 98 99 100 100 104 104 105   CO2 mmol/L 24 22 21 25 22 25 22   BUN mg/dL 58* 60* 55* 51* 46* 42* 39*   CREATININE mg/dL 1.41* 1.48* 1.44* 1.39* 1.21 1.33* 1.21   CALCIUM mg/dL 9.2 8.5 9.0 9.3 8.8 9.1 9.3       BNP:   No results for input(s): \"BNP\" in the last 72 hours.      Magnesium:   Results from last 7 days   Lab Units 03/17/24  0320 03/16/24  1318 03/16/24  0603   MAGNESIUM mg/dL 2.1 2.1 2.1       Coags:       TSH:        Hemoglobin A1C       Lipid Profile:   Results from last 7 days   Lab Units 03/16/24  0603   TRIGLYCERIDES mg/dL 146   HDL mg/dL 45*       Meds/Allergies   all current active meds have been reviewed and current meds:   Current Facility-Administered Medications   Medication Dose Route Frequency    acetaminophen (TYLENOL) tablet 650 mg  650 mg Oral Q6H PRN    AMILoride tablet 2.5 mg  2.5 mg Oral Daily    apixaban (ELIQUIS) tablet 5 mg  5 mg Oral BID    aspirin chewable tablet 81 mg  81 mg Oral Daily    bimatoprost (LUMIGAN) 0.03 % ophthalmic drops 1 drop  1 drop Ophthalmic Daily    ezetimibe (ZETIA) tablet 10 mg  10 mg Oral HS    gabapentin (NEURONTIN) capsule 100 mg  100 mg Oral TID    insulin lispro (HumALOG/ADMELOG) 100 units/mL subcutaneous injection 1-5 Units  1-5 Units Subcutaneous TID AC    insulin lispro (HumALOG/ADMELOG) 100 units/mL subcutaneous injection 1-5 Units  1-5 Units Subcutaneous HS    metoprolol succinate (TOPROL-XL) 24 hr tablet 100 mg  100 mg Oral Daily    torsemide (DEMADEX) tablet 20 mg  20 mg Oral Daily     Medications Prior to Admission   Medication    AMILoride 5 mg tablet "    amLODIPine (NORVASC) 5 mg tablet    apixaban (ELIQUIS) 5 mg    aspirin 81 mg chewable tablet    bimatoprost (Lumigan) 0.01 % ophthalmic drops    Cholecalciferol (VITAMIN D3) 1000 units CAPS    ezetimibe (ZETIA) 10 mg tablet    furosemide (LASIX) 40 mg tablet    gabapentin (NEURONTIN) 100 mg capsule    glimepiride (AMARYL) 1 mg tablet    hydrocortisone 2.5 % cream    ibuprofen (MOTRIN) 200 mg tablet    ketoconazole (NIZORAL) 2 % cream    KLOR-CON M20 20 MEQ tablet    losartan (COZAAR) 50 mg tablet    metoprolol succinate (TOPROL-XL) 100 mg 24 hr tablet    Multiple Vitamins-Minerals (CENTRUM SILVER PO)    oxyCODONE (ROXICODONE) 5 immediate release tablet            Cardiac testing: reviewed  Results for orders placed during the hospital encounter of 18    Echo complete with contrast if indicated    [place] [address]  [phone]    Transthoracic Echocardiogram  2D, M-mode, Doppler, and Color Doppler    Study date:  28-Dec-2018    Patient: HAN CARVAJAL  MR number: [id number]  Account number: [id number]  : 1945  Age: 73 years  Gender: Female  Status: Outpatient  Location: [address] [place] [address] [facility]  Height: 63 in  Weight: 232 lb  BP: 169/ 76 mmHg    Indications: Heart failure    Diagnoses: I50.9 - Heart failure, unspecified    Sonographer:  DANYELLE Ga  Interpreting Physician:  Kvng Aviles MD  Referring Physician:  Jorden Holt III, MD  Group:  [facility]  Cardiology Fellow:  Von Fuentes MD    SUMMARY    PROCEDURE INFORMATION:  This was a technically difficult study.    LEFT VENTRICLE:  Systolic function was normal. Ejection fraction was estimated to be 60 %.  There were no regional wall motion abnormalities.  Doppler parameters were consistent with abnormal left ventricular relaxation (grade 1 diastolic dysfunction).    MITRAL VALVE:  There was trace  regurgitation.    TRICUSPID VALVE:  There was trace regurgitation.  Pulmonary artery systolic pressure was within the normal range.    PULMONIC VALVE:  There was trace regurgitation.    HISTORY: PRIOR HISTORY: Hypertension, hyperlipidemia, diastolic dysfunction, type 2 diabetes, GERD    PROCEDURE: The study was performed in the [address] [place] [address] [facility]. This was a routine study. The transthoracic approach was used. The study included complete 2D imaging, M-mode, complete spectral Doppler, and color Doppler. This  was a technically difficult study.    LEFT VENTRICLE: Size was normal. Systolic function was normal. Ejection fraction was estimated to be 60 %. There were no regional wall motion abnormalities. Wall thickness was normal. DOPPLER: Doppler parameters were consistent with  abnormal left ventricular relaxation (grade 1 diastolic dysfunction).    RIGHT VENTRICLE: The size was normal. Systolic function was normal. Wall thickness was normal.    LEFT ATRIUM: Size was normal.    RIGHT ATRIUM: Size was normal.    MITRAL VALVE: Valve structure was normal. There was normal leaflet separation. DOPPLER: The transmitral velocity was within the normal range. There was no evidence for stenosis. There was trace regurgitation.    AORTIC VALVE: The valve was probably trileaflet. Leaflets exhibited normal cuspal separation. The valve was not well visualized. DOPPLER: Transaortic velocity was within the normal range. There was no evidence for stenosis. There was no  significant regurgitation.    TRICUSPID VALVE: The valve structure was normal. There was normal leaflet separation. DOPPLER: The transtricuspid velocity was within the normal range. There was no evidence for stenosis. There was trace regurgitation. Pulmonary artery  systolic pressure was within the normal range. Estimated peak PA pressure was 30 mmHg.    PULMONIC VALVE: Not well visualized. DOPPLER: The transpulmonic velocity was within the normal range.  There was trace regurgitation.    PERICARDIUM: There was no pericardial effusion. A pericardial fat pad was present. The pericardium was normal in appearance.    AORTA: The root exhibited normal size.    SYSTEMIC VEINS: IVC: The inferior vena cava was normal in size. Respirophasic changes were normal.    SYSTEM MEASUREMENT TABLES    2D  %FS: 31.39 %  Ao Diam: 3.29 cm  EDV(Teich): 100.54 ml  EF(Cube): 67.7 %  EF(Teich): 59.26 %  ESV(Cube): 32.77 ml  ESV(Teich): 40.96 ml  IVSd: 0.99 cm  LA Area: 16.53 cm2  LA Diam: 3.39 cm  LVEDV MOD A4C: 94.35 ml  LVEF MOD A4C: 68.85 %  LVESV MOD A4C: 29.39 ml  LVIDd: 4.66 cm  LVIDs: 3.2 cm  LVLd A4C: 7.19 cm  LVLs A4C: 5.3 cm  LVPWd: 1.08 cm  RA Area: 13.64 cm2  RV Diam.: 2.6 cm  SV MOD A4C: 64.96 ml  SV(Cube): 68.69 ml  SV(Teich): 59.58 ml    CW  TR Vmax: 2.6 m/s  TR maxP.99 mmHg    MM  TAPSE: 1.84 cm    PW  E': 0.06 m/s  E/E': 14.64  MV A Toan: 0.97 m/s  MV Dec Harvey: 3.14 m/s2  MV DecT: 267.31 ms  MV E Toan: 0.84 m/s  MV E/A Ratio: 0.86    [employer] Accredited Echocardiography Laboratory    Prepared and electronically signed by    Kvng Aviles MD  Signed 28-Dec-2018 11:05:05    No results found for this or any previous visit.    No results found for this or any previous visit.    No results found for this or any previous visit.

## 2024-03-19 NOTE — ASSESSMENT & PLAN NOTE
Wt Readings from Last 3 Encounters:   03/19/24 102 kg (224 lb 6.9 oz)   03/11/24 98.5 kg (217 lb 2.5 oz)   05/16/22 105 kg (232 lb)     Recently admitted to the hospital earlier this month and was diuresed.  Since then patient has had an 18 pound increase at home.  Patient started feeling shortness of breath this Wednesday her PCP increased her Lasix from 40 mg daily to 40 mg twice a day  Presented with peripheral edema and shortness of breath with exertion  Has since significantly improved with IV diuretics  Stable on torsemide 20 mg daily, continue  Euvolemic

## 2024-03-19 NOTE — ASSESSMENT & PLAN NOTE
Blood pressures stable  Continue amiloride, metoprolol, torsemide  Discontinue amlodipine and losartan at discharge

## 2024-03-19 NOTE — RESTORATIVE TECHNICIAN NOTE
Restorative Technician Note      Patient Name: Jessica Jaquez     Note Type: Mobility  Patient Position Upon Consult: Bedside chair  Activity Performed: Ambulated  Assistive Device: Roller walker  Patient Position at End of Consult: Bedside chair; All needs within reach

## 2024-03-19 NOTE — DISCHARGE SUMMARY
[facility]  Discharge- Jessica Jaquez 1945, 78 y.o. female MRN: [id number]  Unit/Bed#: [facility] 525-01 Encounter: [id number]  Primary Care Provider: Jorden Holt MD   Date and time admitted to hospital: 3/15/2024 12:55 PM    * CHF exacerbation (HCC)  Assessment & Plan  Wt Readings from Last 3 Encounters:   03/19/24 102 kg (224 lb 6.9 oz)   03/11/24 98.5 kg (217 lb 2.5 oz)   05/16/22 105 kg (232 lb)     Recently admitted to the hospital earlier this month and was diuresed.  Since then patient has had an 18 pound increase at home.  Patient started feeling shortness of breath this Wednesday her PCP increased her Lasix from 40 mg daily to 40 mg twice a day  Presented with peripheral edema and shortness of breath with exertion  Has since significantly improved with IV diuretics  Stable on torsemide 20 mg daily, continue  Euvolemic    Hyponatremia  Assessment & Plan  Hyponatremia due to diuresis  Monitor serially with labs  Sodium 132 at discharge, stable  Repeat BMP in 5 to 7 days for follow-up    GIOVANNI (acute kidney injury) (HCC)  Assessment & Plan  Baseline creatinine around 1  Creatinine stabilized around 1.4-1.5, will need to likely tolerate higher creatinine for symptom relief  Monitor outpatient  Repeat BMP in 5 to 7 days    New onset atrial fibrillation (HCC)  Assessment & Plan  Recently diagnosed earlier this month  S/p PHU/DCCV 3/8/34  Currently rate controlled  Continue metoprolol  Continue Eliquis    Lumbar radiculopathy  Assessment & Plan  Continue conservative treatment    Hyperlipidemia  Assessment & Plan  Continue Zetia    Essential hypertension  Assessment & Plan  Blood pressures stable  Continue amiloride, metoprolol, torsemide  Discontinue amlodipine and losartan at discharge    Diabetes mellitus type 2 in obese   Assessment & Plan  Resume home agents at discharge  Outpatient follow-up with PCP      Medical Problems       Resolved Problems  Date Reviewed:  "3/19/2024            Resolved    Benign essential tremor 3/15/2024     Resolved by  Houston Cervantes MD        Discharging Physician / Practitioner: Kingsley Jamil DO  PCP: Jorden Holt MD  Admission Date:   Admission Orders (From admission, onward)       Ordered        03/15/24 1440  INPATIENT ADMISSION  Once                          Discharge Date: 03/19/24    Consultations During Hospital Stay:  Cardiology    Procedures Performed:   None     Significant Findings / Test Results:   CXR no acute cardiopulmonary disease    Incidental Findings:   None      Test Results Pending at Discharge (will require follow up):   None      Outpatient Tests Requested:  BMP in 1 week    Complications:  none     Reason for Admission: Shortness of breath and weight gain    Hospital Course:   Jessica Jaquez is a 78 y.o. female patient who originally presented to the hospital on 3/15/2024 due to shortness of breath and weight gain found to be volume overloaded and in heart failure.  Symptoms improved with IV diuretics.  Patient was transitioned to oral torsemide 20 mg daily per cardiology recommendations.  Creatinine elevated at discharge and therefore home losartan held at time of discharge.  Patient will need repeat BMP in 5 to 7 days.  Stable for discharge with outpatient follow-up with cardiology and primary care.    Please see above list of diagnoses and related plan for additional information.     Condition at Discharge: stable    Discharge Day Visit / Exam:   Subjective: Patient assessed at bedside.  No acute complaints.  Reports shortness of breath is improved.  Vitals: Blood Pressure: 115/50 (03/19/24 0746)  Pulse: 76 (03/19/24 0746)  Temperature: 97.7 °F (36.5 °C) (03/19/24 0746)  Temp Source: Oral (03/18/24 2154)  Respirations: 18 (03/19/24 0746)  Height: 5' 3\" (160 cm) (03/18/24 1536)  Weight - Scale: 102 kg (224 lb 6.9 oz) (03/19/24 0600)  SpO2: 95 % (03/19/24 0800)  Exam:   Physical Exam  Vitals reviewed. "   Constitutional:       General: She is not in acute distress.     Appearance: Normal appearance. She is not ill-appearing.   HENT:      Head: Normocephalic and atraumatic.   Cardiovascular:      Rate and Rhythm: Normal rate and regular rhythm.      Heart sounds: Normal heart sounds.   Pulmonary:      Effort: Pulmonary effort is normal. No respiratory distress.   Abdominal:      General: Bowel sounds are normal.      Tenderness: There is no abdominal tenderness.   Musculoskeletal:      Right lower leg: No edema.      Left lower leg: No edema.   Skin:     General: Skin is warm and dry.   Neurological:      Mental Status: She is alert and oriented to person, place, and time. Mental status is at baseline.          Discussion with Family: Patient declined call to .     Discharge instructions/Information to patient and family:   See after visit summary for information provided to patient and family.      Provisions for Follow-Up Care:  See after visit summary for information related to follow-up care and any pertinent home health orders.      Mobility at time of Discharge:   Basic Mobility Inpatient Raw Score: 19  JH-HLM Goal: 6: Walk 10 steps or more  JH-HLM Achieved: 7: Walk 25 feet or more  HLM Goal achieved. Continue to encourage appropriate mobility.     Disposition:   Home with [facility] Services (Reminder: Complete face to face encounter)    Planned Readmission: none      Discharge Statement:  I spent 35 minutes discharging the patient. This time was spent on the day of discharge. I had direct contact with the patient on the day of discharge. Greater than 50% of the total time was spent examining patient, answering all patient questions, arranging and discussing plan of care with patient as well as directly providing post-discharge instructions.  Additional time then spent on discharge activities.    Discharge Medications:  See after visit summary for reconciled discharge medications provided to patient  and/or family.      **Please Note: This note may have been constructed using a voice recognition system**

## 2024-03-20 ENCOUNTER — OFFICE VISIT (OUTPATIENT)
Dept: CARDIOLOGY CLINIC | Facility: CLINIC | Age: 79
End: 2024-03-20
Payer: COMMERCIAL

## 2024-03-20 VITALS
BODY MASS INDEX: 39.62 KG/M2 | HEART RATE: 88 BPM | WEIGHT: 223.6 LBS | OXYGEN SATURATION: 96 % | HEIGHT: 63 IN | DIASTOLIC BLOOD PRESSURE: 64 MMHG | SYSTOLIC BLOOD PRESSURE: 98 MMHG

## 2024-03-20 DIAGNOSIS — E11.69 DIABETES MELLITUS TYPE 2 IN OBESE: ICD-10-CM

## 2024-03-20 DIAGNOSIS — E78.5 HYPERLIPIDEMIA, UNSPECIFIED HYPERLIPIDEMIA TYPE: ICD-10-CM

## 2024-03-20 DIAGNOSIS — E66.9 OBESITY (BMI 30-39.9): ICD-10-CM

## 2024-03-20 DIAGNOSIS — I48.91 ATRIAL FIBRILLATION, UNSPECIFIED TYPE (HCC): ICD-10-CM

## 2024-03-20 DIAGNOSIS — I10 ESSENTIAL HYPERTENSION: ICD-10-CM

## 2024-03-20 DIAGNOSIS — I50.32 CHRONIC HEART FAILURE WITH PRESERVED EJECTION FRACTION (HFPEF) (HCC): Primary | ICD-10-CM

## 2024-03-20 DIAGNOSIS — E66.9 DIABETES MELLITUS TYPE 2 IN OBESE: ICD-10-CM

## 2024-03-20 PROCEDURE — 99215 OFFICE O/P EST HI 40 MIN: CPT | Performed by: NURSE PRACTITIONER

## 2024-03-20 PROCEDURE — 93000 ELECTROCARDIOGRAM COMPLETE: CPT | Performed by: NURSE PRACTITIONER

## 2024-03-20 NOTE — PROGRESS NOTES
Cardiology Follow Up    Jessica Jaquez  1945  [id number]  [facility]  [address] [facility]  [place] [id number] [phone]    1. Chronic heart failure with preserved ejection fraction (HFpEF) (HCC)        2. Atrial fibrillation, unspecified type (HCC)  POCT ECG      3. Essential hypertension        4. Hyperlipidemia, unspecified hyperlipidemia type        5. Diabetes mellitus type 2 in obese         6. Obesity (BMI 30-39.9)            Interval History:   Ms Jessica Jaquez was admitted to [facility] on 3/15 -3/19/2024 with CHF exacerbation.  She presented to the emergency room with shortness of breath weight gain and was found to be volume overloaded and heart failure.  Her symptoms improved with IV diuresis.  She was transition to torsemide 20 mg daily.  Creatinine elevated at discharge losartan held at the time of discharge.  She was instructed to undergo a BMP in 5 to 7 days.  Charge weight 224 pounds at discharge sodium 132 BUN 58 creatinine 1.41 GFR 35/    Jessica presents to our office for a recent hospitalization follow up visit.  She will undergo lab studies next week. VNA and PT should be coming into her home.  Jessica is accompanied by her son and .  She denies worsening dyspnea with exertion chest pain palpitations lightheadedness or dizziness.  Mitts to due to muscle cramping in her hands when she does not take potassium.  She took a dose of potassium today.  She ordered a scale.  Will be delivered to her home tomorrow.  Her weight in the office is 223 pounds.      Medical History   Primary Cardiologist   Chronic HFpEF LVEF 60%  Atrial fibrillation sp PHU DCCV on 3/08/24 [id number]= 8 on Eliquis 5 mg twice daily for stroke prevention  Hypertension  Hyperlipidemia 3/16/24   HDL 45 LDL 85  DM2 HgbA1C 6.9% on 1/22/2024  Hx of remote CVA 15 years ago   Lumbar  radiculopathy    3/06/23 TTE: Left ventricle cavity size normal wall thickness mild increased mild concentric hypertrophy LVEF 60% systolic function normal, right ventricle cavity size systolic function normal.  Right atrium mildly dilated, aortic valve sclerosis, mild MR, mild TR.    Patient Active Problem List   Diagnosis    Diabetes mellitus type 2 in obese     Obesity, Class III, BMI 40-49.9 (morbid obesity) ([facility])    Essential hypertension    Chronic diastolic CHF (congestive heart failure) ([facility])    Hyperlipidemia    Unspecified abnormalities of gait and mobility    Tremor of hands and face    Cerebrovascular accident (CVA) due to thrombosis of precerebral artery ([facility])    Open wound of abdomen    Lumbar radiculopathy    History of stroke    New onset atrial fibrillation (HCC)    GIOVANNI (acute kidney injury) ([facility])    CHF exacerbation ([facility])    Hyponatremia     Past Medical History:   Diagnosis Date    Arthritis     Colorectal polyps     Constipation     Diabetes mellitus ([facility])     Diverticulosis of colon     Hiatal hernia     Hypertension     Increased urinary frequency     Lumbar disc disease     Pressure injury of skin     SOB (shortness of breath)     Stress incontinence     Vitamin D deficiency 07/27/2018     Social History     Socioeconomic History    Marital status: /Civil Union     Spouse name: Not on file    Number of children: Not on file    Years of education: Not on file    Highest education level: Not on file   Occupational History    Not on file   Tobacco Use    Smoking status: Never    Smokeless tobacco: Never   Substance and Sexual Activity    Alcohol use: Never    Drug use: No    Sexual activity: Not on file   Other Topics Concern    Not on file   Social History Narrative    Not on file     Social Determinants of Health     Financial Resource Strain: Not on file   Food Insecurity: No Food Insecurity (3/15/2024)    Hunger Vital Sign     Worried About Running Out of Food in the Last Year: Never  true     Ran Out of Food in the Last Year: Never true   Transportation Needs: No Transportation Needs (3/15/2024)    PRAPARE - Transportation     Lack of Transportation (Medical): No     Lack of Transportation (Non-Medical): No   Physical Activity: Not on file   Stress: Not on file   Social Connections: Not on file   Intimate Partner Violence: Not on file   Housing Stability: Low Risk  (3/15/2024)    Housing Stability Vital Sign     Unable to Pay for Housing in the Last Year: No     Number of Places Lived in the Last Year: 1     Unstable Housing in the Last Year: No      Family History   Problem Relation Age of Onset    Hypertension Mother     Heart attack Mother     Diabetes Mother     Prostate cancer Father     No Known Problems Brother     Stroke Maternal Grandmother     Stroke Paternal Grandmother     Stroke Paternal Grandfather     Thyroid cancer Neg Hx      Past Surgical History:   Procedure Laterality Date    BACK SURGERY      CARPAL TUNNEL RELEASE Left     CATARACT EXTRACTION Bilateral     CHOLECYSTECTOMY      COLONOSCOPY W/ ENDOSCOPIC US N/A 2/24/2016    Procedure: ANAL ENDOSCOPIC U/S;  Surgeon: HOLLIS Washington MD;  Location: [facility];  Service:     HERNIA REPAIR      SC COLONOSCOPY FLX DX W/COLLJ SPEC WHEN PFRMD N/A 2/24/2016    Procedure: COLONOSCOPY;  Surgeon: HOLLIS Washington MD;  Location: [facility];  Service: Colorectal    TONSILLECTOMY      TUBAL LIGATION         Current Outpatient Medications:     AMILoride 5 mg tablet, Take 2.5 mg by mouth daily, Disp: , Rfl:     apixaban (ELIQUIS) 5 mg, Take 1 tablet (5 mg total) by mouth 2 (two) times a day, Disp: 60 tablet, Rfl: 0    aspirin 81 mg chewable tablet, Chew 81 mg daily, Disp: , Rfl:     bimatoprost (Lumigan) 0.01 % ophthalmic drops, Administer 1 drop to both eyes daily at bedtime, Disp: , Rfl:     Cholecalciferol (VITAMIN D3) 1000 units CAPS, Take by mouth daily  , Disp: , Rfl:     ezetimibe (ZETIA) 10 mg tablet, Take 1 tablet (10 mg total) by  mouth daily at bedtime, Disp: 60 tablet, Rfl: 0    gabapentin (NEURONTIN) 100 mg capsule, Take 1 capsule (100 mg total) by mouth 3 (three) times a day, Disp: 90 capsule, Rfl: 2    glimepiride (AMARYL) 1 mg tablet, Take 1 mg by mouth every morning before breakfast, Disp: , Rfl:     hydrocortisone 2.5 % cream, as needed, Disp: , Rfl: 3    ibuprofen (MOTRIN) 200 mg tablet, Take by mouth every 6 (six) hours as needed for mild pain, Disp: , Rfl:     ketoconazole (NIZORAL) 2 % cream, as needed, Disp: , Rfl: 3    metoprolol succinate (TOPROL-XL) 100 mg 24 hr tablet, Take 100 mg by mouth daily, Disp: , Rfl: 3    Multiple Vitamins-Minerals (CENTRUM SILVER PO), multivitamin, Disp: , Rfl:     torsemide (DEMADEX) 20 mg tablet, Take 1 tablet (20 mg total) by mouth daily, Disp: 30 tablet, Rfl: 0  No current facility-administered medications for this visit.  Allergies   Allergen Reactions    Other Cough and Sneezing     Seasonal allergies       Labs:  Admission on 03/15/2024, Discharged on 03/19/2024   Component Date Value    Ventricular Rate 03/15/2024 68     Atrial Rate 03/15/2024 68     [place] Interval 03/15/2024 160     QRSD Interval 03/15/2024 120     QT Interval 03/15/2024 402     QTC Interval 03/15/2024 427     P Axis 03/15/2024 63     QRS [place] 03/15/2024 -17     T Wave Axis 03/15/2024 -18     WBC 03/15/2024 7.91     RBC 03/15/2024 4.41     Hemoglobin 03/15/2024 12.5     Hematocrit 03/15/2024 40.5     MCV 03/15/2024 92     MCH 03/15/2024 28.3     MCHC 03/15/2024 30.9 (L)     RDW 03/15/2024 14.2     MPV 03/15/2024 8.9     Platelets 03/15/2024 298     nRBC 03/15/2024 0     Neutrophils Relative 03/15/2024 65     Immature Grans % 03/15/2024 0     Lymphocytes Relative 03/15/2024 19     Monocytes Relative 03/15/2024 8     Eosinophils Relative 03/15/2024 7 (H)     Basophils Relative 03/15/2024 1     Neutrophils Absolute 03/15/2024 5.22     Absolute Immature Grans 03/15/2024 0.03     Absolute Lymphocytes 03/15/2024 1.49     Absolute  Monocytes 03/15/2024 0.59     Eosinophils Absolute 03/15/2024 0.54     Basophils Absolute 03/15/2024 0.04     Sodium 03/15/2024 136     Potassium 03/15/2024 4.2     Chloride 03/15/2024 104     CO2 03/15/2024 25     ANION GAP 03/15/2024 7     BUN 03/15/2024 42 (H)     Creatinine 03/15/2024 1.33 (H)     Glucose 03/15/2024 76     Calcium 03/15/2024 9.1     AST 03/15/2024 23     ALT 03/15/2024 26     Alkaline Phosphatase 03/15/2024 68     Total Protein 03/15/2024 6.9     Albumin 03/15/2024 3.6     Total Bilirubin 03/15/2024 0.37     eGFR 03/15/2024 38     hs TnI 0hr 03/15/2024 8     hs TnI 2hr 03/15/2024 8     Delta 2hr hsTnI 03/15/2024 0     hs TnI 4hr 03/15/2024 9     Delta 4hr hsTnI 03/15/2024 1     POC Glucose 03/15/2024 62 (L)     POC Glucose 03/15/2024 80     POC Glucose 03/15/2024 195 (H)     WBC 03/16/2024 8.62     RBC 03/16/2024 4.32     Hemoglobin 03/16/2024 12.2     Hematocrit 03/16/2024 38.2     MCV 03/16/2024 88     MCH 03/16/2024 28.2     MCHC 03/16/2024 31.9     RDW 03/16/2024 14.6     MPV 03/16/2024 8.9     Platelets 03/16/2024 310     nRBC 03/16/2024 0     Neutrophils Relative 03/16/2024 61     Immature Grans % 03/16/2024 0     Lymphocytes Relative 03/16/2024 24     Monocytes Relative 03/16/2024 9     Eosinophils Relative 03/16/2024 6     Basophils Relative 03/16/2024 0     Neutrophils Absolute 03/16/2024 5.24     Absolute Immature Grans 03/16/2024 0.03     Absolute Lymphocytes 03/16/2024 2.03     Absolute Monocytes 03/16/2024 0.79     Eosinophils Absolute 03/16/2024 0.51     Basophils Absolute 03/16/2024 0.02     Sodium 03/16/2024 136     Potassium 03/16/2024 4.5     Chloride 03/16/2024 104     CO2 03/16/2024 22     ANION GAP 03/16/2024 10     BUN 03/16/2024 46 (H)     Creatinine 03/16/2024 1.21     Glucose 03/16/2024 101     Calcium 03/16/2024 8.8     AST 03/16/2024 25     ALT 03/16/2024 25     Alkaline Phosphatase 03/16/2024 71     Total Protein 03/16/2024 6.5     Albumin 03/16/2024 3.6     Total  Bilirubin 03/16/2024 0.50     eGFR 03/16/2024 42     Magnesium 03/16/2024 2.1     TSH 3RD GENERATON 03/16/2024 0.427 (L)     Cholesterol 03/16/2024 159     Triglycerides 03/16/2024 146     HDL, Direct 03/16/2024 45 (L)     LDL Calculated 03/16/2024 85     POC Glucose 03/16/2024 114     BNP 03/16/2024 30     POC Glucose 03/16/2024 212 (H)     Sodium 03/16/2024 140     Potassium 03/16/2024 4.8     Chloride 03/16/2024 100     CO2 03/16/2024 25     ANION GAP 03/16/2024 15 (H)     BUN 03/16/2024 51 (H)     Creatinine 03/16/2024 1.39 (H)     Glucose 03/16/2024 220 (H)     Calcium 03/16/2024 9.3     eGFR 03/16/2024 36     Magnesium 03/16/2024 2.1     POC Glucose 03/16/2024 141 (H)     POC Glucose 03/16/2024 199 (H)     Sodium 03/17/2024 134 (L)     Potassium 03/17/2024 4.6     Chloride 03/17/2024 100     CO2 03/17/2024 21     ANION GAP 03/17/2024 13     BUN 03/17/2024 55 (H)     Creatinine 03/17/2024 1.44 (H)     Glucose 03/17/2024 154 (H)     Calcium 03/17/2024 9.0     eGFR 03/17/2024 34     WBC 03/17/2024 11.02 (H)     RBC 03/17/2024 4.51     Hemoglobin 03/17/2024 12.7     Hematocrit 03/17/2024 40.9     MCV 03/17/2024 91     MCH 03/17/2024 28.2     MCHC 03/17/2024 31.1 (L)     RDW 03/17/2024 14.4     Platelets 03/17/2024 323     MPV 03/17/2024 8.9     Magnesium 03/17/2024 2.1     POC Glucose 03/17/2024 149 (H)     POC Glucose 03/17/2024 186 (H)     Ventricular Rate 03/16/2024 62     Atrial Rate 03/16/2024 62     ME Interval 03/16/2024 156     QRSD Interval 03/16/2024 122     QT Interval 03/16/2024 434     QTC Interval 03/16/2024 440     P Axis 03/16/2024 46     QRS Axis 03/16/2024 -14     T Wave Axis 03/16/2024 -32     POC Glucose 03/17/2024 177 (H)     POC Glucose 03/17/2024 214 (H)     POC Glucose 03/18/2024 127     Sodium 03/18/2024 130 (L)     Potassium 03/18/2024 5.3     Chloride 03/18/2024 99     CO2 03/18/2024 22     ANION GAP 03/18/2024 9     BUN 03/18/2024 60 (H)     Creatinine 03/18/2024 1.48 (H)     Glucose  03/18/2024 237 (H)     Calcium 03/18/2024 8.5     eGFR 03/18/2024 33     POC Glucose 03/18/2024 211 (H)     POC Glucose 03/18/2024 174 (H)     POC Glucose 03/18/2024 176 (H)     Sodium 03/19/2024 132 (L)     Potassium 03/19/2024 4.7     Chloride 03/19/2024 98     CO2 03/19/2024 24     ANION GAP 03/19/2024 10     BUN 03/19/2024 58 (H)     Creatinine 03/19/2024 1.41 (H)     Glucose 03/19/2024 132     Calcium 03/19/2024 9.2     eGFR 03/19/2024 35     POC Glucose 03/19/2024 128     POC Glucose 03/19/2024 225 (H)    Transcribe Orders on 03/14/2024   Component Date Value    Sodium 03/14/2024 135     Potassium 03/14/2024 4.7     Chloride 03/14/2024 105     CO2 03/14/2024 22     ANION GAP 03/14/2024 8     BUN 03/14/2024 39 (H)     Creatinine 03/14/2024 1.21     Glucose, Fasting 03/14/2024 90     Calcium 03/14/2024 9.3     eGFR 03/14/2024 42    No results displayed because visit has over 200 results.      Appointment on 02/12/2024   Component Date Value    Color, UA 02/12/2024 Light [place]     Clarity, UA 02/12/2024 Turbid     Specific Gravity, UA 02/12/2024 1.019     pH, UA 02/12/2024 6.0     Leukocytes, UA 02/12/2024 Large (A)     Nitrite, UA 02/12/2024 Negative     Protein, UA 02/12/2024 70 (1+) (A)     Glucose, UA 02/12/2024 Negative     Ketones, UA 02/12/2024 Negative     Urobilinogen, UA 02/12/2024 <2.0     Bilirubin, UA 02/12/2024 Negative     Occult Blood, UA 02/12/2024 Large (A)     RBC, UA 02/12/2024 Innumerable (A)     WBC, UA 02/12/2024 Innumerable (A)     Epithelial Cells 02/12/2024 Occasional     Bacteria, UA 02/12/2024 Moderate (A)     MUCUS THREADS 02/12/2024 Occasional (A)     Urine Culture 02/12/2024 10,000-19,000 cfu/ml Escherichia coli (A)     Urine Culture 02/12/2024 <10,000 cfu/ml    Lab Requisition on 01/22/2024   Component Date Value    Hemoglobin 01/22/2024 12.2     Sodium 01/22/2024 140     Potassium 01/22/2024 4.2     Chloride 01/22/2024 104     CO2 01/22/2024 25     ANION GAP 01/22/2024 11     BUN  01/22/2024 26 (H)     Creatinine 01/22/2024 1.07     Glucose 01/22/2024 125     Calcium 01/22/2024 9.4     AST 01/22/2024 16     ALT 01/22/2024 12     Alkaline Phosphatase 01/22/2024 85     Total Protein 01/22/2024 7.0     Albumin 01/22/2024 4.0     Total Bilirubin 01/22/2024 0.47     eGFR 01/22/2024 49     LDL Direct 01/22/2024 130 (H)     Hemoglobin A1C 01/22/2024 6.9 (H)     EAG 01/22/2024 151      Imaging: XR chest 1 view portable    Result Date: 3/15/2024  Narrative: XR CHEST PORTABLE INDICATION: shortness of breath. COMPARISON: CXR 12/29/2011, chest CT 8/3/2010. Abdomen CT 3/4/2024. Thoracic spine CT 4/13/2021. FINDINGS: Clear lungs. No pneumothorax or pleural effusion. Mild cardiomegaly. Bones are unremarkable for age. Normal upper abdomen.     Impression: No acute cardiopulmonary disease. Workstation performed: [id number]     PHU    Result Date: 3/8/2024  Narrative:   Left Ventricle: Left ventricular cavity size is normal. Wall thickness is normal. The left ventricular ejection fraction is 55%. Systolic function is normal. Wall motion is normal.   Left Atrium: The atrium is dilated. There is no thrombus. There is no mass.   Right Atrium: The atrium is dilated.   Atrial Septum: No patent foramen ovale detected, confirmed at rest using color doppler.   Left Atrial Appendage: There is no thrombus.   Aortic Valve: There is aortic valve sclerosis.   Mitral Valve: There is mild regurgitation with a centrally directed jet.   Tricuspid Valve: There is mild to moderate regurgitation.   Aorta: There is mild degree of atheroma and intimal thickening.     Cardioversion    Result Date: 3/8/2024  Narrative: PHU with Cardioversion Jessica Jaquez is a 78 y.o. female with new onset atrial fibrillation with RVR Indication for procedure: atrial fibrillation with RVR Anticoagulation: heparin drip transitioned to apixaban The patient was identified in the echo lab. A time out procedure was performed. The patient was sedated by  the anesthesia service with propofol. After adequate sedation, a PHU was performed. Please see separate report for full details.  Briefly, the LV function was 55%, and there was no left atrial or left atrial appendage thrombus identified. After the PHU, adequate sedation was once again confirmed. The patient was cardioverted to sinus rhythm with a 250J biphasic shock.  There were no complications. The patient was monitored for the appropriate time interval in the holding area, and was transferred back to the medical floor in stable condition.     MRI lumbar spine w wo contrast    Result Date: 3/8/2024  Narrative: MRI LUMBAR SPINE WITH AND WITHOUT CONTRAST INDICATION: Lumbar radiculopathy. Prior lumbar surgery. COMPARISON:  None. TECHNIQUE:  Multiplanar, multisequence imaging of the lumbar spine was performed before and after gadolinium administration. . IV Contrast:  10 mL of Gadobutrol injection (SINGLE-DOSE) IMAGE QUALITY:  Diagnostic FINDINGS: VERTEBRAL BODIES:  There normal alignment of the lumbar spine. Susceptibility artifact from posterior L3-4 fusion. No osseous lesion. SACRUM: Lesion in the right sacrum at the S1 level likely to represent a 1.4 cm hemangioma. DISTAL CORD AND CONUS: Normal signal morphology of the distal thoracic cord and conus, terminating at L1. PARASPINAL SOFT TISSUES: No mass or fluid collection. LOWER THORACIC DISC SPACES: Mild posterior disc bulge at T11-12 and T12-L1. No significant central canal stenosis or neural foraminal narrowing. LUMBAR DISC SPACES: L1-L2: Posterior disc bulge. Mild central canal stenosis. No neural foraminal narrowing. L2-L3: Posterior disc bulge eccentric to the right foraminal zone. Facet hypertrophy. No central canal stenosis or neural foraminal narrowing. L3-L4: No central canal stenosis or neuroforaminal narrowing. L4-L5: Posterior disc bulge and disc osteophytes and facet osteophytosis. Mild central canal stenosis. Encroachment of the traversing L5 nerve  roots in the subarticular zones. Mild central canal stenosis and neural foraminal narrowing. L5-S1: Posterior disc osteophytes and facet osteophytosis. No central canal stenosis. Mild neural foraminal narrowing. POSTCONTRAST IMAGING:  No abnormal enhancement. OTHER FINDINGS:  None.     Impression: Disc and facet degenerative change throughout the lumbar spine, most prominent at L4-5. Workstation performed: [id number]     Echo complete w/ contrast if indicated    Result Date: 3/6/2024  Narrative:   Left Ventricle: Left ventricular cavity size is normal. Wall thickness is mildly increased. There is mild concentric hypertrophy. The left ventricular ejection fraction is 60%. Systolic function is normal. Although no diagnostic regional wall motion abnormality was identified, this possibility cannot be completely excluded on the basis of this study.   Right Ventricle: Right ventricular cavity size is normal. Systolic function is normal.   Right Atrium: The atrium is mildly dilated.   Aortic Valve: There is aortic valve sclerosis.   Mitral Valve: There is annular calcification. There is mild regurgitation.   Tricuspid Valve: There is mild regurgitation.     CT abdomen pelvis wo contrast    Result Date: 3/4/2024  Narrative: CT ABDOMEN AND PELVIS WITHOUT IV CONTRAST INDICATION: lumbar back and left hip pain, eval for fracture. COMPARISON: 5/12/2007. TECHNIQUE: CT examination of the abdomen and pelvis was performed without intravenous contrast. Multiplanar 2D reformatted images were created from the source data. This examination, like all CT scans performed in the [facility], was performed utilizing techniques to minimize radiation dose exposure, including the use of iterative reconstruction and automated exposure control. Radiation dose length product (DLP) for this visit: 1295.06 mGy-cm Enteric Contrast: Not administered. FINDINGS: ABDOMEN LOWER CHEST: Bibasilar atelectasis. LIVER/BILIARY TREE: Unremarkable.  GALLBLADDER: Post cholecystectomy. SPLEEN: Unremarkable. PANCREAS: Unremarkable. ADRENAL GLANDS: Unremarkable. KIDNEYS/URETERS: Unremarkable. No hydronephrosis. STOMACH AND BOWEL: There is diverticulosis coli with possible trace stranding around the proximal sigmoid colon. Acute diverticulitis is not excluded. APPENDIX: No findings to suggest appendicitis. ABDOMINOPELVIC CAVITY: No ascites. No pneumoperitoneum. No lymphadenopathy. VESSELS: Unremarkable for patient's age. PELVIS REPRODUCTIVE ORGANS: Unremarkable for patient's age. URINARY BLADDER: Unremarkable. ABDOMINAL WALL/INGUINAL REGIONS: There is a hernia mesh along the anterior abdominal wall with a simple appearing fluid collection, likely chronic. BONES: No acute fracture or suspicious osseous lesion. There are pedicle screws at L3 and L4 which appear intact.     Impression: 1.  Diverticulosis coli with suggestion of trace stranding adjacent to the proximal sigmoid colon. Very mild acute diverticulitis is not excluded. 2.  No acute fracture. 3.  Chronic appearing seroma associated with the large anterior abdominal wall hernia mesh. The study was marked in EPIC for immediate notification. Workstation performed: [id number]       Review of Systems:  Review of Systems   Musculoskeletal:  Positive for arthralgias, gait problem and myalgias.   All other systems reviewed and are negative.      Physical Exam:  Physical Exam  Vitals reviewed.   Constitutional:       Appearance: She is obese.   HENT:      Head: Normocephalic.   Neck:      Comments: - JVD   Cardiovascular:      Rate and Rhythm: Normal rate and regular rhythm.      Pulses: Normal pulses.      Heart sounds: Normal heart sounds.   Pulmonary:      Effort: Pulmonary effort is normal.      Breath sounds: Normal breath sounds.   Musculoskeletal:         General: Normal range of motion.      Cervical back: Normal range of motion and neck supple.      Right lower leg: No edema.      Left lower leg: No edema.    Skin:     General: Skin is warm and dry.      Capillary Refill: Capillary refill takes less than 2 seconds.   Neurological:      General: No focal deficit present.      Mental Status: She is alert and oriented to person, place, and time.   Psychiatric:         Mood and Affect: Mood normal.         Behavior: Behavior normal.         Discussion/Summary:  # Chronic HFpEF LVEF 60% NYHA class III stage C- Weight in the office 223 pounds weight at discharged 224 pounds.  HR and BP controlled, on PE appears eu volemic, continue on Torsemide 20mg daily, Metoprolol succinate 100 mg daily, amiloride 2.5 mg daily, I have reinforced to 2 g sodium diet 1500 cc fluid restriction, weights and monitoring for signs and symptoms of heart failure.  She was instructed to take an extra dose of torsemide if she experiences a weight gain of 3 pounds in 1 day or worsening shortness of breath.  She will follow-up in our office in 1 week.  # Atrial fibrillation sp PHU DCCV on 3/08/24 [id number]= 8 on Eliquis 5 mg twice daily for stroke prevention, educated on monitoring for signs and symptoms of bleeding and when to seek medical attention.  Continue on Metoprolol succinate 100 mg daily, EKG in the office normal sinus rhythm 88 bpm  # Hypertension- controlled on metoprolol succinate 100 mg daily.  DASH diet  # Hyperlipidemia 3/16/24   HDL 45 LDL 85 continue on Zetia 10 mg daily, healthy diet  # DM2 HgbA1C 6.9% on 1/22/2024 continue on current medical regimen  # Obesity BMI 39.61 sleeps in recliner  states she snores at night.  She refuses sleep medicine to rule out sleep apnea

## 2024-03-20 NOTE — PATIENT INSTRUCTIONS
Maintain a 2 gram daily sodium diet and 1500 ml daily fluid restriction.  Check daily weights.  If you gain 3 pounds in one day, 5 pounds in one week, or experience worsening shortness of breath or increasing lower leg swelling.  Please call the heart failure office at [phone].  Please bring a  list of your current medications and daily weights to the office visit

## 2024-03-21 ENCOUNTER — HOME CARE VISIT (OUTPATIENT)
Dept: HOME HEALTH SERVICES | Facility: HOME HEALTHCARE | Age: 79
End: 2024-03-21
Payer: COMMERCIAL

## 2024-03-21 ENCOUNTER — TELEPHONE (OUTPATIENT)
Dept: CARDIOLOGY CLINIC | Facility: CLINIC | Age: 79
End: 2024-03-21

## 2024-03-21 VITALS
TEMPERATURE: 97.3 F | DIASTOLIC BLOOD PRESSURE: 76 MMHG | HEART RATE: 82 BPM | OXYGEN SATURATION: 96 % | SYSTOLIC BLOOD PRESSURE: 110 MMHG

## 2024-03-21 PROCEDURE — G0151 HHCP-SERV OF PT,EA 15 MIN: HCPCS

## 2024-03-21 NOTE — TELEPHONE ENCOUNTER
Sameer. Physical therapists from [facility] saw pt today.  She has had her wt done on 3 different scales the last 3 days. His scale the wt was 229 when yesterday at the office it was 223.6 lbs.  Pt feels her symptoms are getting better. She is SOB with doing the 6 steps in her home.  Sameer will be out to see the pt tomorrow. I will call her back to get the wt on his scale and see how she is doing.  She will receive her scale from [employer] next Tuesday.

## 2024-03-22 ENCOUNTER — HOME CARE VISIT (OUTPATIENT)
Dept: HOME HEALTH SERVICES | Facility: HOME HEALTHCARE | Age: 79
End: 2024-03-22
Payer: COMMERCIAL

## 2024-03-22 ENCOUNTER — TELEPHONE (OUTPATIENT)
Dept: CARDIOLOGY CLINIC | Facility: CLINIC | Age: 79
End: 2024-03-22

## 2024-03-22 VITALS
SYSTOLIC BLOOD PRESSURE: 138 MMHG | HEART RATE: 98 BPM | TEMPERATURE: 96.3 F | DIASTOLIC BLOOD PRESSURE: 80 MMHG | RESPIRATION RATE: 18 BRPM | OXYGEN SATURATION: 97 %

## 2024-03-22 VITALS
OXYGEN SATURATION: 99 % | TEMPERATURE: 97 F | WEIGHT: 223.4 LBS | DIASTOLIC BLOOD PRESSURE: 78 MMHG | HEART RATE: 95 BPM | SYSTOLIC BLOOD PRESSURE: 132 MMHG | BODY MASS INDEX: 39.57 KG/M2

## 2024-03-22 PROCEDURE — G0151 HHCP-SERV OF PT,EA 15 MIN: HCPCS

## 2024-03-22 PROCEDURE — G0299 HHS/HOSPICE OF RN EA 15 MIN: HCPCS

## 2024-03-22 NOTE — CASE COMMUNICATION
"PT, SN  This report is informational only, no response is needed  Primary focus of home health care: CHF fluid/weight management,  Patient stated goals of care: \"I want to be able to resume my leisurely walks outside and to be able to get in and out of my car by myself.\"  Anticipated visit pattern and next visit date: 2w1, 1w1, 2w3 next vist 3/22/24  See medication list - meds in home differ from AVS, patient reports taking potassium johnson pplement for the past 20 years and was told that she is not supposed to take it when we was at the hospital. At the hospital her hands locked up due to patient stated reasoning of lack of potassium. Patient resumed taking potassium morning and night. Potassium is not included in her AVS from CRNP appointment yesterday nor the hospital AVS (it is specifically stated for her to stop her potassium)  Patient also states that she was given i nstructions to take an extra dose of Torsemide with any weight gain of 3lbs in one day or 5 lbs in one week contradicting the instructions provided on the AVS to call the heart failure office.  Significant clinical findings: poor weight management practices due to unreliable scale, new scale is arriving Tuesday.  Potential barriers to goal achievement: Patient can be prideful in her choices at times but is mostly receptive to recommenda tions and instructions.    Thank you for allowing us to participate in the care of your patient.    "

## 2024-03-22 NOTE — CASE COMMUNICATION
I did not look at the bottle but the patient told me Cardiology. Would you please call the patient to clarify?  Tyrel Espinal RN  ----- Message -----  From: Nava Wright RN  Sent: 3/22/2024  11:46 AM EDT  To: Tyrel Espinal RN      Would ordered the metoprolol ? Thank you Miguelina  ----- Message -----  From: Tyrel Espinal RN  Sent: 3/22/2024  11:21 AM EDT  To: Nava Wright RN; Susanne Figueredo RN; *    TC to patient to report car diology would like patient to hold potassium until lab work completed. Patient reports just taking two potassium due to feeling need for potassium. Awaiting clarification of metorpolol succinate: 200 mg daily on bottle in home and AVS states 100 mg daily.

## 2024-03-22 NOTE — TELEPHONE ENCOUNTER
Spoke with pt. She was told she was getting a zio for 14 days. Noted that Celestino Carrera PA-C mentioned it in his note on 3/16/24 in the hospital.    Do not see an order in Epic.    Checking to see if this was taken care of and zio going to pt's home?    Please advise

## 2024-03-22 NOTE — TELEPHONE ENCOUNTER
Pcp office ordered the metoprolol at 200 mg qd.  Their office is closed today. Will call Monday and get documentation of the increased dose.  Will also look into  if she needed a zio that pt states was told  her at discharge.

## 2024-03-22 NOTE — CASE COMMUNICATION
Back office please fax to PCP  Jorden Holt MD  Fax: [phone]   Patient Jessica Jaquez  1945    PHYSICIAN NOTIFICATION_Weight today 221.6 per RN scale.    Wt 224 lb at discharge, 223lb 3/20 in cardiology office, and 3/21 229lb per PT scale.   Crackles bilateral lower lobes and right middle lobe. SOB with speaking. No edema BLE. Patient reports decreased appetite due to low na diet.   Patient not using cane or Walker at SOC.  Educated to use at all times.   Patient reports SOB started Friday 3/15/24 and has since improved. Still waiting for scale to arrive.   Patient struggles with transportation and would benefit from an MSW consult to discuss benefits of palliative care and more assistance in the home.   has dementia and patient struggles with balancing rest and activity periods. Patient refuses to use bath bench.   Patient reports was supposed to g et a ZIO patch but never received.   Patient reports gabapentin is denied by insurance. Patient reports chronic unmanaged pain. Patient would benefit from a palliative care consult.     MEDICATION ISSUES_   Patient is taking 200 mg metoprolol succinate daily not 100 mg daily as on AVS and in epic.   On 3/17/24 per [facility] Patty Alberts PA-C discontinued potassium and RN unable to see reason why. Patient reports having muscular symptom s of low potassium and has had a hx of low potassium from diuretics in the past. Patient reports taking potassium twice Wednesday and two yesterday since leaving hospital.   Patient reports she was instructed to take an extra dose of torsemide if she has a weigh gain of 3lbs in one day or 5lbs within a week.   Aspirin  and out of vit d family to get today.  Taking tylenol 50 mg 2 tabs bid.  Hydrocortisone and ketoconazole  are morteza th PRN for ABD and Breast fold rash.

## 2024-03-22 NOTE — CASE COMMUNICATION
TC to patient to report cardiology would like patient to hold potassium until lab work completed. Patient reports just taking two potassium due to feeling need for potassium. Awaiting clarification of metorpolol succinate: 200 mg daily on bottle in home and AVS states 100 mg daily.

## 2024-03-22 NOTE — CASE COMMUNICATION
OT referral is not necessary at this time. Patient's needs are mobility related and fluid management related. Patient can complete her daily tasks with assistance from her  and/or assistive device to get to her destinations in the home.

## 2024-03-22 NOTE — TELEPHONE ENCOUNTER
Pt stated she was to have a zio for 14 days.  No order was placed. She comes for appt Helena HALLMAN on Thursday. Did you want to have it placed on then?        Please advise

## 2024-03-22 NOTE — TELEPHONE ENCOUNTER
BERHANE Sarah, saw pt today. Wt is not an issue, but does have crackles in the lungs and is sob. Pt takes torsemide 20 mg qd and has order to take extra dose if rapid wt gain.  She will have bmp on 3/26/24.  She has taken potassium on her own, not prescribed.  Having pain that she feels is from low potassium level.    See BERHANE notes from today.    Any changes?    Please advise

## 2024-03-22 NOTE — CASE COMMUNICATION
TT to Nava Wright RN of cardioloy to addess case communication and if ok for patient to resume taking potassium 20 meq BID and if patient should be breaking metoprolol in half?

## 2024-03-22 NOTE — TELEPHONE ENCOUNTER
No request was received to order a ZIO and nothing has been ordered by the [facility] office.  It appears pt was in [facility] and will be following at [address]   Thanks.

## 2024-03-25 ENCOUNTER — TELEPHONE (OUTPATIENT)
Dept: CARDIOLOGY CLINIC | Facility: CLINIC | Age: 79
End: 2024-03-25

## 2024-03-25 ENCOUNTER — HOME CARE VISIT (OUTPATIENT)
Dept: HOME HEALTH SERVICES | Facility: HOME HEALTHCARE | Age: 79
End: 2024-03-25
Payer: COMMERCIAL

## 2024-03-25 VITALS — DIASTOLIC BLOOD PRESSURE: 90 MMHG | HEART RATE: 97 BPM | OXYGEN SATURATION: 99 % | SYSTOLIC BLOOD PRESSURE: 144 MMHG

## 2024-03-25 PROCEDURE — G0157 HHC PT ASSISTANT EA 15: HCPCS

## 2024-03-25 NOTE — TELEPHONE ENCOUNTER
I called Dr. Holt's office to clarify the dose for the toprol-xl pt is taking.  They will call me back with an answer.

## 2024-03-25 NOTE — TELEPHONE ENCOUNTER
[place] I am not sure if the dose of Torsemide was increased?  She can take Torsemide 20mg BID for 2 days then return to daily.  She needs reminder to abide by a low sodium diet and fluid restriction  Thank you Helena

## 2024-03-25 NOTE — TELEPHONE ENCOUNTER
Hi this is Patty calling back from Doctor Jorden Holt office. In regard to Jessica CARVAJAL YOB: 1945. You just wanted to confirm what dose of her metoprolol succinate that she is currently taking And Doctor Holt said since she's been on the 200 programs and we did had changed it at one point, she should continue on the metoprolol succinate 200 milligrams one daily. All right. Any questions? Our phone number [phone]. Thank you very much.

## 2024-03-26 ENCOUNTER — APPOINTMENT (OUTPATIENT)
Dept: LAB | Facility: CLINIC | Age: 79
End: 2024-03-26
Payer: COMMERCIAL

## 2024-03-26 ENCOUNTER — HOME CARE VISIT (OUTPATIENT)
Dept: HOME HEALTH SERVICES | Facility: HOME HEALTHCARE | Age: 79
End: 2024-03-26
Payer: COMMERCIAL

## 2024-03-26 ENCOUNTER — TELEPHONE (OUTPATIENT)
Dept: CARDIOLOGY CLINIC | Facility: CLINIC | Age: 79
End: 2024-03-26

## 2024-03-26 VITALS
SYSTOLIC BLOOD PRESSURE: 124 MMHG | TEMPERATURE: 98 F | OXYGEN SATURATION: 97 % | RESPIRATION RATE: 16 BRPM | DIASTOLIC BLOOD PRESSURE: 60 MMHG | HEART RATE: 77 BPM

## 2024-03-26 DIAGNOSIS — R26.2 AMBULATORY DYSFUNCTION: ICD-10-CM

## 2024-03-26 DIAGNOSIS — M25.552 LEFT HIP PAIN: ICD-10-CM

## 2024-03-26 DIAGNOSIS — I50.9 ACUTE CHF (CONGESTIVE HEART FAILURE) (HCC): ICD-10-CM

## 2024-03-26 LAB
ANION GAP SERPL CALCULATED.3IONS-SCNC: 16 MMOL/L (ref 4–13)
BUN SERPL-MCNC: 68 MG/DL (ref 5–25)
CALCIUM SERPL-MCNC: 9.6 MG/DL (ref 8.4–10.2)
CHLORIDE SERPL-SCNC: 92 MMOL/L (ref 96–108)
CO2 SERPL-SCNC: 31 MMOL/L (ref 21–32)
CREAT SERPL-MCNC: 1.78 MG/DL (ref 0.6–1.3)
GFR SERPL CREATININE-BSD FRML MDRD: 26 ML/MIN/1.73SQ M
GLUCOSE SERPL-MCNC: 135 MG/DL (ref 65–140)
POTASSIUM SERPL-SCNC: 4.3 MMOL/L (ref 3.5–5.3)
SODIUM SERPL-SCNC: 139 MMOL/L (ref 135–147)

## 2024-03-26 PROCEDURE — 36415 COLL VENOUS BLD VENIPUNCTURE: CPT

## 2024-03-26 PROCEDURE — 80048 BASIC METABOLIC PNL TOTAL CA: CPT

## 2024-03-26 PROCEDURE — G0299 HHS/HOSPICE OF RN EA 15 MIN: HCPCS

## 2024-03-26 NOTE — TELEPHONE ENCOUNTER
Spoke with pt . Today wt was 221.4 lbs. Lungs CTA, and no edema present. She did get her scale from [employer]. She will weigh daily.  Mcgarry will monitor the wts .    Pt has appt on Thursday with our office.  She knows when to call the office.  I will call her again next week.

## 2024-03-27 ENCOUNTER — HOME CARE VISIT (OUTPATIENT)
Dept: HOME HEALTH SERVICES | Facility: HOME HEALTHCARE | Age: 79
End: 2024-03-27
Payer: COMMERCIAL

## 2024-03-27 NOTE — PROGRESS NOTES
Cardiology Follow Up    Jessica Jaquez  1945  [id number]  [facility]  [address]  [place] [id number] [phone]    1. Chronic diastolic CHF (congestive heart failure) (HCC)        2. Atrial fibrillation, unspecified type (HCC)        3. Essential hypertension        4. Hyperlipidemia, unspecified hyperlipidemia type        5. Diabetes mellitus type 2 in obese             Interval HistMs Jessica Jaquez was admitted to [facility] on 3/15 -3/19/2024 with CHF exacerbation.  She presented to the emergency room with shortness of breath weight gain and was found to be volume overloaded and heart failure.  Her symptoms improved with IV diuresis.  She was transition to torsemide 20 mg daily.  Creatinine elevated at discharge losartan held at the time of discharge.  She was instructed to undergo a BMP in 5 to 7 days.  Charge weight 224 pounds at discharge sodium 132 BUN 58 creatinine 1.41 GFR 35/     On 3/202/4 Jessica was seen in our office for a recent hospitalization follow up visit.  She will undergo lab studies next week. VNA and PT should be coming into her home.  Jessica is accompanied by her son and .  She denies worsening dyspnea with exertion chest pain palpitations lightheadedness or dizziness.  Mitts to due to muscle cramping in her hands when she does not take potassium.  She took a dose of potassium today.  She ordered a scale.  Will be delivered to her home tomorrow.  Her weight in the office is 223 pounds.    Jessica presents her office for a follow-up visit.  She continues to be followed by VNA and PT.  She is accompanied by her son.  Jessica admits to fatigue denies worsening dyspnea lightheadedness or dizziness.  Her weight is stable 224 pounds        Medical History   Primary Cardiologist   Chronic HFpEF LVEF 60%  Atrial fibrillation sp PHU DCCV on 3/08/24 [id number]= 8 on Eliquis 5  mg twice daily for stroke prevention  Hypertension  Hyperlipidemia 3/16/24   HDL 45 LDL 85  DM2 HgbA1C 6.9% on 1/22/2024  Hx of remote CVA 15 years ago   Lumbar radiculopathy     3/06/23 TTE: Left ventricle cavity size normal wall thickness mild increased mild concentric hypertrophy LVEF 60% systolic function normal, right ventricle cavity size systolic function normal.  Right atrium mildly dilated, aortic valve sclerosis, mild MR, mild TR.ory:       Patient Active Problem List   Diagnosis    Diabetes mellitus type 2 in obese     Obesity, Class III, BMI 40-49.9 (morbid obesity) (Formerly [facility])    Essential hypertension    Chronic diastolic CHF (congestive heart failure) (Formerly [facility])    Hyperlipidemia    Unspecified abnormalities of gait and mobility    Tremor of hands and face    Cerebrovascular accident (CVA) due to thrombosis of precerebral artery (Formerly [facility])    Open wound of abdomen    Lumbar radiculopathy    History of stroke    New onset atrial fibrillation (Formerly [facility])    GIOVANNI (acute kidney injury) (Formerly [facility])    CHF exacerbation (Formerly [facility])    Hyponatremia     Past Medical History:   Diagnosis Date    Arthritis     Colorectal polyps     Constipation     Diabetes mellitus (Formerly [facility])     Diverticulosis of colon     Hiatal hernia     Hypertension     Increased urinary frequency     Lumbar disc disease     Pressure injury of skin     SOB (shortness of breath)     Stress incontinence     Vitamin D deficiency 07/27/2018     Social History     Socioeconomic History    Marital status: /Civil Union     Spouse name: Not on file    Number of children: Not on file    Years of education: Not on file    Highest education level: Not on file   Occupational History    Not on file   Tobacco Use    Smoking status: Never    Smokeless tobacco: Never   Substance and Sexual Activity    Alcohol use: Never    Drug use: No    Sexual activity: Not on file   Other Topics Concern    Not on file   Social History Narrative    Not on file     Social Determinants of  Health     Financial Resource Strain: Not on file   Food Insecurity: No Food Insecurity (3/15/2024)    Hunger Vital Sign     Worried About Running Out of Food in the Last Year: Never true     Ran Out of Food in the Last Year: Never true   Transportation Needs: No Transportation Needs (3/15/2024)    PRAPARE - Transportation     Lack of Transportation (Medical): No     Lack of Transportation (Non-Medical): No   Physical Activity: Not on file   Stress: Not on file   Social Connections: Not on file   Intimate Partner Violence: Not on file   Housing Stability: Low Risk  (3/15/2024)    Housing Stability Vital Sign     Unable to Pay for Housing in the Last Year: No     Number of Places Lived in the Last Year: 1     Unstable Housing in the Last Year: No      Family History   Problem Relation Age of Onset    Hypertension Mother     Heart attack Mother     Diabetes Mother     Prostate cancer Father     No Known Problems Brother     Stroke Maternal Grandmother     Stroke Paternal Grandmother     Stroke Paternal Grandfather     Thyroid cancer Neg Hx      Past Surgical History:   Procedure Laterality Date    BACK SURGERY      CARPAL TUNNEL RELEASE Left     CATARACT EXTRACTION Bilateral     CHOLECYSTECTOMY      COLONOSCOPY W/ ENDOSCOPIC US N/A 2/24/2016    Procedure: ANAL ENDOSCOPIC U/S;  Surgeon: HOLLIS Washington MD;  Location: [facility];  Service:     HERNIA REPAIR      IA COLONOSCOPY FLX DX W/COLLJ SPEC WHEN PFRMD N/A 2/24/2016    Procedure: COLONOSCOPY;  Surgeon: HOLLIS Washington MD;  Location: [facility];  Service: Colorectal    TONSILLECTOMY      TUBAL LIGATION         Current Outpatient Medications:     Acetaminophen 325 MG CAPS, Take by mouth as needed, Disp: , Rfl:     AMILoride 5 mg tablet, Take 2.5 mg by mouth daily, Disp: , Rfl:     apixaban (ELIQUIS) 5 mg, Take 1 tablet (5 mg total) by mouth 2 (two) times a day, Disp: 60 tablet, Rfl: 0    aspirin 81 mg chewable tablet, Chew 81 mg daily, Disp: , Rfl:     bimatoprost  (Lumigan) 0.01 % ophthalmic drops, Administer 1 drop to both eyes daily at bedtime, Disp: , Rfl:     Cholecalciferol (VITAMIN D3) 1000 units CAPS, Take by mouth daily  , Disp: , Rfl:     ezetimibe (ZETIA) 10 mg tablet, Take 1 tablet (10 mg total) by mouth daily at bedtime, Disp: 60 tablet, Rfl: 0    gabapentin (NEURONTIN) 100 mg capsule, Take 1 capsule (100 mg total) by mouth 3 (three) times a day, Disp: 90 capsule, Rfl: 2    glimepiride (AMARYL) 1 mg tablet, Take 1 mg by mouth every morning before breakfast, Disp: , Rfl:     hydrocortisone 2.5 % cream, as needed, Disp: , Rfl: 3    ketoconazole (NIZORAL) 2 % cream, as needed, Disp: , Rfl: 3    metoprolol succinate (TOPROL-XL) 100 mg 24 hr tablet, Take 200 mg by mouth daily, Disp: , Rfl: 3    Multiple Vitamins-Minerals (CENTRUM SILVER PO), multivitamin, Disp: , Rfl:     torsemide (DEMADEX) 20 mg tablet, Take 1 tablet (20 mg total) by mouth daily (Patient taking differently: Take 20 mg by mouth daily. Patient reports she was instructed to take an extra dose of torsemide if she has a weigth gain of 3lbs in one day or 5lbs within a week. This instruction is not reflected on hospital AVS nor the CRNP's AVS from yesterday.  Indications: Cardiac Failure), Disp: 30 tablet, Rfl: 0  Allergies   Allergen Reactions    Other Cough and Sneezing     Seasonal allergies       Labs:  Appointment on 03/26/2024   Component Date Value    Sodium 03/26/2024 139     Potassium 03/26/2024 4.3     Chloride 03/26/2024 92 (L)     CO2 03/26/2024 31     ANION GAP 03/26/2024 16 (H)     BUN 03/26/2024 68 (H)     Creatinine 03/26/2024 1.78 (H)     Glucose 03/26/2024 135     Calcium 03/26/2024 9.6     eGFR 03/26/2024 26    Admission on 03/15/2024, Discharged on 03/19/2024   Component Date Value    Ventricular Rate 03/15/2024 68     Atrial Rate 03/15/2024 68     MA Interval 03/15/2024 160     QRSD Interval 03/15/2024 120     QT Interval 03/15/2024 402     QTC Interval 03/15/2024 427     P Axis  03/15/2024 63     QRS [place] 03/15/2024 -17     T Wave Axis 03/15/2024 -18     WBC 03/15/2024 7.91     RBC 03/15/2024 4.41     Hemoglobin 03/15/2024 12.5     Hematocrit 03/15/2024 40.5     MCV 03/15/2024 92     MCH 03/15/2024 28.3     MCHC 03/15/2024 30.9 (L)     RDW 03/15/2024 14.2     MPV 03/15/2024 8.9     Platelets 03/15/2024 298     nRBC 03/15/2024 0     Neutrophils Relative 03/15/2024 65     Immature Grans % 03/15/2024 0     Lymphocytes Relative 03/15/2024 19     Monocytes Relative 03/15/2024 8     Eosinophils Relative 03/15/2024 7 (H)     Basophils Relative 03/15/2024 1     Neutrophils Absolute 03/15/2024 5.22     Absolute Immature Grans 03/15/2024 0.03     Absolute Lymphocytes 03/15/2024 1.49     Absolute Monocytes 03/15/2024 0.59     Eosinophils Absolute 03/15/2024 0.54     Basophils Absolute 03/15/2024 0.04     Sodium 03/15/2024 136     Potassium 03/15/2024 4.2     Chloride 03/15/2024 104     CO2 03/15/2024 25     ANION GAP 03/15/2024 7     BUN 03/15/2024 42 (H)     Creatinine 03/15/2024 1.33 (H)     Glucose 03/15/2024 76     Calcium 03/15/2024 9.1     AST 03/15/2024 23     ALT 03/15/2024 26     Alkaline Phosphatase 03/15/2024 68     Total Protein 03/15/2024 6.9     Albumin 03/15/2024 3.6     Total Bilirubin 03/15/2024 0.37     eGFR 03/15/2024 38     hs TnI 0hr 03/15/2024 8     hs TnI 2hr 03/15/2024 8     Delta 2hr hsTnI 03/15/2024 0     hs TnI 4hr 03/15/2024 9     Delta 4hr hsTnI 03/15/2024 1     POC Glucose 03/15/2024 62 (L)     POC Glucose 03/15/2024 80     POC Glucose 03/15/2024 195 (H)     WBC 03/16/2024 8.62     RBC 03/16/2024 4.32     Hemoglobin 03/16/2024 12.2     Hematocrit 03/16/2024 38.2     MCV 03/16/2024 88     MCH 03/16/2024 28.2     MCHC 03/16/2024 31.9     RDW 03/16/2024 14.6     MPV 03/16/2024 8.9     Platelets 03/16/2024 310     nRBC 03/16/2024 0     Neutrophils Relative 03/16/2024 61     Immature Grans % 03/16/2024 0     Lymphocytes Relative 03/16/2024 24     Monocytes Relative 03/16/2024  9     Eosinophils Relative 03/16/2024 6     Basophils Relative 03/16/2024 0     Neutrophils Absolute 03/16/2024 5.24     Absolute Immature Grans 03/16/2024 0.03     Absolute Lymphocytes 03/16/2024 2.03     Absolute Monocytes 03/16/2024 0.79     Eosinophils Absolute 03/16/2024 0.51     Basophils Absolute 03/16/2024 0.02     Sodium 03/16/2024 136     Potassium 03/16/2024 4.5     Chloride 03/16/2024 104     CO2 03/16/2024 22     ANION GAP 03/16/2024 10     BUN 03/16/2024 46 (H)     Creatinine 03/16/2024 1.21     Glucose 03/16/2024 101     Calcium 03/16/2024 8.8     AST 03/16/2024 25     ALT 03/16/2024 25     Alkaline Phosphatase 03/16/2024 71     Total Protein 03/16/2024 6.5     Albumin 03/16/2024 3.6     Total Bilirubin 03/16/2024 0.50     eGFR 03/16/2024 42     Magnesium 03/16/2024 2.1     TSH 3RD GENERATON 03/16/2024 0.427 (L)     Cholesterol 03/16/2024 159     Triglycerides 03/16/2024 146     HDL, Direct 03/16/2024 45 (L)     LDL Calculated 03/16/2024 85     POC Glucose 03/16/2024 114     BNP 03/16/2024 30     POC Glucose 03/16/2024 212 (H)     Sodium 03/16/2024 140     Potassium 03/16/2024 4.8     Chloride 03/16/2024 100     CO2 03/16/2024 25     ANION GAP 03/16/2024 15 (H)     BUN 03/16/2024 51 (H)     Creatinine 03/16/2024 1.39 (H)     Glucose 03/16/2024 220 (H)     Calcium 03/16/2024 9.3     eGFR 03/16/2024 36     Magnesium 03/16/2024 2.1     POC Glucose 03/16/2024 141 (H)     POC Glucose 03/16/2024 199 (H)     Sodium 03/17/2024 134 (L)     Potassium 03/17/2024 4.6     Chloride 03/17/2024 100     CO2 03/17/2024 21     ANION GAP 03/17/2024 13     BUN 03/17/2024 55 (H)     Creatinine 03/17/2024 1.44 (H)     Glucose 03/17/2024 154 (H)     Calcium 03/17/2024 9.0     eGFR 03/17/2024 34     WBC 03/17/2024 11.02 (H)     RBC 03/17/2024 4.51     Hemoglobin 03/17/2024 12.7     Hematocrit 03/17/2024 40.9     MCV 03/17/2024 91     MCH 03/17/2024 28.2     MCHC 03/17/2024 31.1 (L)     RDW 03/17/2024 14.4     Platelets  03/17/2024 323     MPV 03/17/2024 8.9     Magnesium 03/17/2024 2.1     POC Glucose 03/17/2024 149 (H)     POC Glucose 03/17/2024 186 (H)     Ventricular Rate 03/16/2024 62     Atrial Rate 03/16/2024 62     ND Interval 03/16/2024 156     QRSD Interval 03/16/2024 122     QT Interval 03/16/2024 434     QTC Interval 03/16/2024 440     P Axis 03/16/2024 46     QRS Axis 03/16/2024 -14     T Wave Axis 03/16/2024 -32     POC Glucose 03/17/2024 177 (H)     POC Glucose 03/17/2024 214 (H)     POC Glucose 03/18/2024 127     Sodium 03/18/2024 130 (L)     Potassium 03/18/2024 5.3     Chloride 03/18/2024 99     CO2 03/18/2024 22     ANION GAP 03/18/2024 9     BUN 03/18/2024 60 (H)     Creatinine 03/18/2024 1.48 (H)     Glucose 03/18/2024 237 (H)     Calcium 03/18/2024 8.5     eGFR 03/18/2024 33     POC Glucose 03/18/2024 211 (H)     POC Glucose 03/18/2024 174 (H)     POC Glucose 03/18/2024 176 (H)     Sodium 03/19/2024 132 (L)     Potassium 03/19/2024 4.7     Chloride 03/19/2024 98     CO2 03/19/2024 24     ANION GAP 03/19/2024 10     BUN 03/19/2024 58 (H)     Creatinine 03/19/2024 1.41 (H)     Glucose 03/19/2024 132     Calcium 03/19/2024 9.2     eGFR 03/19/2024 35     POC Glucose 03/19/2024 128     POC Glucose 03/19/2024 225 (H)    Transcribe Orders on 03/14/2024   Component Date Value    Sodium 03/14/2024 135     Potassium 03/14/2024 4.7     Chloride 03/14/2024 105     CO2 03/14/2024 22     ANION GAP 03/14/2024 8     BUN 03/14/2024 39 (H)     Creatinine 03/14/2024 1.21     Glucose, Fasting 03/14/2024 90     Calcium 03/14/2024 9.3     eGFR 03/14/2024 42    No results displayed because visit has over 200 results.      Appointment on 02/12/2024   Component Date Value    Color, UA 02/12/2024 Light [place]     Clarity, UA 02/12/2024 Turbid     Specific Gravity, UA 02/12/2024 1.019     pH, UA 02/12/2024 6.0     Leukocytes, UA 02/12/2024 Large (A)     Nitrite, UA 02/12/2024 Negative     Protein, UA 02/12/2024 70 (1+) (A)     Glucose,  UA 02/12/2024 Negative     Ketones, UA 02/12/2024 Negative     Urobilinogen, UA 02/12/2024 <2.0     Bilirubin, UA 02/12/2024 Negative     Occult Blood, UA 02/12/2024 Large (A)     RBC, UA 02/12/2024 Innumerable (A)     WBC, UA 02/12/2024 Innumerable (A)     Epithelial Cells 02/12/2024 Occasional     Bacteria, UA 02/12/2024 Moderate (A)     MUCUS THREADS 02/12/2024 Occasional (A)     Urine Culture 02/12/2024 10,000-19,000 cfu/ml Escherichia coli (A)     Urine Culture 02/12/2024 <10,000 cfu/ml      Imaging: XR chest 1 view portable    Result Date: 3/15/2024  Narrative: XR CHEST PORTABLE INDICATION: shortness of breath. COMPARISON: CXR 12/29/2011, chest CT 8/3/2010. Abdomen CT 3/4/2024. Thoracic spine CT 4/13/2021. FINDINGS: Clear lungs. No pneumothorax or pleural effusion. Mild cardiomegaly. Bones are unremarkable for age. Normal upper abdomen.     Impression: No acute cardiopulmonary disease. Workstation performed: [id number]     PHU    Result Date: 3/8/2024  Narrative:   Left Ventricle: Left ventricular cavity size is normal. Wall thickness is normal. The left ventricular ejection fraction is 55%. Systolic function is normal. Wall motion is normal.   Left Atrium: The atrium is dilated. There is no thrombus. There is no mass.   Right Atrium: The atrium is dilated.   Atrial Septum: No patent foramen ovale detected, confirmed at rest using color doppler.   Left Atrial Appendage: There is no thrombus.   Aortic Valve: There is aortic valve sclerosis.   Mitral Valve: There is mild regurgitation with a centrally directed jet.   Tricuspid Valve: There is mild to moderate regurgitation.   Aorta: There is mild degree of atheroma and intimal thickening.     Cardioversion    Result Date: 3/8/2024  Narrative: PHU with Cardioversion Jessica Jaquez is a 78 y.o. female with new onset atrial fibrillation with RVR Indication for procedure: atrial fibrillation with RVR Anticoagulation: heparin drip transitioned to apixaban The  patient was identified in the echo lab. A time out procedure was performed. The patient was sedated by the anesthesia service with propofol. After adequate sedation, a PHU was performed. Please see separate report for full details.  Briefly, the LV function was 55%, and there was no left atrial or left atrial appendage thrombus identified. After the PHU, adequate sedation was once again confirmed. The patient was cardioverted to sinus rhythm with a 250J biphasic shock.  There were no complications. The patient was monitored for the appropriate time interval in the holding area, and was transferred back to the medical floor in stable condition.     MRI lumbar spine w wo contrast    Result Date: 3/8/2024  Narrative: MRI LUMBAR SPINE WITH AND WITHOUT CONTRAST INDICATION: Lumbar radiculopathy. Prior lumbar surgery. COMPARISON:  None. TECHNIQUE:  Multiplanar, multisequence imaging of the lumbar spine was performed before and after gadolinium administration. . IV Contrast:  10 mL of Gadobutrol injection (SINGLE-DOSE) IMAGE QUALITY:  Diagnostic FINDINGS: VERTEBRAL BODIES:  There normal alignment of the lumbar spine. Susceptibility artifact from posterior L3-4 fusion. No osseous lesion. SACRUM: Lesion in the right sacrum at the S1 level likely to represent a 1.4 cm hemangioma. DISTAL CORD AND CONUS: Normal signal morphology of the distal thoracic cord and conus, terminating at L1. PARASPINAL SOFT TISSUES: No mass or fluid collection. LOWER THORACIC DISC SPACES: Mild posterior disc bulge at T11-12 and T12-L1. No significant central canal stenosis or neural foraminal narrowing. LUMBAR DISC SPACES: L1-L2: Posterior disc bulge. Mild central canal stenosis. No neural foraminal narrowing. L2-L3: Posterior disc bulge eccentric to the right foraminal zone. Facet hypertrophy. No central canal stenosis or neural foraminal narrowing. L3-L4: No central canal stenosis or neuroforaminal narrowing. L4-L5: Posterior disc bulge and disc  osteophytes and facet osteophytosis. Mild central canal stenosis. Encroachment of the traversing L5 nerve roots in the subarticular zones. Mild central canal stenosis and neural foraminal narrowing. L5-S1: Posterior disc osteophytes and facet osteophytosis. No central canal stenosis. Mild neural foraminal narrowing. POSTCONTRAST IMAGING:  No abnormal enhancement. OTHER FINDINGS:  None.     Impression: Disc and facet degenerative change throughout the lumbar spine, most prominent at L4-5. Workstation performed: [id number]     Echo complete w/ contrast if indicated    Result Date: 3/6/2024  Narrative:   Left Ventricle: Left ventricular cavity size is normal. Wall thickness is mildly increased. There is mild concentric hypertrophy. The left ventricular ejection fraction is 60%. Systolic function is normal. Although no diagnostic regional wall motion abnormality was identified, this possibility cannot be completely excluded on the basis of this study.   Right Ventricle: Right ventricular cavity size is normal. Systolic function is normal.   Right Atrium: The atrium is mildly dilated.   Aortic Valve: There is aortic valve sclerosis.   Mitral Valve: There is annular calcification. There is mild regurgitation.   Tricuspid Valve: There is mild regurgitation.     CT abdomen pelvis wo contrast    Result Date: 3/4/2024  Narrative: CT ABDOMEN AND PELVIS WITHOUT IV CONTRAST INDICATION: lumbar back and left hip pain, eval for fracture. COMPARISON: 5/12/2007. TECHNIQUE: CT examination of the abdomen and pelvis was performed without intravenous contrast. Multiplanar 2D reformatted images were created from the source data. This examination, like all CT scans performed in the [facility], was performed utilizing techniques to minimize radiation dose exposure, including the use of iterative reconstruction and automated exposure control. Radiation dose length product (DLP) for this visit: 1295.06 mGy-cm Enteric Contrast:  Not administered. FINDINGS: ABDOMEN LOWER CHEST: Bibasilar atelectasis. LIVER/BILIARY TREE: Unremarkable. GALLBLADDER: Post cholecystectomy. SPLEEN: Unremarkable. PANCREAS: Unremarkable. ADRENAL GLANDS: Unremarkable. KIDNEYS/URETERS: Unremarkable. No hydronephrosis. STOMACH AND BOWEL: There is diverticulosis coli with possible trace stranding around the proximal sigmoid colon. Acute diverticulitis is not excluded. APPENDIX: No findings to suggest appendicitis. ABDOMINOPELVIC CAVITY: No ascites. No pneumoperitoneum. No lymphadenopathy. VESSELS: Unremarkable for patient's age. PELVIS REPRODUCTIVE ORGANS: Unremarkable for patient's age. URINARY BLADDER: Unremarkable. ABDOMINAL WALL/INGUINAL REGIONS: There is a hernia mesh along the anterior abdominal wall with a simple appearing fluid collection, likely chronic. BONES: No acute fracture or suspicious osseous lesion. There are pedicle screws at L3 and L4 which appear intact.     Impression: 1.  Diverticulosis coli with suggestion of trace stranding adjacent to the proximal sigmoid colon. Very mild acute diverticulitis is not excluded. 2.  No acute fracture. 3.  Chronic appearing seroma associated with the large anterior abdominal wall hernia mesh. The study was marked in EPIC for immediate notification. Workstation performed: [id number]       Review of Systems:  Review of Systems   Constitutional:  Positive for fatigue.   Musculoskeletal:  Positive for arthralgias and myalgias.   All other systems reviewed and are negative.      Physical Exam:  Physical Exam  Vitals reviewed.   Constitutional:       Appearance: She is obese.   Neck:      Comments: No JVD noted  Cardiovascular:      Rate and Rhythm: Normal rate and regular rhythm.      Pulses: Normal pulses.      Heart sounds: Normal heart sounds.   Pulmonary:      Effort: Pulmonary effort is normal.      Breath sounds: Normal breath sounds.   Musculoskeletal:         General: Normal range of motion.      Cervical  back: Normal range of motion and neck supple.      Right lower leg: No edema.      Left lower leg: No edema.   Skin:     General: Skin is warm and dry.      Capillary Refill: Capillary refill takes less than 2 seconds.   Neurological:      General: No focal deficit present.      Mental Status: She is alert and oriented to person, place, and time.   Psychiatric:         Mood and Affect: Mood normal.         Behavior: Behavior normal.         Discussion/Summary:  # Chronic HFpEF LVEF 60% NYHA class III stage C- weight at home 224 pounds, HR and BP controlled, no changes in medication.  Continue on torsemide 20 mg daily, K-Dur 20 milliequivalents twice daily, metoprolol succinate 200 mg daily, amiloride 2.5 mg daily, continue with daily weights, 2 g sodium diet, increase fluid to 1800 cc daily for recent lab studies showing slightly worsening BUN and creatinine  # Paroxysmal Atrial fibrillation sp PHU DCCV on 3/08/24 [id number]= 8 on Eliquis 5 mg twice daily for stroke prevention, on PE RRR, continue on metoprolol succinate 200 mg daily Jessica does not need a Zio patch continue medical management with beta-blocker and anticoagulant  # Hypertension left upper extremity sitting 112/62 continue on metoprolol succinate 200 mg daily DASH diet  # Hyperlipidemia 3/16/24   HDL 45 LDL 85 continue on Zetia 10 mg daily healthy diet  # DM2 HgbA1C 6.9% on 1/22/2024 continue on Amaryl 1 mg daily

## 2024-03-28 ENCOUNTER — OFFICE VISIT (OUTPATIENT)
Dept: CARDIOLOGY CLINIC | Facility: CLINIC | Age: 79
End: 2024-03-28
Payer: COMMERCIAL

## 2024-03-28 VITALS
DIASTOLIC BLOOD PRESSURE: 60 MMHG | OXYGEN SATURATION: 98 % | SYSTOLIC BLOOD PRESSURE: 94 MMHG | BODY MASS INDEX: 39.73 KG/M2 | HEART RATE: 67 BPM | HEIGHT: 63 IN | WEIGHT: 224.2 LBS

## 2024-03-28 DIAGNOSIS — E66.9 DIABETES MELLITUS TYPE 2 IN OBESE: ICD-10-CM

## 2024-03-28 DIAGNOSIS — E11.69 DIABETES MELLITUS TYPE 2 IN OBESE: ICD-10-CM

## 2024-03-28 DIAGNOSIS — E78.5 HYPERLIPIDEMIA, UNSPECIFIED HYPERLIPIDEMIA TYPE: ICD-10-CM

## 2024-03-28 DIAGNOSIS — I10 ESSENTIAL HYPERTENSION: ICD-10-CM

## 2024-03-28 DIAGNOSIS — I50.32 CHRONIC DIASTOLIC CHF (CONGESTIVE HEART FAILURE) (HCC): Primary | Chronic | ICD-10-CM

## 2024-03-28 DIAGNOSIS — I48.91 ATRIAL FIBRILLATION, UNSPECIFIED TYPE (HCC): ICD-10-CM

## 2024-03-28 PROCEDURE — 99214 OFFICE O/P EST MOD 30 MIN: CPT | Performed by: NURSE PRACTITIONER

## 2024-03-28 RX ORDER — POTASSIUM CHLORIDE 750 MG/1
20 CAPSULE, EXTENDED RELEASE ORAL 2 TIMES DAILY
COMMUNITY

## 2024-03-28 NOTE — PATIENT INSTRUCTIONS
Maintain a 2 gram daily sodium diet and 1800 ml daily fluid restriction.  Check daily weights.  If you gain 3 pounds in one day, 5 pounds in one week, or experience worsening shortness of breath or increasing lower leg swelling.  Please call the heart failure office at [phone].  Please bring a  list of your current medications and daily weights to the office visit

## 2024-03-29 ENCOUNTER — OFFICE VISIT (OUTPATIENT)
Dept: NEUROSURGERY | Facility: CLINIC | Age: 79
End: 2024-03-29
Payer: COMMERCIAL

## 2024-03-29 ENCOUNTER — HOME CARE VISIT (OUTPATIENT)
Dept: HOME HEALTH SERVICES | Facility: HOME HEALTHCARE | Age: 79
End: 2024-03-29
Payer: COMMERCIAL

## 2024-03-29 VITALS
WEIGHT: 221.6 LBS | RESPIRATION RATE: 20 BRPM | SYSTOLIC BLOOD PRESSURE: 140 MMHG | BODY MASS INDEX: 39.27 KG/M2 | OXYGEN SATURATION: 98 % | TEMPERATURE: 97.9 F | HEART RATE: 82 BPM | HEIGHT: 63 IN | DIASTOLIC BLOOD PRESSURE: 90 MMHG

## 2024-03-29 VITALS
DIASTOLIC BLOOD PRESSURE: 60 MMHG | TEMPERATURE: 97.8 F | HEART RATE: 77 BPM | RESPIRATION RATE: 16 BRPM | SYSTOLIC BLOOD PRESSURE: 132 MMHG | OXYGEN SATURATION: 98 %

## 2024-03-29 DIAGNOSIS — M54.16 LUMBAR RADICULOPATHY: Primary | ICD-10-CM

## 2024-03-29 PROCEDURE — 99214 OFFICE O/P EST MOD 30 MIN: CPT | Performed by: NURSE PRACTITIONER

## 2024-03-29 PROCEDURE — G0299 HHS/HOSPICE OF RN EA 15 MIN: HCPCS

## 2024-03-29 NOTE — ASSESSMENT & PLAN NOTE
Patient seen outpatient office today for approximately 4 to 6-week clinical follow-up  Patient with 3 prior lumbar surgeries at [facility] in 2010.  She was doing okay up until February of this year.  Complaining of low back pain at times which radiates into her left groin and anterior thigh and shin.  She continues to have a hard time picking her left leg up  She has seen physical therapy 3 times which does help her move better.  She has not seen pain management or received injections    Imaging reviewed with :    MRI lumbar spine with and without contrast 3/7/24:Disc and facet degenerative change throughout the lumbar spine, most prominent at L4-5.     Plan:  Reviewed imaging with patient and family in detail  Recommend patient exhaust all nonsurgical options including physical therapy and pain management.  Placed referral to pain management  Patient will follow-up as needed or if symptoms worsen after trialing conservative management  Spoke with patient about reaching out to orthopedics in regards to her left knee and need for injection  Patient aware to seek care sooner if she develops any new or worsening neurological change or red flag signs  Patient made aware to contact neurosurgery with any further questions or concerns

## 2024-03-29 NOTE — PROGRESS NOTES
Neurosurgery Office Note  Jessica Jaquez 78 y.o. female MRN: [id number]      Assessment/Plan     Lumbar radiculopathy  Patient seen outpatient office today for approximately 4 to 6-week clinical follow-up  Patient with 3 prior lumbar surgeries at [facility] in 2010.  She was doing okay up until February of this year.  Complaining of low back pain at times which radiates into her left groin and anterior thigh and shin.  She continues to have a hard time picking her left leg up  She has seen physical therapy 3 times which does help her move better.  She has not seen pain management or received injections    Imaging reviewed with :    MRI lumbar spine with and without contrast 3/7/24:Disc and facet degenerative change throughout the lumbar spine, most prominent at L4-5.     Plan:  Reviewed imaging with patient and family in detail  Recommend patient exhaust all nonsurgical options including physical therapy and pain management.  Placed referral to pain management  Patient will follow-up as needed or if symptoms worsen after trialing conservative management  Spoke with patient about reaching out to orthopedics in regards to her left knee and need for injection  Patient aware to seek care sooner if she develops any new or worsening neurological change or red flag signs  Patient made aware to contact neurosurgery with any further questions or concerns       Diagnoses and all orders for this visit:    Lumbar radiculopathy  -     Ambulatory referral to Spine & Pain Management; Future          I have spent a total time of 40 minutes on 03/29/24 in caring for this patient including Diagnostic results, Risks and benefits of tx options, Instructions for management, Patient and family education, Importance of tx compliance, Risk factor reductions, Impressions, Counseling / Coordination of care, Documenting in the medical record, Reviewing / ordering tests, medicine, procedures  , Obtaining or reviewing history  , and  Communicating with other healthcare professionals .      CHIEF COMPLAINT    Chief Complaint   Patient presents with    Consult    Follow-up      Lumbar radiculopathy       HISTORY    History of Present Illness     78 y.o. year old female with past medical history significant for CHF, hypertension, CVA, A-fib on Eliquis, type 2 diabetes, lumbar radiculopathy, tremors, hyperlipidemia, and obesity.  Patient seen in outpatient office today for 4-6-week clinical follow-up.    Patient has had 3 prior lumbar surgeries at [facility] in 2010.  Per chart review a left L3-4 decompression.  Then underwent left L3-4 decompression for recurrent disc herniation.  Following that surgery she underwent decompression on the left at L3-4, TLIF/PSF with significant improvement in her left leg pain.    Patient states she was doing okay up until February when she noticed she had a hard time moving and lifting her left leg and this is when she presented to the ED.  She states she was diagnosed with new onset A-fib as well as CHF exacerbation.  She presented to the ED on 3/4/2024 with acute on chronic low back pain with left lower extremity radiculopathy.  She states her pain has improved since her hospitalization.    She is currently complaining of 2/10 low back pain at times with slight left groin pain at times into her left knee and anterior shin.  Patient states that she is unsure if this is coming from her back or her knee.  She states getting into the car worsens her pain or symptoms.  She is currently on gabapentin 100 mg 3 times daily and taking Tylenol which does help.  She states since they started the gabapentin her pain is greatly improved.  She states there is some sort of insurance problem and they would not refill her gabapentin, spoke with patient about reaching out to her PCP in regards to this.  She denies any recent falls or traumas and states her balance is good with a walker.  She offers no other complaints.    She saw  physical therapy 3 times thus far and she states it does help her move better.  She has not seen pain management or received injections.    Reviewed imaging with patient and family in detail.  Recommend patient exhaust all nonsurgical options including physical therapy and pain management.  Placed referral to pain management.  Patient will follow-up as needed or if symptoms worsen    HPI    See Discussion    REVIEW OF SYSTEMS    Review of Systems   Constitutional:  Positive for fatigue.   HENT: Negative.     Eyes: Negative.    Respiratory:  Positive for shortness of breath (when tired).    Gastrointestinal: Negative.    Endocrine: Negative.    Genitourinary:  Positive for frequency.   Musculoskeletal:  Positive for back pain (left side of back pain radiates to the inner thigh down to the isidro) and gait problem (using walker and a cane, off balance).   Neurological:  Positive for weakness (left leg). Negative for numbness.   Psychiatric/Behavioral:  Negative for sleep disturbance.        ROS reviewed with patient and agree and changes were made as needed    Meds/Allergies     Current Outpatient Medications   Medication Sig Dispense Refill    Acetaminophen 325 MG CAPS Take by mouth as needed      AMILoride 5 mg tablet Take 2.5 mg by mouth daily      apixaban (ELIQUIS) 5 mg Take 1 tablet (5 mg total) by mouth 2 (two) times a day 60 tablet 0    aspirin 81 mg chewable tablet Chew 81 mg daily      bimatoprost (Lumigan) 0.01 % ophthalmic drops Administer 1 drop to both eyes daily at bedtime      Cholecalciferol (VITAMIN D3) 1000 units CAPS Take by mouth daily        ezetimibe (ZETIA) 10 mg tablet Take 1 tablet (10 mg total) by mouth daily at bedtime 60 tablet 0    gabapentin (NEURONTIN) 100 mg capsule Take 1 capsule (100 mg total) by mouth 3 (three) times a day 90 capsule 2    glimepiride (AMARYL) 1 mg tablet Take 1 mg by mouth every morning before breakfast      hydrocortisone 2.5 % cream as needed  3    ketoconazole  (NIZORAL) 2 % cream as needed  3    metoprolol succinate (TOPROL-XL) 100 mg 24 hr tablet Take 200 mg by mouth daily  3    Multiple Vitamins-Minerals (CENTRUM SILVER PO) multivitamin      potassium chloride (MICRO-K) 10 MEQ CR capsule Take 20 mEq by mouth 2 (two) times a day      torsemide (DEMADEX) 20 mg tablet Take 1 tablet (20 mg total) by mouth daily 30 tablet 0     No current facility-administered medications for this visit.       Allergies   Allergen Reactions    Other Cough and Sneezing     Seasonal allergies       PAST HISTORY    Past Medical History:   Diagnosis Date    Arthritis     Colorectal polyps     Constipation     Diabetes mellitus (HCC)     Diverticulosis of colon     Hiatal hernia     Hypertension     Increased urinary frequency     Lumbar disc disease     Pressure injury of skin     SOB (shortness of breath)     Stress incontinence     Vitamin D deficiency 07/27/2018       Past Surgical History:   Procedure Laterality Date    BACK SURGERY      CARPAL TUNNEL RELEASE Left     CATARACT EXTRACTION Bilateral     CHOLECYSTECTOMY      COLONOSCOPY W/ ENDOSCOPIC US N/A 2/24/2016    Procedure: ANAL ENDOSCOPIC U/S;  Surgeon: HOLLIS Washington MD;  Location: [facility];  Service:     HERNIA REPAIR      DC COLONOSCOPY FLX DX W/COLLJ SPEC WHEN PFRMD N/A 2/24/2016    Procedure: COLONOSCOPY;  Surgeon: HOLLIS Washington MD;  Location: [facility];  Service: Colorectal    TONSILLECTOMY      TUBAL LIGATION         Social History     Tobacco Use    Smoking status: Never    Smokeless tobacco: Never   Substance Use Topics    Alcohol use: Never    Drug use: No       Family History   Problem Relation Age of Onset    Hypertension Mother     Heart attack Mother     Diabetes Mother     Prostate cancer Father     No Known Problems Brother     Stroke Maternal Grandmother     Stroke Paternal Grandmother     Stroke Paternal Grandfather     Thyroid cancer Neg Hx          Above history personally reviewed.       EXAM    Vitals:Blood  "pressure 140/90, pulse 82, temperature 97.9 °F (36.6 °C), temperature source Temporal, resp. rate 20, height 5' 3\" (1.6 m), weight 101 kg (221 lb 9.6 oz), SpO2 98%.,Body mass index is 39.25 kg/m².     Physical Exam  Vitals reviewed.   Constitutional:       General: She is awake. She is not in acute distress.     Appearance: Normal appearance. She is not ill-appearing.   HENT:      Head: Normocephalic and atraumatic.   Eyes:      Extraocular Movements: Extraocular movements intact and EOM normal.      Conjunctiva/sclera: Conjunctivae normal.      Pupils: Pupils are equal, round, and reactive to light.   Cardiovascular:      Rate and Rhythm: Normal rate.   Pulmonary:      Effort: Pulmonary effort is normal. No respiratory distress.   Chest:      Chest wall: No tenderness.   Abdominal:      General: There is no distension.      Palpations: Abdomen is soft.      Tenderness: There is no abdominal tenderness.   Musculoskeletal:         General: Normal range of motion.      Cervical back: Normal range of motion and neck supple. No tenderness. No spinous process tenderness or muscular tenderness.      Thoracic back: No tenderness.      Lumbar back: Tenderness present.   Skin:     General: Skin is warm and dry.   Neurological:      Mental Status: She is alert and oriented to person, place, and time.      Coordination: Finger-Nose-Finger Test normal.      Deep Tendon Reflexes:      Reflex Scores:       Bicep reflexes are 2+ on the right side and 2+ on the left side.       Patellar reflexes are 2+ on the right side and 2+ on the left side.  Psychiatric:         Attention and Perception: Attention and perception normal.         Mood and Affect: Mood and affect normal.         Speech: Speech normal.         Behavior: Behavior normal. Behavior is cooperative.         Thought Content: Thought content normal.         Cognition and Memory: Cognition and memory normal.         Judgment: Judgment normal.         Neurologic Exam "     Mental Status   Oriented to person, place, and time.   Follows 2 step commands.   Attention: normal. Concentration: normal.   Speech: speech is normal   Level of consciousness: alert  Knowledge: good. Able to perform simple calculations.   Able to name object. Normal comprehension.     Cranial Nerves     CN III, IV, VI   Pupils are equal, round, and reactive to light.  Extraocular motions are normal.   CN III: no CN III palsy  CN VI: no CN VI palsy  Nystagmus: none   Diplopia: none  Conjugate gaze: present    CN V   Facial sensation intact.     CN VII   Facial expression full, symmetric.     CN VIII   CN VIII normal.   Hearing: intact    CN IX, X   CN IX normal.     CN XI   CN XI normal.     CN XII   CN XII normal.     Motor Exam   Muscle bulk: normal  Overall muscle tone: normal  Right arm pronator drift: absent  Left arm pronator drift: absent    Strength   Strength 5/5 except as noted. Right hip flexion 4/5  Left hip flexion 3/5 while sitting but was able to walk with a walker so likely 4-/5  L KF 4/5      Sensory Exam   Light touch normal.     Gait, Coordination, and Reflexes     Gait  Gait: (walking with walker)    Coordination   Finger to nose coordination: normal    Tremor   Resting tremor: absent  Intention tremor: absent  Action tremor: absent    Reflexes   Right biceps: 2+  Left biceps: 2+  Right patellar: 2+  Left patellar: 2+  Right Davila: absent  Left Davila: absent  Right ankle clonus: absent  Left ankle clonus: absent        MEDICAL DECISION MAKING    Imaging Studies:     XR chest 1 view portable    Result Date: 3/15/2024  Narrative: XR CHEST PORTABLE INDICATION: shortness of breath. COMPARISON: CXR 12/29/2011, chest CT 8/3/2010. Abdomen CT 3/4/2024. Thoracic spine CT 4/13/2021. FINDINGS: Clear lungs. No pneumothorax or pleural effusion. Mild cardiomegaly. Bones are unremarkable for age. Normal upper abdomen.     Impression: No acute cardiopulmonary disease. Workstation performed: [id number]      PHU    Result Date: 3/8/2024  Narrative:   Left Ventricle: Left ventricular cavity size is normal. Wall thickness is normal. The left ventricular ejection fraction is 55%. Systolic function is normal. Wall motion is normal.   Left Atrium: The atrium is dilated. There is no thrombus. There is no mass.   Right Atrium: The atrium is dilated.   Atrial Septum: No patent foramen ovale detected, confirmed at rest using color doppler.   Left Atrial Appendage: There is no thrombus.   Aortic Valve: There is aortic valve sclerosis.   Mitral Valve: There is mild regurgitation with a centrally directed jet.   Tricuspid Valve: There is mild to moderate regurgitation.   Aorta: There is mild degree of atheroma and intimal thickening.     Cardioversion    Result Date: 3/8/2024  Narrative: PHU with Cardioversion Jessica Jaquez is a 78 y.o. female with new onset atrial fibrillation with RVR Indication for procedure: atrial fibrillation with RVR Anticoagulation: heparin drip transitioned to apixaban The patient was identified in the echo lab. A time out procedure was performed. The patient was sedated by the anesthesia service with propofol. After adequate sedation, a PHU was performed. Please see separate report for full details.  Briefly, the LV function was 55%, and there was no left atrial or left atrial appendage thrombus identified. After the PHU, adequate sedation was once again confirmed. The patient was cardioverted to sinus rhythm with a 250J biphasic shock.  There were no complications. The patient was monitored for the appropriate time interval in the holding area, and was transferred back to the medical floor in stable condition.     MRI lumbar spine w wo contrast    Result Date: 3/8/2024  Narrative: MRI LUMBAR SPINE WITH AND WITHOUT CONTRAST INDICATION: Lumbar radiculopathy. Prior lumbar surgery. COMPARISON:  None. TECHNIQUE:  Multiplanar, multisequence imaging of the lumbar spine was performed before and after  gadolinium administration. . IV Contrast:  10 mL of Gadobutrol injection (SINGLE-DOSE) IMAGE QUALITY:  Diagnostic FINDINGS: VERTEBRAL BODIES:  There normal alignment of the lumbar spine. Susceptibility artifact from posterior L3-4 fusion. No osseous lesion. SACRUM: Lesion in the right sacrum at the S1 level likely to represent a 1.4 cm hemangioma. DISTAL CORD AND CONUS: Normal signal morphology of the distal thoracic cord and conus, terminating at L1. PARASPINAL SOFT TISSUES: No mass or fluid collection. LOWER THORACIC DISC SPACES: Mild posterior disc bulge at T11-12 and T12-L1. No significant central canal stenosis or neural foraminal narrowing. LUMBAR DISC SPACES: L1-L2: Posterior disc bulge. Mild central canal stenosis. No neural foraminal narrowing. L2-L3: Posterior disc bulge eccentric to the right foraminal zone. Facet hypertrophy. No central canal stenosis or neural foraminal narrowing. L3-L4: No central canal stenosis or neuroforaminal narrowing. L4-L5: Posterior disc bulge and disc osteophytes and facet osteophytosis. Mild central canal stenosis. Encroachment of the traversing L5 nerve roots in the subarticular zones. Mild central canal stenosis and neural foraminal narrowing. L5-S1: Posterior disc osteophytes and facet osteophytosis. No central canal stenosis. Mild neural foraminal narrowing. POSTCONTRAST IMAGING:  No abnormal enhancement. OTHER FINDINGS:  None.     Impression: Disc and facet degenerative change throughout the lumbar spine, most prominent at L4-5. Workstation performed: [id number]     Echo complete w/ contrast if indicated    Result Date: 3/6/2024  Narrative:   Left Ventricle: Left ventricular cavity size is normal. Wall thickness is mildly increased. There is mild concentric hypertrophy. The left ventricular ejection fraction is 60%. Systolic function is normal. Although no diagnostic regional wall motion abnormality was identified, this possibility cannot be completely excluded on the  basis of this study.   Right Ventricle: Right ventricular cavity size is normal. Systolic function is normal.   Right Atrium: The atrium is mildly dilated.   Aortic Valve: There is aortic valve sclerosis.   Mitral Valve: There is annular calcification. There is mild regurgitation.   Tricuspid Valve: There is mild regurgitation.     CT abdomen pelvis wo contrast    Result Date: 3/4/2024  Narrative: CT ABDOMEN AND PELVIS WITHOUT IV CONTRAST INDICATION: lumbar back and left hip pain, eval for fracture. COMPARISON: 5/12/2007. TECHNIQUE: CT examination of the abdomen and pelvis was performed without intravenous contrast. Multiplanar 2D reformatted images were created from the source data. This examination, like all CT scans performed in the [facility], was performed utilizing techniques to minimize radiation dose exposure, including the use of iterative reconstruction and automated exposure control. Radiation dose length product (DLP) for this visit: 1295.06 mGy-cm Enteric Contrast: Not administered. FINDINGS: ABDOMEN LOWER CHEST: Bibasilar atelectasis. LIVER/BILIARY TREE: Unremarkable. GALLBLADDER: Post cholecystectomy. SPLEEN: Unremarkable. PANCREAS: Unremarkable. ADRENAL GLANDS: Unremarkable. KIDNEYS/URETERS: Unremarkable. No hydronephrosis. STOMACH AND BOWEL: There is diverticulosis coli with possible trace stranding around the proximal sigmoid colon. Acute diverticulitis is not excluded. APPENDIX: No findings to suggest appendicitis. ABDOMINOPELVIC CAVITY: No ascites. No pneumoperitoneum. No lymphadenopathy. VESSELS: Unremarkable for patient's age. PELVIS REPRODUCTIVE ORGANS: Unremarkable for patient's age. URINARY BLADDER: Unremarkable. ABDOMINAL WALL/INGUINAL REGIONS: There is a hernia mesh along the anterior abdominal wall with a simple appearing fluid collection, likely chronic. BONES: No acute fracture or suspicious osseous lesion. There are pedicle screws at L3 and L4 which appear intact.      Impression: 1.  Diverticulosis coli with suggestion of trace stranding adjacent to the proximal sigmoid colon. Very mild acute diverticulitis is not excluded. 2.  No acute fracture. 3.  Chronic appearing seroma associated with the large anterior abdominal wall hernia mesh. The study was marked in EPIC for immediate notification. Workstation performed: [id number]       I have personally reviewed pertinent reports.   and I have personally reviewed pertinent films in PACS

## 2024-03-30 ENCOUNTER — APPOINTMENT (OUTPATIENT)
Dept: LAB | Age: 79
End: 2024-03-30
Payer: COMMERCIAL

## 2024-03-30 DIAGNOSIS — T83.511A URINARY TRACT INFECTION ASSOCIATED WITH CATHETERIZATION OF URINARY TRACT, UNSPECIFIED INDWELLING URINARY CATHETER TYPE, INITIAL ENCOUNTER  (HCC): ICD-10-CM

## 2024-03-30 DIAGNOSIS — N39.0 URINARY TRACT INFECTION ASSOCIATED WITH CATHETERIZATION OF URINARY TRACT, UNSPECIFIED INDWELLING URINARY CATHETER TYPE, INITIAL ENCOUNTER  (HCC): ICD-10-CM

## 2024-03-30 LAB
BACTERIA UR QL AUTO: ABNORMAL /HPF
BILIRUB UR QL STRIP: NEGATIVE
CLARITY UR: ABNORMAL
COLOR UR: ABNORMAL
GLUCOSE UR STRIP-MCNC: NEGATIVE MG/DL
HGB UR QL STRIP.AUTO: ABNORMAL
HYALINE CASTS #/AREA URNS LPF: ABNORMAL /LPF
KETONES UR STRIP-MCNC: NEGATIVE MG/DL
LEUKOCYTE ESTERASE UR QL STRIP: ABNORMAL
NITRITE UR QL STRIP: NEGATIVE
NON-SQ EPI CELLS URNS QL MICRO: ABNORMAL /HPF
PH UR STRIP.AUTO: 6 [PH]
PROT UR STRIP-MCNC: ABNORMAL MG/DL
RBC #/AREA URNS AUTO: ABNORMAL /HPF
SP GR UR STRIP.AUTO: 1.01 (ref 1–1.03)
URATE CRY URNS QL MICRO: ABNORMAL /HPF
UROBILINOGEN UR STRIP-ACNC: <2 MG/DL
WBC #/AREA URNS AUTO: ABNORMAL /HPF
WBC CLUMPS # UR AUTO: PRESENT /UL

## 2024-03-30 PROCEDURE — 87077 CULTURE AEROBIC IDENTIFY: CPT

## 2024-03-30 PROCEDURE — 87186 SC STD MICRODIL/AGAR DIL: CPT

## 2024-03-30 PROCEDURE — 81001 URINALYSIS AUTO W/SCOPE: CPT

## 2024-03-30 PROCEDURE — 87086 URINE CULTURE/COLONY COUNT: CPT

## 2024-04-01 ENCOUNTER — HOME CARE VISIT (OUTPATIENT)
Dept: HOME HEALTH SERVICES | Facility: HOME HEALTHCARE | Age: 79
End: 2024-04-01
Payer: COMMERCIAL

## 2024-04-01 VITALS
HEART RATE: 75 BPM | DIASTOLIC BLOOD PRESSURE: 64 MMHG | SYSTOLIC BLOOD PRESSURE: 116 MMHG | TEMPERATURE: 97.7 F | OXYGEN SATURATION: 97 %

## 2024-04-01 LAB — BACTERIA UR CULT: ABNORMAL

## 2024-04-01 PROCEDURE — G0151 HHCP-SERV OF PT,EA 15 MIN: HCPCS

## 2024-04-03 ENCOUNTER — HOME CARE VISIT (OUTPATIENT)
Dept: HOME HEALTH SERVICES | Facility: HOME HEALTHCARE | Age: 79
End: 2024-04-03
Payer: COMMERCIAL

## 2024-04-03 ENCOUNTER — TELEPHONE (OUTPATIENT)
Dept: CARDIOLOGY CLINIC | Facility: CLINIC | Age: 79
End: 2024-04-03

## 2024-04-03 VITALS
OXYGEN SATURATION: 98 % | WEIGHT: 220.6 LBS | SYSTOLIC BLOOD PRESSURE: 102 MMHG | TEMPERATURE: 97.4 F | DIASTOLIC BLOOD PRESSURE: 58 MMHG | HEART RATE: 79 BPM | BODY MASS INDEX: 39.08 KG/M2

## 2024-04-03 PROCEDURE — G0151 HHCP-SERV OF PT,EA 15 MIN: HCPCS

## 2024-04-03 NOTE — TELEPHONE ENCOUNTER
Spoke with pt. Wt is stable at 220. Denies any CHF symptoms presently. She is following a low sodium diet and 1800 ml fluid restriction.    She will call the office if she has any questions or concerns.  Otherwise I will call her next Tuesday.

## 2024-04-04 ENCOUNTER — HOME CARE VISIT (OUTPATIENT)
Dept: HOME HEALTH SERVICES | Facility: HOME HEALTHCARE | Age: 79
End: 2024-04-04
Payer: COMMERCIAL

## 2024-04-04 VITALS
OXYGEN SATURATION: 97 % | SYSTOLIC BLOOD PRESSURE: 128 MMHG | TEMPERATURE: 98.6 F | DIASTOLIC BLOOD PRESSURE: 68 MMHG | HEART RATE: 84 BPM | RESPIRATION RATE: 16 BRPM

## 2024-04-04 PROCEDURE — G0299 HHS/HOSPICE OF RN EA 15 MIN: HCPCS

## 2024-04-04 NOTE — CASE COMMUNICATION
SN dc to therapy no other skilled nursing needs.   Patient with uti continuing abx as prescribed to call pcp if issues continue.   TY

## 2024-04-07 ENCOUNTER — HOME CARE VISIT (OUTPATIENT)
Dept: HOME HEALTH SERVICES | Facility: HOME HEALTHCARE | Age: 79
End: 2024-04-07
Payer: COMMERCIAL

## 2024-04-07 NOTE — CASE COMMUNICATION
Patient called 4/7/24 to cancel 4/8/24 appointment as she is feeling sick. Reschedule to 4/12/24. Next appointment is 4/10/2.
Applied

## 2024-04-08 ENCOUNTER — HOME CARE VISIT (OUTPATIENT)
Dept: HOME HEALTH SERVICES | Facility: HOME HEALTHCARE | Age: 79
End: 2024-04-08
Payer: COMMERCIAL

## 2024-04-08 DIAGNOSIS — I50.9 ACUTE CHF (CONGESTIVE HEART FAILURE) (HCC): ICD-10-CM

## 2024-04-08 DIAGNOSIS — I48.91 NEW ONSET ATRIAL FIBRILLATION (HCC): ICD-10-CM

## 2024-04-08 RX ORDER — TORSEMIDE 20 MG/1
20 TABLET ORAL DAILY
Qty: 90 TABLET | Refills: 3 | Status: CANCELLED | OUTPATIENT
Start: 2024-04-08

## 2024-04-09 ENCOUNTER — TELEPHONE (OUTPATIENT)
Dept: CARDIOLOGY CLINIC | Facility: CLINIC | Age: 79
End: 2024-04-09

## 2024-04-09 NOTE — TELEPHONE ENCOUNTER
Patient called for medication refill see below. Last ordered by another provider at hospital. Please review.  3/15/2024 - 3/19/2024 (4 days) [facility]

## 2024-04-09 NOTE — TELEPHONE ENCOUNTER
Reason for call: Pt is not sure who is suppose to take over on refilling her medications. Please be advised.   [x] Refill   [] Prior Auth  [] Other:     Office:   [] PCP/Provider -   [x] Specialty/Provider - cardiology/      Medication: torsemide, eliquis     Dose/Frequency: 20 mg, 5 mg/ daily, twice daily     Quantity: 30 day supply     Pharmacy: [facility]     Does the patient have enough for 3 days?   [x] Yes   [] No - Send as HP to POD

## 2024-04-09 NOTE — TELEPHONE ENCOUNTER
Spoke with pt . Wt today 220 lb. She is doing fine cardiac wise.  She has appt with pain management on 4/26/24 for her back pain.  She knows when to call the office  and will do so if she needs to talk with us.    Medications should be ordered in 24 hrs.

## 2024-04-10 ENCOUNTER — HOME CARE VISIT (OUTPATIENT)
Dept: HOME HEALTH SERVICES | Facility: HOME HEALTHCARE | Age: 79
End: 2024-04-10
Payer: COMMERCIAL

## 2024-04-10 VITALS
OXYGEN SATURATION: 98 % | HEART RATE: 73 BPM | TEMPERATURE: 97.7 F | DIASTOLIC BLOOD PRESSURE: 78 MMHG | SYSTOLIC BLOOD PRESSURE: 118 MMHG

## 2024-04-10 PROCEDURE — G0151 HHCP-SERV OF PT,EA 15 MIN: HCPCS

## 2024-04-11 DIAGNOSIS — I50.9 ACUTE CHF (CONGESTIVE HEART FAILURE) (HCC): ICD-10-CM

## 2024-04-11 DIAGNOSIS — R26.2 AMBULATORY DYSFUNCTION: ICD-10-CM

## 2024-04-11 DIAGNOSIS — M25.552 LEFT HIP PAIN: ICD-10-CM

## 2024-04-11 DIAGNOSIS — I48.91 NEW ONSET ATRIAL FIBRILLATION (HCC): ICD-10-CM

## 2024-04-11 RX ORDER — TORSEMIDE 20 MG/1
20 TABLET ORAL DAILY
Qty: 30 TABLET | Refills: 3 | Status: CANCELLED | OUTPATIENT
Start: 2024-04-11

## 2024-04-11 RX ORDER — TORSEMIDE 20 MG/1
20 TABLET ORAL DAILY
Qty: 30 TABLET | Refills: 0 | Status: CANCELLED | OUTPATIENT
Start: 2024-04-11

## 2024-04-11 NOTE — TELEPHONE ENCOUNTER
Medication: Eliquis    Dose/Frequency: 5 mg BID    Quantity: 180    Pharmacy: [facility] [address]    Office:   [] PCP/Provider -   [x] Speciality/Provider -     Does the patient have enough for 3 days?   [] Yes   [x] No - Send as HP to POD      Medication: Torsemide    Dose/Frequency: 20 mg daily    Quantity: 90    Pharmacy: [facility]    Office:   [] PCP/Provider -   [x] Speciality/Provider -     Does the patient have enough for 3 days?   [] Yes   [x] No - Send as HP to POD

## 2024-04-11 NOTE — TELEPHONE ENCOUNTER
Requested medication(s) are due for refill today: Yes  Patient has already received a courtesy refill: No  Other reason request has been forwarded to provider: scripts failed

## 2024-04-11 NOTE — TELEPHONE ENCOUNTER
Patient called to request a refill for their Torsemide and Eliquis advised a refill was requested on 4/9/24 and is pending approval. Patient verbalized understanding and is in agreement.      Patient is just about out of medication.

## 2024-04-11 NOTE — TELEPHONE ENCOUNTER
Patient calls in regarding prior request for medication.  Advised patient this is pended, and high priority as she will be out of the medication by Sunday.

## 2024-04-12 ENCOUNTER — TELEPHONE (OUTPATIENT)
Age: 79
End: 2024-04-12

## 2024-04-12 ENCOUNTER — HOME CARE VISIT (OUTPATIENT)
Dept: HOME HEALTH SERVICES | Facility: HOME HEALTHCARE | Age: 79
End: 2024-04-12
Payer: COMMERCIAL

## 2024-04-12 ENCOUNTER — NURSE TRIAGE (OUTPATIENT)
Dept: OTHER | Facility: OTHER | Age: 79
End: 2024-04-12

## 2024-04-12 VITALS
DIASTOLIC BLOOD PRESSURE: 78 MMHG | SYSTOLIC BLOOD PRESSURE: 140 MMHG | OXYGEN SATURATION: 98 % | HEART RATE: 90 BPM | TEMPERATURE: 97.8 F

## 2024-04-12 DIAGNOSIS — I48.91 NEW ONSET ATRIAL FIBRILLATION (HCC): ICD-10-CM

## 2024-04-12 DIAGNOSIS — I50.9 ACUTE CHF (CONGESTIVE HEART FAILURE) (HCC): ICD-10-CM

## 2024-04-12 PROCEDURE — G0151 HHCP-SERV OF PT,EA 15 MIN: HCPCS

## 2024-04-12 RX ORDER — EZETIMIBE 10 MG/1
10 TABLET ORAL
Qty: 60 TABLET | Refills: 5 | Status: SHIPPED | OUTPATIENT
Start: 2024-04-12

## 2024-04-12 RX ORDER — TORSEMIDE 20 MG/1
20 TABLET ORAL DAILY
Qty: 30 TABLET | Refills: 0 | Status: SHIPPED | OUTPATIENT
Start: 2024-04-12

## 2024-04-12 RX ORDER — TORSEMIDE 20 MG/1
20 TABLET ORAL DAILY
Qty: 30 TABLET | Refills: 0 | Status: CANCELLED | OUTPATIENT
Start: 2024-04-12

## 2024-04-12 NOTE — TELEPHONE ENCOUNTER
----- Message from Jessica Jaquez sent at 4/11/2024  7:02 PM EDT -----  Regarding: Medication refill  Contact: [phone]  I'm in need if a refill on Eliquis 5mg, 1 tablet 2 times a day and Torsemide 20 mg, 1 tablet a day, I only have enough till Saturday night.  please sent the refill request  to [facility]

## 2024-04-12 NOTE — TELEPHONE ENCOUNTER
Medication: Eliquis       Dose/Frequency: 5mg BID    Quantity: 60    Pharmacy: [facility] [place]     Office:   [] PCP/Provider -   [x] Speciality/Provider - Helena Lopez?    Does the patient have enough for 3 days?   [] Yes   [x] No - Send as HP to POD       Medication: Torsemide     Dose/Frequency: 20mg Daily     Quantity: 30    Pharmacy: [facility] [place]    Office:   [] PCP/Provider -   [x] Speciality/Provider - Helena Lopez?    Does the patient have enough for 3 days?   [] Yes   [x] No - Send as HP to POD

## 2024-04-13 NOTE — TELEPHONE ENCOUNTER
"Reason for Disposition  • [1] Prescription refill request for ESSENTIAL medicine (i.e., likelihood of harm to patient if not taken) AND [2] triager unable to refill per department policy    Answer Assessment - Initial Assessment Questions  1. DRUG NAME: \"What medicine do you need to have refilled?\"      Torsemide 20 mg daily, Eliquis 5 mg bid    2. REFILLS REMAINING: \"How many refills are remaining?\" (Note: The label on the medicine or pill bottle will show how many refills are remaining. If there are no refills remaining, then a renewal may be needed.)      0    3. EXPIRATION DATE: \"What is the expiration date?\" (Note: The label states when the prescription will , and thus can no longer be refilled.)      Unknown     4. PRESCRIBING HCP: \"Who prescribed it?\" Reason: If prescribed by specialist, call should be referred to that group.      Last prescribed by hosptialist    Protocols used: Medication Refill and Renewal Call-ADULT-    "

## 2024-04-13 NOTE — TELEPHONE ENCOUNTER
"Regarding: Runing out of Eliquis and Torsemide  ----- Message from Puja Hudson sent at 4/12/2024  8:47 PM EDT -----  \" My mother-in-law is about to run out of her Eliquis and Torsemide tomorrow. She cannot go without these medication. Can the on call  send Rx to [facility] on [facility] in [place] to hold her over the weekend until the office opens on Monday?\"    "

## 2024-04-13 NOTE — TELEPHONE ENCOUNTER
Notified on call provider of prescription refill since it was last prescribed from a hospital admission. On call provider stated ok to fill and can give 30 days supply.

## 2024-04-15 ENCOUNTER — HOME CARE VISIT (OUTPATIENT)
Dept: HOME HEALTH SERVICES | Facility: HOME HEALTHCARE | Age: 79
End: 2024-04-15
Payer: COMMERCIAL

## 2024-04-15 VITALS
HEART RATE: 85 BPM | SYSTOLIC BLOOD PRESSURE: 128 MMHG | OXYGEN SATURATION: 96 % | TEMPERATURE: 98.1 F | DIASTOLIC BLOOD PRESSURE: 62 MMHG

## 2024-04-15 PROCEDURE — G0151 HHCP-SERV OF PT,EA 15 MIN: HCPCS

## 2024-04-16 ENCOUNTER — TELEPHONE (OUTPATIENT)
Dept: CARDIOLOGY CLINIC | Facility: CLINIC | Age: 79
End: 2024-04-16

## 2024-04-16 NOTE — TELEPHONE ENCOUNTER
Spoke with pt, wt is stable at 220-222 lbs. She is upset because she had trouble getting her medication refilled last week. Otherwise doing ok. Slight edema of the feet .     Will call her Friday to check in with her.  F/u 5/15/24.

## 2024-04-17 ENCOUNTER — HOME CARE VISIT (OUTPATIENT)
Dept: HOME HEALTH SERVICES | Facility: HOME HEALTHCARE | Age: 79
End: 2024-04-17
Payer: COMMERCIAL

## 2024-04-17 ENCOUNTER — APPOINTMENT (EMERGENCY)
Dept: RADIOLOGY | Facility: HOSPITAL | Age: 79
DRG: 309 | End: 2024-04-17
Payer: COMMERCIAL

## 2024-04-17 ENCOUNTER — HOSPITAL ENCOUNTER (INPATIENT)
Facility: HOSPITAL | Age: 79
LOS: 2 days | Discharge: HOME | DRG: 309 | End: 2024-04-19
Attending: EMERGENCY MEDICINE | Admitting: FAMILY MEDICINE
Payer: COMMERCIAL

## 2024-04-17 VITALS
HEART RATE: 145 BPM | TEMPERATURE: 97.2 F | SYSTOLIC BLOOD PRESSURE: 130 MMHG | DIASTOLIC BLOOD PRESSURE: 60 MMHG | OXYGEN SATURATION: 96 %

## 2024-04-17 DIAGNOSIS — I48.91 ATRIAL FIBRILLATION WITH RVR (HCC): ICD-10-CM

## 2024-04-17 DIAGNOSIS — I50.9 CHF EXACERBATION (HCC): ICD-10-CM

## 2024-04-17 DIAGNOSIS — I48.91 A-FIB (HCC): Primary | ICD-10-CM

## 2024-04-17 PROBLEM — I50.33 ACUTE ON CHRONIC DIASTOLIC CONGESTIVE HEART FAILURE (HCC): Status: ACTIVE | Noted: 2024-03-15

## 2024-04-17 PROBLEM — E87.6 HYPOKALEMIA: Status: ACTIVE | Noted: 2024-04-17

## 2024-04-17 PROBLEM — I50.32 CHRONIC DIASTOLIC CONGESTIVE HEART FAILURE (HCC): Status: ACTIVE | Noted: 2024-03-15

## 2024-04-17 LAB
2HR DELTA HS TROPONIN: 9 NG/L
4HR DELTA HS TROPONIN: 45 NG/L
ALBUMIN SERPL BCP-MCNC: 3.7 G/DL (ref 3.5–5)
ALP SERPL-CCNC: 72 U/L (ref 34–104)
ALT SERPL W P-5'-P-CCNC: 16 U/L (ref 7–52)
ANION GAP SERPL CALCULATED.3IONS-SCNC: 16 MMOL/L (ref 4–13)
AST SERPL W P-5'-P-CCNC: 22 U/L (ref 13–39)
ATRIAL RATE: 147 BPM
ATRIAL RATE: 78 BPM
ATRIAL RATE: 95 BPM
BACTERIA UR QL AUTO: ABNORMAL /HPF
BASOPHILS # BLD AUTO: 0.05 THOUSANDS/ÂΜL (ref 0–0.1)
BASOPHILS NFR BLD AUTO: 0 % (ref 0–1)
BILIRUB SERPL-MCNC: 0.57 MG/DL (ref 0.2–1)
BILIRUB UR QL STRIP: NEGATIVE
BUN SERPL-MCNC: 70 MG/DL (ref 5–25)
CALCIUM SERPL-MCNC: 9.8 MG/DL (ref 8.4–10.2)
CARDIAC TROPONIN I PNL SERPL HS: 54 NG/L
CARDIAC TROPONIN I PNL SERPL HS: 63 NG/L
CARDIAC TROPONIN I PNL SERPL HS: 99 NG/L
CHLORIDE SERPL-SCNC: 87 MMOL/L (ref 96–108)
CLARITY UR: CLEAR
CO2 SERPL-SCNC: 32 MMOL/L (ref 21–32)
COLOR UR: COLORLESS
CREAT SERPL-MCNC: 1.78 MG/DL (ref 0.6–1.3)
D DIMER PPP FEU-MCNC: 0.68 UG/ML FEU
EOSINOPHIL # BLD AUTO: 0.29 THOUSAND/ÂΜL (ref 0–0.61)
EOSINOPHIL NFR BLD AUTO: 3 % (ref 0–6)
ERYTHROCYTE [DISTWIDTH] IN BLOOD BY AUTOMATED COUNT: 15.6 % (ref 11.6–15.1)
GFR SERPL CREATININE-BSD FRML MDRD: 26 ML/MIN/1.73SQ M
GLUCOSE SERPL-MCNC: 149 MG/DL (ref 65–140)
GLUCOSE SERPL-MCNC: 223 MG/DL (ref 65–140)
GLUCOSE SERPL-MCNC: 233 MG/DL (ref 65–140)
GLUCOSE UR STRIP-MCNC: NEGATIVE MG/DL
HCT VFR BLD AUTO: 38.8 % (ref 34.8–46.1)
HGB BLD-MCNC: 12.5 G/DL (ref 11.5–15.4)
HGB UR QL STRIP.AUTO: NEGATIVE
HYALINE CASTS #/AREA URNS LPF: ABNORMAL /LPF
IMM GRANULOCYTES # BLD AUTO: 0.05 THOUSAND/UL (ref 0–0.2)
IMM GRANULOCYTES NFR BLD AUTO: 0 % (ref 0–2)
KETONES UR STRIP-MCNC: NEGATIVE MG/DL
LEUKOCYTE ESTERASE UR QL STRIP: NEGATIVE
LYMPHOCYTES # BLD AUTO: 2.52 THOUSANDS/ÂΜL (ref 0.6–4.47)
LYMPHOCYTES NFR BLD AUTO: 21 % (ref 14–44)
MAGNESIUM SERPL-MCNC: 2.3 MG/DL (ref 1.9–2.7)
MCH RBC QN AUTO: 28.7 PG (ref 26.8–34.3)
MCHC RBC AUTO-ENTMCNC: 32.2 G/DL (ref 31.4–37.4)
MCV RBC AUTO: 89 FL (ref 82–98)
MONOCYTES # BLD AUTO: 0.69 THOUSAND/ÂΜL (ref 0.17–1.22)
MONOCYTES NFR BLD AUTO: 6 % (ref 4–12)
MUCOUS THREADS UR QL AUTO: ABNORMAL
NEUTROPHILS # BLD AUTO: 8.2 THOUSANDS/ÂΜL (ref 1.85–7.62)
NEUTS SEG NFR BLD AUTO: 70 % (ref 43–75)
NITRITE UR QL STRIP: NEGATIVE
NON-SQ EPI CELLS URNS QL MICRO: ABNORMAL /HPF
NRBC BLD AUTO-RTO: 0 /100 WBCS
P AXIS: 101 DEGREES
P AXIS: 92 DEGREES
PH UR STRIP.AUTO: 5.5 [PH]
PLATELET # BLD AUTO: 375 THOUSANDS/UL (ref 149–390)
PMV BLD AUTO: 9.1 FL (ref 8.9–12.7)
POTASSIUM SERPL-SCNC: 2.8 MMOL/L (ref 3.5–5.3)
PR INTERVAL: 152 MS
PROT SERPL-MCNC: 7.5 G/DL (ref 6.4–8.4)
PROT UR STRIP-MCNC: NEGATIVE MG/DL
QRS AXIS: -45 DEGREES
QRS AXIS: -49 DEGREES
QRS AXIS: -50 DEGREES
QRSD INTERVAL: 128 MS
QRSD INTERVAL: 136 MS
QRSD INTERVAL: 140 MS
QT INTERVAL: 336 MS
QT INTERVAL: 344 MS
QT INTERVAL: 460 MS
QTC INTERVAL: 507 MS
QTC INTERVAL: 538 MS
QTC INTERVAL: 578 MS
RBC # BLD AUTO: 4.35 MILLION/UL (ref 3.81–5.12)
RBC #/AREA URNS AUTO: ABNORMAL /HPF
SODIUM SERPL-SCNC: 135 MMOL/L (ref 135–147)
SP GR UR STRIP.AUTO: 1.01 (ref 1–1.03)
T WAVE AXIS: 104 DEGREES
T WAVE AXIS: 33 DEGREES
T WAVE AXIS: 91 DEGREES
TSH SERPL DL<=0.05 MIU/L-ACNC: 0.94 UIU/ML (ref 0.45–4.5)
UROBILINOGEN UR STRIP-ACNC: <2 MG/DL
VENTRICULAR RATE: 137 BPM
VENTRICULAR RATE: 147 BPM
VENTRICULAR RATE: 95 BPM
WBC # BLD AUTO: 11.8 THOUSAND/UL (ref 4.31–10.16)
WBC #/AREA URNS AUTO: ABNORMAL /HPF

## 2024-04-17 PROCEDURE — 99222 1ST HOSP IP/OBS MODERATE 55: CPT | Performed by: INTERNAL MEDICINE

## 2024-04-17 PROCEDURE — 82948 REAGENT STRIP/BLOOD GLUCOSE: CPT

## 2024-04-17 PROCEDURE — 71045 X-RAY EXAM CHEST 1 VIEW: CPT

## 2024-04-17 PROCEDURE — 99291 CRITICAL CARE FIRST HOUR: CPT | Performed by: EMERGENCY MEDICINE

## 2024-04-17 PROCEDURE — 36415 COLL VENOUS BLD VENIPUNCTURE: CPT

## 2024-04-17 PROCEDURE — 85025 COMPLETE CBC W/AUTO DIFF WBC: CPT | Performed by: EMERGENCY MEDICINE

## 2024-04-17 PROCEDURE — 99223 1ST HOSP IP/OBS HIGH 75: CPT | Performed by: FAMILY MEDICINE

## 2024-04-17 PROCEDURE — 84484 ASSAY OF TROPONIN QUANT: CPT | Performed by: EMERGENCY MEDICINE

## 2024-04-17 PROCEDURE — 81001 URINALYSIS AUTO W/SCOPE: CPT | Performed by: FAMILY MEDICINE

## 2024-04-17 PROCEDURE — 99285 EMERGENCY DEPT VISIT HI MDM: CPT

## 2024-04-17 PROCEDURE — 85379 FIBRIN DEGRADATION QUANT: CPT

## 2024-04-17 PROCEDURE — 96365 THER/PROPH/DIAG IV INF INIT: CPT

## 2024-04-17 PROCEDURE — 84443 ASSAY THYROID STIM HORMONE: CPT

## 2024-04-17 PROCEDURE — 93005 ELECTROCARDIOGRAM TRACING: CPT

## 2024-04-17 PROCEDURE — 96366 THER/PROPH/DIAG IV INF ADDON: CPT

## 2024-04-17 PROCEDURE — 96375 TX/PRO/DX INJ NEW DRUG ADDON: CPT

## 2024-04-17 PROCEDURE — 93010 ELECTROCARDIOGRAM REPORT: CPT | Performed by: INTERNAL MEDICINE

## 2024-04-17 PROCEDURE — 80053 COMPREHEN METABOLIC PANEL: CPT | Performed by: EMERGENCY MEDICINE

## 2024-04-17 PROCEDURE — 83735 ASSAY OF MAGNESIUM: CPT | Performed by: INTERNAL MEDICINE

## 2024-04-17 RX ORDER — EZETIMIBE 10 MG/1
10 TABLET ORAL
Status: DISCONTINUED | OUTPATIENT
Start: 2024-04-17 | End: 2024-04-19 | Stop reason: HOSPADM

## 2024-04-17 RX ORDER — FUROSEMIDE 10 MG/ML
40 INJECTION INTRAMUSCULAR; INTRAVENOUS ONCE
Status: COMPLETED | OUTPATIENT
Start: 2024-04-17 | End: 2024-04-17

## 2024-04-17 RX ORDER — BIMATOPROST 0.3 MG/ML
1 SOLUTION/ DROPS OPHTHALMIC DAILY
Status: DISCONTINUED | OUTPATIENT
Start: 2024-04-17 | End: 2024-04-19 | Stop reason: HOSPADM

## 2024-04-17 RX ORDER — FLECAINIDE ACETATE 100 MG/1
100 TABLET ORAL EVERY 12 HOURS SCHEDULED
Status: DISCONTINUED | OUTPATIENT
Start: 2024-04-17 | End: 2024-04-19 | Stop reason: HOSPADM

## 2024-04-17 RX ORDER — METOPROLOL SUCCINATE 100 MG/1
200 TABLET, EXTENDED RELEASE ORAL DAILY
Status: DISCONTINUED | OUTPATIENT
Start: 2024-04-17 | End: 2024-04-17

## 2024-04-17 RX ORDER — POTASSIUM CHLORIDE 20 MEQ/1
40 TABLET, EXTENDED RELEASE ORAL ONCE
Status: COMPLETED | OUTPATIENT
Start: 2024-04-17 | End: 2024-04-17

## 2024-04-17 RX ORDER — POTASSIUM CHLORIDE 20 MEQ/1
40 TABLET, EXTENDED RELEASE ORAL 2 TIMES DAILY
Status: DISCONTINUED | OUTPATIENT
Start: 2024-04-17 | End: 2024-04-19 | Stop reason: HOSPADM

## 2024-04-17 RX ORDER — METOPROLOL TARTRATE 1 MG/ML
5 INJECTION, SOLUTION INTRAVENOUS ONCE
Status: COMPLETED | OUTPATIENT
Start: 2024-04-17 | End: 2024-04-17

## 2024-04-17 RX ORDER — POTASSIUM CHLORIDE 14.9 MG/ML
20 INJECTION INTRAVENOUS ONCE
Status: DISCONTINUED | OUTPATIENT
Start: 2024-04-17 | End: 2024-04-17

## 2024-04-17 RX ORDER — ACETAMINOPHEN 325 MG/1
650 TABLET ORAL EVERY 4 HOURS PRN
Status: DISCONTINUED | OUTPATIENT
Start: 2024-04-17 | End: 2024-04-19 | Stop reason: HOSPADM

## 2024-04-17 RX ORDER — INSULIN LISPRO 100 [IU]/ML
1-6 INJECTION, SOLUTION INTRAVENOUS; SUBCUTANEOUS
Status: DISCONTINUED | OUTPATIENT
Start: 2024-04-17 | End: 2024-04-19 | Stop reason: HOSPADM

## 2024-04-17 RX ORDER — GABAPENTIN 100 MG/1
100 CAPSULE ORAL 3 TIMES DAILY
Status: DISCONTINUED | OUTPATIENT
Start: 2024-04-17 | End: 2024-04-19 | Stop reason: HOSPADM

## 2024-04-17 RX ORDER — INSULIN LISPRO 100 [IU]/ML
2-12 INJECTION, SOLUTION INTRAVENOUS; SUBCUTANEOUS
Status: DISCONTINUED | OUTPATIENT
Start: 2024-04-17 | End: 2024-04-19 | Stop reason: HOSPADM

## 2024-04-17 RX ORDER — ASPIRIN 81 MG/1
81 TABLET, CHEWABLE ORAL DAILY
Status: DISCONTINUED | OUTPATIENT
Start: 2024-04-17 | End: 2024-04-19 | Stop reason: HOSPADM

## 2024-04-17 RX ORDER — AMILORIDE HYDROCHLORIDE 5 MG/1
2.5 TABLET ORAL DAILY
Status: CANCELLED | OUTPATIENT
Start: 2024-04-17

## 2024-04-17 RX ORDER — METOPROLOL TARTRATE 1 MG/ML
5 INJECTION, SOLUTION INTRAVENOUS
Status: DISCONTINUED | OUTPATIENT
Start: 2024-04-17 | End: 2024-04-17

## 2024-04-17 RX ADMIN — POTASSIUM CHLORIDE 40 MEQ: 1500 TABLET, EXTENDED RELEASE ORAL at 11:15

## 2024-04-17 RX ADMIN — INSULIN LISPRO 4 UNITS: 100 INJECTION, SOLUTION INTRAVENOUS; SUBCUTANEOUS at 17:00

## 2024-04-17 RX ADMIN — GABAPENTIN 100 MG: 100 CAPSULE ORAL at 17:00

## 2024-04-17 RX ADMIN — EZETIMIBE 10 MG: 10 TABLET ORAL at 21:46

## 2024-04-17 RX ADMIN — METOROPROLOL TARTRATE 5 MG: 5 INJECTION, SOLUTION INTRAVENOUS at 12:48

## 2024-04-17 RX ADMIN — FLECAINIDE ACETATE 100 MG: 100 TABLET ORAL at 21:46

## 2024-04-17 RX ADMIN — ASPIRIN 81 MG CHEWABLE TABLET 81 MG: 81 TABLET CHEWABLE at 15:14

## 2024-04-17 RX ADMIN — FUROSEMIDE 40 MG: 10 INJECTION, SOLUTION INTRAMUSCULAR; INTRAVENOUS at 11:48

## 2024-04-17 RX ADMIN — POTASSIUM CHLORIDE 20 MEQ: 14.9 INJECTION, SOLUTION INTRAVENOUS at 11:15

## 2024-04-17 RX ADMIN — POTASSIUM CHLORIDE 40 MEQ: 1500 TABLET, EXTENDED RELEASE ORAL at 13:38

## 2024-04-17 RX ADMIN — POTASSIUM CHLORIDE 40 MEQ: 1500 TABLET, EXTENDED RELEASE ORAL at 17:06

## 2024-04-17 RX ADMIN — GABAPENTIN 100 MG: 100 CAPSULE ORAL at 21:47

## 2024-04-17 RX ADMIN — APIXABAN 5 MG: 5 TABLET, FILM COATED ORAL at 17:06

## 2024-04-17 NOTE — H&P
"[facility]  H&P  Name: Jessica Jaquez 78 y.o. female I MRN: [id number]  Unit/Bed#: ED 29 I Date of Admission: 4/17/2024   Date of Service: 4/17/2024 I Hospital Day: 0      Assessment/Plan   * Atrial fibrillation with RVR (HCC)  Assessment & Plan  78-year-old female with recent diagnosis of A-fib s/p PHU DCCV on 3/08/24, chronic diastolic CHF, DM2, presented to ED with complaint of diaphoresis, chest pain, severe fatigue, presyncope.  Patient noted to have atrial fibrillation with RVR.  Patient reports compliance with metoprolol.  Mildly elevated troponin secondary to A-fib RVR.  Currently denies chest pain on my exam.  Patient did not take metoprolol this morning, will resume home metoprolol succinate 200 mg daily  As needed IV Lopressor.  Had recent PHU  Continue anticoagulation with Eliquis.  Cardiology consulted.       Chronic diastolic congestive heart failure (HCC)  Assessment & Plan  Wt Readings from Last 3 Encounters:   04/03/24 100 kg (220 lb 9.6 oz)   03/29/24 101 kg (221 lb 9.6 oz)   03/28/24 102 kg (224 lb 3.2 oz)     Chronic HFpEF LVEF 55% in recent PHU.  In room air, no significant lower extremity edema.  Patient reports baseline weight is 220 to 221 pounds, this morning to 222 pounds.  Received one dose of IV Lasix in ED. Will hold off further diuretics due to worsening creatinine.  Will hold home amiloride and Torsemide.  Cardiology consult          Hypokalemia  Assessment & Plan  Potassium 2.8, replace and repeat labs    History of stroke  Assessment & Plan  Continue ezetimibe, Eliquis and aspirin    Essential hypertension  Assessment & Plan  Due to worsening creatinine, will hold off amiloride and torsemide.  Will continue metoprolol    Type 2 diabetes mellitus with obesity  (HCC)  Assessment & Plan  Lab Results   Component Value Date    HGBA1C 6.9 (H) 01/22/2024       No results for input(s): \"POCGLU\" in the last 72 hours.    Blood Sugar Average: Last 72 " hrs:  Hold home glimepiride.  Sliding scale             VTE Prophylaxis: Apixaban (Eliquis)  / sequential compression device   Code Status: Level 3 DNR/DNI     Anticipated Length of Stay:  Patient will be admitted on an Inpatient basis with an anticipated length of stay of  > 2 midnights.   Justification for Hospital Stay: afib RVR, hypokalemia    Total Time for Visit, including Counseling / Coordination of Care: 60 minutes.  Greater than 50% of this total time spent on direct patient counseling and coordination of care.    Chief Complaint:   diaphoresis, palpitation,chest pain    History of Present Illness:    Jessica Jaquez is a 78-year-old female with recent diagnosis of A-fib s/p PHU DCCV on 3/08/24, chronic diastolic CHF, DM2, presented to ED with complaint of diaphoresis, chest pain, severe fatigue, presyncope.  Patient noted to have atrial fibrillation with RVR.  Patient reports compliance with metoprolol.  Patient denies any recent fever or chills, denies any abdominal pain.  Denies any significant weight gain.  Denies lower extremity edema.  Patient will be admitted for A-fib RVR, hypokalemia.    Review of Systems:    Review of Systems   Constitutional:  Positive for diaphoresis and fatigue. Negative for activity change, appetite change and fever.   HENT:  Negative for congestion and sore throat.    Eyes:  Negative for pain and visual disturbance.   Respiratory:  Positive for shortness of breath. Negative for cough and wheezing.    Cardiovascular:  Positive for chest pain and palpitations. Negative for leg swelling.   Gastrointestinal:  Negative for abdominal distention and abdominal pain.   Endocrine: Negative for polyuria.   Genitourinary:  Negative for difficulty urinating and dysuria.   Musculoskeletal:  Negative for arthralgias and back pain.   Skin:  Negative for rash and wound.   Allergic/Immunologic: Negative for immunocompromised state.   Neurological:  Negative for dizziness, speech difficulty  and headaches.   Hematological:  Negative for adenopathy.   Psychiatric/Behavioral:  Negative for sleep disturbance. The patient is not hyperactive.        Past Medical and Surgical History:     Past Medical History:   Diagnosis Date    Arthritis     Colorectal polyps     Constipation     Diabetes mellitus (HCC)     Diverticulosis of colon     Hiatal hernia     Hypertension     Increased urinary frequency     Lumbar disc disease     Pressure injury of skin     SOB (shortness of breath)     Stress incontinence     Vitamin D deficiency 07/27/2018       Past Surgical History:   Procedure Laterality Date    BACK SURGERY      CARPAL TUNNEL RELEASE Left     CATARACT EXTRACTION Bilateral     CHOLECYSTECTOMY      COLONOSCOPY W/ ENDOSCOPIC US N/A 2/24/2016    Procedure: ANAL ENDOSCOPIC U/S;  Surgeon: HOLLIS Washington MD;  Location: [facility];  Service:     HERNIA REPAIR      MD COLONOSCOPY FLX DX W/COLLJ SPEC WHEN PFRMD N/A 2/24/2016    Procedure: COLONOSCOPY;  Surgeon: HOLLIS Washington MD;  Location: [facility];  Service: Colorectal    TONSILLECTOMY      TUBAL LIGATION         Meds/Allergies:    Prior to Admission medications    Medication Sig Start Date End Date Taking? Authorizing Provider   Acetaminophen 325 MG CAPS Take by mouth as needed    Historical Provider, MD   AMILoride 5 mg tablet Take 2.5 mg by mouth daily    Historical Provider, MD   apixaban (ELIQUIS) 5 mg Take 1 tablet (5 mg total) by mouth 2 (two) times a day 4/12/24   Deni Lewis MD   aspirin 81 mg chewable tablet Chew 81 mg daily    Historical Provider, MD   bimatoprost (Lumigan) 0.01 % ophthalmic drops Administer 1 drop to both eyes daily at bedtime    Historical Provider, MD   Cholecalciferol (VITAMIN D3) 1000 units CAPS Take by mouth daily      Historical Provider, MD   ezetimibe (ZETIA) 10 mg tablet Take 1 tablet (10 mg total) by mouth daily at bedtime 4/12/24   VIJI Díaz   gabapentin (NEURONTIN) 100 mg capsule Take 1 capsule (100 mg  total) by mouth 3 (three) times a day 3/11/24   Natalie Dudley MD   glimepiride (AMARYL) 1 mg tablet Take 1 mg by mouth every morning before breakfast    Historical Provider, MD   hydrocortisone 2.5 % cream as needed 6/11/18   Historical Provider, MD   ketoconazole (NIZORAL) 2 % cream as needed 6/11/18   Historical Provider, MD   metoprolol succinate (TOPROL-XL) 100 mg 24 hr tablet Take 200 mg by mouth daily 7/5/18   Historical Provider, MD   Multiple Vitamins-Minerals (CENTRUM SILVER PO) multivitamin    Historical Provider, MD   potassium chloride (MICRO-K) 10 MEQ CR capsule Take 20 mEq by mouth 2 (two) times a day    Historical Provider, MD   torsemide (DEMADEX) 20 mg tablet Take 1 tablet (20 mg total) by mouth daily 4/12/24   Deni Lewis MD     I have reviewed home medications using allscripts.    Allergies:   Allergies   Allergen Reactions    Other Cough and Sneezing     Seasonal allergies       Social History:     Marital Status: /Civil Union      Substance Use History:   Social History     Substance and Sexual Activity   Alcohol Use Never     Social History     Tobacco Use   Smoking Status Never   Smokeless Tobacco Never     Social History     Substance and Sexual Activity   Drug Use No       Family History:    Family History   Problem Relation Age of Onset    Hypertension Mother     Heart attack Mother     Diabetes Mother     Prostate cancer Father     No Known Problems Brother     Stroke Maternal Grandmother     Stroke Paternal Grandmother     Stroke Paternal Grandfather     Thyroid cancer Neg Hx        Physical Exam:     Vitals:   Blood Pressure: 138/76 (04/17/24 1229)  Pulse: (!) 149 (04/17/24 1229)  Temperature: 97.9 °F (36.6 °C) (04/17/24 1023)  Temp Source: Oral (04/17/24 1023)  Respirations: 20 (04/17/24 1229)  SpO2: 95 % (04/17/24 1229)    Physical Exam  Constitutional:       Appearance: She is well-developed. She is obese.   HENT:      Head: Normocephalic and atraumatic.   Cardiovascular:       Rate and Rhythm: Regular rhythm. Tachycardia present.   Pulmonary:      Effort: Pulmonary effort is normal. No respiratory distress.      Breath sounds: Normal breath sounds.   Musculoskeletal:      Cervical back: Normal range of motion.      Right lower leg: No edema.      Left lower leg: No edema.   Skin:     General: Skin is warm and dry.   Neurological:      Mental Status: Mental status is at baseline.   Psychiatric:         Mood and Affect: Mood normal.          Additional Data:     Lab Results: I have personally reviewed pertinent reports.      Results from last 7 days   Lab Units 04/17/24  1029   WBC Thousand/uL 11.80*   HEMOGLOBIN g/dL 12.5   HEMATOCRIT % 38.8   PLATELETS Thousands/uL 375   SEGS PCT % 70   LYMPHO PCT % 21   MONO PCT % 6   EOS PCT % 3     Results from last 7 days   Lab Units 04/17/24  1029   SODIUM mmol/L 135   POTASSIUM mmol/L 2.8*   CHLORIDE mmol/L 87*   CO2 mmol/L 32   BUN mg/dL 70*   CREATININE mg/dL 1.78*   ANION GAP mmol/L 16*   CALCIUM mg/dL 9.8   ALBUMIN g/dL 3.7   TOTAL BILIRUBIN mg/dL 0.57   ALK PHOS U/L 72   ALT U/L 16   AST U/L 22   GLUCOSE RANDOM mg/dL 233*                       Imaging: I have personally reviewed pertinent reports.      XR chest 1 view portable    (Results Pending)       EKG, Pathology, and Other Studies Reviewed on Admission:   EKG: afib RVR    Allscripts / Epic Records Reviewed: Yes     ** Please Note: This note has been constructed using a voice recognition system. **

## 2024-04-17 NOTE — CASE COMMUNICATION
PT calls PCP's office to alert PCP about sending patient to ER this morning for the following symptoms:  elevated HR (145bpm)  arhythmia  sweaty clammy  shooting pain from back into both sides of her neck  EMS is taking patient to [facility].

## 2024-04-17 NOTE — CONSULTS
.Consultation - Electrophysiology - Cardiology  Jessica Jaquez 78 y.o. female MRN: [id number]  Unit/Bed#: ED 29 Encounter: [id number]      Inpatient consult to Electrophysiology  Consult performed by: Jovanni Tillman PA-C  Consult ordered by: Aly Fierro MD          History of Present Illness   Physician Requesting Consult: Aly Fierro MD  Reason for Consult / Principal Problem: atrial fibrillation with RVR     Assessment/Plan   Assessment:  Paroxysmal atrial fibrillation with RVR   Newly diagnosed march 2024 during hospital stay for LBP   Rate control w/ Toprol  mg daily (started on this for BP)   PHU and DCCV in march   Last echo with preserved LVEF 60% w/o valvular abnormalities   Currently with rates in the 150's   Patient relatively asx   Currently pack in sinus with rates in the 90's   HFpEF last echo w/ EF 60%   Mildly volume up   On furosemide 40 mg once daily   Hx of CVA  HTN   Mildly elevated here 138/76 last reading   DM II   Obesity w/ bmi 39.08   Hypokalemia K 2.8       Plan:  - plan to start flecainide 100 mg twice daily for antiarrhythmic  -Patient has not had stress test, will order nuclear while inpatient to rule out coronary disease.  Patient unable to walk on treadmill secondary to lower back pain with multiple back surgeries in the past.  -Converted to sinus rhythm in the 90s, hold beta-blocker for stress test  -Will give patient 4 to 6-week follow-up with EP to discuss ablation for long-term management  -Continue anticoagulation with Eliquis 5 mg twice daily for now    HPI: Jessica Jaquez is a 78 y.o. year old female with past medical history as mentioned above. She presents to [facility] after found to be tachycardiac by her outpatient PT. She was noted to be in atrial fibrillation with RVR. She has a realtively new diagnosis of atrial fibrillation starting in march of the year when she was found to be in a fib during an admission for lower back pain. She was started on eliquis for ac due to  a [id number] score of 8. She had previously been on metoprolol for a history of hypertension and was on max dose of 200 mg BID. She underwent cardioverison with PHU during march admission to restore sinus rhythm. She had an echo done during that admission as well that showed a preserved EF of 60%. She did have conversion from atrial fibrillation to sinus rhythm while in the ED here. She has not had a stress done that is visible in her records although she states she has tried to have them before but was unsuccessful at completion secondary to back pain.     TELE: Initially was noted to be in atrial fibrillation with RVR with rates in the 140s to 150s.  Repeat evaluation of patient shows telemetry sinus rhythm in the 90s.    EKG:     Historical Information   Past Medical History:   Diagnosis Date    Arthritis     Colorectal polyps     Constipation     Diabetes mellitus (HCC)     Diverticulosis of colon     Hiatal hernia     Hypertension     Increased urinary frequency     Lumbar disc disease     Pressure injury of skin     SOB (shortness of breath)     Stress incontinence     Vitamin D deficiency 07/27/2018     Past Surgical History:   Procedure Laterality Date    BACK SURGERY      CARPAL TUNNEL RELEASE Left     CATARACT EXTRACTION Bilateral     CHOLECYSTECTOMY      COLONOSCOPY W/ ENDOSCOPIC US N/A 2/24/2016    Procedure: ANAL ENDOSCOPIC U/S;  Surgeon: HOLLIS Washington MD;  Location: [facility];  Service:     HERNIA REPAIR      SD COLONOSCOPY FLX DX W/COLLJ SPEC WHEN PFRMD N/A 2/24/2016    Procedure: COLONOSCOPY;  Surgeon: HOLLIS Washington MD;  Location: [facility];  Service: Colorectal    TONSILLECTOMY      TUBAL LIGATION       Social History     Substance and Sexual Activity   Alcohol Use Never     Social History     Substance and Sexual Activity   Drug Use No     Social History     Tobacco Use   Smoking Status Never   Smokeless Tobacco Never     Family History: non-contributory    Meds/Allergies   [facility]  Medications:   Current Facility-Administered Medications   Medication Dose Route Frequency    potassium chloride (Klor-Con M20) CR tablet 40 mEq  40 mEq Oral Once     Home Medications: (Not in a hospital admission)      Allergies   Allergen Reactions    Other Cough and Sneezing     Seasonal allergies       Objective   Vitals: Blood pressure 138/76, pulse (!) 149, temperature 97.9 °F (36.6 °C), temperature source Oral, resp. rate 20, SpO2 95%.  Orthostatic Blood Pressures      Flowsheet Row Most Recent Value   Blood Pressure 138/76 filed at 04/17/2024 1229            No intake or output data in the 24 hours ending 04/17/24 1301    Invasive Devices       Peripheral Intravenous Line  Duration             Peripheral IV 04/17/24 Right Antecubital <1 day                    Review of Systems:  Review of Systems   Constitutional: Positive for malaise/fatigue. Negative for fever.   Cardiovascular:  Negative for chest pain, dyspnea on exertion, irregular heartbeat, near-syncope and palpitations.   Neurological:  Negative for dizziness and light-headedness.   All other systems reviewed and are negative.    ROS as noted above, otherwise 12 point review of systems was performed and is negative.     Physical Exam:   Physical Exam  Vitals and nursing note reviewed.   Constitutional:       General: She is not in acute distress.     Appearance: She is obese.   Cardiovascular:      Rate and Rhythm: Tachycardia present. Rhythm irregular.   Pulmonary:      Effort: Pulmonary effort is normal.      Breath sounds: Normal breath sounds.   Abdominal:      General: Abdomen is flat.      Palpations: Abdomen is soft.   Musculoskeletal:      Right lower leg: Edema present.      Left lower leg: Edema present.   Skin:     General: Skin is warm and dry.      Coloration: Skin is not jaundiced.   Neurological:      General: No focal deficit present.      Mental Status: She is alert and oriented to person, place, and time.   Psychiatric:         Mood  and Affect: Mood normal.         Lab Results: I have personally reviewed pertinent lab results.    Results from last 7 days   Lab Units 24  1029   WBC Thousand/uL 11.80*   HEMOGLOBIN g/dL 12.5   HEMATOCRIT % 38.8   PLATELETS Thousands/uL 375     Results from last 7 days   Lab Units 24  1029   POTASSIUM mmol/L 2.8*   CHLORIDE mmol/L 87*   CO2 mmol/L 32   BUN mg/dL 70*   CREATININE mg/dL 1.78*   CALCIUM mg/dL 9.8               Imaging: I have personally reviewed pertinent reports.    ECHO: Results for orders placed during the hospital encounter of 18    Echo complete with contrast if indicated    [place] [address]  [phone]    Transthoracic Echocardiogram  2D, M-mode, Doppler, and Color Doppler    Study date:  28-Dec-2018    Patient: HAN CARVAJAL  MR number: [id number]  Account number: [id number]  : 1945  Age: 73 years  Gender: Female  Status: Outpatient  Location: [address] [place] [address] [facility] [place]  Height: 63 in  Weight: 232 lb  BP: 169/ 76 mmHg    Indications: Heart failure    Diagnoses: I50.9 - Heart failure, unspecified    Sonographer:  DANYELLE Ga  Interpreting Physician:  Kvng Aviles MD  Referring Physician:  Jorden Holt III, MD  Group:  [facility]  Cardiology Fellow:  Von Fuentes MD    SUMMARY    PROCEDURE INFORMATION:  This was a technically difficult study.    LEFT VENTRICLE:  Systolic function was normal. Ejection fraction was estimated to be 60 %.  There were no regional wall motion abnormalities.  Doppler parameters were consistent with abnormal left ventricular relaxation (grade 1 diastolic dysfunction).    MITRAL VALVE:  There was trace regurgitation.    TRICUSPID VALVE:  There was trace regurgitation.  Pulmonary artery systolic pressure was within the normal range.    PULMONIC VALVE:  There was trace regurgitation.    HISTORY: PRIOR HISTORY:  Hypertension, hyperlipidemia, diastolic dysfunction, type 2 diabetes, GERD    PROCEDURE: The study was performed in the [address] [place] [address] [facility]. This was a routine study. The transthoracic approach was used. The study included complete 2D imaging, M-mode, complete spectral Doppler, and color Doppler. This  was a technically difficult study.    LEFT VENTRICLE: Size was normal. Systolic function was normal. Ejection fraction was estimated to be 60 %. There were no regional wall motion abnormalities. Wall thickness was normal. DOPPLER: Doppler parameters were consistent with  abnormal left ventricular relaxation (grade 1 diastolic dysfunction).    RIGHT VENTRICLE: The size was normal. Systolic function was normal. Wall thickness was normal.    LEFT ATRIUM: Size was normal.    RIGHT ATRIUM: Size was normal.    MITRAL VALVE: Valve structure was normal. There was normal leaflet separation. DOPPLER: The transmitral velocity was within the normal range. There was no evidence for stenosis. There was trace regurgitation.    AORTIC VALVE: The valve was probably trileaflet. Leaflets exhibited normal cuspal separation. The valve was not well visualized. DOPPLER: Transaortic velocity was within the normal range. There was no evidence for stenosis. There was no  significant regurgitation.    TRICUSPID VALVE: The valve structure was normal. There was normal leaflet separation. DOPPLER: The transtricuspid velocity was within the normal range. There was no evidence for stenosis. There was trace regurgitation. Pulmonary artery  systolic pressure was within the normal range. Estimated peak PA pressure was 30 mmHg.    PULMONIC VALVE: Not well visualized. DOPPLER: The transpulmonic velocity was within the normal range. There was trace regurgitation.    PERICARDIUM: There was no pericardial effusion. A pericardial fat pad was present. The pericardium was normal in appearance.    AORTA: The root exhibited normal size.    SYSTEMIC  VEINS: IVC: The inferior vena cava was normal in size. Respirophasic changes were normal.    SYSTEM MEASUREMENT TABLES    2D  %FS: 31.39 %  Ao Diam: 3.29 cm  EDV(Teich): 100.54 ml  EF(Cube): 67.7 %  EF(Teich): 59.26 %  ESV(Cube): 32.77 ml  ESV(Teich): 40.96 ml  IVSd: 0.99 cm  LA Area: 16.53 cm2  LA Diam: 3.39 cm  LVEDV MOD A4C: 94.35 ml  LVEF MOD A4C: 68.85 %  LVESV MOD A4C: 29.39 ml  LVIDd: 4.66 cm  LVIDs: 3.2 cm  LVLd A4C: 7.19 cm  LVLs A4C: 5.3 cm  LVPWd: 1.08 cm  RA Area: 13.64 cm2  RV Diam.: 2.6 cm  SV MOD A4C: 64.96 ml  SV(Cube): 68.69 ml  SV(Teich): 59.58 ml    CW  TR Vmax: 2.6 m/s  TR maxP.99 mmHg    MM  TAPSE: 1.84 cm    PW  E': 0.06 m/s  E/E': 14.64  MV A Toan: 0.97 m/s  MV Dec [place]borne: 3.14 m/s2  MV DecT: 267.31 ms  MV E Toan: 0.84 m/s  MV E/A Ratio: 0.86    [facility] Accredited Echocardiography Laboratory    Prepared and electronically signed by    Kvng Aviles MD  Signed 28-Dec-2018 11:05:05       Cardiac testing:   ECHO:   Results for orders placed during the hospital encounter of 18    Echo complete with contrast if indicated    Narrative  [address] [address] [phone]    Transthoracic Echocardiogram  2D, M-mode, Doppler, and Color Doppler    Study date:  28-Dec-2018    Patient: HAN CARVAJAL  MR number: [id number]  Account number: [id number]  : 1945  Age: 73 years  Gender: Female  Status: Outpatient  Location: [address] [place] [address] [facility] [place]  Height: 63 in  Weight: 232 lb  BP: 169/ 76 mmHg    Indications: Heart failure    Diagnoses: I50.9 - Heart failure, unspecified    Sonographer:  DANYELLE Ga  Interpreting Physician:  Kvng Aviles MD  Referring Physician:  Jorden Holt III, MD  Group:  [facility]  Cardiology Fellow:  Von Fuentes MD    SUMMARY    PROCEDURE INFORMATION:  This was a technically difficult study.    LEFT VENTRICLE:  Systolic function was  normal. Ejection fraction was estimated to be 60 %.  There were no regional wall motion abnormalities.  Doppler parameters were consistent with abnormal left ventricular relaxation (grade 1 diastolic dysfunction).    MITRAL VALVE:  There was trace regurgitation.    TRICUSPID VALVE:  There was trace regurgitation.  Pulmonary artery systolic pressure was within the normal range.    PULMONIC VALVE:  There was trace regurgitation.    HISTORY: PRIOR HISTORY: Hypertension, hyperlipidemia, diastolic dysfunction, type 2 diabetes, GERD    PROCEDURE: The study was performed in the [address] [place] [address] [facility] [place]. This was a routine study. The transthoracic approach was used. The study included complete 2D imaging, M-mode, complete spectral Doppler, and color Doppler. This  was a technically difficult study.    LEFT VENTRICLE: Size was normal. Systolic function was normal. Ejection fraction was estimated to be 60 %. There were no regional wall motion abnormalities. Wall thickness was normal. DOPPLER: Doppler parameters were consistent with  abnormal left ventricular relaxation (grade 1 diastolic dysfunction).    RIGHT VENTRICLE: The size was normal. Systolic function was normal. Wall thickness was normal.    LEFT ATRIUM: Size was normal.    RIGHT ATRIUM: Size was normal.    MITRAL VALVE: Valve structure was normal. There was normal leaflet separation. DOPPLER: The transmitral velocity was within the normal range. There was no evidence for stenosis. There was trace regurgitation.    AORTIC VALVE: The valve was probably trileaflet. Leaflets exhibited normal cuspal separation. The valve was not well visualized. DOPPLER: Transaortic velocity was within the normal range. There was no evidence for stenosis. There was no  significant regurgitation.    TRICUSPID VALVE: The valve structure was normal. There was normal leaflet separation. DOPPLER: The transtricuspid velocity was within the normal range. There was no evidence for  stenosis. There was trace regurgitation. Pulmonary artery  systolic pressure was within the normal range. Estimated peak PA pressure was 30 mmHg.    PULMONIC VALVE: Not well visualized. DOPPLER: The transpulmonic velocity was within the normal range. There was trace regurgitation.    PERICARDIUM: There was no pericardial effusion. A pericardial fat pad was present. The pericardium was normal in appearance.    AORTA: The root exhibited normal size.    SYSTEMIC VEINS: IVC: The inferior vena cava was normal in size. Respirophasic changes were normal.    SYSTEM MEASUREMENT TABLES    2D  %FS: 31.39 %  Ao Diam: 3.29 cm  EDV(Teich): 100.54 ml  EF(Cube): 67.7 %  EF(Teich): 59.26 %  ESV(Cube): 32.77 ml  ESV(Teich): 40.96 ml  IVSd: 0.99 cm  LA Area: 16.53 cm2  LA Diam: 3.39 cm  LVEDV MOD A4C: 94.35 ml  LVEF MOD A4C: 68.85 %  LVESV MOD A4C: 29.39 ml  LVIDd: 4.66 cm  LVIDs: 3.2 cm  LVLd A4C: 7.19 cm  LVLs A4C: 5.3 cm  LVPWd: 1.08 cm  RA Area: 13.64 cm2  RV Diam.: 2.6 cm  SV MOD A4C: 64.96 ml  SV(Cube): 68.69 ml  SV(Teich): 59.58 ml    CW  TR Vmax: 2.6 m/s  TR maxP.99 mmHg    MM  TAPSE: 1.84 cm    PW  E': 0.06 m/s  E/E': 14.64  MV A Toan: 0.97 m/s  MV Dec [place]: 3.14 m/s2  MV DecT: 267.31 ms  MV E Toan: 0.84 m/s  MV E/A Ratio: 0.86    [employer] Accredited Echocardiography Laboratory    Prepared and electronically signed by    Kvng Aviles MD  Signed 28-Dec-2018 11:05:05    No results found for this or any previous visit.      CATH:  No results found for this or any previous visit.      STRESS TEST:  No results found for this or any previous visit.      VTE Prophylaxis: eliquis

## 2024-04-17 NOTE — CONSULTS
Consultation - General Cardiology Team 2  Jessica Jaquez 78 y.o. female MRN: [id number]  Unit/Bed#: ED 29 Encounter: [id number]    History of Present Illness   Physician Requesting Consult: Aly Fierro MD  Reason for Consult / Principal Problem: Atrial fibrillation      Assessment/Plan         Jessica Jaquez is a 78 y.o. year old female with a history of HFpEF, atrial fibrillation, prior history of stroke, hyperlipidemia and type II DM.  Who presents today with episode of chest pain with radiation to the jaw and diaphoresis.  At the time of admission patient was found to be in A-fib with RVR.  By the time I saw the patient she had converted by herself to normal sinus rhythm.    Atrial fibrillation, paroxysmal.  Patient has been maintained on Eliquis since she was first diagnosed in 3/6/2024 with atrial fibrillation.  She also takes metoprolol  mg daily for rate control.  HFpEF.  Last EF was 60%.  Patient takes 2.5 mg of amiloride and 20 mg of torsemide at home. (Prior to last month she was taking as needed Lasix only.  Torsemide was a new medication for)  GIOVANNI.  At the time of my examination patient does not appear to be volume up.  Patient's home diuretics were increased, and patient has been fluid restricting herself as well.  Metabolic syndrome      Plan:  Continue Eliquis, Continue rate control with Toprol  EP planning on rhythm control with flecainide  Hypokalemia, K of 2.8 continue to replete  Will obtain nuclear stress test tomorrow  Patient examines euvolemic at the time of my examination, will hold further diuresis for right now  Monitor BMP tomorrow a.m.              HPI: Jessica Jaquez is a 78 y.o. year old female who presented with episode of chest pain with bilateral jaw and radiation.  Patient also complaining of associated shortness of breath and diaphoresis.  Patient states she has not had chest pain like this prior.  Also states that her prior admission where she was noted to have atrial  Patient found sitting on toilet in bathroom. No distress. Assisted back to bed. O2 remained in place via NC. Reminded on call light use. Safety measures maintained. Call light in reach. Will continue to monitor.   fibrillation, she did not feel any chest pain or palpitations.    Patient states she has been taking her Eliquis as well as her metoprolol diligently since it was prescribed.  The only missed dose was this morning as she was not feeling well.    Patient also notes that she has been taking amiloride 2.5 mg daily for several years and was taking as needed Lasix for weight gain of 3 pounds in 1 day versus 5 pounds in a week.  Patient states after last hospitalization she was continued on torsemide 20 mg daily as well as her daily amiloride.    Outpatient Cardiologist: Himanshu Webb MD      Review of Systems  Review of system was conducted and was negative except for as stated in the HPI.      Historical Information   Past Medical History:   Diagnosis Date    Arthritis     Colorectal polyps     Constipation     Diabetes mellitus (HCC)     Diverticulosis of colon     Hiatal hernia     Hypertension     Increased urinary frequency     Lumbar disc disease     Pressure injury of skin     SOB (shortness of breath)     Stress incontinence     Vitamin D deficiency 07/27/2018     Past Surgical History:   Procedure Laterality Date    BACK SURGERY      CARPAL TUNNEL RELEASE Left     CATARACT EXTRACTION Bilateral     CHOLECYSTECTOMY      COLONOSCOPY W/ ENDOSCOPIC US N/A 2/24/2016    Procedure: ANAL ENDOSCOPIC U/S;  Surgeon: HOLLIS Washington MD;  Location: [facility];  Service:     HERNIA REPAIR      ID COLONOSCOPY FLX DX W/COLLJ SPEC WHEN PFRMD N/A 2/24/2016    Procedure: COLONOSCOPY;  Surgeon: HOLLIS Washington MD;  Location: [facility];  Service: Colorectal    TONSILLECTOMY      TUBAL LIGATION       Social History     Substance and Sexual Activity   Alcohol Use Never     Social History     Substance and Sexual Activity   Drug Use No     Social History     Tobacco Use   Smoking Status Never   Smokeless Tobacco Never     Family History: non-contributory    Meds/Allergies   Hospital Medications:   Current Facility-Administered  Medications   Medication Dose Route Frequency    acetaminophen (TYLENOL) tablet 650 mg  650 mg Oral Q4H PRN    apixaban (ELIQUIS) tablet 5 mg  5 mg Oral BID    aspirin chewable tablet 81 mg  81 mg Oral Daily    bimatoprost (LUMIGAN) 0.03 % ophthalmic drops 1 drop  1 drop Ophthalmic Daily    ezetimibe (ZETIA) tablet 10 mg  10 mg Oral HS    gabapentin (NEURONTIN) capsule 100 mg  100 mg Oral TID    insulin lispro (HumALOG/ADMELOG) 100 units/mL subcutaneous injection 1-6 Units  1-6 Units Subcutaneous HS    insulin lispro (HumALOG/ADMELOG) 100 units/mL subcutaneous injection 2-12 Units  2-12 Units Subcutaneous TID AC    metoprolol (LOPRESSOR) injection 5 mg  5 mg Intravenous Q5 Min PRN    potassium chloride (Klor-Con M20) CR tablet 40 mEq  40 mEq Oral BID     Home Medications: (Not in a hospital admission)      Allergies   Allergen Reactions    Other Cough and Sneezing     Seasonal allergies       Objective   Vitals: Blood pressure 116/59, pulse 90, temperature 97.9 °F (36.6 °C), temperature source Oral, resp. rate 20, SpO2 98%.  Orthostatic Blood Pressures      Flowsheet Row Most Recent Value   Blood Pressure 116/59 filed at 04/17/2024 1341              Invasive Devices       Peripheral Intravenous Line  Duration             Peripheral IV 04/17/24 Right Antecubital <1 day                    Physical Exam    GEN: Jessica Jaquez appears well, alert and oriented x 3, pleasant and cooperative   HEENT:  Normocephalic, atraumatic, anicteric, moist mucous membranes  NECK: No JVD or carotid bruits   HEART: Regular rhythm, regular rate, normal S1 and S2, no murmurs, clicks, gallops or rubs   LUNGS: Clear to auscultation bilaterally; no wheezes, rales, or rhonchi; respiration nonlabored   ABDOMEN:  Normoactive bowel sounds, soft, no tenderness, no distention  EXTREMITIES: peripheral pulses palpable; no edema  NEURO: no gross focal findings; cranial nerves grossly intact   SKIN:  Dry, intact, warm to touch    Lab Results: I have  personally reviewed pertinent lab results.    Results from last 7 days   Lab Units 24  1157 24  1029   HS TNI 0HR ng/L  --  54*   HS TNI 2HR ng/L 63*  --          Results from last 7 days   Lab Units 24  1029   POTASSIUM mmol/L 2.8*   CO2 mmol/L 32   CHLORIDE mmol/L 87*   BUN mg/dL 70*   CREATININE mg/dL 1.78*     Results from last 7 days   Lab Units 24  1029   HEMOGLOBIN g/dL 12.5   HEMATOCRIT % 38.8   PLATELETS Thousands/uL 375             Imaging: I have personally reviewed pertinent reports.        Telemetry:   Patient presented with atrial fibrillation with RVR, is now in normal sinus rhythm    EKG:   Date: 2024  Interpretation: Atrial fibrillation with RVR, diffuse ST depressions noted, also has right bundle branch block.      ECHO:  Results for orders placed during the hospital encounter of 18    Echo complete with contrast if indicated    [place] [address]  [phone]    Transthoracic Echocardiogram  2D, M-mode, Doppler, and Color Doppler    Study date:  28-Dec-2018    Patient: HAN CARVAJAL  MR number: [id number]  Account number: [id number]  : 1945  Age: 73 years  Gender: Female  Status: Outpatient  Location: [address] [place] [address] [facility] [place]  Height: 63 in  Weight: 232 lb  BP: 169/ 76 mmHg    Indications: Heart failure    Diagnoses: I50.9 - Heart failure, unspecified    Sonographer:  DANYELLE Ga  Interpreting Physician:  Kvng Aviles MD  Referring Physician:  Jorden Holt III, MD  Group:  [facility]  Cardiology Fellow:  Von Fuentes MD    SUMMARY    PROCEDURE INFORMATION:  This was a technically difficult study.    LEFT VENTRICLE:  Systolic function was normal. Ejection fraction was estimated to be 60 %.  There were no regional wall motion abnormalities.  Doppler parameters were consistent with abnormal left ventricular relaxation (grade 1  diastolic dysfunction).    MITRAL VALVE:  There was trace regurgitation.    TRICUSPID VALVE:  There was trace regurgitation.  Pulmonary artery systolic pressure was within the normal range.    PULMONIC VALVE:  There was trace regurgitation.    HISTORY: PRIOR HISTORY: Hypertension, hyperlipidemia, diastolic dysfunction, type 2 diabetes, GERD    PROCEDURE: The study was performed in the [address] [place] [address] [facility]. This was a routine study. The transthoracic approach was used. The study included complete 2D imaging, M-mode, complete spectral Doppler, and color Doppler. This  was a technically difficult study.    LEFT VENTRICLE: Size was normal. Systolic function was normal. Ejection fraction was estimated to be 60 %. There were no regional wall motion abnormalities. Wall thickness was normal. DOPPLER: Doppler parameters were consistent with  abnormal left ventricular relaxation (grade 1 diastolic dysfunction).    RIGHT VENTRICLE: The size was normal. Systolic function was normal. Wall thickness was normal.    LEFT ATRIUM: Size was normal.    RIGHT ATRIUM: Size was normal.    MITRAL VALVE: Valve structure was normal. There was normal leaflet separation. DOPPLER: The transmitral velocity was within the normal range. There was no evidence for stenosis. There was trace regurgitation.    AORTIC VALVE: The valve was probably trileaflet. Leaflets exhibited normal cuspal separation. The valve was not well visualized. DOPPLER: Transaortic velocity was within the normal range. There was no evidence for stenosis. There was no  significant regurgitation.    TRICUSPID VALVE: The valve structure was normal. There was normal leaflet separation. DOPPLER: The transtricuspid velocity was within the normal range. There was no evidence for stenosis. There was trace regurgitation. Pulmonary artery  systolic pressure was within the normal range. Estimated peak PA pressure was 30 mmHg.    PULMONIC VALVE: Not well visualized. DOPPLER:  The transpulmonic velocity was within the normal range. There was trace regurgitation.    PERICARDIUM: There was no pericardial effusion. A pericardial fat pad was present. The pericardium was normal in appearance.    AORTA: The root exhibited normal size.    SYSTEMIC VEINS: IVC: The inferior vena cava was normal in size. Respirophasic changes were normal.    SYSTEM MEASUREMENT TABLES    2D  %FS: 31.39 %  Ao Diam: 3.29 cm  EDV(Teich): 100.54 ml  EF(Cube): 67.7 %  EF(Teich): 59.26 %  ESV(Cube): 32.77 ml  ESV(Teich): 40.96 ml  IVSd: 0.99 cm  LA Area: 16.53 cm2  LA Diam: 3.39 cm  LVEDV MOD A4C: 94.35 ml  LVEF MOD A4C: 68.85 %  LVESV MOD A4C: 29.39 ml  LVIDd: 4.66 cm  LVIDs: 3.2 cm  LVLd A4C: 7.19 cm  LVLs A4C: 5.3 cm  LVPWd: 1.08 cm  RA Area: 13.64 cm2  RV Diam.: 2.6 cm  SV MOD A4C: 64.96 ml  SV(Cube): 68.69 ml  SV(Teich): 59.58 ml    CW  TR Vmax: 2.6 m/s  TR maxP.99 mmHg    MM  TAPSE: 1.84 cm    PW  E': 0.06 m/s  E/E': 14.64  MV A [place]: 0.97 m/s  MV Dec [place]: 3.14 m/s2  MV DecT: 267.31 ms  MV E [place]: 0.84 m/s  MV E/A Ratio: 0.86    [facility] Accredited Ech[facility]    Prepared and electronically signed by    Kvng Aviles MD  Signed 28-Dec-2018 11:05:05    Results for orders placed during the hospital encounter of 24    Echo complete w/ contrast if indicated    Interpretation Summary    Left Ventricle: Left ventricular cavity size is normal. Wall thickness is mildly increased. There is mild concentric hypertrophy. The left ventricular ejection fraction is 60%. Systolic function is normal. Although no diagnostic regional wall motion abnormality was identified, this possibility cannot be completely excluded on the basis of this study.    Right Ventricle: Right ventricular cavity size is normal. Systolic function is normal.    Right Atrium: The atrium is mildly dilated.    Aortic Valve: There is aortic valve sclerosis.    Mitral Valve: There is annular calcification. There is  mild regurgitation.    Tricuspid Valve: There is mild regurgitation.      PHU:  No results found for this or any previous visit.    Results for orders placed during the hospital encounter of 03/04/24    PHU    Interpretation Summary    Left Ventricle: Left ventricular cavity size is normal. Wall thickness is normal. The left ventricular ejection fraction is 55%. Systolic function is normal. Wall motion is normal.    Left Atrium: The atrium is dilated. There is no thrombus. There is no mass.    Right Atrium: The atrium is dilated.    Atrial Septum: No patent foramen ovale detected, confirmed at rest using color doppler.    Left Atrial Appendage: There is no thrombus.    Aortic Valve: There is aortic valve sclerosis.    Mitral Valve: There is mild regurgitation with a centrally directed jet.    Tricuspid Valve: There is mild to moderate regurgitation.    Aorta: There is mild degree of atheroma and intimal thickening.      CMR:  No results found for this or any previous visit.    No results found for this or any previous visit.    No results found for this or any previous visit.      HOLTER  No results found for this or any previous visit.    No results found for this or any previous visit.        VTE Prophylaxis: Chanel Morgan MD  Cardiology Fellow   FY-1    ==========================================================================================    Epic/ Allscripts/Care Everywhere records reviewed: y    ** Please Note: Fluency DirectDictation voice to text software may have been used in the creation of this document. **

## 2024-04-17 NOTE — ED ATTENDING ATTESTATION
4/17/2024  I, Elizabet Riley MD, saw and evaluated the patient. I have discussed the patient with the resident/non-physician practitioner and agree with the resident's/non-physician practitioner's findings, Plan of Care, and MDM as documented in the resident's/non-physician practitioner's note, except where noted. All available labs and Radiology studies were reviewed.  I was present for key portions of any procedure(s) performed by the resident/non-physician practitioner and I was immediately available to provide assistance.       At this point I agree with the current assessment done in the Emergency Department.  I have conducted an independent evaluation of this patient a history and physical is as follows:  Patient here with shortness of breath and near syncope while at physical therapy.  Patient has recent diagnosis of atrial fibrillation in March, was cardioverted at that time.  Patient's course was complicated by congestive heart failure, with 32 pound weight gain and then loss, recurrent atrial fibrillation managed with medications, and the second hospitalization for diuresis.  Patient is on Eliquis and metoprolol and has been compliant with her medications.  She is maxed out on metoprolol currently.  She has not been able to follow-up with cardiology yet because they could not offer her an appointment till mid May.  Patient was at physical therapy today when she felt lightheaded, very weak, chest discomfort, and sweaty.  Her pulse was checked by her physical therapist, she was noted to have a heart rate of 145.  The patient presented to the emergency department.  The patient currently feels globally weak and unwell, but no longer presyncopal.  She denies chest pain currently.  She is not short of breath currently.  She states that she has some lower extremity swelling, but it is not worse than usual.  She weighs herself daily, and has put on 2 pounds over the last 1 day.  No fevers or chills.  No  cough.  No abdominal pain, vomiting, or diarrhea.  Review of systems otherwise -12 systems reviewed.  On exam the patient is a little pale.  She is tachycardic with a heart rate of 145 on the monitor appears to be in atrial fibrillation.  Her conjunctiva are pink.  Her mucous membranes are moist.  She has JVD.  Her heart is irregular without any murmurs, rubs, or gallops.  She has decreased breath sounds at her bilateral bases with scant crackles.  Her abdomen is soft and nontender with no masses, rebound, or guarding.  The patient's extremities are intact.  She has +2 bilateral symmetric pitting edema in her lower extremities.  She is neurovascularly intact with pulses throughout adequate capillary refill.MEDICAL DECISION MAKING    Number and Complexity of Problems  Differential diagnosis: Atrial fibrillation with RVR, possible ischemic event, near syncope, doubt pulmonary embolus, doubt thyroid disease, congestive heart failure    Medical Decision Making Data  External documents reviewed: Patient's prior hospitalization reviewed.  At that time patient was cardioverted, had recurrent A-fib.  My EKG interpretation: Atrial fibrillation, right bundle branch block, left anterior fascicular block, patient has had these findings previously  My CT interpretation:   My X-ray interpretation: Independently visualized by me, mild cardiomegaly, no cephalization or effusions  My ultrasound interpretation:     XR chest 1 view portable    (Results Pending)       Labs Reviewed   CBC AND DIFFERENTIAL - Abnormal       Result Value Ref Range Status    WBC 11.80 (*) 4.31 - 10.16 Thousand/uL Final    RBC 4.35  3.81 - 5.12 Million/uL Final    Hemoglobin 12.5  11.5 - 15.4 g/dL Final    Hematocrit 38.8  34.8 - 46.1 % Final    MCV 89  82 - 98 fL Final    MCH 28.7  26.8 - 34.3 pg Final    MCHC 32.2  31.4 - 37.4 g/dL Final    RDW 15.6 (*) 11.6 - 15.1 % Final    MPV 9.1  8.9 - 12.7 fL Final    Platelets 375  149 - 390 Thousands/uL Final     nRBC 0  /100 WBCs Final    Segmented % 70  43 - 75 % Final    Immature Grans % 0  0 - 2 % Final    Lymphocytes % 21  14 - 44 % Final    Monocytes % 6  4 - 12 % Final    Eosinophils Relative 3  0 - 6 % Final    Basophils Relative 0  0 - 1 % Final    Absolute Neutrophils 8.20 (*) 1.85 - 7.62 Thousands/µL Final    Absolute Immature Grans 0.05  0.00 - 0.20 Thousand/uL Final    Absolute Lymphocytes 2.52  0.60 - 4.47 Thousands/µL Final    Absolute Monocytes 0.69  0.17 - 1.22 Thousand/µL Final    Eosinophils Absolute 0.29  0.00 - 0.61 Thousand/µL Final    Basophils Absolute 0.05  0.00 - 0.10 Thousands/µL Final   COMPREHENSIVE METABOLIC PANEL - Abnormal    Sodium 135  135 - 147 mmol/L Final    Potassium 2.8 (*) 3.5 - 5.3 mmol/L Final    Chloride 87 (*) 96 - 108 mmol/L Final    CO2 32  21 - 32 mmol/L Final    ANION GAP 16 (*) 4 - 13 mmol/L Final    BUN 70 (*) 5 - 25 mg/dL Final    Creatinine 1.78 (*) 0.60 - 1.30 mg/dL Final    Comment: Standardized to IDMS reference method    Glucose 233 (*) 65 - 140 mg/dL Final    Comment: If the patient is fasting, the ADA then defines impaired fasting glucose as > 100 mg/dL and diabetes as > or equal to 123 mg/dL.    Calcium 9.8  8.4 - 10.2 mg/dL Final    AST 22  13 - 39 U/L Final    ALT 16  7 - 52 U/L Final    Comment: Specimen collection should occur prior to Sulfasalazine administration due to the potential for falsely depressed results.     Alkaline Phosphatase 72  34 - 104 U/L Final    Total Protein 7.5  6.4 - 8.4 g/dL Final    Albumin 3.7  3.5 - 5.0 g/dL Final    Total Bilirubin 0.57  0.20 - 1.00 mg/dL Final    Comment: Use of this assay is not recommended for patients undergoing treatment with eltrombopag due to the potential for falsely elevated results.  N-acetyl-p-benzoquinone imine (metabolite of Acetaminophen) will generate erroneously low results in samples for patients that have taken an overdose of Acetaminophen.    eGFR 26  ml/min/1.73sq m Final    Narrative:     National  "Kidney Disease Foundation guidelines for Chronic Kidney Disease (CKD):     Stage 1 with normal or high GFR (GFR > 90 mL/min/1.73 square meters)    Stage 2 Mild CKD (GFR = 60-89 mL/min/1.73 square meters)    Stage 3A Moderate CKD (GFR = 45-59 mL/min/1.73 square meters)    Stage 3B Moderate CKD (GFR = 30-44 mL/min/1.73 square meters)    Stage 4 Severe CKD (GFR = 15-29 mL/min/1.73 square meters)    Stage 5 End Stage CKD (GFR <15 mL/min/1.73 square meters)  Note: GFR calculation is accurate only with a steady state creatinine   HS TROPONIN I 0HR - Abnormal    hs TnI 0hr 54 (*) \"Refer to ACS Flowchart\"- see link ng/L Final    Comment:                                              Initial (time 0) result  If >=50 ng/L, Myocardial injury suggested ;  Type of myocardial injury and treatment strategy  to be determined.  If 5-49 ng/L, a delta result at 2 hours and or 4 hours will be needed to further evaluate.  If <4 ng/L, and chest pain has been >3 hours since onset, patient may qualify for discharge based on the HEART score in the ED.  If <5 ng/L and <3hours since onset of chest pain, a delta result at 2 hours will be needed to further evaluate.    HS Troponin 99th Percentile URL of a Health Population=12 ng/L with a 95% Confidence Interval of 8-18 ng/L.    Second Troponin (time 2 hours)  If calculated delta >= 20 ng/L,  Myocardial injury suggested ; Type of myocardial injury and treatment strategy to be determined.  If 5-49 ng/L and the calculated delta is 5-19 ng/L, consult medical service for evaluation.  Continue evaluation for ischemia on ecg and other possible etiology and repeat hs troponin at 4 hours.  If delta is <5 ng/L at 2 hours, consider discharge based on risk stratification via the HEART score (if in ED), or BARBARA risk score in IP/Observation.    HS Troponin 99th Percentile URL of a Health Population=12 ng/L with a 95% Confidence Interval of 8-18 ng/L.   D-DIMER, QUANTITATIVE - Abnormal    D-Dimer, Quant 0.68 " "(*) <0.50 ug/ml FEU Final    Comment: Reference and upper limits to exclude DVT and PE are the same.  Do not use to exclude if clinical symptoms are present.  Pregnant women:  1st trimester:  <0.22 - 1.06 ug/ml FEU  2nd trimester:  <0.22 - 1.88 ug/ml FEU  3rd trimester:   0.24 - 3.28 ug/ml FEU    Note: Normal ranges may not apply to patients who are transgender, non-binary, or whose legal sex, sex at birth, and gender identity differ.      Narrative:     In the evaluation for possible pulmonary embolism, in the appropriate (Well's Score of 4 or less) patient, the age adjusted d-dimer cutoff for this patient can be calculated as:    Age x 0.01 (in ug/mL) for Age-adjusted D-dimer exclusion threshold for a patient over 50 years.   HS TROPONIN I 2HR - Abnormal    hs TnI 2hr 63 (*) \"Refer to ACS Flowchart\"- see link ng/L Final    Comment:                                              Initial (time 0) result  If >=50 ng/L, Myocardial injury suggested ;  Type of myocardial injury and treatment strategy  to be determined.  If 5-49 ng/L, a delta result at 2 hours and or 4 hours will be needed to further evaluate.  If <4 ng/L, and chest pain has been >3 hours since onset, patient may qualify for discharge based on the HEART score in the ED.  If <5 ng/L and <3hours since onset of chest pain, a delta result at 2 hours will be needed to further evaluate.    HS Troponin 99th Percentile URL of a Health Population=12 ng/L with a 95% Confidence Interval of 8-18 ng/L.    Second Troponin (time 2 hours)  If calculated delta >= 20 ng/L,  Myocardial injury suggested ; Type of myocardial injury and treatment strategy to be determined.  If 5-49 ng/L and the calculated delta is 5-19 ng/L, consult medical service for evaluation.  Continue evaluation for ischemia on ecg and other possible etiology and repeat hs troponin at 4 hours.  If delta is <5 ng/L at 2 hours, consider discharge based on risk stratification via the HEART score (if in ED), " or BARBARA risk score in IP/Observation.    HS Troponin 99th Percentile URL of a Health Population=12 ng/L with a 95% Confidence Interval of 8-18 ng/L.    Delta 2hr hsTnI 9  <20 ng/L Final   MAGNESIUM - Normal    Magnesium 2.3  1.9 - 2.7 mg/dL Final   TSH, 3RD GENERATION   HS TROPONIN I 4HR   URINALYSIS WITH MICROSCOPIC   LIGHT BLUE TOP       Labs reviewed by me are significant for: D-dimer is normal per age-adjusted.  Patient has chronic renal insufficiency and her findings are consistent with previously    Clinical decision rules/scores are significant for:     Discussed case with:   Considered admission for:     Treatment and Disposition  ED course: Seen and examined, case discussed with cardiology.  Believe the patient would benefit from EP consultation and possible ablation as she is maxed out on her metoprolol, very symptomatic, and has congestive heart failure associate with her atrial fibrillation.  Patient given IV beta-blockade here with improvement in her symptoms as well as her heart rate.  Will admit to the hospital with cardiology consultation.  Shared decision making:   Code status:     ED Course         Critical Care Time  CriticalCare Time    Date/Time: 4/17/2024 3:15 PM    Performed by: Elizabet Riley MD  Authorized by: Elizabet Riley MD    Critical care provider statement:     Critical care time (minutes):  31    Critical care time was exclusive of:  Separately billable procedures and treating other patients and teaching time    Critical care was necessary to treat or prevent imminent or life-threatening deterioration of the following conditions:  Cardiac failure    Critical care was time spent personally by me on the following activities:  Blood draw for specimens, obtaining history from patient or surrogate, development of treatment plan with patient or surrogate, discussions with consultants, discussions with primary provider, evaluation of patient's response to treatment, examination  of patient, review of old charts, re-evaluation of patient's condition, ordering and review of radiographic studies, ordering and review of laboratory studies and ordering and performing treatments and interventions

## 2024-04-17 NOTE — ASSESSMENT & PLAN NOTE
78-year-old female with recent diagnosis of A-fib s/p PHU DCCV on 3/08/24, chronic diastolic CHF, DM2, presented to ED with complaint of diaphoresis, chest pain, severe fatigue, presyncope.  Patient noted to have atrial fibrillation with RVR.  Patient reports compliance with metoprolol.  Mildly elevated troponin secondary to A-fib RVR.  Currently denies chest pain on my exam.  Patient did not take metoprolol this morning, will resume home metoprolol succinate 200 mg daily  As needed IV Lopressor.  Had recent PHU  Continue anticoagulation with Eliquis.  Cardiology consulted.

## 2024-04-17 NOTE — ASSESSMENT & PLAN NOTE
"Lab Results   Component Value Date    HGBA1C 6.9 (H) 01/22/2024       No results for input(s): \"POCGLU\" in the last 72 hours.    Blood Sugar Average: Last 72 hrs:  Hold home glimepiride.  Sliding scale    "

## 2024-04-17 NOTE — ED PROVIDER NOTES
"  History  Chief Complaint   Patient presents with    Medical Problem     Per EMS, \"Per EMS, pt woke up and began PT today and felt not well.  Felt as though she was going to pass out.\"  Pt stated that she was recently hospitalized.  Pt stated that last night she had back pain that radiated into her neck.     HPI  MDM  Pt is a 78-year-old female, history of CHF, recent diagnosis of A-fib, comes in for evaluation of A-fib RVR.  Patient is on Eliquis and metoprolol and is compliant with medication.  Was admitted a month ago for CHF and was noted to have A-fib at that time and was started on Eliquis.  Patient did not have any further symptoms since that time.  This morning patient was getting ready for physical therapy when she felt presyncopal, with some associated chest discomfort.  Denies shortness of breath.  No recent fever, urinary symptoms, abdominal pain, excessive weight gain or loss  Initial presentation pt is A-fib RVR, hemodynamically stable    Pertinent exam.  Patient is ordered, with male send lower extremity swelling, irregular rhythm, tachy  on exam General: awake, alert. Talking normally.   Head: Normocephalic, atraumatic, nontender.  Eyes: EOM-No subconjunctival hemorrhages.  ENT: Nose atraumatic. MMM  No malocclusion. No stridor. Normal phonation. No drooling. Normal swallowing.   Neck: Trachea midline. No JVD.  CV: Tachycardia, irregular  Lungs: Rales no tachypnea. No paradoxical motion.  Abd: +BS, soft, NT/ND. No guarding/rigidity.   MSK: Full ROM throughout.  Bilateral pitting edema  Skin: Dry, intact.   Neuro: AAOx3, GCS 15, CN II-XII grossly intact. Motor/sensory grossly intact.  Psychiatric/Behavioral: mood/affect normal; behavior normal; thought content normal; judgement normal   Exam: deferred      Ddx:     Plan: ***    Labs as interpreted by me ***  EKG as interpreted by me ***  Imaging as interpreted by me ***    ED Course: ***    Final Dispo: ***    ***Pt is hemodynamically stable and " clear for discharge with outpatient f/u with their PCP. Return precautions given pt verbalized understanding      Prior to Admission Medications   Prescriptions Last Dose Informant Patient Reported? Taking?   AMILoride 5 mg tablet  Self Yes No   Sig: Take 2.5 mg by mouth daily   Acetaminophen 325 MG CAPS  Self Yes No   Sig: Take by mouth as needed   Cholecalciferol (VITAMIN D3) 1000 units CAPS  Self Yes No   Sig: Take by mouth daily     Multiple Vitamins-Minerals (CENTRUM SILVER PO)  Self Yes No   Sig: multivitamin   apixaban (ELIQUIS) 5 mg   No No   Sig: Take 1 tablet (5 mg total) by mouth 2 (two) times a day   aspirin 81 mg chewable tablet  Self Yes No   Sig: Chew 81 mg daily   bimatoprost (Lumigan) 0.01 % ophthalmic drops  Self Yes No   Sig: Administer 1 drop to both eyes daily at bedtime   ezetimibe (ZETIA) 10 mg tablet   No No   Sig: Take 1 tablet (10 mg total) by mouth daily at bedtime   gabapentin (NEURONTIN) 100 mg capsule  Self No No   Sig: Take 1 capsule (100 mg total) by mouth 3 (three) times a day   glimepiride (AMARYL) 1 mg tablet  Self Yes No   Sig: Take 1 mg by mouth every morning before breakfast   hydrocortisone 2.5 % cream  Self Yes No   Sig: as needed   ketoconazole (NIZORAL) 2 % cream  Self Yes No   Sig: as needed   metoprolol succinate (TOPROL-XL) 100 mg 24 hr tablet  Self Yes No   Sig: Take 200 mg by mouth daily   potassium chloride (MICRO-K) 10 MEQ CR capsule  Self Yes No   Sig: Take 20 mEq by mouth 2 (two) times a day   torsemide (DEMADEX) 20 mg tablet   No No   Sig: Take 1 tablet (20 mg total) by mouth daily      Facility-Administered Medications: None       Past Medical History:   Diagnosis Date    Arthritis     Colorectal polyps     Constipation     Diabetes mellitus (HCC)     Diverticulosis of colon     Hiatal hernia     Hypertension     Increased urinary frequency     Lumbar disc disease     Pressure injury of skin     SOB (shortness of breath)     Stress incontinence     Vitamin D  deficiency 07/27/2018       Past Surgical History:   Procedure Laterality Date    BACK SURGERY      CARPAL TUNNEL RELEASE Left     CATARACT EXTRACTION Bilateral     CHOLECYSTECTOMY      COLONOSCOPY W/ ENDOSCOPIC US N/A 2/24/2016    Procedure: ANAL ENDOSCOPIC U/S;  Surgeon: HOLLIS Washington MD;  Location: [facility];  Service:     HERNIA REPAIR      NH COLONOSCOPY FLX DX W/COLLJ SPEC WHEN PFRMD N/A 2/24/2016    Procedure: COLONOSCOPY;  Surgeon: HOLLIS Washington MD;  Location: [facility];  Service: Colorectal    TONSILLECTOMY      TUBAL LIGATION         Family History   Problem Relation Age of Onset    Hypertension Mother     Heart attack Mother     Diabetes Mother     Prostate cancer Father     No Known Problems Brother     Stroke Maternal Grandmother     Stroke Paternal Grandmother     Stroke Paternal Grandfather     Thyroid cancer Neg Hx      I have reviewed and agree with the history as documented.    E-Cigarette/Vaping     E-Cigarette/Vaping Substances     Social History     Tobacco Use    Smoking status: Never    Smokeless tobacco: Never   Substance Use Topics    Alcohol use: Never    Drug use: No        Review of Systems    Physical Exam  ED Triage Vitals [04/17/24 1023]   Temperature Pulse Respirations Blood Pressure SpO2   97.9 °F (36.6 °C) (!) 135 20 (!) 143/102 93 %      Temp Source Heart Rate Source Patient Position - Orthostatic VS BP Location FiO2 (%)   Oral -- -- -- --      Pain Score       --             Orthostatic Vital Signs  Vitals:    04/17/24 1023   BP: (!) 143/102   Pulse: (!) 135       Physical Exam    ED Medications  Medications - No data to display    Diagnostic Studies  Results Reviewed       Procedure Component Value Units Date/Time    CBC and differential [[id number]]  (Abnormal) Collected: 04/17/24 1029    Lab Status: Final result Specimen: Blood from Arm, Right Updated: 04/17/24 1038     WBC 11.80 Thousand/uL      RBC 4.35 Million/uL      Hemoglobin 12.5 g/dL      Hematocrit 38.8 %       MCV 89 fL      MCH 28.7 pg      MCHC 32.2 g/dL      RDW 15.6 %      MPV 9.1 fL      Platelets 375 Thousands/uL      nRBC 0 /100 WBCs      Segmented % 70 %      Immature Grans % 0 %      Lymphocytes % 21 %      Monocytes % 6 %      Eosinophils Relative 3 %      Basophils Relative 0 %      Absolute Neutrophils 8.20 Thousands/µL      Absolute Immature Grans 0.05 Thousand/uL      Absolute Lymphocytes 2.52 Thousands/µL      Absolute Monocytes 0.69 Thousand/µL      Eosinophils Absolute 0.29 Thousand/µL      Basophils Absolute 0.05 Thousands/µL     Comprehensive metabolic panel [[id number]] Collected: 04/17/24 1029    Lab Status: In process Specimen: Blood from Arm, Right Updated: 04/17/24 1034    HS Troponin 0hr (reflex protocol) [[id number]] Collected: 04/17/24 1029    Lab Status: In process Specimen: Blood from Arm, Right Updated: 04/17/24 1034                   XR chest 1 view portable    (Results Pending)         Procedures  Procedures      ED Course  ED Course as of 04/17/24 1345   Wed Apr 17, 2024   1101 ANION GAP(!): 16   1101 Potassium(!): 2.8   1101 Creatinine(!): 1.78   1142 Texted EP   1223 Cardiology seeing pt, currently rate is 115. Will plan to admit to medicine   1246 Admitted to [facility].               Identification of Seniors at Risk      Flowsheet Row Most Recent Value   (ISAR) Identification of Seniors at Risk    Before the illness or injury that brought you to the Emergency, did you need someone to help you on a regular basis? 0 Filed at: 04/17/2024 1023   In the last 24 hours, have you needed more help than usual? 1 Filed at: 04/17/2024 1023   Have you been hospitalized for one or more nights during the past 6 months? 1 Filed at: 04/17/2024 1023   In general, do you see well? 0 Filed at: 04/17/2024 1023   In general, do you have serious problems with your memory? 0 Filed at: 04/17/2024 1023   Do you take more than three different medications every day? 1 Filed at: 04/17/2024 1023   ISAR Score 3 Filed at:  04/17/2024 1023                                MDM      Disposition  Final diagnoses:   None     ED Disposition       None          Follow-up Information    None         Patient's Medications   Discharge Prescriptions    No medications on file     No discharge procedures on file.    PDMP Review         Value Time User    PDMP Reviewed  Yes 3/11/2024 11:21 AM Natalie Dudley MD             ED Provider  Attending physically available and evaluated Jessica Jaquez. I managed the patient along with the ED Attending.    Electronically Signed by         POC Glucose 223 mg/dl     HS Troponin I 4hr [[id number]]  (Abnormal) Collected: 04/17/24 1514    Lab Status: Final result Specimen: Blood from Arm, Right Updated: 04/17/24 1551     hs TnI 4hr 99 ng/L      Delta 4hr hsTnI 45 ng/L     Magnesium [[id number]]  (Normal) Collected: 04/17/24 1029    Lab Status: Final result Specimen: Blood from Arm, Right Updated: 04/17/24 1407     Magnesium 2.3 mg/dL     HS Troponin I 2hr [[id number]]  (Abnormal) Collected: 04/17/24 1157    Lab Status: Final result Specimen: Blood from Arm, Right Updated: 04/17/24 1229     hs TnI 2hr 63 ng/L      Delta 2hr hsTnI 9 ng/L     D-dimer, quantitative [[id number]]  (Abnormal) Collected: 04/17/24 1029    Lab Status: Final result Specimen: Blood from Arm, Right Updated: 04/17/24 1111     D-Dimer, Quant 0.68 ug/ml FEU     Narrative:      In the evaluation for possible pulmonary embolism, in the appropriate (Well's Score of 4 or less) patient, the age adjusted d-dimer cutoff for this patient can be calculated as:    Age x 0.01 (in ug/mL) for Age-adjusted D-dimer exclusion threshold for a patient over 50 years.    HS Troponin 0hr (reflex protocol) [[id number]]  (Abnormal) Collected: 04/17/24 1029    Lab Status: Final result Specimen: Blood from Arm, Right Updated: 04/17/24 1108     hs TnI 0hr 54 ng/L     Comprehensive metabolic panel [[id number]]  (Abnormal) Collected: 04/17/24 1029    Lab Status: Final result Specimen: Blood from Arm, Right Updated: 04/17/24 1058     Sodium 135 mmol/L      Potassium 2.8 mmol/L      Chloride 87 mmol/L      CO2 32 mmol/L      ANION GAP 16 mmol/L      BUN 70 mg/dL      Creatinine 1.78 mg/dL      Glucose 233 mg/dL      Calcium 9.8 mg/dL      AST 22 U/L      ALT 16 U/L      Alkaline Phosphatase 72 U/L      Total Protein 7.5 g/dL      Albumin 3.7 g/dL      Total Bilirubin 0.57 mg/dL      eGFR 26 ml/min/1.73sq m     Narrative:      National Kid[employer] Foundation guidelines for Chronic Kidney Disease (CKD):     Stage 1 with  normal or high GFR (GFR > 90 mL/min/1.73 square meters)    Stage 2 Mild CKD (GFR = 60-89 mL/min/1.73 square meters)    Stage 3A Moderate CKD (GFR = 45-59 mL/min/1.73 square meters)    Stage 3B Moderate CKD (GFR = 30-44 mL/min/1.73 square meters)    Stage 4 Severe CKD (GFR = 15-29 mL/min/1.73 square meters)    Stage 5 End Stage CKD (GFR <15 mL/min/1.73 square meters)  Note: GFR calculation is accurate only with a steady state creatinine    CBC and differential [[id number]]  (Abnormal) Collected: 04/17/24 1029    Lab Status: Final result Specimen: Blood from Arm, Right Updated: 04/17/24 1038     WBC 11.80 Thousand/uL      RBC 4.35 Million/uL      Hemoglobin 12.5 g/dL      Hematocrit 38.8 %      MCV 89 fL      MCH 28.7 pg      MCHC 32.2 g/dL      RDW 15.6 %      MPV 9.1 fL      Platelets 375 Thousands/uL      nRBC 0 /100 WBCs      Segmented % 70 %      Immature Grans % 0 %      Lymphocytes % 21 %      Monocytes % 6 %      Eosinophils Relative 3 %      Basophils Relative 0 %      Absolute Neutrophils 8.20 Thousands/µL      Absolute Immature Grans 0.05 Thousand/uL      Absolute Lymphocytes 2.52 Thousands/µL      Absolute Monocytes 0.69 Thousand/µL      Eosinophils Absolute 0.29 Thousand/µL      Basophils Absolute 0.05 Thousands/µL                    XR chest 1 view portable   Final Result by Juvenal Moreland MD (04/17 1523)      No acute cardiopulmonary disease.            Workstation performed: [id number]               Procedures  Procedures      ED Course  ED Course as of 05/03/24 0717   Wed Apr 17, 2024   1101 ANION GAP(!): 16   1101 Potassium(!): 2.8   1101 Creatinine(!): 1.78   1142 Texted EP   1223 Cardiology seeing pt, currently rate is 115. Will plan to admit to medicine   1246 Admitted to [facility].               Identification of Seniors at Risk      Flowsheet Row Most Recent Value   (ISAR) Identification of Seniors at Risk    Before the illness or injury that brought you to the Emergency, did you need  someone to help you on a regular basis? 0 Filed at: 04/17/2024 1023   In the last 24 hours, have you needed more help than usual? 1 Filed at: 04/17/2024 1023   Have you been hospitalized for one or more nights during the past 6 months? 1 Filed at: 04/17/2024 1023   In general, do you see well? 0 Filed at: 04/17/2024 1023   In general, do you have serious problems with your memory? 0 Filed at: 04/17/2024 1023   Do you take more than three different medications every day? 1 Filed at: 04/17/2024 1023   ISAR Score 3 Filed at: 04/17/2024 1023                                Medical Decision Making  Amount and/or Complexity of Data Reviewed  Labs: ordered. Decision-making details documented in ED Course.  Radiology: ordered.    Risk  Prescription drug management.  Decision regarding hospitalization.          Disposition  Final diagnoses:   A-fib (HCC)   CHF exacerbation (HCC)   Atrial fibrillation with RVR (HCC)     Time reflects when diagnosis was documented in both MDM as applicable and the Disposition within this note       Time User Action Codes Description Comment    4/17/2024 12:42 PM Laura Carvajal Add [I48.91] A-fib (HCC)     4/17/2024 12:45 PM Laura Carvajal Add [I50.9] CHF exacerbation (HCC)     4/17/2024  1:00 PM Laura Carvajal Add [I48.91] Atrial fibrillation with RVR (HCC)           ED Disposition       ED Disposition   Admit    Condition   Stable    Date/Time   Wed Apr 17, 2024 1242    Comment                  Follow-up Information       Follow up With Specialties Details Why Contact Info    Jovanni Tillman PA-C Cardiology, Internal Medicine, Physician Assistant Follow up 5/14/24 at 4 pm [address]  [place] [id number] [phone]      [facility]  Follow up 4/25/24 at 10:30 am. this is for a one week EKG since you are starting flecainide. [address]  [place] [address]  [phone]      Jorden Holt MD Internal Medicine Schedule an appointment as soon as possible for a  visit  [address]  [place] [address]  [phone]      Jovanni Agosto MD Trauma Surgery, General Surgery, Wound Care   [address]  [address]  [place] [address]  [phone]              Discharge Medication List as of 4/19/2024  2:39 PM        START taking these medications    Details   flecainide (TAMBOCOR) 100 mg tablet Take 1 tablet (100 mg total) by mouth 2 (two) times a day, Starting Fri 4/19/2024, Until Thu 7/18/2024, Normal           CONTINUE these medications which have NOT CHANGED    Details   apixaban (ELIQUIS) 5 mg Take 1 tablet (5 mg total) by mouth 2 (two) times a day, Starting Fri 4/12/2024, Normal      aspirin 81 mg chewable tablet Chew 81 mg daily, Historical Med      bimatoprost (Lumigan) 0.01 % ophthalmic drops Administer 1 drop to both eyes daily at bedtime, Historical Med      Cholecalciferol (VITAMIN D3) 1000 units CAPS Take by mouth daily  , Historical Med      ezetimibe (ZETIA) 10 mg tablet Take 1 tablet (10 mg total) by mouth daily at bedtime, Starting Fri 4/12/2024, Normal      gabapentin (NEURONTIN) 100 mg capsule Take 1 capsule (100 mg total) by mouth 3 (three) times a day, Starting Mon 3/11/2024, Normal      glimepiride (AMARYL) 1 mg tablet Take 1 mg by mouth every morning before breakfast, Historical Med      hydrocortisone 2.5 % cream as needed, Historical Med      ketoconazole (NIZORAL) 2 % cream as needed, Historical Med      metoprolol succinate (TOPROL-XL) 100 mg 24 hr tablet Take 200 mg by mouth daily, Starting Thu 7/5/2018, Historical Med      Multiple Vitamins-Minerals (CENTRUM SILVER PO) multivitamin, Historical Med      potassium chloride (MICRO-K) 10 MEQ CR capsule Take 20 mEq by mouth 2 (two) times a day, Historical Med      torsemide (DEMADEX) 20 mg tablet Take 1 tablet (20 mg total) by mouth daily, Starting Fri 4/12/2024, Normal      Acetaminophen 325 MG CAPS Take by mouth as needed, Historical Med           STOP taking these medications        AMILoride 5 mg tablet Comments:   Reason for Stopping:             Outpatient Discharge Orders   Basic metabolic panel   Standing Status: Future Standing Exp. Date: 04/19/25       PDMP Review         Value Time User    PDMP Reviewed  Yes 3/11/2024 11:21 AM Natalie Dudley MD             ED Provider  Attending physically available and evaluated Jessica Jaquez. I managed the patient along with the ED Attending.    Electronically Signed by           Laura Carvajal DO  05/03/24 0717

## 2024-04-17 NOTE — ASSESSMENT & PLAN NOTE
Wt Readings from Last 3 Encounters:   04/03/24 100 kg (220 lb 9.6 oz)   03/29/24 101 kg (221 lb 9.6 oz)   03/28/24 102 kg (224 lb 3.2 oz)     Chronic HFpEF LVEF 55% in recent PHU.  In room air, no significant lower extremity edema.  Patient reports baseline weight is 220 to 221 pounds, this morning to 222 pounds.  Received one dose of IV Lasix in ED. Will hold off further diuretics due to worsening creatinine.  Will hold home amiloride and Torsemide.  Cardiology consult

## 2024-04-18 ENCOUNTER — APPOINTMENT (INPATIENT)
Dept: RADIOLOGY | Facility: HOSPITAL | Age: 79
DRG: 309 | End: 2024-04-18
Payer: COMMERCIAL

## 2024-04-18 ENCOUNTER — APPOINTMENT (INPATIENT)
Dept: NON INVASIVE DIAGNOSTICS | Facility: HOSPITAL | Age: 79
DRG: 309 | End: 2024-04-18
Payer: COMMERCIAL

## 2024-04-18 PROBLEM — R07.9 CHEST PAIN: Status: ACTIVE | Noted: 2024-04-18

## 2024-04-18 PROBLEM — E87.6 HYPOKALEMIA: Status: RESOLVED | Noted: 2024-04-17 | Resolved: 2024-04-18

## 2024-04-18 LAB
ANION GAP SERPL CALCULATED.3IONS-SCNC: 15 MMOL/L (ref 4–13)
BUN SERPL-MCNC: 66 MG/DL (ref 5–25)
CALCIUM SERPL-MCNC: 9 MG/DL (ref 8.4–10.2)
CHLORIDE SERPL-SCNC: 92 MMOL/L (ref 96–108)
CO2 SERPL-SCNC: 26 MMOL/L (ref 21–32)
CREAT SERPL-MCNC: 1.5 MG/DL (ref 0.6–1.3)
ERYTHROCYTE [DISTWIDTH] IN BLOOD BY AUTOMATED COUNT: 16.3 % (ref 11.6–15.1)
GFR SERPL CREATININE-BSD FRML MDRD: 32 ML/MIN/1.73SQ M
GLUCOSE SERPL-MCNC: 153 MG/DL (ref 65–140)
GLUCOSE SERPL-MCNC: 174 MG/DL (ref 65–140)
GLUCOSE SERPL-MCNC: 212 MG/DL (ref 65–140)
GLUCOSE SERPL-MCNC: 225 MG/DL (ref 65–140)
GLUCOSE SERPL-MCNC: 235 MG/DL (ref 65–140)
HCT VFR BLD AUTO: 37.2 % (ref 34.8–46.1)
HGB BLD-MCNC: 11.8 G/DL (ref 11.5–15.4)
MAGNESIUM SERPL-MCNC: 2.5 MG/DL (ref 1.9–2.7)
MAX HR PERCENT: 73 %
MAX HR: 104 BPM
MCH RBC QN AUTO: 28.9 PG (ref 26.8–34.3)
MCHC RBC AUTO-ENTMCNC: 31.7 G/DL (ref 31.4–37.4)
MCV RBC AUTO: 91 FL (ref 82–98)
NUC STRESS EJECTION FRACTION: 75 %
PLATELET # BLD AUTO: 367 THOUSANDS/UL (ref 149–390)
PMV BLD AUTO: 9.1 FL (ref 8.9–12.7)
POTASSIUM SERPL-SCNC: 3.8 MMOL/L (ref 3.5–5.3)
RATE PRESSURE PRODUCT: NORMAL
RBC # BLD AUTO: 4.08 MILLION/UL (ref 3.81–5.12)
SL CV REST NUCLEAR ISOTOPE DOSE: 11 MCI
SL CV STRESS NUCLEAR ISOTOPE DOSE: 32 MCI
SL CV STRESS RECOVERY BP: 140 MMHG
SL CV STRESS RECOVERY HR: 92 BPM
SL CV STRESS RECOVERY O2 SAT: 96 %
SODIUM SERPL-SCNC: 133 MMOL/L (ref 135–147)
STRESS ANGINA INDEX: 1
STRESS BASELINE BP: NORMAL MMHG
STRESS BASELINE HR: 93 BPM
STRESS O2 SAT REST: 97 %
STRESS PEAK HR: 102 BPM
STRESS POST ESTIMATED WORKLOAD: 1 METS
STRESS POST EXERCISE DUR MIN: 3 MIN
STRESS POST EXERCISE DUR SEC: 0 SEC
STRESS POST O2 SAT PEAK: 98 %
STRESS POST PEAK BP: 100 MMHG
STRESS/REST PERFUSION RATIO: 1.18
WBC # BLD AUTO: 12.86 THOUSAND/UL (ref 4.31–10.16)

## 2024-04-18 PROCEDURE — 99232 SBSQ HOSP IP/OBS MODERATE 35: CPT | Performed by: INTERNAL MEDICINE

## 2024-04-18 PROCEDURE — 99232 SBSQ HOSP IP/OBS MODERATE 35: CPT | Performed by: PHYSICIAN ASSISTANT

## 2024-04-18 PROCEDURE — A9502 TC99M TETROFOSMIN: HCPCS

## 2024-04-18 PROCEDURE — 93016 CV STRESS TEST SUPVJ ONLY: CPT | Performed by: INTERNAL MEDICINE

## 2024-04-18 PROCEDURE — 93018 CV STRESS TEST I&R ONLY: CPT | Performed by: INTERNAL MEDICINE

## 2024-04-18 PROCEDURE — 83735 ASSAY OF MAGNESIUM: CPT | Performed by: FAMILY MEDICINE

## 2024-04-18 PROCEDURE — 82948 REAGENT STRIP/BLOOD GLUCOSE: CPT

## 2024-04-18 PROCEDURE — 80048 BASIC METABOLIC PNL TOTAL CA: CPT | Performed by: FAMILY MEDICINE

## 2024-04-18 PROCEDURE — 78452 HT MUSCLE IMAGE SPECT MULT: CPT | Performed by: INTERNAL MEDICINE

## 2024-04-18 PROCEDURE — 85027 COMPLETE CBC AUTOMATED: CPT | Performed by: FAMILY MEDICINE

## 2024-04-18 PROCEDURE — 93017 CV STRESS TEST TRACING ONLY: CPT

## 2024-04-18 PROCEDURE — 78452 HT MUSCLE IMAGE SPECT MULT: CPT

## 2024-04-18 RX ORDER — TORSEMIDE 20 MG/1
20 TABLET ORAL DAILY
Status: DISCONTINUED | OUTPATIENT
Start: 2024-04-18 | End: 2024-04-19 | Stop reason: HOSPADM

## 2024-04-18 RX ORDER — METOPROLOL SUCCINATE 100 MG/1
200 TABLET, EXTENDED RELEASE ORAL DAILY
Status: DISCONTINUED | OUTPATIENT
Start: 2024-04-18 | End: 2024-04-19 | Stop reason: HOSPADM

## 2024-04-18 RX ORDER — REGADENOSON 0.08 MG/ML
0.4 INJECTION, SOLUTION INTRAVENOUS ONCE
Status: COMPLETED | OUTPATIENT
Start: 2024-04-18 | End: 2024-04-18

## 2024-04-18 RX ORDER — AMINOPHYLLINE 25 MG/ML
INJECTION, SOLUTION INTRAVENOUS
Status: DISCONTINUED
Start: 2024-04-18 | End: 2024-04-18 | Stop reason: WASHOUT

## 2024-04-18 RX ORDER — REGADENOSON 0.08 MG/ML
INJECTION, SOLUTION INTRAVENOUS
Status: COMPLETED
Start: 2024-04-18 | End: 2024-04-18

## 2024-04-18 RX ADMIN — GABAPENTIN 100 MG: 100 CAPSULE ORAL at 08:20

## 2024-04-18 RX ADMIN — APIXABAN 5 MG: 5 TABLET, FILM COATED ORAL at 18:22

## 2024-04-18 RX ADMIN — POTASSIUM CHLORIDE 40 MEQ: 1500 TABLET, EXTENDED RELEASE ORAL at 08:20

## 2024-04-18 RX ADMIN — INSULIN LISPRO 4 UNITS: 100 INJECTION, SOLUTION INTRAVENOUS; SUBCUTANEOUS at 18:23

## 2024-04-18 RX ADMIN — EZETIMIBE 10 MG: 10 TABLET ORAL at 22:29

## 2024-04-18 RX ADMIN — REGADENOSON 0.4 MG: 0.08 INJECTION, SOLUTION INTRAVENOUS at 11:42

## 2024-04-18 RX ADMIN — TORSEMIDE 20 MG: 20 TABLET ORAL at 18:22

## 2024-04-18 RX ADMIN — INSULIN LISPRO 4 UNITS: 100 INJECTION, SOLUTION INTRAVENOUS; SUBCUTANEOUS at 13:03

## 2024-04-18 RX ADMIN — METOPROLOL SUCCINATE 200 MG: 100 TABLET, EXTENDED RELEASE ORAL at 13:03

## 2024-04-18 RX ADMIN — GABAPENTIN 100 MG: 100 CAPSULE ORAL at 20:23

## 2024-04-18 RX ADMIN — APIXABAN 5 MG: 5 TABLET, FILM COATED ORAL at 08:20

## 2024-04-18 RX ADMIN — FLECAINIDE ACETATE 100 MG: 100 TABLET ORAL at 20:23

## 2024-04-18 RX ADMIN — POTASSIUM CHLORIDE 40 MEQ: 1500 TABLET, EXTENDED RELEASE ORAL at 18:22

## 2024-04-18 RX ADMIN — FLECAINIDE ACETATE 100 MG: 100 TABLET ORAL at 08:30

## 2024-04-18 RX ADMIN — REGADENOSON 0.4 MG: 0.08 INJECTION, SOLUTION INTRAVENOUS at 10:22

## 2024-04-18 RX ADMIN — ACETAMINOPHEN 650 MG: 325 TABLET, FILM COATED ORAL at 20:22

## 2024-04-18 RX ADMIN — INSULIN LISPRO 3 UNITS: 100 INJECTION, SOLUTION INTRAVENOUS; SUBCUTANEOUS at 22:29

## 2024-04-18 RX ADMIN — ASPIRIN 81 MG CHEWABLE TABLET 81 MG: 81 TABLET CHEWABLE at 08:20

## 2024-04-18 RX ADMIN — GABAPENTIN 100 MG: 100 CAPSULE ORAL at 18:22

## 2024-04-18 NOTE — DISCHARGE INSTR - AVS FIRST PAGE
Continue flecainide 100 mg twice a day. You will follow up in the office in 1 week for an EKG. If you cannot make this appointment, please give the office a call. We will also see you in  the EP clinic to follow up with need for ablation to better control your atrial fibrillation. Please follow up with your pcp as well in 1 week. Call the EP clinic if you have further questions.

## 2024-04-18 NOTE — ASSESSMENT & PLAN NOTE
Wt Readings from Last 3 Encounters:   04/18/24 98.4 kg (217 lb)   04/03/24 100 kg (220 lb 9.6 oz)   03/29/24 101 kg (221 lb 9.6 oz)     Chronic HFpEF LVEF 55% on recent PHU.  On room air, no significant lower extremity edema.  Patient reports baseline weight is 220 to 221 pounds  Received one dose of IV Lasix in ED. Will hold off further diuretics due to worsening creatinine and appears euvolemic on exam  Cardiology following, agree to hold diuretics at this time though pt endorses LE edema today, will f/u cardiology recs

## 2024-04-18 NOTE — PROGRESS NOTES
"Cardiology Team 2 Progress Note   Jessica Jaquez 78 y.o. female MRN: [id number]  Unit/Bed#: -01 Encounter: [id number]            Assessment:  Jessica Jaquez is a 78 y.o. year old female with a history of HFpEF, atrial fibrillation, prior history of stroke, hyperlipidemia and type II DM.  Who presents today with episode of chest pain with radiation to the jaw and diaphoresis.  At the time of admission patient was found to be in A-fib with RVR.     Atrial fibrillation, paroxysmal.  Patient has been maintained on Eliquis since she was first diagnosed in 3/6/2024 with atrial fibrillation.  She also takes metoprolol  mg daily for rate control.  HFpEF.  Last EF was 60%.  Patient takes 2.5 mg of amiloride and 20 mg of torsemide at home. (Prior to last month she was taking as needed Lasix only.  Torsemide was a new medication for)  GIOVANNI.  Kidney function is improving as of today.  Metabolic syndrome        Plan:  Continue Eliquis, Continue rate control with Toprol  Patient was started on flecainide yesterday, is doing well  Nuclear stress test today did not show any perfusion defects  Patient does have trace edema, will restart her on torsemide 20 mg including 1 dose today  Monitor BMP tomorrow a.m.    Subjective:     Patient does not have any complaints at this time.  She feels better, does state that her legs feel slightly more swollen today compared to yesterday.        Vitals: /60 (BP Location: Left arm)   Pulse 77   Temp 98.5 °F (36.9 °C) (Oral)   Resp 16   Ht 5' 3\" (1.6 m)   Wt 98.4 kg (217 lb)   SpO2 96%   BMI 38.44 kg/m²       Intake/Output Summary (Last 24 hours) at 4/18/2024 1742  Last data filed at 4/18/2024 1543  Gross per 24 hour   Intake 480 ml   Output 550 ml   Net -70 ml       24 Hours VS:   Temp:  [97.3 °F (36.3 °C)-98.5 °F (36.9 °C)] 98.5 °F (36.9 °C)  HR:  [77-98] 77  Resp:  [16-18] 16  BP: ()/(60-84) 128/60  SpO2:  [93 %-97 %] 96 %     Review of System:  Review of system " was conducted and was negative except for as stated in the subjective.    Physical Exam:  Physical Exam  Constitutional:       General: She is not in acute distress.     Appearance: Normal appearance. She is not ill-appearing.   Cardiovascular:      Rate and Rhythm: Normal rate and regular rhythm.      Pulses: Normal pulses.      Heart sounds: Normal heart sounds. No murmur heard.  Pulmonary:      Effort: Pulmonary effort is normal. No respiratory distress.      Breath sounds: No rales.   Musculoskeletal:      Right lower leg: Edema present.      Left lower leg: Edema present.      Comments: Trace   Skin:     General: Skin is warm.   Neurological:      General: No focal deficit present.      Mental Status: She is alert and oriented to person, place, and time.          Inpatient medications:   Scheduled Meds:  Current Facility-Administered Medications   Medication Dose Route Frequency Provider Last Rate    acetaminophen  650 mg Oral Q4H PRN Aly Fierro MD      apixaban  5 mg Oral BID Jovanni Tillman PA-C      aspirin  81 mg Oral Daily Aly Fierro MD      bimatoprost  1 drop Ophthalmic Daily Aly Fierro MD      ezetimibe  10 mg Oral HS Aly Fierro MD      flecainide  100 mg Oral Q12H [facility] Jovanni Tillman PA-C      gabapentin  100 mg Oral TID Aly Fierro MD      insulin lispro  1-6 Units Subcutaneous HS Aly Fierro MD      insulin lispro  2-12 Units Subcutaneous TID AC Aly Fierro MD      metoprolol succinate  200 mg Oral Daily Simran Naqvi PA-C      potassium chloride  40 mEq Oral BID Aly Fierro MD      torsemide  20 mg Oral Daily Thom Morgan MD       Continuous Infusions:   PRN Meds:.  acetaminophen     Labs & Results:  Results from last 7 days   Lab Units 04/18/24  0519 04/17/24  1029   POTASSIUM mmol/L 3.8 2.8*   CO2 mmol/L 26 32   CHLORIDE mmol/L 92* 87*   BUN mg/dL 66* 70*   CREATININE mg/dL 1.50* 1.78*     Results from last 7 days   Lab Units 04/18/24  0519 04/17/24  1029   HEMOGLOBIN g/dL 11.8 12.5    HEMATOCRIT % 37.2 38.8   PLATELETS Thousands/uL [phone]           Telemetry:   Personally reviewed by Thom Morgan MD    Imaging:       VTE Prophylaxis: Anticoagulated with Eliquis          Thom Morgan MD  Cardiology Fellow   FY-1      Epic/ Allscripts/Care Everywhere records reviewed: y    ** Please Note: Fluency DirectDictation voice to text software may have been used in the creation of this document. **

## 2024-04-18 NOTE — UTILIZATION REVIEW
"Initial Clinical Review    Admission: Date/Time/Statement:   Admission Orders (From admission, onward)       Ordered        04/17/24 1246  INPATIENT ADMISSION  Once                          Orders Placed This Encounter   Procedures    INPATIENT ADMISSION     Standing Status:   Standing     Number of Occurrences:   1     Order Specific Question:   Level of Care     Answer:   Med Surg [16]     Order Specific Question:   Estimated length of stay     Answer:   More than 2 Midnights     Order Specific Question:   Certification     Answer:   I certify that inpatient services are medically necessary for this patient for a duration of greater than two midnights. See H&P and MD Progress Notes for additional information about the patient's course of treatment.     ED Arrival Information       Expected   -    Arrival   4/17/2024 10:16    Acuity   Emergent              Means of arrival   Ambulance    Escorted by   [facility]    Service   Hospitalist    Admission type   Emergency              Arrival complaint   near syncope             Chief Complaint   Patient presents with    Medical Problem     Per EMS, \"Per EMS, pt woke up and began PT today and felt not well.  Felt as though she was going to pass out.\"  Pt stated that she was recently hospitalized.  Pt stated that last night she had back pain that radiated into her neck.       Initial Presentation: 78 y.o. female with PMHx: A-fib s/p PHU DCCV on 3/08/24, chronic diastolic CHF, DM2, who presented to [facility] ED via EMS due to diaphoresis, chest pain, severe fatigue, presyncope.  Patient noted to have atrial fibrillation with RVR.  Patient reports compliance with metoprolol. On exam, tachycardic at 135, hypertensive at 143/102.  Labs revealed WBC 11.80, K 2.8, BIm 70, Cr 1.78, anion gap 16, glucose 233, trop 54, d-dimer 0.68. EKG revealing Afib w/ RVR. Given IV Lasix and potassium replenished in ED.  Plan:  Admit Inpatient status to med surg " telemetry dt AFib w/ RVR, CHF, Hypokalemia: Cardiology consult, IV Lopressor, continue Eliquis, hold home amiloride and torsemide, serial torps and EGKs. Continue to monitor electrolytes and replete prn. Start accuchecks w/ SSI. Monitor IO, daily wts, bladder scan. SCDs.     4/17 Per Cardiology:  Continue Eliquis, Continue rate control with Toprol  EP planning on rhythm control with flecainide  Hypokalemia, K of 2.8 continue to replete  Will obtain nuclear stress test tomorrow  Patient examines euvolemic at the time of my examination, will hold further diuresis for right now  Monitor BMP tomorrow a.m.    Date: 4/18   Day 2: Continue telemetry, accuchecks, cardiac stress test diet, continue to monitor volume status, BMP in AM, fu on nuclear stress test.     4/18 Per EP: Plan to continue flecainide 100 mg twice daily. Outpatient follow-up with EP to discuss ablation. Continue anticoagulation.    Date: 4/19  Day 3: Has surpassed a 2nd midnight with active treatments and services. Pt doing well on flecainide, nuclear stress test reviewed, labs reviewed, torsemide restarted. Continued to monitor VS, rhythm and labs.     ED Triage Vitals   Temperature Pulse Respirations Blood Pressure SpO2   04/17/24 1023 04/17/24 1023 04/17/24 1023 04/17/24 1023 04/17/24 1023   97.9 °F (36.6 °C) (!) 135 20 (!) 143/102 93 %      Temp Source Heart Rate Source Patient Position - Orthostatic VS BP Location FiO2 (%)   04/17/24 1023 04/17/24 1516 04/17/24 1952 04/17/24 1516 --   Oral Monitor Lying Left arm       Pain Score       04/17/24 2002       No Pain          Wt Readings from Last 1 Encounters:   04/19/24 101 kg (222 lb 0.1 oz)     Additional Vital Signs:   Date/Time Temp Pulse Resp BP MAP (mmHg) SpO2 O2 Device Patient Position - Orthostatic VS   04/19/24 09:01:51 97.7 °F (36.5 °C) 67 -- 111/48 Abnormal  69 96 % -- --   04/19/24 07:49:34 97.7 °F (36.5 °C) 60 17 108/63 78 96 % -- Lying   04/19/24 03:11:59 97.8 °F (36.6 °C) 64 14 107/48  Abnormal  68 96 % None (Room air) Lying   04/18/24 23:29:28 98 °F (36.7 °C) 69 14 108/48 Abnormal  68 94 % None (Room air) Lying   04/18/24 2100 -- -- -- -- -- -- None (Room air) --   04/18/24 19:18:47 97.9 °F (36.6 °C) 79 14 117/53 74 96 % None (Room air) Lying   04/18/24 15:43:54 98.5 °F (36.9 °C) 77 -- 128/60 83 96 % None (Room air) Lying   04/18/24 11:47:43 97.3 °F (36.3 °C) Abnormal  98 -- 130/72 91 96 % -- --   04/18/24 11:47:02 -- -- -- 130/72 91 -- -- --   04/18/24 1010 -- 93 16 142/84 -- 97 % -- --   04/18/24 07:24:33 97.4 °F (36.3 °C) Abnormal  92 18 117/66 83 93 % None (Room air) Lying   04/18/24 0320 -- 90 18 113/65 81 93 % None (Room air) Lying   04/17/24 2005 -- -- 18 -- -- -- None (Room air) Lying   04/17/24 2004 98.2 °F (36.8 °C) 97 18 96/75 -- -- -- --   04/17/24 2002 -- -- -- -- -- -- None (Room air) --   04/17/24 19:52:25 98.2 °F (36.8 °C) 97 18 96/75 82 93 % None (Room air) Lying   04/17/24 1516 -- 96 18 110/57 79 97 % None (Room air) --   04/17/24 1341 -- 90 20 116/59 -- 98 % -- --   04/17/24 1229 -- 149 Abnormal  20 138/76 -- 95 % -- --   04/17/24 1023 97.9 °F (36.6 °C) 135 Abnormal  20 143/102 Abnormal  -- 93 % -- --     Pertinent Labs/Diagnostic Test Results:   4/18 Nuclear Stress Test:      Stress ECG: A pharmacological stress test was performed using regadenoson. The patient after stress for 3 min and 0 sec and had a maximal HR of 104 bpm (73% of MPHR) and 1.0 METS. The patient experienced non-limiting angina during the test. The patient reached the end of the protocol. The patient reported dyspnea, flushing, chest pain and lightheaded (atypical) during the stress test. Symptoms began during stress and ended during recovery. Blood pressure demonstrated a hypotensive response and heart rate demonstrated a blunted response to stress. The patient's heart rate recovery was normal.    Stress ECG: No ST deviation is noted. There were no arrhythmias during stress. There were no arrhythmias during  recovery. . The ECG was not diagnostic due to pharmacological (vasodilator) stress. The ECG was uninterpretable due to right bundle branch block and left anterior fascicular block.    Stress Function: Left ventricular function post-stress is normal. Stress ejection fraction is 75%.    Perfusion: There are no perfusion defects.    Stress Combined Conclusion: Left ventricular perfusion is normal.     Normal pharmacologic stress myocardial perfusion study, with no diagnostic evidence of ischemia or infarction.      4/17 EKG: Normal sinus rhythm  Right bundle branch block  Left anterior fascicular block   Bifascicular block   Left ventricular hypertrophy with QRS widening  Abnormal ECG    XR chest 1 view portable   Final Result by Juvenal Moreland MD (04/17 1523)      No acute cardiopulmonary disease.            Workstation performed: [id number]               Results from last 7 days   Lab Units 04/18/24  0519 04/17/24  1029   WBC Thousand/uL 12.86* 11.80*   HEMOGLOBIN g/dL 11.8 12.5   HEMATOCRIT % 37.2 38.8   PLATELETS Thousands/uL 367 375   TOTAL NEUT ABS Thousands/µL  --  8.20*         Results from last 7 days   Lab Units 04/19/24  0454 04/18/24  0519 04/17/24  1029   SODIUM mmol/L 136 133* 135   POTASSIUM mmol/L 3.8 3.8 2.8*   CHLORIDE mmol/L 95* 92* 87*   CO2 mmol/L 30 26 32   ANION GAP mmol/L 11 15* 16*   BUN mg/dL 56* 66* 70*   CREATININE mg/dL 1.39* 1.50* 1.78*   EGFR ml/min/1.73sq m 36 32 26   CALCIUM mg/dL 9.0 9.0 9.8   MAGNESIUM mg/dL  --  2.5 2.3     Results from last 7 days   Lab Units 04/17/24  1029   AST U/L 22   ALT U/L 16   ALK PHOS U/L 72   TOTAL PROTEIN g/dL 7.5   ALBUMIN g/dL 3.7   TOTAL BILIRUBIN mg/dL 0.57     Results from last 7 days   Lab Units 04/19/24  0815 04/18/24  2139 04/18/24  1652 04/18/24  1208 04/18/24  0808 04/17/24  2205 04/17/24  1654   POC GLUCOSE mg/dl 199* 235* 212* 225* 174* 149* 223*     Results from last 7 days   Lab Units 04/19/24  0454 04/18/24  0519 04/17/24  1029    GLUCOSE RANDOM mg/dL 152* 153* 233*       Results from last 7 days   Lab Units 04/17/24  1514 04/17/24  1157 04/17/24  1029   HS TNI 0HR ng/L  --   --  54*   HS TNI 2HR ng/L  --  63*  --    HSTNI D2 ng/L  --  9  --    HS TNI 4HR ng/L 99*  --   --    HSTNI D4 ng/L 45*  --   --      Results from last 7 days   Lab Units 04/17/24  1029   D-DIMER QUANTITATIVE ug/ml FEU 0.68*         Results from last 7 days   Lab Units 04/17/24  1029   TSH 3RD GENERATON uIU/mL 0.936         Results from last 7 days   Lab Units 04/17/24  1705   CLARITY UA  Clear   COLOR UA  Colorless   SPEC GRAV UA  1.008   PH UA  5.5   GLUCOSE UA mg/dl Negative   KETONES UA mg/dl Negative   BLOOD UA  Negative   PROTEIN UA mg/dl Negative   NITRITE UA  Negative   BILIRUBIN UA  Negative   UROBILINOGEN UA (BE) mg/dl <2.0   LEUKOCYTES UA  Negative   WBC UA /hpf None Seen   RBC UA /hpf None Seen   BACTERIA UA /hpf None Seen   EPITHELIAL CELLS WET PREP /hpf Occasional   MUCUS THREADS  Occasional*     ED Treatment:   Medication Administration from 04/17/2024 1016 to 04/17/2024 1936         Date/Time Order Dose Route Action     04/17/2024 1115 EDT potassium chloride (Klor-Con M20) CR tablet 40 mEq 40 mEq Oral Given     04/17/2024 1115 EDT potassium chloride 20 mEq IVPB (premix) 20 mEq Intravenous New Bag     04/17/2024 1148 EDT furosemide (LASIX) injection 40 mg 40 mg Intravenous Given     04/17/2024 1248 EDT metoprolol (LOPRESSOR) injection 5 mg 5 mg Intravenous Given     04/17/2024 1700 EDT gabapentin (NEURONTIN) capsule 100 mg 100 mg Oral Given     04/17/2024 1706 EDT potassium chloride (Klor-Con M20) CR tablet 40 mEq 40 mEq Oral Given     04/17/2024 1700 EDT insulin lispro (HumALOG/ADMELOG) 100 units/mL subcutaneous injection 2-12 Units 4 Units Subcutaneous Given     04/17/2024 1338 EDT potassium chloride (Klor-Con M20) CR tablet 40 mEq 40 mEq Oral Given     04/17/2024 1514 EDT aspirin chewable tablet 81 mg 81 mg Oral Given     04/17/2024 1706 EDT apixaban  (ELIQUIS) tablet 5 mg 5 mg Oral Given          Past Medical History:   Diagnosis Date    Arthritis     Colorectal polyps     Constipation     Diabetes mellitus (HCC)     Diverticulosis of colon     Hiatal hernia     Hypertension     Increased urinary frequency     Lumbar disc disease     Pressure injury of skin     SOB (shortness of breath)     Stress incontinence     Vitamin D deficiency 07/27/2018     Present on Admission:   Type 2 diabetes mellitus with obesity  (HCC)   Essential hypertension   Chronic diastolic congestive heart failure (HCC)      Admitting Diagnosis: A-fib ([facility]) [I48.91]  CHF exacerbation (HCC) [I50.9]  Near syncope [R55]  Atrial fibrillation with RVR ([facility]) [I48.91]  Age/Sex: 78 y.o. female  Admission Orders:  Scheduled Medications:  apixaban, 5 mg, Oral, BID  aspirin, 81 mg, Oral, Daily  bimatoprost, 1 drop, Ophthalmic, Daily  ezetimibe, 10 mg, Oral, HS  flecainide, 100 mg, Oral, Q12H NALINI  gabapentin, 100 mg, Oral, TID  insulin lispro, 1-6 Units, Subcutaneous, HS  insulin lispro, 2-12 Units, Subcutaneous, TID AC  metoprolol succinate, 200 mg, Oral, Daily  potassium chloride, 40 mEq, Oral, BID  torsemide, 20 mg, Oral, Daily      Continuous IV Infusions: none     PRN Meds:  acetaminophen, 650 mg, Oral, Q4H PRN        IP CONSULT TO CARDIOLOGY  IP CONSULT TO ELECTROPHYSIOLOGY    Network Utilization Review Department  ATTENTION: Please call with any questions or concerns to [phone] and carefully listen to the prompts so that you are directed to the right person. All voicemails are confidential.   For Discharge needs, contact Care Management DC Support Team at [phone] opt. 2  Send all requests for admission clinical reviews, approved or denied determinations and any other requests to dedicated fax number below belonging to the campus where the patient is receiving treatment. List of dedicated fax numbers for the Facilities:  FACILITY NAME UR FAX NUMBER   ADMISSION DENIALS  (Administrative/Medical Necessity) [phone]   DISCHARGE SUPPORT TEAM (NETWORK) [phone]   PARENT CHILD HEALTH (Maternity/NICU/Pediatrics) [phone]   [facility] [phone]   [facility] [phone]   [facility] [phone]   [facility] [phone]   [facility] [phone]   [facility] [phone]   [facility] [phone]   [facility] [phone]   [facility] [phone]   [facility] [phone]   [facility] [phone]   [facility] [phone]

## 2024-04-18 NOTE — ASSESSMENT & PLAN NOTE
78-year-old female with recent diagnosis of A-fib s/p PHU DCCV on 3/08/24, chronic diastolic CHF, DM2, presented to ED with complaint of diaphoresis, chest pain, severe fatigue, presyncope.  Patient noted to have atrial fibrillation with RVR.  Patient reports compliance with metoprolol.  Mildly elevated troponin secondary to A-fib RVR.  Continue home metoprolol succinate 200 mg daily, AC with Eliquis  EP following, started Flecainide 100 mg BID.  Now sinus rhythm on tele   Monitor on telemetry  Consider outpt ablation   Monitor filomena

## 2024-04-18 NOTE — ASSESSMENT & PLAN NOTE
CP on admission, which could be in setting of rapid a fib   Troponin 54/63/99  CXR negative   Nuclear stress test ordered per cardiology -- pending read but per my d/w cardiology is normal

## 2024-04-18 NOTE — PROGRESS NOTES
"Progress Note - Electrophysiology - Cardiology  Jessica Jaquez 78 y.o. female MRN: [id number]  Unit/Bed#: -01 Encounter: [id number]      Assessment:  Paroxysmal atrial fibrillation with RVR   Newly diagnosed march 2024 during hospital stay for LBP   Rate control w/ Toprol  mg daily (started on this for BP)   PHU and DCCV in march   Last echo with preserved LVEF 60% w/o valvular abnormalities   Converted to sinus in the ER spontaneously   Started on flecainide   Stress test pending but prelim read normal   HFpEF last echo w/ EF 60%   Diuretics on hold  per general cards   Hx of CVA  HTN   Mildly elevated here 138/76 last reading   DM II   Obesity w/ bmi 39.08   Hypokalemia today 3.8     Plan:  -Plan to continue flecainide 100 mg twice daily  -Outpatient follow-up with myself to discuss ablation  -Continue anticoagulation  -Patient is cleared from EP perspective for discharge.  Discussed with patient today,  does have dementia and she is exhausted from nuclear stress test today would prefer to stay overnight.  Discussed this with primary team      Subjective/Objective   Subjective: Doing well today.  She has not had any rapid heart rates, palpitations, shortness of breath, or any other symptoms.  She is exhausted from her stress test today.    TELE: Sinus rhythm in the 90s      Objective:  Vitals: /72   Pulse 98   Temp (!) 97.3 °F (36.3 °C)   Resp 16   Ht 5' 3\" (1.6 m)   Wt 98.4 kg (217 lb)   SpO2 96%   BMI 38.44 kg/m²     Vitals:    04/18/24 0500 04/18/24 1010   Weight: 98.6 kg (217 lb 7.7 oz) 98.4 kg (217 lb)     Orthostatic Blood Pressures      Flowsheet Row Most Recent Value   Blood Pressure 130/72 filed at 04/18/2024 1147   Patient Position - Orthostatic VS Lying filed at 04/18/2024 0724              Intake/Output Summary (Last 24 hours) at 4/18/2024 1347  Last data filed at 4/18/2024 0724  Gross per 24 hour   Intake 0 ml   Output 1550 ml   Net -1550 ml       Invasive Devices  "      Peripheral Intravenous Line  Duration             Peripheral IV 04/17/24 Right Antecubital 1 day              Drain  Duration             External Urinary Catheter <1 day                              Scheduled Meds:  Current Facility-Administered Medications   Medication Dose Route Frequency Provider Last Rate    acetaminophen  650 mg Oral Q4H PRN Aly Fierro MD      apixaban  5 mg Oral BID Jovanni Tillman PA-C      aspirin  81 mg Oral Daily Aly Fierro MD      bimatoprost  1 drop Ophthalmic Daily Aly Fierro MD      ezetimibe  10 mg Oral HS Aly Fierro MD      flecainide  100 mg Oral Q12H [facility] Jovanni Tillman PA-C      gabapentin  100 mg Oral TID Aly Fierro MD      insulin lispro  1-6 Units Subcutaneous HS Aly Fierro MD      insulin lispro  2-12 Units Subcutaneous TID AC Aly Fierro MD      metoprolol succinate  200 mg Oral Daily Simran Naqvi PA-C      potassium chloride  40 mEq Oral BID Aly Fierro MD       Continuous Infusions:   PRN Meds:.  acetaminophen    Review of Systems:  Review of Systems   Constitutional: Negative for fever and malaise/fatigue.   Cardiovascular:  Negative for chest pain, irregular heartbeat, near-syncope and palpitations.   Neurological:  Negative for dizziness and light-headedness.   All other systems reviewed and are negative.    ROS as noted above, otherwise 12 point review of systems was performed and is negative.     Physical Exam:   Physical Exam  Vitals and nursing note reviewed.   Constitutional:       General: She is not in acute distress.     Appearance: She is obese.   HENT:      Head: Normocephalic and atraumatic.   Cardiovascular:      Rate and Rhythm: Normal rate and regular rhythm.   Pulmonary:      Effort: Pulmonary effort is normal.      Breath sounds: Normal breath sounds.   Skin:     General: Skin is warm and dry.   Neurological:      General: No focal deficit present.      Mental Status: She is alert and oriented to person, place, and time.                    Lab Results: I have personally reviewed pertinent lab results.    Results from last 7 days   Lab Units 24  0519 24  1029   WBC Thousand/uL 12.86* 11.80*   HEMOGLOBIN g/dL 11.8 12.5   HEMATOCRIT % 37.2 38.8   PLATELETS Thousands/uL 367 375     Results from last 7 days   Lab Units 24  0519 24  1029   POTASSIUM mmol/L 3.8 2.8*   CHLORIDE mmol/L 92* 87*   CO2 mmol/L 26 32   BUN mg/dL 66* 70*   CREATININE mg/dL 1.50* 1.78*   CALCIUM mg/dL 9.0 9.8         Results from last 7 days   Lab Units 24  0519 24  1029   MAGNESIUM mg/dL 2.5 2.3       Imaging: I have personally reviewed pertinent reports.    Results for orders placed during the hospital encounter of 18    Echo complete with contrast if indicated    [place] [address]  [phone]    Transthoracic Echocardiogram  2D, M-mode, Doppler, and Color Doppler    Study date:  28-Dec-2018    Patient: HAN CARVAJAL  MR number: [id number]  Account number: [id number]  : 1945  Age: 73 years  Gender: Female  Status: Outpatient  Location: [address] [place] [address] [facility] [place]  Height: 63 in  Weight: 232 lb  BP: 169/ 76 mmHg    Indications: Heart failure    Diagnoses: I50.9 - Heart failure, unspecified    Sonographer:  DANYELLE Ga  Interpreting Physician:  Kvng Aviles MD  Referring Physician:  Jorden Holt III, MD  Group:  [facility]  Cardiology Fellow:  Von Fuentes MD    SUMMARY    PROCEDURE INFORMATION:  This was a technically difficult study.    LEFT VENTRICLE:  Systolic function was normal. Ejection fraction was estimated to be 60 %.  There were no regional wall motion abnormalities.  Doppler parameters were consistent with abnormal left ventricular relaxation (grade 1 diastolic dysfunction).    MITRAL VALVE:  There was trace regurgitation.    TRICUSPID VALVE:  There was trace regurgitation.  Pulmonary artery  systolic pressure was within the normal range.    PULMONIC VALVE:  There was trace regurgitation.    HISTORY: PRIOR HISTORY: Hypertension, hyperlipidemia, diastolic dysfunction, type 2 diabetes, GERD    PROCEDURE: The study was performed in the [address] [place] [address] [facility]. This was a routine study. The transthoracic approach was used. The study included complete 2D imaging, M-mode, complete spectral Doppler, and color Doppler. This  was a technically difficult study.    LEFT VENTRICLE: Size was normal. Systolic function was normal. Ejection fraction was estimated to be 60 %. There were no regional wall motion abnormalities. Wall thickness was normal. DOPPLER: Doppler parameters were consistent with  abnormal left ventricular relaxation (grade 1 diastolic dysfunction).    RIGHT VENTRICLE: The size was normal. Systolic function was normal. Wall thickness was normal.    LEFT ATRIUM: Size was normal.    RIGHT ATRIUM: Size was normal.    MITRAL VALVE: Valve structure was normal. There was normal leaflet separation. DOPPLER: The transmitral velocity was within the normal range. There was no evidence for stenosis. There was trace regurgitation.    AORTIC VALVE: The valve was probably trileaflet. Leaflets exhibited normal cuspal separation. The valve was not well visualized. DOPPLER: Transaortic velocity was within the normal range. There was no evidence for stenosis. There was no  significant regurgitation.    TRICUSPID VALVE: The valve structure was normal. There was normal leaflet separation. DOPPLER: The transtricuspid velocity was within the normal range. There was no evidence for stenosis. There was trace regurgitation. Pulmonary artery  systolic pressure was within the normal range. Estimated peak PA pressure was 30 mmHg.    PULMONIC VALVE: Not well visualized. DOPPLER: The transpulmonic velocity was within the normal range. There was trace regurgitation.    PERICARDIUM: There was no pericardial effusion. A  pericardial fat pad was present. The pericardium was normal in appearance.    AORTA: The root exhibited normal size.    SYSTEMIC VEINS: IVC: The inferior vena cava was normal in size. Respirophasic changes were normal.    SYSTEM MEASUREMENT TABLES    2D  %FS: 31.39 %  Ao Diam: 3.29 cm  EDV(Teich): 100.54 ml  EF(Cube): 67.7 %  EF(Teich): 59.26 %  ESV(Cube): 32.77 ml  ESV(Teich): 40.96 ml  IVSd: 0.99 cm  LA Area: 16.53 cm2  LA Diam: 3.39 cm  LVEDV MOD A4C: 94.35 ml  LVEF MOD A4C: 68.85 %  LVESV MOD A4C: 29.39 ml  LVIDd: 4.66 cm  LVIDs: 3.2 cm  LVLd A4C: 7.19 cm  LVLs A4C: 5.3 cm  LVPWd: 1.08 cm  RA Area: 13.64 cm2  RV Diam.: 2.6 cm  SV MOD A4C: 64.96 ml  SV(Cube): 68.69 ml  SV(Teich): 59.58 ml    CW  TR Vmax: 2.6 m/s  TR maxP.99 mmHg    MM  TAPSE: 1.84 cm    PW  E': 0.06 m/s  E/E': 14.64  MV A Toan: 0.97 m/s  MV Dec Tompkins: 3.14 m/s2  MV DecT: 267.31 ms  MV E Toan: 0.84 m/s  MV E/A Ratio: 0.86    [employer] Accredited Echocardiography Laboratory    Prepared and electronically signed by    Kvng Aviles MD  Signed 28-Dec-2018 11:05:05      VTE Pharmacologic Prophylaxis: eliquis   VTE Mechanical Prophylaxis: sequential compression device

## 2024-04-18 NOTE — ASSESSMENT & PLAN NOTE
Lab Results   Component Value Date    HGBA1C 6.9 (H) 01/22/2024       Recent Labs     04/17/24  1654 04/17/24  2205 04/18/24  0808 04/18/24  1208   POCGLU 223* 149* 174* 225*       Blood Sugar Average: Last 72 hrs:  (P) 192.75  Hold home glimepiride.  Sliding scale

## 2024-04-18 NOTE — PLAN OF CARE
Problem: PAIN - ADULT  Goal: Verbalizes/displays adequate comfort level or baseline comfort level  Description: Interventions:  - Encourage patient to monitor pain and request assistance  - Assess pain using appropriate pain scale  - Administer analgesics based on type and severity of pain and evaluate response  - Implement non-pharmacological measures as appropriate and evaluate response  - Consider cultural and social influences on pain and pain management  - Notify physician/advanced practitioner if interventions unsuccessful or patient reports new pain  Outcome: Progressing     Problem: INFECTION - ADULT  Goal: Absence or prevention of progression during hospitalization  Description: INTERVENTIONS:  - Assess and monitor for signs and symptoms of infection  - Monitor lab/diagnostic results  - Monitor all insertion sites, i.e. indwelling lines, tubes, and drains  - Monitor endotracheal if appropriate and nasal secretions for changes in amount and color  - [place] appropriate cooling/warming therapies per order  - Administer medications as ordered  - Instruct and encourage patient and family to use good hand hygiene technique  - Identify and instruct in appropriate isolation precautions for identified infection/condition  Outcome: Progressing     Problem: SAFETY ADULT  Goal: Patient will remain free of falls  Description: INTERVENTIONS:  - Educate patient/family on patient safety including physical limitations  - Instruct patient to call for assistance with activity   - Consult OT/PT to assist with strengthening/mobility   - Keep Call bell within reach  - Keep bed low and locked with side rails adjusted as appropriate  - Keep care items and personal belongings within reach  - Initiate and maintain comfort rounds  - Make Fall Risk Sign visible to staff  - Offer Toileting every 2 Hours, in advance of need  - Initiate/Maintain bed/chair alarm  - Obtain necessary fall risk management equipment: walker, slippers  -  Apply yellow socks and bracelet for high fall risk patients  - Consider moving patient to room near nurses station  Outcome: Progressing     Problem: DISCHARGE PLANNING  Goal: Discharge to home or other facility with appropriate resources  Description: INTERVENTIONS:  - Identify barriers to discharge w/patient and caregiver  - Arrange for needed discharge resources and transportation as appropriate  - Identify discharge learning needs (meds, wound care, etc.)  - Arrange for interpretive services to assist at discharge as needed  - Refer to Case Management Department for coordinating discharge planning if the patient needs post-hospital services based on physician/advanced practitioner order or complex needs related to functional status, cognitive ability, or social support system  Outcome: Progressing     Problem: Knowledge Deficit  Goal: Patient/family/caregiver demonstrates understanding of disease process, treatment plan, medications, and discharge instructions  Description: Complete learning assessment and assess knowledge base.  Interventions:  - Provide teaching at level of understanding  - Provide teaching via preferred learning methods  Outcome: Progressing

## 2024-04-18 NOTE — ASSESSMENT & PLAN NOTE
Due to worsening creatinine, will hold off amiloride and torsemide.  Will continue metoprolol -- reordered

## 2024-04-18 NOTE — PROGRESS NOTES
[facility]  Progress Note  Name: Jessica Jaquez I  MRN: [id number]  Unit/Bed#: -01 I Date of Admission: 4/17/2024   Date of Service: 4/18/2024 I Hospital Day: 1    Assessment/Plan   * Atrial fibrillation with RVR (HCC)  Assessment & Plan  78-year-old female with recent diagnosis of A-fib s/p PHU DCCV on 3/08/24, chronic diastolic CHF, DM2, presented to ED with complaint of diaphoresis, chest pain, severe fatigue, presyncope.  Patient noted to have atrial fibrillation with RVR.  Patient reports compliance with metoprolol.  Mildly elevated troponin secondary to A-fib RVR.  Continue home metoprolol succinate 200 mg daily, AC with Eliquis  EP following, started Flecainide 100 mg BID.  Now sinus rhythm on tele   Monitor on telemetry  Consider outpt ablation   Monitor lytes     Chest pain  Assessment & Plan  CP on admission, which could be in setting of rapid a fib   Troponin 54/63/99  CXR negative   Nuclear stress test ordered per cardiology -- pending read but per my d/w cardiology is normal     Chronic diastolic congestive heart failure (HCC)  Assessment & Plan  Wt Readings from Last 3 Encounters:   04/18/24 98.4 kg (217 lb)   04/03/24 100 kg (220 lb 9.6 oz)   03/29/24 101 kg (221 lb 9.6 oz)     Chronic HFpEF LVEF 55% on recent PHU.  On room air, no significant lower extremity edema.  Patient reports baseline weight is 220 to 221 pounds  Received one dose of IV Lasix in ED. Will hold off further diuretics due to worsening creatinine and appears euvolemic on exam  Cardiology following, agree to hold diuretics at this time though pt endorses LE edema today, will f/u cardiology recs     History of stroke  Assessment & Plan  Continue ezetimibe, Eliquis and aspirin    Essential hypertension  Assessment & Plan  Due to worsening creatinine, will hold off amiloride and torsemide.  Will continue metoprolol -- reordered     Type 2 diabetes mellitus with obesity  (HCC)  Assessment &  Plan  Lab Results   Component Value Date    HGBA1C 6.9 (H) 2024       Recent Labs     24  1654 24  2205 24  0808 24  1208   POCGLU 223* 149* 174* 225*       Blood Sugar Average: Last 72 hrs:  (P) 192.75  Hold home glimepiride.  Sliding scale    Hypokalemia-resolved as of 2024  Assessment & Plan  Potassium 2.8 on arrival, resolved with replacement            VTE Pharmacologic Prophylaxis:   Moderate Risk (Score 3-4) - Pharmacological DVT Prophylaxis Ordered: apixaban (Eliquis).    Mobility:   Basic Mobility Inpatient Raw Score: 14  JH-HLM Goal: 4: Move to chair/commode  JH-HLM Achieved: 6: Walk 10 steps or more  JH-HLM Goal achieved. Continue to encourage appropriate mobility.    Patient Centered Rounds: I performed bedside rounds with nursing staff today.   Discussions with Specialists or Other Care Team Provider: RN, EP    Education and Discussions with Family / Patient: Patient declined call to .     Total Time Spent on Date of Encounter in care of patient: 20 mins. This time was spent on one or more of the following: performing physical exam; counseling and coordination of care; obtaining or reviewing history; documenting in the medical record; reviewing/ordering tests, medications or procedures; communicating with other healthcare professionals and discussing with patient's family/caregivers.    Current Length of Stay: 1 day(s)  Current Patient Status: Inpatient   Certification Statement: The patient will continue to require additional inpatient hospital stay due to new start flecainide, safe dc  Discharge Plan: Anticipate discharge tomorrow to home.    Code Status: Level 3 - DNAR and DNI    Subjective:   Reports she is exhausted after her stress test.  Otherwise no complaints     Objective:     Vitals:   Temp (24hrs), Av.8 °F (36.6 °C), Min:97.3 °F (36.3 °C), Max:98.2 °F (36.8 °C)    Temp:  [97.3 °F (36.3 °C)-98.2 °F (36.8 °C)] 97.3 °F (36.3 °C)  HR:  [90-98]  98  Resp:  [16-18] 16  BP: ()/(65-84) 130/72  SpO2:  [93 %-97 %] 96 %  Body mass index is 38.44 kg/m².     Input and Output Summary (last 24 hours):     Intake/Output Summary (Last 24 hours) at 4/18/2024 1531  Last data filed at 4/18/2024 0724  Gross per 24 hour   Intake 0 ml   Output 1550 ml   Net -1550 ml       Physical Exam:   Physical Exam  Vitals reviewed.   Constitutional:       General: She is not in acute distress.     Appearance: She is not toxic-appearing.   HENT:      Head: Normocephalic and atraumatic.   Eyes:      Extraocular Movements: Extraocular movements intact.   Pulmonary:      Effort: Pulmonary effort is normal. No respiratory distress.   Musculoskeletal:         General: Normal range of motion.   Neurological:      General: No focal deficit present.      Mental Status: She is alert and oriented to person, place, and time.   Psychiatric:         Mood and Affect: Mood normal.         Behavior: Behavior normal.         Thought Content: Thought content normal.          Additional Data:     Labs:  Results from last 7 days   Lab Units 04/18/24  0519 04/17/24  1029   WBC Thousand/uL 12.86* 11.80*   HEMOGLOBIN g/dL 11.8 12.5   HEMATOCRIT % 37.2 38.8   PLATELETS Thousands/uL 367 375   SEGS PCT %  --  70   LYMPHO PCT %  --  21   MONO PCT %  --  6   EOS PCT %  --  3     Results from last 7 days   Lab Units 04/18/24  0519 04/17/24  1029   SODIUM mmol/L 133* 135   POTASSIUM mmol/L 3.8 2.8*   CHLORIDE mmol/L 92* 87*   CO2 mmol/L 26 32   BUN mg/dL 66* 70*   CREATININE mg/dL 1.50* 1.78*   ANION GAP mmol/L 15* 16*   CALCIUM mg/dL 9.0 9.8   ALBUMIN g/dL  --  3.7   TOTAL BILIRUBIN mg/dL  --  0.57   ALK PHOS U/L  --  72   ALT U/L  --  16   AST U/L  --  22   GLUCOSE RANDOM mg/dL 153* 233*         Results from last 7 days   Lab Units 04/18/24  1208 04/18/24  0808 04/17/24  2205 04/17/24  1654   POC GLUCOSE mg/dl 225* 174* 149* 223*               Lines/Drains:  Invasive Devices       Peripheral Intravenous Line   Duration             Peripheral IV 04/17/24 Right Antecubital 1 day              Drain  Duration             External Urinary Catheter <1 day                      Telemetry:  Telemetry Orders (From admission, onward)               24 Hour Telemetry Monitoring  Continuous x 24 Hours (Telem)        Question:  Reason for 24 Hour Telemetry  Answer:  Arrhythmias requiring acute medical intervention / PPM or ICD malfunction                     Telemetry Reviewed: Normal Sinus Rhythm  Indication for Continued Telemetry Use: Arrthymias requiring medical therapy             Imaging: No pertinent imaging reviewed.    Recent Cultures (last 7 days):         Last 24 Hours Medication List:   Current Facility-Administered Medications   Medication Dose Route Frequency Provider Last Rate    acetaminophen  650 mg Oral Q4H PRN Aly Fierro MD      apixaban  5 mg Oral BID Jovanni Tillman PA-C      aspirin  81 mg Oral Daily Aly Fierro MD      bimatoprost  1 drop Ophthalmic Daily Aly Fierro MD      ezetimibe  10 mg Oral HS Aly Fierro MD      flecainide  100 mg Oral Q12H [facility] Jovanni Tillman PA-C      gabapentin  100 mg Oral TID Aly Fierro MD      insulin lispro  1-6 Units Subcutaneous HS Aly Fierro MD      insulin lispro  2-12 Units Subcutaneous TID AC Aly Fierro MD      metoprolol succinate  200 mg Oral Daily Simran Naqvi PA-C      potassium chloride  40 mEq Oral BID Aly Fierro MD          Today, Patient Was Seen By: Simran Naqvi PA-C    **Please Note: This note may have been constructed using a voice recognition system.**

## 2024-04-19 VITALS
BODY MASS INDEX: 39.34 KG/M2 | RESPIRATION RATE: 17 BRPM | SYSTOLIC BLOOD PRESSURE: 111 MMHG | OXYGEN SATURATION: 98 % | HEART RATE: 60 BPM | TEMPERATURE: 97.7 F | HEIGHT: 63 IN | WEIGHT: 222 LBS | DIASTOLIC BLOOD PRESSURE: 54 MMHG

## 2024-04-19 LAB
ANION GAP SERPL CALCULATED.3IONS-SCNC: 11 MMOL/L (ref 4–13)
BUN SERPL-MCNC: 56 MG/DL (ref 5–25)
CALCIUM SERPL-MCNC: 9 MG/DL (ref 8.4–10.2)
CHLORIDE SERPL-SCNC: 95 MMOL/L (ref 96–108)
CO2 SERPL-SCNC: 30 MMOL/L (ref 21–32)
CREAT SERPL-MCNC: 1.39 MG/DL (ref 0.6–1.3)
GFR SERPL CREATININE-BSD FRML MDRD: 36 ML/MIN/1.73SQ M
GLUCOSE SERPL-MCNC: 152 MG/DL (ref 65–140)
GLUCOSE SERPL-MCNC: 199 MG/DL (ref 65–140)
GLUCOSE SERPL-MCNC: 259 MG/DL (ref 65–140)
POTASSIUM SERPL-SCNC: 3.8 MMOL/L (ref 3.5–5.3)
SODIUM SERPL-SCNC: 136 MMOL/L (ref 135–147)

## 2024-04-19 PROCEDURE — 99239 HOSP IP/OBS DSCHRG MGMT >30: CPT | Performed by: PHYSICIAN ASSISTANT

## 2024-04-19 PROCEDURE — 99232 SBSQ HOSP IP/OBS MODERATE 35: CPT | Performed by: INTERNAL MEDICINE

## 2024-04-19 PROCEDURE — 82948 REAGENT STRIP/BLOOD GLUCOSE: CPT

## 2024-04-19 PROCEDURE — 80048 BASIC METABOLIC PNL TOTAL CA: CPT | Performed by: FAMILY MEDICINE

## 2024-04-19 RX ORDER — FLECAINIDE ACETATE 100 MG/1
100 TABLET ORAL 2 TIMES DAILY
Qty: 180 TABLET | Refills: 0 | Status: SHIPPED | OUTPATIENT
Start: 2024-04-19 | End: 2024-07-18

## 2024-04-19 RX ADMIN — APIXABAN 5 MG: 5 TABLET, FILM COATED ORAL at 08:59

## 2024-04-19 RX ADMIN — GABAPENTIN 100 MG: 100 CAPSULE ORAL at 08:59

## 2024-04-19 RX ADMIN — INSULIN LISPRO 2 UNITS: 100 INJECTION, SOLUTION INTRAVENOUS; SUBCUTANEOUS at 09:00

## 2024-04-19 RX ADMIN — TORSEMIDE 20 MG: 20 TABLET ORAL at 09:02

## 2024-04-19 RX ADMIN — ASPIRIN 81 MG CHEWABLE TABLET 81 MG: 81 TABLET CHEWABLE at 08:59

## 2024-04-19 RX ADMIN — INSULIN LISPRO 6 UNITS: 100 INJECTION, SOLUTION INTRAVENOUS; SUBCUTANEOUS at 12:25

## 2024-04-19 RX ADMIN — METOPROLOL SUCCINATE 200 MG: 100 TABLET, EXTENDED RELEASE ORAL at 09:02

## 2024-04-19 RX ADMIN — FLECAINIDE ACETATE 100 MG: 100 TABLET ORAL at 09:02

## 2024-04-19 RX ADMIN — POTASSIUM CHLORIDE 40 MEQ: 1500 TABLET, EXTENDED RELEASE ORAL at 08:59

## 2024-04-19 NOTE — PROGRESS NOTES
"Cardiology Team 2 Progress Note   Jessica Jaquez 78 y.o. female MRN: [id number]  Unit/Bed#: -01 Encounter: [id number]            Assessment:  Jessica Jaquez is a 78 y.o. year old female with a history of HFpEF, atrial fibrillation, prior history of stroke, hyperlipidemia and type II DM.  Who presents today with episode of chest pain with radiation to the jaw and diaphoresis.  At the time of admission patient was found to be in A-fib with RVR.     Atrial fibrillation, paroxysmal.  Patient has been maintained on Eliquis since she was first diagnosed in 3/6/2024 with atrial fibrillation.  She also takes metoprolol  mg daily for rate control.  HFpEF.  Last EF was 60%.  Patient takes 2.5 mg of amiloride and 20 mg of torsemide at home. (Prior to last month she was taking as needed Lasix only.  Torsemide was a new medication for)  GIOVANNI.  Kidney function is improving as of today.  Metabolic syndrome        Plan:  Continue Eliquis, Continue rate control with Toprol  Patient was started on flecainide yesterday, is doing well  Nuclear stress test today did not show any perfusion defects  Continue home torsemide 20 mg Qd on discharge. Will have her repeat bmp 3 days after D/c  D/C amiloride      Subjective:     No acute overnight events.  No other complaints today.  She is being discharged.          Vitals: BP (!) 107/48 (BP Location: Left arm)   Pulse 64   Temp 97.8 °F (36.6 °C) (Axillary)   Resp 14   Ht 5' 3\" (1.6 m)   Wt 101 kg (222 lb 0.1 oz)   SpO2 96%   BMI 39.33 kg/m²       Intake/Output Summary (Last 24 hours) at 4/19/2024 0703  Last data filed at 4/19/2024 0311  Gross per 24 hour   Intake 700 ml   Output 1750 ml   Net -1050 ml       24 Hours VS:   Temp:  [97.3 °F (36.3 °C)-98.5 °F (36.9 °C)] 97.8 °F (36.6 °C)  HR:  [64-98] 64  Resp:  [14-18] 14  BP: (107-142)/(48-84) 107/48  SpO2:  [93 %-97 %] 96 %     Review of System:  Review of system was conducted and was negative except for as stated in the " subjective.    Physical Exam:  Physical Exam  Constitutional:       General: She is not in acute distress.     Appearance: Normal appearance. She is not ill-appearing.   Cardiovascular:      Rate and Rhythm: Normal rate and regular rhythm.      Pulses: Normal pulses.      Heart sounds: Normal heart sounds. No murmur heard.  Pulmonary:      Effort: Pulmonary effort is normal. No respiratory distress.      Breath sounds: No rales.   Musculoskeletal:      Right lower leg: No edema.      Left lower leg: No edema.   Skin:     General: Skin is warm.   Neurological:      General: No focal deficit present.      Mental Status: She is alert and oriented to person, place, and time.          Inpatient medications:   Scheduled Meds:  Current Facility-Administered Medications   Medication Dose Route Frequency Provider Last Rate    acetaminophen  650 mg Oral Q4H PRN Aly Fierro MD      apixaban  5 mg Oral BID Jovanni Tillman PA-C      aspirin  81 mg Oral Daily Aly Fierro MD      bimatoprost  1 drop Ophthalmic Daily Aly Fierro MD      ezetimibe  10 mg Oral HS Aly Fierro MD      flecainide  100 mg Oral Q12H [facility] Jovanni Tillman PA-C      gabapentin  100 mg Oral TID Aly Fierro MD      insulin lispro  1-6 Units Subcutaneous HS Aly Fierro MD      insulin lispro  2-12 Units Subcutaneous TID AC Aly Fierro MD      metoprolol succinate  200 mg Oral Daily Simran Naqvi PA-C      potassium chloride  40 mEq Oral BID Aly Fierro MD      torsemide  20 mg Oral Daily Thom Morgan MD       Continuous Infusions:   PRN Meds:.  acetaminophen     Labs & Results:  Results from last 7 days   Lab Units 04/19/24  0454 04/18/24  0519 04/17/24  1029   POTASSIUM mmol/L 3.8 3.8 2.8*   CO2 mmol/L 30 26 32   CHLORIDE mmol/L 95* 92* 87*   BUN mg/dL 56* 66* 70*   CREATININE mg/dL 1.39* 1.50* 1.78*     Results from last 7 days   Lab Units 04/18/24  0519 04/17/24  1029   HEMOGLOBIN g/dL 11.8 12.5   HEMATOCRIT % 37.2 38.8   PLATELETS Thousands/uL 367  375           Telemetry:   Personally reviewed by Thom Morgan MD    Imaging:       VTE Prophylaxis: Anticoagulated with [place]quis          Thom Morgan MD  Cardiology Fellow   FY-1      Epic/ Allscripts/Care Everywhere records reviewed: y    ** Please Note: Fluency DirectDictation voice to text software may have been used in the creation of this document. **

## 2024-04-19 NOTE — ASSESSMENT & PLAN NOTE
Wt Readings from Last 3 Encounters:   04/19/24 101 kg (222 lb 0.1 oz)   04/03/24 100 kg (220 lb 9.6 oz)   03/29/24 101 kg (221 lb 9.6 oz)     Chronic HFpEF LVEF 55% on recent PHU.  On room air, no significant lower extremity edema.  Patient reports baseline weight is 220 to 221 pounds  Received one dose of IV Lasix in ED  Cardiology following, they recommended to resume torsemide 20mg daily and discontinue PTA amiloride

## 2024-04-19 NOTE — PLAN OF CARE
Problem: PAIN - ADULT  Goal: Verbalizes/displays adequate comfort level or baseline comfort level  Description: Interventions:  - Encourage patient to monitor pain and request assistance  - Assess pain using appropriate pain scale  - Administer analgesics based on type and severity of pain and evaluate response  - Implement non-pharmacological measures as appropriate and evaluate response  - Consider cultural and social influences on pain and pain management  - Notify physician/advanced practitioner if interventions unsuccessful or patient reports new pain  Outcome: Progressing     Problem: INFECTION - ADULT  Goal: Absence or prevention of progression during hospitalization  Description: INTERVENTIONS:  - Assess and monitor for signs and symptoms of infection  - Monitor lab/diagnostic results  - Monitor all insertion sites, i.e. indwelling lines, tubes, and drains  - Monitor endotracheal if appropriate and nasal secretions for changes in amount and color  - [place] appropriate cooling/warming therapies per order  - Administer medications as ordered  - Instruct and encourage patient and family to use good hand hygiene technique  - Identify and instruct in appropriate isolation precautions for identified infection/condition  Outcome: Progressing     Problem: SAFETY ADULT  Goal: Patient will remain free of falls  Description: INTERVENTIONS:  - Educate patient/family on patient safety including physical limitations  - Instruct patient to call for assistance with activity   - Consult OT/PT to assist with strengthening/mobility   - Keep Call bell within reach  - Keep bed low and locked with side rails adjusted as appropriate  - Keep care items and personal belongings within reach  - Initiate and maintain comfort rounds  - Make Fall Risk Sign visible to staff  - Offer Toileting every 2 Hours, in advance of need  - Initiate/Maintain bed/chair alarm  - Obtain necessary fall risk management equipment: walker, slippers  -  Apply yellow socks and bracelet for high fall risk patients  - Consider moving patient to room near nurses station  Outcome: Progressing     Problem: DISCHARGE PLANNING  Goal: Discharge to home or other facility with appropriate resources  Description: INTERVENTIONS:  - Identify barriers to discharge w/patient and caregiver  - Arrange for needed discharge resources and transportation as appropriate  - Identify discharge learning needs (meds, wound care, etc.)  - Arrange for interpretive services to assist at discharge as needed  - Refer to Case Management Department for coordinating discharge planning if the patient needs post-hospital services based on physician/advanced practitioner order or complex needs related to functional status, cognitive ability, or social support system  Outcome: Progressing     Problem: CARDIOVASCULAR - ADULT  Goal: Maintains optimal cardiac output and hemodynamic stability  Description: INTERVENTIONS:  - Monitor I/O, vital signs and rhythm  - Monitor for S/S and trends of decreased cardiac output  - Administer and titrate ordered vasoactive medications to optimize hemodynamic stability  - Assess quality of pulses, skin color and temperature  - Assess for signs of decreased coronary artery perfusion  - Instruct patient to report change in severity of symptoms  Outcome: Progressing

## 2024-04-19 NOTE — DISCHARGE SUMMARY
"[facility]  Discharge- Jessica Jaquez 1945, 78 y.o. female MRN: [id number]  Unit/Bed#: MS Sales3-01 Encounter: [id number]  Primary Care Provider: Jorden Holt MD   Date and time admitted to hospital: 4/17/2024 10:17 AM    * Atrial fibrillation with RVR (HCC)  Assessment & Plan  78-year-old female with recent diagnosis of A-fib s/p PHU DCCV on 3/08/24, chronic diastolic CHF, DM2, presented to ED with complaint of diaphoresis, chest pain, severe fatigue, presyncope.  Patient noted to have atrial fibrillation with RVR.  Patient reported compliance with metoprolol.  Mildly elevated troponin secondary to A-fib RVR.  Continue home metoprolol succinate 200 mg daily, AC with Eliquis  EP following, started Flecainide 100 mg BID. They cleared the patient from their standpoint for discharge today  Consider outpt ablation, close outpatient follow up with EP    Chronic diastolic congestive heart failure (HCC)  Assessment & Plan  Wt Readings from Last 3 Encounters:   04/19/24 101 kg (222 lb 0.1 oz)   04/03/24 100 kg (220 lb 9.6 oz)   03/29/24 101 kg (221 lb 9.6 oz)     Chronic HFpEF LVEF 55% on recent PHU.  On room air, no significant lower extremity edema.  Patient reports baseline weight is 220 to 221 pounds  Received one dose of IV Lasix in ED  Cardiology following, they recommended to resume torsemide 20mg daily and discontinue PTA amiloride    Chest pain  Assessment & Plan  CP on admission, which could be in setting of rapid a fib   Troponin 54/63/99  CXR revealed \"no acute cardiopulmonary disease\" per the results report    Nuclear stress test ordered per cardiology - \"Normal pharmacologic stress myocardial perfusion study, with no diagnostic evidence of ischemia or infarction.\"    Type 2 diabetes mellitus with obesity  (HCC)  Assessment & Plan  Lab Results   Component Value Date    HGBA1C 6.9 (H) 01/22/2024       Recent Labs     04/18/24  1208 04/18/24  1652 04/18/24  2139 " "04/19/24  0815   POCGLU 225* 212* 235* 199*         Blood Sugar Average: Last 72 hrs:  (P) 202.6026214297015650    Resume home regimen on discharge and follow up with PCP    Essential hypertension  Assessment & Plan  Continue metoprolol  Cardiology recommending to resume torsemide 20mg daily and discontinue amiloride     History of stroke  Assessment & Plan  Continue ezetimibe, Eliquis and aspirin      Medical Problems       Resolved Problems  Date Reviewed: 4/19/2024            Resolved    Hypokalemia 4/18/2024     Resolved by  Simran Naqvi PA-C        Discharging Physician / Practitioner: Jacques Mendez PA-C  PCP: Jorden Holt MD  Admission Date:   Admission Orders (From admission, onward)       Ordered        04/17/24 1246  INPATIENT ADMISSION  Once                          Discharge Date: 04/19/24    Consultations During Hospital Stay:  Cardiology  EP    Procedures Performed:   CXR  Stress test  CBC  CMP/BMP  HS troponins  TSH  D-dimer    Significant Findings / Test Results:   CXR: \"No acute cardiopulmonary disease.\"  Stress test: \"Normal pharmacologic stress myocardial perfusion study, with no diagnostic evidence of ischemia or infarction.\"  HS troponins: 54, 63, 99  TSH: 0.936  D-dimer: 0.68    Incidental Findings:   None     Test Results Pending at Discharge (will require follow up):   None     Outpatient Tests Requested:  Follow up with EP and PCP    Complications:  None    Reason for Admission: A. Fib with RVR, chest pain    Hospital Course:   Jessica Jaquez is a 78 y.o. female with A. Fib, chronic diastolic CHF, history of stroke, HTN, DM2 who originally presented to the hospital on 4/17/2024 due to diaphoresis, CP, fatigue, and presyncope. Was noted to be in A. Fib with RVR with elevated trops. CXR revealed no acute cardiopulmonary disease per the results report. EP and Cardiology were consulted.  She was started on flecainide 100mg BID. NM myocardial perfusion spect stress test revealed \"Normal " "pharmacologic stress myocardial perfusion study, with no diagnostic evidence of ischemia or infarction\". EP and Cardiology both cleared the patient from their standpoints for discharge today.     Please see above list of diagnoses and related plan for additional information.     Condition at Discharge: stable    Discharge Day Visit / Exam:   Subjective:    Ms. Jaquez denies CP or SOB    Vitals: Blood Pressure: (!) 111/48 (04/19/24 0901)  Pulse: 67 (04/19/24 0901)  Temperature: 97.7 °F (36.5 °C) (04/19/24 0901)  Temp Source: Oral (04/19/24 0749)  Respirations: 17 (04/19/24 0749)  Height: 5' 3\" (160 cm) (04/18/24 1010)  Weight - Scale: 101 kg (222 lb 0.1 oz) (04/19/24 0605)  SpO2: 96 % (04/19/24 0901)  Exam:   Physical Exam  Vitals reviewed.   Constitutional:       Comments: Patient seen sitting in bedside chair, NAD   Cardiovascular:      Rate and Rhythm: Normal rate and regular rhythm.   Pulmonary:      Effort: Pulmonary effort is normal. No respiratory distress.      Breath sounds: Normal breath sounds.   Abdominal:      General: Bowel sounds are normal.      Palpations: Abdomen is soft.      Tenderness: There is no abdominal tenderness.   Musculoskeletal:      Right lower leg: No edema.      Left lower leg: No edema.   Skin:     General: Skin is warm.   Neurological:      Mental Status: She is alert and oriented to person, place, and time.   Psychiatric:         Mood and Affect: Mood normal.         Behavior: Behavior normal.          Discussion with Family: Patient declined call to .     Discharge instructions/Information to patient and family:   See after visit summary for information provided to patient and family.      Provisions for Follow-Up Care:  See after visit summary for information related to follow-up care and any pertinent home health orders.      Mobility at time of Discharge:   Basic Mobility Inpatient Raw Score: 14  JH-HLM Goal: 4: Move to chair/commode  JH-HLM Achieved: 3: Sit at " edge of bed  HLM Goal NOT achieved. Continue to encourage mobility in post discharge setting.     Disposition:   Home    Planned Readmission: None     Discharge Statement:  I spent 42 minutes discharging the patient. This time was spent on the day of discharge. I had direct contact with the patient on the day of discharge. Greater than 50% of the total time was spent examining patient, answering all patient questions, arranging and discussing plan of care with patient as well as directly providing post-discharge instructions.  Additional time then spent on discharge activities.    Discharge Medications:  See after visit summary for reconciled discharge medications provided to patient and/or family.      **Please Note: This note may have been constructed using a voice recognition system**

## 2024-04-19 NOTE — QUICK NOTE
Patient has been maintaining sinus rhythm and has done relatively well on flecainide 100 mg twice daily.  She will follow-up in the EP clinic for 1 week EKG as well as 4 to 6-week follow-up for discussion of atrial fibrillation ablation.  She is aware to give the EP clinic a call if she is having further symptoms.  For now, patient is stable for discharge from EP perspective.  Please call or reach out with further questions.

## 2024-04-19 NOTE — ASSESSMENT & PLAN NOTE
"CP on admission, which could be in setting of rapid a fib   Troponin 54/63/99  CXR revealed \"no acute cardiopulmonary disease\" per the results report    Nuclear stress test ordered per cardiology - \"Normal pharmacologic stress myocardial perfusion study, with no diagnostic evidence of ischemia or infarction.\"  "

## 2024-04-19 NOTE — ASSESSMENT & PLAN NOTE
Lab Results   Component Value Date    HGBA1C 6.9 (H) 01/22/2024       Recent Labs     04/18/24  1208 04/18/24  1652 04/18/24  2139 04/19/24  0815   POCGLU 225* 212* 235* 199*         Blood Sugar Average: Last 72 hrs:  (P) [id number]    Resume home regimen on discharge and follow up with PCP

## 2024-04-19 NOTE — ASSESSMENT & PLAN NOTE
78-year-old female with recent diagnosis of A-fib s/p PHU DCCV on 3/08/24, chronic diastolic CHF, DM2, presented to ED with complaint of diaphoresis, chest pain, severe fatigue, presyncope.  Patient noted to have atrial fibrillation with RVR.  Patient reported compliance with metoprolol.  Mildly elevated troponin secondary to A-fib RVR.  Continue home metoprolol succinate 200 mg daily, AC with Eliquis  EP following, started Flecainide 100 mg BID. They cleared the patient from their standpoint for discharge today  Consider outpt ablation, close outpatient follow up with EP

## 2024-04-19 NOTE — ASSESSMENT & PLAN NOTE
Continue metoprolol  Cardiology recommending to resume torsemide 20mg daily and discontinue amiloride

## 2024-04-23 LAB
MAX DIASTOLIC BP: 84 MMHG
MAX PREDICTED HEART RATE: 141 BPM
PROTOCOL NAME: NORMAL
STRESS POST EXERCISE DUR MIN: 3 MIN
STRESS POST EXERCISE DUR SEC: 0 SEC
STRESS POST PEAK HR: 104 BPM
STRESS POST PEAK SYSTOLIC BP: 142 MMHG
TARGET HR FORMULA: NORMAL
TEST INDICATION: NORMAL

## 2024-04-24 ENCOUNTER — HOME CARE VISIT (OUTPATIENT)
Dept: HOME HEALTH SERVICES | Facility: HOME HEALTHCARE | Age: 79
End: 2024-04-24
Payer: COMMERCIAL

## 2024-04-24 ENCOUNTER — TELEPHONE (OUTPATIENT)
Age: 79
End: 2024-04-24

## 2024-04-24 VITALS
TEMPERATURE: 98 F | SYSTOLIC BLOOD PRESSURE: 132 MMHG | OXYGEN SATURATION: 97 % | HEART RATE: 60 BPM | DIASTOLIC BLOOD PRESSURE: 68 MMHG

## 2024-04-24 NOTE — TELEPHONE ENCOUNTER
Sameer from [facility] called out for visit today   Pt feels well.   VS stable /68 HR 60 T 98.0   Pt had weight herself today weight was 223.00 lbs with just underware    Smaeer had re weight her when he was there and she was fully clothed.  He got 232.00 lbs.    Advised pt she needs to weight herself same time and same amt of clothing daily. Same scale also   I would go by the weight from the morning.Advised that on 4/16/2024 weight were 220-223 lbs.  Advised would have her weigh herself daily and if she is 3 lbs up in one day or 5 lbs in one week to call office or if having any S/S.     Verbally understood

## 2024-04-24 NOTE — CASE COMMUNICATION
Upon performing SOLO appointemnt, PT assesses that the patient is at her PLOF. Patient is discharged from home health PT at this time.    Patient's instructions are to continue to follow daily weight checks in the morning and to attend her follow up visits with spine and pain, cardiology, and to follow up with her PCP, Dr. Holt.

## 2024-04-25 ENCOUNTER — CLINICAL SUPPORT (OUTPATIENT)
Dept: CARDIOLOGY CLINIC | Facility: CLINIC | Age: 79
End: 2024-04-25
Payer: COMMERCIAL

## 2024-04-25 ENCOUNTER — TELEPHONE (OUTPATIENT)
Dept: CARDIOLOGY CLINIC | Facility: CLINIC | Age: 79
End: 2024-04-25

## 2024-04-25 VITALS
HEART RATE: 48 BPM | DIASTOLIC BLOOD PRESSURE: 62 MMHG | SYSTOLIC BLOOD PRESSURE: 112 MMHG | WEIGHT: 231.1 LBS | BODY MASS INDEX: 40.95 KG/M2 | HEIGHT: 63 IN

## 2024-04-25 DIAGNOSIS — I48.91 NEW ONSET ATRIAL FIBRILLATION (HCC): Primary | ICD-10-CM

## 2024-04-25 PROCEDURE — 99211 OFF/OP EST MAY X REQ PHY/QHP: CPT | Performed by: INTERNAL MEDICINE

## 2024-04-25 PROCEDURE — 93000 ELECTROCARDIOGRAM COMPLETE: CPT | Performed by: INTERNAL MEDICINE

## 2024-04-25 NOTE — TELEPHONE ENCOUNTER
Daughter in Law Keily called today (Shelbi is name in chart, verified same person).  She called today with similar weight that visiting nurse got yesterday.  Weight on same scale today is 233 lbs.  Yesterd ay 235 lbs.  Had been 222 lbs so appears to be a large weight gain in a short period of time.  The patient did go out to celebrate her birthday with her family. She is not short of breath but has increased edema in both LE.    She has an ov with Helena on Monday 4/29.  I offered a visit with Sophie in the [place] office this afternoon, but DIL said she is bringing patient to the [place] office at 3 PM today for a scheduled EKG upstairs in EP as a follow up to starting Flecainide.    I advised Keily I would let you know of this weight gain.    [place] is at work until she leaves to bring the patient to the [place] office later today.    [place]'s direct number at work; [phone]

## 2024-04-25 NOTE — TELEPHONE ENCOUNTER
Pt taking torsemide 20 mg qd/ potasium 20 meq bid.  Called Daughter-in-law back, no answer. LMOM for them to call [phone] if they can come for appt tomorrow.    Do you want her to double torsemide/ potassium over the weekend( Fri/ Sat/ Sun) if I can not get her in for an appt tomorrow?      Please advise

## 2024-04-25 NOTE — TELEPHONE ENCOUNTER
Torsemide 40mg daily for 3 days K dur 40meq in am and 20meq in pm for 3 days and I will see her on Monday.  Helena

## 2024-04-29 NOTE — TELEPHONE ENCOUNTER
Pt doubled up on Torsemide as instructed.   Weight yesterday was 227, today 231.8  Feet and abdomen swollen    She was scheduled to see Helena today It was rescheduled for 5/2/2024

## 2024-04-29 NOTE — TELEPHONE ENCOUNTER
Advised pt to take extra torsemide and potassium tonight.  And call with wt in the am.  Verbally understood.

## 2024-04-30 ENCOUNTER — TELEPHONE (OUTPATIENT)
Dept: CARDIOLOGY CLINIC | Facility: CLINIC | Age: 79
End: 2024-04-30

## 2024-04-30 NOTE — TELEPHONE ENCOUNTER
F/U call to patient who stated she took the extra Torsemide yesterday & lost 2 lb today.    Stated her feet & ankles are still swollen.    Has an OV tomorrow with VIJI Díaz who will offer further instructions.    Patient verbalized understanding .

## 2024-05-05 DIAGNOSIS — I50.9 ACUTE CHF (CONGESTIVE HEART FAILURE) (HCC): ICD-10-CM

## 2024-05-06 RX ORDER — TORSEMIDE 20 MG/1
20 TABLET ORAL DAILY
Qty: 90 TABLET | Refills: 1 | Status: SHIPPED | OUTPATIENT
Start: 2024-05-06 | End: 2024-05-07

## 2024-05-06 NOTE — PROGRESS NOTES
Cardiology Follow Up    Jessica Jaquez  1945  [id number]  [facility]  [address]  [place] [id number] [phone]    1. Chronic diastolic CHF (congestive heart failure) (HCC)  torsemide (DEMADEX) 20 mg tablet    Basic metabolic panel    Magnesium    potassium chloride (MICRO-K) 10 MEQ CR capsule      2. PAF (paroxysmal atrial fibrillation) (HCC)  Basic metabolic panel    Magnesium    potassium chloride (MICRO-K) 10 MEQ CR capsule    apixaban (ELIQUIS) 5 mg    DISCONTINUED: apixaban (ELIQUIS) 5 mg          Interval History:   Ms Jessica Jaquez was admitted to [facility] on 3/15 -3/19/2024 with CHF exacerbation.  She presented to the emergency room with shortness of breath weight gain and was found to be volume overloaded and heart failure.  Her symptoms improved with IV diuresis.  She was transition to torsemide 20 mg daily.  Creatinine elevated at discharge losartan held at the time of discharge.  She was instructed to undergo a BMP in 5 to 7 days.  Charge weight 224 pounds at discharge sodium 132 BUN 58 creatinine 1.41 GFR 35/     On 3/20/24 Jessica was seen in our office for a recent hospitalization follow up visit.  She will undergo lab studies next week. VNA and PT should be coming into her home.  Jessica is accompanied by her son and .  She denies worsening dyspnea with exertion chest pain palpitations lightheadedness or dizziness.  Mitts to due to muscle cramping in her hands when she does not take potassium.  She took a dose of potassium today.  She ordered a scale.  Will be delivered to her home tomorrow.  Her weight in the office is 223 pounds.     On 3/28/24 Jessica was seen in our office for a follow-up visit.  She continues to be followed by VNA and PT.  She is accompanied by her son.  Jessica admits to fatigue denies worsening dyspnea lightheadedness or dizziness.  Her weight is  stable 224 pounds    Jessica was admitted to [facility] on 4/17 to 4/19/2024 with atrial fibrillation with RVR.  She presented with severe fatigue chest pain and presyncope.  On admission troponins mildly elevated felt to be secondary to A-fib with RVR.  She continued on home metoprolol succinate 200 mg daily and Eliquis for stroke prevention.  418 nuclear stress test showed no evidence of transient ischemic dilatation.  No perfusion defects.  Flecainide 100 mg twice daily started.  Outpatient ablation was discussed with close follow-up with EP.  Discharge weight 222 pounds.     4/25/24 Jessica was seen in EP For EKG. EKG SB 48 BPM  No changes made to her medical regimen    Jessica presents her office for recent hospitalization follow-up visit. She is Accompanied by her son.  Weight at home 227 pounds.  Jessica denies Shortness of breath, lightheadedness or dizziness.  Jessica admits to worsening carroll LE edema.  She is sleeping in a recliner.          Medical History   Primary Cardiologist   Chronic HFpEF LVEF 60%  Atrial fibrillation sp [facility] DCCV on 3/08/24 [id number]= 8 on Eliquis 5 mg twice daily for stroke prevention  Hypertension  Hyperlipidemia 3/16/24   HDL 45 LDL 85  DM2 HgbA1C 6.9% on 1/22/2024  Hx of remote CVA 15 years ago   Lumbar radiculopathy     3/06/23 TTE: Left ventricle cavity size normal wall thickness mild increased mild concentric hypertrophy LVEF 60% systolic function normal, right ventricle cavity size systolic function normal.  Right atrium mildly dilated, aortic valve sclerosis, mild MR, mild TR.          Past Medical History:   Diagnosis Date    Arthritis     Colorectal polyps     Constipation     Diabetes mellitus (HCC)     Diverticulosis of colon     Hiatal hernia     Hypertension     Increased urinary frequency     Lumbar disc disease     Pressure injury of skin     SOB (shortness of breath)     Stress incontinence     Vitamin D deficiency 07/27/2018      Social History     Socioeconomic History    Marital status: /Civil Union     Spouse name: Not on file    Number of children: Not on file    Years of education: Not on file    Highest education level: Not on file   Occupational History    Not on file   Tobacco Use    Smoking status: Never    Smokeless tobacco: Never   Substance and Sexual Activity    Alcohol use: Never    Drug use: No    Sexual activity: Not on file   Other Topics Concern    Not on file   Social History Narrative    Not on file     Social Determinants of Health     Financial Resource Strain: Not on file   Food Insecurity: No Food Insecurity (3/15/2024)    Hunger Vital Sign     Worried About Running Out of Food in the Last Year: Never true     Ran Out of Food in the Last Year: Never true   Transportation Needs: No Transportation Needs (3/15/2024)    PRAPARE - Transportation     Lack of Transportation (Medical): No     Lack of Transportation (Non-Medical): No   Physical Activity: Not on file   Stress: Not on file   Social Connections: Not on file   Intimate Partner Violence: Not on file   Housing Stability: Low Risk  (3/15/2024)    Housing Stability Vital Sign     Unable to Pay for Housing in the Last Year: No     Number of Places Lived in the Last Year: 1     Unstable Housing in the Last Year: No      Family History   Problem Relation Age of Onset    Hypertension Mother     Heart attack Mother     Diabetes Mother     Prostate cancer Father     No Known Problems Brother     Stroke Maternal Grandmother     Stroke Paternal Grandmother     Stroke Paternal Grandfather     Thyroid cancer Neg Hx      Past Surgical History:   Procedure Laterality Date    BACK SURGERY      CARPAL TUNNEL RELEASE Left     CATARACT EXTRACTION Bilateral     CHOLECYSTECTOMY      COLONOSCOPY W/ ENDOSCOPIC US N/A 2/24/2016    Procedure: ANAL ENDOSCOPIC U/S;  Surgeon: HOLLIS Washington MD;  Location: [facility];  Service:     HERNIA REPAIR      NH COLONOSCOPY FLX DX W/COLLJ  SPEC WHEN PFRMD N/A 2/24/2016    Procedure: COLONOSCOPY;  Surgeon: HOLLIS Washington MD;  Location: [facility];  Service: Colorectal    TONSILLECTOMY      TUBAL LIGATION         Current Outpatient Medications:     apixaban (ELIQUIS) 5 mg, Take 1 tablet (5 mg total) by mouth 2 (two) times a day, Disp: 60 tablet, Rfl: 0    aspirin 81 mg chewable tablet, Chew 81 mg daily, Disp: , Rfl:     bimatoprost (Lumigan) 0.01 % ophthalmic drops, Administer 1 drop to both eyes daily at bedtime, Disp: , Rfl:     Cholecalciferol (VITAMIN D3) 1000 units CAPS, Take by mouth daily  , Disp: , Rfl:     ezetimibe (ZETIA) 10 mg tablet, Take 1 tablet (10 mg total) by mouth daily at bedtime, Disp: 60 tablet, Rfl: 5    flecainide (TAMBOCOR) 100 mg tablet, Take 1 tablet (100 mg total) by mouth 2 (two) times a day, Disp: 180 tablet, Rfl: 0    gabapentin (NEURONTIN) 100 mg capsule, Take 1 capsule (100 mg total) by mouth 3 (three) times a day, Disp: 90 capsule, Rfl: 2    glimepiride (AMARYL) 1 mg tablet, Take 1 mg by mouth every morning before breakfast, Disp: , Rfl:     hydrocortisone 2.5 % cream, as needed, Disp: , Rfl: 3    ketoconazole (NIZORAL) 2 % cream, as needed, Disp: , Rfl: 3    metoprolol succinate (TOPROL-XL) 100 mg 24 hr tablet, Take 200 mg by mouth daily, Disp: , Rfl: 3    Multiple Vitamins-Minerals (CENTRUM SILVER PO), multivitamin, Disp: , Rfl:     potassium chloride (MICRO-K) 10 MEQ CR capsule, Take 20 mEq by mouth 2 (two) times a day, Disp: , Rfl:     torsemide (DEMADEX) 20 mg tablet, TAKE 1 TABLET BY MOUTH EVERY DAY, Disp: 90 tablet, Rfl: 1  Allergies   Allergen Reactions    Other Cough and Sneezing     Seasonal allergies    Pollen Extract Allergic Rhinitis       Labs:  Admission on 04/17/2024, Discharged on 04/19/2024   Component Date Value    WBC 04/17/2024 11.80 (H)     RBC 04/17/2024 4.35     Hemoglobin 04/17/2024 12.5     Hematocrit 04/17/2024 38.8     MCV 04/17/2024 89     MCH 04/17/2024 28.7     MCHC 04/17/2024 32.2      RDW 04/17/2024 15.6 (H)     MPV 04/17/2024 9.1     Platelets 04/17/2024 375     nRBC 04/17/2024 0     Segmented % 04/17/2024 70     Immature Grans % 04/17/2024 0     Lymphocytes % 04/17/2024 21     Monocytes % 04/17/2024 6     Eosinophils Relative 04/17/2024 3     Basophils Relative 04/17/2024 0     Absolute Neutrophils 04/17/2024 8.20 (H)     Absolute Immature Grans 04/17/2024 0.05     Absolute Lymphocytes 04/17/2024 2.52     Absolute Monocytes 04/17/2024 0.69     Eosinophils Absolute 04/17/2024 0.29     Basophils Absolute 04/17/2024 0.05     Sodium 04/17/2024 135     Potassium 04/17/2024 2.8 (L)     Chloride 04/17/2024 87 (L)     CO2 04/17/2024 32     ANION GAP 04/17/2024 16 (H)     BUN 04/17/2024 70 (H)     Creatinine 04/17/2024 1.78 (H)     Glucose 04/17/2024 233 (H)     Calcium 04/17/2024 9.8     AST 04/17/2024 22     ALT 04/17/2024 16     Alkaline Phosphatase 04/17/2024 72     Total Protein 04/17/2024 7.5     Albumin 04/17/2024 3.7     Total Bilirubin 04/17/2024 0.57     eGFR 04/17/2024 26     hs TnI 0hr 04/17/2024 54 (H)     Ventricular Rate 04/17/2024 137     Atrial Rate 04/17/2024 78     QRSD Interval 04/17/2024 140     QT Interval 04/17/2024 336     QTC Interval 04/17/2024 507     QRS Axis 04/17/2024 -49     T Wave Axis 04/17/2024 91     Ventricular Rate 04/17/2024 147     Atrial Rate 04/17/2024 147     QRSD Interval 04/17/2024 136     QT Interval 04/17/2024 344     QTC Interval 04/17/2024 538     P Axis 04/17/2024 101     QRS Axis 04/17/2024 -50     T Wave Axis 04/17/2024 104     TSH 3RD GENERATON 04/17/2024 0.936     D-Dimer, Quant 04/17/2024 0.68 (H)     hs TnI 2hr 04/17/2024 63 (H)     Delta 2hr hsTnI 04/17/2024 9     hs TnI 4hr 04/17/2024 99 (H)     Delta 4hr hsTnI 04/17/2024 45 (H)     Magnesium 04/17/2024 2.3     Color, UA 04/17/2024 Colorless     Clarity, UA 04/17/2024 Clear     Specific Gravity, UA 04/17/2024 1.008     pH, UA 04/17/2024 5.5     Leukocytes, UA 04/17/2024 Negative     Nitrite, UA  04/17/2024 Negative     Protein, UA 04/17/2024 Negative     Glucose, UA 04/17/2024 Negative     Ketones, UA 04/17/2024 Negative     Urobilinogen, UA 04/17/2024 <2.0     Bilirubin, UA 04/17/2024 Negative     Occult Blood, UA 04/17/2024 Negative     RBC, UA 04/17/2024 None Seen     WBC, UA 04/17/2024 None Seen     Epithelial Cells 04/17/2024 Occasional     Bacteria, UA 04/17/2024 None Seen     MUCUS THREADS 04/17/2024 Occasional (A)     Hyaline Casts, UA 04/17/2024 0-3 (A)     Ventricular Rate 04/17/2024 95     Atrial Rate 04/17/2024 95     ID Interval 04/17/2024 152     QRSD Interval 04/17/2024 128     QT Interval 04/17/2024 460     QTC Interval 04/17/2024 578     P Axis 04/17/2024 92     QRS Axis 04/17/2024 -45     T Wave Axis 04/17/2024 33     Rest Nuclear Isotope Dose 04/18/2024 11.00     Stress Nuclear Isotope D* 04/18/2024 32.00     Baseline HR 04/18/2024 93     Baseline BP 04/18/2024 142/84     O2 sat rest 04/18/2024 97     Stress peak HR 04/18/2024 102     Post peak BP 04/18/2024 100     O2 sat peak 04/18/2024 98     Recovery HR 04/18/2024 92     Recovery BP 04/18/2024 140     O2 sat recovery 04/18/2024 96     Max HR 04/18/2024 104     Max HR Percent 04/18/2024 73     Exercise duration (min) 04/18/2024 3     Exercise duration (sec) 04/18/2024 0     Estimated workload 04/18/2024 1.0     Rate Pressure Product 04/18/2024 10,200.0     Angina Index 04/18/2024 1     Stress/rest perfusion ra* 04/18/2024 1.18     EF (%) 04/18/2024 75     POC Glucose 04/17/2024 223 (H)     POC Glucose 04/17/2024 149 (H)     Sodium 04/18/2024 133 (L)     Potassium 04/18/2024 3.8     Chloride 04/18/2024 92 (L)     CO2 04/18/2024 26     ANION GAP 04/18/2024 15 (H)     BUN 04/18/2024 66 (H)     Creatinine 04/18/2024 1.50 (H)     Glucose 04/18/2024 153 (H)     Calcium 04/18/2024 9.0     eGFR 04/18/2024 32     Magnesium 04/18/2024 2.5     WBC 04/18/2024 12.86 (H)     RBC 04/18/2024 4.08     Hemoglobin 04/18/2024 11.8     Hematocrit  04/18/2024 37.2     MCV 04/18/2024 91     MCH 04/18/2024 28.9     MCHC 04/18/2024 31.7     RDW 04/18/2024 16.3 (H)     Platelets 04/18/2024 367     MPV 04/18/2024 9.1     POC Glucose 04/18/2024 174 (H)     Protocol Name 04/18/2024 LIZ SIT     Exercise duration (min) 04/18/2024 3     Exercise duration (sec) 04/18/2024 0     Post Peak Systolic BP 04/18/2024 142     Max Diastolic Bp 04/18/2024 84     Peak HR 04/18/2024 104     Max Predicted Heart Rate 04/18/2024 141     Test Indication 04/18/2024 Screening for CAD     Target Hr Formular 04/18/2024 (220 - Age)*100%     POC Glucose 04/18/2024 225 (H)     POC Glucose 04/18/2024 212 (H)     POC Glucose 04/18/2024 235 (H)     Sodium 04/19/2024 136     Potassium 04/19/2024 3.8     Chloride 04/19/2024 95 (L)     CO2 04/19/2024 30     ANION GAP 04/19/2024 11     BUN 04/19/2024 56 (H)     Creatinine 04/19/2024 1.39 (H)     Glucose 04/19/2024 152 (H)     Calcium 04/19/2024 9.0     eGFR 04/19/2024 36     POC Glucose 04/19/2024 199 (H)     POC Glucose 04/19/2024 259 (H)    Appointment on 03/30/2024   Component Date Value    Color, UA 03/30/2024 Light Yellow     Clarity, UA 03/30/2024 Turbid     Specific Gravity, UA 03/30/2024 1.014     pH, UA 03/30/2024 6.0     Leukocytes, UA 03/30/2024 Large (A)     Nitrite, UA 03/30/2024 Negative     Protein, UA 03/30/2024 Trace (A)     Glucose, UA 03/30/2024 Negative     Ketones, UA 03/30/2024 Negative     Urobilinogen, UA 03/30/2024 <2.0     Bilirubin, UA 03/30/2024 Negative     Occult Blood, UA 03/30/2024 Trace (A)     RBC, UA 03/30/2024 20-30 (A)     WBC, UA 03/30/2024 Innumerable (A)     Epithelial Cells 03/30/2024 Occasional     Bacteria, UA 03/30/2024 Innumerable (A)     Hyaline Casts, UA 03/30/2024 5-10 (A)     Uric Acid Susi, UA 03/30/2024 Occasional     WBC Clumps 03/30/2024 Present     Urine Culture 03/30/2024 >100,000 cfu/ml Escherichia coli (A)    Appointment on 03/26/2024   Component Date Value    Sodium 03/26/2024 139      Potassium 03/26/2024 4.3     Chloride 03/26/2024 92 (L)     CO2 03/26/2024 31     ANION GAP 03/26/2024 16 (H)     BUN 03/26/2024 68 (H)     Creatinine 03/26/2024 1.78 (H)     Glucose 03/26/2024 135     Calcium 03/26/2024 9.6     eGFR 03/26/2024 26    Admission on 03/15/2024, Discharged on 03/19/2024   Component Date Value    Ventricular Rate 03/15/2024 68     Atrial Rate 03/15/2024 68     OH Interval 03/15/2024 160     QRSD Interval 03/15/2024 120     QT Interval 03/15/2024 402     QTC Interval 03/15/2024 427     P Axis 03/15/2024 63     QRS [place] 03/15/2024 -17     T Wave Axis 03/15/2024 -18     WBC 03/15/2024 7.91     RBC 03/15/2024 4.41     Hemoglobin 03/15/2024 12.5     Hematocrit 03/15/2024 40.5     MCV 03/15/2024 92     MCH 03/15/2024 28.3     MCHC 03/15/2024 30.9 (L)     RDW 03/15/2024 14.2     MPV 03/15/2024 8.9     Platelets 03/15/2024 298     nRBC 03/15/2024 0     Segmented % 03/15/2024 65     Immature Grans % 03/15/2024 0     Lymphocytes % 03/15/2024 19     Monocytes % 03/15/2024 8     Eosinophils Relative 03/15/2024 7 (H)     Basophils Relative 03/15/2024 1     Absolute Neutrophils 03/15/2024 5.22     Absolute Immature Grans 03/15/2024 0.03     Absolute Lymphocytes 03/15/2024 1.49     Absolute Monocytes 03/15/2024 0.59     Eosinophils Absolute 03/15/2024 0.54     Basophils Absolute 03/15/2024 0.04     Sodium 03/15/2024 136     Potassium 03/15/2024 4.2     Chloride 03/15/2024 104     CO2 03/15/2024 25     ANION GAP 03/15/2024 7     BUN 03/15/2024 42 (H)     Creatinine 03/15/2024 1.33 (H)     Glucose 03/15/2024 76     Calcium 03/15/2024 9.1     AST 03/15/2024 23     ALT 03/15/2024 26     Alkaline Phosphatase 03/15/2024 68     Total Protein 03/15/2024 6.9     Albumin 03/15/2024 3.6     Total Bilirubin 03/15/2024 0.37     eGFR 03/15/2024 38     hs TnI 0hr 03/15/2024 8     hs TnI 2hr 03/15/2024 8     Delta 2hr hsTnI 03/15/2024 0     hs TnI 4hr 03/15/2024 9     Delta 4hr hsTnI 03/15/2024 1     POC Glucose  03/15/2024 62 (L)     POC Glucose 03/15/2024 80     POC Glucose 03/15/2024 195 (H)     WBC 03/16/2024 8.62     RBC 03/16/2024 4.32     Hemoglobin 03/16/2024 12.2     Hematocrit 03/16/2024 38.2     MCV 03/16/2024 88     MCH 03/16/2024 28.2     MCHC 03/16/2024 31.9     RDW 03/16/2024 14.6     MPV 03/16/2024 8.9     Platelets 03/16/2024 310     nRBC 03/16/2024 0     Segmented % 03/16/2024 61     Immature Grans % 03/16/2024 0     Lymphocytes % 03/16/2024 24     Monocytes % 03/16/2024 9     Eosinophils Relative 03/16/2024 6     Basophils Relative 03/16/2024 0     Absolute Neutrophils 03/16/2024 5.24     Absolute Immature Grans 03/16/2024 0.03     Absolute Lymphocytes 03/16/2024 2.03     Absolute Monocytes 03/16/2024 0.79     Eosinophils Absolute 03/16/2024 0.51     Basophils Absolute 03/16/2024 0.02     Sodium 03/16/2024 136     Potassium 03/16/2024 4.5     Chloride 03/16/2024 104     CO2 03/16/2024 22     ANION GAP 03/16/2024 10     BUN 03/16/2024 46 (H)     Creatinine 03/16/2024 1.21     Glucose 03/16/2024 101     Calcium 03/16/2024 8.8     AST 03/16/2024 25     ALT 03/16/2024 25     Alkaline Phosphatase 03/16/2024 71     Total Protein 03/16/2024 6.5     Albumin 03/16/2024 3.6     Total Bilirubin 03/16/2024 0.50     eGFR 03/16/2024 42     Magnesium 03/16/2024 2.1     TSH 3RD GENERATON 03/16/2024 0.427 (L)     Cholesterol 03/16/2024 159     Triglycerides 03/16/2024 146     HDL, Direct 03/16/2024 45 (L)     LDL Calculated 03/16/2024 85     POC Glucose 03/16/2024 114     BNP 03/16/2024 30     POC Glucose 03/16/2024 212 (H)     Sodium 03/16/2024 140     Potassium 03/16/2024 4.8     Chloride 03/16/2024 100     CO2 03/16/2024 25     ANION GAP 03/16/2024 15 (H)     BUN 03/16/2024 51 (H)     Creatinine 03/16/2024 1.39 (H)     Glucose 03/16/2024 220 (H)     Calcium 03/16/2024 9.3     eGFR 03/16/2024 36     Magnesium 03/16/2024 2.1     POC Glucose 03/16/2024 141 (H)     POC Glucose 03/16/2024 199 (H)     Sodium 03/17/2024 134  (L)     Potassium 03/17/2024 4.6     Chloride 03/17/2024 100     CO2 03/17/2024 21     ANION GAP 03/17/2024 13     BUN 03/17/2024 55 (H)     Creatinine 03/17/2024 1.44 (H)     Glucose 03/17/2024 154 (H)     Calcium 03/17/2024 9.0     eGFR 03/17/2024 34     WBC 03/17/2024 11.02 (H)     RBC 03/17/2024 4.51     Hemoglobin 03/17/2024 12.7     Hematocrit 03/17/2024 40.9     MCV 03/17/2024 91     MCH 03/17/2024 28.2     MCHC 03/17/2024 31.1 (L)     RDW 03/17/2024 14.4     Platelets 03/17/2024 323     MPV 03/17/2024 8.9     Magnesium 03/17/2024 2.1     POC Glucose 03/17/2024 149 (H)     POC Glucose 03/17/2024 186 (H)     Ventricular Rate 03/16/2024 62     Atrial Rate 03/16/2024 62     TN Interval 03/16/2024 156     QRSD Interval 03/16/2024 122     QT Interval 03/16/2024 434     QTC Interval 03/16/2024 440     P Axis 03/16/2024 46     QRS Axis 03/16/2024 -14     T Wave Axis 03/16/2024 -32     POC Glucose 03/17/2024 177 (H)     POC Glucose 03/17/2024 214 (H)     POC Glucose 03/18/2024 127     Sodium 03/18/2024 130 (L)     Potassium 03/18/2024 5.3     Chloride 03/18/2024 99     CO2 03/18/2024 22     ANION GAP 03/18/2024 9     BUN 03/18/2024 60 (H)     Creatinine 03/18/2024 1.48 (H)     Glucose 03/18/2024 237 (H)     Calcium 03/18/2024 8.5     eGFR 03/18/2024 33     POC Glucose 03/18/2024 211 (H)     POC Glucose 03/18/2024 174 (H)     POC Glucose 03/18/2024 176 (H)     Sodium 03/19/2024 132 (L)     Potassium 03/19/2024 4.7     Chloride 03/19/2024 98     CO2 03/19/2024 24     ANION GAP 03/19/2024 10     BUN 03/19/2024 58 (H)     Creatinine 03/19/2024 1.41 (H)     Glucose 03/19/2024 132     Calcium 03/19/2024 9.2     eGFR 03/19/2024 35     POC Glucose 03/19/2024 128     POC Glucose 03/19/2024 225 (H)    Transcribe Orders on 03/14/2024   Component Date Value    Sodium 03/14/2024 135     Potassium 03/14/2024 4.7     Chloride 03/14/2024 105     CO2 03/14/2024 22     ANION GAP 03/14/2024 8     BUN 03/14/2024 39 (H)     Creatinine  03/14/2024 1.21     Glucose, Fasting 03/14/2024 90     Calcium 03/14/2024 9.3     eGFR 03/14/2024 42    No results displayed because visit has over 200 results.        Imaging: Stress strip    Result Date: 4/23/2024  Narrative: Confirmed by MICHELE BE ([id number]),  Kamala León ([id number]) on 4/23/2024 10:03:42 AM    NM Myocardial Perfusion Spect (Pharmacological Induced Stress and/or Rest)    Result Date: 4/18/2024  Narrative:   Stress ECG: A pharmacological stress test was performed using regadenoson. The patient after stress for 3 min and 0 sec and had a maximal HR of 104 bpm (73% of MPHR) and 1.0 METS. The patient experienced non-limiting angina during the test. The patient reached the end of the protocol. The patient reported dyspnea, flushing, chest pain and lightheaded (atypical) during the stress test. Symptoms began during stress and ended during recovery. Blood pressure demonstrated a hypotensive response and heart rate demonstrated a blunted response to stress. The patient's heart rate recovery was normal.   Stress ECG: No ST deviation is noted. There were no arrhythmias during stress. There were no arrhythmias during recovery. . The ECG was not diagnostic due to pharmacological (vasodilator) stress. The ECG was uninterpretable due to right bundle branch block and left anterior fascicular block.   Stress Function: Left ventricular function post-stress is normal. Stress ejection fraction is 75%.   Perfusion: There are no perfusion defects.   Stress Combined Conclusion: Left ventricular perfusion is normal. Normal pharmacologic stress myocardial perfusion study, with no diagnostic evidence of ischemia or infarction.     XR chest 1 view portable    Result Date: 4/17/2024  Narrative: XR CHEST PORTABLE INDICATION: weakness. COMPARISON: Radiographs 3/15/2024. FINDINGS: Clear lungs. No pneumothorax or pleural effusion. Normal cardiomediastinal silhouette. No acute osseous abnormality within the limitations  of portable radiography. Normal upper abdomen.     Impression: No acute cardiopulmonary disease. Workstation performed: [id number]       Review of Systems:  Review of Systems   Cardiovascular:  Positive for leg swelling.   Musculoskeletal:  Positive for arthralgias, gait problem and myalgias.        Using a walker to assist with ambulation   All other systems reviewed and are negative.      Physical Exam:  Physical Exam  Vitals reviewed.   Constitutional:       Appearance: She is obese.   Cardiovascular:      Rate and Rhythm: Normal rate and regular rhythm.      Pulses: Normal pulses.      Heart sounds: Normal heart sounds.   Pulmonary:      Effort: Pulmonary effort is normal.      Breath sounds: Normal breath sounds.   Musculoskeletal:         General: Normal range of motion.      Cervical back: Normal range of motion and neck supple.      Right lower leg: Edema present.      Left lower leg: Edema present.      Comments: 1+ Maurilio LE edema to mid tibia    Skin:     General: Skin is warm and dry.      Capillary Refill: Capillary refill takes less than 2 seconds.   Neurological:      General: No focal deficit present.      Mental Status: She is alert and oriented to person, place, and time.   Psychiatric:         Mood and Affect: Mood normal.         Behavior: Behavior normal.         Discussion/Summary:  # Chronic HFpEF LVEF 60% NYHA class II stage C weight at home 227 pounds.  Weight in the office 230 pounds.  1+ Maurilio LE edema.  Increase torsemide from 20 mg daily to 40 mg daily.  Increase K-Dur from 20 mEq twice daily to 20 mEq 3 times daily.  Continue on metoprolol succinate  200 mg daily.  2 g sodium diet, 1500 cc fluid restriction Daily weights, BMP mag to be done in 1 week, follow-up in our office next week with Dr. Webb   # Paroxysmal Atrial fibrillation [id number]= 8, hx of  [facility] DCCV on 3/08/24 [id number]= 8 on Eliquis 5 mg twice daily for stroke prevention, reverted back to atrial fibrillation with RVR.   Started on Flecainide 100mg BID, follow-up EKG showed sinus bradycardia 48 bpm.  Continue on Toprol succinate 200 mg daily, follow up with EP next week

## 2024-05-07 ENCOUNTER — OFFICE VISIT (OUTPATIENT)
Dept: CARDIOLOGY CLINIC | Facility: CLINIC | Age: 79
End: 2024-05-07
Payer: COMMERCIAL

## 2024-05-07 VITALS
HEART RATE: 62 BPM | DIASTOLIC BLOOD PRESSURE: 64 MMHG | BODY MASS INDEX: 40.89 KG/M2 | SYSTOLIC BLOOD PRESSURE: 124 MMHG | OXYGEN SATURATION: 97 % | HEIGHT: 63 IN | WEIGHT: 230.8 LBS

## 2024-05-07 DIAGNOSIS — I48.0 PAF (PAROXYSMAL ATRIAL FIBRILLATION) (HCC): ICD-10-CM

## 2024-05-07 DIAGNOSIS — I50.32 CHRONIC DIASTOLIC CHF (CONGESTIVE HEART FAILURE) (HCC): Primary | ICD-10-CM

## 2024-05-07 PROCEDURE — 99215 OFFICE O/P EST HI 40 MIN: CPT | Performed by: NURSE PRACTITIONER

## 2024-05-07 RX ORDER — TORSEMIDE 20 MG/1
TABLET ORAL
Qty: 180 TABLET | Refills: 3 | Status: SHIPPED | OUTPATIENT
Start: 2024-05-07

## 2024-05-07 RX ORDER — POTASSIUM CHLORIDE 750 MG/1
CAPSULE, EXTENDED RELEASE ORAL
Qty: 90 CAPSULE | Refills: 3 | Status: SHIPPED | OUTPATIENT
Start: 2024-05-07 | End: 2024-05-15

## 2024-05-07 NOTE — PATIENT INSTRUCTIONS
Maintain a 2 gram daily sodium diet and 1500 ml daily fluid restriction.  Check daily weights.  If you gain 3 pounds in one day, 5 pounds in one week, or experience worsening shortness of breath or increasing lower leg swelling.  Please call the heart failure office at [phone].  Please bring a  list of your current medications and daily weights to the office visit     Torsemide 40mg daily , K dur 20meq TID, Lab work in a week

## 2024-05-14 ENCOUNTER — PATIENT MESSAGE (OUTPATIENT)
Dept: CARDIOLOGY CLINIC | Facility: CLINIC | Age: 79
End: 2024-05-14

## 2024-05-14 ENCOUNTER — PREP FOR PROCEDURE (OUTPATIENT)
Dept: CARDIOLOGY CLINIC | Facility: CLINIC | Age: 79
End: 2024-05-14

## 2024-05-14 ENCOUNTER — OFFICE VISIT (OUTPATIENT)
Dept: CARDIOLOGY CLINIC | Facility: CLINIC | Age: 79
End: 2024-05-14
Payer: COMMERCIAL

## 2024-05-14 ENCOUNTER — APPOINTMENT (OUTPATIENT)
Dept: LAB | Facility: CLINIC | Age: 79
End: 2024-05-14
Payer: COMMERCIAL

## 2024-05-14 ENCOUNTER — TELEPHONE (OUTPATIENT)
Dept: CARDIOLOGY CLINIC | Facility: CLINIC | Age: 79
End: 2024-05-14

## 2024-05-14 VITALS
HEART RATE: 59 BPM | WEIGHT: 229.8 LBS | SYSTOLIC BLOOD PRESSURE: 120 MMHG | HEIGHT: 63 IN | DIASTOLIC BLOOD PRESSURE: 72 MMHG | BODY MASS INDEX: 40.72 KG/M2

## 2024-05-14 DIAGNOSIS — I48.0 PAF (PAROXYSMAL ATRIAL FIBRILLATION) (HCC): Primary | ICD-10-CM

## 2024-05-14 DIAGNOSIS — N39.0 URINARY TRACT INFECTION WITHOUT HEMATURIA, SITE UNSPECIFIED: ICD-10-CM

## 2024-05-14 DIAGNOSIS — I50.9 CHF EXACERBATION (HCC): ICD-10-CM

## 2024-05-14 DIAGNOSIS — I50.32 CHRONIC DIASTOLIC CHF (CONGESTIVE HEART FAILURE) (HCC): ICD-10-CM

## 2024-05-14 DIAGNOSIS — I48.0 PAF (PAROXYSMAL ATRIAL FIBRILLATION) (HCC): ICD-10-CM

## 2024-05-14 LAB
ANION GAP SERPL CALCULATED.3IONS-SCNC: 12 MMOL/L (ref 4–13)
BUN SERPL-MCNC: 32 MG/DL (ref 5–25)
CALCIUM SERPL-MCNC: 10 MG/DL (ref 8.4–10.2)
CHLORIDE SERPL-SCNC: 101 MMOL/L (ref 96–108)
CO2 SERPL-SCNC: 28 MMOL/L (ref 21–32)
CREAT SERPL-MCNC: 1.48 MG/DL (ref 0.6–1.3)
GFR SERPL CREATININE-BSD FRML MDRD: 33 ML/MIN/1.73SQ M
GLUCOSE SERPL-MCNC: 138 MG/DL (ref 65–140)
MAGNESIUM SERPL-MCNC: 2.1 MG/DL (ref 1.9–2.7)
POTASSIUM SERPL-SCNC: 4 MMOL/L (ref 3.5–5.3)
SODIUM SERPL-SCNC: 141 MMOL/L (ref 135–147)

## 2024-05-14 PROCEDURE — 83735 ASSAY OF MAGNESIUM: CPT

## 2024-05-14 PROCEDURE — 80048 BASIC METABOLIC PNL TOTAL CA: CPT

## 2024-05-14 PROCEDURE — 99215 OFFICE O/P EST HI 40 MIN: CPT | Performed by: PHYSICIAN ASSISTANT

## 2024-05-14 PROCEDURE — 36415 COLL VENOUS BLD VENIPUNCTURE: CPT

## 2024-05-14 PROCEDURE — 93246 EXT ECG>7D<15D RECORDING: CPT | Performed by: PHYSICIAN ASSISTANT

## 2024-05-14 PROCEDURE — 93000 ELECTROCARDIOGRAM COMPLETE: CPT | Performed by: PHYSICIAN ASSISTANT

## 2024-05-14 NOTE — TELEPHONE ENCOUNTER
"Per insurance of [employer], needs to do 2W ZIO.     Informed Lata York and it will be placed today in office.     Thanks,  Yoko \"Melissa\" Yannick     "

## 2024-05-14 NOTE — LETTER
2024               CARDIAC ABLATION INSTRUCTIONS     Jessica Jaquez   : 1945  MRN: [id number]  [address]  [place] [address]    Procedure: 24     Procedure Date: PHU/AFIB PFA ABLATION / LOOP RECORDER IMPLANT     Location: [facility]  Address: [address] [place] [address], [place] [address]        Labs to be done on 24: CMP /  CBC (FASTING 8 HOURS)    BLOOD THINNER INSTRUCTIONS (Coumadin / Warfarin / Pradaxa / Eliquis / Xarelto):     Eliquis - Keep taking.    Medication Hold:     Torsemide - Do not take morning of procedure.    Glimepiride - Take your regular dose day/evening before procedure and do not take morning of procedure.     Arrival Time: The Hospital will contact you the day prior to your procedure, usually between 4PM - 6PM to instruct you on the time and place to report. If you do not hear from a [facility]  by 6PM the evening prior to your procedure, please contact the campus you are scheduled at.     DO NOT drink or eat anything after midnight the night prior to your procedure. You may have a minimal amount of water with your AM medications. If your procedure is scheduled after 12:00 noon, you may have clear liquids until 8:00AM the morning of your procedure. (Clear liquids: 7UP, ginger ale, jello, or broth.)    Arrange for a responsible adult to drive you to and from the hospital.    You should continue to take your morning medications with a sip of water UNLESS ADVISED OTHERWISE.       DO NOT stop taking Plavix or Aspirin unless advised otherwise.     If you are currently taking Fish Oil, Krill oil and/or Vitamin E please DO NOT TAKE FOR A WEEK PRIOR TO PROCEDURE.     If you are diabetic, DO NOT take any of your diabetic pills the morning of your procedure. Oral diabetic medications may include Glucophage, Prandin, Glyburide, Micronase, Avandia, Glucovance, Precose, Glynase, and Starlix.     Bring a list of daily medications, vitamins,  "minerals, herbals and nutritional supplements you take. Please include dosages and the times you take them each day.     If you are packing an overnight bag, pack minimal clothing, you will be given hospital sleepwear.   DO NOT bring money, valuables or jewelry. The wedding band is ok.     If you use CPAP machine, bring it to the hospital.      Bring your Photo ID and Insurance cards with you.     Please notify us if you have been admitted to the hospital within 30 days.      FAILURE TO FOLLOW ANY OF THESE INSTRUCTIONS COULD RESULT IN THE CANCELLATION OF YOUR PROCEDURE      Please call [phone] if you do not hear from the  by 6:00PM the night before your procedure.    All patients enter through ENTRANCE B. [employer] Parking is available free of charge or park on Parking Deck B.        Thank you,   Yoko \"Melissa\" Yannick  Surgery Coordinator  [facility]   [address] [place] [id number]  Teams: [phone]             "

## 2024-05-14 NOTE — TELEPHONE ENCOUNTER
",    Which company to use for the LOOP Recorder Implant?     Thanks,  Yoko \"Melissa\" Yannick     "

## 2024-05-14 NOTE — PROGRESS NOTES
Electrophysiology Office Note    Jessica Jaquez  1945  [id number]  HEART & VASCULAR [facility]  [address] [place] [address]  [place] [id number]    Assessment/Plan     Primary diagnosis:   Paroxysmal atrial fibrillation with RVR   Newly diagnosed march 2024 during hospital stay for LBP   Rate control w/ Toprol  mg daily (started on this for BP)   PHU and DCCV in march   Last echo with preserved LVEF 60% w/o valvular abnormalities   Converted to sinus in the ER spontaneously   Started on flecainide   Stress test normal   EKG here sinus bradycardia at 59 bpm with QRS duration 126 ms   Has been feeling horrible since flecainide start with fatigue and dizziness  Plan:  Discussed a fib ablation in detial including procedure, hospital stay and potential complications including but not limited to stroke, vessel injury, esophageal injury, perforation of cardiac muscle requiring drain placement, etc.   Patient agreeable to proceed with the ablation at this time   Check loop recorder with insurance company given patient does not feel atrial fibrillation would like to monitor post ablation  Stop flecainide given patient is highly symptomatic and I suspect flecainide is what is causing his given symptoms started right at time of initiation.  HFpEF last echo w/ EF 60%   Follows with general cardiology   Has edema but does not feel volume up.   Diuretic increased by outpatient cardiologist.   Hx of CVA  HTN   Bp today   DM II   Obesity w/ bmi 39.08     Secondary diagnosis:       Rhythm History:   Atrial fibrillation:     Atrial flutter:     SVT:     VT/VF/PVC:     Device history:   Pacemaker:    Defibrillator:    BIV PPM:    BIV ICD:    ILR:      Cardiac Testing:     ECHO: Results for orders placed during the hospital encounter of 12/28/18    Echo complete with contrast if indicated    Narrative  [facility]  [address] [facility]  [place]  [id number]  [phone]    Transthoracic Echocardiogram  2D, M-mode, Doppler, and Color Doppler    Study date:  28-Dec-2018    Patient: HAN CARVAJAL  MR number: [id number]  Account number: [id number]  : 1945  Age: 73 years  Gender: Female  Status: Outpatient  Location: [address] [place] [address]  Height: 63 in  Weight: 232 lb  BP: 169/ 76 mmHg    Indications: Heart failure    Diagnoses: I50.9 - Heart failure, unspecified    Sonographer:  DANYELLE Ga  Interpreting Physician:  Kvng Aviles MD  Referring Physician:  Jorden Holt III, MD  Group:  [facility]  Cardiology Fellow:  Von Fuentes MD    SUMMARY    PROCEDURE INFORMATION:  This was a technically difficult study.    LEFT VENTRICLE:  Systolic function was normal. Ejection fraction was estimated to be 60 %.  There were no regional wall motion abnormalities.  Doppler parameters were consistent with abnormal left ventricular relaxation (grade 1 diastolic dysfunction).    MITRAL VALVE:  There was trace regurgitation.    TRICUSPID VALVE:  There was trace regurgitation.  Pulmonary artery systolic pressure was within the normal range.    PULMONIC VALVE:  There was trace regurgitation.    HISTORY: PRIOR HISTORY: Hypertension, hyperlipidemia, diastolic dysfunction, type 2 diabetes, GERD    PROCEDURE: The study was performed in the [address] [place] [address]. This was a routine study. The transthoracic approach was used. The study included complete 2D imaging, M-mode, complete spectral Doppler, and color Doppler. This  was a technically difficult study.    LEFT VENTRICLE: Size was normal. Systolic function was normal. Ejection fraction was estimated to be 60 %. There were no regional wall motion abnormalities. Wall thickness was normal. DOPPLER: Doppler parameters were consistent with  abnormal left ventricular relaxation (grade 1 diastolic dysfunction).    RIGHT VENTRICLE: The size was normal. Systolic  function was normal. Wall thickness was normal.    LEFT ATRIUM: Size was normal.    RIGHT ATRIUM: Size was normal.    MITRAL VALVE: Valve structure was normal. There was normal leaflet separation. DOPPLER: The transmitral velocity was within the normal range. There was no evidence for stenosis. There was trace regurgitation.    AORTIC VALVE: The valve was probably trileaflet. Leaflets exhibited normal cuspal separation. The valve was not well visualized. DOPPLER: Transaortic velocity was within the normal range. There was no evidence for stenosis. There was no  significant regurgitation.    TRICUSPID VALVE: The valve structure was normal. There was normal leaflet separation. DOPPLER: The transtricuspid velocity was within the normal range. There was no evidence for stenosis. There was trace regurgitation. Pulmonary artery  systolic pressure was within the normal range. Estimated peak PA pressure was 30 mmHg.    PULMONIC VALVE: Not well visualized. DOPPLER: The transpulmonic velocity was within the normal range. There was trace regurgitation.    PERICARDIUM: There was no pericardial effusion. A pericardial fat pad was present. The pericardium was normal in appearance.    AORTA: The root exhibited normal size.    SYSTEMIC VEINS: IVC: The inferior vena cava was normal in size. Respirophasic changes were normal.    SYSTEM MEASUREMENT TABLES    2D  %FS: 31.39 %  Ao Diam: 3.29 cm  EDV(Teich): 100.54 ml  EF(Cube): 67.7 %  EF(Teich): 59.26 %  ESV(Cube): 32.77 ml  ESV(Teich): 40.96 ml  IVSd: 0.99 cm  LA Area: 16.53 cm2  LA Diam: 3.39 cm  LVEDV MOD A4C: 94.35 ml  LVEF MOD A4C: 68.85 %  LVESV MOD A4C: 29.39 ml  LVIDd: 4.66 cm  LVIDs: 3.2 cm  LVLd A4C: 7.19 cm  LVLs A4C: 5.3 cm  LVPWd: 1.08 cm  RA Area: 13.64 cm2  RV Diam.: 2.6 cm  SV MOD A4C: 64.96 ml  SV(Cube): 68.69 ml  SV(Teich): 59.58 ml    CW  TR Vmax: 2.6 m/s  TR maxP.99 mmHg    MM  TAPSE: 1.84 cm    PW  E': 0.06 m/s  E/E': 14.64  MV A [place]: 0.97 m/s  MV Dec [place]:  3.14 m/s2  MV DecT: 267.31 ms  MV E [place]: 0.84 m/s  MV E/A Ratio: 0.86    [facility] Accredited Echocardiography Laboratory    Prepared and electronically signed by    Kvng Aviles MD  Signed 28-Dec-2018 11:05:05        History of Present Illness     HPI/INTERVAL HISTORY:  Jessica Jaquez is a 78 y.o. year old female with past medical history as mentioned above. She presents to \[facility]\"" after found to be tachycardiac by her outpatient PT. She was noted to be in atrial fibrillation with RVR. She has a realtively new diagnosis of atrial fibrillation starting in march of the year when she was found to be in a fib during an admission for lower back pain. She was started on eliquis for ac due to a [id number] score of 8. She had previously been on metoprolol for a history of hypertension and was on max dose of 200 mg BID. She underwent cardioverison with PHU during march admission to restore sinus rhythm. She had an echo done during that admission as well that showed a preserved EF of 60%. She did have conversion from atrial fibrillation to sinus rhythm while in the ED here. She has not had a stress done that is visible in her records although she states she has tried to have them before but was unsuccessful at completion secondary to back pain.  she was again seen in the hospital for RVR in April of this year and was seen by the EP service. She was started on flecainide and underwent stress testing to rule out CAD. Stress test was normal. She has done well with sinus on flecainide and presents today to discuss ablation.  Patient has been feeling unwell ever since being released from the hospital.  She reports that after starting flecainide she has felt significant fatigue, dizziness, and unable to complete activities of daily living.  Patient to stop flecainide today and we will bridge her to atrial fibrillation ablation as I feel this will best solve her A-fib problem as well as getting her off of  antiarrhythmics which she obviously cannot tolerate.      Review of Systems   Constitutional:  Positive for fatigue. Negative for fever.   Respiratory:  Negative for chest tightness and shortness of breath.    Cardiovascular:  Negative for chest pain and palpitations.   Neurological:  Positive for dizziness and light-headedness.   All other systems reviewed and are negative.    ROS as noted above, otherwise 12 point review of systems was performed and is negative.       Historical Information   Social History     Socioeconomic History    Marital status: /Civil Union     Spouse name: Not on file    Number of children: Not on file    Years of education: Not on file    Highest education level: Not on file   Occupational History    Not on file   Tobacco Use    Smoking status: Never    Smokeless tobacco: Never   Substance and Sexual Activity    Alcohol use: Never    Drug use: No    Sexual activity: Not on file   Other Topics Concern    Not on file   Social History Narrative    Not on file     Social Determinants of Health     Financial Resource Strain: Not on file   Food Insecurity: No Food Insecurity (3/15/2024)    Hunger Vital Sign     Worried About Running Out of Food in the Last Year: Never true     Ran Out of Food in the Last Year: Never true   Transportation Needs: No Transportation Needs (3/15/2024)    PRAPARE - Transportation     Lack of Transportation (Medical): No     Lack of Transportation (Non-Medical): No   Physical Activity: Not on file   Stress: Not on file   Social Connections: Not on file   Intimate Partner Violence: Not on file   Housing Stability: Low Risk  (3/15/2024)    Housing Stability Vital Sign     Unable to Pay for Housing in the Last Year: No     Number of Places Lived in the Last Year: 1     Unstable Housing in the Last Year: No     Past Medical History:   Diagnosis Date    Arthritis     Colorectal polyps     Constipation     Diabetes mellitus (HCC)     Diverticulosis of colon      Hiatal hernia     Hypertension     Increased urinary frequency     Lumbar disc disease     Pressure injury of skin     SOB (shortness of breath)     Stress incontinence     Vitamin D deficiency 07/27/2018     Past Surgical History:   Procedure Laterality Date    BACK SURGERY      CARPAL TUNNEL RELEASE Left     CATARACT EXTRACTION Bilateral     CHOLECYSTECTOMY      COLONOSCOPY W/ ENDOSCOPIC US N/A 2/24/2016    Procedure: ANAL ENDOSCOPIC U/S;  Surgeon: HOLLIS Washington MD;  Location: [facility];  Service:     HERNIA REPAIR      IA COLONOSCOPY FLX DX W/COLLJ SPEC WHEN PFRMD N/A 2/24/2016    Procedure: COLONOSCOPY;  Surgeon: HOLLIS Washington MD;  Location: [facility];  Service: Colorectal    TONSILLECTOMY      TUBAL LIGATION       Social History     Substance and Sexual Activity   Alcohol Use Never     Social History     Substance and Sexual Activity   Drug Use No     Social History     Tobacco Use   Smoking Status Never   Smokeless Tobacco Never     Family History   Problem Relation Age of Onset    Hypertension Mother     Heart attack Mother     Diabetes Mother     Prostate cancer Father     No Known Problems Brother     Stroke Maternal Grandmother     Stroke Paternal Grandmother     Stroke Paternal Grandfather     Thyroid cancer Neg Hx        Meds/Allergies       Current Outpatient Medications:     apixaban (ELIQUIS) 5 mg, Take 1 tablet (5 mg total) by mouth 2 (two) times a day, Disp: 60 tablet, Rfl: 4    aspirin 81 mg chewable tablet, Chew 81 mg daily, Disp: , Rfl:     bimatoprost (Lumigan) 0.01 % ophthalmic drops, Administer 1 drop to both eyes daily at bedtime, Disp: , Rfl:     Cholecalciferol (VITAMIN D3) 1000 units CAPS, Take by mouth daily  , Disp: , Rfl:     ezetimibe (ZETIA) 10 mg tablet, Take 1 tablet (10 mg total) by mouth daily at bedtime, Disp: 60 tablet, Rfl: 5    flecainide (TAMBOCOR) 100 mg tablet, Take 1 tablet (100 mg total) by mouth 2 (two) times a day, Disp: 180 tablet, Rfl: 0    gabapentin  (NEURONTIN) 100 mg capsule, Take 1 capsule (100 mg total) by mouth 3 (three) times a day, Disp: 90 capsule, Rfl: 2    glimepiride (AMARYL) 1 mg tablet, Take 1 mg by mouth every morning before breakfast, Disp: , Rfl:     hydrocortisone 2.5 % cream, as needed, Disp: , Rfl: 3    ketoconazole (NIZORAL) 2 % cream, as needed, Disp: , Rfl: 3    metoprolol succinate (TOPROL-XL) 100 mg 24 hr tablet, Take 200 mg by mouth daily, Disp: , Rfl: 3    Multiple Vitamins-Minerals (CENTRUM SILVER PO), multivitamin, Disp: , Rfl:     potassium chloride (MICRO-K) 10 MEQ CR capsule, Micro K 20meq TID, Disp: 90 capsule, Rfl: 3    torsemide (DEMADEX) 20 mg tablet, Torsemide 40mg daily, Disp: 180 tablet, Rfl: 3    Allergies   Allergen Reactions    Other Cough and Sneezing     Seasonal allergies    Pollen Extract Allergic Rhinitis       Objective   Vitals: There were no vitals taken for this visit.        Physical Exam  Vitals and nursing note reviewed.   Constitutional:       General: She is not in acute distress.     Appearance: She is obese.   Cardiovascular:      Rate and Rhythm: Normal rate and regular rhythm.   Pulmonary:      Effort: Pulmonary effort is normal.      Breath sounds: Normal breath sounds.   Musculoskeletal:      Right lower leg: Edema present.      Left lower leg: Edema present.   Skin:     General: Skin is warm and dry.   Neurological:      General: No focal deficit present.      Mental Status: She is alert and oriented to person, place, and time.   Psychiatric:         Mood and Affect: Mood normal.           Labs:  Admission on 04/17/2024, Discharged on 04/19/2024   Component Date Value    WBC 04/17/2024 11.80 (H)     RBC 04/17/2024 4.35     Hemoglobin 04/17/2024 12.5     Hematocrit 04/17/2024 38.8     MCV 04/17/2024 89     MCH 04/17/2024 28.7     MCHC 04/17/2024 32.2     RDW 04/17/2024 15.6 (H)     MPV 04/17/2024 9.1     Platelets 04/17/2024 375     nRBC 04/17/2024 0     Segmented % 04/17/2024 70     Immature Grans %  04/17/2024 0     Lymphocytes % 04/17/2024 21     Monocytes % 04/17/2024 6     Eosinophils Relative 04/17/2024 3     Basophils Relative 04/17/2024 0     Absolute Neutrophils 04/17/2024 8.20 (H)     Absolute Immature Grans 04/17/2024 0.05     Absolute Lymphocytes 04/17/2024 2.52     Absolute Monocytes 04/17/2024 0.69     Eosinophils Absolute 04/17/2024 0.29     Basophils Absolute 04/17/2024 0.05     Sodium 04/17/2024 135     Potassium 04/17/2024 2.8 (L)     Chloride 04/17/2024 87 (L)     CO2 04/17/2024 32     ANION GAP 04/17/2024 16 (H)     BUN 04/17/2024 70 (H)     Creatinine 04/17/2024 1.78 (H)     Glucose 04/17/2024 233 (H)     Calcium 04/17/2024 9.8     AST 04/17/2024 22     ALT 04/17/2024 16     Alkaline Phosphatase 04/17/2024 72     Total Protein 04/17/2024 7.5     Albumin 04/17/2024 3.7     Total Bilirubin 04/17/2024 0.57     eGFR 04/17/2024 26     hs TnI 0hr 04/17/2024 54 (H)     Ventricular Rate 04/17/2024 137     Atrial Rate 04/17/2024 78     QRSD Interval 04/17/2024 140     QT Interval 04/17/2024 336     QTC Interval 04/17/2024 507     QRS Axis 04/17/2024 -49     T Wave Axis 04/17/2024 91     Ventricular Rate 04/17/2024 147     Atrial Rate 04/17/2024 147     QRSD Interval 04/17/2024 136     QT Interval 04/17/2024 344     QTC Interval 04/17/2024 538     P Axis 04/17/2024 101     QRS Axis 04/17/2024 -50     T Wave Axis 04/17/2024 104     TSH 3RD GENERATON 04/17/2024 0.936     D-Dimer, Quant 04/17/2024 0.68 (H)     hs TnI 2hr 04/17/2024 63 (H)     Delta 2hr hsTnI 04/17/2024 9     hs TnI 4hr 04/17/2024 99 (H)     Delta 4hr hsTnI 04/17/2024 45 (H)     Magnesium 04/17/2024 2.3     Color, UA 04/17/2024 Colorless     Clarity, UA 04/17/2024 Clear     Specific Gravity, UA 04/17/2024 1.008     pH, UA 04/17/2024 5.5     Leukocytes, UA 04/17/2024 Negative     Nitrite, UA 04/17/2024 Negative     Protein, UA 04/17/2024 Negative     Glucose, UA 04/17/2024 Negative     Ketones, UA 04/17/2024 Negative     Urobilinogen, UA  04/17/2024 <2.0     Bilirubin, UA 04/17/2024 Negative     Occult Blood, UA 04/17/2024 Negative     RBC, UA 04/17/2024 None Seen     WBC, UA 04/17/2024 None Seen     Epithelial Cells 04/17/2024 Occasional     Bacteria, UA 04/17/2024 None Seen     MUCUS THREADS 04/17/2024 Occasional (A)     Hyaline Casts, UA 04/17/2024 0-3 (A)     Ventricular Rate 04/17/2024 95     Atrial Rate 04/17/2024 95     SC Interval 04/17/2024 152     QRSD Interval 04/17/2024 128     QT Interval 04/17/2024 460     QTC Interval 04/17/2024 578     P Axis 04/17/2024 92     QRS Axis 04/17/2024 -45     T Wave Axis 04/17/2024 33     Rest Nuclear Isotope Dose 04/18/2024 11.00     Stress Nuclear Isotope D* 04/18/2024 32.00     Baseline HR 04/18/2024 93     Baseline BP 04/18/2024 142/84     O2 sat rest 04/18/2024 97     Stress peak HR 04/18/2024 102     Post peak BP 04/18/2024 100     O2 sat peak 04/18/2024 98     Recovery HR 04/18/2024 92     Recovery BP 04/18/2024 140     O2 sat recovery 04/18/2024 96     Max HR 04/18/2024 104     Max HR Percent 04/18/2024 73     Exercise duration (min) 04/18/2024 3     Exercise duration (sec) 04/18/2024 0     Estimated workload 04/18/2024 1.0     Rate Pressure Product 04/18/2024 10,200.0     Angina Index 04/18/2024 1     Stress/rest perfusion ra* 04/18/2024 1.18     EF (%) 04/18/2024 75     POC Glucose 04/17/2024 223 (H)     POC Glucose 04/17/2024 149 (H)     Sodium 04/18/2024 133 (L)     Potassium 04/18/2024 3.8     Chloride 04/18/2024 92 (L)     CO2 04/18/2024 26     ANION GAP 04/18/2024 15 (H)     BUN 04/18/2024 66 (H)     Creatinine 04/18/2024 1.50 (H)     Glucose 04/18/2024 153 (H)     Calcium 04/18/2024 9.0     eGFR 04/18/2024 32     Magnesium 04/18/2024 2.5     WBC 04/18/2024 12.86 (H)     RBC 04/18/2024 4.08     Hemoglobin 04/18/2024 11.8     Hematocrit 04/18/2024 37.2     MCV 04/18/2024 91     MCH 04/18/2024 28.9     MCHC 04/18/2024 31.7     RDW 04/18/2024 16.3 (H)     Platelets 04/18/2024 367     MPV  04/18/2024 9.1     POC Glucose 04/18/2024 174 (H)     Protocol Name 04/18/2024 LIZ SIT     Exercise duration (min) 04/18/2024 3     Exercise duration (sec) 04/18/2024 0     Post Peak Systolic BP 04/18/2024 142     Max Diastolic Bp 04/18/2024 84     Peak HR 04/18/2024 104     Max Predicted Heart Rate 04/18/2024 141     Test Indication 04/18/2024 Screening for CAD     Target Hr Formular 04/18/2024 (220 - Age)*100%     POC Glucose 04/18/2024 225 (H)     POC Glucose 04/18/2024 212 (H)     POC Glucose 04/18/2024 235 (H)     Sodium 04/19/2024 136     Potassium 04/19/2024 3.8     Chloride 04/19/2024 95 (L)     CO2 04/19/2024 30     ANION GAP 04/19/2024 11     BUN 04/19/2024 56 (H)     Creatinine 04/19/2024 1.39 (H)     Glucose 04/19/2024 152 (H)     Calcium 04/19/2024 9.0     eGFR 04/19/2024 36     POC Glucose 04/19/2024 199 (H)     POC Glucose 04/19/2024 259 (H)    Appointment on 03/30/2024   Component Date Value    Color, UA 03/30/2024 Light Yellow     Clarity, UA 03/30/2024 Turbid     Specific Gravity, UA 03/30/2024 1.014     pH, UA 03/30/2024 6.0     Leukocytes, UA 03/30/2024 Large (A)     Nitrite, UA 03/30/2024 Negative     Protein, UA 03/30/2024 Trace (A)     Glucose, UA 03/30/2024 Negative     Ketones, UA 03/30/2024 Negative     Urobilinogen, UA 03/30/2024 <2.0     Bilirubin, UA 03/30/2024 Negative     Occult Blood, UA 03/30/2024 Trace (A)     RBC, UA 03/30/2024 20-30 (A)     WBC, UA 03/30/2024 Innumerable (A)     Epithelial Cells 03/30/2024 Occasional     Bacteria, UA 03/30/2024 Innumerable (A)     Hyaline Casts, UA 03/30/2024 5-10 (A)     Uric Acid Susi, UA 03/30/2024 Occasional     WBC Clumps 03/30/2024 Present     Urine Culture 03/30/2024 >100,000 cfu/ml Escherichia coli (A)    Appointment on 03/26/2024   Component Date Value    Sodium 03/26/2024 139     Potassium 03/26/2024 4.3     Chloride 03/26/2024 92 (L)     CO2 03/26/2024 31     ANION GAP 03/26/2024 16 (H)     BUN 03/26/2024 68 (H)     Creatinine  03/26/2024 1.78 (H)     Glucose 03/26/2024 135     Calcium 03/26/2024 9.6     eGFR 03/26/2024 26    Admission on 03/15/2024, Discharged on 03/19/2024   Component Date Value    Ventricular Rate 03/15/2024 68     Atrial Rate 03/15/2024 68     IN Interval 03/15/2024 160     QRSD Interval 03/15/2024 120     QT Interval 03/15/2024 402     QTC Interval 03/15/2024 427     P Axis 03/15/2024 63     QRS [place] 03/15/2024 -17     T Wave Axis 03/15/2024 -18     WBC 03/15/2024 7.91     RBC 03/15/2024 4.41     Hemoglobin 03/15/2024 12.5     Hematocrit 03/15/2024 40.5     MCV 03/15/2024 92     MCH 03/15/2024 28.3     MCHC 03/15/2024 30.9 (L)     RDW 03/15/2024 14.2     MPV 03/15/2024 8.9     Platelets 03/15/2024 298     nRBC 03/15/2024 0     Segmented % 03/15/2024 65     Immature Grans % 03/15/2024 0     Lymphocytes % 03/15/2024 19     Monocytes % 03/15/2024 8     Eosinophils Relative 03/15/2024 7 (H)     Basophils Relative 03/15/2024 1     Absolute Neutrophils 03/15/2024 5.22     Absolute Immature Grans 03/15/2024 0.03     Absolute Lymphocytes 03/15/2024 1.49     Absolute Monocytes 03/15/2024 0.59     Eosinophils Absolute 03/15/2024 0.54     Basophils Absolute 03/15/2024 0.04     Sodium 03/15/2024 136     Potassium 03/15/2024 4.2     Chloride 03/15/2024 104     CO2 03/15/2024 25     ANION GAP 03/15/2024 7     BUN 03/15/2024 42 (H)     Creatinine 03/15/2024 1.33 (H)     Glucose 03/15/2024 76     Calcium 03/15/2024 9.1     AST 03/15/2024 23     ALT 03/15/2024 26     Alkaline Phosphatase 03/15/2024 68     Total Protein 03/15/2024 6.9     Albumin 03/15/2024 3.6     Total Bilirubin 03/15/2024 0.37     eGFR 03/15/2024 38     hs TnI 0hr 03/15/2024 8     hs TnI 2hr 03/15/2024 8     Delta 2hr hsTnI 03/15/2024 0     hs TnI 4hr 03/15/2024 9     Delta 4hr hsTnI 03/15/2024 1     POC Glucose 03/15/2024 62 (L)     POC Glucose 03/15/2024 80     POC Glucose 03/15/2024 195 (H)     WBC 03/16/2024 8.62     RBC 03/16/2024 4.32     Hemoglobin 03/16/2024  12.2     Hematocrit 03/16/2024 38.2     MCV 03/16/2024 88     MCH 03/16/2024 28.2     MCHC 03/16/2024 31.9     RDW 03/16/2024 14.6     MPV 03/16/2024 8.9     Platelets 03/16/2024 310     nRBC 03/16/2024 0     Segmented % 03/16/2024 61     Immature Grans % 03/16/2024 0     Lymphocytes % 03/16/2024 24     Monocytes % 03/16/2024 9     Eosinophils Relative 03/16/2024 6     Basophils Relative 03/16/2024 0     Absolute Neutrophils 03/16/2024 5.24     Absolute Immature Grans 03/16/2024 0.03     Absolute Lymphocytes 03/16/2024 2.03     Absolute Monocytes 03/16/2024 0.79     Eosinophils Absolute 03/16/2024 0.51     Basophils Absolute 03/16/2024 0.02     Sodium 03/16/2024 136     Potassium 03/16/2024 4.5     Chloride 03/16/2024 104     CO2 03/16/2024 22     ANION GAP 03/16/2024 10     BUN 03/16/2024 46 (H)     Creatinine 03/16/2024 1.21     Glucose 03/16/2024 101     Calcium 03/16/2024 8.8     AST 03/16/2024 25     ALT 03/16/2024 25     Alkaline Phosphatase 03/16/2024 71     Total Protein 03/16/2024 6.5     Albumin 03/16/2024 3.6     Total Bilirubin 03/16/2024 0.50     eGFR 03/16/2024 42     Magnesium 03/16/2024 2.1     TSH 3RD GENERATON 03/16/2024 0.427 (L)     Cholesterol 03/16/2024 159     Triglycerides 03/16/2024 146     HDL, Direct 03/16/2024 45 (L)     LDL Calculated 03/16/2024 85     POC Glucose 03/16/2024 114     BNP 03/16/2024 30     POC Glucose 03/16/2024 212 (H)     Sodium 03/16/2024 140     Potassium 03/16/2024 4.8     Chloride 03/16/2024 100     CO2 03/16/2024 25     ANION GAP 03/16/2024 15 (H)     BUN 03/16/2024 51 (H)     Creatinine 03/16/2024 1.39 (H)     Glucose 03/16/2024 220 (H)     Calcium 03/16/2024 9.3     eGFR 03/16/2024 36     Magnesium 03/16/2024 2.1     POC Glucose 03/16/2024 141 (H)     POC Glucose 03/16/2024 199 (H)     Sodium 03/17/2024 134 (L)     Potassium 03/17/2024 4.6     Chloride 03/17/2024 100     CO2 03/17/2024 21     ANION GAP 03/17/2024 13     BUN 03/17/2024 55 (H)     Creatinine  03/17/2024 1.44 (H)     Glucose 03/17/2024 154 (H)     Calcium 03/17/2024 9.0     eGFR 03/17/2024 34     WBC 03/17/2024 11.02 (H)     RBC 03/17/2024 4.51     Hemoglobin 03/17/2024 12.7     Hematocrit 03/17/2024 40.9     MCV 03/17/2024 91     MCH 03/17/2024 28.2     MCHC 03/17/2024 31.1 (L)     RDW 03/17/2024 14.4     Platelets 03/17/2024 323     MPV 03/17/2024 8.9     Magnesium 03/17/2024 2.1     POC Glucose 03/17/2024 149 (H)     POC Glucose 03/17/2024 186 (H)     Ventricular Rate 03/16/2024 62     Atrial Rate 03/16/2024 62     AK Interval 03/16/2024 156     QRSD Interval 03/16/2024 122     QT Interval 03/16/2024 434     QTC Interval 03/16/2024 440     P Axis 03/16/2024 46     QRS Axis 03/16/2024 -14     T Wave Axis 03/16/2024 -32     POC Glucose 03/17/2024 177 (H)     POC Glucose 03/17/2024 214 (H)     POC Glucose 03/18/2024 127     Sodium 03/18/2024 130 (L)     Potassium 03/18/2024 5.3     Chloride 03/18/2024 99     CO2 03/18/2024 22     ANION GAP 03/18/2024 9     BUN 03/18/2024 60 (H)     Creatinine 03/18/2024 1.48 (H)     Glucose 03/18/2024 237 (H)     Calcium 03/18/2024 8.5     eGFR 03/18/2024 33     POC Glucose 03/18/2024 211 (H)     POC Glucose 03/18/2024 174 (H)     POC Glucose 03/18/2024 176 (H)     Sodium 03/19/2024 132 (L)     Potassium 03/19/2024 4.7     Chloride 03/19/2024 98     CO2 03/19/2024 24     ANION GAP 03/19/2024 10     BUN 03/19/2024 58 (H)     Creatinine 03/19/2024 1.41 (H)     Glucose 03/19/2024 132     Calcium 03/19/2024 9.2     eGFR 03/19/2024 35     POC Glucose 03/19/2024 128     POC Glucose 03/19/2024 225 (H)        Imaging: I have personally reviewed pertinent reports.

## 2024-05-14 NOTE — TELEPHONE ENCOUNTER
"Patient scheduled for PHU/AFIB PFA Ablation/LOOP Recorder Implant  on 7/9/24 at  [facility] with Dr. Delaney.      Instructions sent to patient through [employer] and in person in office.      Patient aware of all general instructions.    Medication holds:   Torsemide - Do not take morning of procedure.     Glimepiride - Take your regular dose day/evening before procedure and do not take morning of procedure.     Labs to be done on 6/17/24:  PT INR / CMP / CBC (FASTING 8 HOURS)    PHU ordered/completed.      Thank you,  Yoko \"Melissa\" Yannick    "

## 2024-05-14 NOTE — LETTER
2024               CARDIAC ABLATION INSTRUCTIONS     Jessica Jaquez   : 1945  MRN: [id number]  [address]  [place] [address]    Procedure: 24     Procedure Date: PHU/AFIB PFA ABLATION / LOOP RECORDER IMPLANT     Location: [facility]  Address: [address] [place] [address], [place] [address]        Labs to be done on 24: CMP /  CBC (FASTING 8 HOURS)    BLOOD THINNER INSTRUCTIONS (Coumadin / Warfarin / Pradaxa / Eliquis / Xarelto):     Eliquis - Keep taking.    Medication Hold:     Torsemide - Do not take morning of procedure.    Glimepiride - Take your regular dose day/evening before procedure and do not take morning of procedure.     Arrival Time: The Hospital will contact you the day prior to your procedure, usually between 4PM - 6PM to instruct you on the time and place to report. If you do not hear from a [facility]  by 6PM the evening prior to your procedure, please contact the campus you are scheduled at.     DO NOT drink or eat anything after midnight the night prior to your procedure. You may have a minimal amount of water with your AM medications. If your procedure is scheduled after 12:00 noon, you may have clear liquids until 8:00AM the morning of your procedure. (Clear liquids: 7UP, ginger ale, jello, or broth.)    Arrange for a responsible adult to drive you to and from the hospital.    You should continue to take your morning medications with a sip of water UNLESS ADVISED OTHERWISE.       DO NOT stop taking Plavix or Aspirin unless advised otherwise.       If you are currently taking Fish Oil, Krill oil and/or Vitamin E please DO NOT TAKE FOR A WEEK PRIOR TO PROCEDURE.     If you are diabetic, DO NOT take any of your diabetic pills the morning of your procedure. Oral diabetic medications may include Glucophage, Prandin, Glyburide, Micronase, Avandia, Glucovance, Precose, Glynase, and Starlix.     Bring a list of daily medications, vitamins,  "minerals, herbals and nutritional supplements you take. Please include dosages and the times you take them each day.     If you are packing an overnight bag, pack minimal clothing, you will be given hospital sleepwear.   DO NOT bring money, valuables or jewelry. The wedding band is ok.     If you use CPAP machine, bring it to the hospital.      Bring your Photo ID and Insurance cards with you.     Please notify us if you have been admitted to the hospital within 30 days.      FAILURE TO FOLLOW ANY OF THESE INSTRUCTIONS COULD RESULT IN THE CANCELLATION OF YOUR PROCEDURE      Please call [phone] if you do not hear from the  by 6:00PM the night before your procedure.    All patients enter through ENTRANCE B. Wall[place] Parking is available free of charge or park on Parking Deck B.        Thank you,   Yoko \"Melissa\" Yannick  Surgery Coordinator  [facility]   [address] [place] [id number]  Teams: [phone]             "

## 2024-05-15 ENCOUNTER — CONSULT (OUTPATIENT)
Dept: CARDIOLOGY CLINIC | Facility: CLINIC | Age: 79
End: 2024-05-15
Payer: COMMERCIAL

## 2024-05-15 VITALS
BODY MASS INDEX: 41.02 KG/M2 | SYSTOLIC BLOOD PRESSURE: 130 MMHG | OXYGEN SATURATION: 97 % | DIASTOLIC BLOOD PRESSURE: 56 MMHG | HEART RATE: 56 BPM | WEIGHT: 231.5 LBS | HEIGHT: 63 IN

## 2024-05-15 DIAGNOSIS — N18.30 STAGE 3 CHRONIC KIDNEY DISEASE, UNSPECIFIED WHETHER STAGE 3A OR 3B CKD (HCC): ICD-10-CM

## 2024-05-15 DIAGNOSIS — E66.01 OBESITY, CLASS III, BMI 40-49.9 (MORBID OBESITY) (HCC): ICD-10-CM

## 2024-05-15 DIAGNOSIS — I10 ESSENTIAL HYPERTENSION: ICD-10-CM

## 2024-05-15 DIAGNOSIS — I50.32 CHRONIC DIASTOLIC CHF (CONGESTIVE HEART FAILURE) (HCC): Primary | ICD-10-CM

## 2024-05-15 DIAGNOSIS — I48.91 NEW ONSET ATRIAL FIBRILLATION (HCC): ICD-10-CM

## 2024-05-15 PROCEDURE — 99214 OFFICE O/P EST MOD 30 MIN: CPT | Performed by: INTERNAL MEDICINE

## 2024-05-15 PROCEDURE — G2211 COMPLEX E/M VISIT ADD ON: HCPCS | Performed by: INTERNAL MEDICINE

## 2024-05-15 RX ORDER — POTASSIUM CHLORIDE 20 MEQ/1
20 TABLET, EXTENDED RELEASE ORAL 3 TIMES DAILY
COMMUNITY

## 2024-05-15 RX ORDER — METOLAZONE 5 MG/1
5 TABLET ORAL DAILY
Qty: 10 TABLET | Refills: 0 | Status: SHIPPED | OUTPATIENT
Start: 2024-05-15

## 2024-05-15 NOTE — PATIENT INSTRUCTIONS
You were seen today in the Cardiology office for follow up evaluation.     Please continue your current cardiac medications as prescribed.    Please make the following changes to your cardiac medications:  Increase Torsemide to 40mg twice daily for 5 days, then decrease to 20mg twice daily for maintenance. Continue to take your potassium supplement as directed.   Take Metolazone 5mg once tomorrow. Then as needed as directed by our office only.     Thank you for choosing [facility].    Please call our office or use [employer] with any questions.

## 2024-05-15 NOTE — PROGRESS NOTES
[facility]    Name:Jessica Jaquez   DOS: 5/15/2024     Chief Complaint:   Chief Complaint   Patient presents with    Follow-up     Edema, 7lb increase in 2 weeks       HISTORY OF PRESENT ILLNESS:      HPI:  Jessica Jaquez is a 79 y.o. female. She  has a past medical history of Arthritis, Colorectal polyps, Constipation, Diabetes mellitus (HCC), Diverticulosis of colon, Hiatal hernia, Hypertension, Increased urinary frequency, Lumbar disc disease, Pressure injury of skin, SOB (shortness of breath), Stress incontinence, and Vitamin D deficiency (07/27/2018).    She presents for follow-up evaluation.  Known to me from recent hospitalization this past April, last seen in our office by my colleague VIJI Díaz 5/7/24. Per her OV note at that time:  Jessica was admitted to [facility] on 4/17 to 4/19/2024 with atrial fibrillation with RVR.  She presented with severe fatigue chest pain and presyncope.  On admission troponins mildly elevated felt to be secondary to A-fib with RVR.  She continued on home metoprolol succinate 200 mg daily and Eliquis for stroke prevention.  418 nuclear stress test showed no evidence of transient ischemic dilatation.  No perfusion defects.  Flecainide 100 mg twice daily started.  Outpatient ablation was discussed with close follow-up with EP.  Discharge weight 222 pounds.     Chronic HFpEF LVEF 60%  Atrial fibrillation sp PHU DCCV on 3/08/24 [id number]= 8 on Eliquis 5 mg twice daily for stroke prevention  Hypertension  Hyperlipidemia 3/16/24   HDL 45 LDL 85  DM2 HgbA1C 6.9% on 1/22/2024  Hx of remote CVA 15 years ago   Lumbar radiculopathy     3/06/23 TTE: Left ventricle cavity size normal wall thickness mild increased mild concentric hypertrophy LVEF 60% systolic function normal, right ventricle cavity size systolic function normal.  Right atrium mildly dilated, aortic valve sclerosis, mild MR, mild TR.    During her  last office visit, torsemide was increased to 40 mg once daily due to persistent lower extremity swelling..  The patient was referred for repeat labs including a magnesium level and BMP.  Her BMP demonstrated renal function stable within her baseline, creatinine 1.48.  Potassium level 4.0.  Magnesium 2.1.  She was last evaluated by our EP service and saw one of our physician assistants just yesterday, 5/14/2024.  At that time, the patient was taken off of flecainide due to concerns for severe fatigue and dizziness as a side effect of that medication.  She was referred for ablation, which is presently in the process of being scheduled tentatively on 7/9/24.     Today, she reports no significant change in her weight or lower extremity swelling since torsemide was increased.  Patient reports that she has been taking torsemide as 20 mg twice daily instead of 40 mg once daily.  She is up approximately 9 pounds since she left the hospital.  She denies chest pain, shortness of breath, diaphoresis.  She has no dizziness.  No palpitations.  Lower extremity swelling in her opinion appears to be gradually worsening.  Has had no syncopal episodes, no falls.         ROS    ROS: Pertinent positives and negatives as described in History of Present Illness. Remainder of a 14 point review of systems was negative.     Allergies   Allergen Reactions    Other Cough and Sneezing     Seasonal allergies    Pollen Extract Allergic Rhinitis        Current Outpatient Medications on File Prior to Visit   Medication Sig Dispense Refill    apixaban (ELIQUIS) 5 mg Take 1 tablet (5 mg total) by mouth 2 (two) times a day 60 tablet 4    aspirin 81 mg chewable tablet Chew 81 mg daily      bimatoprost (Lumigan) 0.01 % ophthalmic drops Administer 1 drop to both eyes daily at bedtime      Cholecalciferol (VITAMIN D3) 1000 units CAPS Take by mouth daily        ezetimibe (ZETIA) 10 mg tablet Take 1 tablet (10 mg total) by mouth daily at bedtime 60 tablet  5    gabapentin (NEURONTIN) 100 mg capsule Take 1 capsule (100 mg total) by mouth 3 (three) times a day 90 capsule 2    glimepiride (AMARYL) 1 mg tablet Take 1 mg by mouth every morning before breakfast      metoprolol succinate (TOPROL-XL) 100 mg 24 hr tablet Take 200 mg by mouth daily  3    Multiple Vitamins-Minerals (CENTRUM SILVER PO) multivitamin      potassium chloride (Klor-Con M20) 20 mEq tablet Take 20 mEq by mouth 3 (three) times a day      torsemide (DEMADEX) 20 mg tablet Torsemide 40mg daily 180 tablet 3    [DISCONTINUED] potassium chloride (MICRO-K) 10 MEQ CR capsule Micro K 20meq TID (Patient taking differently: Take 20 mEq by mouth 3 (three) times a day Micro K 20meq TID) 90 capsule 3    flecainide (TAMBOCOR) 100 mg tablet Take 1 tablet (100 mg total) by mouth 2 (two) times a day (Patient not taking: Reported on 5/15/2024) 180 tablet 0    hydrocortisone 2.5 % cream as needed  3    ketoconazole (NIZORAL) 2 % cream as needed  3     No current facility-administered medications on file prior to visit.       Past Medical History:   Diagnosis Date    Arthritis     Colorectal polyps     Constipation     Diabetes mellitus (HCC)     Diverticulosis of colon     Hiatal hernia     Hypertension     Increased urinary frequency     Lumbar disc disease     Pressure injury of skin     SOB (shortness of breath)     Stress incontinence     Vitamin D deficiency 07/27/2018       Past Surgical History:   Procedure Laterality Date    BACK SURGERY      CARPAL TUNNEL RELEASE Left     CATARACT EXTRACTION Bilateral     CHOLECYSTECTOMY      COLONOSCOPY W/ ENDOSCOPIC US N/A 2/24/2016    Procedure: ANAL ENDOSCOPIC U/S;  Surgeon: HOLLIS Washington MD;  Location: [facility];  Service:     HERNIA REPAIR      MO COLONOSCOPY FLX DX W/COLLJ SPEC WHEN PFRMD N/A 2/24/2016    Procedure: COLONOSCOPY;  Surgeon: HOLLIS Washington MD;  Location: [facility];  Service: Colorectal    TONSILLECTOMY      TUBAL LIGATION         Family History  "  Problem Relation Age of Onset    Hypertension Mother     Heart attack Mother     Diabetes Mother     Prostate cancer Father     No Known Problems Brother     Stroke Maternal Grandmother     Stroke Paternal Grandmother     Stroke Paternal Grandfather     Thyroid cancer Neg Hx        Social History     Socioeconomic History    Marital status: /Civil Union     Spouse name: Not on file    Number of children: Not on file    Years of education: Not on file    Highest education level: Not on file   Occupational History    Not on file   Tobacco Use    Smoking status: Never    Smokeless tobacco: Never   Substance and Sexual Activity    Alcohol use: Never    Drug use: No    Sexual activity: Not on file   Other Topics Concern    Not on file   Social History Narrative    Not on file     Social Determinants of Health     Financial Resource Strain: Not on file   Food Insecurity: No Food Insecurity (3/15/2024)    Hunger Vital Sign     Worried About Running Out of Food in the Last Year: Never true     Ran Out of Food in the Last Year: Never true   Transportation Needs: No Transportation Needs (3/15/2024)    PRAPARE - Transportation     Lack of Transportation (Medical): No     Lack of Transportation (Non-Medical): No   Physical Activity: Not on file   Stress: Not on file   Social Connections: Not on file   Intimate Partner Violence: Not on file   Housing Stability: Low Risk  (3/15/2024)    Housing Stability Vital Sign     Unable to Pay for Housing in the Last Year: No     Number of Places Lived in the Last Year: 1     Unstable Housing in the Last Year: No       OBJECTIVE:    /56 (BP Location: Left arm, Patient Position: Sitting, Cuff Size: Large)   Pulse 56   Ht 5' 3\" (1.6 m)   Wt 105 kg (231 lb 8 oz)   SpO2 97%   BMI 41.01 kg/m²      BP Readings from Last 3 Encounters:   05/15/24 130/56   05/14/24 120/72   05/07/24 124/64       Wt Readings from Last 3 Encounters:   05/15/24 105 kg (231 lb 8 oz)   05/14/24 104 kg " (229 lb 12.8 oz)   24 105 kg (230 lb 12.8 oz)         Physical Exam  Vitals reviewed.   Constitutional:       General: She is not in acute distress.     Appearance: Normal appearance. She is not diaphoretic.   HENT:      Head: Normocephalic and atraumatic.   Eyes:      Conjunctiva/sclera: Conjunctivae normal.   Neck:      Vascular: No JVD (No well visualized).   Cardiovascular:      Rate and Rhythm: Normal rate and regular rhythm.      Pulses: Normal pulses.      Heart sounds: Normal heart sounds. No murmur heard.     No friction rub. No gallop.   Pulmonary:      Effort: Pulmonary effort is normal.      Breath sounds: Normal breath sounds. No wheezing, rhonchi or rales.   Abdominal:      General: Abdomen is flat. Bowel sounds are normal.   Musculoskeletal:      Right lower leg: Edema present.      Left lower leg: Edema present.   Skin:     General: Skin is warm and dry.   Neurological:      General: No focal deficit present.      Mental Status: She is alert and oriented to person, place, and time.   Psychiatric:         Mood and Affect: Mood normal.         Behavior: Behavior normal.                                                       Cardiac testing:   EKG reviewed personally as performed on 24. My read: Sinus bradycardia, left axis deviation. Voltage criteria for LVH (Lead aVL). Poor anterior R-wave progression.     Results for orders placed during the hospital encounter of 18    Echo complete with contrast if indicated    Narrative  [place] [id number] [phone]    Transthoracic Echocardiogram  2D, M-mode, Doppler, and Color Doppler    Study date:  28-Dec-2018    Patient: HAN CARVAJAL  MR number: [id number]  Account number: [id number]  : 1945  Age: 73 years  Gender: Female  Status: Outpatient  Location: [address] [place] [address] [facility]  Height: 63 in  Weight: 232 lb  BP: 169/ 76 mmHg    Indications: Heart  failure    Diagnoses: I50.9 - Heart failure, unspecified    Sonographer:  DANYELLE Ga  Interpreting Physician:  Kvng Aviles MD  Referring Physician:  Jorden Holt III, MD  Group:  [facility]  Cardiology Fellow:  Von Fuentes MD    SUMMARY    PROCEDURE INFORMATION:  This was a technically difficult study.    LEFT VENTRICLE:  Systolic function was normal. Ejection fraction was estimated to be 60 %.  There were no regional wall motion abnormalities.  Doppler parameters were consistent with abnormal left ventricular relaxation (grade 1 diastolic dysfunction).    MITRAL VALVE:  There was trace regurgitation.    TRICUSPID VALVE:  There was trace regurgitation.  Pulmonary artery systolic pressure was within the normal range.    PULMONIC VALVE:  There was trace regurgitation.    HISTORY: PRIOR HISTORY: Hypertension, hyperlipidemia, diastolic dysfunction, type 2 diabetes, GERD    PROCEDURE: The study was performed in the [address] [place] [address] [facility]. This was a routine study. The transthoracic approach was used. The study included complete 2D imaging, M-mode, complete spectral Doppler, and color Doppler. This  was a technically difficult study.    LEFT VENTRICLE: Size was normal. Systolic function was normal. Ejection fraction was estimated to be 60 %. There were no regional wall motion abnormalities. Wall thickness was normal. DOPPLER: Doppler parameters were consistent with  abnormal left ventricular relaxation (grade 1 diastolic dysfunction).    RIGHT VENTRICLE: The size was normal. Systolic function was normal. Wall thickness was normal.    LEFT ATRIUM: Size was normal.    RIGHT ATRIUM: Size was normal.    MITRAL VALVE: Valve structure was normal. There was normal leaflet separation. DOPPLER: The transmitral velocity was within the normal range. There was no evidence for stenosis. There was trace regurgitation.    AORTIC VALVE: The valve was probably trileaflet. Leaflets  exhibited normal cuspal separation. The valve was not well visualized. DOPPLER: Transaortic velocity was within the normal range. There was no evidence for stenosis. There was no  significant regurgitation.    TRICUSPID VALVE: The valve structure was normal. There was normal leaflet separation. DOPPLER: The transtricuspid velocity was within the normal range. There was no evidence for stenosis. There was trace regurgitation. Pulmonary artery  systolic pressure was within the normal range. Estimated peak PA pressure was 30 mmHg.    PULMONIC VALVE: Not well visualized. DOPPLER: The transpulmonic velocity was within the normal range. There was trace regurgitation.    PERICARDIUM: There was no pericardial effusion. A pericardial fat pad was present. The pericardium was normal in appearance.    AORTA: The root exhibited normal size.    SYSTEMIC VEINS: IVC: The inferior vena cava was normal in size. Respirophasic changes were normal.    SYSTEM MEASUREMENT TABLES    2D  %FS: 31.39 %  Ao Diam: 3.29 cm  EDV(Teich): 100.54 ml  EF(Cube): 67.7 %  EF(Teich): 59.26 %  ESV(Cube): 32.77 ml  ESV(Teich): 40.96 ml  IVSd: 0.99 cm  LA Area: 16.53 cm2  LA Diam: 3.39 cm  LVEDV MOD A4C: 94.35 ml  LVEF MOD A4C: 68.85 %  LVESV MOD A4C: 29.39 ml  LVIDd: 4.66 cm  LVIDs: 3.2 cm  LVLd A4C: 7.19 cm  LVLs A4C: 5.3 cm  LVPWd: 1.08 cm  RA Area: 13.64 cm2  RV Diam.: 2.6 cm  SV MOD A4C: 64.96 ml  SV(Cube): 68.69 ml  SV(Teich): 59.58 ml    CW  TR Vmax: 2.6 m/s  TR maxP.99 mmHg    MM  TAPSE: 1.84 cm    PW  E': 0.06 m/s  E/E': 14.64  MV A Toan: 0.97 m/s  MV Dec Marshall: 3.14 m/s2  MV DecT: 267.31 ms  MV E Toan: 0.84 m/s  MV E/A Ratio: 0.86    [employer] Accredited Echocardiography Laboratory    Prepared and electronically signed by    Kvng Aviles MD  Signed 28-Dec-2018 11:05:05          LABS:  Lab Results   Component Value Date    GLUCOSE 109 2015    BUN 32 (H) 2024    CREATININE 1.48 (H) 2024    CALCIUM 10.0  05/14/2024     (L) 06/09/2015    K 4.0 05/14/2024    CO2 28 05/14/2024     05/14/2024    ALKPHOS 72 04/17/2024    BILITOT 0.33 06/09/2015    PROT 7.6 06/09/2015    AST 22 04/17/2024    ALT 16 04/17/2024    ANIONGAP 5 06/09/2015        Lab Results   Component Value Date    WBC 12.86 (H) 04/18/2024    HGB 11.8 04/18/2024    HCT 37.2 04/18/2024    MCV 91 04/18/2024     04/18/2024       Lab Results   Component Value Date    CHOL 248 03/19/2014    HDL 45 (L) 03/16/2024    LDLCALC 85 03/16/2024    TRIG 146 03/16/2024       Lab Results   Component Value Date    HGBA1C 6.9 (H) 01/22/2024       ASSESSMENT/PLAN:  Diagnoses and all orders for this visit:    Chronic diastolic CHF (congestive heart failure) (HCC)  -     metolazone (ZAROXOLYN) 5 mg tablet; Take 1 tablet (5 mg total) by mouth daily Take 1 tablet daily as needed. Not to exceed 3 tablets per week.  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  Clinically volume overloaded with evidence of bilateral lower extremity edema.  JVD and lung exam is limited due to body habitus, although by weight she is up approximately 9 to 10 pounds since she left the hospital.  She has had a suboptimal diuretic response to torsemide 20 mg twice daily, and therefore I recommended increasing torsemide to 40 mg twice daily for 5 days along with a single dose of oral metolazone to facilitate sequential nephron blockade.  I have recommended a BMP in 1 week to ensure stable renal function and electrolytes on this regimen, and counseled the patient on the importance of regular follow-up and close volume assessment  "as she is at high risk for acute kidney injury especially in the setting of baseline CKD.  She will continue potassium supplementation at 20 mEq 3 times daily.  I have recommended continuing Eliquis 5 mg twice daily for thromboembolism prevention in the setting of paroxysmal atrial fibrillation.    She will follow-up in our office in 2 weeks with a nurse practitioner or physician assistant for volume assessment and to discuss her response to diuretic therapy adjustment.  I will see her back in the office personally in 6 weeks for the same.  Okay to be added onto my schedule at any time if needing to be seen sooner.  Ultimately, if she does not respond well to oral diuretics we may need to trial subcutaneous furosemide on an outpatient basis.          Himanshu Webb MD      Portions of the record may have been created with voice recognition software. Occasional wrong word or \"sound alike\" substitutions may have occurred due to the inherent limitations of voice recognition software. Please review the chart carefully and recognize, using context, where substitutions/typographical errors may have occurred.     "

## 2024-05-21 ENCOUNTER — TELEPHONE (OUTPATIENT)
Dept: CARDIOLOGY CLINIC | Facility: CLINIC | Age: 79
End: 2024-05-21

## 2024-05-21 NOTE — TELEPHONE ENCOUNTER
Pt called stating Reymundo told her to call this week to report her symptoms after stopping Flecainide. He stopped it at her 5/14/24 visit. Pt states she's feeling much better.    Please review.

## 2024-05-22 ENCOUNTER — APPOINTMENT (OUTPATIENT)
Dept: LAB | Facility: CLINIC | Age: 79
End: 2024-05-22
Payer: COMMERCIAL

## 2024-05-22 DIAGNOSIS — I50.32 CHRONIC DIASTOLIC CHF (CONGESTIVE HEART FAILURE) (HCC): ICD-10-CM

## 2024-05-22 LAB
ANION GAP SERPL CALCULATED.3IONS-SCNC: 18 MMOL/L (ref 4–13)
BUN SERPL-MCNC: 71 MG/DL (ref 5–25)
CALCIUM SERPL-MCNC: 10.1 MG/DL (ref 8.4–10.2)
CHLORIDE SERPL-SCNC: 89 MMOL/L (ref 96–108)
CO2 SERPL-SCNC: 32 MMOL/L (ref 21–32)
CREAT SERPL-MCNC: 1.9 MG/DL (ref 0.6–1.3)
GFR SERPL CREATININE-BSD FRML MDRD: 24 ML/MIN/1.73SQ M
GLUCOSE P FAST SERPL-MCNC: 178 MG/DL (ref 65–99)
POTASSIUM SERPL-SCNC: 3.2 MMOL/L (ref 3.5–5.3)
SODIUM SERPL-SCNC: 139 MMOL/L (ref 135–147)

## 2024-05-22 PROCEDURE — 36415 COLL VENOUS BLD VENIPUNCTURE: CPT

## 2024-05-22 PROCEDURE — 80048 BASIC METABOLIC PNL TOTAL CA: CPT

## 2024-05-29 NOTE — PROGRESS NOTES
Cardiology Follow Up    Jessica Jqauez  1945  [id number]  [facility] [place]  [address] [facility] [id number] [phone]    1. Chronic diastolic CHF (congestive heart failure) ([facility])        2. GIOVANNI (acute kidney injury) ([facility])        3. Hypokalemia  spironolactone (ALDACTONE) 25 mg tablet    Basic metabolic panel      4. PAF (paroxysmal atrial fibrillation) ([facility])            Interval History:   Ms Jessica Jaquez was admitted to [facility] on 3/15 -3/19/2024 with CHF exacerbation.  She presented to the emergency room with shortness of breath weight gain and was found to be volume overloaded and heart failure.  Her symptoms improved with IV diuresis.  She was transition to torsemide 20 mg daily.  Creatinine elevated at discharge losartan held at the time of discharge.  She was instructed to undergo a BMP in 5 to 7 days.  Charge weight 224 pounds at discharge sodium 132 BUN 58 creatinine 1.41 GFR 35/     On 3/20/24 Jessica was seen in our office for a recent hospitalization follow up visit.  She will undergo lab studies next week. VNA and PT should be coming into her home.  Jessica is accompanied by her son and .  She denies worsening dyspnea with exertion chest pain palpitations lightheadedness or dizziness.  Mitts to due to muscle cramping in her hands when she does not take potassium.  She took a dose of potassium today.  She ordered a scale.  Will be delivered to her home tomorrow.  Her weight in the office is 223 pounds.     On 3/28/24 Jessica was seen in our office for a follow-up visit.  She continues to be followed by VNA and PT.  She is accompanied by her son.  Jessica admits to fatigue denies worsening dyspnea lightheadedness or dizziness.  Her weight is stable 224 pounds     Jessica was admitted to [facility] on 4/17 to 4/19/2024 with atrial fibrillation with RVR.   "She presented with severe fatigue chest pain and presyncope.  On admission troponins mildly elevated felt to be secondary to A-fib with RVR.  She continued on home metoprolol succinate 200 mg daily and Eliquis for stroke prevention.  418 nuclear stress test showed no evidence of transient ischemic dilatation.  No perfusion defects.  Flecainide 100 mg twice daily started.  Outpatient ablation was discussed with close follow-up with EP.  Discharge weight 222 pounds.      4/25/24 Jessica was seen in EP For EKG. EKG SB 48 BPM  No changes made to her medical regimen    On 5/15/24 Jessica was seen in the office by Dr Webb.  Her weight increase 7 pounds in 2 weeks and 9 to 10 pounds since hospital discharge, 231 pounds.  On presentation she presented with positive JVD and lower extremity edema.  Torsemide was increased to 40 mg p.o. twice daily x 5 days with 1 dose of metolazone.  Instructed to follow-up in our office.     Jessica presents to our office for a follow-up visit.   She is Accompanied by her son. Her weight decreased to 220 pounds with Metolazone and increased dose of Torsemide.   According to Jessica her legs and feet are back to normal.  Now weight back up.  She admits to shortness of breath with \"Racing around\" She admits to worsening LE edema.   Weight back up to 232 pounds.  5/22/24 sodium 139 potassium 3.2 BUN 71 creatinine 1.90 GFR 24     Medical History   Primary Cardiologist   Chronic HFpEF LVEF 60%  Atrial fibrillation sp PHU DCCV on 3/08/24 [id number]= 8 on Eliquis 5 mg twice daily for stroke prevention  Hypertension  Hyperlipidemia 3/16/24   HDL 45 LDL 85  DM2 HgbA1C 6.9% on 1/22/2024  Hx of remote CVA 15 years ago   Lumbar radiculopathy     3/06/23 TTE: Left ventricle cavity size normal wall thickness mild increased mild concentric hypertrophy LVEF 60% systolic function normal, right ventricle cavity size systolic function normal.  Right atrium mildly dilated, aortic valve sclerosis, mild MR, " mild TR.       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 (HCC)    Open wound of abdomen    Lumbar radiculopathy    History of stroke    New onset atrial fibrillation (HCC)    GIOVANNI (acute kidney injury) (HCC)    Chronic diastolic congestive heart failure (HCC)    Hyponatremia    Atrial fibrillation with RVR (HCC)    Chest pain     Past Medical History:   Diagnosis Date    Arthritis     Colorectal polyps     Constipation     Diabetes mellitus (HCC)     Diverticulosis of colon     Hiatal hernia     Hypertension     Increased urinary frequency     Lumbar disc disease     Pressure injury of skin     SOB (shortness of breath)     Stress incontinence     Vitamin D deficiency 07/27/2018     Social History     Socioeconomic History    Marital status: /Civil Union     Spouse name: Not on file    Number of children: Not on file    Years of education: Not on file    Highest education level: Not on file   Occupational History    Not on file   Tobacco Use    Smoking status: Never    Smokeless tobacco: Never   Substance and Sexual Activity    Alcohol use: Never    Drug use: No    Sexual activity: Not on file   Other Topics Concern    Not on file   Social History Narrative    Not on file     Social Determinants of Health     Financial Resource Strain: Not on file   Food Insecurity: No Food Insecurity (3/15/2024)    Hunger Vital Sign     Worried About Running Out of Food in the Last Year: Never true     Ran Out of Food in the Last Year: Never true   Transportation Needs: No Transportation Needs (3/15/2024)    PRAPARE - Transportation     Lack of Transportation (Medical): No      Lack of Transportation (Non-Medical): No   Physical Activity: Not on file   Stress: Not on file   Social Connections: Not on file   Intimate Partner Violence: Not on file   Housing Stability: Low Risk  (3/15/2024)    Housing Stability Vital Sign     Unable to Pay for Housing in the Last Year: No     Number of Places Lived in the Last Year: 1     Unstable Housing in the Last Year: No      Family History   Problem Relation Age of Onset    Hypertension Mother     Heart attack Mother     Diabetes Mother     Prostate cancer Father     No Known Problems Brother     Stroke Maternal Grandmother     Stroke Paternal Grandmother     Stroke Paternal Grandfather     Thyroid cancer Neg Hx      Past Surgical History:   Procedure Laterality Date    BACK SURGERY      CARPAL TUNNEL RELEASE Left     CATARACT EXTRACTION Bilateral     CHOLECYSTECTOMY      COLONOSCOPY W/ ENDOSCOPIC US N/A 2/24/2016    Procedure: ANAL ENDOSCOPIC U/S;  Surgeon: HOLLIS Washington MD;  Location: [facility];  Service:     HERNIA REPAIR      MI COLONOSCOPY FLX DX W/COLLJ SPEC WHEN PFRMD N/A 2/24/2016    Procedure: COLONOSCOPY;  Surgeon: HOLLIS Washington MD;  Location: [facility];  Service: Colorectal    TONSILLECTOMY      TUBAL LIGATION         Current Outpatient Medications:     apixaban (ELIQUIS) 5 mg, Take 1 tablet (5 mg total) by mouth 2 (two) times a day, Disp: 60 tablet, Rfl: 4    aspirin 81 mg chewable tablet, Chew 81 mg daily, Disp: , Rfl:     bimatoprost (Lumigan) 0.01 % ophthalmic drops, Administer 1 drop to both eyes daily at bedtime, Disp: , Rfl:     Cholecalciferol (VITAMIN D3) 1000 units CAPS, Take by mouth daily  , Disp: , Rfl:     ezetimibe (ZETIA) 10 mg tablet, Take 1 tablet (10 mg total) by mouth daily at bedtime, Disp: 60 tablet, Rfl: 5    flecainide (TAMBOCOR) 100 mg tablet, Take 1 tablet (100 mg total) by mouth 2 (two) times a day (Patient not taking: Reported on 5/15/2024), Disp: 180 tablet, Rfl: 0    gabapentin (NEURONTIN) 100 mg  capsule, Take 1 capsule (100 mg total) by mouth 3 (three) times a day, Disp: 90 capsule, Rfl: 2    glimepiride (AMARYL) 1 mg tablet, Take 1 mg by mouth every morning before breakfast, Disp: , Rfl:     hydrocortisone 2.5 % cream, as needed, Disp: , Rfl: 3    ketoconazole (NIZORAL) 2 % cream, as needed, Disp: , Rfl: 3    metolazone (ZAROXOLYN) 5 mg tablet, Take 1 tablet (5 mg total) by mouth daily Take 1 tablet daily as needed. Not to exceed 3 tablets per week., Disp: 10 tablet, Rfl: 0    metoprolol succinate (TOPROL-XL) 100 mg 24 hr tablet, Take 200 mg by mouth daily, Disp: , Rfl: 3    Multiple Vitamins-Minerals (CENTRUM SILVER PO), multivitamin, Disp: , Rfl:     potassium chloride (Klor-Con M20) 20 mEq tablet, Take 20 mEq by mouth 3 (three) times a day, Disp: , Rfl:     torsemide (DEMADEX) 20 mg tablet, Torsemide 40mg daily, Disp: 180 tablet, Rfl: 3  Allergies   Allergen Reactions    Other Cough and Sneezing     Seasonal allergies    Pollen Extract Allergic Rhinitis       Labs:  Appointment on 05/22/2024   Component Date Value    Sodium 05/22/2024 139     Potassium 05/22/2024 3.2 (L)     Chloride 05/22/2024 89 (L)     CO2 05/22/2024 32     ANION GAP 05/22/2024 18 (H)     BUN 05/22/2024 71 (H)     Creatinine 05/22/2024 1.90 (H)     Glucose, Fasting 05/22/2024 178 (H)     Calcium 05/22/2024 10.1     eGFR 05/22/2024 24    Appointment on 05/14/2024   Component Date Value    Magnesium 05/14/2024 2.1     Sodium 05/14/2024 141     Potassium 05/14/2024 4.0     Chloride 05/14/2024 101     CO2 05/14/2024 28     ANION GAP 05/14/2024 12     BUN 05/14/2024 32 (H)     Creatinine 05/14/2024 1.48 (H)     Glucose 05/14/2024 138     Calcium 05/14/2024 10.0     eGFR 05/14/2024 33    Admission on 04/17/2024, Discharged on 04/19/2024   Component Date Value    WBC 04/17/2024 11.80 (H)     RBC 04/17/2024 4.35     Hemoglobin 04/17/2024 12.5     Hematocrit 04/17/2024 38.8     MCV 04/17/2024 89     MCH 04/17/2024 28.7     MCHC 04/17/2024  32.2     RDW 04/17/2024 15.6 (H)     MPV 04/17/2024 9.1     Platelets 04/17/2024 375     nRBC 04/17/2024 0     Segmented % 04/17/2024 70     Immature Grans % 04/17/2024 0     Lymphocytes % 04/17/2024 21     Monocytes % 04/17/2024 6     Eosinophils Relative 04/17/2024 3     Basophils Relative 04/17/2024 0     Absolute Neutrophils 04/17/2024 8.20 (H)     Absolute Immature Grans 04/17/2024 0.05     Absolute Lymphocytes 04/17/2024 2.52     Absolute Monocytes 04/17/2024 0.69     Eosinophils Absolute 04/17/2024 0.29     Basophils Absolute 04/17/2024 0.05     Sodium 04/17/2024 135     Potassium 04/17/2024 2.8 (L)     Chloride 04/17/2024 87 (L)     CO2 04/17/2024 32     ANION GAP 04/17/2024 16 (H)     BUN 04/17/2024 70 (H)     Creatinine 04/17/2024 1.78 (H)     Glucose 04/17/2024 233 (H)     Calcium 04/17/2024 9.8     AST 04/17/2024 22     ALT 04/17/2024 16     Alkaline Phosphatase 04/17/2024 72     Total Protein 04/17/2024 7.5     Albumin 04/17/2024 3.7     Total Bilirubin 04/17/2024 0.57     eGFR 04/17/2024 26     hs TnI 0hr 04/17/2024 54 (H)     Ventricular Rate 04/17/2024 137     Atrial Rate 04/17/2024 78     QRSD Interval 04/17/2024 140     QT Interval 04/17/2024 336     QTC Interval 04/17/2024 507     QRS Axis 04/17/2024 -49     T Wave Axis 04/17/2024 91     Ventricular Rate 04/17/2024 147     Atrial Rate 04/17/2024 147     QRSD Interval 04/17/2024 136     QT Interval 04/17/2024 344     QTC Interval 04/17/2024 538     P Axis 04/17/2024 101     QRS Axis 04/17/2024 -50     T Wave Axis 04/17/2024 104     TSH 3RD GENERATON 04/17/2024 0.936     D-Dimer, Quant 04/17/2024 0.68 (H)     hs TnI 2hr 04/17/2024 63 (H)     Delta 2hr hsTnI 04/17/2024 9     hs TnI 4hr 04/17/2024 99 (H)     Delta 4hr hsTnI 04/17/2024 45 (H)     Magnesium 04/17/2024 2.3     Color, UA 04/17/2024 Colorless     Clarity, UA 04/17/2024 Clear     Specific Gravity, UA 04/17/2024 1.008     pH, UA 04/17/2024 5.5     Leukocytes, UA 04/17/2024 Negative      Nitrite, UA 04/17/2024 Negative     Protein, UA 04/17/2024 Negative     Glucose, UA 04/17/2024 Negative     Ketones, UA 04/17/2024 Negative     Urobilinogen, UA 04/17/2024 <2.0     Bilirubin, UA 04/17/2024 Negative     Occult Blood, UA 04/17/2024 Negative     RBC, UA 04/17/2024 None Seen     WBC, UA 04/17/2024 None Seen     Epithelial Cells 04/17/2024 Occasional     Bacteria, UA 04/17/2024 None Seen     MUCUS THREADS 04/17/2024 Occasional (A)     Hyaline Casts, UA 04/17/2024 0-3 (A)     Ventricular Rate 04/17/2024 95     Atrial Rate 04/17/2024 95     WV Interval 04/17/2024 152     QRSD Interval 04/17/2024 128     QT Interval 04/17/2024 460     QTC Interval 04/17/2024 578     P Axis 04/17/2024 92     QRS Axis 04/17/2024 -45     T Wave Axis 04/17/2024 33     Rest Nuclear Isotope Dose 04/18/2024 11.00     Stress Nuclear Isotope D* 04/18/2024 32.00     Baseline HR 04/18/2024 93     Baseline BP 04/18/2024 142/84     O2 sat rest 04/18/2024 97     Stress peak HR 04/18/2024 102     Post peak BP 04/18/2024 100     O2 sat peak 04/18/2024 98     Recovery HR 04/18/2024 92     Recovery BP 04/18/2024 140     O2 sat recovery 04/18/2024 96     Max HR 04/18/2024 104     Max HR Percent 04/18/2024 73     Exercise duration (min) 04/18/2024 3     Exercise duration (sec) 04/18/2024 0     Estimated workload 04/18/2024 1.0     Rate Pressure Product 04/18/2024 10,200.0     Angina Index 04/18/2024 1     Stress/rest perfusion ra* 04/18/2024 1.18     EF (%) 04/18/2024 75     POC Glucose 04/17/2024 223 (H)     POC Glucose 04/17/2024 149 (H)     Sodium 04/18/2024 133 (L)     Potassium 04/18/2024 3.8     Chloride 04/18/2024 92 (L)     CO2 04/18/2024 26     ANION GAP 04/18/2024 15 (H)     BUN 04/18/2024 66 (H)     Creatinine 04/18/2024 1.50 (H)     Glucose 04/18/2024 153 (H)     Calcium 04/18/2024 9.0     eGFR 04/18/2024 32     Magnesium 04/18/2024 2.5     WBC 04/18/2024 12.86 (H)     RBC 04/18/2024 4.08     Hemoglobin 04/18/2024 11.8      Hematocrit 04/18/2024 37.2     MCV 04/18/2024 91     MCH 04/18/2024 28.9     MCHC 04/18/2024 31.7     RDW 04/18/2024 16.3 (H)     Platelets 04/18/2024 367     MPV 04/18/2024 9.1     POC Glucose 04/18/2024 174 (H)     Protocol Name 04/18/2024 LIZ SIT     Exercise duration (min) 04/18/2024 3     Exercise duration (sec) 04/18/2024 0     Post Peak Systolic BP 04/18/2024 142     Max Diastolic Bp 04/18/2024 84     Peak HR 04/18/2024 104     Max Predicted Heart Rate 04/18/2024 141     Test Indication 04/18/2024 Screening for CAD     Target Hr Formular 04/18/2024 (220 - Age)*100%     POC Glucose 04/18/2024 225 (H)     POC Glucose 04/18/2024 212 (H)     POC Glucose 04/18/2024 235 (H)     Sodium 04/19/2024 136     Potassium 04/19/2024 3.8     Chloride 04/19/2024 95 (L)     CO2 04/19/2024 30     ANION GAP 04/19/2024 11     BUN 04/19/2024 56 (H)     Creatinine 04/19/2024 1.39 (H)     Glucose 04/19/2024 152 (H)     Calcium 04/19/2024 9.0     eGFR 04/19/2024 36     POC Glucose 04/19/2024 199 (H)     POC Glucose 04/19/2024 259 (H)      Imaging: No results found.    Review of Systems:  Review of Systems   Constitutional:  Positive for fatigue.   Respiratory:  Positive for shortness of breath.    Cardiovascular:  Positive for leg swelling.   Musculoskeletal:  Positive for arthralgias, gait problem and joint swelling.   All other systems reviewed and are negative.      Physical Exam:  Physical Exam  Vitals reviewed.   Constitutional:       Appearance: She is obese.   Cardiovascular:      Rate and Rhythm: Normal rate and regular rhythm.      Pulses: Normal pulses.      Heart sounds: Normal heart sounds.   Pulmonary:      Effort: Pulmonary effort is normal.      Breath sounds: Normal breath sounds.   Musculoskeletal:      Cervical back: Normal range of motion and neck supple.      Right lower leg: Edema present.      Left lower leg: Edema present.      Comments: 2+ bilateral lower extremity edema   Skin:     General: Skin is warm and  dry.      Capillary Refill: Capillary refill takes less than 2 seconds.   Neurological:      General: No focal deficit present.      Mental Status: She is alert and oriented to person, place, and time.   Psychiatric:         Mood and Affect: Mood normal.         Behavior: Behavior normal.         Discussion/Summary:  # Acute on chronic heart failure preserved ejection fraction LVEF 60% NYHA class III stage C weight in the office to 232 pounds, No LE edema with weight 220 pounds.  Not back up to 232 pounds with 2+ bilateral extremity edema.   heart rate and blood pressure control, continue on torsemide 20 mg twice daily directed not to take any further metolazone at this time, continue on metoprolol succinate 200 mg daily Aldactone 25 mg daily, 2 g sodium diet 1500 cc fluid restriction Daily weights and BMP early next week follow-up in our office in 2 weeks  # GIOVANNI 5/22/24 BUN  71 creatinine 1.90 GFR 24 most likely in the setting of metolazone and increased dose of torsemide.  BMP early next week  # Hypokalemia K+ 3.2 start Aldactone 25 mg daily.  Continue on K-Dur 20 milliequivalents 3 times daily, BMP early next week  # Paroxysmal atrial fibrillation [id number] equals 8.  History of PHU DCCV on 308 2024.  Continue on Eliquis 5 mg twice daily for stroke prevention.  Reverted back to atrial fibrillation with RVR.    EKG showed sinus bradycardia 48 bpm.  Continue metoprolol succinate 200 mg daily. Seen by EP on 5/14/24 Flecainide stopped.  Discussed possible atrial fibrillation ablation.  Patient was agreeable.

## 2024-05-30 ENCOUNTER — OFFICE VISIT (OUTPATIENT)
Dept: CARDIOLOGY CLINIC | Facility: CLINIC | Age: 79
End: 2024-05-30
Payer: COMMERCIAL

## 2024-05-30 VITALS
OXYGEN SATURATION: 95 % | DIASTOLIC BLOOD PRESSURE: 66 MMHG | SYSTOLIC BLOOD PRESSURE: 116 MMHG | HEART RATE: 77 BPM | HEIGHT: 63 IN | WEIGHT: 232.2 LBS | BODY MASS INDEX: 41.14 KG/M2

## 2024-05-30 DIAGNOSIS — I48.0 PAF (PAROXYSMAL ATRIAL FIBRILLATION) (HCC): ICD-10-CM

## 2024-05-30 DIAGNOSIS — N17.9 AKI (ACUTE KIDNEY INJURY) (HCC): ICD-10-CM

## 2024-05-30 DIAGNOSIS — E87.6 HYPOKALEMIA: ICD-10-CM

## 2024-05-30 DIAGNOSIS — I50.32 CHRONIC DIASTOLIC CHF (CONGESTIVE HEART FAILURE) (HCC): Primary | ICD-10-CM

## 2024-05-30 PROCEDURE — 99215 OFFICE O/P EST HI 40 MIN: CPT | Performed by: NURSE PRACTITIONER

## 2024-05-30 RX ORDER — SPIRONOLACTONE 25 MG/1
25 TABLET ORAL DAILY
Qty: 30 TABLET | Refills: 3 | Status: SHIPPED | OUTPATIENT
Start: 2024-05-30

## 2024-06-03 DIAGNOSIS — I48.0 PAF (PAROXYSMAL ATRIAL FIBRILLATION) (HCC): ICD-10-CM

## 2024-06-04 ENCOUNTER — CLINICAL SUPPORT (OUTPATIENT)
Dept: CARDIOLOGY CLINIC | Facility: CLINIC | Age: 79
End: 2024-06-04
Payer: COMMERCIAL

## 2024-06-04 ENCOUNTER — APPOINTMENT (OUTPATIENT)
Dept: LAB | Facility: CLINIC | Age: 79
End: 2024-06-04
Payer: COMMERCIAL

## 2024-06-04 DIAGNOSIS — E87.6 HYPOKALEMIA: ICD-10-CM

## 2024-06-04 DIAGNOSIS — I48.0 PAF (PAROXYSMAL ATRIAL FIBRILLATION) (HCC): ICD-10-CM

## 2024-06-04 LAB
ANION GAP SERPL CALCULATED.3IONS-SCNC: 9 MMOL/L (ref 4–13)
BUN SERPL-MCNC: 31 MG/DL (ref 5–25)
CALCIUM SERPL-MCNC: 9.8 MG/DL (ref 8.4–10.2)
CHLORIDE SERPL-SCNC: 100 MMOL/L (ref 96–108)
CO2 SERPL-SCNC: 28 MMOL/L (ref 21–32)
CREAT SERPL-MCNC: 1.39 MG/DL (ref 0.6–1.3)
GFR SERPL CREATININE-BSD FRML MDRD: 35 ML/MIN/1.73SQ M
GLUCOSE P FAST SERPL-MCNC: 150 MG/DL (ref 65–99)
POTASSIUM SERPL-SCNC: 4.9 MMOL/L (ref 3.5–5.3)
SODIUM SERPL-SCNC: 137 MMOL/L (ref 135–147)

## 2024-06-04 PROCEDURE — 80048 BASIC METABOLIC PNL TOTAL CA: CPT

## 2024-06-04 PROCEDURE — 93248 EXT ECG>7D<15D REV&INTERPJ: CPT | Performed by: INTERNAL MEDICINE

## 2024-06-04 PROCEDURE — 36415 COLL VENOUS BLD VENIPUNCTURE: CPT

## 2024-06-07 DIAGNOSIS — I50.32 CHRONIC DIASTOLIC CHF (CONGESTIVE HEART FAILURE) (HCC): ICD-10-CM

## 2024-06-07 NOTE — TELEPHONE ENCOUNTER
Requested medication(s) are due for refill today: Yes  Patient has already received a courtesy refill: No  Other reason request has been forwarded to provider: 90 day  
no

## 2024-06-10 ENCOUNTER — TELEPHONE (OUTPATIENT)
Age: 79
End: 2024-06-10

## 2024-06-10 RX ORDER — METOLAZONE 5 MG/1
TABLET ORAL
Qty: 30 TABLET | Refills: 1 | Status: SHIPPED | OUTPATIENT
Start: 2024-06-10

## 2024-06-10 NOTE — TELEPHONE ENCOUNTER
Caller: Aliya    Doctor: Shivani    Reason for call: Pre op Letter was received but Eliquis question was not answered.  Please let Aliya know if Eliquis needs to be put on hold for any proceures where bleeding may occur.    Call back#: [phone]

## 2024-06-13 ENCOUNTER — ESTABLISHED COMPREHENSIVE EXAM (OUTPATIENT)
Dept: URBAN - METROPOLITAN AREA CLINIC 6 | Facility: CLINIC | Age: 79
End: 2024-06-13

## 2024-06-13 DIAGNOSIS — H40.1131: ICD-10-CM

## 2024-06-13 PROCEDURE — 92083 EXTENDED VISUAL FIELD XM: CPT

## 2024-06-13 PROCEDURE — 92020 GONIOSCOPY: CPT

## 2024-06-13 PROCEDURE — 92012 INTRM OPH EXAM EST PATIENT: CPT

## 2024-06-13 ASSESSMENT — TONOMETRY
OD_IOP_MMHG: 20
OS_IOP_MMHG: 16

## 2024-06-13 ASSESSMENT — VISUAL ACUITY
OS_PH: 20/30+1
OU_CC: J1
OS_CC: 20/40-2
OD_CC: 20/25-2

## 2024-06-17 ENCOUNTER — APPOINTMENT (OUTPATIENT)
Dept: LAB | Facility: CLINIC | Age: 79
End: 2024-06-17
Payer: COMMERCIAL

## 2024-06-17 LAB
ALBUMIN SERPL BCP-MCNC: 3.9 G/DL (ref 3.5–5)
ALP SERPL-CCNC: 72 U/L (ref 34–104)
ALT SERPL W P-5'-P-CCNC: 13 U/L (ref 7–52)
ANION GAP SERPL CALCULATED.3IONS-SCNC: 12 MMOL/L (ref 4–13)
AST SERPL W P-5'-P-CCNC: 18 U/L (ref 13–39)
BASOPHILS # BLD AUTO: 0.05 THOUSANDS/ÂΜL (ref 0–0.1)
BASOPHILS NFR BLD AUTO: 1 % (ref 0–1)
BILIRUB SERPL-MCNC: 0.37 MG/DL (ref 0.2–1)
BUN SERPL-MCNC: 41 MG/DL (ref 5–25)
CALCIUM SERPL-MCNC: 9.5 MG/DL (ref 8.4–10.2)
CHLORIDE SERPL-SCNC: 101 MMOL/L (ref 96–108)
CO2 SERPL-SCNC: 25 MMOL/L (ref 21–32)
CREAT SERPL-MCNC: 1.31 MG/DL (ref 0.6–1.3)
EOSINOPHIL # BLD AUTO: 0.42 THOUSAND/ÂΜL (ref 0–0.61)
EOSINOPHIL NFR BLD AUTO: 5 % (ref 0–6)
ERYTHROCYTE [DISTWIDTH] IN BLOOD BY AUTOMATED COUNT: 14.1 % (ref 11.6–15.1)
GFR SERPL CREATININE-BSD FRML MDRD: 38 ML/MIN/1.73SQ M
GLUCOSE P FAST SERPL-MCNC: 142 MG/DL (ref 65–99)
HCT VFR BLD AUTO: 38.4 % (ref 34.8–46.1)
HGB BLD-MCNC: 11.9 G/DL (ref 11.5–15.4)
IMM GRANULOCYTES # BLD AUTO: 0.03 THOUSAND/UL (ref 0–0.2)
IMM GRANULOCYTES NFR BLD AUTO: 0 % (ref 0–2)
INR PPP: 1.26 (ref 0.84–1.19)
LYMPHOCYTES # BLD AUTO: 2.47 THOUSANDS/ÂΜL (ref 0.6–4.47)
LYMPHOCYTES NFR BLD AUTO: 27 % (ref 14–44)
MCH RBC QN AUTO: 29.5 PG (ref 26.8–34.3)
MCHC RBC AUTO-ENTMCNC: 31 G/DL (ref 31.4–37.4)
MCV RBC AUTO: 95 FL (ref 82–98)
MONOCYTES # BLD AUTO: 0.53 THOUSAND/ÂΜL (ref 0.17–1.22)
MONOCYTES NFR BLD AUTO: 6 % (ref 4–12)
NEUTROPHILS # BLD AUTO: 5.53 THOUSANDS/ÂΜL (ref 1.85–7.62)
NEUTS SEG NFR BLD AUTO: 61 % (ref 43–75)
NRBC BLD AUTO-RTO: 0 /100 WBCS
PLATELET # BLD AUTO: 386 THOUSANDS/UL (ref 149–390)
PMV BLD AUTO: 9.6 FL (ref 8.9–12.7)
POTASSIUM SERPL-SCNC: 4.4 MMOL/L (ref 3.5–5.3)
PROT SERPL-MCNC: 7.3 G/DL (ref 6.4–8.4)
PROTHROMBIN TIME: 15.6 SECONDS (ref 11.6–14.5)
RBC # BLD AUTO: 4.03 MILLION/UL (ref 3.81–5.12)
SODIUM SERPL-SCNC: 138 MMOL/L (ref 135–147)
WBC # BLD AUTO: 9.03 THOUSAND/UL (ref 4.31–10.16)

## 2024-06-22 DIAGNOSIS — E87.6 HYPOKALEMIA: ICD-10-CM

## 2024-06-24 RX ORDER — SPIRONOLACTONE 25 MG/1
25 TABLET ORAL DAILY
Qty: 90 TABLET | Refills: 1 | Status: SHIPPED | OUTPATIENT
Start: 2024-06-24

## 2024-06-25 ENCOUNTER — APPOINTMENT (OUTPATIENT)
Dept: LAB | Facility: HOSPITAL | Age: 79
End: 2024-06-25
Payer: COMMERCIAL

## 2024-06-25 ENCOUNTER — APPOINTMENT (OUTPATIENT)
Dept: LAB | Facility: CLINIC | Age: 79
End: 2024-06-25
Payer: COMMERCIAL

## 2024-06-25 DIAGNOSIS — N39.0 URINARY TRACT INFECTION WITHOUT HEMATURIA, SITE UNSPECIFIED: ICD-10-CM

## 2024-06-25 LAB

## 2024-06-25 PROCEDURE — 87186 SC STD MICRODIL/AGAR DIL: CPT

## 2024-06-25 PROCEDURE — 87077 CULTURE AEROBIC IDENTIFY: CPT

## 2024-06-25 PROCEDURE — 81001 URINALYSIS AUTO W/SCOPE: CPT

## 2024-06-25 PROCEDURE — 87086 URINE CULTURE/COLONY COUNT: CPT

## 2024-06-27 LAB — BACTERIA UR CULT: ABNORMAL

## 2024-07-07 DIAGNOSIS — I48.0 PAF (PAROXYSMAL ATRIAL FIBRILLATION) (HCC): ICD-10-CM

## 2024-07-09 ENCOUNTER — HOSPITAL ENCOUNTER (OUTPATIENT)
Dept: NON INVASIVE DIAGNOSTICS | Facility: HOSPITAL | Age: 79
Discharge: HOME | End: 2024-07-09
Attending: INTERNAL MEDICINE

## 2024-07-09 ENCOUNTER — ANESTHESIA EVENT (OUTPATIENT)
Dept: NON INVASIVE DIAGNOSTICS | Facility: HOSPITAL | Age: 79
End: 2024-07-09
Payer: COMMERCIAL

## 2024-07-09 ENCOUNTER — HOSPITAL ENCOUNTER (OUTPATIENT)
Facility: HOSPITAL | Age: 79
Setting detail: OUTPATIENT SURGERY
Discharge: HOME | End: 2024-07-10
Attending: INTERNAL MEDICINE | Admitting: INTERNAL MEDICINE
Payer: COMMERCIAL

## 2024-07-09 ENCOUNTER — ANESTHESIA (OUTPATIENT)
Dept: NON INVASIVE DIAGNOSTICS | Facility: HOSPITAL | Age: 79
End: 2024-07-09
Payer: COMMERCIAL

## 2024-07-09 DIAGNOSIS — I48.0 PAF (PAROXYSMAL ATRIAL FIBRILLATION) (HCC): ICD-10-CM

## 2024-07-09 LAB
ANION GAP SERPL CALCULATED.3IONS-SCNC: 10 MMOL/L (ref 4–13)
ATRIAL RATE: 57 BPM
ATRIAL RATE: 58 BPM
ATRIAL RATE: 71 BPM
BUN SERPL-MCNC: 48 MG/DL (ref 5–25)
CALCIUM SERPL-MCNC: 10.1 MG/DL (ref 8.4–10.2)
CHLORIDE SERPL-SCNC: 102 MMOL/L (ref 96–108)
CO2 SERPL-SCNC: 26 MMOL/L (ref 21–32)
CREAT SERPL-MCNC: 1.45 MG/DL (ref 0.6–1.3)
ERYTHROCYTE [DISTWIDTH] IN BLOOD BY AUTOMATED COUNT: 13.8 % (ref 11.6–15.1)
GFR SERPL CREATININE-BSD FRML MDRD: 34 ML/MIN/1.73SQ M
GLUCOSE P FAST SERPL-MCNC: 162 MG/DL (ref 65–99)
GLUCOSE SERPL-MCNC: 142 MG/DL (ref 65–140)
GLUCOSE SERPL-MCNC: 162 MG/DL (ref 65–140)
GLUCOSE SERPL-MCNC: 255 MG/DL (ref 65–140)
HCT VFR BLD AUTO: 37.2 % (ref 34.8–46.1)
HGB BLD-MCNC: 11.8 G/DL (ref 11.5–15.4)
INR PPP: 1.31 (ref 0.84–1.19)
KCT BLD-ACNC: 368 SEC (ref 89–137)
KCT BLD-ACNC: 375 SEC (ref 89–137)
KCT BLD-ACNC: 416 SEC (ref 89–137)
MCH RBC QN AUTO: 29.7 PG (ref 26.8–34.3)
MCHC RBC AUTO-ENTMCNC: 31.7 G/DL (ref 31.4–37.4)
MCV RBC AUTO: 94 FL (ref 82–98)
P AXIS: 47 DEGREES
P AXIS: 81 DEGREES
P AXIS: 89 DEGREES
PLATELET # BLD AUTO: 324 THOUSANDS/UL (ref 149–390)
PMV BLD AUTO: 9 FL (ref 8.9–12.7)
POTASSIUM SERPL-SCNC: 5.1 MMOL/L (ref 3.5–5.3)
PR INTERVAL: 168 MS
PR INTERVAL: 170 MS
PR INTERVAL: 196 MS
PROTHROMBIN TIME: 16.1 SECONDS (ref 11.6–14.5)
QRS AXIS: -12 DEGREES
QRS AXIS: -18 DEGREES
QRS AXIS: -31 DEGREES
QRSD INTERVAL: 120 MS
QRSD INTERVAL: 128 MS
QRSD INTERVAL: 62 MS
QT INTERVAL: 422 MS
QT INTERVAL: 468 MS
QT INTERVAL: 476 MS
QTC INTERVAL: 414 MS
QTC INTERVAL: 455 MS
QTC INTERVAL: 517 MS
RBC # BLD AUTO: 3.97 MILLION/UL (ref 3.81–5.12)
SODIUM SERPL-SCNC: 138 MMOL/L (ref 135–147)
SPECIMEN SOURCE: ABNORMAL
T WAVE AXIS: -16 DEGREES
T WAVE AXIS: -46 DEGREES
T WAVE AXIS: -8 DEGREES
VENTRICULAR RATE: 57 BPM
VENTRICULAR RATE: 58 BPM
VENTRICULAR RATE: 71 BPM
WBC # BLD AUTO: 8.71 THOUSAND/UL (ref 4.31–10.16)

## 2024-07-09 PROCEDURE — C1894 INTRO/SHEATH, NON-LASER: HCPCS | Performed by: INTERNAL MEDICINE

## 2024-07-09 PROCEDURE — C1766 INTRO/SHEATH,STRBLE,NON-PEEL: HCPCS | Performed by: INTERNAL MEDICINE

## 2024-07-09 PROCEDURE — 82948 REAGENT STRIP/BLOOD GLUCOSE: CPT

## 2024-07-09 PROCEDURE — 93005 ELECTROCARDIOGRAM TRACING: CPT

## 2024-07-09 PROCEDURE — 93656 COMPRE EP EVAL ABLTJ ATR FIB: CPT | Performed by: INTERNAL MEDICINE

## 2024-07-09 PROCEDURE — C1760 CLOSURE DEV, VASC: HCPCS | Performed by: INTERNAL MEDICINE

## 2024-07-09 PROCEDURE — 93010 ELECTROCARDIOGRAM REPORT: CPT | Performed by: INTERNAL MEDICINE

## 2024-07-09 PROCEDURE — C1759 CATH, INTRA ECHOCARDIOGRAPHY: HCPCS | Performed by: INTERNAL MEDICINE

## 2024-07-09 PROCEDURE — C1764 EVENT RECORDER, CARDIAC: HCPCS | Performed by: INTERNAL MEDICINE

## 2024-07-09 PROCEDURE — 85610 PROTHROMBIN TIME: CPT | Performed by: PHYSICIAN ASSISTANT

## 2024-07-09 PROCEDURE — 93657 TX L/R ATRIAL FIB ADDL: CPT | Performed by: INTERNAL MEDICINE

## 2024-07-09 PROCEDURE — 33285 INSJ SUBQ CAR RHYTHM MNTR: CPT | Performed by: INTERNAL MEDICINE

## 2024-07-09 PROCEDURE — NC001 PR NO CHARGE: Performed by: PHYSICIAN ASSISTANT

## 2024-07-09 PROCEDURE — 85347 COAGULATION TIME ACTIVATED: CPT

## 2024-07-09 PROCEDURE — C1733 CATH, EP, OTHR THAN COOL-TIP: HCPCS | Performed by: INTERNAL MEDICINE

## 2024-07-09 PROCEDURE — 80048 BASIC METABOLIC PNL TOTAL CA: CPT | Performed by: PHYSICIAN ASSISTANT

## 2024-07-09 PROCEDURE — 85027 COMPLETE CBC AUTOMATED: CPT | Performed by: PHYSICIAN ASSISTANT

## 2024-07-09 PROCEDURE — 76937 US GUIDE VASCULAR ACCESS: CPT | Performed by: INTERNAL MEDICINE

## 2024-07-09 DEVICE — LOOP RECORDER REVEAL LINQ II SYS DEVICE ONLY: Type: IMPLANTABLE DEVICE | Site: CHEST  WALL | Status: FUNCTIONAL

## 2024-07-09 DEVICE — PERCLOSE™ PROSTYLE™ SUTURE-MEDIATED CLOSURE AND REPAIR SYSTEM
Type: IMPLANTABLE DEVICE | Site: GROIN | Status: FUNCTIONAL
Brand: PERCLOSE™ PROSTYLE™

## 2024-07-09 RX ORDER — LIDOCAINE HYDROCHLORIDE 10 MG/ML
INJECTION, SOLUTION EPIDURAL; INFILTRATION; INTRACAUDAL; PERINEURAL CODE/TRAUMA/SEDATION MEDICATION
Status: DISCONTINUED | OUTPATIENT
Start: 2024-07-09 | End: 2024-07-09 | Stop reason: HOSPADM

## 2024-07-09 RX ORDER — FENTANYL CITRATE/PF 50 MCG/ML
50 SYRINGE (ML) INJECTION
Status: COMPLETED | OUTPATIENT
Start: 2024-07-09 | End: 2024-07-09

## 2024-07-09 RX ORDER — POTASSIUM CHLORIDE 20 MEQ/1
20 TABLET, EXTENDED RELEASE ORAL
Status: DISCONTINUED | OUTPATIENT
Start: 2024-07-09 | End: 2024-07-10 | Stop reason: HOSPADM

## 2024-07-09 RX ORDER — SODIUM CHLORIDE 9 MG/ML
20 INJECTION, SOLUTION INTRAVENOUS CONTINUOUS
Status: DISCONTINUED | OUTPATIENT
Start: 2024-07-09 | End: 2024-07-09

## 2024-07-09 RX ORDER — HEPARIN SODIUM 1000 [USP'U]/ML
INJECTION, SOLUTION INTRAVENOUS; SUBCUTANEOUS CODE/TRAUMA/SEDATION MEDICATION
Status: DISCONTINUED | OUTPATIENT
Start: 2024-07-09 | End: 2024-07-09 | Stop reason: HOSPADM

## 2024-07-09 RX ORDER — PROPOFOL 10 MG/ML
INJECTION, EMULSION INTRAVENOUS AS NEEDED
Status: DISCONTINUED | OUTPATIENT
Start: 2024-07-09 | End: 2024-07-09

## 2024-07-09 RX ORDER — GABAPENTIN 100 MG/1
100 CAPSULE ORAL 3 TIMES DAILY
Status: DISCONTINUED | OUTPATIENT
Start: 2024-07-09 | End: 2024-07-10 | Stop reason: HOSPADM

## 2024-07-09 RX ORDER — TORSEMIDE 20 MG/1
20 TABLET ORAL
Status: DISCONTINUED | OUTPATIENT
Start: 2024-07-09 | End: 2024-07-10 | Stop reason: HOSPADM

## 2024-07-09 RX ORDER — ONDANSETRON 2 MG/ML
4 INJECTION INTRAMUSCULAR; INTRAVENOUS ONCE AS NEEDED
Status: DISCONTINUED | OUTPATIENT
Start: 2024-07-09 | End: 2024-07-09 | Stop reason: HOSPADM

## 2024-07-09 RX ORDER — CEFAZOLIN SODIUM 1 G/3ML
INJECTION, POWDER, FOR SOLUTION INTRAMUSCULAR; INTRAVENOUS AS NEEDED
Status: DISCONTINUED | OUTPATIENT
Start: 2024-07-09 | End: 2024-07-09

## 2024-07-09 RX ORDER — HEPARIN SODIUM 10000 [USP'U]/100ML
INJECTION, SOLUTION INTRAVENOUS
Status: DISCONTINUED | OUTPATIENT
Start: 2024-07-09 | End: 2024-07-09 | Stop reason: HOSPADM

## 2024-07-09 RX ORDER — ONDANSETRON 2 MG/ML
INJECTION INTRAMUSCULAR; INTRAVENOUS AS NEEDED
Status: DISCONTINUED | OUTPATIENT
Start: 2024-07-09 | End: 2024-07-09

## 2024-07-09 RX ORDER — ACETAMINOPHEN 325 MG/1
650 TABLET ORAL EVERY 4 HOURS PRN
Status: DISCONTINUED | OUTPATIENT
Start: 2024-07-09 | End: 2024-07-10 | Stop reason: HOSPADM

## 2024-07-09 RX ORDER — LIDOCAINE HYDROCHLORIDE 10 MG/ML
INJECTION, SOLUTION EPIDURAL; INFILTRATION; INTRACAUDAL; PERINEURAL AS NEEDED
Status: DISCONTINUED | OUTPATIENT
Start: 2024-07-09 | End: 2024-07-09

## 2024-07-09 RX ORDER — ASPIRIN 81 MG/1
81 TABLET, CHEWABLE ORAL DAILY
Status: DISCONTINUED | OUTPATIENT
Start: 2024-07-10 | End: 2024-07-10 | Stop reason: HOSPADM

## 2024-07-09 RX ORDER — PROTAMINE SULFATE 10 MG/ML
INJECTION, SOLUTION INTRAVENOUS AS NEEDED
Status: DISCONTINUED | OUTPATIENT
Start: 2024-07-09 | End: 2024-07-09

## 2024-07-09 RX ORDER — SODIUM CHLORIDE 9 MG/ML
20 INJECTION, SOLUTION INTRAVENOUS ONCE
Status: COMPLETED | OUTPATIENT
Start: 2024-07-09 | End: 2024-07-09

## 2024-07-09 RX ORDER — HYDROMORPHONE HCL IN WATER/PF 6 MG/30 ML
0.2 PATIENT CONTROLLED ANALGESIA SYRINGE INTRAVENOUS
Status: DISCONTINUED | OUTPATIENT
Start: 2024-07-09 | End: 2024-07-09 | Stop reason: HOSPADM

## 2024-07-09 RX ORDER — ROCURONIUM BROMIDE 10 MG/ML
INJECTION, SOLUTION INTRAVENOUS AS NEEDED
Status: DISCONTINUED | OUTPATIENT
Start: 2024-07-09 | End: 2024-07-09

## 2024-07-09 RX ORDER — SPIRONOLACTONE 25 MG/1
25 TABLET ORAL DAILY
Status: DISCONTINUED | OUTPATIENT
Start: 2024-07-10 | End: 2024-07-10 | Stop reason: HOSPADM

## 2024-07-09 RX ORDER — EZETIMIBE 10 MG/1
10 TABLET ORAL
Status: DISCONTINUED | OUTPATIENT
Start: 2024-07-09 | End: 2024-07-10 | Stop reason: HOSPADM

## 2024-07-09 RX ORDER — PANTOPRAZOLE SODIUM 40 MG/10ML
40 INJECTION, POWDER, LYOPHILIZED, FOR SOLUTION INTRAVENOUS ONCE
Status: DISCONTINUED | OUTPATIENT
Start: 2024-07-09 | End: 2024-07-09 | Stop reason: HOSPADM

## 2024-07-09 RX ORDER — GLYCOPYRROLATE 0.2 MG/ML
INJECTION INTRAMUSCULAR; INTRAVENOUS AS NEEDED
Status: DISCONTINUED | OUTPATIENT
Start: 2024-07-09 | End: 2024-07-09

## 2024-07-09 RX ORDER — DEXAMETHASONE SODIUM PHOSPHATE 10 MG/ML
INJECTION, SOLUTION INTRAMUSCULAR; INTRAVENOUS AS NEEDED
Status: DISCONTINUED | OUTPATIENT
Start: 2024-07-09 | End: 2024-07-09

## 2024-07-09 RX ORDER — FENTANYL CITRATE 50 UG/ML
INJECTION, SOLUTION INTRAMUSCULAR; INTRAVENOUS AS NEEDED
Status: DISCONTINUED | OUTPATIENT
Start: 2024-07-09 | End: 2024-07-09

## 2024-07-09 RX ORDER — EPHEDRINE SULFATE 50 MG/ML
INJECTION INTRAVENOUS AS NEEDED
Status: DISCONTINUED | OUTPATIENT
Start: 2024-07-09 | End: 2024-07-09

## 2024-07-09 RX ADMIN — ONDANSETRON 4 MG: 2 INJECTION INTRAMUSCULAR; INTRAVENOUS at 09:58

## 2024-07-09 RX ADMIN — ROCURONIUM BROMIDE 50 MG: 10 INJECTION, SOLUTION INTRAVENOUS at 09:16

## 2024-07-09 RX ADMIN — GABAPENTIN 100 MG: 100 CAPSULE ORAL at 21:54

## 2024-07-09 RX ADMIN — POTASSIUM CHLORIDE 20 MEQ: 1500 TABLET, EXTENDED RELEASE ORAL at 17:36

## 2024-07-09 RX ADMIN — FENTANYL CITRATE 50 MCG: 50 INJECTION INTRAMUSCULAR; INTRAVENOUS at 08:04

## 2024-07-09 RX ADMIN — FENTANYL CITRATE 50 MCG: 50 INJECTION INTRAMUSCULAR; INTRAVENOUS at 13:23

## 2024-07-09 RX ADMIN — APIXABAN 5 MG: 5 TABLET, FILM COATED ORAL at 14:26

## 2024-07-09 RX ADMIN — TORSEMIDE 20 MG: 20 TABLET ORAL at 17:37

## 2024-07-09 RX ADMIN — FENTANYL CITRATE 50 MCG: 50 INJECTION INTRAMUSCULAR; INTRAVENOUS at 11:24

## 2024-07-09 RX ADMIN — SUGAMMADEX 200 MG: 100 INJECTION, SOLUTION INTRAVENOUS at 09:55

## 2024-07-09 RX ADMIN — ACETAMINOPHEN 650 MG: 325 TABLET, FILM COATED ORAL at 17:36

## 2024-07-09 RX ADMIN — FENTANYL CITRATE 25 MCG: 50 INJECTION INTRAMUSCULAR; INTRAVENOUS at 10:02

## 2024-07-09 RX ADMIN — SODIUM CHLORIDE: 0.9 INJECTION, SOLUTION INTRAVENOUS at 07:00

## 2024-07-09 RX ADMIN — ACETAMINOPHEN 650 MG: 325 TABLET, FILM COATED ORAL at 21:54

## 2024-07-09 RX ADMIN — SODIUM CHLORIDE: 0.9 INJECTION, SOLUTION INTRAVENOUS at 07:09

## 2024-07-09 RX ADMIN — PHENYLEPHRINE HYDROCHLORIDE 30 MCG/MIN: 10 INJECTION INTRAVENOUS at 08:18

## 2024-07-09 RX ADMIN — PROPOFOL 150 MG: 10 INJECTION, EMULSION INTRAVENOUS at 08:04

## 2024-07-09 RX ADMIN — DEXAMETHASONE SODIUM PHOSPHATE 10 MG: 10 INJECTION, SOLUTION INTRAMUSCULAR; INTRAVENOUS at 09:25

## 2024-07-09 RX ADMIN — GLYCOPYRROLATE 0.2 MG: 0.2 INJECTION, SOLUTION INTRAMUSCULAR; INTRAVENOUS at 09:08

## 2024-07-09 RX ADMIN — CEFAZOLIN 2000 MG: 1 INJECTION, POWDER, FOR SOLUTION INTRAMUSCULAR; INTRAVENOUS at 08:36

## 2024-07-09 RX ADMIN — ROCURONIUM BROMIDE 50 MG: 10 INJECTION, SOLUTION INTRAVENOUS at 08:05

## 2024-07-09 RX ADMIN — PROTAMINE SULFATE 60 MG: 10 INJECTION, SOLUTION INTRAVENOUS at 09:47

## 2024-07-09 RX ADMIN — GABAPENTIN 100 MG: 100 CAPSULE ORAL at 17:36

## 2024-07-09 RX ADMIN — FENTANYL CITRATE 25 MCG: 50 INJECTION INTRAMUSCULAR; INTRAVENOUS at 10:14

## 2024-07-09 RX ADMIN — EZETIMIBE 10 MG: 10 TABLET ORAL at 21:54

## 2024-07-09 RX ADMIN — EPHEDRINE SULFATE 10 MG: 50 INJECTION, SOLUTION INTRAVENOUS at 08:18

## 2024-07-09 RX ADMIN — APIXABAN 5 MG: 5 TABLET, FILM COATED ORAL at 19:33

## 2024-07-09 RX ADMIN — LIDOCAINE HYDROCHLORIDE 50 MG: 10 INJECTION, SOLUTION EPIDURAL; INFILTRATION; INTRACAUDAL; PERINEURAL at 08:04

## 2024-07-09 NOTE — ANESTHESIA PREPROCEDURE EVALUATION
Procedure:  Cardiac eps/afib ablation PFA (Chest)  Cardiac loop recorder implant (Chest)    Relevant Problems   CARDIO   (+) Atrial fibrillation with RVR (HCC)   (+) Chest pain   (+) Chronic diastolic congestive heart failure (HCC)   (+) Essential hypertension   (+) Hyperlipidemia   (+) New onset atrial fibrillation (HCC)      ENDO   (+) Type 2 diabetes mellitus with obesity  (HCC)      /RENAL   (+) GIOVANNI (acute kidney injury) (HCC)      NEURO/PSYCH   (+) Cerebrovascular accident (CVA) due to thrombosis of precerebral artery ([facility])      3/2024: normal LV systolic function, normal RV systolic function, mild MR, mild-mod TR       Anesthesia Plan  ASA Score- 3     Anesthesia Type- general with ASA Monitors.         Additional Monitors:     Airway Plan: ETT.    Comment: General anesthesia, endotracheal tube; standard ASA monitors. Risks and benefits discussed with patient; patient consented and agrees to proceed.    I saw and evaluated the patient. If seen with CRNA, we have discussed the anesthetic plan and I am in agreement that the plan is appropriate for the patient.  .       Plan Factors-    Chart reviewed.   Existing labs reviewed.                   Induction- intravenous.    Postoperative Plan- Plan for postoperative opioid use. Planned trial extubation    Perioperative Resuscitation Plan - Level 1 - Full Code.       Informed Consent- Anesthetic plan and risks discussed with patient.  I personally reviewed this patient with the CRNA. Discussed and agreed on the Anesthesia Plan with the CRNA..

## 2024-07-09 NOTE — ANESTHESIA POSTPROCEDURE EVALUATION
Post-Op Assessment Note    CV Status:  Stable  Pain Score: 0    Pain management: adequate       Mental Status:  Alert and awake   Hydration Status:  Euvolemic   PONV Controlled:  Controlled   Airway Patency:  Patent     Post Op Vitals Reviewed: Yes    No anethesia notable event occurred.    Staff: CRNA               BP   135/67   Temp   96.8   Pulse   70   Resp   15   SpO2   100%

## 2024-07-09 NOTE — PLAN OF CARE
Problem: Prexisting or High Potential for Compromised Skin Integrity  Goal: Skin integrity is maintained or improved  Description: INTERVENTIONS:  - Identify patients at risk for skin breakdown  - Assess and monitor skin integrity  - Assess and monitor nutrition and hydration status  - Monitor labs   - Assess for incontinence   - Turn and reposition patient  - Assist with mobility/ambulation  - Relieve pressure over bony prominences  - Avoid friction and shearing  - Provide appropriate hygiene as needed including keeping skin clean and dry  - Evaluate need for skin moisturizer/barrier cream  - Collaborate with interdisciplinary team   - Patient/family teaching  - Consider wound care consult   Outcome: Progressing     Problem: PAIN - ADULT  Goal: Verbalizes/displays adequate comfort level or baseline comfort level  Description: Interventions:  - Encourage patient to monitor pain and request assistance  - Assess pain using appropriate pain scale  - Administer analgesics based on type and severity of pain and evaluate response  - Implement non-pharmacological measures as appropriate and evaluate response  - Consider cultural and social influences on pain and pain management  - Notify physician/advanced practitioner if interventions unsuccessful or patient reports new pain  Outcome: Progressing     Problem: INFECTION - ADULT  Goal: Absence or prevention of progression during hospitalization  Description: INTERVENTIONS:  - Assess and monitor for signs and symptoms of infection  - Monitor lab/diagnostic results  - Monitor all insertion sites, i.e. indwelling lines, tubes, and drains  - Monitor endotracheal if appropriate and nasal secretions for changes in amount and color  - [place] appropriate cooling/warming therapies per order  - Administer medications as ordered  - Instruct and encourage patient and family to use good hand hygiene technique  - Identify and instruct in appropriate isolation precautions for  identified infection/condition  Outcome: Progressing  Goal: Absence of fever/infection during neutropenic period  Description: INTERVENTIONS:  - Monitor WBC    Outcome: Progressing     Problem: SAFETY ADULT  Goal: Patient will remain free of falls  Description: INTERVENTIONS:  - Educate patient/family on patient safety including physical limitations  - Instruct patient to call for assistance with activity   - Consult OT/PT to assist with strengthening/mobility   - Keep Call bell within reach  - Keep bed low and locked with side rails adjusted as appropriate  - Keep care items and personal belongings within reach  - Initiate and maintain comfort rounds  - Make Fall Risk Sign visible to staff  - Offer Toileting every  Hours, in advance of need  - Initiate/Maintain alarm  - Obtain necessary fall risk management equipment:   - Apply yellow socks and bracelet for high fall risk patients  - Consider moving patient to room near nurses station  Outcome: Progressing  Goal: Maintain or return to baseline ADL function  Description: INTERVENTIONS:  -  Assess patient's ability to carry out ADLs; assess patient's baseline for ADL function and identify physical deficits which impact ability to perform ADLs (bathing, care of mouth/teeth, toileting, grooming, dressing, etc.)  - Assess/evaluate cause of self-care deficits   - Assess range of motion  - Assess patient's mobility; develop plan if impaired  - Assess patient's need for assistive devices and provide as appropriate  - Encourage maximum independence but intervene and supervise when necessary  - Involve family in performance of ADLs  - Assess for home care needs following discharge   - Consider OT consult to assist with ADL evaluation and planning for discharge  - Provide patient education as appropriate  Outcome: Progressing  Goal: Maintains/Returns to pre admission functional level  Description: INTERVENTIONS:  - Perform AM-PAC 6 Click Basic Mobility/ Daily Activity  assessment daily.  - Set and communicate daily mobility goal to care team and patient/family/caregiver.   - Collaborate with rehabilitation services on mobility goals if consulted  - Perform Range of Motion  times a day.  - Reposition patient every  hours.  - Dangle patient  times a day  - Stand patient  times a day  - Ambulate patient  times a day  - Out of bed to chair  times a day   - Out of bed for meals  times a day  - Out of bed for toileting  - Record patient progress and toleration of activity level   Outcome: Progressing     Problem: DISCHARGE PLANNING  Goal: Discharge to home or other facility with appropriate resources  Description: INTERVENTIONS:  - Identify barriers to discharge w/patient and caregiver  - Arrange for needed discharge resources and transportation as appropriate  - Identify discharge learning needs (meds, wound care, etc.)  - Arrange for interpretive services to assist at discharge as needed  - Refer to Case Management Department for coordinating discharge planning if the patient needs post-hospital services based on physician/advanced practitioner order or complex needs related to functional status, cognitive ability, or social support system  Outcome: Progressing     Problem: Knowledge Deficit  Goal: Patient/family/caregiver demonstrates understanding of disease process, treatment plan, medications, and discharge instructions  Description: Complete learning assessment and assess knowledge base.  Interventions:  - Provide teaching at level of understanding  - Provide teaching via preferred learning methods  Outcome: Progressing     Problem: CARDIOVASCULAR - ADULT  Goal: Maintains optimal cardiac output and hemodynamic stability  Description: INTERVENTIONS:  - Monitor I/O, vital signs and rhythm  - Monitor for S/S and trends of decreased cardiac output  - Administer and titrate ordered vasoactive medications to optimize hemodynamic stability  - Assess quality of pulses, skin color and  temperature  - Assess for signs of decreased coronary artery perfusion  - Instruct patient to report change in severity of symptoms  Outcome: Progressing  Goal: Absence of cardiac dysrhythmias or at baseline rhythm  Description: INTERVENTIONS:  - Continuous cardiac monitoring, vital signs, obtain 12 lead EKG if ordered  - Administer antiarrhythmic and heart rate control medications as ordered  - Monitor electrolytes and administer replacement therapy as ordered  Outcome: Progressing

## 2024-07-09 NOTE — H&P
H&P Exam - Electrophysiology - Cardiology   Jessica Jaquez 79 y.o. female MRN: [id number]  Unit/Bed#: [facility] Encounter: [id number]    Assessment & Plan     Assessment:  Paroxysmal atrial fibrillation  New Dx 3/24 during hospital stay for low back pain  Rate control metoprolol  mg daily  S/p PHU/DCCV March  Rehospitalized April for RVR spontaneously converted to NSR  Started on flecainide   flecainide-stopped due to fatigue and dizziness in May  Normal stress test 4/18/2024  Ac with eliquis 5 mg bid for C2V score 8   HFpEF last echo EF 60% Hx CVA  Outpatient follow up with [facility] cardiology   On toprol  mg daily, diuretics torsemide 20 mg BID and aldactone 25 mg daily   HTN  DM2  Obesity BMI 41.01    Plan:  - plan for atrial fibrillation ablation with Dr. Delaney today using PFA   - post procedure immediate eliquis administration   - hold flecainide post procedure   - may have to adjust metoprolol in the setting of bradycardia note on zio       History of Present Illness   HPI:  Jessica Jaquez is a 79 y.o. year old female with past medical history as mentioned above. She [facility] [place] after found to be tachycardiac by her outpatient PT. She was noted to be in atrial fibrillation with RVR. She has a realtively new diagnosis of atrial fibrillation starting in march of the year when she was found to be in a fib during an admission for lower back pain. She was started on eliquis for ac due to a [id number] score of 8. She had previously been on metoprolol for a history of hypertension and was on max dose of 200 mg BID. She underwent cardioverison with PHU during march admission to restore sinus rhythm. She had an echo done during that admission as well that showed a preserved EF of 60%. She did have conversion from atrial fibrillation to sinus rhythm while in the ED here. She has not had a stress done that is visible in her records although she states she has tried to have them before but was  unsuccessful at completion secondary to back pain.  she was again seen in the hospital for RVR in April of this year and was seen by the EP service. She was started on flecainide and underwent stress testing to rule out CAD. Stress test was normal.  Patient was unable to tolerate flecainide in the setting of dizziness, and fatigue and thus medication was stopped.  She was seen by myself in the electrophysiology office and ablation was discussed to better control patient's atrial fibrillation.  Patient was agreeable for A-fib ablation with Dr. Delaney as well as loop recorder implantation for monitoring of atrial fibrillation in the future.       Review of Systems  ROS as noted above, otherwise 12 point review of systems was performed and is negative.     Historical Information   Past Medical History:   Diagnosis Date    Arthritis     Colorectal polyps     Constipation     Diabetes mellitus (HCC)     Diverticulosis of colon     Hiatal hernia     Hypertension     Increased urinary frequency     Lumbar disc disease     Pressure injury of skin     SOB (shortness of breath)     Stress incontinence     Vitamin D deficiency 07/27/2018     Past Surgical History:   Procedure Laterality Date    BACK SURGERY      CARPAL TUNNEL RELEASE Left     CATARACT EXTRACTION Bilateral     CHOLECYSTECTOMY      COLONOSCOPY W/ ENDOSCOPIC US N/A 2/24/2016    Procedure: ANAL ENDOSCOPIC U/S;  Surgeon: HOLLIS Washington MD;  Location: [facility];  Service:     HERNIA REPAIR      AR COLONOSCOPY FLX DX W/COLLJ SPEC WHEN PFRMD N/A 2/24/2016    Procedure: COLONOSCOPY;  Surgeon: HOLLIS Washington MD;  Location: [facility];  Service: Colorectal    TONSILLECTOMY      TUBAL LIGATION       Family History:   Family History   Problem Relation Age of Onset    Hypertension Mother     Heart attack Mother     Diabetes Mother     Prostate cancer Father     No Known Problems Brother     Stroke Maternal Grandmother     Stroke Paternal Grandmother     Stroke  "Paternal Grandfather     Thyroid cancer Neg Hx        Social History   Social History     Substance and Sexual Activity   Alcohol Use Never     Social History     Substance and Sexual Activity   Drug Use No     Social History     Tobacco Use   Smoking Status Never   Smokeless Tobacco Never       Meds/Allergies   all medications and allergies reviewed  Home Medications:   Medications Prior to Admission:     apixaban (ELIQUIS) 5 mg    aspirin 81 mg chewable tablet    bimatoprost (Lumigan) 0.01 % ophthalmic drops    Cholecalciferol (VITAMIN D3) 1000 units CAPS    ezetimibe (ZETIA) 10 mg tablet    gabapentin (NEURONTIN) 100 mg capsule    glimepiride (AMARYL) 1 mg tablet    hydrocortisone 2.5 % cream    ketoconazole (NIZORAL) 2 % cream    metolazone (ZAROXOLYN) 5 mg tablet    metoprolol succinate (TOPROL-XL) 100 mg 24 hr tablet    Multiple Vitamins-Minerals (CENTRUM SILVER PO)    potassium chloride (Klor-Con M20) 20 mEq tablet    spironolactone (ALDACTONE) 25 mg tablet    torsemide (DEMADEX) 20 mg tablet    flecainide (TAMBOCOR) 100 mg tablet    Allergies   Allergen Reactions    Other Cough and Sneezing     Seasonal allergies    Pollen Extract Allergic Rhinitis       Objective   Vitals: Blood pressure 145/63, pulse 64, temperature (!) 97.3 °F (36.3 °C), temperature source Temporal, resp. rate 16, height 5' 3\" (1.6 m), weight 105 kg (231 lb 7.7 oz), SpO2 99%.  Orthostatic Blood Pressures      Flowsheet Row Most Recent Value   Blood Pressure 145/63 filed at 07/09/2024 0659            No intake or output data in the 24 hours ending 07/09/24 0708    Invasive Devices       Peripheral Intravenous Line  Duration             Peripheral IV 07/09/24 Right Antecubital <1 day                    Physical Exam   GEN: NAD, alert and oriented, well appearing  SKIN: dry without significant lesions or rashes  HEENT: NCAT, PERRL, EOMs intact  NECK: No JVD or carotid bruits appreciated  CARDIOVASCULAR: RRR, normal S1, S2 without murmurs, " "rubs, or gallops appreciated  LUNGS: Clear to auscultation bilaterally without wheezes, rhonchi, or rales  ABDOMEN: Soft, nontender, nondistended  EXTREMITIES/VASCULAR: perfused without clubbing, cyanosis, or edema b/l  PSYCH: Normal mood and affect  NEURO: CN ll-Xll grossly intact    Lab Results: I have personally reviewed pertinent lab results.              Invalid input(s): \"LABGLOM\"              Imaging: I have personally reviewed pertinent reports.    Results for orders placed during the hospital encounter of 18    Echo complete with contrast if indicated    Narrative  [address] [address]  [phone]    Transthoracic Echocardiogram  2D, M-mode, Doppler, and Color Doppler    Study date:  28-Dec-2018    Patient: HAN CARVAJAL  MR number: [id number]  Account number: [id number]  : 1945  Age: 73 years  Gender: Female  Status: Outpatient  Location: [address] [place] [address] [facility] [place]  Height: 63 in  Weight: 232 lb  BP: 169/ 76 mmHg    Indications: Heart failure    Diagnoses: I50.9 - Heart failure, unspecified    Sonographer:  DANYELLE Ga  Interpreting Physician:  Kvng Aviles MD  Referring Physician:  Jorden Holt III, MD  Group:  [facility]  Cardiology Fellow:  Von Fuentes MD    SUMMARY    PROCEDURE INFORMATION:  This was a technically difficult study.    LEFT VENTRICLE:  Systolic function was normal. Ejection fraction was estimated to be 60 %.  There were no regional wall motion abnormalities.  Doppler parameters were consistent with abnormal left ventricular relaxation (grade 1 diastolic dysfunction).    MITRAL VALVE:  There was trace regurgitation.    TRICUSPID VALVE:  There was trace regurgitation.  Pulmonary artery systolic pressure was within the normal range.    PULMONIC VALVE:  There was trace regurgitation.    HISTORY: PRIOR HISTORY: Hypertension, hyperlipidemia, diastolic dysfunction, " type 2 diabetes, GERD    PROCEDURE: The study was performed in the [address] [place] [address] [facility]. This was a routine study. The transthoracic approach was used. The study included complete 2D imaging, M-mode, complete spectral Doppler, and color Doppler. This  was a technically difficult study.    LEFT VENTRICLE: Size was normal. Systolic function was normal. Ejection fraction was estimated to be 60 %. There were no regional wall motion abnormalities. Wall thickness was normal. DOPPLER: Doppler parameters were consistent with  abnormal left ventricular relaxation (grade 1 diastolic dysfunction).    RIGHT VENTRICLE: The size was normal. Systolic function was normal. Wall thickness was normal.    LEFT ATRIUM: Size was normal.    RIGHT ATRIUM: Size was normal.    MITRAL VALVE: Valve structure was normal. There was normal leaflet separation. DOPPLER: The transmitral velocity was within the normal range. There was no evidence for stenosis. There was trace regurgitation.    AORTIC VALVE: The valve was probably trileaflet. Leaflets exhibited normal cuspal separation. The valve was not well visualized. DOPPLER: Transaortic velocity was within the normal range. There was no evidence for stenosis. There was no  significant regurgitation.    TRICUSPID VALVE: The valve structure was normal. There was normal leaflet separation. DOPPLER: The transtricuspid velocity was within the normal range. There was no evidence for stenosis. There was trace regurgitation. Pulmonary artery  systolic pressure was within the normal range. Estimated peak PA pressure was 30 mmHg.    PULMONIC VALVE: Not well visualized. DOPPLER: The transpulmonic velocity was within the normal range. There was trace regurgitation.    PERICARDIUM: There was no pericardial effusion. A pericardial fat pad was present. The pericardium was normal in appearance.    AORTA: The root exhibited normal size.    SYSTEMIC VEINS: IVC: The inferior vena cava was normal in  size. Respirophasic changes were normal.    SYSTEM MEASUREMENT TABLES    2D  %FS: 31.39 %  Ao Diam: 3.29 cm  EDV(Teich): 100.54 ml  EF(Cube): 67.7 %  EF(Teich): 59.26 %  ESV(Cube): 32.77 ml  ESV(Teich): 40.96 ml  IVSd: 0.99 cm  LA Area: 16.53 cm2  LA Diam: 3.39 cm  LVEDV MOD A4C: 94.35 ml  LVEF MOD A4C: 68.85 %  LVESV MOD A4C: 29.39 ml  LVIDd: 4.66 cm  LVIDs: 3.2 cm  LVLd A4C: 7.19 cm  LVLs A4C: 5.3 cm  LVPWd: 1.08 cm  RA Area: 13.64 cm2  RV Diam.: 2.6 cm  SV MOD A4C: 64.96 ml  SV(Cube): 68.69 ml  SV(Teich): 59.58 ml    CW  TR Vmax: 2.6 m/s  TR maxP.99 mmHg    MM  TAPSE: 1.84 cm    PW  E': 0.06 m/s  E/E': 14.64  MV A [place]: 0.97 m/s  MV Dec [place]: 3.14 m/s2  MV DecT: 267.31 ms  MV E [place]an: 0.84 m/s  MV E/A Ratio: 0.86    [employer] Accredited Echocardiography Laboratory    Prepared and electronically signed by    Kvng Aviles MD  Signed 28-Dec-2018 11:05:05      Code Status: Level 1 - Full Code    ** Please Note: Dictation voice to text software may have been used in the creation of this document. **

## 2024-07-10 VITALS
HEART RATE: 66 BPM | SYSTOLIC BLOOD PRESSURE: 121 MMHG | TEMPERATURE: 98.2 F | DIASTOLIC BLOOD PRESSURE: 63 MMHG | BODY MASS INDEX: 41.02 KG/M2 | HEIGHT: 63 IN | WEIGHT: 231.48 LBS | OXYGEN SATURATION: 95 % | RESPIRATION RATE: 16 BRPM

## 2024-07-10 LAB
ANION GAP SERPL CALCULATED.3IONS-SCNC: 11 MMOL/L (ref 4–13)
BUN SERPL-MCNC: 46 MG/DL (ref 5–25)
CALCIUM SERPL-MCNC: 8.8 MG/DL (ref 8.4–10.2)
CHLORIDE SERPL-SCNC: 102 MMOL/L (ref 96–108)
CO2 SERPL-SCNC: 22 MMOL/L (ref 21–32)
CREAT SERPL-MCNC: 1.43 MG/DL (ref 0.6–1.3)
ERYTHROCYTE [DISTWIDTH] IN BLOOD BY AUTOMATED COUNT: 14.2 % (ref 11.6–15.1)
GFR SERPL CREATININE-BSD FRML MDRD: 34 ML/MIN/1.73SQ M
GLUCOSE P FAST SERPL-MCNC: 208 MG/DL (ref 65–99)
GLUCOSE SERPL-MCNC: 208 MG/DL (ref 65–140)
HCT VFR BLD AUTO: 31.9 % (ref 34.8–46.1)
HGB BLD-MCNC: 10.2 G/DL (ref 11.5–15.4)
LDH SERPL-CCNC: 240 U/L (ref 140–271)
MCH RBC QN AUTO: 29.7 PG (ref 26.8–34.3)
MCHC RBC AUTO-ENTMCNC: 32 G/DL (ref 31.4–37.4)
MCV RBC AUTO: 93 FL (ref 82–98)
PLATELET # BLD AUTO: 335 THOUSANDS/UL (ref 149–390)
PMV BLD AUTO: 9.2 FL (ref 8.9–12.7)
POTASSIUM SERPL-SCNC: 4.4 MMOL/L (ref 3.5–5.3)
RBC # BLD AUTO: 3.43 MILLION/UL (ref 3.81–5.12)
SODIUM SERPL-SCNC: 135 MMOL/L (ref 135–147)
WBC # BLD AUTO: 12.12 THOUSAND/UL (ref 4.31–10.16)

## 2024-07-10 PROCEDURE — 83010 ASSAY OF HAPTOGLOBIN QUANT: CPT | Performed by: PHYSICIAN ASSISTANT

## 2024-07-10 PROCEDURE — 85027 COMPLETE CBC AUTOMATED: CPT | Performed by: PHYSICIAN ASSISTANT

## 2024-07-10 PROCEDURE — 83615 LACTATE (LD) (LDH) ENZYME: CPT | Performed by: PHYSICIAN ASSISTANT

## 2024-07-10 PROCEDURE — NC001 PR NO CHARGE: Performed by: PHYSICIAN ASSISTANT

## 2024-07-10 PROCEDURE — 80048 BASIC METABOLIC PNL TOTAL CA: CPT | Performed by: PHYSICIAN ASSISTANT

## 2024-07-10 PROCEDURE — 93005 ELECTROCARDIOGRAM TRACING: CPT

## 2024-07-10 RX ADMIN — SPIRONOLACTONE 25 MG: 25 TABLET ORAL at 09:35

## 2024-07-10 RX ADMIN — ASPIRIN 81 MG CHEWABLE TABLET 81 MG: 81 TABLET CHEWABLE at 09:36

## 2024-07-10 RX ADMIN — POTASSIUM CHLORIDE 20 MEQ: 1500 TABLET, EXTENDED RELEASE ORAL at 09:35

## 2024-07-10 RX ADMIN — TORSEMIDE 20 MG: 20 TABLET ORAL at 09:39

## 2024-07-10 RX ADMIN — APIXABAN 5 MG: 5 TABLET, FILM COATED ORAL at 09:36

## 2024-07-10 RX ADMIN — POTASSIUM CHLORIDE 20 MEQ: 1500 TABLET, EXTENDED RELEASE ORAL at 13:07

## 2024-07-10 RX ADMIN — GABAPENTIN 100 MG: 100 CAPSULE ORAL at 09:35

## 2024-07-10 NOTE — PLAN OF CARE
Problem: Prexisting or High Potential for Compromised Skin Integrity  Goal: Skin integrity is maintained or improved  Description: INTERVENTIONS:  - Identify patients at risk for skin breakdown  - Assess and monitor skin integrity  - Assess and monitor nutrition and hydration status  - Monitor labs   - Assess for incontinence   - Turn and reposition patient  - Assist with mobility/ambulation  - Relieve pressure over bony prominences  - Avoid friction and shearing  - Provide appropriate hygiene as needed including keeping skin clean and dry  - Evaluate need for skin moisturizer/barrier cream  - Collaborate with interdisciplinary team   - Patient/family teaching  - Consider wound care consult   Outcome: Adequate for Discharge     Problem: PAIN - ADULT  Goal: Verbalizes/displays adequate comfort level or baseline comfort level  Description: Interventions:  - Encourage patient to monitor pain and request assistance  - Assess pain using appropriate pain scale  - Administer analgesics based on type and severity of pain and evaluate response  - Implement non-pharmacological measures as appropriate and evaluate response  - Consider cultural and social influences on pain and pain management  - Notify physician/advanced practitioner if interventions unsuccessful or patient reports new pain  Outcome: Adequate for Discharge     Problem: INFECTION - ADULT  Goal: Absence or prevention of progression during hospitalization  Description: INTERVENTIONS:  - Assess and monitor for signs and symptoms of infection  - Monitor lab/diagnostic results  - Monitor all insertion sites, i.e. indwelling lines, tubes, and drains  - Monitor endotracheal if appropriate and nasal secretions for changes in amount and color  - [place] appropriate cooling/warming therapies per order  - Administer medications as ordered  - Instruct and encourage patient and family to use good hand hygiene technique  - Identify and instruct in appropriate isolation  precautions for identified infection/condition  Outcome: Adequate for Discharge  Goal: Absence of fever/infection during neutropenic period  Description: INTERVENTIONS:  - Monitor WBC    Outcome: Adequate for Discharge     Problem: SAFETY ADULT  Goal: Patient will remain free of falls  Description: INTERVENTIONS:  - Educate patient/family on patient safety including physical limitations  - Instruct patient to call for assistance with activity   - Consult OT/PT to assist with strengthening/mobility   - Keep Call bell within reach  - Keep bed low and locked with side rails adjusted as appropriate  - Keep care items and personal belongings within reach  - Initiate and maintain comfort rounds  - Make Fall Risk Sign visible to staff  - Offer Toileting every \ Hours, in advance of need  - Initiate/Maintain \alarm  - Obtain necessary fall risk management equipment: \  - Apply yellow socks and bracelet for high fall risk patients  - Consider moving patient to room near nurses station  Outcome: Adequate for Discharge  Goal: Maintain or return to baseline ADL function  Description: INTERVENTIONS:  -  Assess patient's ability to carry out ADLs; assess patient's baseline for ADL function and identify physical deficits which impact ability to perform ADLs (bathing, care of mouth/teeth, toileting, grooming, dressing, etc.)  - Assess/evaluate cause of self-care deficits   - Assess range of motion  - Assess patient's mobility; develop plan if impaired  - Assess patient's need for assistive devices and provide as appropriate  - Encourage maximum independence but intervene and supervise when necessary  - Involve family in performance of ADLs  - Assess for home care needs following discharge   - Consider OT consult to assist with ADL evaluation and planning for discharge  - Provide patient education as appropriate  Outcome: Adequate for Discharge  Goal: Maintains/Returns to pre admission functional level  Description:  INTERVENTIONS:  - Perform AM-PAC 6 Click Basic Mobility/ Daily Activity assessment daily.  - Set and communicate daily mobility goal to care team and patient/family/caregiver.   - Collaborate with rehabilitation services on mobility goals if consulted  - Perform Range of Motion \ times a day.  - Reposition patient every \ hours.  - Dangle patient \ times a day  - Stand patient \ times a day  - Ambulate patient \ times a day  - Out of bed to chair \ times a day   - Out of bed for meals \ times a day  - Out of bed for toileting  - Record patient progress and toleration of activity level   Outcome: Adequate for Discharge     Problem: DISCHARGE PLANNING  Goal: Discharge to home or other facility with appropriate resources  Description: INTERVENTIONS:  - Identify barriers to discharge w/patient and caregiver  - Arrange for needed discharge resources and transportation as appropriate  - Identify discharge learning needs (meds, wound care, etc.)  - Arrange for interpretive services to assist at discharge as needed  - Refer to Case Management Department for coordinating discharge planning if the patient needs post-hospital services based on physician/advanced practitioner order or complex needs related to functional status, cognitive ability, or social support system  Outcome: Adequate for Discharge     Problem: Knowledge Deficit  Goal: Patient/family/caregiver demonstrates understanding of disease process, treatment plan, medications, and discharge instructions  Description: Complete learning assessment and assess knowledge base.  Interventions:  - Provide teaching at level of understanding  - Provide teaching via preferred learning methods  Outcome: Adequate for Discharge     Problem: CARDIOVASCULAR - ADULT  Goal: Maintains optimal cardiac output and hemodynamic stability  Description: INTERVENTIONS:  - Monitor I/O, vital signs and rhythm  - Monitor for S/S and trends of decreased cardiac output  - Administer and titrate  ordered vasoactive medications to optimize hemodynamic stability  - Assess quality of pulses, skin color and temperature  - Assess for signs of decreased coronary artery perfusion  - Instruct patient to report change in severity of symptoms  Outcome: Adequate for Discharge  Goal: Absence of cardiac dysrhythmias or at baseline rhythm  Description: INTERVENTIONS:  - Continuous cardiac monitoring, vital signs, obtain 12 lead EKG if ordered  - Administer antiarrhythmic and heart rate control medications as ordered  - Monitor electrolytes and administer replacement therapy as ordered  Outcome: Adequate for Discharge

## 2024-07-10 NOTE — DISCHARGE SUMMARY
Discharge Summary - Jessica Jaquez 79 y.o. female MRN: [id number]    Unit/Bed#: -01 Encounter: [id number]      Admission Date: 7/9/2024   Discharge Date: 7/10/24    Discharge Diagnosis:   Paroxysmal atrial fibrillation s/p PFA ablation of PVI, posterior wall and loop recorder implantation   New Dx 3/24 during hospital stay for low back pain  Rate control metoprolol  mg daily  S/p PHU/DCCV March  Rehospitalized April for RVR spontaneously converted to NSR  Started on flecainide   flecainide-stopped due to fatigue and dizziness in May  Normal stress test 4/18/2024  Ac with eliquis 5 mg bid for C2V score 8   HFpEF last echo EF 60% Hx CVA  Outpatient follow up with [facility] cardiology   On toprol  mg daily, diuretics torsemide 20 mg BID and aldactone 25 mg daily   HTN  DM2  Obesity BMI 41.01    Procedures Performed:   PFA ablation of PVI, posterior wall and loop recorder implantation   Orders Placed This Encounter   Procedures    Cardiac ep lab eps/ablations       Consultants: none     HPI: Please refer to the initial history and physical as well as procedure notes for full details. Briefly, Jessica Jaquez is a 79 y.o. year old female with past medical history as mentioned above. She presents to hospitals after found to be tachycardiac by her outpatient PT. She was noted to be in atrial fibrillation with RVR. She has a realtively new diagnosis of atrial fibrillation starting in march of the year when she was found to be in a fib during an admission for lower back pain. She was started on eliquis for ac due to a LNRN2Dnbq score of 8. She had previously been on metoprolol for a history of hypertension and was on max dose of 200 mg BID. She underwent cardioverison with PHU during march admission to restore sinus rhythm. She had an echo done during that admission as well that showed a preserved EF of 60%. She did have conversion from atrial fibrillation to sinus rhythm while in the ED here. She has not had a  stress done that is visible in her records although she states she has tried to have them before but was unsuccessful at completion secondary to back pain.  she was again seen in the hospital for RVR in April of this year and was seen by the EP service. She was started on flecainide and underwent stress testing to rule out CAD. Stress test was normal.  Patient was unable to tolerate flecainide in the setting of dizziness, and fatigue and thus medication was stopped.  She was seen by myself in the electrophysiology office and ablation was discussed to better control patient's atrial fibrillation.  Patient was agreeable for A-fib ablation with Dr. Delaney as well as loop recorder implantation for monitoring of atrial fibrillation in the future        Hospital Course: Jessica Jaquez presented at her [facility] state of health. After the procedure was explained in detail and consent was obtained, she underwent PFA ablation of atrial fibrillation with posterior wall ablation as well as pulmonary veins without complications. She also had MDT loop recorder placed at the end of the procedure for atrial fibrillation monitoring. She tolerated the procedure well. She was then monitored overnight for further observation.    Patient was seen by the cardiac fellow overnight 7/10 for persistent oozing of the loop site. Manual pressure was held with hemostasis. She again had wound oozing during my visit with her later that day. Manual pressure did achieve hemostasis and quick clot pressing was applied to the wound. She was told to follow up in the clinic on Friday for a site check to ensure that wound is no longer bleeding.     The following morning She denied all cardiac complaints, including chest pain/pressure, dyspnea, palpitations, dizziness, lightheadedness, or syncope. Her vital signs were reviewed and labs are stable. Telemetry showed stable rhythm with NSR in the 70's. Her groins were soft without significant hematoma or  recurrent bleeding. Physical exam on the day of discharge was as follows:    GEN: NAD, alert and oriented, well appearing  SKIN: dry without significant lesions or rashes  HEENT: NCAT, PERRL, EOMs intact  NECK: No JVD or carotid bruits appreciated  CARDIOVASCULAR: RRR, normal S1, S2 without murmurs, rubs, or gallops appreciated  LUNGS: Clear to auscultation bilaterally without wheezes, rhonchi, or rales  ABDOMEN: Soft, nontender, nondistended  EXTREMITIES/VASCULAR: perfused without clubbing, cyanosis, or edema b/l  PSYCH: Normal mood and affect  NEURO: CN ll-Xll grossly intact    She was given routine post ablation discharge instructions and restrictions, and these were explained in detail. She was given a follow up appointment with Dr. Delaney at the [facility], and she was instructed to follow up with her primary cardiologist as previously instructed.    In terms of her medications, there will be no changes from an EP perspective. She is to continue blood thinner until follow up with Dr. Delaney. She will also not come off of her baby ASA for a minimum of 30 days.     She is stable for discharge at this time with all questions answered. She was discussed in detail with Dr. Delaney who is in agreement with this discharge summary.     Discharge Medications:  See after visit summary for reconciled discharge medications provided to patient and family.      Medications Prior to Admission:     apixaban (ELIQUIS) 5 mg    aspirin 81 mg chewable tablet    bimatoprost (Lumigan) 0.01 % ophthalmic drops    Cholecalciferol (VITAMIN D3) 1000 units CAPS    ezetimibe (ZETIA) 10 mg tablet    gabapentin (NEURONTIN) 100 mg capsule    glimepiride (AMARYL) 1 mg tablet    hydrocortisone 2.5 % cream    ketoconazole (NIZORAL) 2 % cream    metolazone (ZAROXOLYN) 5 mg tablet    metoprolol succinate (TOPROL-XL) 100 mg 24 hr tablet    Multiple Vitamins-Minerals (CENTRUM SILVER PO)    potassium chloride (Klor-Con M20) 20 mEq  tablet    spironolactone (ALDACTONE) 25 mg tablet    torsemide (DEMADEX) 20 mg tablet    flecainide (TAMBOCOR) 100 mg tablet      Pertininet Labs/diagnostics:  CBC with diff:   Results from last 7 days   Lab Units 07/10/24  0515 07/09/24  0639   WBC Thousand/uL 12.12* 8.71   HEMOGLOBIN g/dL 10.2* 11.8   HEMATOCRIT % 31.9* 37.2   MCV fL 93 94   PLATELETS Thousands/uL 335 324   RBC Million/uL 3.43* 3.97   MCH pg 29.7 29.7   MCHC g/dL 32.0 31.7   RDW % 14.2 13.8   MPV fL 9.2 9.0       BMP:  Results from last 7 days   Lab Units 07/10/24  0515 07/09/24  0639   POTASSIUM mmol/L 4.4 5.1   CHLORIDE mmol/L 102 102   CO2 mmol/L 22 26   BUN mg/dL 46* 48*   CREATININE mg/dL 1.43* 1.45*   CALCIUM mg/dL 8.8 10.1       Magnesium:       Coags:   Results from last 7 days   Lab Units 07/09/24  0639   INR  1.31*         Complications: none    Condition at Discharge: good     Discharge instructions/Information to patient and family:   See after visit summary for information provided to patient and family.      Provisions for Follow-Up Care:  See after visit summary for information related to follow-up care and any pertinent home health orders.      Disposition: Home    Planned Readmission: No    Discharge Statement   I spent 45 minutes minutes discharging the patient. This time was spent on the day of discharge. I had direct contact with the patient on the day of discharge. Additional documentation is required if more than 30 minutes were spent on discharge. Evaluating the incision, discussing discharge instructions and restrictions, arranging follow up appointments, discussing medications

## 2024-07-10 NOTE — DISCHARGE INSTR - AVS FIRST PAGE
NEW MEDICATIONS:  Continue your blood thinner until you are able to follow up with EP in the office.   Continue your baby aspirin x 30 days.   Please get blood work done in 1 week (to check your kidney function)     RESTRICTIONS:   No heavy lifting or strenuous activity for one week.    Do not submerge in any water for one week or until groin heals. You may shower. Please let soap and water run over the groins. Do not scrub the groins. Pat the area dry. You may place band-aid on groins daily for no more than 5 days.    If you notice bleeding, swelling, or large firm lumps near ablation incision, please contact Dr. Delaney's office at [phone].

## 2024-07-11 ENCOUNTER — HOSPITAL ENCOUNTER (EMERGENCY)
Facility: HOSPITAL | Age: 79
Discharge: HOME | End: 2024-07-11
Attending: EMERGENCY MEDICINE
Payer: COMMERCIAL

## 2024-07-11 VITALS
SYSTOLIC BLOOD PRESSURE: 114 MMHG | RESPIRATION RATE: 18 BRPM | TEMPERATURE: 97.1 F | HEART RATE: 66 BPM | DIASTOLIC BLOOD PRESSURE: 57 MMHG | OXYGEN SATURATION: 98 %

## 2024-07-11 DIAGNOSIS — R58 BLEEDING: ICD-10-CM

## 2024-07-11 DIAGNOSIS — Z95.818 STATUS POST PLACEMENT OF IMPLANTABLE LOOP RECORDER: Primary | ICD-10-CM

## 2024-07-11 LAB
ATRIAL RATE: 83 BPM
HAPTOGLOB SERPL-MCNC: 150 MG/DL (ref 42–346)
P AXIS: 63 DEGREES
PR INTERVAL: 174 MS
QRS AXIS: -27 DEGREES
QRSD INTERVAL: 124 MS
QT INTERVAL: 418 MS
QTC INTERVAL: 491 MS
T WAVE AXIS: -14 DEGREES
VENTRICULAR RATE: 83 BPM

## 2024-07-11 PROCEDURE — 93010 ELECTROCARDIOGRAM REPORT: CPT | Performed by: INTERNAL MEDICINE

## 2024-07-11 PROCEDURE — 99284 EMERGENCY DEPT VISIT MOD MDM: CPT | Performed by: EMERGENCY MEDICINE

## 2024-07-11 RX ORDER — LIDOCAINE HYDROCHLORIDE AND EPINEPHRINE 10; 10 MG/ML; UG/ML
5 INJECTION, SOLUTION INFILTRATION; PERINEURAL ONCE
Status: COMPLETED | OUTPATIENT
Start: 2024-07-11 | End: 2024-07-11

## 2024-07-11 RX ADMIN — LIDOCAINE HYDROCHLORIDE,EPINEPHRINE BITARTRATE 5 ML: 10; .01 INJECTION, SOLUTION INFILTRATION; PERINEURAL at 12:52

## 2024-07-11 NOTE — QUICK NOTE
EP service note:    Reached out by ER regarding Jessica Buitrago who is an 79-year-old female who underwent atrial fibrillation ablation as well as loop recorder implantation yesterday with Dr. Delaney.  Postop day 1, patient did have bleeding under loop recorder bandage which was evaluated and pressure was held achieving hemostasis.  Quick clot dressing was applied to the wound and patient was subsequently discharged with follow-up today.     Unfortunately, patient represented to the emergency room due to soaking through bandage with blood.  She does not have any symptoms but was just concerned regarding the bleeding.    Dressing was removed in sterile fashion by Dr. Delaney.  8 cc of lidocaine with epinephrine was injected around incision. skin and wound was prepped with Clore sticks x 2 and patient was draped in sterile fashion with sterile towels.  Sterile gloves were applied by  and sutures x 2 were removed with scissors without any residual suture material left behind.    Small superficial bleeding was noted at the left lateral edge of incision.  Wound was subsequently cauterized and observed for proximately 2 minutes with no return of spontaneous bleeding.  Wound was resutured x 2.  Telfa and Tegaderm dressing were applied.  Patient tolerated the procedure well with no complications.    She was given follow-up visit for site check on Monday in our office to have incision evaluated again to ensure no residual bleeding.  She will also call our office tomorrow to update me on status of her wound.    Patient is stable for discharge from EP perspective at this time.  Please reach out with questions or concerns.

## 2024-07-11 NOTE — ED PROVIDER NOTES
EMERGENCY DEPARTMENT ENCOUNTER NOTE    This note has been generated using a voice recognition software. There may be typographic, grammatic, or word substitution errors that have escaped editorial review.    Emergency Department Note- Jessica Jaquez 79 y.o. female MRN: [id number]    Unit/Bed#: ED 25 Encounter: [id number]  ?  CHIEF COMPLAINT  Chief Complaint   Patient presents with    Post-op Problem     Patient had an ablation and loop recorder placed yesterday and has been bleeding since but has increased in bleeding this morning. On elaquis.        HPI  Jessica Jaquez is a 79 y.o. female with PMH of diabetes mellitus, hypertension, paroxysmal atrial fibrillation, presenting with surgical site bleeding.  Patient underwent atrial fibrillation ablation and had a loop recorder placed on 7/9 with Dr. Delaney.  Her Eliquis was restarted.  She has had some issues with oozing from the loop recorder implantation site.  There is no associated pain.  No fevers.  Patient reports doing well otherwise.  She has a puncture wound to bilateral groins for access for the ablation and is requesting assistance with changing dressings since she is unable to reach them.    REVIEW OF SYSTEMS    Constitutional: no fevers  Cardiac: no chest pain  Respiratory:  no cough, no shortness of breath,   GI: no abdominal pain  Endocrine: History of diabetes  Neuro: no new focal weakness or numbness    PAST MEDICAL HISTORY  Past Medical History:   Diagnosis Date    Arthritis     Colorectal polyps     Constipation     Diabetes mellitus (HCC)     Diverticulosis of colon     Hiatal hernia     Hypertension     Increased urinary frequency     Lumbar disc disease     Pressure injury of skin     SOB (shortness of breath)     Stress incontinence     Vitamin D deficiency 07/27/2018       SURGICAL HISTORY  Past Surgical History:   Procedure Laterality Date    BACK SURGERY      CARDIAC ELECTROPHYSIOLOGY PROCEDURE N/A 7/9/2024    Procedure: Cardiac eps/afib  ablation PFA;  Surgeon: Jeyson Delaney MD;  Location: [facility];  Service: Cardiology    CARDIAC ELECTROPHYSIOLOGY PROCEDURE N/A 7/9/2024    Procedure: Cardiac loop recorder implant;  Surgeon: Jeyson Delaney MD;  Location: [facility];  Service: Cardiology    CARPAL TUNNEL RELEASE Left     CATARACT EXTRACTION Bilateral     CHOLECYSTECTOMY      COLONOSCOPY W/ ENDOSCOPIC US N/A 2/24/2016    Procedure: ANAL ENDOSCOPIC U/S;  Surgeon: HOLLIS Washington MD;  Location: [facility];  Service:     HERNIA REPAIR      OH COLONOSCOPY FLX DX W/COLLJ SPEC WHEN PFRMD N/A 2/24/2016    Procedure: COLONOSCOPY;  Surgeon: HOLLIS Washington MD;  Location: [facility];  Service: Colorectal    TONSILLECTOMY      TUBAL LIGATION         FAMILY HISTORY  Family History   Problem Relation Age of Onset    Hypertension Mother     Heart attack Mother     Diabetes Mother     Prostate cancer Father     No Known Problems Brother     Stroke Maternal Grandmother     Stroke Paternal Grandmother     Stroke Paternal Grandfather     Thyroid cancer Neg Hx         CURRENT MEDICATIONS  Current Facility-Administered Medications on File Prior to Encounter   Medication    [DISCONTINUED] acetaminophen (TYLENOL) tablet 650 mg    [DISCONTINUED] apixaban (ELIQUIS) tablet 5 mg    [DISCONTINUED] aspirin chewable tablet 81 mg    [DISCONTINUED] ezetimibe (ZETIA) tablet 10 mg    [DISCONTINUED] gabapentin (NEURONTIN) capsule 100 mg    [DISCONTINUED] potassium chloride (Klor-Con M20) CR tablet 20 mEq    [DISCONTINUED] spironolactone (ALDACTONE) tablet 25 mg    [DISCONTINUED] torsemide (DEMADEX) tablet 20 mg     Current Outpatient Medications on File Prior to Encounter   Medication Sig    apixaban (ELIQUIS) 5 mg Take 1 tablet (5 mg total) by mouth 2 (two) times a day    aspirin 81 mg chewable tablet Chew 81 mg daily    bimatoprost (Lumigan) 0.01 % ophthalmic drops Administer 1 drop to both eyes daily at bedtime    Cholecalciferol (VITAMIN D3) 1000 units  CAPS Take by mouth daily      ezetimibe (ZETIA) 10 mg tablet Take 1 tablet (10 mg total) by mouth daily at bedtime    gabapentin (NEURONTIN) 100 mg capsule Take 1 capsule (100 mg total) by mouth 3 (three) times a day    glimepiride (AMARYL) 1 mg tablet Take 1 mg by mouth every morning before breakfast    hydrocortisone 2.5 % cream as needed    ketoconazole (NIZORAL) 2 % cream as needed    metolazone (ZAROXOLYN) 5 mg tablet TAKE 1 TABLET BY MOUTH DAILY TAKE 1 TABLET DAILY AS NEEDED. NOT TO EXCEED 3 TABLETS PER WEEK.    metoprolol succinate (TOPROL-XL) 100 mg 24 hr tablet Take 200 mg by mouth daily    Multiple Vitamins-Minerals (CENTRUM SILVER PO) multivitamin    potassium chloride (Klor-Con M20) 20 mEq tablet Take 20 mEq by mouth 3 (three) times a day    spironolactone (ALDACTONE) 25 mg tablet TAKE 1 TABLET (25 MG TOTAL) BY MOUTH DAILY.    torsemide (DEMADEX) 20 mg tablet Torsemide 40mg daily (Patient taking differently: 20 mg 2 (two) times a day)       ALLERGIES  Allergies   Allergen Reactions    Other Cough and Sneezing     Seasonal allergies    Pollen Extract Allergic Rhinitis       SOCIAL HISTORY  Social History     Socioeconomic History    Marital status: /Civil Union     Spouse name: None    Number of children: None    Years of education: None    Highest education level: None   Occupational History    None   Tobacco Use    Smoking status: Never    Smokeless tobacco: Never   Substance and Sexual Activity    Alcohol use: Never    Drug use: No    Sexual activity: None   Other Topics Concern    None   Social History Narrative    None     Social Determinants of Health     Financial Resource Strain: Not on file   Food Insecurity: No Food Insecurity (3/15/2024)    Hunger Vital Sign     Worried About Running Out of Food in the Last Year: Never true     Ran Out of Food in the Last Year: Never true   Transportation Needs: No Transportation Needs (3/15/2024)    PRAPARE - Transportation     Lack of Transportation  (Medical): No     Lack of Transportation (Non-Medical): No   Physical Activity: Not on file   Stress: Not on file   Social Connections: Not on file   Intimate Partner Violence: Not on file   Housing Stability: Low Risk  (3/15/2024)    Housing Stability Vital Sign     Unable to Pay for Housing in the Last Year: No     Number of Times Moved in the Last Year: 1     Homeless in the Last Year: No       PHYSICAL EXAM    ED Triage Vitals [07/11/24 1029]   Temperature Pulse Respirations Blood Pressure SpO2   (!) 97.1 °F (36.2 °C) 65 18 (!) 128/47 96 %      Temp Source Heart Rate Source Patient Position - Orthostatic VS BP Location FiO2 (%)   Oral Monitor Sitting Left arm --      Pain Score       --         Vital signs and nursing notes reviewed    CONSTITUTIONAL: female appearing stated age resting in bed, in no acute distress  HEENT: atraumatic, normocephalic. Sclera anicteric, conjunctiva are not injected. Moist oral mucosa  CARDIOVASCULAR/CHEST: RRR, no M/R/G. 2+ radial pulses.  Small ecchymosis inferior to a small surgical incision at the center of the chest.  Incision is clean, with sutures in place.  There is a very slow oozing, approximately 1 drop of blood every 30 seconds, coming from the incision.  Bilateral groins assessed, left groin is unremarkable, right groin has an appropriately healing puncture site in keeping with access for ablation.  PULMONARY: Breathing comfortably on RA. Breath sounds are equal and clear to auscultation  ABDOMEN: non-distended. BS present, normoactive. Non-tender  MSK: moves all extremities, no deformities, no peripheral edema, no calf asymmetry  NEURO: Awake, alert, and oriented x 3. Face symmetric. Moves all extremities spontaneously. No focal neurologic deficits  SKIN: Warm, appears well-perfused  MENTAL STATUS: Normal affect      ?ED COURSE & MEDICAL DECISION MAKING  Procedures             Medications   lidocaine-epinephrine (XYLOCAINE/EPINEPHRINE) 1 %-1:100,000 injection 5 mL (5  mL Infiltration Given by Other 7/11/24 1252)     79-year-old female presenting with surgical site oozing, possibly in setting of restarting Eliquis and possibly due to another underlying bleeding diathesis.  Medical record including most recent hospitalization reviewed.  Patient has no symptoms other than slow oozing from the surgical site.  I removed the surgical dressing to examine the site and applied Surgifoam and pressure.  I also reached out to cardiology team.    ED Course as of 07/12/24 0006   Thu Jul 11, 2024   1226 Dr. Delaney is at bedside     Cardiology team assessed the patient, and cauterized a small vessel at the site of oozing.  Please see cardiology note for additional details.  Patient deemed safe to discharge to home with recommendations to follow-up in their office.  Patient is under strict return precautions to seek medical attention if bleeding recurs or if she develops any new symptoms.     Medical Decision Making  Problems Addressed:  Bleeding: acute illness or injury  Status post placement of implantable loop recorder: acute illness or injury    Amount and/or Complexity of Data Reviewed  External Data Reviewed: ECG and notes.    Risk  Prescription drug management.        CLINICAL IMPRESSION  Final diagnoses:   Status post placement of implantable loop recorder   Bleeding - loop recorder implantation site       DISPOSITION  Time reflects when diagnosis was documented in both MDM as applicable and the Disposition within this note       Time User Action Codes Description Comment    7/11/2024 11:10 AM Sakshi Jordan Add [Z95.818] Status post placement of implantable loop recorder     7/11/2024 12:49 PM Sakshi Jordan Add [R58] Bleeding     7/11/2024 12:49 PM Sakshi Jordan Modify [R58] Bleeding loop recorder implantation site          ED Disposition       ED Disposition   Discharge    Condition   Stable    Date/Time   Thu Jul 11, 2024 12:48 PM    Comment   Jessica Jaquez discharge to  home/self care.                   Follow-up Information       Follow up With Specialties Details Why Contact Info Additional Information    Jeyson Delaney MD Cardiology   [address]  [facility] [address]  [phone]       [facility] Emergency Department Emergency Medicine Go to  As needed, If symptoms worsen [address] [facility] [address] [phone] [facility], [address], [place], [id number] [phone]            DISCHARGE MEDICATIONS  Discharge Medication List as of 7/11/2024 12:50 PM        CONTINUE these medications which have NOT CHANGED    Details   apixaban (ELIQUIS) 5 mg Take 1 tablet (5 mg total) by mouth 2 (two) times a day, Starting Mon 7/8/2024, Normal      aspirin 81 mg chewable tablet Chew 81 mg daily, Historical Med      bimatoprost (Lumigan) 0.01 % ophthalmic drops Administer 1 drop to both eyes daily at bedtime, Historical Med      Cholecalciferol (VITAMIN D3) 1000 units CAPS Take by mouth daily  , Historical Med      ezetimibe (ZETIA) 10 mg tablet Take 1 tablet (10 mg total) by mouth daily at bedtime, Starting Fri 4/12/2024, Normal      gabapentin (NEURONTIN) 100 mg capsule Take 1 capsule (100 mg total) by mouth 3 (three) times a day, Starting Mon 3/11/2024, Normal      glimepiride (AMARYL) 1 mg tablet Take 1 mg by mouth every morning before breakfast, Historical Med      hydrocortisone 2.5 % cream as needed, Historical Med      ketoconazole (NIZORAL) 2 % cream as needed, Historical Med      metolazone (ZAROXOLYN) 5 mg tablet TAKE 1 TABLET BY MOUTH DAILY TAKE 1 TABLET DAILY AS NEEDED. NOT TO EXCEED 3 TABLETS PER WEEK., Normal      metoprolol succinate (TOPROL-XL) 100 mg 24 hr tablet Take 200 mg by mouth daily, Starting Thu 7/5/2018, Historical Med      Multiple Vitamins-Minerals (CENTRUM SILVER PO) multivitamin, Historical Med      potassium chloride (Klor-Con M20) 20 mEq tablet Take 20  mEq by mouth 3 (three) times a day, Historical Med      spironolactone (ALDACTONE) 25 mg tablet TAKE 1 TABLET (25 MG TOTAL) BY MOUTH DAILY., Starting Mon 6/24/2024, Normal      torsemide (DEMADEX) 20 mg tablet Torsemide 40mg daily, Normal                Sakshi Jordan MD  07/12/24 0012

## 2024-07-12 ENCOUNTER — TELEPHONE (OUTPATIENT)
Dept: CARDIOLOGY CLINIC | Facility: CLINIC | Age: 79
End: 2024-07-12

## 2024-07-12 NOTE — TELEPHONE ENCOUNTER
Pt's daughter called to state pt has no more bleeding but there is some bruising on her cleavage that was bigger than yesterday as well as soaring.

## 2024-07-15 ENCOUNTER — IN-CLINIC DEVICE VISIT (OUTPATIENT)
Dept: CARDIOLOGY CLINIC | Facility: CLINIC | Age: 79
End: 2024-07-15

## 2024-07-15 DIAGNOSIS — Z95.818 PRESENCE OF OTHER CARDIAC IMPLANTS AND GRAFTS: Primary | ICD-10-CM

## 2024-07-15 PROCEDURE — 99024 POSTOP FOLLOW-UP VISIT: CPT

## 2024-07-16 NOTE — PROGRESS NOTES
Results for orders placed or performed in visit on 07/15/24   Cardiac EP device report    Narrative    MDT LNQ22 ILR/ ACTIVE SYSTEM IS MRI CONDITIONAL  [facility] .BATTERY VOLTAGE ADEQUATE (GOOD). NO PATIENT OR DEVICE ACTIVATED EPISODES. WOUND CHECK: INCISION CLEAN AND DRY WITH EDGES APPROXIMATED; SUTURES AND BANDAGE REMOVED; WOUND CARE AND RESTRICTIONS REVIEWED WITH PATIENT. STERI STRIPS REAPPLIED. NORMAL DEVICE FUNCTION. AM

## 2024-07-17 DIAGNOSIS — Z86.79 S/P ABLATION OF ATRIAL FIBRILLATION: Primary | ICD-10-CM

## 2024-07-17 DIAGNOSIS — Z98.890 S/P ABLATION OF ATRIAL FIBRILLATION: Primary | ICD-10-CM

## 2024-07-20 ENCOUNTER — APPOINTMENT (OUTPATIENT)
Dept: LAB | Facility: CLINIC | Age: 79
End: 2024-07-20
Payer: COMMERCIAL

## 2024-07-20 ENCOUNTER — TRANSCRIBE ORDERS (OUTPATIENT)
Dept: LAB | Facility: CLINIC | Age: 79
End: 2024-07-20

## 2024-07-20 DIAGNOSIS — Z86.79 S/P ABLATION OF ATRIAL FIBRILLATION: ICD-10-CM

## 2024-07-20 DIAGNOSIS — Z98.890 S/P ABLATION OF ATRIAL FIBRILLATION: ICD-10-CM

## 2024-07-20 DIAGNOSIS — N39.0 URINARY TRACT INFECTION WITHOUT HEMATURIA, SITE UNSPECIFIED: Primary | ICD-10-CM

## 2024-07-20 LAB
ANION GAP SERPL CALCULATED.3IONS-SCNC: 14 MMOL/L (ref 4–13)
BUN SERPL-MCNC: 37 MG/DL (ref 5–25)
CALCIUM SERPL-MCNC: 9.2 MG/DL (ref 8.4–10.2)
CHLORIDE SERPL-SCNC: 102 MMOL/L (ref 96–108)
CO2 SERPL-SCNC: 24 MMOL/L (ref 21–32)
CREAT SERPL-MCNC: 1.35 MG/DL (ref 0.6–1.3)
GFR SERPL CREATININE-BSD FRML MDRD: 37 ML/MIN/1.73SQ M
GLUCOSE P FAST SERPL-MCNC: 112 MG/DL (ref 65–99)
POTASSIUM SERPL-SCNC: 4.2 MMOL/L (ref 3.5–5.3)
SODIUM SERPL-SCNC: 140 MMOL/L (ref 135–147)

## 2024-07-20 PROCEDURE — 80048 BASIC METABOLIC PNL TOTAL CA: CPT

## 2024-07-20 PROCEDURE — 36415 COLL VENOUS BLD VENIPUNCTURE: CPT

## 2024-07-25 ENCOUNTER — APPOINTMENT (OUTPATIENT)
Dept: LAB | Facility: CLINIC | Age: 79
End: 2024-07-25
Payer: COMMERCIAL

## 2024-07-25 DIAGNOSIS — N39.0 URINARY TRACT INFECTION WITHOUT HEMATURIA, SITE UNSPECIFIED: ICD-10-CM

## 2024-07-25 LAB
BACTERIA UR QL AUTO: ABNORMAL /HPF
BILIRUB UR QL STRIP: NEGATIVE
CLARITY UR: ABNORMAL
COLOR UR: ABNORMAL
GLUCOSE UR STRIP-MCNC: NEGATIVE MG/DL
HGB UR QL STRIP.AUTO: ABNORMAL
KETONES UR STRIP-MCNC: NEGATIVE MG/DL
LEUKOCYTE ESTERASE UR QL STRIP: ABNORMAL
NITRITE UR QL STRIP: NEGATIVE
NON-SQ EPI CELLS URNS QL MICRO: ABNORMAL /HPF
PH UR STRIP.AUTO: 7 [PH]
PROT UR STRIP-MCNC: ABNORMAL MG/DL
RBC #/AREA URNS AUTO: ABNORMAL /HPF
SP GR UR STRIP.AUTO: 1.01 (ref 1–1.03)
UROBILINOGEN UR STRIP-ACNC: <2 MG/DL
WBC #/AREA URNS AUTO: ABNORMAL /HPF
WBC CLUMPS # UR AUTO: PRESENT /UL

## 2024-07-25 PROCEDURE — 81001 URINALYSIS AUTO W/SCOPE: CPT

## 2024-07-26 ENCOUNTER — OFFICE VISIT (OUTPATIENT)
Dept: CARDIOLOGY CLINIC | Facility: CLINIC | Age: 79
End: 2024-07-26
Payer: COMMERCIAL

## 2024-07-26 VITALS
DIASTOLIC BLOOD PRESSURE: 54 MMHG | OXYGEN SATURATION: 98 % | SYSTOLIC BLOOD PRESSURE: 114 MMHG | BODY MASS INDEX: 40.54 KG/M2 | HEIGHT: 63 IN | HEART RATE: 72 BPM | WEIGHT: 228.8 LBS

## 2024-07-26 DIAGNOSIS — E78.2 MIXED HYPERLIPIDEMIA: ICD-10-CM

## 2024-07-26 DIAGNOSIS — R26.2 AMBULATORY DYSFUNCTION: ICD-10-CM

## 2024-07-26 DIAGNOSIS — I50.33 ACUTE ON CHRONIC DIASTOLIC (CONGESTIVE) HEART FAILURE (HCC): ICD-10-CM

## 2024-07-26 DIAGNOSIS — Z86.79 S/P ABLATION OF ATRIAL FIBRILLATION: ICD-10-CM

## 2024-07-26 DIAGNOSIS — Z98.890 S/P ABLATION OF ATRIAL FIBRILLATION: ICD-10-CM

## 2024-07-26 DIAGNOSIS — I48.0 PAF (PAROXYSMAL ATRIAL FIBRILLATION) (HCC): Primary | ICD-10-CM

## 2024-07-26 DIAGNOSIS — R06.02 SOB (SHORTNESS OF BREATH): ICD-10-CM

## 2024-07-26 PROCEDURE — 99214 OFFICE O/P EST MOD 30 MIN: CPT | Performed by: INTERNAL MEDICINE

## 2024-07-26 RX ORDER — TORSEMIDE 20 MG/1
40 TABLET ORAL 2 TIMES DAILY
Qty: 360 TABLET | Refills: 3 | Status: SHIPPED | OUTPATIENT
Start: 2024-07-26

## 2024-07-26 NOTE — PATIENT INSTRUCTIONS
You were seen today in the Cardiology office for follow up evaluation.     Please continue your current cardiac medications as prescribed.    Please make the following changes to your cardiac medications:  Increase Torsemide to 40mg twice daily for 1 week, then resume Torsemide 20mg twice daily.    Have your kidney function checked in 1 week.     Thank you for choosing [facility].    Please call our office or use [email or web address] with any questions.

## 2024-07-26 NOTE — PROGRESS NOTES
[facility]    Name:Jessica Jaquez   DOS: 7/26/2024     Chief Complaint:   Chief Complaint   Patient presents with    Follow-up     6 weeks. Pt has swelling everywhere.    Shortness of Breath     Worsening due swelling.        HISTORY OF PRESENT ILLNESS:      HPI:  Jessica Jaquez is a 79 y.o. female. She  has a past medical history of Arthritis, Colorectal polyps, Constipation, Diabetes mellitus (HCC), Diverticulosis of colon, Hiatal hernia, Hypertension, Increased urinary frequency, Lumbar disc disease, Pressure injury of skin, SOB (shortness of breath), Stress incontinence, and Vitamin D deficiency (07/27/2018).    She presents for follow up evaluation. She last saw me in the office on 5/15/24. Per my prior OV note:  Jessica was admitted to [facility] on 4/17 to 4/19/2024 with atrial fibrillation with RVR.  She presented with severe fatigue chest pain and presyncope.  On admission troponins mildly elevated felt to be secondary to A-fib with RVR.  She continued on home metoprolol succinate 200 mg daily and Eliquis for stroke prevention.  418 nuclear stress test showed no evidence of transient ischemic dilatation.  No perfusion defects.  Flecainide 100 mg twice daily started.  Outpatient ablation was discussed with close follow-up with EP.  Discharge weight 222 pounds.      Chronic HFpEF LVEF 60%  Atrial fibrillation sp PHU DCCV on 3/08/24 [id number]= 8 on Eliquis 5 mg twice daily for stroke prevention  Hypertension  Hyperlipidemia 3/16/24   HDL 45 LDL 85  DM2 HgbA1C 6.9% on 1/22/2024  Hx of remote CVA 15 years ago   Lumbar radiculopathy     3/06/23 TTE: Left ventricle cavity size normal wall thickness mild increased mild concentric hypertrophy LVEF 60% systolic function normal, right ventricle cavity size systolic function normal.  Right atrium mildly dilated, aortic valve sclerosis, mild MR, mild TR.     During a prior office visit, torsemide was  increased to 40 mg once daily due to persistent lower extremity swelling..  The patient was referred for repeat labs including a magnesium level and BMP.  Her BMP demonstrated renal function stable within her baseline, creatinine 1.48.  Potassium level 4.0.  Magnesium 2.1.  She was last evaluated by our EP service and saw one of our physician assistants just yesterday, 5/14/2024.  At that time, the patient was taken off of flecainide due to concerns for severe fatigue and dizziness as a side effect of that medication.  She was referred for ablation, which is presently in the process of being scheduled tentatively on 7/9/24.     Since she last saw me in the office, she has undergone ablation for atrial fibrillation with implantation of a loop recorder.     Today, she reports that she has had a gradual worsening in lower extremity swelling and worsening exertional dyspnea. She otherwise denies chest pain, diaphoresis, dizziness, palpitations, syncope.     ROS    ROS: Pertinent positives and negatives as described in History of Present Illness. Remainder of a 14 point review of systems was negative.     Allergies   Allergen Reactions    Other Cough and Sneezing     Seasonal allergies    Pollen Extract Allergic Rhinitis        Current Outpatient Medications on File Prior to Visit   Medication Sig Dispense Refill    apixaban (ELIQUIS) 5 mg Take 1 tablet (5 mg total) by mouth 2 (two) times a day 60 tablet 5    aspirin 81 mg chewable tablet Chew 81 mg daily      bimatoprost (Lumigan) 0.01 % ophthalmic drops Administer 1 drop to both eyes daily at bedtime      Cholecalciferol (VITAMIN D3) 1000 units CAPS Take by mouth daily        ezetimibe (ZETIA) 10 mg tablet Take 1 tablet (10 mg total) by mouth daily at bedtime 60 tablet 5    gabapentin (NEURONTIN) 100 mg capsule Take 1 capsule (100 mg total) by mouth 3 (three) times a day 90 capsule 2    glimepiride (AMARYL) 1 mg tablet Take 1 mg by mouth every morning before breakfast       hydrocortisone 2.5 % cream as needed  3    ketoconazole (NIZORAL) 2 % cream as needed  3    metolazone (ZAROXOLYN) 5 mg tablet TAKE 1 TABLET BY MOUTH DAILY TAKE 1 TABLET DAILY AS NEEDED. NOT TO EXCEED 3 TABLETS PER WEEK. 30 tablet 1    metoprolol succinate (TOPROL-XL) 100 mg 24 hr tablet Take 200 mg by mouth daily  3    Multiple Vitamins-Minerals (CENTRUM SILVER PO) multivitamin      potassium chloride (Klor-Con M20) 20 mEq tablet Take 20 mEq by mouth 3 (three) times a day      spironolactone (ALDACTONE) 25 mg tablet TAKE 1 TABLET (25 MG TOTAL) BY MOUTH DAILY. 90 tablet 1    torsemide (DEMADEX) 20 mg tablet Torsemide 40mg daily (Patient taking differently: 20 mg 2 (two) times a day) 180 tablet 3     No current facility-administered medications on file prior to visit.       Past Medical History:   Diagnosis Date    Arthritis     Colorectal polyps     Constipation     Diabetes mellitus (HCC)     Diverticulosis of colon     Hiatal hernia     Hypertension     Increased urinary frequency     Lumbar disc disease     Pressure injury of skin     SOB (shortness of breath)     Stress incontinence     Vitamin D deficiency 07/27/2018       Past Surgical History:   Procedure Laterality Date    BACK SURGERY      CARDIAC ELECTROPHYSIOLOGY PROCEDURE N/A 7/9/2024    Procedure: Cardiac eps/afib ablation PFA;  Surgeon: Jeyson eDlaney MD;  [facility]: [facility];  Service: Cardiology    CARDIAC ELECTROPHYSIOLOGY PROCEDURE N/A 7/9/2024    Procedure: Cardiac loop recorder implant;  Surgeon: Jeyson Delaney MD;  [facility];  Service: Cardiology    CARPAL TUNNEL RELEASE Left     CATARACT EXTRACTION Bilateral     CHOLECYSTECTOMY      COLONOSCOPY W/ ENDOSCOPIC US N/A 2/24/2016    Procedure: ANAL ENDOSCOPIC U/S;  Surgeon: HOLLIS Washington MD;  Location: [facility];  Service:     HERNIA REPAIR      MT COLONOSCOPY FLX DX W/COLLJ SPEC WHEN PFRMD N/A 2/24/2016    Procedure: COLONOSCOPY;  Surgeon: HOLLIS Otero  MD Felix;  Location: [facility];  Service: Colorectal    TONSILLECTOMY      TUBAL LIGATION         Family History   Problem Relation Age of Onset    Hypertension Mother     Heart attack Mother     Diabetes Mother     Prostate cancer Father     No Known Problems Brother     Stroke Maternal Grandmother     Stroke Paternal Grandmother     Stroke Paternal Grandfather     Thyroid cancer Neg Hx        Social History     Socioeconomic History    Marital status: /Civil Union     Spouse name: Not on file    Number of children: Not on file    Years of education: Not on file    Highest education level: Not on file   Occupational History    Not on file   Tobacco Use    Smoking status: Never    Smokeless tobacco: Never   Substance and Sexual Activity    Alcohol use: Never    Drug use: No    Sexual activity: Not on file   Other Topics Concern    Not on file   Social History Narrative    Not on file     Social Determinants of Health     Financial Resource Strain: Not on file   Food Insecurity: No Food Insecurity (3/15/2024)    Hunger Vital Sign     Worried About Running Out of Food in the Last Year: Never true     Ran Out of Food in the Last Year: Never true   Transportation Needs: No Transportation Needs (3/15/2024)    PRAPARE - Transportation     Lack of Transportation (Medical): No     Lack of Transportation (Non-Medical): No   Physical Activity: Not on file   Stress: Not on file   Social Connections: Not on file   Intimate Partner Violence: Not on file   Housing Stability: Low Risk  (3/15/2024)    Housing Stability Vital Sign     Unable to Pay for Housing in the Last Year: No     Number of Times Moved in the Last Year: 1     Homeless in the Last Year: No       OBJECTIVE:    There were no vitals taken for this visit.     BP Readings from Last 3 Encounters:   07/11/24 114/57   07/10/24 121/63   05/30/24 116/66       Wt Readings from Last 3 Encounters:   07/09/24 105 kg (231 lb 7.7 oz)   05/30/24 105 kg (232 lb 3.2 oz)    05/15/24 105 kg (231 lb 8 oz)         Physical Exam  Vitals reviewed.   Constitutional:       General: She is not in acute distress.     Appearance: Normal appearance. She is not diaphoretic.   HENT:      Head: Normocephalic and atraumatic.   Eyes:      Conjunctiva/sclera: Conjunctivae normal.   Neck:      Vascular: No JVD (No well visualized).   Cardiovascular:      Rate and Rhythm: Normal rate and regular rhythm.      Pulses: Normal pulses.      Heart sounds: Normal heart sounds. No murmur heard.     No friction rub. No gallop.   Pulmonary:      Effort: Pulmonary effort is normal.      Breath sounds: Normal breath sounds. No wheezing, rhonchi or rales.   Abdominal:      General: Abdomen is flat. Bowel sounds are normal.   Musculoskeletal:      Right lower leg: Edema present.      Left lower leg: Edema present.   Skin:     General: Skin is warm and dry.   Neurological:      General: No focal deficit present.      Mental Status: She is alert and oriented to person, place, and time.   Psychiatric:         Mood and Affect: Mood normal.         Behavior: Behavior normal.                                                       Cardiac testing:     Results for orders placed during the hospital encounter of 18    Echo complete with contrast if indicated    [place] [address]  [phone]    Transthoracic Echocardiogram  2D, M-mode, Doppler, and Color Doppler    Study date:  28-Dec-2018    Patient: HAN CARVAJAL  MR number: [id number]  Account number: [id number]  : 1945  Age: 73 years  Gender: Female  Status: Outpatient  Location: [address] [place] [address] [facility]  Height: 63 in  Weight: 232 lb  BP: 169/ 76 mmHg    Indications: Heart failure    Diagnoses: I50.9 - Heart failure, unspecified    Sonographer:  DANYELLE Ga  Interpreting Physician:  Kvng Aviles MD  Referring Physician:  Jorden Holt III, MD  Group:  [facility]  [facility]  Cardiology Fellow:  Von Fuentes MD    SUMMARY    PROCEDURE INFORMATION:  This was a technically difficult study.    LEFT VENTRICLE:  Systolic function was normal. Ejection fraction was estimated to be 60 %.  There were no regional wall motion abnormalities.  Doppler parameters were consistent with abnormal left ventricular relaxation (grade 1 diastolic dysfunction).    MITRAL VALVE:  There was trace regurgitation.    TRICUSPID VALVE:  There was trace regurgitation.  Pulmonary artery systolic pressure was within the normal range.    PULMONIC VALVE:  There was trace regurgitation.    HISTORY: PRIOR HISTORY: Hypertension, hyperlipidemia, diastolic dysfunction, type 2 diabetes, GERD    PROCEDURE: The study was performed in the [address] [place] [address] [facility]. This was a routine study. The transthoracic approach was used. The study included complete 2D imaging, M-mode, complete spectral Doppler, and color Doppler. This  was a technically difficult study.    LEFT VENTRICLE: Size was normal. Systolic function was normal. Ejection fraction was estimated to be 60 %. There were no regional wall motion abnormalities. Wall thickness was normal. DOPPLER: Doppler parameters were consistent with  abnormal left ventricular relaxation (grade 1 diastolic dysfunction).    RIGHT VENTRICLE: The size was normal. Systolic function was normal. Wall thickness was normal.    LEFT ATRIUM: Size was normal.    RIGHT ATRIUM: Size was normal.    MITRAL VALVE: Valve structure was normal. There was normal leaflet separation. DOPPLER: The transmitral velocity was within the normal range. There was no evidence for stenosis. There was trace regurgitation.    AORTIC VALVE: The valve was probably trileaflet. Leaflets exhibited normal cuspal separation. The valve was not well visualized. DOPPLER: Transaortic velocity was within the normal range. There was no evidence for stenosis. There was no  significant  regurgitation.    TRICUSPID VALVE: The valve structure was normal. There was normal leaflet separation. DOPPLER: The transtricuspid velocity was within the normal range. There was no evidence for stenosis. There was trace regurgitation. Pulmonary artery  systolic pressure was within the normal range. Estimated peak PA pressure was 30 mmHg.    PULMONIC VALVE: Not well visualized. DOPPLER: The transpulmonic velocity was within the normal range. There was trace regurgitation.    PERICARDIUM: There was no pericardial effusion. A pericardial fat pad was present. The pericardium was normal in appearance.    AORTA: The root exhibited normal size.    SYSTEMIC VEINS: IVC: The inferior vena cava was normal in size. Respirophasic changes were normal.    SYSTEM MEASUREMENT TABLES    2D  %FS: 31.39 %  Ao Diam: 3.29 cm  EDV(Teich): 100.54 ml  EF(Cube): 67.7 %  EF(Teich): 59.26 %  ESV(Cube): 32.77 ml  ESV(Teich): 40.96 ml  IVSd: 0.99 cm  LA Area: 16.53 cm2  LA Diam: 3.39 cm  LVEDV MOD A4C: 94.35 ml  LVEF MOD A4C: 68.85 %  LVESV MOD A4C: 29.39 ml  LVIDd: 4.66 cm  LVIDs: 3.2 cm  LVLd A4C: 7.19 cm  LVLs A4C: 5.3 cm  LVPWd: 1.08 cm  RA Area: 13.64 cm2  RV Diam.: 2.6 cm  SV MOD A4C: 64.96 ml  SV(Cube): 68.69 ml  SV(Teich): 59.58 ml    CW  TR Vmax: 2.6 m/s  TR maxP.99 mmHg    MM  TAPSE: 1.84 cm    PW  E': 0.06 m/s  E/E': 14.64  MV A Toan: 0.97 m/s  MV Dec Comal: 3.14 m/s2  MV DecT: 267.31 ms  MV E Toan: 0.84 m/s  MV E/A Ratio: 0.86    [employer] Accredited Echocardiography Laboratory    Prepared and electronically signed by    Kvng Aviles MD  Signed 28-Dec-2018 11:05:05    No results found for this or any previous visit.    No results found for this or any previous visit.    No results found for this or any previous visit.        LABS:  Lab Results   Component Value Date    GLUCOSE 109 2015    BUN 37 (H) 2024    CREATININE 1.35 (H) 2024    CALCIUM 9.2 2024     (L) 2015    K 4.2  "07/20/2024    CO2 24 07/20/2024     07/20/2024    ALKPHOS 72 06/17/2024    BILITOT 0.33 06/09/2015    PROT 7.6 06/09/2015    AST 18 06/17/2024    ALT 13 06/17/2024    ANIONGAP 5 06/09/2015        Lab Results   Component Value Date    WBC 12.12 (H) 07/10/2024    HGB 10.2 (L) 07/10/2024    HCT 31.9 (L) 07/10/2024    MCV 93 07/10/2024     07/10/2024       Lab Results   Component Value Date    CHOL 248 03/19/2014    HDL 45 (L) 03/16/2024    LDLCALC 85 03/16/2024    TRIG 146 03/16/2024       Lab Results   Component Value Date    HGBA1C 6.9 (H) 01/22/2024         ASSESSMENT/PLAN:  Diagnoses and all orders for this visit:    PAF (paroxysmal atrial fibrillation) ([facility])    S/P ablation of atrial fibrillation    Acute on chronic diastolic (congestive) heart failure (HCC)  -     torsemide (DEMADEX) 20 mg tablet; Take 2 tablets (40 mg total) by mouth 2 (two) times a day  -     Basic metabolic panel; Future    Ambulatory dysfunction    SOB (shortness of breath)  -     Basic metabolic panel; Future    Mixed hyperlipidemia    Other orders  -     cephalexin (KEFLEX) 250 mg capsule    79-year-old female presenting for follow-up evaluation. Her concerns include acute worsening of chronic volume overload in the setting of heart failure with preserved ejection fraction and this is  supported objectively on exam.  I recommended increasing torsemide to 40 mg twice daily for 1 week, then resuming 20 mg once daily as a maintenance assuming she has improvement in weight and swelling on that regimen.  I advised her to notify me if she does not improve with this regimen as this may necessitate a change in diuretic regimen and more closer monitoring of renal function.  We will check a BMP in 1 week.          Himanshu Webb MD      Portions of the record may have been created with voice recognition software. Occasional wrong word or \"sound alike\" substitutions may have occurred due to the inherent limitations of voice " recognition software. Please review the chart carefully and recognize, using context, where substitutions/typographical errors may have occurred.

## 2024-08-02 ENCOUNTER — APPOINTMENT (OUTPATIENT)
Dept: LAB | Facility: CLINIC | Age: 79
End: 2024-08-02
Payer: COMMERCIAL

## 2024-08-02 DIAGNOSIS — I50.33 ACUTE ON CHRONIC DIASTOLIC (CONGESTIVE) HEART FAILURE (HCC): ICD-10-CM

## 2024-08-02 DIAGNOSIS — R06.02 SOB (SHORTNESS OF BREATH): ICD-10-CM

## 2024-08-02 LAB
ANION GAP SERPL CALCULATED.3IONS-SCNC: 10 MMOL/L (ref 4–13)
BUN SERPL-MCNC: 41 MG/DL (ref 5–25)
CALCIUM SERPL-MCNC: 9.3 MG/DL (ref 8.4–10.2)
CHLORIDE SERPL-SCNC: 103 MMOL/L (ref 96–108)
CO2 SERPL-SCNC: 26 MMOL/L (ref 21–32)
CREAT SERPL-MCNC: 1.73 MG/DL (ref 0.6–1.3)
GFR SERPL CREATININE-BSD FRML MDRD: 27 ML/MIN/1.73SQ M
GLUCOSE P FAST SERPL-MCNC: 117 MG/DL (ref 65–99)
POTASSIUM SERPL-SCNC: 5.1 MMOL/L (ref 3.5–5.3)
SODIUM SERPL-SCNC: 139 MMOL/L (ref 135–147)

## 2024-08-02 PROCEDURE — 80048 BASIC METABOLIC PNL TOTAL CA: CPT

## 2024-08-02 PROCEDURE — 36415 COLL VENOUS BLD VENIPUNCTURE: CPT

## 2024-08-14 ENCOUNTER — TELEPHONE (OUTPATIENT)
Age: 79
End: 2024-08-14

## 2024-08-14 ENCOUNTER — OFFICE VISIT (OUTPATIENT)
Dept: CARDIOLOGY CLINIC | Facility: CLINIC | Age: 79
End: 2024-08-14
Payer: COMMERCIAL

## 2024-08-14 VITALS
WEIGHT: 230.5 LBS | HEART RATE: 68 BPM | SYSTOLIC BLOOD PRESSURE: 96 MMHG | DIASTOLIC BLOOD PRESSURE: 62 MMHG | BODY MASS INDEX: 40.84 KG/M2 | HEIGHT: 63 IN

## 2024-08-14 DIAGNOSIS — E87.6 HYPOKALEMIA: ICD-10-CM

## 2024-08-14 DIAGNOSIS — I48.0 PAF (PAROXYSMAL ATRIAL FIBRILLATION) (HCC): Primary | ICD-10-CM

## 2024-08-14 PROCEDURE — 99215 OFFICE O/P EST HI 40 MIN: CPT | Performed by: INTERNAL MEDICINE

## 2024-08-14 PROCEDURE — 93000 ELECTROCARDIOGRAM COMPLETE: CPT | Performed by: INTERNAL MEDICINE

## 2024-08-14 RX ORDER — METOPROLOL SUCCINATE 25 MG/1
25 TABLET, EXTENDED RELEASE ORAL DAILY
Qty: 30 TABLET | Refills: 3 | Status: SHIPPED | OUTPATIENT
Start: 2024-08-14 | End: 2024-08-21

## 2024-08-14 RX ORDER — SPIRONOLACTONE 25 MG/1
12.5 TABLET ORAL DAILY
Start: 2024-08-14

## 2024-08-14 NOTE — TELEPHONE ENCOUNTER
Pt called stating that she forgot to get her AVS at the end of her visit. Advised pt that she can see her AVS on her mychart. Pt is going to have her granddaughter help her.

## 2024-08-14 NOTE — PROGRESS NOTES
Cardiology Follow Up    Jessica Jaquez  1945  [id number]  [facility]  [address]  [place] [id number] [phone]    1. PAF (paroxysmal atrial fibrillation) ([facility])  POCT ECG        ASSESSMENT/PLAN:    Paroxysmal atrial fibrillation  Patient was having atrial fibrillation with RVR episodes  Diagnosed in March 2024 during hospital stay for low blood pressure  Underwent PHU/cardioversion in March and started on Toprol- mg daily  Echo showed EF of 60%  Also started on flecainide  She was seen in our office in May 2024 and we discussed A-fib ablation for which she was agreeable  She underwent ablation procedure on July 9, 2024  She had pulmonary vein isolation, posterior wall isolation and loop monitor implantation  She presented to the emergency room next day with bleeding from her loop site  We performed cauterization at bedside under sterile procedure  Today she reports feeling fatigue   Loop monitor has not shown any atrial fibrillation   Notes heart rate decreases to 40s at time.   Continue Eliquis, metoprolol but decrease dose of metoprolol to 25 mg daily  Hypotension  Having mildly low blood pressure with systolic blood pressure in 90s  Will reduce spironolactone to 12.5 mg daily  Patient is on spironolactone 25 mg daily  Diastolic heart failure  Patient is noted to have lower extremity swelling  Maintained on torsemide and spironolactone  Has low BP, will reduce spironolactone to 12.5 mg daily   Taking torsemide   Weight slowly increased by 10 lbs  Swelling mostly in feet  Diabetes type 2  Last A1c 6.9 in January 2024  Morbid obesity    Interval History:    Jessica Jaquez is a 79 y.o. female with diabetes type 2, morbid obesity, hypertension and paroxysmal atrial fibrillation who presents for follow-up visit after her ablation.    She was diagnosed with atrial fibrillation with RVR in March and underwent  PHU/cardioversion.  She was placed on Toprol-XL as well as Eliquis.  She was seen in office follow-up in May and ablation was discussed for which she was agreeable.    She underwent PVI, posterior wall isolation and loop monitor placement on July 9, 2024.  Loop monitor had bleeding afterwards which was cauterized next day in the emergency room.    Loop monitor in July has not shown any atrial fibrillation episodes.    Jessica reports feeling fatigue.  She notes her heart rate occasionally decreasing down to 40s.  She also states swelling in lower extremity is better but now present in her feet rather than her ankles.  Also notes swelling in abdomen at times.  Her weight has increased about 10 pounds since she stopped taking torsemide 80 mg daily.  She denies feeling any palpitations.  Her loop recorder does not show any atrial fibrillation.    Patient Active Problem List   Diagnosis    Type 2 diabetes mellitus with obesity  ([facility])    Obesity, Class III, BMI 40-49.9 (morbid obesity) ([facility])    Essential hypertension    Chronic diastolic CHF (congestive heart failure) ([facility])    Hyperlipidemia    Unspecified abnormalities of gait and mobility    Tremor of hands and face    Cerebrovascular accident (CVA) due to thrombosis of precerebral artery ([facility])    Open wound of abdomen    Lumbar radiculopathy    History of stroke    New onset atrial fibrillation (HCC)    GIOVANNI (acute kidney injury) (HCC)    Chronic diastolic congestive heart failure (HCC)    Hyponatremia    Atrial fibrillation with RVR ([facility])    Chest pain    S/P ablation of atrial fibrillation     Past Medical History:   Diagnosis Date    Arthritis     Colorectal polyps     Constipation     Diabetes mellitus (HCC)     Diverticulosis of colon     Hiatal hernia     Hypertension     Increased urinary frequency     Lumbar disc disease     Pressure injury of skin     SOB (shortness of breath)     Stress incontinence     Vitamin D deficiency 07/27/2018     Social History      Socioeconomic History    Marital status: /Civil Union     Spouse name: Not on file    Number of children: Not on file    Years of education: Not on file    Highest education level: Not on file   Occupational History    Not on file   Tobacco Use    Smoking status: Never    Smokeless tobacco: Never   Substance and Sexual Activity    Alcohol use: Never    Drug use: No    Sexual activity: Not on file   Other Topics Concern    Not on file   Social History Narrative    Not on file     Social Determinants of Health     Financial Resource Strain: Not on file   Food Insecurity: No Food Insecurity (3/15/2024)    Hunger Vital Sign     Worried About Running Out of Food in the Last Year: Never true     Ran Out of Food in the Last Year: Never true   Transportation Needs: No Transportation Needs (3/15/2024)    PRAPARE - Transportation     Lack of Transportation (Medical): No     Lack of Transportation (Non-Medical): No   Physical Activity: Not on file   Stress: Not on file   Social Connections: Not on file   Intimate Partner Violence: Not on file   Housing Stability: Low Risk  (3/15/2024)    Housing Stability Vital Sign     Unable to Pay for Housing in the Last Year: No     Number of Times Moved in the Last Year: 1     Homeless in the Last Year: No      Family History   Problem Relation Age of Onset    Hypertension Mother     Heart attack Mother     Diabetes Mother     Prostate cancer Father     No Known Problems Brother     Stroke Maternal Grandmother     Stroke Paternal Grandmother     Stroke Paternal Grandfather     Thyroid cancer Neg Hx      Past Surgical History:   Procedure Laterality Date    BACK SURGERY      CARDIAC ELECTROPHYSIOLOGY PROCEDURE N/A 7/9/2024    Procedure: Cardiac eps/afib ablation PFA;  Surgeon: Jeyson Delaney MD;  [facility]: [facility];  Service: Cardiology    CARDIAC ELECTROPHYSIOLOGY PROCEDURE N/A 7/9/2024    Procedure: Cardiac loop recorder implant;  Surgeon: Jeyson Delaney MD;   [facility];  Service: Cardiology    CARPAL TUNNEL RELEASE Left     CATARACT EXTRACTION Bilateral     CHOLECYSTECTOMY      COLONOSCOPY W/ ENDOSCOPIC US N/A 2/24/2016    Procedure: ANAL ENDOSCOPIC U/S;  Surgeon: OHLLIS Washington MD;  Location: [facility];  Service:     HERNIA REPAIR      CO COLONOSCOPY FLX DX W/COLLJ SPEC WHEN PFRMD N/A 2/24/2016    Procedure: COLONOSCOPY;  Surgeon: HOLLIS Washington MD;  Location: [facility];  Service: Colorectal    TONSILLECTOMY      TUBAL LIGATION         Current Outpatient Medications:     apixaban (ELIQUIS) 5 mg, Take 1 tablet (5 mg total) by mouth 2 (two) times a day, Disp: 60 tablet, Rfl: 5    aspirin 81 mg chewable tablet, Chew 81 mg daily, Disp: , Rfl:     bimatoprost (Lumigan) 0.01 % ophthalmic drops, Administer 1 drop to both eyes daily at bedtime, Disp: , Rfl:     cephalexin (KEFLEX) 250 mg capsule, , Disp: , Rfl:     Cholecalciferol (VITAMIN D3) 1000 units CAPS, Take by mouth daily  , Disp: , Rfl:     ezetimibe (ZETIA) 10 mg tablet, Take 1 tablet (10 mg total) by mouth daily at bedtime, Disp: 60 tablet, Rfl: 5    gabapentin (NEURONTIN) 100 mg capsule, Take 1 capsule (100 mg total) by mouth 3 (three) times a day, Disp: 90 capsule, Rfl: 2    glimepiride (AMARYL) 1 mg tablet, Take 1 mg by mouth every morning before breakfast, Disp: , Rfl:     hydrocortisone 2.5 % cream, as needed, Disp: , Rfl: 3    ketoconazole (NIZORAL) 2 % cream, as needed, Disp: , Rfl: 3    metolazone (ZAROXOLYN) 5 mg tablet, TAKE 1 TABLET BY MOUTH DAILY TAKE 1 TABLET DAILY AS NEEDED. NOT TO EXCEED 3 TABLETS PER WEEK., Disp: 30 tablet, Rfl: 1    metoprolol succinate (TOPROL-XL) 100 mg 24 hr tablet, Take 200 mg by mouth daily, Disp: , Rfl: 3    Multiple Vitamins-Minerals (CENTRUM SILVER PO), multivitamin, Disp: , Rfl:     potassium chloride (Klor-Con M20) 20 mEq tablet, Take 20 mEq by mouth 3 (three) times a day, Disp: , Rfl:     spironolactone (ALDACTONE) 25 mg tablet, TAKE 1 TABLET (25  "MG TOTAL) BY MOUTH DAILY., Disp: 90 tablet, Rfl: 1    torsemide (DEMADEX) 20 mg tablet, Take 2 tablets (40 mg total) by mouth 2 (two) times a day, Disp: 360 tablet, Rfl: 3  Allergies   Allergen Reactions    Other Cough and Sneezing     Seasonal allergies    Pollen Extract Allergic Rhinitis         Imaging: Cardiac EP device report    Result Date: 7/15/2024  Narrative: MDT LNQ22 ILR/ ACTIVE SYSTEM IS MRI CONDITIONAL [facility] .BATTERY VOLTAGE ADEQUATE (GOOD). NO PATIENT OR DEVICE ACTIVATED EPISODES. WOUND CHECK: INCISION CLEAN AND DRY WITH EDGES APPROXIMATED; SUTURES AND BANDAGE REMOVED; WOUND CARE AND RESTRICTIONS REVIEWED WITH PATIENT. STERI STRIPS REAPPLIED. NORMAL DEVICE FUNCTION. AM       Review of Systems:  Review of Systems   Constitutional:  Positive for fatigue. Negative for chills and fever.   Eyes:  Negative for discharge and visual disturbance.   Respiratory:  Positive for shortness of breath. Negative for wheezing.    Cardiovascular:  Positive for leg swelling.   Gastrointestinal:  Negative for abdominal pain, nausea and vomiting.   Endocrine: Negative.    Genitourinary: Negative.    Musculoskeletal: Negative.  Negative for arthralgias.   Skin: Negative.  Negative for rash.   Allergic/Immunologic: Negative.    Neurological:  Positive for light-headedness. Negative for dizziness.   Psychiatric/Behavioral: Negative.  The patient is not nervous/anxious.      Objective:   BP 96/62 (BP Location: Right arm, Patient Position: Sitting, Cuff Size: Large)   Pulse 68   Ht 5' 3\" (1.6 m)   Wt 105 kg (230 lb 8 oz)   BMI 40.83 kg/m²    Physical Exam:  GEN: NAD, Alert and oriented, well appearing; morbidly obese  HEENT:Head, neck, ears, oral pharynx: Mucus membranes moist, oral pharynx clear, nares clear. External ears normal  EYES: Pupils equal, sclera anicteric  NECK: No JVD  CARDIOVASCULAR: RRR, No murmur, rub, gallops S1,S2  LUNGS: Clear To auscultation bilaterally  ABDOMEN: " Soft, nondistended  EXTREMITIES/VASCULAR: +1 edema mostly in the feet  PSYCH: Normal Affect  NEURO: Grossly intact, moving all extremiteis equal, face symetric  HEME: No bleeding, bruising, petechia  SKIN: No significant rashes       Labs & Results:  Below is the patient's most recent value for Albumin, ALT, AST, BUN, Calcium, Chloride, Cholesterol, CO2, Creatinine, GFR, Glucose, HDL, Hematocrit, Hemoglobin, Hemoglobin A1C, LDL, Magnesium, Phosphorus, Platelets, Potassium, PSA, Sodium, Triglycerides, and WBC.   Lab Results   Component Value Date    ALT 13 2024    AST 18 2024    BUN 41 (H) 2024    CALCIUM 9.3 2024     2024    CHOL 248 2014    CO2 26 2024    CREATININE 1.73 (H) 2024    HDL 45 (L) 2024    HCT 31.9 (L) 07/10/2024    HGB 10.2 (L) 07/10/2024    HGBA1C 6.9 (H) 2024    MG 2.1 2024     07/10/2024    K 5.1 2024     (L) 2015    TRIG 146 2024    WBC 12.12 (H) 07/10/2024     Note: for a comprehensive list of the patient's lab results, access the Results Review activity.          Cardiac testing:     I personally reviewed the ECG performed in the clinic on 24. It reveals sinus rhythm with PAC.     Echocardiograms:  Results for orders placed during the hospital encounter of 18    Echo complete with contrast if indicated    Narrative  [address] [address] [phone]    Transthoracic Echocardiogram  2D, M-mode, Doppler, and Color Doppler    Study date:  28-Dec-2018    Patient: HAN CARVAJAL  MR number: [id number]  Account number: [id number]  : 1945  Age: 73 years  Gender: Female  Status: Outpatient  Location: [address] [place] [address] [facility]  Height: 63 in  Weight: 232 lb  BP: 169/ 76 mmHg    Indications: Heart failure    Diagnoses: I50.9 - Heart failure, unspecified    Sonographer:  DANYELLE Ga  Interpreting  Physician:  Kvng Aviles MD  Referring Physician:  Jorden Holt III, MD  Group:  [facility]  Cardiology Fellow:  Von Fuentes MD    SUMMARY    PROCEDURE INFORMATION:  This was a technically difficult study.    LEFT VENTRICLE:  Systolic function was normal. Ejection fraction was estimated to be 60 %.  There were no regional wall motion abnormalities.  Doppler parameters were consistent with abnormal left ventricular relaxation (grade 1 diastolic dysfunction).    MITRAL VALVE:  There was trace regurgitation.    TRICUSPID VALVE:  There was trace regurgitation.  Pulmonary artery systolic pressure was within the normal range.    PULMONIC VALVE:  There was trace regurgitation.    HISTORY: PRIOR HISTORY: Hypertension, hyperlipidemia, diastolic dysfunction, type 2 diabetes, GERD    PROCEDURE: The study was performed in the [address] [place] [address] [facility] [place]. This was a routine study. The transthoracic approach was used. The study included complete 2D imaging, M-mode, complete spectral Doppler, and color Doppler. This  was a technically difficult study.    LEFT VENTRICLE: Size was normal. Systolic function was normal. Ejection fraction was estimated to be 60 %. There were no regional wall motion abnormalities. Wall thickness was normal. DOPPLER: Doppler parameters were consistent with  abnormal left ventricular relaxation (grade 1 diastolic dysfunction).    RIGHT VENTRICLE: The size was normal. Systolic function was normal. Wall thickness was normal.    LEFT ATRIUM: Size was normal.    RIGHT ATRIUM: Size was normal.    MITRAL VALVE: Valve structure was normal. There was normal leaflet separation. DOPPLER: The transmitral velocity was within the normal range. There was no evidence for stenosis. There was trace regurgitation.    AORTIC VALVE: The valve was probably trileaflet. Leaflets exhibited normal cuspal separation. The valve was not well visualized. DOPPLER: Transaortic velocity was within  the normal range. There was no evidence for stenosis. There was no  significant regurgitation.    TRICUSPID VALVE: The valve structure was normal. There was normal leaflet separation. DOPPLER: The transtricuspid velocity was within the normal range. There was no evidence for stenosis. There was trace regurgitation. Pulmonary artery  systolic pressure was within the normal range. Estimated peak PA pressure was 30 mmHg.    PULMONIC VALVE: Not well visualized. DOPPLER: The transpulmonic velocity was within the normal range. There was trace regurgitation.    PERICARDIUM: There was no pericardial effusion. A pericardial fat pad was present. The pericardium was normal in appearance.    AORTA: The root exhibited normal size.    SYSTEMIC VEINS: IVC: The inferior vena cava was normal in size. Respirophasic changes were normal.    SYSTEM MEASUREMENT TABLES    2D  %FS: 31.39 %  Ao Diam: 3.29 cm  EDV(Teich): 100.54 ml  EF(Cube): 67.7 %  EF(Teich): 59.26 %  ESV(Cube): 32.77 ml  ESV(Teich): 40.96 ml  IVSd: 0.99 cm  LA Area: 16.53 cm2  LA Diam: 3.39 cm  LVEDV MOD A4C: 94.35 ml  LVEF MOD A4C: 68.85 %  LVESV MOD A4C: 29.39 ml  LVIDd: 4.66 cm  LVIDs: 3.2 cm  LVLd A4C: 7.19 cm  LVLs A4C: 5.3 cm  LVPWd: 1.08 cm  RA Area: 13.64 cm2  RV Diam.: 2.6 cm  SV MOD A4C: 64.96 ml  SV(Cube): 68.69 ml  SV(Teich): 59.58 ml    CW  TR Vmax: 2.6 m/s  TR maxP.99 mmHg    MM  TAPSE: 1.84 cm    PW  E': 0.06 m/s  E/E': 14.64  MV A Toan: 0.97 m/s  MV Dec Archer: 3.14 m/s2  MV DecT: 267.31 ms  MV E Toan: 0.84 m/s  MV E/A Ratio: 0.86    [employer] Accredited Echocardiography Laboratory    Prepared and electronically signed by    Kvng Aviles MD  Signed 28-Dec-2018 11:05:05    No results found for this or any previous visit.      Catheterizations:   No results found for this or any previous visit.      Stress Tests:  No results found for this or any previous visit.      Holter monitor -   No results found for this or any previous  visit.    No results found for this or any previous visit.

## 2024-08-19 DIAGNOSIS — I50.32 CHRONIC DIASTOLIC CHF (CONGESTIVE HEART FAILURE) (HCC): Primary | Chronic | ICD-10-CM

## 2024-08-19 DIAGNOSIS — I48.91 ATRIAL FIBRILLATION WITH RVR (HCC): ICD-10-CM

## 2024-08-19 DIAGNOSIS — I10 ESSENTIAL HYPERTENSION: ICD-10-CM

## 2024-08-19 RX ORDER — POTASSIUM CHLORIDE 1500 MG/1
20 TABLET, EXTENDED RELEASE ORAL 3 TIMES DAILY
Qty: 90 TABLET | Refills: 2 | Status: SHIPPED | OUTPATIENT
Start: 2024-08-19

## 2024-08-19 NOTE — TELEPHONE ENCOUNTER
Requested medication(s) are due for refill today: Yes  Patient has already received a courtesy refill: No  Other reason request has been forwarded to provider: Was prescribed from PCP but last seen in office by you

## 2024-08-19 NOTE — TELEPHONE ENCOUNTER
Medication: potassium chloride 20mEq tablet    Dose/Frequency: 1 tablet 3 times a day    Quantity: 90 tablets    Pharmacy: [facility] [address]    Office:   [] PCP/Provider -   [x] Speciality/Provider -     Does the patient have enough for 3 days?   [x] Yes   [] No - Send as HP to POD

## 2024-08-21 DIAGNOSIS — I48.0 PAF (PAROXYSMAL ATRIAL FIBRILLATION) (HCC): ICD-10-CM

## 2024-08-21 RX ORDER — METOPROLOL SUCCINATE 25 MG/1
25 TABLET, EXTENDED RELEASE ORAL 2 TIMES DAILY
Start: 2024-08-21 | End: 2024-08-29 | Stop reason: SDUPTHER

## 2024-08-21 NOTE — PROGRESS NOTES
Patient with SVT noted on loop. Was symptomatic with lightheadedness and palpitations. Discussed with Dr. Delaney. Metoprolol increased to 25 mg BID from 25 mg daily.

## 2024-08-29 ENCOUNTER — OFFICE VISIT (OUTPATIENT)
Dept: CARDIOLOGY CLINIC | Facility: CLINIC | Age: 79
End: 2024-08-29
Payer: COMMERCIAL

## 2024-08-29 VITALS
HEART RATE: 89 BPM | DIASTOLIC BLOOD PRESSURE: 62 MMHG | WEIGHT: 229.1 LBS | SYSTOLIC BLOOD PRESSURE: 114 MMHG | BODY MASS INDEX: 40.59 KG/M2 | OXYGEN SATURATION: 96 % | HEIGHT: 63 IN

## 2024-08-29 DIAGNOSIS — I48.0 PAF (PAROXYSMAL ATRIAL FIBRILLATION) (HCC): Primary | ICD-10-CM

## 2024-08-29 DIAGNOSIS — I50.32 CHRONIC DIASTOLIC CHF (CONGESTIVE HEART FAILURE) (HCC): ICD-10-CM

## 2024-08-29 DIAGNOSIS — I10 ESSENTIAL HYPERTENSION: ICD-10-CM

## 2024-08-29 DIAGNOSIS — Z98.890 S/P ABLATION OF ATRIAL FIBRILLATION: ICD-10-CM

## 2024-08-29 DIAGNOSIS — I47.10 SUPRAVENTRICULAR TACHYCARDIA: ICD-10-CM

## 2024-08-29 DIAGNOSIS — R60.0 BILATERAL LOWER EXTREMITY EDEMA: ICD-10-CM

## 2024-08-29 DIAGNOSIS — E78.5 HYPERLIPIDEMIA, UNSPECIFIED HYPERLIPIDEMIA TYPE: ICD-10-CM

## 2024-08-29 DIAGNOSIS — Z86.79 S/P ABLATION OF ATRIAL FIBRILLATION: ICD-10-CM

## 2024-08-29 PROCEDURE — 99214 OFFICE O/P EST MOD 30 MIN: CPT | Performed by: INTERNAL MEDICINE

## 2024-08-29 RX ORDER — METOPROLOL SUCCINATE 25 MG/1
25 TABLET, EXTENDED RELEASE ORAL 2 TIMES DAILY
Qty: 60 TABLET | Refills: 11 | Status: SHIPPED | OUTPATIENT
Start: 2024-08-29

## 2024-08-29 NOTE — PATIENT INSTRUCTIONS
You were seen today in the Cardiology office for follow up evaluation.     Please continue your current cardiac medications as prescribed.    Thank you for choosing [facility].    Please call our office or use [employer] with any questions.

## 2024-08-29 NOTE — PROGRESS NOTES
[facility]    Name:Jessica Jaquez   DOS: 8/29/2024     Chief Complaint:   Chief Complaint   Patient presents with    Follow-up     1 month follow up; medication problems.    Shortness of Breath     Always SOB    Edema     Swelling in feet and ankles        HISTORY OF PRESENT ILLNESS:    HPI:  Jessica Jaquez is a 79 y.o. female. She  has a past medical history of Arthritis, Colorectal polyps, Constipation, Diabetes mellitus (HCC), Diverticulosis of colon, Hiatal hernia, Hypertension, Increased urinary frequency, Lumbar disc disease, Pressure injury of skin, SOB (shortness of breath), Stress incontinence, and Vitamin D deficiency (07/27/2018).    She presents for follow up evaluation. She last saw me personally in the office on 7/26/24. Per my prior OV notes:  Chronic HFpEF LVEF 60%  Atrial fibrillation sp PHU DCCV on 3/08/24 [id number]= 8 on Eliquis 5 mg twice daily for stroke prevention  S/P ablation for atrial fibrillation 7/9/24, pulmonary vein isolation with posterior wall isolation and loop implantation.   Hypertension  Hyperlipidemia 3/16/24   HDL 45 LDL 85  DM2 HgbA1C 6.9% on 1/22/2024  Hx of remote CVA 15 years ago   Lumbar radiculopathy    Review of her loop recorder interrogations has demonstrated no recurrent atrial fibrillation since ablation.  Her most recent interrogation on August 21, 2024 demonstrates an episode of supraventricular tachycardia lasting 2 minutes and 38 seconds, for which her beta-blocker dose was uptitrated by Dr. Delaney.     Today, she reports short of breath which is chronic to her. This is not changed compared to her baseline.  She also reports bilateral lower extremity swelling that is chronic, marginally improved compared to prior evaluations but not quite at her baseline. She otherwise denies chest pain, diaphoresis, dizziness, orthopnea, syncope. She is taking Torsemide 40mg BID, and has not been taking Metolazine recently although she does  still have some at home.          ROS    ROS: Pertinent positives and negatives as described in History of Present Illness. Remainder of a 14 point review of systems was negative.     Allergies   Allergen Reactions    Other Cough and Sneezing     Seasonal allergies    Pollen Extract Allergic Rhinitis        Current Outpatient Medications on File Prior to Visit   Medication Sig Dispense Refill    apixaban (ELIQUIS) 5 mg Take 1 tablet (5 mg total) by mouth 2 (two) times a day 60 tablet 5    bimatoprost (Lumigan) 0.01 % ophthalmic drops Administer 1 drop to both eyes daily at bedtime      cephalexin (KEFLEX) 250 mg capsule       Cholecalciferol (VITAMIN D3) 1000 units CAPS Take by mouth daily        ezetimibe (ZETIA) 10 mg tablet Take 1 tablet (10 mg total) by mouth daily at bedtime 60 tablet 5    gabapentin (NEURONTIN) 100 mg capsule Take 1 capsule (100 mg total) by mouth 3 (three) times a day 90 capsule 2    glimepiride (AMARYL) 1 mg tablet Take 1 mg by mouth every morning before breakfast      hydrocortisone 2.5 % cream as needed  3    ketoconazole (NIZORAL) 2 % cream as needed  3    metolazone (ZAROXOLYN) 5 mg tablet TAKE 1 TABLET BY MOUTH DAILY TAKE 1 TABLET DAILY AS NEEDED. NOT TO EXCEED 3 TABLETS PER WEEK. 30 tablet 1    metoprolol succinate (TOPROL-XL) 25 mg 24 hr tablet Take 1 tablet (25 mg total) by mouth 2 (two) times a day      Multiple Vitamins-Minerals (CENTRUM SILVER PO) multivitamin      potassium chloride (Klor-Con M20) 20 mEq tablet Take 1 tablet (20 mEq total) by mouth 3 (three) times a day 90 tablet 2    spironolactone (ALDACTONE) 25 mg tablet Take 0.5 tablets (12.5 mg total) by mouth daily      torsemide (DEMADEX) 20 mg tablet Take 2 tablets (40 mg total) by mouth 2 (two) times a day 360 tablet 3     No current facility-administered medications on file prior to visit.       Past Medical History:   Diagnosis Date    Arthritis     Colorectal polyps     Constipation     Diabetes mellitus (HCC)      Diverticulosis of colon     Hiatal hernia     Hypertension     Increased urinary frequency     Lumbar disc disease     Pressure injury of skin     SOB (shortness of breath)     Stress incontinence     Vitamin D deficiency 07/27/2018       Past Surgical History:   Procedure Laterality Date    BACK SURGERY      CARDIAC ELECTROPHYSIOLOGY PROCEDURE N/A 7/9/2024    Procedure: Cardiac eps/afib ablation PFA;  Surgeon: Jeyson Delaney MD;  [facility]: [facility];  Service: Cardiology    CARDIAC ELECTROPHYSIOLOGY PROCEDURE N/A 7/9/2024    Procedure: Cardiac loop recorder implant;  Surgeon: Jeyson Delaney MD;  [facility];  Service: Cardiology    CARPAL TUNNEL RELEASE Left     CATARACT EXTRACTION Bilateral     CHOLECYSTECTOMY      COLONOSCOPY W/ ENDOSCOPIC US N/A 2/24/2016    Procedure: ANAL ENDOSCOPIC U/S;  Surgeon: HOLLIS Washington MD;  Location: [facility];  Service:     HERNIA REPAIR      SD COLONOSCOPY FLX DX W/COLLJ SPEC WHEN PFRMD N/A 2/24/2016    Procedure: COLONOSCOPY;  Surgeon: HOLLIS Washington MD;  Location: [facility];  Service: Colorectal    TONSILLECTOMY      TUBAL LIGATION         Family History   Problem Relation Age of Onset    Hypertension Mother     Heart attack Mother     Diabetes Mother     Prostate cancer Father     No Known Problems Brother     Stroke Maternal Grandmother     Stroke Paternal Grandmother     Stroke Paternal Grandfather     Thyroid cancer Neg Hx        Social History     Socioeconomic History    Marital status: /Civil Union     Spouse name: Not on file    Number of children: Not on file    Years of education: Not on file    Highest education level: Not on file   Occupational History    Not on file   Tobacco Use    Smoking status: Never    Smokeless tobacco: Never   Substance and Sexual Activity    Alcohol use: Never    Drug use: No    Sexual activity: Not on file   Other Topics Concern    Not on file   Social History Narrative    Not on file     Social  "Determinants of Health     Financial Resource Strain: Not on file   Food Insecurity: No Food Insecurity (3/15/2024)    Hunger Vital Sign     Worried About Running Out of Food in the Last Year: Never true     Ran Out of Food in the Last Year: Never true   Transportation Needs: No Transportation Needs (3/15/2024)    PRAPARE - Transportation     Lack of Transportation (Medical): No     Lack of Transportation (Non-Medical): No   Physical Activity: Not on file   Stress: Not on file   Social Connections: Not on file   Intimate Partner Violence: Not on file   Housing Stability: Low Risk  (3/15/2024)    Housing Stability Vital Sign     Unable to Pay for Housing in the Last Year: No     Number of Times Moved in the Last Year: 1     Homeless in the Last Year: No       OBJECTIVE:    /62 (BP Location: Left arm, Patient Position: Sitting, Cuff Size: Large)   Pulse 89   Ht 5' 3\" (1.6 m)   Wt 104 kg (229 lb 1.6 oz)   SpO2 96%   BMI 40.58 kg/m²      BP Readings from Last 3 Encounters:   08/29/24 114/62   08/14/24 96/62   07/26/24 114/54       Wt Readings from Last 3 Encounters:   08/29/24 104 kg (229 lb 1.6 oz)   08/14/24 105 kg (230 lb 8 oz)   07/26/24 104 kg (228 lb 12.8 oz)         Physical Exam  Vitals reviewed.   Constitutional:       General: She is not in acute distress.     Appearance: Normal appearance. She is not diaphoretic.   HENT:      Head: Normocephalic and atraumatic.   Eyes:      Conjunctiva/sclera: Conjunctivae normal.   Neck:      Vascular: No JVD (Not well visualized).   Cardiovascular:      Rate and Rhythm: Normal rate and regular rhythm.      Pulses: Normal pulses.      Heart sounds: Normal heart sounds. No murmur heard.     No friction rub. No gallop.   Pulmonary:      Effort: Pulmonary effort is normal.      Breath sounds: Normal breath sounds. No wheezing, rhonchi or rales.   Abdominal:      General: Abdomen is flat. Bowel sounds are normal.   Musculoskeletal:      Right lower leg: Edema present. "      Left lower leg: Edema present.   Skin:     General: Skin is warm and dry.   Neurological:      General: No focal deficit present.      Mental Status: She is alert and oriented to person, place, and time.   Psychiatric:         Mood and Affect: Mood normal.         Behavior: Behavior normal.                                                       Cardiac testing:       Results for orders placed during the hospital encounter of 18    Echo complete with contrast if indicated    [place] [address]  [phone]    Transthoracic Echocardiogram  2D, M-mode, Doppler, and Color Doppler    Study date:  28-Dec-2018    Patient: HAN CARVAJAL  MR number: [id number]  Account number: [id number]  : 1945  Age: 73 years  Gender: Female  Status: Outpatient  Location: [address] [place] [address] [facility]  Height: 63 in  Weight: 232 lb  BP: 169/ 76 mmHg    Indications: Heart failure    Diagnoses: I50.9 - Heart failure, unspecified    Sonographer:  DANYELLE Ga  Interpreting Physician:  Kvng Aviles MD  Referring Physician:  Jorden Holt III, MD  Group:  [facility]  Cardiology Fellow:  Von Fuentes MD    SUMMARY    PROCEDURE INFORMATION:  This was a technically difficult study.    LEFT VENTRICLE:  Systolic function was normal. Ejection fraction was estimated to be 60 %.  There were no regional wall motion abnormalities.  Doppler parameters were consistent with abnormal left ventricular relaxation (grade 1 diastolic dysfunction).    MITRAL VALVE:  There was trace regurgitation.    TRICUSPID VALVE:  There was trace regurgitation.  Pulmonary artery systolic pressure was within the normal range.    PULMONIC VALVE:  There was trace regurgitation.    HISTORY: PRIOR HISTORY: Hypertension, hyperlipidemia, diastolic dysfunction, type 2 diabetes, GERD    PROCEDURE: The study was performed in the [address] [place] [address]  [facility]. This was a routine study. The transthoracic approach was used. The study included complete 2D imaging, M-mode, complete spectral Doppler, and color Doppler. This  was a technically difficult study.    LEFT VENTRICLE: Size was normal. Systolic function was normal. Ejection fraction was estimated to be 60 %. There were no regional wall motion abnormalities. Wall thickness was normal. DOPPLER: Doppler parameters were consistent with  abnormal left ventricular relaxation (grade 1 diastolic dysfunction).    RIGHT VENTRICLE: The size was normal. Systolic function was normal. Wall thickness was normal.    LEFT ATRIUM: Size was normal.    RIGHT ATRIUM: Size was normal.    MITRAL VALVE: Valve structure was normal. There was normal leaflet separation. DOPPLER: The transmitral velocity was within the normal range. There was no evidence for stenosis. There was trace regurgitation.    AORTIC VALVE: The valve was probably trileaflet. Leaflets exhibited normal cuspal separation. The valve was not well visualized. DOPPLER: Transaortic velocity was within the normal range. There was no evidence for stenosis. There was no  significant regurgitation.    TRICUSPID VALVE: The valve structure was normal. There was normal leaflet separation. DOPPLER: The transtricuspid velocity was within the normal range. There was no evidence for stenosis. There was trace regurgitation. Pulmonary artery  systolic pressure was within the normal range. Estimated peak PA pressure was 30 mmHg.    PULMONIC VALVE: Not well visualized. DOPPLER: The transpulmonic velocity was within the normal range. There was trace regurgitation.    PERICARDIUM: There was no pericardial effusion. A pericardial fat pad was present. The pericardium was normal in appearance.    AORTA: The root exhibited normal size.    SYSTEMIC VEINS: IVC: The inferior vena cava was normal in size. Respirophasic changes were normal.    SYSTEM MEASUREMENT TABLES    2D  %FS: 31.39  %  Ao Diam: 3.29 cm  EDV(Teich): 100.54 ml  EF(Cube): 67.7 %  EF(Teich): 59.26 %  ESV(Cube): 32.77 ml  ESV(Teich): 40.96 ml  IVSd: 0.99 cm  LA Area: 16.53 cm2  LA Diam: 3.39 cm  LVEDV MOD A4C: 94.35 ml  LVEF MOD A4C: 68.85 %  LVESV MOD A4C: 29.39 ml  LVIDd: 4.66 cm  LVIDs: 3.2 cm  LVLd A4C: 7.19 cm  LVLs A4C: 5.3 cm  LVPWd: 1.08 cm  RA Area: 13.64 cm2  RV Diam.: 2.6 cm  SV MOD A4C: 64.96 ml  SV(Cube): 68.69 ml  SV(Teich): 59.58 ml    CW  TR Vmax: 2.6 m/s  TR maxP.99 mmHg    MM  TAPSE: 1.84 cm    PW  E': 0.06 m/s  E/E': 14.64  MV A [place]: 0.97 m/s  MV Dec [place]: 3.14 m/s2  MV DecT: 267.31 ms  MV E [place]: 0.84 m/s  MV E/A Ratio: 0.86    [employer] Accredited Echocardiography Laboratory    Prepared and electronically signed by    Kvng Aviles MD  Signed 28-Dec-2018 11:05:05          LABS:  Lab Results   Component Value Date    GLUCOSE 109 2015    BUN 41 (H) 2024    CREATININE 1.73 (H) 2024    CALCIUM 9.3 2024     (L) 2015    K 5.1 2024    CO2 26 2024     2024    ALKPHOS 72 2024    BILITOT 0.33 2015    PROT 7.6 2015    AST 18 2024    ALT 13 2024    ANIONGAP 5 2015        Lab Results   Component Value Date    WBC 12.12 (H) 07/10/2024    HGB 10.2 (L) 07/10/2024    HCT 31.9 (L) 07/10/2024    MCV 93 07/10/2024     07/10/2024       Lab Results   Component Value Date    CHOL 248 2014    HDL 45 (L) 2024    LDLCALC 85 2024    TRIG 146 2024       Lab Results   Component Value Date    HGBA1C 6.9 (H) 2024       ASSESSMENT/PLAN:  Diagnoses and all orders for this visit:    PAF (paroxysmal atrial fibrillation) ([facility])  -     metoprolol succinate (TOPROL-XL) 25 mg 24 hr tablet; Take 1 tablet (25 mg total) by mouth 2 (two) times a day    Supraventricular tachycardia    S/P ablation of atrial fibrillation    Chronic diastolic CHF (congestive heart failure) ([facility])  -     Basic metabolic  "panel; Future    Essential hypertension    Hyperlipidemia, unspecified hyperlipidemia type    Bilateral lower extremity edema  -     Basic metabolic panel; Future        PLAN:  Continue Toprol XL 25mg BID  Continue Torsemide 40mg BID  Resume Metolazone 5mg once weekly  Continue Eliquis 5mg BID. If her creatinine is still above 1.5, we will need to decrease her Eliquis to 2.5mg BID.   Daily weights  Compression stockings  Repeat BMP in 1 week to assess creatinine and electrolytes. We may need to tolerate a higher new creatinine baseline for maintenance of euvolemia.  If no response, we will trial her on Bumex when I see her back in 4 weeks.       Himanshu Webb MD      Portions of the record may have been created with voice recognition software. Occasional wrong word or \"sound alike\" substitutions may have occurred due to the inherent limitations of voice recognition software. Please review the chart carefully and recognize, using context, where substitutions/typographical errors may have occurred.     "

## 2024-09-02 ENCOUNTER — APPOINTMENT (EMERGENCY)
Dept: RADIOLOGY | Facility: HOSPITAL | Age: 79
End: 2024-09-02
Payer: COMMERCIAL

## 2024-09-02 ENCOUNTER — HOSPITAL ENCOUNTER (OUTPATIENT)
Facility: HOSPITAL | Age: 79
Setting detail: OBSERVATION
Discharge: HOME | End: 2024-09-03
Attending: EMERGENCY MEDICINE | Admitting: INTERNAL MEDICINE
Payer: COMMERCIAL

## 2024-09-02 DIAGNOSIS — I47.10 SVT (SUPRAVENTRICULAR TACHYCARDIA): ICD-10-CM

## 2024-09-02 DIAGNOSIS — R61 DIAPHORESIS: ICD-10-CM

## 2024-09-02 DIAGNOSIS — R00.2 PALPITATIONS: Primary | ICD-10-CM

## 2024-09-02 DIAGNOSIS — I48.91 ATRIAL FIBRILLATION (HCC): ICD-10-CM

## 2024-09-02 PROBLEM — N18.9 CKD (CHRONIC KIDNEY DISEASE): Status: ACTIVE | Noted: 2024-09-02

## 2024-09-02 LAB
2HR DELTA HS TROPONIN: 1 NG/L
ALBUMIN SERPL BCG-MCNC: 4.1 G/DL (ref 3.5–5)
ALP SERPL-CCNC: 82 U/L (ref 34–104)
ALT SERPL W P-5'-P-CCNC: 14 U/L (ref 7–52)
ANION GAP SERPL CALCULATED.3IONS-SCNC: 17 MMOL/L (ref 4–13)
AST SERPL W P-5'-P-CCNC: 19 U/L (ref 13–39)
ATRIAL RATE: 98 BPM
BASOPHILS # BLD AUTO: 0.06 THOUSANDS/ÂΜL (ref 0–0.1)
BASOPHILS NFR BLD AUTO: 1 % (ref 0–1)
BILIRUB SERPL-MCNC: 0.63 MG/DL (ref 0.2–1)
BNP SERPL-MCNC: 29 PG/ML (ref 0–100)
BUN SERPL-MCNC: 39 MG/DL (ref 5–25)
CALCIUM SERPL-MCNC: 10 MG/DL (ref 8.4–10.2)
CARDIAC TROPONIN I PNL SERPL HS: 10 NG/L
CARDIAC TROPONIN I PNL SERPL HS: 11 NG/L
CHLORIDE SERPL-SCNC: 92 MMOL/L (ref 96–108)
CO2 SERPL-SCNC: 28 MMOL/L (ref 21–32)
CREAT SERPL-MCNC: 1.63 MG/DL (ref 0.6–1.3)
EOSINOPHIL # BLD AUTO: 0.14 THOUSAND/ÂΜL (ref 0–0.61)
EOSINOPHIL NFR BLD AUTO: 1 % (ref 0–6)
ERYTHROCYTE [DISTWIDTH] IN BLOOD BY AUTOMATED COUNT: 14.3 % (ref 11.6–15.1)
GFR SERPL CREATININE-BSD FRML MDRD: 29 ML/MIN/1.73SQ M
GLUCOSE SERPL-MCNC: 146 MG/DL (ref 65–140)
GLUCOSE SERPL-MCNC: 172 MG/DL (ref 65–140)
GLUCOSE SERPL-MCNC: 281 MG/DL (ref 65–140)
HCT VFR BLD AUTO: 39.3 % (ref 34.8–46.1)
HGB BLD-MCNC: 12.6 G/DL (ref 11.5–15.4)
IMM GRANULOCYTES # BLD AUTO: 0.07 THOUSAND/UL (ref 0–0.2)
IMM GRANULOCYTES NFR BLD AUTO: 1 % (ref 0–2)
LYMPHOCYTES # BLD AUTO: 1.53 THOUSANDS/ÂΜL (ref 0.6–4.47)
LYMPHOCYTES NFR BLD AUTO: 12 % (ref 14–44)
MCH RBC QN AUTO: 28.6 PG (ref 26.8–34.3)
MCHC RBC AUTO-ENTMCNC: 32.1 G/DL (ref 31.4–37.4)
MCV RBC AUTO: 89 FL (ref 82–98)
MONOCYTES # BLD AUTO: 0.54 THOUSAND/ÂΜL (ref 0.17–1.22)
MONOCYTES NFR BLD AUTO: 4 % (ref 4–12)
NEUTROPHILS # BLD AUTO: 10.5 THOUSANDS/ÂΜL (ref 1.85–7.62)
NEUTS SEG NFR BLD AUTO: 81 % (ref 43–75)
NRBC BLD AUTO-RTO: 0 /100 WBCS
P AXIS: 55 DEGREES
PLATELET # BLD AUTO: 363 THOUSANDS/UL (ref 149–390)
PMV BLD AUTO: 9.2 FL (ref 8.9–12.7)
POTASSIUM SERPL-SCNC: 3.7 MMOL/L (ref 3.5–5.3)
PR INTERVAL: 158 MS
PROT SERPL-MCNC: 7.4 G/DL (ref 6.4–8.4)
QRS AXIS: -38 DEGREES
QRSD INTERVAL: 122 MS
QT INTERVAL: 406 MS
QTC INTERVAL: 518 MS
RBC # BLD AUTO: 4.41 MILLION/UL (ref 3.81–5.12)
SODIUM SERPL-SCNC: 137 MMOL/L (ref 135–147)
T WAVE AXIS: 12 DEGREES
VENTRICULAR RATE: 98 BPM
WBC # BLD AUTO: 12.84 THOUSAND/UL (ref 4.31–10.16)

## 2024-09-02 PROCEDURE — 99223 1ST HOSP IP/OBS HIGH 75: CPT | Performed by: INTERNAL MEDICINE

## 2024-09-02 PROCEDURE — 82948 REAGENT STRIP/BLOOD GLUCOSE: CPT

## 2024-09-02 PROCEDURE — 85025 COMPLETE CBC W/AUTO DIFF WBC: CPT

## 2024-09-02 PROCEDURE — 93005 ELECTROCARDIOGRAM TRACING: CPT

## 2024-09-02 PROCEDURE — 99215 OFFICE O/P EST HI 40 MIN: CPT | Performed by: INTERNAL MEDICINE

## 2024-09-02 PROCEDURE — 36415 COLL VENOUS BLD VENIPUNCTURE: CPT

## 2024-09-02 PROCEDURE — 80053 COMPREHEN METABOLIC PANEL: CPT

## 2024-09-02 PROCEDURE — 83880 ASSAY OF NATRIURETIC PEPTIDE: CPT

## 2024-09-02 PROCEDURE — 99285 EMERGENCY DEPT VISIT HI MDM: CPT | Performed by: EMERGENCY MEDICINE

## 2024-09-02 PROCEDURE — 93010 ELECTROCARDIOGRAM REPORT: CPT | Performed by: INTERNAL MEDICINE

## 2024-09-02 PROCEDURE — 71046 X-RAY EXAM CHEST 2 VIEWS: CPT

## 2024-09-02 PROCEDURE — 84484 ASSAY OF TROPONIN QUANT: CPT

## 2024-09-02 PROCEDURE — 99285 EMERGENCY DEPT VISIT HI MDM: CPT

## 2024-09-02 PROCEDURE — 84484 ASSAY OF TROPONIN QUANT: CPT | Performed by: INTERNAL MEDICINE

## 2024-09-02 RX ORDER — BIMATOPROST 0.3 MG/ML
1 SOLUTION/ DROPS OPHTHALMIC DAILY
Status: DISCONTINUED | OUTPATIENT
Start: 2024-09-02 | End: 2024-09-03 | Stop reason: HOSPADM

## 2024-09-02 RX ORDER — INSULIN LISPRO 100 [IU]/ML
1-5 INJECTION, SOLUTION INTRAVENOUS; SUBCUTANEOUS
Status: DISCONTINUED | OUTPATIENT
Start: 2024-09-02 | End: 2024-09-03 | Stop reason: HOSPADM

## 2024-09-02 RX ORDER — TORSEMIDE 20 MG/1
40 TABLET ORAL 2 TIMES DAILY
Status: DISCONTINUED | OUTPATIENT
Start: 2024-09-02 | End: 2024-09-03

## 2024-09-02 RX ORDER — SPIRONOLACTONE 25 MG/1
12.5 TABLET ORAL DAILY
Status: DISCONTINUED | OUTPATIENT
Start: 2024-09-02 | End: 2024-09-03 | Stop reason: HOSPADM

## 2024-09-02 RX ORDER — EZETIMIBE 10 MG/1
10 TABLET ORAL
Status: DISCONTINUED | OUTPATIENT
Start: 2024-09-02 | End: 2024-09-03 | Stop reason: HOSPADM

## 2024-09-02 RX ORDER — GABAPENTIN 100 MG/1
100 CAPSULE ORAL 3 TIMES DAILY
Status: DISCONTINUED | OUTPATIENT
Start: 2024-09-02 | End: 2024-09-03 | Stop reason: HOSPADM

## 2024-09-02 RX ORDER — ACETAMINOPHEN 325 MG/1
650 TABLET ORAL EVERY 6 HOURS PRN
Status: DISCONTINUED | OUTPATIENT
Start: 2024-09-02 | End: 2024-09-03 | Stop reason: HOSPADM

## 2024-09-02 RX ORDER — METOPROLOL SUCCINATE 25 MG/1
25 TABLET, EXTENDED RELEASE ORAL 2 TIMES DAILY
Status: DISCONTINUED | OUTPATIENT
Start: 2024-09-02 | End: 2024-09-03 | Stop reason: HOSPADM

## 2024-09-02 RX ADMIN — GABAPENTIN 100 MG: 100 CAPSULE ORAL at 21:01

## 2024-09-02 RX ADMIN — EZETIMIBE 10 MG: 10 TABLET ORAL at 21:01

## 2024-09-02 RX ADMIN — INSULIN LISPRO 1 UNITS: 100 INJECTION, SOLUTION INTRAVENOUS; SUBCUTANEOUS at 16:29

## 2024-09-02 RX ADMIN — GABAPENTIN 100 MG: 100 CAPSULE ORAL at 16:29

## 2024-09-02 RX ADMIN — METOPROLOL SUCCINATE 25 MG: 25 TABLET, EXTENDED RELEASE ORAL at 21:01

## 2024-09-02 RX ADMIN — APIXABAN 2.5 MG: 2.5 TABLET, FILM COATED ORAL at 17:51

## 2024-09-02 RX ADMIN — TORSEMIDE 40 MG: 20 TABLET ORAL at 21:01

## 2024-09-02 NOTE — ASSESSMENT & PLAN NOTE
Wt Readings from Last 3 Encounters:   09/02/24 101 kg (222 lb 7.1 oz)   08/29/24 104 kg (229 lb 1.6 oz)   08/14/24 105 kg (230 lb 8 oz)     Currently euvolemic  Continue beta-blocker  Continue torsemide

## 2024-09-02 NOTE — ED PROVIDER NOTES
History  Chief Complaint   Patient presents with    Irregular Heart Beat     Per pt she got a loop recorder placed about a month ago, since 0300 this morning she states heart rate has been all over the place either very high or low     79 year old female with pmhx of HTN, HLD, CHF, and loop recorder for the past 3 weeks presents to the ED for evaluation of lightheadedness and diaphoretic episode 2 hours ago while at lunch.  Loop recorder revealed SVT with heart rate of 200s last week.  Patient had another episode last night where her heart rate was in the 180s for about 2 minutes associated with shortness of breath, her heart rate then dropped down to the 40s. Pt notes she is asymptomatic at this time. Denies recent illness, fever, chills, coughs, n/v/d           Prior to Admission Medications   Prescriptions Last Dose Informant Patient Reported? Taking?   Cholecalciferol (VITAMIN D3) 1000 units CAPS  Self, Child Yes No   Sig: Take by mouth daily     Multiple Vitamins-Minerals (CENTRUM SILVER PO)  Self, Child Yes No   Sig: multivitamin   apixaban (ELIQUIS) 5 mg  Self, Child No No   Sig: Take 1 tablet (5 mg total) by mouth 2 (two) times a day   bimatoprost (Lumigan) 0.01 % ophthalmic drops  Self, Child Yes No   Sig: Administer 1 drop to both eyes daily at bedtime   cephalexin (KEFLEX) 250 mg capsule Past Month Self, Child Yes Yes   ezetimibe (ZETIA) 10 mg tablet  Self, Child No No   Sig: Take 1 tablet (10 mg total) by mouth daily at bedtime   gabapentin (NEURONTIN) 100 mg capsule  Self, Child No No   Sig: Take 1 capsule (100 mg total) by mouth 3 (three) times a day   glimepiride (AMARYL) 1 mg tablet  Self, Child Yes No   Sig: Take 1 mg by mouth every morning before breakfast   hydrocortisone 2.5 % cream  Self, Child Yes No   Sig: as needed   ketoconazole (NIZORAL) 2 % cream  Self, Child Yes No   Sig: as needed   metolazone (ZAROXOLYN) 5 mg tablet  Self, Child No No   Sig: TAKE 1 TABLET BY MOUTH DAILY TAKE 1 TABLET  DAILY AS NEEDED. NOT TO EXCEED 3 TABLETS PER WEEK.   metoprolol succinate (TOPROL-XL) 25 mg 24 hr tablet   No No   Sig: Take 1 tablet (25 mg total) by mouth 2 (two) times a day   potassium chloride (Klor-Con M20) 20 mEq tablet  Self, Child No No   Sig: Take 1 tablet (20 mEq total) by mouth 3 (three) times a day   spironolactone (ALDACTONE) 25 mg tablet  Self, Child No No   Sig: Take 0.5 tablets (12.5 mg total) by mouth daily   torsemide (DEMADEX) 20 mg tablet  Self, Child No No   Sig: Take 2 tablets (40 mg total) by mouth 2 (two) times a day      Facility-Administered Medications: None       Past Medical History:   Diagnosis Date    Arthritis     Colorectal polyps     Constipation     Diabetes mellitus (HCC)     Diverticulosis of colon     Hiatal hernia     Hypertension     Increased urinary frequency     Lumbar disc disease     Pressure injury of skin     SOB (shortness of breath)     Stress incontinence     Vitamin D deficiency 07/27/2018       Past Surgical History:   Procedure Laterality Date    BACK SURGERY      CARDIAC ELECTROPHYSIOLOGY PROCEDURE N/A 7/9/2024    Procedure: Cardiac eps/afib ablation PFA;  Surgeon: Jeyson Delaney MD;  Location: [facility];  Service: Cardiology    CARDIAC ELECTROPHYSIOLOGY PROCEDURE N/A 7/9/2024    Procedure: Cardiac loop recorder implant;  Surgeon: Jeyson Delaney MD;  Location: [facility];  Service: Cardiology    CARPAL TUNNEL RELEASE Left     CATARACT EXTRACTION Bilateral     CHOLECYSTECTOMY      COLONOSCOPY W/ ENDOSCOPIC US N/A 2/24/2016    Procedure: ANAL ENDOSCOPIC U/S;  Surgeon: HOLLIS Washington MD;  Location: [facility];  Service:     HERNIA REPAIR      [place] COLONOSCOPY FLX DX W/COLLJ SPEC WHEN PFRMD N/A 2/24/2016    Procedure: COLONOSCOPY;  Surgeon: HOLLIS Washington MD;  Location: [facility];  Service: Colorectal    TONSILLECTOMY      TUBAL LIGATION         Family History   Problem Relation Age of Onset    Hypertension Mother     Heart attack Mother      Diabetes Mother     Prostate cancer Father     No Known Problems Brother     Stroke Maternal Grandmother     Stroke Paternal Grandmother     Stroke Paternal Grandfather     Thyroid cancer Neg Hx      I have reviewed and agree with the history as documented.    E-Cigarette/Vaping     E-Cigarette/Vaping Substances     Social History     Tobacco Use    Smoking status: Never    Smokeless tobacco: Never   Substance Use Topics    Alcohol use: Never    Drug use: No        Review of Systems   Constitutional:  Negative for appetite change, chills, diaphoresis, fever and unexpected weight change.   HENT:  Negative for dental problem, ear pain, facial swelling, sore throat and trouble swallowing.    Eyes:  Negative for pain and visual disturbance.   Respiratory:  Positive for shortness of breath. Negative for cough and chest tightness.    Cardiovascular:  Positive for palpitations. Negative for chest pain and leg swelling.   Gastrointestinal:  Negative for abdominal distention, abdominal pain, constipation, diarrhea, nausea and vomiting.   Endocrine: Negative for polyuria.   Genitourinary:  Negative for difficulty urinating, dysuria and hematuria.   Musculoskeletal:  Negative for arthralgias and back pain.   Skin:  Negative for color change and rash.   Neurological:  Positive for light-headedness. Negative for dizziness, seizures, syncope and headaches.   Psychiatric/Behavioral:  Negative for confusion.    All other systems reviewed and are negative.      Physical Exam  ED Triage Vitals [09/02/24 1222]   Temperature Pulse Respirations Blood Pressure SpO2   97.7 °F (36.5 °C) 90 20 156/87 94 %      Temp Source Heart Rate Source Patient Position - Orthostatic VS BP Location FiO2 (%)   Temporal Monitor Sitting Left arm --      Pain Score       No Pain             Orthostatic Vital Signs  Vitals:    09/02/24 2047 09/02/24 2301 09/03/24 0335 09/03/24 0711   BP: 136/69 131/65 152/82 127/77   Pulse: 89 83 98 80   Patient Position -  Orthostatic VS:           Physical Exam  Vitals and nursing note reviewed.   Constitutional:       General: She is not in acute distress.     Appearance: Normal appearance.   HENT:      Head: Normocephalic and atraumatic.      Right Ear: External ear normal.      Left Ear: External ear normal.      Nose: Nose normal.      Mouth/Throat:      Mouth: Mucous membranes are moist.   Eyes:      General: No scleral icterus.        Right eye: No discharge.      Extraocular Movements: Extraocular movements intact.      Conjunctiva/sclera: Conjunctivae normal.   Cardiovascular:      Rate and Rhythm: Normal rate and regular rhythm.      Pulses: Normal pulses.      Heart sounds: No murmur heard.  Pulmonary:      Effort: Pulmonary effort is normal.      Breath sounds: Normal breath sounds. No wheezing.   Chest:      Chest wall: No tenderness.   Abdominal:      General: Abdomen is flat. There is no distension.      Palpations: Abdomen is soft.      Tenderness: There is no abdominal tenderness.   Musculoskeletal:         General: No deformity or signs of injury. Normal range of motion.      Cervical back: Normal range of motion and neck supple. No rigidity or tenderness.      Right lower leg: No edema.      Left lower leg: No edema.   Skin:     General: Skin is warm and dry.   Neurological:      General: No focal deficit present.      Mental Status: She is alert and oriented to person, place, and time.         ED Medications  Medications - No data to display    Diagnostic Studies  Results Reviewed       Procedure Component Value Units Date/Time    Basic metabolic panel [[id number]]  (Abnormal) Collected: 09/03/24 0454    Lab Status: Final result Specimen: Blood from Arm, Right Updated: 09/03/24 0625     Sodium 136 mmol/L      Potassium 3.0 mmol/L      Chloride 93 mmol/L      CO2 28 mmol/L      ANION GAP 15 mmol/L      BUN 43 mg/dL      Creatinine 1.37 mg/dL      Glucose 171 mg/dL      Calcium 9.5 mg/dL      eGFR 36 ml/min/1.73sq m      Narrative:      National Kidney Disease Foundation guidelines for Chronic Kidney Disease (CKD):     Stage 1 with normal or high GFR (GFR > 90 mL/min/1.73 square meters)    Stage 2 Mild CKD (GFR = 60-89 mL/min/1.73 square meters)    Stage 3A Moderate CKD (GFR = 45-59 mL/min/1.73 square meters)    Stage 3B Moderate CKD (GFR = 30-44 mL/min/1.73 square meters)    Stage 4 Severe CKD (GFR = 15-29 mL/min/1.73 square meters)    Stage 5 End Stage CKD (GFR <15 mL/min/1.73 square meters)  Note: GFR calculation is accurate only with a steady state creatinine    Magnesium [[id number]]  (Normal) Collected: 09/03/24 0454    Lab Status: Final result Specimen: Blood from Arm, Right Updated: 09/03/24 0625     Magnesium 2.1 mg/dL     CBC and differential [[id number]]  (Abnormal) Collected: 09/03/24 0454    Lab Status: Final result Specimen: Blood from Arm, Right Updated: 09/03/24 0601     WBC 11.63 Thousand/uL      RBC 4.16 Million/uL      Hemoglobin 11.8 g/dL      Hematocrit 37.1 %      MCV 89 fL      MCH 28.4 pg      MCHC 31.8 g/dL      RDW 14.6 %      MPV 9.1 fL      Platelets 329 Thousands/uL      nRBC 0 /100 WBCs      Segmented % 70 %      Immature Grans % 1 %      Lymphocytes % 20 %      Monocytes % 7 %      Eosinophils Relative 2 %      Basophils Relative 0 %      Absolute Neutrophils 8.13 Thousands/µL      Absolute Immature Grans 0.06 Thousand/uL      Absolute Lymphocytes 2.32 Thousands/µL      Absolute Monocytes 0.79 Thousand/µL      Eosinophils Absolute 0.28 Thousand/µL      Basophils Absolute 0.05 Thousands/µL     HS Troponin I 2hr [[id number]]  (Normal) Collected: 09/02/24 1546    Lab Status: Final result Specimen: Blood from Arm, Right Updated: 09/02/24 1616     hs TnI 2hr 11 ng/L      Delta 2hr hsTnI 1 ng/L     Fingerstick Glucose (POCT) [[id number]]  (Abnormal) Collected: 09/02/24 1614    Lab Status: Final result Specimen: Blood Updated: 09/02/24 1615     POC Glucose 172 mg/dl     B-Type Natriuretic Peptide(BNP)  [[id number]]  (Normal) Collected: 09/02/24 1329    Lab Status: Final result Specimen: Blood from Arm, Right Updated: 09/02/24 1410     BNP 29 pg/mL     HS Troponin 0hr (reflex protocol) [[id number]]  (Normal) Collected: 09/02/24 1329    Lab Status: Final result Specimen: Blood from Arm, Right Updated: 09/02/24 1407     hs TnI 0hr 10 ng/L     Comprehensive metabolic panel [[id number]]  (Abnormal) Collected: 09/02/24 1329    Lab Status: Final result Specimen: Blood from Arm, Right Updated: 09/02/24 1359     Sodium 137 mmol/L      Potassium 3.7 mmol/L      Chloride 92 mmol/L      CO2 28 mmol/L      ANION GAP 17 mmol/L      BUN 39 mg/dL      Creatinine 1.63 mg/dL      Glucose 281 mg/dL      Calcium 10.0 mg/dL      AST 19 U/L      ALT 14 U/L      Alkaline Phosphatase 82 U/L      Total Protein 7.4 g/dL      Albumin 4.1 g/dL      Total Bilirubin 0.63 mg/dL      eGFR 29 ml/min/1.73sq m     Narrative:      National Kidney Disease Foundation guidelines for Chronic Kidney Disease (CKD):     Stage 1 with normal or high GFR (GFR > 90 mL/min/1.73 square meters)    Stage 2 Mild CKD (GFR = 60-89 mL/min/1.73 square meters)    Stage 3A Moderate CKD (GFR = 45-59 mL/min/1.73 square meters)    Stage 3B Moderate CKD (GFR = 30-44 mL/min/1.73 square meters)    Stage 4 Severe CKD (GFR = 15-29 mL/min/1.73 square meters)    Stage 5 End Stage CKD (GFR <15 mL/min/1.73 square meters)  Note: GFR calculation is accurate only with a steady state creatinine    CBC and differential [[id number]]  (Abnormal) Collected: 09/02/24 1329    Lab Status: Final result Specimen: Blood from Arm, Right Updated: 09/02/24 1339     WBC 12.84 Thousand/uL      RBC 4.41 Million/uL      Hemoglobin 12.6 g/dL      Hematocrit 39.3 %      MCV 89 fL      MCH 28.6 pg      MCHC 32.1 g/dL      RDW 14.3 %      MPV 9.2 fL      Platelets 363 Thousands/uL      nRBC 0 /100 WBCs      Segmented % 81 %      Immature Grans % 1 %      Lymphocytes % 12 %      Monocytes % 4 %      Eosinophils  Relative 1 %      Basophils Relative 1 %      Absolute Neutrophils 10.50 Thousands/µL      Absolute Immature Grans 0.07 Thousand/uL      Absolute Lymphocytes 1.53 Thousands/µL      Absolute Monocytes 0.54 Thousand/µL      Eosinophils Absolute 0.14 Thousand/µL      Basophils Absolute 0.06 Thousands/µL                    XR chest 2 views   Final Result by Monique Rodriguez MD (09/03 1031)      No acute cardiopulmonary disease.               Workstation performed: [id number]               Procedures  ECG 12 Lead Documentation Only    Date/Time: 9/2/2024 4:08 PM    Performed by: Alfredo Gates DO  Authorized by: Alfredo Gates DO    ECG reviewed by me, the ED Provider: yes    Patient location:  ED  Rate:     ECG rate:  98    ECG rate assessment: normal    Rhythm:     Rhythm: sinus rhythm    Ectopy:     Ectopy: none    QRS:     QRS axis:  Left    QRS intervals:  Normal  Conduction:     Conduction: abnormal      Abnormal conduction: complete RBBB    ST segments:     ST segments:  Non-specific  T waves:     T waves: non-specific          ED Course  ED Course as of 09/07/24 0708   Mon Sep 02, 2024   1600 hs TnI 0hr: 10   1600 WBC(!): 12.84   1600 ANION GAP(!): 17   1600 Creatinine(!): 1.63   1600 BNP: 29                             SBIRT 20yo+      Flowsheet Row Most Recent Value   Initial Alcohol Screen: US AUDIT-C     1. How often do you have a drink containing alcohol? 0 Filed at: 09/02/2024 1224   Audit-C Score 0 Filed at: 09/02/2024 1224   SHANNON: How many times in the past year have you...    Used an illegal drug or used a prescription medication for non-medical reasons? Never Filed at: 09/02/2024 1224                  Medical Decision Making  79 year old female with pmhx of HTN, HLD, CHF, and loop recorder for the past 3 weeks presents to the ED for evaluation of lightheadedness and diaphoretic episode 2 hours ago while at lunch.  Loop recorder revealed SVT with heart rate of 200s last week.  Patient had another episode last  night where her heart rate was in the 180s for about 2 minutes associated with shortness of breath, her heart rate then dropped down to the 40s. Pt notes she is asymptomatic at this time. Denies recent illness, fever, chills, coughs, n/v/d     Ddx: ACS, arrhythmia, infection, pna  Will evaluate with CBC, CMP, trop, cxr  Labs significant for hypokalemia  CXR negative for acute findings  Pt admitted to [facility] for obs          Amount and/or Complexity of Data Reviewed  External Data Reviewed: labs, radiology and ECG.  Labs: ordered. Decision-making details documented in ED Course.  Radiology: ordered and independent interpretation performed.  ECG/medicine tests: ordered and independent interpretation performed.    Risk  Decision regarding hospitalization.          Disposition  Final diagnoses:   Palpitations   Diaphoresis     Time reflects when diagnosis was documented in both MDM as applicable and the Disposition within this note       Time User Action Codes Description Comment    9/2/2024  2:30 PM VoAlfredo Add [R00.2] Palpitations     9/2/2024  2:30 PM VoAlfredo Add [R61] Diaphoresis     9/2/2024  2:35 PM Ralf Powell Add [I47.10] SVT (supraventricular tachycardia)     9/2/2024  2:37 PM Ralf Powell Add [I48.91] Atrial fibrillation (HCC)           ED Disposition       ED Disposition   Admit    Condition   Stable    Date/Time   Mon Sep 2, 2024 1430    Comment   Case was discussed with [facility] and the patient's admission status was agreed to be Admission Status: observation status to the service of [facility].               Follow-up Information       Follow up With Specialties Details Why Contact Info    Jorden Holt MD Internal Medicine Follow up in 1 week(s)  [address]  [place] [address]  [phone]      Jovanni Tillman PA-C Cardiology, Internal Medicine, Physician Assistant Schedule an appointment as soon as possible for a visit in 1 week(s)  [address]  [place] [address] [phone]               Discharge Medication List as of 9/3/2024 10:54 AM        CONTINUE these medications which have NOT CHANGED    Details   apixaban (ELIQUIS) 5 mg Take 1 tablet (5 mg total) by mouth 2 (two) times a day, Starting Mon 7/8/2024, Normal      bimatoprost (Lumigan) 0.01 % ophthalmic drops Administer 1 drop to both eyes daily at bedtime, Historical Med      Cholecalciferol (VITAMIN D3) 1000 units CAPS Take by mouth daily  , Historical Med      ezetimibe (ZETIA) 10 mg tablet Take 1 tablet (10 mg total) by mouth daily at bedtime, Starting Fri 4/12/2024, Normal      gabapentin (NEURONTIN) 100 mg capsule Take 1 capsule (100 mg total) by mouth 3 (three) times a day, Starting Mon 3/11/2024, Normal      glimepiride (AMARYL) 1 mg tablet Take 1 mg by mouth every morning before breakfast, Historical Med      hydrocortisone 2.5 % cream as needed, Historical Med      ketoconazole (NIZORAL) 2 % cream as needed, Historical Med      metolazone (ZAROXOLYN) 5 mg tablet TAKE 1 TABLET BY MOUTH DAILY TAKE 1 TABLET DAILY AS NEEDED. NOT TO EXCEED 3 TABLETS PER WEEK., Normal      metoprolol succinate (TOPROL-XL) 25 mg 24 hr tablet Take 1 tablet (25 mg total) by mouth 2 (two) times a day, Starting Thu 8/29/2024, Normal      Multiple Vitamins-Minerals (CENTRUM SILVER PO) multivitamin, Historical Med      potassium chloride (Klor-Con M20) 20 mEq tablet Take 1 tablet (20 mEq total) by mouth 3 (three) times a day, Starting Mon 8/19/2024, Normal      spironolactone (ALDACTONE) 25 mg tablet Take 0.5 tablets (12.5 mg total) by mouth daily, Starting Wed 8/14/2024, No Print      torsemide (DEMADEX) 20 mg tablet Take 2 tablets (40 mg total) by mouth 2 (two) times a day, Starting Fri 7/26/2024, Normal           STOP taking these medications       cephalexin (KEFLEX) 250 mg capsule Comments:   Reason for Stopping:             Outpatient Discharge Orders   Discharge Diet     Activity as tolerated     Call provider for:  persistent nausea or vomiting      Call provider for:  severe uncontrolled pain     Call provider for:  difficulty breathing, headache or visual disturbances     Call provider for:  persistent dizziness or light-headedness     Call provider for:  extreme fatigue     Call provider for:       PDMP Review         Value Time User    PDMP Reviewed  Yes 3/11/2024 11:21 AM Natalie Dudley MD             ED Provider  Attending physically available and evaluated Jessica Jaquez. I managed the patient along with the ED Attending.    Electronically Signed by           Alfredo Gates DO  09/07/24 0708

## 2024-09-02 NOTE — ED ATTENDING ATTESTATION
9/2/2024  I, Elizabet Riley MD, saw and evaluated the patient. I have discussed the patient with the resident/non-physician practitioner and agree with the resident's/non-physician practitioner's findings, Plan of Care, and MDM as documented in the resident's/non-physician practitioner's note, except where noted. All available labs and Radiology studies were reviewed.  I was present for key portions of any procedure(s) performed by the resident/non-physician practitioner and I was immediately available to provide assistance.       At this point I agree with the current assessment done in the Emergency Department.  I have conducted an independent evaluation of this patient a history and physical is as follows:  79-year-old female here with palpitations.  Patient with history of prior ablation for atrial fibrillation, admitted in April and also July, has a loop recorder.  Patient has been having tacky dysrhythmias.  Patient has been having pulse as high as 200 for 2 to 3 minutes at a time.  Was seen by cardiology last week, and has had her beta-blockers adjusted and increased her diuretics.  Patient continues to have symptoms, and when she has her palpitations she gets discomfort in her chest that radiates up into her jaw.  The patient becomes diaphoretic as well.  She states that she has not had increased swelling, and if anything her legs have been less swollen secondary to diuretic use.  She denies chest pain, but does have significant exercise intolerance and dyspnea on exertion.  No fevers or chills.  No lateralizing swelling.  No vomiting or abdominal pain.  Review of systems otherwise negative in 12 systems reviewed.  On exam the patient awake and alert.  She appears chronically ill.  She is slightly pale.  HEENT exam, she has slightly tacky mucous membranes.  Neck is supple.  Her heart is regular currently without murmurs, rubs, gallop.  Lungs are clear with good air movement.  Abdomen soft and nontender  with no masses, rebound, or guarding.  Her extremities have +2 bilateral symmetric pitting edema which she states is improved compared to baseline.  She has no skin breakdown or rashes.  She is neurologically intact. MEDICAL DECISION MAKING    Number and Complexity of Problems  Differential diagnosis: Congestive heart failure, cardiac tacky dysrhythmia, renal failure, electrolyte disturbance, angina    Medical Decision Making Data  External documents reviewed: Prior admissions from April and July reviewed  My EKG interpretation: Right bundle branch block, left anterior fascicular block, no evidence of acute ischemia  My CT interpretation:   My X-ray interpretation: Normal cardiomediastinal silhouette, normal heart size  My ultrasound interpretation:     XR chest 2 views    (Results Pending)       Labs Reviewed   CBC AND DIFFERENTIAL - Abnormal       Result Value Ref Range Status    WBC 12.84 (*) 4.31 - 10.16 Thousand/uL Final    RBC 4.41  3.81 - 5.12 Million/uL Final    Hemoglobin 12.6  11.5 - 15.4 g/dL Final    Hematocrit 39.3  34.8 - 46.1 % Final    MCV 89  82 - 98 fL Final    MCH 28.6  26.8 - 34.3 pg Final    MCHC 32.1  31.4 - 37.4 g/dL Final    RDW 14.3  11.6 - 15.1 % Final    MPV 9.2  8.9 - 12.7 fL Final    Platelets 363  149 - 390 Thousands/uL Final    nRBC 0  /100 WBCs Final    Segmented % 81 (*) 43 - 75 % Final    Immature Grans % 1  0 - 2 % Final    Lymphocytes % 12 (*) 14 - 44 % Final    Monocytes % 4  4 - 12 % Final    Eosinophils Relative 1  0 - 6 % Final    Basophils Relative 1  0 - 1 % Final    Absolute Neutrophils 10.50 (*) 1.85 - 7.62 Thousands/µL Final    Absolute Immature Grans 0.07  0.00 - 0.20 Thousand/uL Final    Absolute Lymphocytes 1.53  0.60 - 4.47 Thousands/µL Final    Absolute Monocytes 0.54  0.17 - 1.22 Thousand/µL Final    Eosinophils Absolute 0.14  0.00 - 0.61 Thousand/µL Final    Basophils Absolute 0.06  0.00 - 0.10 Thousands/µL Final   COMPREHENSIVE METABOLIC PANEL - Abnormal    Sodium  "137  135 - 147 mmol/L Final    Potassium 3.7  3.5 - 5.3 mmol/L Final    Chloride 92 (*) 96 - 108 mmol/L Final    CO2 28  21 - 32 mmol/L Final    ANION GAP 17 (*) 4 - 13 mmol/L Final    BUN 39 (*) 5 - 25 mg/dL Final    Creatinine 1.63 (*) 0.60 - 1.30 mg/dL Final    Comment: Standardized to IDMS reference method    Glucose 281 (*) 65 - 140 mg/dL Final    Comment: If the patient is fasting, the ADA then defines impaired fasting glucose as > 100 mg/dL and diabetes as > or equal to 123 mg/dL.    Calcium 10.0  8.4 - 10.2 mg/dL Final    AST 19  13 - 39 U/L Final    ALT 14  7 - 52 U/L Final    Comment: Specimen collection should occur prior to Sulfasalazine administration due to the potential for falsely depressed results.     Alkaline Phosphatase 82  34 - 104 U/L Final    Total Protein 7.4  6.4 - 8.4 g/dL Final    Albumin 4.1  3.5 - 5.0 g/dL Final    Total Bilirubin 0.63  0.20 - 1.00 mg/dL Final    Comment: Use of this assay is not recommended for patients undergoing treatment with eltrombopag due to the potential for falsely elevated results.  N-acetyl-p-benzoquinone imine (metabolite of Acetaminophen) will generate erroneously low results in samples for patients that have taken an overdose of Acetaminophen.    eGFR 29  ml/min/1.73sq m Final    Narrative:     [employer] guidelines for Chronic Kidney Disease (CKD):     Stage 1 with normal or high GFR (GFR > 90 mL/min/1.73 square meters)    Stage 2 Mild CKD (GFR = 60-89 mL/min/1.73 square meters)    Stage 3A Moderate CKD (GFR = 45-59 mL/min/1.73 square meters)    Stage 3B Moderate CKD (GFR = 30-44 mL/min/1.73 square meters)    Stage 4 Severe CKD (GFR = 15-29 mL/min/1.73 square meters)    Stage 5 End Stage CKD (GFR <15 mL/min/1.73 square meters)  Note: GFR calculation is accurate only with a steady state creatinine   B-TYPE NATRIURETIC PEPTIDE (BNP) - Normal    BNP 29  0 - 100 pg/mL Final   HS TROPONIN I 0HR - Normal    hs TnI 0hr 10  \"Refer to ACS " "Flowchart\"- see link ng/L Final    Comment:                                              Initial (time 0) result  If >=50 ng/L, Myocardial injury suggested ;  Type of myocardial injury and treatment strategy  to be determined.  If 5-49 ng/L, a delta result at 2 hours and or 4 hours will be needed to further evaluate.  If <4 ng/L, and chest pain has been >3 hours since onset, patient may qualify for discharge based on the HEART score in the ED.  If <5 ng/L and <3hours since onset of chest pain, a delta result at 2 hours will be needed to further evaluate.    HS Troponin 99th Percentile URL of a Health Population=12 ng/L with a 95% Confidence Interval of 8-18 ng/L.    Second Troponin (time 2 hours)  If calculated delta >= 20 ng/L,  Myocardial injury suggested ; Type of myocardial injury and treatment strategy to be determined.  If 5-49 ng/L and the calculated delta is 5-19 ng/L, consult medical service for evaluation.  Continue evaluation for ischemia on ecg and other possible etiology and repeat hs troponin at 4 hours.  If delta is <5 ng/L at 2 hours, consider discharge based on risk stratification via the HEART score (if in ED), or BARBARA risk score in IP/Observation.    HS Troponin 99th Percentile URL of a Health Population=12 ng/L with a 95% Confidence Interval of 8-18 ng/L.   HS TROPONIN I 2HR       Labs reviewed by me are significant for: Slightly worsening renal function    Clinical decision rules/scores are significant for:     Discussed case with: Electrophysiology  Considered admission for: Tacky dysrhythmia, chest pain, renal insufficiency    Treatment and Disposition  ED course: Patient seen and examined.  Will plan to do cardiac evaluation, consult cardiology, anticipate admission to the hospital for 1 tachydysrhythmias, 2 chest pain  Shared decision making:   Code status:     ED Course         Critical Care Time  Procedures      "

## 2024-09-02 NOTE — ASSESSMENT & PLAN NOTE
Lab Results   Component Value Date    HGBA1C 6.9 (H) 01/22/2024   Hold home p.o. agents  Sliding-scale insulin  Monitor blood glucose

## 2024-09-02 NOTE — CONSULTS
Consultation - General Cardiology Team 2  Jessica Jaquez 79 y.o. female MRN: [id number]  Unit/Bed#: [facility] Encounter: [id number]      Inpatient consult to Cardiology  Consult performed by: Raul Miranda MD  Consult ordered by: Ralf Powell MD          Assessment:    79-year-old female with a history of HFpEF 60% on echo 3/2024 A-fib status post PHU DCCV, A-fib ablation and pulmonary vein isolation, on Eliquis, with loop recorder implantation 7/9/2024, who presents with multiple episodes of lightheadedness and diaphoresis with associated tachycardia reported to the rates of 200s but no findings on loop recorder interrogation.    Lightheadedness  A-fib status post ablation  Loop recorder implantation 7/9/2024  Chronic HFpEF 60%  Hypertension  Hyperlipidemia  Diabetes type 2  Remote history of CVA    Plan:  Unclear etiology of patient's lightheadedness.  Her symptoms were shown to be correlated with a known tachyarrhythmia early in August, however her more recent episodes do not appear to be correlated with any findings on loop recorder interrogation even though the patient does report that she checks her heart rate and has noted elevated heart rates to the 180s as recently as yesterday.  She was recently started on metolazone and reports a significant urine output associated with this 2 days ago.  It is possible her symptoms may be due to orthostasis.    Patient to be admitted to obs overnight with telemetry  Will order orthostatics  Replete electrolytes, maintain K greater than 4 mag greater than 2  Patient is euvolemic/dry, do not believe the patient to be in heart failure at this time  Continue current outpatient cardiac regimen for now while monitoring on telemetry      Case discussed with the overnight attending and is pending their agreement of the assessment and plan.     Thank you for involving us in the care of your patient.    PCP: Jorden Holt MD   Outpatient Cardiologist: Dr. Jon Dr.  Rhett    History of Present Illness   Physician Requesting Consult: Ralf Powell MD  Reason for Consult / Principal Problem: symptomatic tachycardia    HPI: Jessica Jaquez is a 79 y.o. year old female with a history of chronic HFpEF 60%, atrial fibrillation status post PHU DCCV on 3/8/2024 on Eliquis 5 mg twice daily for stroke prevention, status post A-fib ablation, pulmonary vein isolation with posterior wall isolation and loop implantation 7/9/2024, hypertension, hyperlipidemia, diabetes type 2, remote history of CVA.    Patient is presenting after experiencing multiple episodes of lightheadedness and shortness of breath associated with palpitations in her neck.  She had symptoms last night while sitting in her recliner with her legs propped up, and her symptoms were associated with a heart rate via pulse ox in the 180s.  Her symptoms lasted 4 minutes before self resolving but was followed by a heart rate in the 40s briefly before returning to normal rate.    Today, while at a restaurant, she began feeling lightheaded and diaphoretic.  She was walking at the time and had to sit down.  She felt similar a elevated heart rate with pulsing in her neck but did not have a pulse ox to measure her rate. The  called her daughter who picked her up and ultimately the patient was brought to the ED.    Of note, the patient had a's episode with similar symptoms early in August.  Her loop recorder did record a tachyarrhythmia of 200 that lasted 2 minutes and 38 seconds, which correlated with her pulse ox reading that she took at home during her symptoms.    Her cardiac medical regiment has been changed a few times over the past month.  Her metoprolol succinate was reduced to 25 mg daily on 8/14/2024 and her spironolactone was reduced to 12.5 mg daily.  Her metoprolol succinate was later increased to 25 mg twice daily on 8/29/2024 and metolazone 5 mg once weekly was added to her diuretic regimen of torsemide 40 mg  twice daily.  She reports taking her metolazone 2 days ago and reports that she had a urine output of roughly 6 L due to the metolazone.    On arrival to the ED patient is. afebrile,  heart rate in the 90s, BP 150s over 70s, satting 94% on room air.  WBC count of 12.8, hemoglobin 12.6, potassium of 3.7, creatinine of 1.63 which is improved from 1.73 one month ago. Anion gap of 17 with hypochloremia in the setting of diuretics and metolazone. Trop of 10, BNP 29.    EKG shows normal sinus with RBBB  Telemetry normal sinus rate 80s to 90s  Chest x-ray unremarkable on wet read.    I did interrogate the loop recorder, which showed no new arrhythmias aside from her episode in early August.                  Review of Systems  ROS as noted above.       Historical Information   Past Medical History:   Diagnosis Date    Arthritis     Colorectal polyps     Constipation     Diabetes mellitus (HCC)     Diverticulosis of colon     Hiatal hernia     Hypertension     Increased urinary frequency     Lumbar disc disease     Pressure injury of skin     SOB (shortness of breath)     Stress incontinence     Vitamin D deficiency 07/27/2018     Past Surgical History:   Procedure Laterality Date    BACK SURGERY      CARDIAC ELECTROPHYSIOLOGY PROCEDURE N/A 7/9/2024    Procedure: Cardiac eps/afib ablation PFA;  Surgeon: Jeyson Delaney MD;  [facility]: [facility];  Service: Cardiology    CARDIAC ELECTROPHYSIOLOGY PROCEDURE N/A 7/9/2024    Procedure: Cardiac loop recorder implant;  Surgeon: Jeyson Delaney MD;  [facility];  Service: Cardiology    CARPAL TUNNEL RELEASE Left     CATARACT EXTRACTION Bilateral     CHOLECYSTECTOMY      COLONOSCOPY W/ ENDOSCOPIC US N/A 2/24/2016    Procedure: ANAL ENDOSCOPIC U/S;  Surgeon: HOLLIS Washington MD;  Location: [facility];  Service:     HERNIA REPAIR      CT COLONOSCOPY FLX DX W/COLLJ SPEC WHEN PFRMD N/A 2/24/2016    Procedure: COLONOSCOPY;  Surgeon: HOLLIS Washington MD;   "Location: [facility];  Service: Colorectal    TONSILLECTOMY      TUBAL LIGATION       Social History     Substance and Sexual Activity   Alcohol Use Never     Social History     Substance and Sexual Activity   Drug Use No     Social History     Tobacco Use   Smoking Status Never   Smokeless Tobacco Never     Family History: non-contributory    Meds/Allergies   Hospital Medications:   Current Facility-Administered Medications   Medication Dose Route Frequency    acetaminophen (TYLENOL) tablet 650 mg  650 mg Oral Q6H PRN    apixaban (ELIQUIS) tablet 5 mg  5 mg Oral BID    bimatoprost (LUMIGAN) 0.03 % ophthalmic drops 1 drop  1 drop Ophthalmic Daily    ezetimibe (ZETIA) tablet 10 mg  10 mg Oral HS    gabapentin (NEURONTIN) capsule 100 mg  100 mg Oral TID    insulin lispro (HumALOG/ADMELOG) 100 units/mL subcutaneous injection 1-5 Units  1-5 Units Subcutaneous TID AC    insulin lispro (HumALOG/ADMELOG) 100 units/mL subcutaneous injection 1-5 Units  1-5 Units Subcutaneous HS    metoprolol succinate (TOPROL-XL) 24 hr tablet 25 mg  25 mg Oral BID    spironolactone (ALDACTONE) tablet 12.5 mg  12.5 mg Oral Daily    torsemide (DEMADEX) tablet 40 mg  40 mg Oral BID     Home Medications: Not in a hospital admission.    Allergies   Allergen Reactions    Other Cough and Sneezing     Seasonal allergies    Pollen Extract Allergic Rhinitis       Objective   Vitals: Blood pressure 157/72, pulse 93, temperature 97.7 °F (36.5 °C), temperature source Temporal, resp. rate 22, height 5' 3\" (1.6 m), weight 101 kg (222 lb 7.1 oz), SpO2 94%.  Orthostatic Blood Pressures      Flowsheet Row Most Recent Value   Blood Pressure 157/72 filed at 09/02/2024 1300   Patient Position - Orthostatic VS Sitting filed at 09/02/2024 1222              Invasive Devices       Peripheral Intravenous Line  Duration             Peripheral IV 09/02/24 Right Antecubital <1 day              Drain  Duration             External Urinary Catheter 54 days    External " Urinary Catheter 53 days                    Physical Exam  Constitutional:       General: She is not in acute distress.     Appearance: Normal appearance. She is obese. She is not ill-appearing or toxic-appearing.   HENT:      Head: Normocephalic and atraumatic.      Right Ear: External ear normal.      Left Ear: External ear normal.   Eyes:      Extraocular Movements: Extraocular movements intact.   Cardiovascular:      Rate and Rhythm: Normal rate and regular rhythm.      Pulses: Normal pulses.      Heart sounds: Normal heart sounds. No murmur heard.     No friction rub. No gallop.      Comments: Unable to assess JVD due to habitus  Pulmonary:      Effort: Pulmonary effort is normal. No respiratory distress.      Breath sounds: Normal breath sounds. No wheezing or rales.   Abdominal:      General: Abdomen is flat. Bowel sounds are normal. There is no distension.      Palpations: Abdomen is soft.      Tenderness: There is no abdominal tenderness. There is no guarding.   Musculoskeletal:         General: Swelling present. No tenderness, deformity or signs of injury.      Right lower leg: Edema present.      Left lower leg: Edema present.   Skin:     Coloration: Skin is not jaundiced.      Findings: No bruising, lesion or rash.   Neurological:      General: No focal deficit present.      Mental Status: She is alert and oriented to person, place, and time.         GEN: Jessica Jaquez appears well, alert and oriented x 3, pleasant and cooperative   HEENT:  Normocephalic, atraumatic, anicteric, moist mucous membranes  NECK: No JVD or carotid bruits   HEART: regular rhythm, regular rate, normal S1 and S2, no murmurs, clicks, gallops or rubs   LUNGS: Clear to auscultation bilaterally; no wheezes, rales, or rhonchi; respiration nonlabored   ABDOMEN:  Normoactive bowel sounds, soft, no tenderness, no distention  EXTREMITIES: peripheral pulses palpable; no edema  NEURO: no gross focal findings; cranial nerves grossly intact    SKIN:  Dry, intact, warm to touch    Lab Results: CBC with diff:   Results from last 7 days   Lab Units 09/02/24  1329   WBC Thousand/uL 12.84*   RBC Million/uL 4.41   HEMOGLOBIN g/dL 12.6   HEMATOCRIT % 39.3   MCV fL 89   MCH pg 28.6   MCHC g/dL 32.1   RDW % 14.3   MPV fL 9.2   PLATELETS Thousands/uL 363     CMP:   Results from last 7 days   Lab Units 09/02/24  1329   SODIUM mmol/L 137   CHLORIDE mmol/L 92*   CO2 mmol/L 28   BUN mg/dL 39*   CREATININE mg/dL 1.63*   CALCIUM mg/dL 10.0   AST U/L 19   ALT U/L 14   ALK PHOS U/L 82   EGFR ml/min/1.73sq m 29     HS Troponin:   0   Lab Value Date/Time    HSTNI0 10 09/02/2024 1329    HSTNI2 63 (H) 04/17/2024 1157    HSTNI4 99 (H) 04/17/2024 1514    HSTNI4 9 03/15/2024 1748     BNP:   Results from last 7 days   Lab Units 09/02/24  1329   POTASSIUM mmol/L 3.7   CHLORIDE mmol/L 92*   CO2 mmol/L 28   BUN mg/dL 39*   CREATININE mg/dL 1.63*   CALCIUM mg/dL 10.0   EGFR ml/min/1.73sq m 29     Coags:     TSH:     Magnesium:     Lipid Profile:         Results from last 7 days   Lab Units 09/02/24  1329   POTASSIUM mmol/L 3.7   CO2 mmol/L 28   CHLORIDE mmol/L 92*   BUN mg/dL 39*   CREATININE mg/dL 1.63*     Results from last 7 days   Lab Units 09/02/24  1329   HEMOGLOBIN g/dL 12.6   HEMATOCRIT % 39.3   PLATELETS Thousands/uL 363                 Imaging: I have personally reviewed pertinent films in PACS    Telemetry:   80s-90s NS    EKG:   Date: 9/2/24  Interpretation: NS, rate 98, RBBB      Previous STRESS TEST:  No results found for this or any previous visit.     No results found for this or any previous visit.    Results for orders placed during the hospital encounter of 04/17/24    NM Myocardial Perfusion Spect (Pharmacological Induced Stress and/or Rest)    Interpretation Summary    Stress ECG: A pharmacological stress test was performed using regadenoson. The patient after stress for 3 min and 0 sec and had a maximal HR of 104 bpm (73% of MPHR) and 1.0 METS. The patient  experienced non-limiting angina during the test. The patient reached the end of the protocol. The patient reported dyspnea, flushing, chest pain and lightheaded (atypical) during the stress test. Symptoms began during stress and ended during recovery. Blood pressure demonstrated a hypotensive response and heart rate demonstrated a blunted response to stress. The patient's heart rate recovery was normal.    Stress ECG: No ST deviation is noted. There were no arrhythmias during stress. There were no arrhythmias during recovery. . The ECG was not diagnostic due to pharmacological (vasodilator) stress. The ECG was uninterpretable due to right bundle branch block and left anterior fascicular block.    Stress Function: Left ventricular function post-stress is normal. Stress ejection fraction is 75%.    Perfusion: There are no perfusion defects.    Stress Combined Conclusion: Left ventricular perfusion is normal.    Normal pharmacologic stress myocardial perfusion study, with no diagnostic evidence of ischemia or infarction.      Previous Cath/PCI:  No results found for this or any previous visit.      ECHO:  Results for orders placed during the hospital encounter of 18    Echo complete with contrast if indicated    [place] [address]  [phone]    Transthoracic Echocardiogram  2D, M-mode, Doppler, and Color Doppler    Study date:  28-Dec-2018    Patient: HAN CARVAJAL  MR number: [id number]  Account number: [id number]  : 1945  Age: 73 years  Gender: Female  Status: Outpatient  Location: [address] [place] [address] [facility]  Height: 63 in  Weight: 232 lb  BP: 169/ 76 mmHg    Indications: Heart failure    Diagnoses: I50.9 - Heart failure, unspecified    Sonographer:  DANYELLE Ga  Interpreting Physician:  Kvng Aviles MD  Referring Physician:  Jorden Holt III, MD  Group:  [facility]  Cardiology  Fellow:  Von Fuentes MD    SUMMARY    PROCEDURE INFORMATION:  This was a technically difficult study.    LEFT VENTRICLE:  Systolic function was normal. Ejection fraction was estimated to be 60 %.  There were no regional wall motion abnormalities.  Doppler parameters were consistent with abnormal left ventricular relaxation (grade 1 diastolic dysfunction).    MITRAL VALVE:  There was trace regurgitation.    TRICUSPID VALVE:  There was trace regurgitation.  Pulmonary artery systolic pressure was within the normal range.    PULMONIC VALVE:  There was trace regurgitation.    HISTORY: PRIOR HISTORY: Hypertension, hyperlipidemia, diastolic dysfunction, type 2 diabetes, GERD    PROCEDURE: The study was performed in the [address] [place] [address] [facility]. This was a routine study. The transthoracic approach was used. The study included complete 2D imaging, M-mode, complete spectral Doppler, and color Doppler. This  was a technically difficult study.    LEFT VENTRICLE: Size was normal. Systolic function was normal. Ejection fraction was estimated to be 60 %. There were no regional wall motion abnormalities. Wall thickness was normal. DOPPLER: Doppler parameters were consistent with  abnormal left ventricular relaxation (grade 1 diastolic dysfunction).    RIGHT VENTRICLE: The size was normal. Systolic function was normal. Wall thickness was normal.    LEFT ATRIUM: Size was normal.    RIGHT ATRIUM: Size was normal.    MITRAL VALVE: Valve structure was normal. There was normal leaflet separation. DOPPLER: The transmitral velocity was within the normal range. There was no evidence for stenosis. There was trace regurgitation.    AORTIC VALVE: The valve was probably trileaflet. Leaflets exhibited normal cuspal separation. The valve was not well visualized. DOPPLER: Transaortic velocity was within the normal range. There was no evidence for stenosis. There was no  significant regurgitation.    TRICUSPID VALVE: The valve  structure was normal. There was normal leaflet separation. DOPPLER: The transtricuspid velocity was within the normal range. There was no evidence for stenosis. There was trace regurgitation. Pulmonary artery  systolic pressure was within the normal range. Estimated peak PA pressure was 30 mmHg.    PULMONIC VALVE: Not well visualized. DOPPLER: The transpulmonic velocity was within the normal range. There was trace regurgitation.    PERICARDIUM: There was no pericardial effusion. A pericardial fat pad was present. The pericardium was normal in appearance.    AORTA: The root exhibited normal size.    SYSTEMIC VEINS: IVC: The inferior vena cava was normal in size. Respirophasic changes were normal.    SYSTEM MEASUREMENT TABLES    2D  %FS: 31.39 %  Ao Diam: 3.29 cm  EDV(Teich): 100.54 ml  EF(Cube): 67.7 %  EF(Teich): 59.26 %  ESV(Cube): 32.77 ml  ESV(Teich): 40.96 ml  IVSd: 0.99 cm  LA Area: 16.53 cm2  LA Diam: 3.39 cm  LVEDV MOD A4C: 94.35 ml  LVEF MOD A4C: 68.85 %  LVESV MOD A4C: 29.39 ml  LVIDd: 4.66 cm  LVIDs: 3.2 cm  LVLd A4C: 7.19 cm  LVLs A4C: 5.3 cm  LVPWd: 1.08 cm  RA Area: 13.64 cm2  RV Diam.: 2.6 cm  SV MOD A4C: 64.96 ml  SV(Cube): 68.69 ml  SV(Teich): 59.58 ml    CW  TR Vmax: 2.6 m/s  TR maxP.99 mmHg    MM  TAPSE: 1.84 cm    PW  E': 0.06 m/s  E/E': 14.64  MV A Toan: 0.97 m/s  MV Dec Frontier: 3.14 m/s2  MV DecT: 267.31 ms  MV E Toan: 0.84 m/s  MV E/A Ratio: 0.86    [employer] Accredited Echocardiography Laboratory    Prepared and electronically signed by    Kvng Aviles MD  Signed 28-Dec-2018 11:05:05    No results found for this or any previous visit.      HOLTER  No results found for this or any previous visit.        VTE Prophylaxis: Eliquis  ---  Raul Miranda M.D.  Cardiology Fellow PGY4  [facility], [place]  2024 3:28 PM    ==========================================================================================    Counseling / Coordination of Care  Total  floor / unit time spent today 30 minutes minutes.  Greater than 50% of total time was spent with the patient and / or family counseling and / or coordination of care.  .            ** Please Note: Fluency DirectDictation voice to text software may have been used in the creation of this document. **

## 2024-09-02 NOTE — H&P
[facility]  H&P  Name: Jessica Jaquez 79 y.o. female I MRN: [id number]  Unit/Bed#: CRB I Date of Admission: 9/2/2024   Date of Service: 9/2/2024 I Hospital Day: 0      Assessment & Plan   * SVT (supraventricular tachycardia)  Assessment & Plan  79-year-old female with history of hypertension, CHF recently placed loop recorder who initially presented lightheadedness and diaphoresis approximately 2 hours prior to arrival  Loop recorder revealed SVT with heart rate approximate 200 approximate 1 week ago and had recurrent episode earlier today  Currently normal sinus rhythm  Will place on telemetry  Initial troponin negative  Cardiology consult  EP consult  Follow-up recommendations    CKD (chronic kidney disease)  Assessment & Plan  Baseline creatinine approximately 1.3-1.7  Currently at baseline  Continue to monitor    Atrial fibrillation (HCC)  Assessment & Plan  Currently rate controlled  Continue beta-blocker  Continue Eliquis    History of stroke  Assessment & Plan  Continue ezetimibe, Eliquis, aspirin    Chronic diastolic CHF (congestive heart failure) (HCC)  Assessment & Plan  Wt Readings from Last 3 Encounters:   09/02/24 101 kg (222 lb 7.1 oz)   08/29/24 104 kg (229 lb 1.6 oz)   08/14/24 105 kg (230 lb 8 oz)     Currently euvolemic  Continue beta-blocker  Continue torsemide          Essential hypertension  Assessment & Plan  BP remains controlled  Continue beta-blocker, Aldactone, torsemide    Type 2 diabetes mellitus with obesity  (HCC)  Assessment & Plan    Lab Results   Component Value Date    HGBA1C 6.9 (H) 01/22/2024   Hold home p.o. agents  Sliding-scale insulin  Monitor blood glucose           VTE Prophylaxis: Apixaban (Eliquis)  / sequential compression device   Code Status: full code  POLST: There is no POLST form on file for this patient (pre-hospital)  Discussion with family: pt    Anticipated Length of Stay:  Patient will be admitted on an Emergency basis  with an anticipated length of stay of  > 2 midnights.   Justification for Hospital Stay: SVT    Total Time for Visit, including Counseling / Coordination of Care: 60 minutes.  Greater than 50% of this total time spent on direct patient counseling and coordination of care.    Chief Complaint:   Lightheadedness    History of Present Illness:    Jessica Jaquez is a 79 y.o. female with past medical history significant A-fib on Eliquis, hypertension, type 2 diabetes, CHF initially presented lightheadedness.  Patient reports approximate 2 hours prior to arrival began having lightheadedness.  Upon arrival symptoms have since improved.  She otherwise denies any acute complaints.  Denies chest pain, shortness of breath, fevers, chills, abdominal pain, nausea, vomiting, diarrhea or any other complaints  Review of Systems:    Review of Systems   Constitutional:  Positive for diaphoresis. Negative for activity change, appetite change, chills, fever and unexpected weight change.   HENT:  Negative for congestion, facial swelling and rhinorrhea.    Eyes:  Negative for photophobia and visual disturbance.   Respiratory:  Negative for cough, shortness of breath and wheezing.    Cardiovascular:  Negative for chest pain and palpitations.   Gastrointestinal:  Negative for abdominal pain, blood in stool, constipation, diarrhea, nausea and vomiting.   Genitourinary:  Negative for decreased urine volume, difficulty urinating, dysuria, flank pain, frequency, hematuria and urgency.   Musculoskeletal:  Negative for arthralgias, back pain, joint swelling and myalgias.   Neurological:  Positive for light-headedness. Negative for dizziness, syncope, facial asymmetry, numbness and headaches.   Psychiatric/Behavioral:  Negative for confusion and decreased concentration. The patient is not nervous/anxious.        Past Medical and Surgical History:     Past Medical History:   Diagnosis Date    Arthritis     Colorectal polyps     Constipation      Diabetes mellitus (HCC)     Diverticulosis of colon     Hiatal hernia     Hypertension     Increased urinary frequency     Lumbar disc disease     Pressure injury of skin     SOB (shortness of breath)     Stress incontinence     Vitamin D deficiency 07/27/2018       Past Surgical History:   Procedure Laterality Date    BACK SURGERY      CARDIAC ELECTROPHYSIOLOGY PROCEDURE N/A 7/9/2024    Procedure: Cardiac eps/afib ablation PFA;  Surgeon: Jeyson Delaney MD;  [facility];  Service: Cardiology    CARDIAC ELECTROPHYSIOLOGY PROCEDURE N/A 7/9/2024    Procedure: Cardiac loop recorder implant;  Surgeon: Jeyson Delaney MD;  [facility];  Service: Cardiology    CARPAL TUNNEL RELEASE Left     CATARACT EXTRACTION Bilateral     CHOLECYSTECTOMY      COLONOSCOPY W/ ENDOSCOPIC US N/A 2/24/2016    Procedure: ANAL ENDOSCOPIC U/S;  Surgeon: HOLLIS Washington MD;  Location: [facility];  Service:     HERNIA REPAIR      DE COLONOSCOPY FLX DX W/COLLJ SPEC WHEN PFRMD N/A 2/24/2016    Procedure: COLONOSCOPY;  Surgeon: HOLLIS Washington MD;  Location: [facility];  Service: Colorectal    TONSILLECTOMY      TUBAL LIGATION         Meds/Allergies:    Prior to Admission medications    Medication Sig Start Date End Date Taking? Authorizing Provider   apixaban (ELIQUIS) 5 mg Take 1 tablet (5 mg total) by mouth 2 (two) times a day 7/8/24   VIJI Díaz   bimatoprost (Lumigan) 0.01 % ophthalmic drops Administer 1 drop to both eyes daily at bedtime    Historical Provider, MD   cephalexin (KEFLEX) 250 mg capsule  7/24/24   Historical Provider, MD   Cholecalciferol (VITAMIN D3) 1000 units CAPS Take by mouth daily      Historical Provider, MD   ezetimibe (ZETIA) 10 mg tablet Take 1 tablet (10 mg total) by mouth daily at bedtime 4/12/24   VIJI Díaz   gabapentin (NEURONTIN) 100 mg capsule Take 1 capsule (100 mg total) by mouth 3 (three) times a day 3/11/24   Natalie Dudley MD   glimepiride  (AMARYL) 1 mg tablet Take 1 mg by mouth every morning before breakfast    Historical Provider, MD   hydrocortisone 2.5 % cream as needed 6/11/18   Historical Provider, MD   ketoconazole (NIZORAL) 2 % cream as needed 6/11/18   Historical Provider, MD   metolazone (ZAROXOLYN) 5 mg tablet TAKE 1 TABLET BY MOUTH DAILY TAKE 1 TABLET DAILY AS NEEDED. NOT TO EXCEED 3 TABLETS PER WEEK. 6/10/24   Himanshu Webb MD   metoprolol succinate (TOPROL-XL) 25 mg 24 hr tablet Take 1 tablet (25 mg total) by mouth 2 (two) times a day 8/29/24   Himanshu Webb MD   Multiple Vitamins-Minerals (CENTRUM SILVER PO) multivitamin    Historical Provider, MD   potassium chloride (Klor-Con M20) 20 mEq tablet Take 1 tablet (20 mEq total) by mouth 3 (three) times a day 8/19/24   Jeyson Delaney MD   spironolactone (ALDACTONE) 25 mg tablet Take 0.5 tablets (12.5 mg total) by mouth daily 8/14/24   Jeyson Delaney MD   torsemide (DEMADEX) 20 mg tablet Take 2 tablets (40 mg total) by mouth 2 (two) times a day 7/26/24   Himanshu Webb MD     I have reviewed home medications with patient personally.    Allergies:   Allergies   Allergen Reactions    Other Cough and Sneezing     Seasonal allergies    Pollen Extract Allergic Rhinitis       Social History:     Marital Status: /Civil Union     Patient Pre-hospital Living Situation: home  Patient Pre-hospital Level of Mobility: independent  Patient Pre-hospital Diet Restrictions: cardiac, dm  Substance Use History:   Social History     Substance and Sexual Activity   Alcohol Use Never     Social History     Tobacco Use   Smoking Status Never   Smokeless Tobacco Never     Social History     Substance and Sexual Activity   Drug Use No       Family History:    Family History   Problem Relation Age of Onset    Hypertension Mother     Heart attack Mother     Diabetes Mother     Prostate cancer Father     No Known Problems Brother     Stroke Maternal Grandmother     Stroke Paternal  "Grandmother     Stroke Paternal Grandfather     Thyroid cancer Neg Hx        Physical Exam:     Vitals:   Blood Pressure: 157/72 (09/02/24 1300)  Pulse: 93 (09/02/24 1300)  Temperature: 97.7 °F (36.5 °C) (09/02/24 1222)  Temp Source: Temporal (09/02/24 1222)  Respirations: 22 (09/02/24 1300)  Height: 5' 3\" (160 cm) (09/02/24 1222)  Weight - Scale: 101 kg (222 lb 7.1 oz) (09/02/24 1300)  SpO2: 94 % (09/02/24 1300)    Physical Exam  Constitutional:       General: She is not in acute distress.     Appearance: She is well-developed. She is not diaphoretic.   HENT:      Head: Normocephalic and atraumatic.      Nose: Nose normal.      Mouth/Throat:      Mouth: Oropharynx is clear and moist.      Pharynx: No oropharyngeal exudate.   Eyes:      General: No scleral icterus.        Right eye: No discharge.         Left eye: No discharge.      Extraocular Movements: EOM normal.      Conjunctiva/sclera: Conjunctivae normal.   Neck:      Thyroid: No thyromegaly.      Vascular: No JVD.   Cardiovascular:      Rate and Rhythm: Normal rate and regular rhythm.      Heart sounds: Normal heart sounds. No murmur heard.     No friction rub. No gallop.   Pulmonary:      Effort: Pulmonary effort is normal. No respiratory distress.      Breath sounds: Normal breath sounds. No wheezing or rales.   Chest:      Chest wall: No tenderness.   Abdominal:      General: Bowel sounds are normal. There is no distension.      Palpations: Abdomen is soft.      Tenderness: There is no abdominal tenderness. There is no guarding or rebound.   Musculoskeletal:         General: No tenderness, deformity or edema. Normal range of motion.      Cervical back: Normal range of motion and neck supple.   Skin:     General: Skin is warm and dry.      Findings: No erythema or rash.   Neurological:      Mental Status: She is alert.      Cranial Nerves: No cranial nerve deficit.      Sensory: No sensory deficit.      Motor: No abnormal muscle tone.      Coordination: " Coordination normal.   Psychiatric:         Mood and Affect: Mood and affect normal.         (     Additional Data:     Lab Results: I have personally reviewed pertinent reports.      Results from last 7 days   Lab Units 09/02/24  1329   WBC Thousand/uL 12.84*   HEMOGLOBIN g/dL 12.6   HEMATOCRIT % 39.3   PLATELETS Thousands/uL 363   SEGS PCT % 81*   LYMPHO PCT % 12*   MONO PCT % 4   EOS PCT % 1     Results from last 7 days   Lab Units 09/02/24  1329   SODIUM mmol/L 137   POTASSIUM mmol/L 3.7   CHLORIDE mmol/L 92*   CO2 mmol/L 28   BUN mg/dL 39*   CREATININE mg/dL 1.63*   ANION GAP mmol/L 17*   CALCIUM mg/dL 10.0   ALBUMIN g/dL 4.1   TOTAL BILIRUBIN mg/dL 0.63   ALK PHOS U/L 82   ALT U/L 14   AST U/L 19   GLUCOSE RANDOM mg/dL 281*                       Imaging: I have personally reviewed pertinent reports.      XR chest 2 views    (Results Pending)       EKG, Pathology, and Other Studies Reviewed on Admission:   EKG: reviewed    [facility] Yes     ** Please Note: This note has been constructed using a voice recognition system. **

## 2024-09-02 NOTE — ASSESSMENT & PLAN NOTE
79-year-old female with history of hypertension, CHF recently placed loop recorder who initially presented lightheadedness and diaphoresis approximately 2 hours prior to arrival  Loop recorder revealed SVT with heart rate approximate 200 approximate 1 week ago and had recurrent episode earlier today  Currently normal sinus rhythm  Will place on telemetry  Initial troponin negative  Cardiology consult  EP consult  Follow-up recommendations

## 2024-09-03 ENCOUNTER — TELEPHONE (OUTPATIENT)
Age: 79
End: 2024-09-03

## 2024-09-03 VITALS
HEIGHT: 63 IN | SYSTOLIC BLOOD PRESSURE: 127 MMHG | TEMPERATURE: 97.9 F | DIASTOLIC BLOOD PRESSURE: 77 MMHG | WEIGHT: 212.96 LBS | RESPIRATION RATE: 17 BRPM | HEART RATE: 80 BPM | OXYGEN SATURATION: 95 % | BODY MASS INDEX: 37.73 KG/M2

## 2024-09-03 LAB
ANION GAP SERPL CALCULATED.3IONS-SCNC: 15 MMOL/L (ref 4–13)
BASOPHILS # BLD AUTO: 0.05 THOUSANDS/ÂΜL (ref 0–0.1)
BASOPHILS NFR BLD AUTO: 0 % (ref 0–1)
BUN SERPL-MCNC: 43 MG/DL (ref 5–25)
CALCIUM SERPL-MCNC: 9.5 MG/DL (ref 8.4–10.2)
CHLORIDE SERPL-SCNC: 93 MMOL/L (ref 96–108)
CO2 SERPL-SCNC: 28 MMOL/L (ref 21–32)
CREAT SERPL-MCNC: 1.37 MG/DL (ref 0.6–1.3)
EOSINOPHIL # BLD AUTO: 0.28 THOUSAND/ÂΜL (ref 0–0.61)
EOSINOPHIL NFR BLD AUTO: 2 % (ref 0–6)
ERYTHROCYTE [DISTWIDTH] IN BLOOD BY AUTOMATED COUNT: 14.6 % (ref 11.6–15.1)
GFR SERPL CREATININE-BSD FRML MDRD: 36 ML/MIN/1.73SQ M
GLUCOSE SERPL-MCNC: 171 MG/DL (ref 65–140)
GLUCOSE SERPL-MCNC: 182 MG/DL (ref 65–140)
HCT VFR BLD AUTO: 37.1 % (ref 34.8–46.1)
HGB BLD-MCNC: 11.8 G/DL (ref 11.5–15.4)
IMM GRANULOCYTES # BLD AUTO: 0.06 THOUSAND/UL (ref 0–0.2)
IMM GRANULOCYTES NFR BLD AUTO: 1 % (ref 0–2)
LYMPHOCYTES # BLD AUTO: 2.32 THOUSANDS/ÂΜL (ref 0.6–4.47)
LYMPHOCYTES NFR BLD AUTO: 20 % (ref 14–44)
MAGNESIUM SERPL-MCNC: 2.1 MG/DL (ref 1.9–2.7)
MCH RBC QN AUTO: 28.4 PG (ref 26.8–34.3)
MCHC RBC AUTO-ENTMCNC: 31.8 G/DL (ref 31.4–37.4)
MCV RBC AUTO: 89 FL (ref 82–98)
MONOCYTES # BLD AUTO: 0.79 THOUSAND/ÂΜL (ref 0.17–1.22)
MONOCYTES NFR BLD AUTO: 7 % (ref 4–12)
NEUTROPHILS # BLD AUTO: 8.13 THOUSANDS/ÂΜL (ref 1.85–7.62)
NEUTS SEG NFR BLD AUTO: 70 % (ref 43–75)
NRBC BLD AUTO-RTO: 0 /100 WBCS
PLATELET # BLD AUTO: 329 THOUSANDS/UL (ref 149–390)
PMV BLD AUTO: 9.1 FL (ref 8.9–12.7)
POTASSIUM SERPL-SCNC: 3 MMOL/L (ref 3.5–5.3)
RBC # BLD AUTO: 4.16 MILLION/UL (ref 3.81–5.12)
SODIUM SERPL-SCNC: 136 MMOL/L (ref 135–147)
WBC # BLD AUTO: 11.63 THOUSAND/UL (ref 4.31–10.16)

## 2024-09-03 PROCEDURE — 83735 ASSAY OF MAGNESIUM: CPT | Performed by: INTERNAL MEDICINE

## 2024-09-03 PROCEDURE — 85025 COMPLETE CBC W/AUTO DIFF WBC: CPT | Performed by: INTERNAL MEDICINE

## 2024-09-03 PROCEDURE — 99239 HOSP IP/OBS DSCHRG MGMT >30: CPT | Performed by: FAMILY MEDICINE

## 2024-09-03 PROCEDURE — 80048 BASIC METABOLIC PNL TOTAL CA: CPT | Performed by: INTERNAL MEDICINE

## 2024-09-03 PROCEDURE — 82948 REAGENT STRIP/BLOOD GLUCOSE: CPT

## 2024-09-03 RX ORDER — TORSEMIDE 20 MG/1
20 TABLET ORAL 2 TIMES DAILY
Status: DISCONTINUED | OUTPATIENT
Start: 2024-09-03 | End: 2024-09-03 | Stop reason: HOSPADM

## 2024-09-03 RX ORDER — POTASSIUM CHLORIDE 1500 MG/1
40 TABLET, EXTENDED RELEASE ORAL 2 TIMES DAILY
Status: DISCONTINUED | OUTPATIENT
Start: 2024-09-03 | End: 2024-09-03 | Stop reason: HOSPADM

## 2024-09-03 RX ADMIN — SPIRONOLACTONE 12.5 MG: 25 TABLET, FILM COATED ORAL at 09:00

## 2024-09-03 RX ADMIN — APIXABAN 2.5 MG: 2.5 TABLET, FILM COATED ORAL at 09:01

## 2024-09-03 RX ADMIN — TORSEMIDE 40 MG: 20 TABLET ORAL at 09:00

## 2024-09-03 RX ADMIN — METOPROLOL SUCCINATE 25 MG: 25 TABLET, EXTENDED RELEASE ORAL at 09:06

## 2024-09-03 RX ADMIN — POTASSIUM CHLORIDE 40 MEQ: 1500 TABLET, EXTENDED RELEASE ORAL at 09:01

## 2024-09-03 RX ADMIN — GABAPENTIN 100 MG: 100 CAPSULE ORAL at 09:01

## 2024-09-03 NOTE — DISCHARGE INSTR - AVS FIRST PAGE
Follow-up with the cardiology/electrophysiology team.  BMP in a week.  Arrange through your primary care physician  Check your daily.  If the weight increases more than 3 to 5 pounds above baseline or lower extremity edema or shortness of breath please contact your cardiology office

## 2024-09-03 NOTE — PLAN OF CARE
Problem: PAIN - ADULT  Goal: Verbalizes/displays adequate comfort level or baseline comfort level  Description: Interventions:  - Encourage patient to monitor pain and request assistance  - Assess pain using appropriate pain scale  - Administer analgesics based on type and severity of pain and evaluate response  - Implement non-pharmacological measures as appropriate and evaluate response  - Consider cultural and social influences on pain and pain management  - Notify physician/advanced practitioner if interventions unsuccessful or patient reports new pain  Outcome: Progressing     Problem: INFECTION - ADULT  Goal: Absence or prevention of progression during hospitalization  Description: INTERVENTIONS:  - Assess and monitor for signs and symptoms of infection  - Monitor lab/diagnostic results  - Monitor all insertion sites, i.e. indwelling lines, tubes, and drains  - Monitor endotracheal if appropriate and nasal secretions for changes in amount and color  - [place] appropriate cooling/warming therapies per order  - Administer medications as ordered  - Instruct and encourage patient and family to use good hand hygiene technique  - Identify and instruct in appropriate isolation precautions for identified infection/condition  Outcome: Progressing     Problem: SAFETY ADULT  Goal: Patient will remain free of falls  Description: INTERVENTIONS:  - Educate patient/family on patient safety including physical limitations  - Instruct patient to call for assistance with activity   - Consult OT/PT to assist with strengthening/mobility   - Keep Call bell within reach  - Keep bed low and locked with side rails adjusted as appropriate  - Keep care items and personal belongings within reach  - Initiate and maintain comfort rounds  - Make Fall Risk Sign visible to staff  - Offer Toileting every 2 Hours, in advance of need  - Initiate/Maintain bed/chairalarm  - Obtain necessary fall risk management equipment: walker,  slippers  Problem: DISCHARGE PLANNING  Goal: Discharge to home or other facility with appropriate resources  Description: INTERVENTIONS:  - Identify barriers to discharge w/patient and caregiver  - Arrange for needed discharge resources and transportation as appropriate  - Identify discharge learning needs (meds, wound care, etc.)  - Arrange for interpretive services to assist at discharge as needed  - Refer to Case Management Department for coordinating discharge planning if the patient needs post-hospital services based on physician/advanced practitioner order or complex needs related to functional status, cognitive ability, or social support system  Outcome: Progressing     Problem: Knowledge Deficit  Goal: Patient/family/caregiver demonstrates understanding of disease process, treatment plan, medications, and discharge instructions  Description: Complete learning assessment and assess knowledge base.  Interventions:  - Provide teaching at level of understanding  - Provide teaching via preferred learning methods  Outcome: Progressing     - Apply yellow socks and bracelet for high fall risk patients  - Consider moving patient to room near nurses station  Outcome: Progressing

## 2024-09-03 NOTE — ASSESSMENT & PLAN NOTE
Wt Readings from Last 3 Encounters:   09/03/24 96.6 kg (212 lb 15.4 oz)   08/29/24 104 kg (229 lb 1.6 oz)   08/14/24 105 kg (230 lb 8 oz)     Currently euvolemic  Continue beta-blocker  Continue torsemide-as per outpatient progress note patient is supposed to be on torsemide 40 mg twice daily.  Outpatient follow-up with cardiology.  BMP in a week

## 2024-09-03 NOTE — UTILIZATION REVIEW
Initial Clinical Review    Admission: Date/Time/Statement:   Admission Orders (From admission, onward)       Ordered        09/02/24 1434  Place in Observation  Once                          Orders Placed This Encounter   Procedures    Place in Observation     Standing Status:   Standing     Number of Occurrences:   1     Order Specific Question:   Level of Care     Answer:   Med Surg [16]     ED Arrival Information       Expected   -    Arrival   9/2/2024 12:18    Acuity   Urgent              Means of arrival   Walk-In    Escorted by   Self    Service   Hospitalist    Admission type   Emergency              Arrival complaint   heart palpitations             Chief Complaint   Patient presents with    Irregular Heart Beat     Per pt she got a loop recorder placed about a month ago, since 0300 this morning she states heart rate has been all over the place either very high or low       Initial Presentation: 79 y.o. female with past medical history significant A-fib on Eliquis, hypertension, type 2 diabetes, CHF initially presented lightheadedness.  Patient reports approximate 2 hours prior to arrival began having lightheadedness.  Upon arrival symptoms have since improved.  She otherwise denies any acute complaints.  Denies chest pain, shortness of breath, fevers, chills, abdominal pain, nausea, vomiting, diarrhea or any other complaints    ADMIT OBSERVATION STATUS    Anticipated Length of Stay/Certification Statement:      Date:     Day 2: Date:    Day 3: Has surpassed a 2nd midnight with active treatments and services.        ED Triage Vitals [09/02/24 1222]   Temperature Pulse Respirations Blood Pressure SpO2 Pain Score   97.7 °F (36.5 °C) 90 20 156/87 94 % No Pain     Weight (last 2 days)       Date/Time Weight    09/03/24 0543 96.6 (212.96)    09/03/24 0500 96.6 (212.96)    09/02/24 2047 101 (222.44)    09/02/24 1300 101 (222.44)    09/02/24 1222 101 (223)            Vital Signs (last 3 days)       Date/Time Temp  Pulse Resp BP MAP (mmHg) SpO2 O2 Device Patient Position - Orthostatic VS Pain    09/03/24 07:11:41 97.9 °F (36.6 °C) 80 -- 127/77 94 95 % -- -- --    09/03/24 03:35:37 -- 98 17 152/82 105 94 % -- -- --    09/02/24 23:01:06 98.3 °F (36.8 °C) 83 19 131/65 87 94 % -- -- --    09/02/24 2047 97.1 °F (36.2 °C) 89 18 136/69 -- -- -- -- No Pain    09/02/24 20:42:05 97.1 °F (36.2 °C) 89 18 136/69 91 94 % -- -- --    09/02/24 2000 -- -- -- -- -- -- None (Room air) -- No Pain    09/02/24 1757 -- 110 20 127/60 86 96 % None (Room air) Standing - Orthostatic VS --    09/02/24 1756 -- 101 16 134/55 88 96 % None (Room air) Sitting - Orthostatic VS --    09/02/24 1754 -- 85 20 147/67 96 94 % None (Room air) Lying - Orthostatic VS --    09/02/24 1300 -- 93 22 157/72 103 94 % -- -- --    09/02/24 1259 -- -- -- -- -- -- None (Room air) -- --    09/02/24 1222 97.7 °F (36.5 °C) 90 20 156/87 110 94 % None (Room air) Sitting No Pain              Pertinent Labs/Diagnostic Test Results:   Radiology:  XR chest 2 views    (Results Pending)     Cardiology:  No orders to display     GI:  No orders to display           Results from last 7 days   Lab Units 09/03/24  0454 09/02/24  1329   WBC Thousand/uL 11.63* 12.84*   HEMOGLOBIN g/dL 11.8 12.6   HEMATOCRIT % 37.1 39.3   PLATELETS Thousands/uL 329 363   TOTAL NEUT ABS Thousands/µL 8.13* 10.50*         Results from last 7 days   Lab Units 09/03/24  0454 09/02/24  1329   SODIUM mmol/L 136 137   POTASSIUM mmol/L 3.0* 3.7   CHLORIDE mmol/L 93* 92*   CO2 mmol/L 28 28   ANION GAP mmol/L 15* 17*   BUN mg/dL 43* 39*   CREATININE mg/dL 1.37* 1.63*   EGFR ml/min/1.73sq m 36 29   CALCIUM mg/dL 9.5 10.0   MAGNESIUM mg/dL 2.1  --      Results from last 7 days   Lab Units 09/02/24  1329   AST U/L 19   ALT U/L 14   ALK PHOS U/L 82   TOTAL PROTEIN g/dL 7.4   ALBUMIN g/dL 4.1   TOTAL BILIRUBIN mg/dL 0.63     Results from last 7 days   Lab Units 09/03/24  0717 09/02/24  2029 09/02/24  1614   POC GLUCOSE mg/dl 182*  "146* 172*     Results from last 7 days   Lab Units 09/03/24  0454 09/02/24  1329   GLUCOSE RANDOM mg/dL 171* 281*             No results found for: \"BETA-HYDROXYBUTYRATE\"                   Results from last 7 days   Lab Units 09/02/24  1546 09/02/24  1329   HS TNI 0HR ng/L  --  10   HS TNI 2HR ng/L 11  --    HSTNI D2 ng/L 1  --                                  Results from last 7 days   Lab Units 09/02/24  1329   BNP pg/mL 29                                                                                       ED Treatment-Medication Administration from 09/02/2024 1218 to 09/02/2024 1934         Date/Time Order Dose Route Action     09/02/2024 1629 gabapentin (NEURONTIN) capsule 100 mg 100 mg Oral Given     09/02/2024 1629 insulin lispro (HumALOG/ADMELOG) 100 units/mL subcutaneous injection 1-5 Units 1 Units Subcutaneous Given     09/02/2024 1751 apixaban (ELIQUIS) tablet 2.5 mg 2.5 mg Oral Given            Past Medical History:   Diagnosis Date    Arthritis     Colorectal polyps     Constipation     Diabetes mellitus ([facility])     Diverticulosis of colon     Hiatal hernia     Hypertension     Increased urinary frequency     Lumbar disc disease     Pressure injury of skin     SOB (shortness of breath)     Stress incontinence     Vitamin D deficiency 07/27/2018     Present on Admission:   SVT (supraventricular tachycardia)   Atrial fibrillation ([facility])   Type 2 diabetes mellitus with obesity  ([facility])   Essential hypertension   Chronic diastolic CHF (congestive heart failure) ([facility])      Admitting Diagnosis: Atrial fibrillation ([facility]) [I48.91]  Palpitations [R00.2]  Diaphoresis [R61]  SVT (supraventricular tachycardia) [I47.10]  Irregular heart beat [I49.9]  Age/Sex: 79 y.o. female  Admission Orders:  Scheduled Medications:  apixaban, 2.5 mg, Oral, BID  bimatoprost, 1 drop, Ophthalmic, Daily  ezetimibe, 10 mg, Oral, HS  gabapentin, 100 mg, Oral, TID  insulin lispro, 1-5 Units, Subcutaneous, TID AC  insulin lispro, 1-5 Units, " Subcutaneous, HS  metoprolol succinate, 25 mg, Oral, BID  spironolactone, 12.5 mg, Oral, Daily  torsemide, 40 mg, Oral, BID      Continuous IV Infusions:     PRN Meds:  acetaminophen, 650 mg, Oral, Q6H PRN        IP CONSULT TO CARDIOLOGY  IP CONSULT TO ELECTROPHYSIOLOGY    Network Utilization Review Department  ATTENTION: Please call with any questions or concerns to [phone] and carefully listen to the prompts so that you are directed to the right person. All voicemails are confidential.   For Discharge needs, contact Care Management DC Support Team at [phone] opt. [phone]  Send all requests for admission clinical reviews, approved or denied determinations and any other requests to dedicated fax number below belonging to the campus where the patient is receiving treatment. List of dedicated fax numbers for the Facilities:  FACILITY NAME UR FAX NUMBER   ADMISSION DENIALS (Administrative/Medical Necessity) [phone]   DISCHARGE SUPPORT TEAM (NETWORK) [phone]   PARENT CHILD HEALTH (Maternity/NICU/Pediatrics) [phone]   [facility] [phone]   [facility] [phone]   [facility] [phone]   [facility] [phone]   [facility] [phone]   [facility] [phone]   [facility] [phone]   [facility] [phone]   [facility] [phone]   [facility] [phone]   [facility] [phone]   [facility] [phone]

## 2024-09-03 NOTE — ASSESSMENT & PLAN NOTE
Lab Results   Component Value Date    HGBA1C 6.9 (H) 01/22/2024   Restart back on the oral agents at the time of discharge

## 2024-09-03 NOTE — DISCHARGE SUMMARY
[facility]  Discharge- Jessica Jaquez 1945, 79 y.o. female MRN: [id number]  Unit/Bed#: CW2 211-01 Encounter: [id number]  Primary Care Provider: Jorden Holt MD   Date and time admitted to hospital: 9/2/2024 12:41 PM    * SVT (supraventricular tachycardia)  Assessment & Plan  79-year-old female with history of hypertension, CHF recently placed loop recorder who initially presented lightheadedness and diaphoresis approximately 2 hours prior to arrival  Loop recorder revealed SVT with heart rate approximate 200 approximate 1 week ago  Patient had recurrence of tachycardia lasting for about a minute or 2 concerning for recurrent SVT  Currently normal sinus rhythm  Monitored overnight in the hospital on telemetry.  Remained in sinus rhythm.  Cleared by electrophysiology for discharge.  Did extra dose of metoprolol with SVT  Outpatient follow-up with electrophysiology    CKD (chronic kidney disease)  Assessment & Plan  Baseline creatinine approximately 1.3-1.7  Currently at baseline  BMP in a week    Atrial fibrillation (HCC)  Assessment & Plan  Currently rate controlled  Continue beta-blocker  Continue Eliquis    History of stroke  Assessment & Plan  Continue ezetimibe, Eliquis, aspirin    Chronic diastolic CHF (congestive heart failure) (HCC)  Assessment & Plan  Wt Readings from Last 3 Encounters:   09/03/24 96.6 kg (212 lb 15.4 oz)   08/29/24 104 kg (229 lb 1.6 oz)   08/14/24 105 kg (230 lb 8 oz)     Currently euvolemic  Continue beta-blocker  Continue torsemide-as per outpatient progress note patient is supposed to be on torsemide 40 mg twice daily.  Outpatient follow-up with cardiology.  BMP in a week          Essential hypertension  Assessment & Plan  BP remains controlled  Continue beta-blocker, Aldactone, torsemide    Type 2 diabetes mellitus with obesity  (HCC)  Assessment & Plan    Lab Results   Component Value Date    HGBA1C 6.9 (H) 01/22/2024   Restart back on the  oral agents at the time of discharge          Medical Problems       Resolved Problems  Date Reviewed: 9/3/2024   None       Discharging Physician / Practitioner: Kate Phillips MD  PCP: Jorden Holt MD  Admission Date:   Admission Orders (From admission, onward)       Ordered        09/02/24 1434  Place in Observation  Once                          Discharge Date: 09/03/24    Consultations During Hospital Stay:  Cardiology-electrophysiology     Procedures Performed:       Significant Findings / Test Results:   Chest x-ray-no cardiopulmonary disease    Incidental Findings:          Test Results Pending at Discharge (will require follow up):        Outpatient Tests Requested:   Bmp in a week     Complications:   none    Reason for Admission: Tachycardia    Hospital Course:   Jessica Jaquez is a 79 y.o. female patient who originally presented to the hospital on 9/2/2024 due to tachycardia concerning for SVT.  This is an 79-year-old female with history of heart failure with preserved ejection fraction, atrial fibrillation status post [facility] DCCV, A-fib ablation and pulmonary vein isolation on Eliquis with loop recorder implantation on 7/9/2024 who presented with multiple episodes of lightheadedness and diaphoresis with associated tachycardia.  Patient reported rates up to 200.  Patient was admitted to the hospital.  Electrophysiology evaluated the patient-patient was given option for possible ablation or antiarrhythmic therapy.  Patient preferred to stay on metoprolol.  Does not want to consider repeat ablation at this time.  As per cardiology she can take an extra dose of metoprolol if she goes into SVT again.  Patient was cleared by cardiology for discharge.  Hemodynamically remained stable.  Patient will be discharged home with outpatient follow-up with electrophysiology.  Patient reported that she had already has a scheduled appointment with Dr. Rivas as outpatient.  For details refer to the chart  "            Please see above list of diagnoses and related plan for additional information.     Condition at Discharge: good    Discharge Day Visit / Exam:   Subjective: Patient seen and examined.  No specific complaints offered.  Eager to go home  Vitals: Blood Pressure: 127/77 (09/03/24 0711)  Pulse: 80 (09/03/24 0711)  Temperature: 97.9 °F (36.6 °C) (09/03/24 0711)  Temp Source: Oral (09/02/24 2047)  Respirations: 17 (09/03/24 0335)  Height: 5' 3\" (160 cm) (09/02/24 2047)  Weight - Scale: 96.6 kg (212 lb 15.4 oz) (09/03/24 0543)  SpO2: 95 % (09/03/24 0711)  Exam:   Physical Exam  Constitutional:       General: She is not in acute distress.     Appearance: She is obese. She is not ill-appearing.   HENT:      Head: Normocephalic.      Nose: Nose normal.   Eyes:      General: No scleral icterus.  Cardiovascular:      Rate and Rhythm: Normal rate and regular rhythm.   Pulmonary:      Effort: Pulmonary effort is normal.   Abdominal:      General: There is no distension.      Tenderness: There is no abdominal tenderness.   Musculoskeletal:      Right lower leg: No edema.      Left lower leg: No edema.   Skin:     General: Skin is warm.      Coloration: Skin is not jaundiced.   Neurological:      Mental Status: She is alert. Mental status is at baseline.          Discussion with Family: Updated  (daughter in law) at bedside.    Discharge instructions/Information to patient and family:   See after visit summary for information provided to patient and family.      Provisions for Follow-Up Care:  See after visit summary for information related to follow-up care and any pertinent home health orders.      Mobility at time of Discharge:   Basic Mobility Inpatient Raw Score: 14  JH-HLM Goal: 4: Move to chair/commode  JH-HLM Achieved: 4: Move to chair/commode  HLM Goal achieved. Continue to encourage appropriate mobility.     Disposition:   Home    Planned Readmission:  none     Discharge Statement:  I spent 45 " minutes discharging the patient. This time was spent on the day of discharge. I had direct contact with the patient on the day of discharge. Greater than 50% of the total time was spent examining patient, answering all patient questions, arranging and discussing plan of care with patient as well as directly providing post-discharge instructions.  Additional time then spent on discharge activities.    Discharge Medications:  See after visit summary for reconciled discharge medications provided to patient and/or family.      **Please Note: This note may have been constructed using a voice recognition system**

## 2024-09-03 NOTE — TELEPHONE ENCOUNTER
Received call from pt, who wants to pass a message along to Jovanni Tillman PA-C.  Pt checked and she does not have a remote/monitor for her ILR.

## 2024-09-03 NOTE — ASSESSMENT & PLAN NOTE
79-year-old female with history of hypertension, CHF recently placed loop recorder who initially presented lightheadedness and diaphoresis approximately 2 hours prior to arrival  Loop recorder revealed SVT with heart rate approximate 200 approximate 1 week ago  Patient had recurrence of tachycardia lasting for about a minute or 2 concerning for recurrent SVT  Currently normal sinus rhythm  Monitored overnight in the hospital on telemetry.  Remained in sinus rhythm.  Cleared by electrophysiology for discharge.  Did extra dose of metoprolol with SVT  Outpatient follow-up with electrophysiology

## 2024-09-05 ENCOUNTER — APPOINTMENT (OUTPATIENT)
Dept: LAB | Facility: CLINIC | Age: 79
End: 2024-09-05
Payer: COMMERCIAL

## 2024-09-05 DIAGNOSIS — I50.32 CHRONIC DIASTOLIC CHF (CONGESTIVE HEART FAILURE) (HCC): ICD-10-CM

## 2024-09-05 DIAGNOSIS — R60.0 BILATERAL LOWER EXTREMITY EDEMA: ICD-10-CM

## 2024-09-05 LAB
ANION GAP SERPL CALCULATED.3IONS-SCNC: 16 MMOL/L (ref 4–13)
BUN SERPL-MCNC: 53 MG/DL (ref 5–25)
CALCIUM SERPL-MCNC: 9.4 MG/DL (ref 8.4–10.2)
CHLORIDE SERPL-SCNC: 92 MMOL/L (ref 96–108)
CO2 SERPL-SCNC: 28 MMOL/L (ref 21–32)
CREAT SERPL-MCNC: 1.92 MG/DL (ref 0.6–1.3)
GFR SERPL CREATININE-BSD FRML MDRD: 24 ML/MIN/1.73SQ M
GLUCOSE P FAST SERPL-MCNC: 186 MG/DL (ref 65–99)
POTASSIUM SERPL-SCNC: 3.3 MMOL/L (ref 3.5–5.3)
SODIUM SERPL-SCNC: 136 MMOL/L (ref 135–147)

## 2024-09-05 PROCEDURE — 80048 BASIC METABOLIC PNL TOTAL CA: CPT

## 2024-09-05 PROCEDURE — 36415 COLL VENOUS BLD VENIPUNCTURE: CPT

## 2024-09-09 DIAGNOSIS — N18.30 STAGE 3 CHRONIC KIDNEY DISEASE, UNSPECIFIED WHETHER STAGE 3A OR 3B CKD (HCC): Primary | ICD-10-CM

## 2024-09-10 ENCOUNTER — TELEPHONE (OUTPATIENT)
Dept: CARDIOLOGY CLINIC | Facility: CLINIC | Age: 79
End: 2024-09-10

## 2024-09-10 ENCOUNTER — APPOINTMENT (OUTPATIENT)
Dept: LAB | Facility: CLINIC | Age: 79
End: 2024-09-10
Payer: COMMERCIAL

## 2024-09-10 DIAGNOSIS — N39.0 URINARY TRACT INFECTION WITHOUT HEMATURIA, SITE UNSPECIFIED: ICD-10-CM

## 2024-09-10 LAB
BACTERIA UR QL AUTO: ABNORMAL /HPF
BILIRUB UR QL STRIP: NEGATIVE
CLARITY UR: ABNORMAL
COLOR UR: YELLOW
GLUCOSE UR STRIP-MCNC: NEGATIVE MG/DL
HGB UR QL STRIP.AUTO: ABNORMAL
HYALINE CASTS #/AREA URNS LPF: ABNORMAL /LPF
KETONES UR STRIP-MCNC: NEGATIVE MG/DL
LEUKOCYTE ESTERASE UR QL STRIP: ABNORMAL
NITRITE UR QL STRIP: NEGATIVE
NON-SQ EPI CELLS URNS QL MICRO: ABNORMAL /HPF
PH UR STRIP.AUTO: 6.5 [PH]
PROT UR STRIP-MCNC: ABNORMAL MG/DL
RBC #/AREA URNS AUTO: ABNORMAL /HPF
SP GR UR STRIP.AUTO: 1.02 (ref 1–1.03)
UROBILINOGEN UR STRIP-ACNC: <2 MG/DL
WBC #/AREA URNS AUTO: ABNORMAL /HPF
WBC CLUMPS # UR AUTO: PRESENT /UL

## 2024-09-10 PROCEDURE — 81001 URINALYSIS AUTO W/SCOPE: CPT

## 2024-09-10 PROCEDURE — 87086 URINE CULTURE/COLONY COUNT: CPT

## 2024-09-10 NOTE — TELEPHONE ENCOUNTER
----- Message from Zahra MART sent at 9/10/2024  7:50 AM EDT -----    ----- Message -----  From: Himanshu Webb MD  Sent: 9/9/2024   8:09 PM EDT  To: Cardiology [facility]    Please call patient regarding these results:  BMP shows that kidney function numbers are slightly above her baseline. Her potassium is also low. I recommend that she not take diuretics for 1 day, and then resume Torsemide 40mg twice daily. She should hold off on taking any additional Metolazone for now. I recommend re-checking her kidney function numbers in 1 week, and have ordered a BMP.    Thanks,    TS

## 2024-09-10 NOTE — TELEPHONE ENCOUNTER
Spoke with Jessica and relayed message as given per Dr. Webb. Jessica is aware and understood and will not take her torsemide today and will resume it again starting tomorrow, she also agreed to hold off on taking the metolazone as well and will get repeat labs done a week from now.    Jessica is aware and understood.

## 2024-09-12 LAB — BACTERIA UR CULT: NORMAL

## 2024-09-13 ENCOUNTER — TELEPHONE (OUTPATIENT)
Age: 79
End: 2024-09-13

## 2024-09-13 NOTE — TELEPHONE ENCOUNTER
Pt calls, stating she missed a call from cardiology.  Advised pt it was an appointment reminder for her visit with Dr. Webb 9/20. Pt verbalized understanding.

## 2024-09-15 DIAGNOSIS — I48.0 PAF (PAROXYSMAL ATRIAL FIBRILLATION) (HCC): ICD-10-CM

## 2024-09-16 ENCOUNTER — HOSPITAL ENCOUNTER (OUTPATIENT)
Dept: RADIOLOGY | Age: 79
Discharge: HOME | End: 2024-09-16
Payer: COMMERCIAL

## 2024-09-16 DIAGNOSIS — N39.0 URINARY TRACT INFECTION WITHOUT HEMATURIA, SITE UNSPECIFIED: ICD-10-CM

## 2024-09-16 PROCEDURE — 76770 US EXAM ABDO BACK WALL COMP: CPT

## 2024-09-17 ENCOUNTER — APPOINTMENT (OUTPATIENT)
Dept: LAB | Facility: CLINIC | Age: 79
End: 2024-09-17
Payer: COMMERCIAL

## 2024-09-17 DIAGNOSIS — N18.30 STAGE 3 CHRONIC KIDNEY DISEASE, UNSPECIFIED WHETHER STAGE 3A OR 3B CKD (HCC): ICD-10-CM

## 2024-09-17 LAB
ANION GAP SERPL CALCULATED.3IONS-SCNC: 13 MMOL/L (ref 4–13)
BUN SERPL-MCNC: 40 MG/DL (ref 5–25)
CALCIUM SERPL-MCNC: 8.9 MG/DL (ref 8.4–10.2)
CHLORIDE SERPL-SCNC: 102 MMOL/L (ref 96–108)
CO2 SERPL-SCNC: 25 MMOL/L (ref 21–32)
CREAT SERPL-MCNC: 1.35 MG/DL (ref 0.6–1.3)
GFR SERPL CREATININE-BSD FRML MDRD: 37 ML/MIN/1.73SQ M
GLUCOSE P FAST SERPL-MCNC: 162 MG/DL (ref 65–99)
POTASSIUM SERPL-SCNC: 3.8 MMOL/L (ref 3.5–5.3)
SODIUM SERPL-SCNC: 140 MMOL/L (ref 135–147)

## 2024-09-17 PROCEDURE — 36415 COLL VENOUS BLD VENIPUNCTURE: CPT

## 2024-09-17 PROCEDURE — 80048 BASIC METABOLIC PNL TOTAL CA: CPT

## 2024-09-20 ENCOUNTER — OFFICE VISIT (OUTPATIENT)
Dept: CARDIOLOGY CLINIC | Facility: CLINIC | Age: 79
End: 2024-09-20
Payer: COMMERCIAL

## 2024-09-20 VITALS
DIASTOLIC BLOOD PRESSURE: 70 MMHG | HEIGHT: 63 IN | OXYGEN SATURATION: 97 % | BODY MASS INDEX: 40.57 KG/M2 | SYSTOLIC BLOOD PRESSURE: 114 MMHG | HEART RATE: 69 BPM | WEIGHT: 229 LBS

## 2024-09-20 DIAGNOSIS — I50.32 CHRONIC DIASTOLIC CHF (CONGESTIVE HEART FAILURE) (HCC): ICD-10-CM

## 2024-09-20 DIAGNOSIS — E78.5 HYPERLIPIDEMIA, UNSPECIFIED HYPERLIPIDEMIA TYPE: ICD-10-CM

## 2024-09-20 DIAGNOSIS — Z98.890 S/P ABLATION OF ATRIAL FIBRILLATION: ICD-10-CM

## 2024-09-20 DIAGNOSIS — I47.10 SUPRAVENTRICULAR TACHYCARDIA (HCC): ICD-10-CM

## 2024-09-20 DIAGNOSIS — R60.0 BILATERAL LOWER EXTREMITY EDEMA: ICD-10-CM

## 2024-09-20 DIAGNOSIS — N18.30 STAGE 3 CHRONIC KIDNEY DISEASE, UNSPECIFIED WHETHER STAGE 3A OR 3B CKD (HCC): ICD-10-CM

## 2024-09-20 DIAGNOSIS — Z86.79 S/P ABLATION OF ATRIAL FIBRILLATION: ICD-10-CM

## 2024-09-20 DIAGNOSIS — I48.0 PAF (PAROXYSMAL ATRIAL FIBRILLATION) (HCC): Primary | ICD-10-CM

## 2024-09-20 DIAGNOSIS — I10 ESSENTIAL HYPERTENSION: ICD-10-CM

## 2024-09-20 PROCEDURE — 99214 OFFICE O/P EST MOD 30 MIN: CPT | Performed by: INTERNAL MEDICINE

## 2024-09-20 NOTE — PATIENT INSTRUCTIONS
You were seen today in the Cardiology office for follow up evaluation.     Please continue your current cardiac medications as prescribed. Switch to taking Metolazone as needed only for weight gain as discussed.     Thank you for choosing [facility].    Please call our office or use [employer] with any questions.

## 2024-09-20 NOTE — PROGRESS NOTES
2/22/2022         RE: Ash Kaplan  [address]  [facility] [address]        Dear Colleague,    Thank you for referring your patient, Ash Kaplan, to the [facility]. Please see a copy of my visit note below.    CHIEF COMPLAINT:  Nosebleeds      HISTORY OF PRESENT ILLNESS    Ash was seen for recurrent epistaxis.  Patient states that he has had long history of nosebleeds..  There is no particular predilection for 1 side.  No but no particular triggers.  They can last anywhere from 1 to 5 minutes.  Usually stop with some gentle pressure.  There is no been no concern about allergies.  Nosebleeds tend to be more frequent in the wintertime.  He has no nasal pain or pressure.  No history of nasal crusting.  There is no history of significant allergy.  They have 1 dog at home.         REVIEW OF SYSTEMS    Review of Systems as per HPI and PMHx, otherwise 10 system review system are negative.     Amoxicillin and Omnicef     There were no vitals taken for this visit.    HEAD: Normal appearance and symmetry:  No cutaneous lesions.      NECK:  supple     EARS: normal TM, EACs     NOSE:     Dorsum:   straight  Septum:  prominent vessels bilaterally (right side cauterized with silver nitrate)  Mucosa:  moist  Inferior turbinates:  wnl        ORAL CAVITY/OROPHARYNX:     Lips:  Normal.  Tongue: normal, midline  Mucosa:   no lesions  Tonsils:  1+     NECK:  Trachea:  midline.              Thyroid:  normal              Adenopathy:  none        NEURO:   Alert and Oriented     GAIT AND STATION:  normal     RESPIRATORY:   Symmetry and Respiratory effort     PSYCH:  Normal mood and affect     SKIN:   warm and dry         IMPRESSION:    Encounter Diagnosis   Name Primary?     Epistaxis Yes       RECOMMENDATIONS:    AYR saline gel during winter months  RTC 4 weeks to cauterize LEFT side  If he continues to require repeated cautery then I would recommend allergy evaluation  All  [facility]    Name:Jessica Jaquez   DOS: 9/20/2024     Chief Complaint:   Chief Complaint   Patient presents with    Follow-up     F/U- was in ED recently- SOB on exertion.        HISTORY OF PRESENT ILLNESS:      HPI:  Jessica Jaquez is a 79 y.o. female. She  has a past medical history of Arthritis, Colorectal polyps, Constipation, Diabetes mellitus (HCC), Diverticulosis of colon, Hiatal hernia, Hypertension, Increased urinary frequency, Lumbar disc disease, Pressure injury of skin, SOB (shortness of breath), Stress incontinence, and Vitamin D deficiency (07/27/2018).    She presents for follow up. Last saw me in the office on 8/29/24. Per my prior OV notes:  Chronic HFpEF LVEF 60%  Atrial fibrillation sp PHU DCCV on 3/08/24 [id number]= 8 on Eliquis 5 mg twice daily for stroke prevention  S/P ablation for atrial fibrillation 7/9/24, pulmonary vein isolation with posterior wall isolation and loop implantation.   Device detected SVT  Hypertension  Hyperlipidemia 3/16/24   HDL 45 LDL 85  DM2 HgbA1C 6.9% on 1/22/2024  Hx of remote CVA 15 years ago   Lumbar radiculopathy    In the interim, she was hospitalized for symptomatic SVT detected by loop recorder, and seen by EP. Was also noted to have rising serum Cr and mild hypokalemia by labs when I saw her last in the office. She was contacted to discontinue Metolazone and hold diuretics for a day. Repeat labs from 9/17 demonstrated an improvement in creatinine and potassium levels.     Today, she reports persistent mild exertional dyspnea.  This is at baseline for her.  Reports that her weight is stable in the range of 225-227 pounds.  Lower extremity swelling is also markedly improved compared to her baseline, still fairly remarkable on exam.  Patient states that she is able to wear shoes that she has not been able to wear for many years.  Presently taking torsemide 40 mg twice daily.  Did not take metolazone last week as advised.    She  has no other cardiopulmonary complaints, specifically denying chest pain, diaphoresis, dizziness, palpitations.  She has had no orthopnea.  Has had no syncope.  Has had no recurrent symptomatic supraventricular tachycardia since she was in the hospital recently.       ROS    ROS: Pertinent positives and negatives as described in History of Present Illness. Remainder of a 14 point review of systems was negative.     Allergies   Allergen Reactions    Other Cough and Sneezing     Seasonal allergies    Pollen Extract Allergic Rhinitis        Current Outpatient Medications on File Prior to Visit   Medication Sig Dispense Refill    apixaban (ELIQUIS) 5 mg Take 1 tablet (5 mg total) by mouth 2 (two) times a day 60 tablet 5    bimatoprost (Lumigan) 0.01 % ophthalmic drops Administer 1 drop to both eyes daily at bedtime      Cholecalciferol (VITAMIN D3) 1000 units CAPS Take by mouth daily        ezetimibe (ZETIA) 10 mg tablet Take 1 tablet (10 mg total) by mouth daily at bedtime 60 tablet 5    gabapentin (NEURONTIN) 100 mg capsule Take 1 capsule (100 mg total) by mouth 3 (three) times a day 90 capsule 2    glimepiride (AMARYL) 1 mg tablet Take 1 mg by mouth every morning before breakfast      hydrocortisone 2.5 % cream as needed  3    ketoconazole (NIZORAL) 2 % cream as needed  3    metolazone (ZAROXOLYN) 5 mg tablet TAKE 1 TABLET BY MOUTH DAILY TAKE 1 TABLET DAILY AS NEEDED. NOT TO EXCEED 3 TABLETS PER WEEK. 30 tablet 1    metoprolol succinate (TOPROL-XL) 25 mg 24 hr tablet Take 1 tablet (25 mg total) by mouth 2 (two) times a day 60 tablet 11    Multiple Vitamins-Minerals (CENTRUM SILVER PO) multivitamin      potassium chloride (Klor-Con M20) 20 mEq tablet Take 1 tablet (20 mEq total) by mouth 3 (three) times a day 90 tablet 2    spironolactone (ALDACTONE) 25 mg tablet Take 0.5 tablets (12.5 mg total) by mouth daily      torsemide (DEMADEX) 20 mg tablet Take 2 tablets (40 mg total) by mouth 2 (two) times a day (Patient  questions were answered.  They are agreeable to this plan of care          Again, thank you for allowing me to participate in the care of your patient.        Sincerely,        Tanvir Boone MD     taking differently: Take 40 mg by mouth 2 (two) times a day Takes 1 tablet BID) 360 tablet 3     No current facility-administered medications on file prior to visit.       Past Medical History:   Diagnosis Date    Arthritis     Colorectal polyps     Constipation     Diabetes mellitus (HCC)     Diverticulosis of colon     Hiatal hernia     Hypertension     Increased urinary frequency     Lumbar disc disease     Pressure injury of skin     SOB (shortness of breath)     Stress incontinence     Vitamin D deficiency 07/27/2018       Past Surgical History:   Procedure Laterality Date    BACK SURGERY      CARDIAC ELECTROPHYSIOLOGY PROCEDURE N/A 7/9/2024    Procedure: Cardiac eps/afib ablation PFA;  Surgeon: Jeyson Delaney MD;  [facility]: [facility];  Service: Cardiology    CARDIAC ELECTROPHYSIOLOGY PROCEDURE N/A 7/9/2024    Procedure: Cardiac loop recorder implant;  Surgeon: Jeyson Delaney MD;  [facility];  Service: Cardiology    CARPAL TUNNEL RELEASE Left     CATARACT EXTRACTION Bilateral     CHOLECYSTECTOMY      COLONOSCOPY W/ ENDOSCOPIC US N/A 2/24/2016    Procedure: ANAL ENDOSCOPIC U/S;  Surgeon: HOLLIS Washington MD;  Location: [facility];  Service:     HERNIA REPAIR      IL COLONOSCOPY FLX DX W/COLLJ SPEC WHEN PFRMD N/A 2/24/2016    Procedure: COLONOSCOPY;  Surgeon: HOLLIS Washington MD;  Location: [facility];  Service: Colorectal    TONSILLECTOMY      TUBAL LIGATION         Family History   Problem Relation Age of Onset    Hypertension Mother     Heart attack Mother     Diabetes Mother     Prostate cancer Father     No Known Problems Brother     Stroke Maternal Grandmother     Stroke Paternal Grandmother     Stroke Paternal Grandfather     Thyroid cancer Neg Hx        Social History     Socioeconomic History    Marital status: /Civil Union     Spouse name: Not on file    Number of children: Not on file    Years of education: Not on file    Highest education level: Not on file  "  Occupational History    Not on file   Tobacco Use    Smoking status: Never    Smokeless tobacco: Never   Substance and Sexual Activity    Alcohol use: Never    Drug use: No    Sexual activity: Not on file   Other Topics Concern    Not on file   Social History Narrative    Not on file     Social Determinants of Health     Financial Resource Strain: Not on file   Food Insecurity: No Food Insecurity (3/15/2024)    Hunger Vital Sign     Worried About Running Out of Food in the Last Year: Never true     Ran Out of Food in the Last Year: Never true   Transportation Needs: No Transportation Needs (3/15/2024)    PRAPARE - Transportation     Lack of Transportation (Medical): No     Lack of Transportation (Non-Medical): No   Physical Activity: Not on file   Stress: Not on file   Social Connections: Not on file   Intimate Partner Violence: Not on file   Housing Stability: Low Risk  (3/15/2024)    Housing Stability Vital Sign     Unable to Pay for Housing in the Last Year: No     Number of Times Moved in the Last Year: 1     Homeless in the Last Year: No       OBJECTIVE:    Ht 5' 3\" (1.6 m)   Wt 104 kg (229 lb)   BMI 40.57 kg/m²      BP Readings from Last 3 Encounters:   09/03/24 127/77   08/29/24 114/62   08/14/24 96/62       Wt Readings from Last 3 Encounters:   09/20/24 104 kg (229 lb)   09/03/24 96.6 kg (212 lb 15.4 oz)   08/29/24 104 kg (229 lb 1.6 oz)         Physical Exam  Vitals reviewed.   Constitutional:       General: She is not in acute distress.     Appearance: Normal appearance. She is not diaphoretic.   HENT:      Head: Normocephalic and atraumatic.   Eyes:      Conjunctiva/sclera: Conjunctivae normal.   Neck:      Vascular: No JVD (Not well visualized).   Cardiovascular:      Rate and Rhythm: Normal rate and regular rhythm.      Pulses: Normal pulses.      Heart sounds: Normal heart sounds. No murmur heard.     No friction rub. No gallop.   Pulmonary:      Effort: Pulmonary effort is normal.      Breath " sounds: Normal breath sounds. No wheezing, rhonchi or rales.   Abdominal:      General: Abdomen is flat. Bowel sounds are normal.   Musculoskeletal:      Right lower leg: Edema present.      Left lower leg: Edema present.   Skin:     General: Skin is warm and dry.   Neurological:      General: No focal deficit present.      Mental Status: She is alert and oriented to person, place, and time.   Psychiatric:         Mood and Affect: Mood normal.         Behavior: Behavior normal.                                                       Cardiac testing:       Results for orders placed during the hospital encounter of 18    Echo complete with contrast if indicated    Narrative  [address] [address]  [phone]    Transthoracic Echocardiogram  2D, M-mode, Doppler, and Color Doppler    Study date:  28-Dec-2018    Patient: HAN CARVAJAL  MR number: [id number]  Account number: [id number]  : 1945  Age: 73 years  Gender: Female  Status: Outpatient  Location: [address] [place] [address] [facility]  Height: 63 in  Weight: 232 lb  BP: 169/ 76 mmHg    Indications: Heart failure    Diagnoses: I50.9 - Heart failure, unspecified    Sonographer:  DANYELLE Ga  Interpreting Physician:  Kvng Aviles MD  Referring Physician:  Jorden Holt III, MD  Group:  [facility]  Cardiology Fellow:  Von Fuentes MD    SUMMARY    PROCEDURE INFORMATION:  This was a technically difficult study.    LEFT VENTRICLE:  Systolic function was normal. Ejection fraction was estimated to be 60 %.  There were no regional wall motion abnormalities.  Doppler parameters were consistent with abnormal left ventricular relaxation (grade 1 diastolic dysfunction).    MITRAL VALVE:  There was trace regurgitation.    TRICUSPID VALVE:  There was trace regurgitation.  Pulmonary artery systolic pressure was within the normal range.    PULMONIC VALVE:  There was  trace regurgitation.    HISTORY: PRIOR HISTORY: Hypertension, hyperlipidemia, diastolic dysfunction, type 2 diabetes, GERD    PROCEDURE: The study was performed in the [address] [place] [address] [facility]. This was a routine study. The transthoracic approach was used. The study included complete 2D imaging, M-mode, complete spectral Doppler, and color Doppler. This  was a technically difficult study.    LEFT VENTRICLE: Size was normal. Systolic function was normal. Ejection fraction was estimated to be 60 %. There were no regional wall motion abnormalities. Wall thickness was normal. DOPPLER: Doppler parameters were consistent with  abnormal left ventricular relaxation (grade 1 diastolic dysfunction).    RIGHT VENTRICLE: The size was normal. Systolic function was normal. Wall thickness was normal.    LEFT ATRIUM: Size was normal.    RIGHT ATRIUM: Size was normal.    MITRAL VALVE: Valve structure was normal. There was normal leaflet separation. DOPPLER: The transmitral velocity was within the normal range. There was no evidence for stenosis. There was trace regurgitation.    AORTIC VALVE: The valve was probably trileaflet. Leaflets exhibited normal cuspal separation. The valve was not well visualized. DOPPLER: Transaortic velocity was within the normal range. There was no evidence for stenosis. There was no  significant regurgitation.    TRICUSPID VALVE: The valve structure was normal. There was normal leaflet separation. DOPPLER: The transtricuspid velocity was within the normal range. There was no evidence for stenosis. There was trace regurgitation. Pulmonary artery  systolic pressure was within the normal range. Estimated peak PA pressure was 30 mmHg.    PULMONIC VALVE: Not well visualized. DOPPLER: The transpulmonic velocity was within the normal range. There was trace regurgitation.    PERICARDIUM: There was no pericardial effusion. A pericardial fat pad was present. The pericardium was normal in  appearance.    AORTA: The root exhibited normal size.    SYSTEMIC VEINS: IVC: The inferior vena cava was normal in size. Respirophasic changes were normal.    SYSTEM MEASUREMENT TABLES    2D  %FS: 31.39 %  Ao Diam: 3.29 cm  EDV(Teich): 100.54 ml  EF(Cube): 67.7 %  EF(Teich): 59.26 %  ESV(Cube): 32.77 ml  ESV(Teich): 40.96 ml  IVSd: 0.99 cm  LA Area: 16.53 cm2  LA Diam: 3.39 cm  LVEDV MOD A4C: 94.35 ml  LVEF MOD A4C: 68.85 %  LVESV MOD A4C: 29.39 ml  LVIDd: 4.66 cm  LVIDs: 3.2 cm  LVLd A4C: 7.19 cm  LVLs A4C: 5.3 cm  LVPWd: 1.08 cm  RA Area: 13.64 cm2  RV Diam.: 2.6 cm  SV MOD A4C: 64.96 ml  SV(Cube): 68.69 ml  SV(Teich): 59.58 ml    CW  TR Vmax: 2.6 m/s  TR maxP.99 mmHg    MM  TAPSE: 1.84 cm    PW  E': 0.06 m/s  E/E': 14.64  MV A [place]: 0.97 m/s  MV Dec [place]: 3.14 m/s2  MV DecT: 267.31 ms  MV E [place]: 0.84 m/s  MV E/A Ratio: 0.86    [employer] Accredited Echocardiography Laboratory    Prepared and electronically signed by    Kvng Aviles MD  Signed 28-Dec-2018 11:05:05        LABS:  Lab Results   Component Value Date    GLUCOSE 109 2015    BUN 40 (H) 2024    CREATININE 1.35 (H) 2024    CALCIUM 8.9 2024     (L) 2015    K 3.8 2024    CO2 25 2024     2024    ALKPHOS 82 2024    BILITOT 0.33 2015    PROT 7.6 2015    AST 19 2024    ALT 14 2024    ANIONGAP 5 2015        Lab Results   Component Value Date    WBC 11.63 (H) 2024    HGB 11.8 2024    HCT 37.1 2024    MCV 89 2024     2024       Lab Results   Component Value Date    CHOL 248 2014    HDL 45 (L) 2024    LDLCALC 85 2024    TRIG 146 2024       Lab Results   Component Value Date    HGBA1C 6.9 (H) 2024       ASSESSMENT/PLAN:  Diagnoses and all orders for this visit:    PAF (paroxysmal atrial fibrillation) (HCC)    Supraventricular tachycardia    S/P ablation of atrial  "fibrillation    Essential hypertension    Hyperlipidemia, unspecified hyperlipidemia type    Stage 3 chronic kidney disease, unspecified whether stage 3a or 3b CKD ([facility])  -     Basic metabolic panel; Future    Bilateral lower extremity edema    Chronic diastolic CHF (congestive heart failure) ([facility])    PLAN:  Continue Toprol XL 25mg BID  Continue Torsemide 40mg BID  Transition to Metolazine 5mg once weekly PRN weight gain.  Repeat BMP  Continue Eliquis 5mg BID.   Daily weights  Compression stockings    3 month follow up.           Himanshu Webb MD      Portions of the record may have been created with voice recognition software. Occasional wrong word or \"sound alike\" substitutions may have occurred due to the inherent limitations of voice recognition software. Please review the chart carefully and recognize, using context, where substitutions/typographical errors may have occurred.     "

## 2024-09-21 DIAGNOSIS — I48.0 PAF (PAROXYSMAL ATRIAL FIBRILLATION) (HCC): ICD-10-CM

## 2024-09-23 RX ORDER — METOPROLOL SUCCINATE 25 MG/1
25 TABLET, EXTENDED RELEASE ORAL 2 TIMES DAILY
Qty: 180 TABLET | Refills: 1 | Status: SHIPPED | OUTPATIENT
Start: 2024-09-23

## 2024-10-13 DIAGNOSIS — I48.0 PAF (PAROXYSMAL ATRIAL FIBRILLATION) (HCC): ICD-10-CM

## 2024-10-14 ENCOUNTER — ESTABLISHED COMPREHENSIVE EXAM (OUTPATIENT)
Dept: URBAN - METROPOLITAN AREA CLINIC 6 | Facility: CLINIC | Age: 79
End: 2024-10-14

## 2024-10-14 DIAGNOSIS — E11.9: ICD-10-CM

## 2024-10-14 DIAGNOSIS — H40.1131: ICD-10-CM

## 2024-10-14 PROCEDURE — 92202 OPSCPY EXTND ON/MAC DRAW: CPT

## 2024-10-14 PROCEDURE — 92014 COMPRE OPH EXAM EST PT 1/>: CPT

## 2024-10-14 PROCEDURE — 92133 CPTRZD OPH DX IMG PST SGM ON: CPT

## 2024-10-14 ASSESSMENT — TONOMETRY
OD_IOP_MMHG: 19
OS_IOP_MMHG: 23

## 2024-10-14 ASSESSMENT — VISUAL ACUITY
OS_CC: 20/50+2
OD_CC: 20/30-1

## 2024-10-17 ENCOUNTER — TELEPHONE (OUTPATIENT)
Age: 79
End: 2024-10-17

## 2024-10-17 NOTE — TELEPHONE ENCOUNTER
Received call from pt stating that she is now required to pay a $106 copay for her Eliquis prescription.  She just picked up a 30 day supply but is wondering if there is something else more cost effective that she can switch to or if there any are assistance programs she is eligible for.  Please review and advise.

## 2024-10-22 ENCOUNTER — CONSULT (OUTPATIENT)
Dept: UROLOGY | Facility: AMBULATORY SURGERY CENTER | Age: 79
End: 2024-10-22

## 2024-10-22 VITALS
HEART RATE: 85 BPM | BODY MASS INDEX: 40.57 KG/M2 | HEIGHT: 63 IN | OXYGEN SATURATION: 95 % | WEIGHT: 229 LBS | SYSTOLIC BLOOD PRESSURE: 132 MMHG | DIASTOLIC BLOOD PRESSURE: 86 MMHG

## 2024-10-22 DIAGNOSIS — N39.0 RECURRENT UTI: ICD-10-CM

## 2024-10-22 DIAGNOSIS — N39.0 URINARY TRACT INFECTION WITHOUT HEMATURIA, SITE UNSPECIFIED: Primary | ICD-10-CM

## 2024-10-22 LAB — POST-VOID RESIDUAL VOLUME, ML POC: 26 ML

## 2024-10-22 RX ORDER — METOPROLOL SUCCINATE 200 MG/1
25 TABLET, EXTENDED RELEASE ORAL DAILY
COMMUNITY
Start: 2024-10-17

## 2024-10-22 NOTE — ASSESSMENT & PLAN NOTE
Patient with recurrent and frequent UTIs dating back to February 2024  PVR in office today 26 mL  Discussed diet lifestyle modifications such as increasing water intake, she is on a fluid restriction related to her congestive heart failure, she states she does consume approximately 900 milliliters per day  Discussed implementing supplements such as cranberry, vitamin C, d-mannose, probiotic for UTI prevention  Also discussed role for vaginal estrogen, wishes to defer at this time  Instructed to call our office with any concern for UTI, we can order urine culture and treat accordingly  Follow-up in 6 months    Urine culture trend:  9/10/2024 demonstrating mixed contaminants  6/25/2024 positive for 60-69,000 E. coli  3/30/2024 positive for greater than 100,000 E. coli  2/12/2024 positive for 10-19,000 E. coli

## 2024-10-22 NOTE — PATIENT INSTRUCTIONS
"Cystex - [facility], on the bottle it will say \"cranberry, vitamin c, d-mannose, probiotic\" - in a maroon bottle   "

## 2024-10-22 NOTE — PROGRESS NOTES
Assessment and plan:     Recurrent UTI  Patient with recurrent and frequent UTIs dating back to February 2024  PVR in office today 26 mL  Discussed diet lifestyle modifications such as increasing water intake, she is on a fluid restriction related to her congestive heart failure, she states she does consume approximately 900 milliliters per day  Discussed implementing supplements such as cranberry, vitamin C, d-mannose, probiotic for UTI prevention  Also discussed role for vaginal estrogen, wishes to defer at this time  Instructed to call our office with any concern for UTI, we can order urine culture and treat accordingly  Follow-up in 6 months    Urine culture trend:  9/10/2024 demonstrating mixed contaminants  6/25/2024 positive for 60-69,000 E. coli  3/30/2024 positive for greater than 100,000 E. coli  2/12/2024 positive for 10-19,000 E. coli    History of Present Illness     Jessica Jaquez is a 79 y.o. female who presents today to the office as a new patient for further evaluation of recurrent UTIs.      She had multiple positive urine cultures starting in February 2024.  She states that she strongly correlates them with her hospital admissions.  She states that in March 2024 she was admitted to the hospital for congestive heart failure.  During this time she was given a pure wick due to her frequent urination.  She states that once she stopped using the pure wick she started with UTI symptoms.  She has had multiple hospital admissions since March due to her congestive heart failure and A-fib.  She states during her last hospital admission she declined the pure wick due to getting UTIs from them.      She does take 2-3 diuretics per day.  She states due to this she does urinate a lot.  She is on a fluid restriction in which she consumes about 900 mL/day.  She has never used any supplements for UTI prevention.  She is not interested in starting any vaginal estrogen at this time.    Today in the office she  "states that she is overall doing very well.  She denies any UTI symptoms.  She states that she does have urinary urgency and urge incontinence but this is related to her multiple diuretics.  She does wear protective undergarments for this.  She denies any dysuria, hematuria, nocturia, suprapubic or flank pain.    Laboratory     Lab Results   Component Value Date    BUN 40 (H) 09/17/2024    CREATININE 1.35 (H) 09/17/2024       No components found for: \"GFR\"    Lab Results   Component Value Date    GLUCOSE 109 06/09/2015    CALCIUM 8.9 09/17/2024     (L) 06/09/2015    K 3.8 09/17/2024    CO2 25 09/17/2024     09/17/2024       Lab Results   Component Value Date    WBC 11.63 (H) 09/03/2024    HGB 11.8 09/03/2024    HCT 37.1 09/03/2024    MCV 89 09/03/2024     09/03/2024       No results found for: \"PSA\"    No results found for this or any previous visit (from the past 1 hour(s)).      Review of Systems     Review of Systems   Constitutional:  Negative for chills and fever.   Respiratory: Negative.  Negative for cough and shortness of breath.    Cardiovascular:  Negative for chest pain and leg swelling.   Genitourinary:  Negative for dyspareunia, dysuria, flank pain, frequency, hematuria, menstrual problem, pelvic pain, urgency, vaginal bleeding, vaginal discharge and vaginal pain.   Skin:  Negative for rash.   Neurological: Negative.    Hematological:  Negative for adenopathy. Does not bruise/bleed easily.                 Allergies     Allergies   Allergen Reactions    Other Cough and Sneezing     Seasonal allergies    Pollen Extract Allergic Rhinitis       Physical Exam     Physical Exam  Vitals reviewed.   Constitutional:       Appearance: Normal appearance.   HENT:      Head: Normocephalic and atraumatic.   Eyes:      Pupils: Pupils are equal, round, and reactive to light.   Cardiovascular:      Rate and Rhythm: Normal rate.   Pulmonary:      Effort: Pulmonary effort is normal.   Musculoskeletal: " "     Cervical back: Normal range of motion.   Skin:     General: Skin is warm and dry.   Neurological:      General: No focal deficit present.      Mental Status: She is alert and oriented to person, place, and time. Mental status is at baseline.   Psychiatric:         Mood and Affect: Mood normal.         Behavior: Behavior normal.         Thought Content: Thought content normal.         Judgment: Judgment normal.         Vital Signs     Vitals:    10/22/24 0815   BP: 132/86   BP Location: Left arm   Patient Position: Sitting   Cuff Size: Standard   Pulse: 85   SpO2: 95%   Weight: 104 kg (229 lb)   Height: 5' 3\" (1.6 m)       Current Medications       Current Outpatient Medications:     apixaban (ELIQUIS) 5 mg, Take 1 tablet (5 mg total) by mouth 2 (two) times a day, Disp: 60 tablet, Rfl: 5    bimatoprost (Lumigan) 0.01 % ophthalmic drops, Administer 1 drop to both eyes daily at bedtime, Disp: , Rfl:     cephalexin (KEFLEX) 250 mg capsule, Take 250 mg by mouth 3 (three) times a day, Disp: , Rfl:     Cholecalciferol (VITAMIN D3) 1000 units CAPS, Take by mouth daily  , Disp: , Rfl:     ezetimibe (ZETIA) 10 mg tablet, Take 1 tablet (10 mg total) by mouth daily at bedtime, Disp: 60 tablet, Rfl: 5    gabapentin (NEURONTIN) 100 mg capsule, Take 1 capsule (100 mg total) by mouth 3 (three) times a day, Disp: 90 capsule, Rfl: 2    glimepiride (AMARYL) 1 mg tablet, Take 1 mg by mouth every morning before breakfast, Disp: , Rfl:     hydrocortisone 2.5 % cream, as needed, Disp: , Rfl: 3    ketoconazole (NIZORAL) 2 % cream, as needed, Disp: , Rfl: 3    metolazone (ZAROXOLYN) 5 mg tablet, TAKE 1 TABLET BY MOUTH DAILY TAKE 1 TABLET DAILY AS NEEDED. NOT TO EXCEED 3 TABLETS PER WEEK., Disp: 30 tablet, Rfl: 1    metoprolol succinate (TOPROL-XL) 200 MG 24 hr tablet, Take 25 mg by mouth in the morning Twice a day, Disp: , Rfl:     metoprolol succinate (TOPROL-XL) 25 mg 24 hr tablet, TAKE 1 TABLET BY MOUTH TWICE A DAY, Disp: 180 " tablet, Rfl: 1    Multiple Vitamins-Minerals (CENTRUM SILVER PO), multivitamin, Disp: , Rfl:     potassium chloride (Klor-Con M20) 20 mEq tablet, Take 1 tablet (20 mEq total) by mouth 3 (three) times a day, Disp: 90 tablet, Rfl: 2    spironolactone (ALDACTONE) 25 mg tablet, Take 0.5 tablets (12.5 mg total) by mouth daily, Disp: , Rfl:     torsemide (DEMADEX) 20 mg tablet, Take 2 tablets (40 mg total) by mouth 2 (two) times a day (Patient taking differently: Take 40 mg by mouth 2 (two) times a day Takes 1 tablet BID), Disp: 360 tablet, Rfl: 3    Active Problems     Patient Active Problem List   Diagnosis    Type 2 diabetes mellitus with obesity  ([facility])    Obesity, Class III, BMI 40-49.9 (morbid obesity) ([facility])    Essential hypertension    Chronic diastolic CHF (congestive heart failure) ([facility])    Hyperlipidemia    Unspecified abnormalities of gait and mobility    Tremor of hands and face    Cerebrovascular accident (CVA) due to thrombosis of precerebral artery ([facility])    Open wound of abdomen    Lumbar radiculopathy    History of stroke    SVT (supraventricular tachycardia) ([facility])    GIOVANNI (acute kidney injury) ([facility])    Chronic diastolic congestive heart failure ([facility])    Hyponatremia    Atrial fibrillation ([facility])    Chest pain    S/P ablation of atrial fibrillation    Bilateral lower extremity edema    CKD (chronic kidney disease)    Recurrent UTI       Past Medical History     Past Medical History:   Diagnosis Date    Arthritis     Colorectal polyps     Constipation     Diabetes mellitus (HCC)     Diverticulosis of colon     Hiatal hernia     Hypertension     Increased urinary frequency     Lumbar disc disease     Pressure injury of skin     SOB (shortness of breath)     Stress incontinence     Vitamin D deficiency 07/27/2018       Surgical History     Past Surgical History:   Procedure Laterality Date    BACK SURGERY      CARDIAC ELECTROPHYSIOLOGY PROCEDURE N/A 7/9/2024    Procedure: Cardiac eps/afib ablation PFA;  Surgeon:  Jeyson Delaney MD;  [facility]: [facility];  Service: Cardiology    CARDIAC ELECTROPHYSIOLOGY PROCEDURE N/A 7/9/2024    Procedure: Cardiac loop recorder implant;  Surgeon: Jeyson Delaney MD;  [facility];  Service: Cardiology    CARPAL TUNNEL RELEASE Left     CATARACT EXTRACTION Bilateral     CHOLECYSTECTOMY      COLONOSCOPY W/ ENDOSCOPIC US N/A 2/24/2016    Procedure: ANAL ENDOSCOPIC U/S;  Surgeon: HOLLIS Washington MD;  Location: [facility];  Service:     HERNIA REPAIR      IA COLONOSCOPY FLX DX W/COLLJ SPEC WHEN PFRMD N/A 2/24/2016    Procedure: COLONOSCOPY;  Surgeon: HOLLIS Washington MD;  Location: [facility];  Service: Colorectal    TONSILLECTOMY      TUBAL LIGATION         Family History     Family History   Problem Relation Age of Onset    Hypertension Mother     Heart attack Mother     Diabetes Mother     Prostate cancer Father     No Known Problems Brother     Stroke Maternal Grandmother     Stroke Paternal Grandmother     Stroke Paternal Grandfather     Thyroid cancer Neg Hx        Social History     Social History     Social History     Tobacco Use   Smoking Status Never   Smokeless Tobacco Never       Past Surgical History:   Procedure Laterality Date    BACK SURGERY      CARDIAC ELECTROPHYSIOLOGY PROCEDURE N/A 7/9/2024    Procedure: Cardiac eps/afib ablation PFA;  Surgeon: Jeyson Delaney MD;  [facility];  Service: Cardiology    CARDIAC ELECTROPHYSIOLOGY PROCEDURE N/A 7/9/2024    Procedure: Cardiac loop recorder implant;  Surgeon: Jeyson Delaney MD;  [facility];  Service: Cardiology    CARPAL TUNNEL RELEASE Left     CATARACT EXTRACTION Bilateral     CHOLECYSTECTOMY      COLONOSCOPY W/ ENDOSCOPIC US N/A 2/24/2016    Procedure: ANAL ENDOSCOPIC U/S;  Surgeon: HOLLIS Washington MD;  [facility];  Service:     HERNIA REPAIR      IA COLONOSCOPY FLX DX W/COLLJ SPEC WHEN PFRMD N/A 2/24/2016    Procedure: COLONOSCOPY;  Surgeon: HOLLIS  Branden Washington MD;  [facility];  Service: Colorectal    TONSILLECTOMY      TUBAL LIGATION           The following portions of the patient's history were reviewed and updated as appropriate: allergies, current medications, past family history, past medical history, past social history, past surgical history and problem list    Please note :  Voice dictation software has been used to create this document.  There may be inadvertent transcription errors.    VIJI Nicole

## 2024-10-24 NOTE — TELEPHONE ENCOUNTER
Relayed message to patient, patient understood. She will call back office to relay what insurance says

## 2024-10-25 ENCOUNTER — REMOTE DEVICE CLINIC VISIT (OUTPATIENT)
Dept: CARDIOLOGY CLINIC | Facility: CLINIC | Age: 79
End: 2024-10-25
Payer: COMMERCIAL

## 2024-10-25 DIAGNOSIS — I48.0 PAROXYSMAL ATRIAL FIBRILLATION (HCC): Primary | ICD-10-CM

## 2024-10-25 PROCEDURE — 93298 REM INTERROG DEV EVAL SCRMS: CPT | Performed by: INTERNAL MEDICINE

## 2024-10-25 NOTE — PROGRESS NOTES
"MDT LNQ22 ILR/ ACTIVE SYSTEM IS MRI CONDITIONAL   CARELINK TRANSMISSION: LOOP RECORDER. PRESENTING RHYTHM NSR @ 75 BPM. BATTERY STATUS \"OK.\" NO PATIENT OR DEVICE ACTIVATED EPISODES. NORMAL DEVICE FUNCTION. DL   "

## 2024-11-14 ENCOUNTER — RESULTS FOLLOW-UP (OUTPATIENT)
Dept: CARDIOLOGY CLINIC | Facility: CLINIC | Age: 79
End: 2024-11-14

## 2024-11-21 PROBLEM — N39.0 RECURRENT UTI: Status: RESOLVED | Noted: 2024-10-22 | Resolved: 2024-11-21

## 2024-11-29 ENCOUNTER — NURSE TRIAGE (OUTPATIENT)
Age: 79
End: 2024-11-29

## 2024-11-29 NOTE — TELEPHONE ENCOUNTER
"Reason for Conversation: Pt called with increased weight and bilateral lower extremity abdominal, and facial fluid retention. Denies SOB and chest pain. Pt reports HR is in NSR and SpO2 is 94%.     The pt's weight has increased to a max of 240lbs on Wed which was a shock to her. She toook a metolazone and in addition to her other diuretics and her weight is down to 235lbs today, but not down to her goal 230lbs.     Pt is asking for recommendations. She is taking Torsemide 20mg BID. Spironolactione 12.5mg daily and Metolazone 5mg only as needed (last on Wednesday).     VS/Weight: (Note: Please include date/time vitals/weight were measured)  11/15: 222lbs ; 11/27: 240lbs; 11/29 235lbs  11/29: HR 84 reg and Spo2 94%    Pain: No    Risk Factors: Hypertension, Heart Failure, and Arrhythmia    Recent relevant testing and date of testing: Stress, Echo, and Other Stress 4/18/24  ECHO 3/6/24, EH 6/4/24    Medication: Torsemide 20mg BID, Micheal 12.5mg daily, Metolazone 5mg daily as needed if weight above 230lbs, metoprolol 25mg BID, Eliquis 5mg BID, Post Cl 20MeQ TID    Upcoming Office Visit: Yes 1/17/25    Last Office Visit: 9/20/24             Answer Assessment - Initial Assessment Questions  1. ONSET: \"When did the swelling start?\" (e.g., minutes, hours, days)      3 days   2. LOCATION: \"What part of the leg is swollen?\"  \"Are both legs swollen or just one leg?\"      Both legs up to knees; but  mostly around   3. SEVERITY: \"How bad is the swelling?\" (e.g., localized; mild, moderate, severe)      Moderate; denies pitting   7. CAUSE: \"What do you think is causing the leg swelling?\"      Recurrent   8. MEDICAL HISTORY: \"Do you have a history of blood clots (e.g., DVT), cancer, heart failure, kidney disease, or liver failure?\"      Denies; AFIB, HTN  9. RECURRENT SYMPTOM: \"Have you had leg swelling before?\" If Yes, ask: \"When was the last time?\" \"What happened that time?\"      Yes, recurrent   10. OTHER SYMPTOMS: \"Do you have " "any other symptoms?\" (e.g., chest pain, difficulty breathing)        Denies SOB and chest pain    Protocols used: Leg Swelling and Edema-Adult-[place]    "

## 2024-12-02 NOTE — TELEPHONE ENCOUNTER
Please have her continue her regular maintenance diuretic dose as long as she is asymptomatic. Start taking Metolazone once weekly until she reaches 230lbs, and then go back to taking it only as needed.    Thanks,      TS     Spoke to Jessica and relayed message as given per Dr. Webb. Jessica relayed that over the weekend she took an extra torsemide (40 mg bid) and one metolazone for one day and has now lost 14lbs and is now 224 lbs as of this morning. She will continue her current medical regimen at this time and will call back if wt increases.     Jessica is aware and understood.

## 2024-12-05 DIAGNOSIS — I50.32 CHRONIC DIASTOLIC CHF (CONGESTIVE HEART FAILURE) (HCC): Chronic | ICD-10-CM

## 2024-12-05 DIAGNOSIS — I48.91 ATRIAL FIBRILLATION WITH RVR (HCC): ICD-10-CM

## 2024-12-05 DIAGNOSIS — I10 ESSENTIAL HYPERTENSION: ICD-10-CM

## 2024-12-06 RX ORDER — POTASSIUM CHLORIDE 1500 MG/1
20 TABLET, EXTENDED RELEASE ORAL 3 TIMES DAILY
Qty: 90 TABLET | Refills: 1 | Status: SHIPPED | OUTPATIENT
Start: 2024-12-06

## 2024-12-09 ENCOUNTER — TELEPHONE (OUTPATIENT)
Age: 79
End: 2024-12-09

## 2024-12-09 ENCOUNTER — APPOINTMENT (OUTPATIENT)
Dept: LAB | Facility: CLINIC | Age: 79
End: 2024-12-09
Payer: COMMERCIAL

## 2024-12-09 DIAGNOSIS — N18.30 STAGE 3 CHRONIC KIDNEY DISEASE, UNSPECIFIED WHETHER STAGE 3A OR 3B CKD (HCC): ICD-10-CM

## 2024-12-09 LAB
ANION GAP SERPL CALCULATED.3IONS-SCNC: 12 MMOL/L (ref 4–13)
BUN SERPL-MCNC: 45 MG/DL (ref 5–25)
CALCIUM SERPL-MCNC: 9.8 MG/DL (ref 8.4–10.2)
CHLORIDE SERPL-SCNC: 94 MMOL/L (ref 96–108)
CO2 SERPL-SCNC: 30 MMOL/L (ref 21–32)
CREAT SERPL-MCNC: 1.57 MG/DL (ref 0.6–1.3)
GFR SERPL CREATININE-BSD FRML MDRD: 30 ML/MIN/1.73SQ M
GLUCOSE P FAST SERPL-MCNC: 229 MG/DL (ref 65–99)
POTASSIUM SERPL-SCNC: 3.4 MMOL/L (ref 3.5–5.3)
SODIUM SERPL-SCNC: 136 MMOL/L (ref 135–147)

## 2024-12-09 PROCEDURE — 36415 COLL VENOUS BLD VENIPUNCTURE: CPT

## 2024-12-09 PROCEDURE — 80048 BASIC METABOLIC PNL TOTAL CA: CPT

## 2024-12-09 NOTE — TELEPHONE ENCOUNTER
Pt calling to inquire if there was any blood work needed before her OV on Wednesday 12/11.  Informed pt there was an active BMP order in her chart and that she can have it drawn at any [facility] lab.  Pt verbalized understanding and has no further questions at this time.

## 2024-12-11 ENCOUNTER — OFFICE VISIT (OUTPATIENT)
Dept: CARDIOLOGY CLINIC | Facility: CLINIC | Age: 79
End: 2024-12-11
Payer: COMMERCIAL

## 2024-12-11 VITALS
HEIGHT: 63 IN | SYSTOLIC BLOOD PRESSURE: 120 MMHG | WEIGHT: 229.6 LBS | DIASTOLIC BLOOD PRESSURE: 60 MMHG | OXYGEN SATURATION: 98 % | BODY MASS INDEX: 40.68 KG/M2 | HEART RATE: 90 BPM

## 2024-12-11 DIAGNOSIS — I50.32 CHRONIC DIASTOLIC CHF (CONGESTIVE HEART FAILURE) (HCC): ICD-10-CM

## 2024-12-11 DIAGNOSIS — E78.5 HYPERLIPIDEMIA, UNSPECIFIED HYPERLIPIDEMIA TYPE: ICD-10-CM

## 2024-12-11 DIAGNOSIS — I48.0 PAROXYSMAL ATRIAL FIBRILLATION (HCC): Primary | ICD-10-CM

## 2024-12-11 DIAGNOSIS — Z98.890 S/P ABLATION OF ATRIAL FIBRILLATION: ICD-10-CM

## 2024-12-11 DIAGNOSIS — N18.30 STAGE 3 CHRONIC KIDNEY DISEASE, UNSPECIFIED WHETHER STAGE 3A OR 3B CKD (HCC): ICD-10-CM

## 2024-12-11 DIAGNOSIS — Z86.79 S/P ABLATION OF ATRIAL FIBRILLATION: ICD-10-CM

## 2024-12-11 DIAGNOSIS — I10 ESSENTIAL HYPERTENSION: ICD-10-CM

## 2024-12-11 DIAGNOSIS — E87.6 HYPOKALEMIA: ICD-10-CM

## 2024-12-11 DIAGNOSIS — R60.0 BILATERAL LOWER EXTREMITY EDEMA: ICD-10-CM

## 2024-12-11 PROCEDURE — 99214 OFFICE O/P EST MOD 30 MIN: CPT | Performed by: INTERNAL MEDICINE

## 2024-12-11 RX ORDER — SPIRONOLACTONE 25 MG/1
25 TABLET ORAL DAILY
Qty: 30 TABLET | Refills: 11 | Status: SHIPPED | OUTPATIENT
Start: 2024-12-11

## 2024-12-11 NOTE — PATIENT INSTRUCTIONS
You were seen today in the Cardiology office for follow up evaluation.     Please continue your current cardiac medications as prescribed.    Please make the following changes to your cardiac medications:  Increase Spironolactone to 25mg twice daily.     Any testing ordered in office today will be available for patient review via [facility], and reviewed with me at the follow-up office visit as scheduled.    Thank you for choosing [facility].    Please call our office or use [facility] with any questions.

## 2024-12-11 NOTE — PROGRESS NOTES
[facility]    Name:Jessica Jaquez   DOS: 12/11/2024     Chief Complaint:   Chief Complaint   Patient presents with    Follow-up       HISTORY OF PRESENT ILLNESS:    HPI:  Jessica Jaquez is a 79 y.o. female. She  has a past medical history of Arthritis, Colorectal polyps, Constipation, Diabetes mellitus (HCC), Diverticulosis of colon, Hiatal hernia, Hypertension, Increased urinary frequency, Lumbar disc disease, Pressure injury of skin, SOB (shortness of breath), Stress incontinence, and Vitamin D deficiency (07/27/2018).    She presents for follow up evaluation.  Chronic HFpEF LVEF 60%  Atrial fibrillation sp PHU DCCV on 3/08/24 [id number]= 8 on Eliquis 5 mg twice daily for stroke prevention  S/P ablation for atrial fibrillation 7/9/24, pulmonary vein isolation with posterior wall isolation and loop implantation.   Device detected SVT  Hypertension  Hyperlipidemia 3/16/24   HDL 45 LDL 85  DM2 HgbA1C 6.9% on 1/22/2024  Hx of remote CVA 15 years ago   Lumbar radiculopathy    She presents for follow-up evaluation.  Reports that she is doing well overall presently.  Dry weight at home has been to 24 pounds, weight which she feels good with no issues breathing or ambulating.  Lower extremity swelling persists, although per the patient it is better today than it is on most days.  She has been taking torsemide 40 mg twice daily with good compliance, along with potassium supplementation 20 mill equivalents 3 times a day.  This is in addition to spironolactone 12.5 mg twice daily, which in office today is being increased to 25 mg twice daily due to persistent mild hypokalemia despite continued potassium supplementation.    She presently has no new cardiac complaints.  She denies chest pain, and reports no new shortness of breath at rest or with minimal activity.  She has no diaphoresis, and denies dizziness and palpitations.  Lower extremity swelling is better than usual, persistent  however.  She has had no falls or syncope.  No issues with bruising or bleeding on Eliquis.         ROS    ROS: Pertinent positives and negatives as described in History of Present Illness. Remainder of a 14 point review of systems was negative.     Allergies   Allergen Reactions    Other Cough and Sneezing     Seasonal allergies    Pollen Extract Allergic Rhinitis        Current Outpatient Medications on File Prior to Visit   Medication Sig Dispense Refill    apixaban (ELIQUIS) 5 mg Take 1 tablet (5 mg total) by mouth 2 (two) times a day 60 tablet 5    bimatoprost (Lumigan) 0.01 % ophthalmic drops Administer 1 drop to both eyes daily at bedtime      Cholecalciferol (VITAMIN D3) 1000 units CAPS Take by mouth daily        ezetimibe (ZETIA) 10 mg tablet Take 1 tablet (10 mg total) by mouth daily at bedtime 60 tablet 5    gabapentin (NEURONTIN) 100 mg capsule Take 1 capsule (100 mg total) by mouth 3 (three) times a day (Patient taking differently: Take 300 mg by mouth 3 (three) times a day) 90 capsule 2    glimepiride (AMARYL) 1 mg tablet Take 1 mg by mouth every morning before breakfast      metolazone (ZAROXOLYN) 5 mg tablet TAKE 1 TABLET BY MOUTH DAILY TAKE 1 TABLET DAILY AS NEEDED. NOT TO EXCEED 3 TABLETS PER WEEK. 30 tablet 1    metoprolol succinate (TOPROL-XL) 25 mg 24 hr tablet TAKE 1 TABLET BY MOUTH TWICE A  tablet 1    Multiple Vitamins-Minerals (CENTRUM SILVER PO) multivitamin      potassium chloride (Klor-Con M20) 20 mEq tablet TAKE 1 TABLET BY MOUTH 3 TIMES A DAY. 90 tablet 1    spironolactone (ALDACTONE) 25 mg tablet Take 0.5 tablets (12.5 mg total) by mouth daily      torsemide (DEMADEX) 20 mg tablet Take 2 tablets (40 mg total) by mouth 2 (two) times a day 360 tablet 3    cephalexin (KEFLEX) 250 mg capsule Take 250 mg by mouth 3 (three) times a day (Patient not taking: Reported on 12/11/2024)      hydrocortisone 2.5 % cream as needed  3    ketoconazole (NIZORAL) 2 % cream as needed  3     metoprolol succinate (TOPROL-XL) 200 MG 24 hr tablet Take 25 mg by mouth in the morning Twice a day (Patient not taking: Reported on 12/11/2024)       No current facility-administered medications on file prior to visit.       Past Medical History:   Diagnosis Date    Arthritis     Colorectal polyps     Constipation     Diabetes mellitus (HCC)     Diverticulosis of colon     Hiatal hernia     Hypertension     Increased urinary frequency     Lumbar disc disease     Pressure injury of skin     SOB (shortness of breath)     Stress incontinence     Vitamin D deficiency 07/27/2018       Past Surgical History:   Procedure Laterality Date    BACK SURGERY      CARDIAC ELECTROPHYSIOLOGY PROCEDURE N/A 7/9/2024    Procedure: Cardiac eps/afib ablation PFA;  Surgeon: Jeyson Delaney MD;  [facility];  Service: Cardiology    CARDIAC ELECTROPHYSIOLOGY PROCEDURE N/A 7/9/2024    Procedure: Cardiac loop recorder implant;  Surgeon: Jeyson Delaney MD;  [facility];  Service: Cardiology    CARPAL TUNNEL RELEASE Left     CATARACT EXTRACTION Bilateral     CHOLECYSTECTOMY      COLONOSCOPY W/ ENDOSCOPIC US N/A 2/24/2016    Procedure: ANAL ENDOSCOPIC U/S;  Surgeon: HOLLIS Washington MD;  Location: [facility];  Service:     HERNIA REPAIR      MN COLONOSCOPY FLX DX W/COLLJ SPEC WHEN PFRMD N/A 2/24/2016    Procedure: COLONOSCOPY;  Surgeon: HOLLIS Washington MD;  Location: [facility];  Service: Colorectal    TONSILLECTOMY      TUBAL LIGATION         Family History   Problem Relation Age of Onset    Hypertension Mother     Heart attack Mother     Diabetes Mother     Prostate cancer Father     No Known Problems Brother     Stroke Maternal Grandmother     Stroke Paternal Grandmother     Stroke Paternal Grandfather     Thyroid cancer Neg Hx        Social History     Socioeconomic History    Marital status: /Civil Union     Spouse name: Not on file    Number of children: Not on file    Years of education:  "Not on file    Highest education level: Not on file   Occupational History    Not on file   Tobacco Use    Smoking status: Never    Smokeless tobacco: Never   Substance and Sexual Activity    Alcohol use: Never    Drug use: No    Sexual activity: Not on file   Other Topics Concern    Not on file   Social History Narrative    Not on file     Social Drivers of Health     Financial Resource Strain: Not on file   Food Insecurity: No Food Insecurity (3/15/2024)    Nursing - Inadequate Food Risk Classification     Worried About Running Out of Food in the Last Year: Never true     Ran Out of Food in the Last Year: Never true     Ran Out of Food in the Last Year: Not on file   Transportation Needs: No Transportation Needs (4/24/2024)    OASIS : Transportation     Lack of Transportation (Medical): No     Lack of Transportation (Non-Medical): No     Patient Unable or Declines to Respond: No   Physical Activity: Not on file   Stress: Not on file   Social Connections: Not on file   Intimate Partner Violence: Not on file   Housing Stability: Low Risk  (3/15/2024)    Housing Stability Vital Sign     Unable to Pay for Housing in the Last Year: No     Number of Times Moved in the Last Year: 1     Homeless in the Last Year: No       OBJECTIVE:    /60 (BP Location: Left arm, Patient Position: Sitting, Cuff Size: Large)   Pulse 90   Ht 5' 3\" (1.6 m)   Wt 104 kg (229 lb 9.6 oz)   SpO2 98%   BMI 40.67 kg/m²      BP Readings from Last 3 Encounters:   12/11/24 120/60   10/22/24 132/86   09/20/24 114/70       Wt Readings from Last 3 Encounters:   12/11/24 104 kg (229 lb 9.6 oz)   10/22/24 104 kg (229 lb)   09/20/24 104 kg (229 lb)         Physical Exam  Vitals reviewed.   Constitutional:       General: She is not in acute distress.     Appearance: Normal appearance. She is not diaphoretic.   HENT:      Head: Normocephalic and atraumatic.   Eyes:      Conjunctiva/sclera: Conjunctivae normal.   Neck:      Vascular: No JVD " (Not well visualized).   Cardiovascular:      Rate and Rhythm: Normal rate and regular rhythm.      Pulses: Normal pulses.      Heart sounds: Normal heart sounds. No murmur heard.     No friction rub. No gallop.   Pulmonary:      Effort: Pulmonary effort is normal.      Breath sounds: Normal breath sounds. No wheezing, rhonchi or rales.   Abdominal:      General: Abdomen is flat. Bowel sounds are normal.   Musculoskeletal:      Right lower leg: Edema present.      Left lower leg: Edema present.   Skin:     General: Skin is warm and dry.   Neurological:      General: No focal deficit present.      Mental Status: She is alert and oriented to person, place, and time.   Psychiatric:         Mood and Affect: Mood normal.         Behavior: Behavior normal.                                                       Cardiac testing:       Results for orders placed during the hospital encounter of 18    Echo complete with contrast if indicated    [place] [address]  [phone]    Transthoracic Echocardiogram  2D, M-mode, Doppler, and Color Doppler    Study date:  28-Dec-2018    Patient: HAN CARVAJAL  MR number: [id number]  Account number: [id number]  : 1945  Age: 73 years  Gender: Female  Status: Outpatient  Location: [address] [place] [address] [facility] [place]  Height: 63 in  Weight: 232 lb  BP: 169/ 76 mmHg    Indications: Heart failure    Diagnoses: I50.9 - Heart failure, unspecified    Sonographer:  DANYELLE Ga  Interpreting Physician:  Kvng Aviles MD  Referring Physician:  Jorden Holt III, MD  Group:  [facility]  Cardiology Fellow:  Von Fuentes MD    SUMMARY    PROCEDURE INFORMATION:  This was a technically difficult study.    LEFT VENTRICLE:  Systolic function was normal. Ejection fraction was estimated to be 60 %.  There were no regional wall motion abnormalities.  Doppler parameters were  consistent with abnormal left ventricular relaxation (grade 1 diastolic dysfunction).    MITRAL VALVE:  There was trace regurgitation.    TRICUSPID VALVE:  There was trace regurgitation.  Pulmonary artery systolic pressure was within the normal range.    PULMONIC VALVE:  There was trace regurgitation.    HISTORY: PRIOR HISTORY: Hypertension, hyperlipidemia, diastolic dysfunction, type 2 diabetes, GERD    PROCEDURE: The study was performed in the [address] [place] [address] [facility] [place]. This was a routine study. The transthoracic approach was used. The study included complete 2D imaging, M-mode, complete spectral Doppler, and color Doppler. This  was a technically difficult study.    LEFT VENTRICLE: Size was normal. Systolic function was normal. Ejection fraction was estimated to be 60 %. There were no regional wall motion abnormalities. Wall thickness was normal. DOPPLER: Doppler parameters were consistent with  abnormal left ventricular relaxation (grade 1 diastolic dysfunction).    RIGHT VENTRICLE: The size was normal. Systolic function was normal. Wall thickness was normal.    LEFT ATRIUM: Size was normal.    RIGHT ATRIUM: Size was normal.    MITRAL VALVE: Valve structure was normal. There was normal leaflet separation. DOPPLER: The transmitral velocity was within the normal range. There was no evidence for stenosis. There was trace regurgitation.    AORTIC VALVE: The valve was probably trileaflet. Leaflets exhibited normal cuspal separation. The valve was not well visualized. DOPPLER: Transaortic velocity was within the normal range. There was no evidence for stenosis. There was no  significant regurgitation.    TRICUSPID VALVE: The valve structure was normal. There was normal leaflet separation. DOPPLER: The transtricuspid velocity was within the normal range. There was no evidence for stenosis. There was trace regurgitation. Pulmonary artery  systolic pressure was within the normal range. Estimated peak PA pressure  was 30 mmHg.    PULMONIC VALVE: Not well visualized. DOPPLER: The transpulmonic velocity was within the normal range. There was trace regurgitation.    PERICARDIUM: There was no pericardial effusion. A pericardial fat pad was present. The pericardium was normal in appearance.    AORTA: The root exhibited normal size.    SYSTEMIC VEINS: IVC: The inferior vena cava was normal in size. Respirophasic changes were normal.    SYSTEM MEASUREMENT TABLES    2D  %FS: 31.39 %  Ao Diam: 3.29 cm  EDV(Teich): 100.54 ml  EF(Cube): 67.7 %  EF(Teich): 59.26 %  ESV(Cube): 32.77 ml  ESV(Teich): 40.96 ml  IVSd: 0.99 cm  LA Area: 16.53 cm2  LA Diam: 3.39 cm  LVEDV MOD A4C: 94.35 ml  LVEF MOD A4C: 68.85 %  LVESV MOD A4C: 29.39 ml  LVIDd: 4.66 cm  LVIDs: 3.2 cm  LVLd A4C: 7.19 cm  LVLs A4C: 5.3 cm  LVPWd: 1.08 cm  RA Area: 13.64 cm2  RV Diam.: 2.6 cm  SV MOD A4C: 64.96 ml  SV(Cube): 68.69 ml  SV(Teich): 59.58 ml    CW  TR Vmax: 2.6 m/s  TR maxP.99 mmHg    MM  TAPSE: 1.84 cm    PW  E': 0.06 m/s  E/E': 14.64  MV A [place]: 0.97 m/s  MV Dec [place]: 3.14 m/s2  MV DecT: 267.31 ms  MV E [place]: 0.84 m/s  MV E/A Ratio: 0.86    [employer] Accredited Echocardiography Laboratory    Prepared and electronically signed by    Kvng Aviles MD  Signed 28-Dec-2018 11:05:05            LABS:  Lab Results   Component Value Date    GLUCOSE 109 2015    BUN 45 (H) 2024    CREATININE 1.57 (H) 2024    CALCIUM 9.8 2024     (L) 2015    K 3.4 (L) 2024    CO2 30 2024    CL 94 (L) 2024    ALKPHOS 82 2024    BILITOT 0.33 2015    PROT 7.6 2015    AST 19 2024    ALT 14 2024    ANIONGAP 5 2015        Lab Results   Component Value Date    WBC 11.63 (H) 2024    HGB 11.8 2024    HCT 37.1 2024    MCV 89 2024     2024       Lab Results   Component Value Date    CHOL 248 2014    HDL 45 (L) 2024    LDLCALC 85 2024    TRIG  "146 03/16/2024       Lab Results   Component Value Date    HGBA1C 6.9 (H) 01/22/2024         ASSESSMENT/PLAN:  Diagnoses and all orders for this visit:    Paroxysmal atrial fibrillation (HCC)    Hypokalemia  -     spironolactone (ALDACTONE) 25 mg tablet; Take 1 tablet (25 mg total) by mouth daily  -     Basic metabolic panel; Future    S/P ablation of atrial fibrillation    Essential hypertension    Hyperlipidemia, unspecified hyperlipidemia type    Stage 3 chronic kidney disease, unspecified whether stage 3a or 3b CKD (HCC)    Bilateral lower extremity edema - Improved compared to baseline.     Chronic diastolic CHF (congestive heart failure) (HCC)  NYHA Class II    Plan:  Continue Eliquis 5mg BID  Continue potassium supplementation TID.  Continue Torsemide 40mg BID  Continue PRN Metolazone up to once weekly, goal dry weight of 224lbs at home.  Continue Spironolactone at a higher dose, now 25mg QD.   Repeat BMP in 2 weeks on this regimen to re-evaluate renal function and electrolytes.   If her creatinine remains >1.5 on this repeat BMP, we will need to reduce her dose of Eliquis to 2.5mg BID for age and creatinine.        Himanshu Webb MD [facility]      Portions of the record may have been created with voice recognition software. Occasional wrong word or \"sound alike\" substitutions may have occurred due to the inherent limitations of voice recognition software. Please review the chart carefully and recognize, using context, where substitutions/typographical errors may have occurred.     "

## 2024-12-16 DIAGNOSIS — I50.32 CHRONIC DIASTOLIC CHF (CONGESTIVE HEART FAILURE) (HCC): ICD-10-CM

## 2024-12-16 NOTE — TELEPHONE ENCOUNTER
Reason for call:   [x] Refill   [] Prior Auth  [] Other:     Office:   [] PCP/Provider -   [x] Specialty/Provider - Cardiology    Medication: metolazone (ZAROXOLYN) 5 mg tablet     Dose/Frequency: TAKE 1 TABLET BY MOUTH DAILY TAKE 1 TABLET DAILY AS NEEDED     Quantity: 30 tablet    Pharmacy: [facility]/pharmacy #[id number] - [facility] [facility]     Does the patient have enough for 3 days?   [x] Yes   [] No - Send as HP to POD

## 2024-12-17 ENCOUNTER — LAB REQUISITION (OUTPATIENT)
Dept: LAB | Facility: HOSPITAL | Age: 79
End: 2024-12-17
Payer: COMMERCIAL

## 2024-12-17 DIAGNOSIS — I50.9 HEART FAILURE, UNSPECIFIED (HCC): ICD-10-CM

## 2024-12-17 DIAGNOSIS — R60.1 GENERALIZED EDEMA: ICD-10-CM

## 2024-12-17 DIAGNOSIS — I10 ESSENTIAL (PRIMARY) HYPERTENSION: ICD-10-CM

## 2024-12-17 LAB
ANION GAP SERPL CALCULATED.3IONS-SCNC: 13 MMOL/L (ref 4–13)
BUN SERPL-MCNC: 42 MG/DL (ref 5–25)
CALCIUM SERPL-MCNC: 9.7 MG/DL (ref 8.4–10.2)
CHLORIDE SERPL-SCNC: 96 MMOL/L (ref 96–108)
CO2 SERPL-SCNC: 30 MMOL/L (ref 21–32)
CREAT SERPL-MCNC: 1.73 MG/DL (ref 0.6–1.3)
GFR SERPL CREATININE-BSD FRML MDRD: 27 ML/MIN/1.73SQ M
GLUCOSE SERPL-MCNC: 202 MG/DL (ref 65–140)
POTASSIUM SERPL-SCNC: 3.8 MMOL/L (ref 3.5–5.3)
SODIUM SERPL-SCNC: 139 MMOL/L (ref 135–147)

## 2024-12-17 PROCEDURE — 80048 BASIC METABOLIC PNL TOTAL CA: CPT | Performed by: INTERNAL MEDICINE

## 2024-12-17 RX ORDER — METOLAZONE 5 MG/1
TABLET ORAL
Qty: 30 TABLET | Refills: 1 | Status: SHIPPED | OUTPATIENT
Start: 2024-12-17

## 2024-12-30 ENCOUNTER — APPOINTMENT (OUTPATIENT)
Dept: LAB | Facility: CLINIC | Age: 79
End: 2024-12-30
Payer: COMMERCIAL

## 2024-12-30 DIAGNOSIS — E87.6 HYPOKALEMIA: ICD-10-CM

## 2024-12-30 LAB
ANION GAP SERPL CALCULATED.3IONS-SCNC: 12 MMOL/L (ref 4–13)
BUN SERPL-MCNC: 38 MG/DL (ref 5–25)
CALCIUM SERPL-MCNC: 9.5 MG/DL (ref 8.4–10.2)
CHLORIDE SERPL-SCNC: 97 MMOL/L (ref 96–108)
CO2 SERPL-SCNC: 29 MMOL/L (ref 21–32)
CREAT SERPL-MCNC: 1.67 MG/DL (ref 0.6–1.3)
GFR SERPL CREATININE-BSD FRML MDRD: 28 ML/MIN/1.73SQ M
GLUCOSE P FAST SERPL-MCNC: 181 MG/DL (ref 65–99)
POTASSIUM SERPL-SCNC: 4 MMOL/L (ref 3.5–5.3)
SODIUM SERPL-SCNC: 138 MMOL/L (ref 135–147)

## 2024-12-30 PROCEDURE — 80048 BASIC METABOLIC PNL TOTAL CA: CPT

## 2024-12-30 PROCEDURE — 36415 COLL VENOUS BLD VENIPUNCTURE: CPT

## 2025-01-19 DIAGNOSIS — I50.32 CHRONIC DIASTOLIC CHF (CONGESTIVE HEART FAILURE) (HCC): ICD-10-CM

## 2025-01-20 RX ORDER — METOLAZONE 5 MG/1
TABLET ORAL
Qty: 27 TABLET | Refills: 1 | Status: SHIPPED | OUTPATIENT
Start: 2025-01-20

## 2025-01-24 ENCOUNTER — RESULTS FOLLOW-UP (OUTPATIENT)
Dept: NON INVASIVE DIAGNOSTICS | Facility: HOSPITAL | Age: 80
End: 2025-01-24

## 2025-01-24 ENCOUNTER — REMOTE DEVICE CLINIC VISIT (OUTPATIENT)
Dept: CARDIOLOGY CLINIC | Facility: CLINIC | Age: 80
End: 2025-01-24
Payer: COMMERCIAL

## 2025-01-24 DIAGNOSIS — I48.0 PAROXYSMAL ATRIAL FIBRILLATION (HCC): Primary | ICD-10-CM

## 2025-01-24 PROCEDURE — 93298 REM INTERROG DEV EVAL SCRMS: CPT | Performed by: STUDENT IN AN ORGANIZED HEALTH CARE EDUCATION/TRAINING PROGRAM

## 2025-01-24 NOTE — PROGRESS NOTES
"MDT LNQ22 ILR/ ACTIVE SYSTEM IS MRI CONDITIONAL   CARELINK TRANSMISSION: LOOP RECORDER. PRESENTING RYTHM NSR @ 60 BPM. BATTERY STATUS \"OK.\" NO PATIENT OR DEVICE ACTIVATED EPISODES. HOWEVER THERE IS AN EGRAM SHOWING NOISE. NORMAL DEVICE FUNCTION. DL   "

## 2025-01-26 DIAGNOSIS — I48.0 PAF (PAROXYSMAL ATRIAL FIBRILLATION) (HCC): ICD-10-CM

## 2025-01-27 RX ORDER — METOPROLOL SUCCINATE 25 MG/1
25 TABLET, EXTENDED RELEASE ORAL 2 TIMES DAILY
Qty: 180 TABLET | Refills: 1 | Status: SHIPPED | OUTPATIENT
Start: 2025-01-27

## 2025-02-02 DIAGNOSIS — I10 ESSENTIAL HYPERTENSION: ICD-10-CM

## 2025-02-02 DIAGNOSIS — I50.32 CHRONIC DIASTOLIC CHF (CONGESTIVE HEART FAILURE) (HCC): Chronic | ICD-10-CM

## 2025-02-02 DIAGNOSIS — I48.91 ATRIAL FIBRILLATION WITH RVR (HCC): ICD-10-CM

## 2025-02-03 RX ORDER — POTASSIUM CHLORIDE 1500 MG/1
20 TABLET, EXTENDED RELEASE ORAL 3 TIMES DAILY
Qty: 90 TABLET | Refills: 5 | Status: SHIPPED | OUTPATIENT
Start: 2025-02-03

## 2025-02-10 DIAGNOSIS — I50.32 CHRONIC DIASTOLIC CHF (CONGESTIVE HEART FAILURE) (HCC): ICD-10-CM

## 2025-02-11 RX ORDER — METOLAZONE 5 MG/1
TABLET ORAL
Qty: 36 TABLET | Refills: 0 | Status: SHIPPED | OUTPATIENT
Start: 2025-02-11

## 2025-02-12 ENCOUNTER — APPOINTMENT (OUTPATIENT)
Dept: LAB | Facility: CLINIC | Age: 80
End: 2025-02-12
Payer: COMMERCIAL

## 2025-02-12 DIAGNOSIS — N39.0 URINARY TRACT INFECTION WITHOUT HEMATURIA, SITE UNSPECIFIED: ICD-10-CM

## 2025-02-12 PROCEDURE — 87086 URINE CULTURE/COLONY COUNT: CPT

## 2025-02-12 PROCEDURE — 81001 URINALYSIS AUTO W/SCOPE: CPT

## 2025-02-13 ENCOUNTER — TELEPHONE (OUTPATIENT)
Age: 80
End: 2025-02-13

## 2025-02-13 LAB — BACTERIA UR CULT: NORMAL

## 2025-02-13 NOTE — TELEPHONE ENCOUNTER
Pt called to find out if Dr. Webb was notified about epidural injection and gave permission to hold Eliquis.  Advised pt of CC letter, advised she is cleared to hold Eliquis 3 days prior and that letter should have been faxed to [facility].

## 2025-02-24 ENCOUNTER — APPOINTMENT (OUTPATIENT)
Dept: LAB | Facility: CLINIC | Age: 80
DRG: 641 | End: 2025-02-24
Payer: COMMERCIAL

## 2025-02-24 ENCOUNTER — APPOINTMENT (EMERGENCY)
Dept: RADIOLOGY | Facility: HOSPITAL | Age: 80
DRG: 641 | End: 2025-02-24
Payer: COMMERCIAL

## 2025-02-24 ENCOUNTER — HOSPITAL ENCOUNTER (INPATIENT)
Facility: HOSPITAL | Age: 80
LOS: 1 days | Discharge: HOME | DRG: 641 | End: 2025-02-25
Attending: EMERGENCY MEDICINE
Payer: COMMERCIAL

## 2025-02-24 DIAGNOSIS — I50.32 CHRONIC DIASTOLIC CHF (CONGESTIVE HEART FAILURE) (HCC): Primary | Chronic | ICD-10-CM

## 2025-02-24 DIAGNOSIS — I50.9 HEART FAILURE, UNSPECIFIED HF CHRONICITY, UNSPECIFIED HEART FAILURE TYPE (HCC): ICD-10-CM

## 2025-02-24 DIAGNOSIS — R26.2 AMBULATORY DYSFUNCTION: ICD-10-CM

## 2025-02-24 DIAGNOSIS — M25.552 LEFT HIP PAIN: ICD-10-CM

## 2025-02-24 DIAGNOSIS — R53.83 FATIGUE: ICD-10-CM

## 2025-02-24 DIAGNOSIS — R55 NEAR SYNCOPE: ICD-10-CM

## 2025-02-24 DIAGNOSIS — E13.9 DIABETES MELLITUS OF OTHER TYPE WITHOUT COMPLICATION, UNSPECIFIED WHETHER LONG TERM INSULIN USE (HCC): ICD-10-CM

## 2025-02-24 DIAGNOSIS — I48.91 ATRIAL FIBRILLATION (HCC): ICD-10-CM

## 2025-02-24 DIAGNOSIS — I50.33 ACUTE ON CHRONIC DIASTOLIC (CONGESTIVE) HEART FAILURE (HCC): ICD-10-CM

## 2025-02-24 PROBLEM — E11.9 DM II (DIABETES MELLITUS, TYPE II), CONTROLLED (HCC): Status: ACTIVE | Noted: 2018-07-27

## 2025-02-24 PROBLEM — R00.2 PALPITATIONS: Status: ACTIVE | Noted: 2025-02-24

## 2025-02-24 PROBLEM — E66.01 CLASS 3 SEVERE OBESITY WITHOUT SERIOUS COMORBIDITY WITH BODY MASS INDEX (BMI) OF 40.0 TO 44.9 IN ADULT (HCC): Status: ACTIVE | Noted: 2025-02-24

## 2025-02-24 PROBLEM — I48.0 PAF (PAROXYSMAL ATRIAL FIBRILLATION) (HCC): Status: ACTIVE | Noted: 2024-04-17

## 2025-02-24 PROBLEM — I50.30 (HFPEF) HEART FAILURE WITH PRESERVED EJECTION FRACTION (HCC): Status: ACTIVE | Noted: 2025-02-24

## 2025-02-24 PROBLEM — E66.813 CLASS 3 SEVERE OBESITY WITHOUT SERIOUS COMORBIDITY WITH BODY MASS INDEX (BMI) OF 40.0 TO 44.9 IN ADULT (HCC): Status: ACTIVE | Noted: 2025-02-24

## 2025-02-24 LAB
2HR DELTA HS TROPONIN: -1 NG/L
ALBUMIN SERPL BCG-MCNC: 4.2 G/DL (ref 3.5–5)
ALP SERPL-CCNC: 80 U/L (ref 34–104)
ALT SERPL W P-5'-P-CCNC: 19 U/L (ref 7–52)
ANION GAP SERPL CALCULATED.3IONS-SCNC: 16 MMOL/L (ref 4–13)
ANION GAP SERPL CALCULATED.3IONS-SCNC: 16 MMOL/L (ref 4–13)
AST SERPL W P-5'-P-CCNC: 34 U/L (ref 13–39)
BACTERIA UR QL AUTO: ABNORMAL /HPF
BASOPHILS # BLD AUTO: 0.04 THOUSANDS/ÂΜL (ref 0–0.1)
BASOPHILS NFR BLD AUTO: 0 % (ref 0–1)
BILIRUB SERPL-MCNC: 0.55 MG/DL (ref 0.2–1)
BILIRUB UR QL STRIP: NEGATIVE
BNP SERPL-MCNC: 31 PG/ML (ref 0–100)
BNP SERPL-MCNC: 36 PG/ML (ref 0–100)
BUN SERPL-MCNC: 57 MG/DL (ref 5–25)
BUN SERPL-MCNC: 61 MG/DL (ref 5–25)
CALCIUM SERPL-MCNC: 10 MG/DL (ref 8.4–10.2)
CALCIUM SERPL-MCNC: 9.8 MG/DL (ref 8.4–10.2)
CARDIAC TROPONIN I PNL SERPL HS: 8 NG/L (ref ?–50)
CARDIAC TROPONIN I PNL SERPL HS: 9 NG/L (ref ?–50)
CHLORIDE SERPL-SCNC: 93 MMOL/L (ref 96–108)
CHLORIDE SERPL-SCNC: 93 MMOL/L (ref 96–108)
CLARITY UR: ABNORMAL
CO2 SERPL-SCNC: 25 MMOL/L (ref 21–32)
CO2 SERPL-SCNC: 28 MMOL/L (ref 21–32)
COLOR UR: YELLOW
CREAT SERPL-MCNC: 1.63 MG/DL (ref 0.6–1.3)
CREAT SERPL-MCNC: 1.71 MG/DL (ref 0.6–1.3)
EOSINOPHIL # BLD AUTO: 0.03 THOUSAND/ÂΜL (ref 0–0.61)
EOSINOPHIL NFR BLD AUTO: 0 % (ref 0–6)
ERYTHROCYTE [DISTWIDTH] IN BLOOD BY AUTOMATED COUNT: 15 % (ref 11.6–15.1)
EST. AVERAGE GLUCOSE BLD GHB EST-MCNC: 186 MG/DL
GFR SERPL CREATININE-BSD FRML MDRD: 27 ML/MIN/1.73SQ M
GFR SERPL CREATININE-BSD FRML MDRD: 29 ML/MIN/1.73SQ M
GLUCOSE P FAST SERPL-MCNC: 139 MG/DL (ref 65–99)
GLUCOSE SERPL-MCNC: 170 MG/DL (ref 65–140)
GLUCOSE SERPL-MCNC: 246 MG/DL (ref 65–140)
GLUCOSE UR STRIP-MCNC: NEGATIVE MG/DL
HBA1C MFR BLD: 8.1 %
HCT VFR BLD AUTO: 41.2 % (ref 34.8–46.1)
HGB BLD-MCNC: 13.6 G/DL (ref 11.5–15.4)
HGB UR QL STRIP.AUTO: ABNORMAL
IMM GRANULOCYTES # BLD AUTO: 0.07 THOUSAND/UL (ref 0–0.2)
IMM GRANULOCYTES NFR BLD AUTO: 0 % (ref 0–2)
KETONES UR STRIP-MCNC: NEGATIVE MG/DL
LEUKOCYTE ESTERASE UR QL STRIP: ABNORMAL
LYMPHOCYTES # BLD AUTO: 1.69 THOUSANDS/ÂΜL (ref 0.6–4.47)
LYMPHOCYTES NFR BLD AUTO: 11 % (ref 14–44)
MCH RBC QN AUTO: 28.9 PG (ref 26.8–34.3)
MCHC RBC AUTO-ENTMCNC: 33 G/DL (ref 31.4–37.4)
MCV RBC AUTO: 88 FL (ref 82–98)
MONOCYTES # BLD AUTO: 0.8 THOUSAND/ÂΜL (ref 0.17–1.22)
MONOCYTES NFR BLD AUTO: 5 % (ref 4–12)
NEUTROPHILS # BLD AUTO: 13.08 THOUSANDS/ÂΜL (ref 1.85–7.62)
NEUTS SEG NFR BLD AUTO: 84 % (ref 43–75)
NITRITE UR QL STRIP: NEGATIVE
NON-SQ EPI CELLS URNS QL MICRO: ABNORMAL /HPF
NRBC BLD AUTO-RTO: 0 /100 WBCS
PH UR STRIP.AUTO: 6 [PH]
PLATELET # BLD AUTO: 466 THOUSANDS/UL (ref 149–390)
PMV BLD AUTO: 8.9 FL (ref 8.9–12.7)
POTASSIUM SERPL-SCNC: 3.5 MMOL/L (ref 3.5–5.3)
POTASSIUM SERPL-SCNC: 4.9 MMOL/L (ref 3.5–5.3)
PROT SERPL-MCNC: 8.1 G/DL (ref 6.4–8.4)
PROT UR STRIP-MCNC: ABNORMAL MG/DL
RBC # BLD AUTO: 4.7 MILLION/UL (ref 3.81–5.12)
RBC #/AREA URNS AUTO: ABNORMAL /HPF
SODIUM SERPL-SCNC: 134 MMOL/L (ref 135–147)
SODIUM SERPL-SCNC: 137 MMOL/L (ref 135–147)
SP GR UR STRIP.AUTO: 1.01 (ref 1–1.03)
TSH SERPL DL<=0.05 MIU/L-ACNC: 1.69 UIU/ML (ref 0.45–4.5)
UROBILINOGEN UR STRIP-ACNC: <2 MG/DL
WBC # BLD AUTO: 15.71 THOUSAND/UL (ref 4.31–10.16)
WBC #/AREA URNS AUTO: ABNORMAL /HPF
WBC CLUMPS # UR AUTO: PRESENT /UL

## 2025-02-24 PROCEDURE — 83036 HEMOGLOBIN GLYCOSYLATED A1C: CPT

## 2025-02-24 PROCEDURE — 99222 1ST HOSP IP/OBS MODERATE 55: CPT | Performed by: INTERNAL MEDICINE

## 2025-02-24 PROCEDURE — 99222 1ST HOSP IP/OBS MODERATE 55: CPT

## 2025-02-24 PROCEDURE — 84484 ASSAY OF TROPONIN QUANT: CPT

## 2025-02-24 PROCEDURE — 83880 ASSAY OF NATRIURETIC PEPTIDE: CPT

## 2025-02-24 PROCEDURE — 36415 COLL VENOUS BLD VENIPUNCTURE: CPT

## 2025-02-24 PROCEDURE — 87086 URINE CULTURE/COLONY COUNT: CPT

## 2025-02-24 PROCEDURE — 82948 REAGENT STRIP/BLOOD GLUCOSE: CPT

## 2025-02-24 PROCEDURE — 81001 URINALYSIS AUTO W/SCOPE: CPT

## 2025-02-24 PROCEDURE — 99285 EMERGENCY DEPT VISIT HI MDM: CPT

## 2025-02-24 PROCEDURE — 80048 BASIC METABOLIC PNL TOTAL CA: CPT

## 2025-02-24 PROCEDURE — 87186 SC STD MICRODIL/AGAR DIL: CPT

## 2025-02-24 PROCEDURE — 80053 COMPREHEN METABOLIC PANEL: CPT

## 2025-02-24 PROCEDURE — 85025 COMPLETE CBC W/AUTO DIFF WBC: CPT

## 2025-02-24 PROCEDURE — 71046 X-RAY EXAM CHEST 2 VIEWS: CPT

## 2025-02-24 PROCEDURE — 84443 ASSAY THYROID STIM HORMONE: CPT

## 2025-02-24 PROCEDURE — 99285 EMERGENCY DEPT VISIT HI MDM: CPT | Performed by: EMERGENCY MEDICINE

## 2025-02-24 PROCEDURE — 87077 CULTURE AEROBIC IDENTIFY: CPT

## 2025-02-24 PROCEDURE — 93005 ELECTROCARDIOGRAM TRACING: CPT

## 2025-02-24 PROCEDURE — 96360 HYDRATION IV INFUSION INIT: CPT

## 2025-02-24 RX ORDER — TORSEMIDE 20 MG/1
40 TABLET ORAL 2 TIMES DAILY
Status: DISCONTINUED | OUTPATIENT
Start: 2025-02-24 | End: 2025-02-25 | Stop reason: HOSPADM

## 2025-02-24 RX ORDER — EZETIMIBE 10 MG/1
10 TABLET ORAL
Status: DISCONTINUED | OUTPATIENT
Start: 2025-02-24 | End: 2025-02-25 | Stop reason: HOSPADM

## 2025-02-24 RX ORDER — METOPROLOL SUCCINATE 25 MG/1
25 TABLET, EXTENDED RELEASE ORAL 2 TIMES DAILY
Status: DISCONTINUED | OUTPATIENT
Start: 2025-02-24 | End: 2025-02-25 | Stop reason: HOSPADM

## 2025-02-24 RX ORDER — BIMATOPROST 0.3 MG/ML
1 SOLUTION/ DROPS OPHTHALMIC DAILY
Status: DISCONTINUED | OUTPATIENT
Start: 2025-02-24 | End: 2025-02-25 | Stop reason: HOSPADM

## 2025-02-24 RX ORDER — MAGNESIUM HYDROXIDE/ALUMINUM HYDROXICE/SIMETHICONE 120; 1200; 1200 MG/30ML; MG/30ML; MG/30ML
30 SUSPENSION ORAL EVERY 6 HOURS PRN
Status: DISCONTINUED | OUTPATIENT
Start: 2025-02-24 | End: 2025-02-25 | Stop reason: HOSPADM

## 2025-02-24 RX ORDER — ONDANSETRON 2 MG/ML
4 INJECTION INTRAMUSCULAR; INTRAVENOUS EVERY 6 HOURS PRN
Status: DISCONTINUED | OUTPATIENT
Start: 2025-02-24 | End: 2025-02-25 | Stop reason: HOSPADM

## 2025-02-24 RX ORDER — INSULIN LISPRO 100 [IU]/ML
1-6 INJECTION, SOLUTION INTRAVENOUS; SUBCUTANEOUS
Status: DISCONTINUED | OUTPATIENT
Start: 2025-02-25 | End: 2025-02-25 | Stop reason: HOSPADM

## 2025-02-24 RX ORDER — POTASSIUM CHLORIDE 1500 MG/1
20 TABLET, EXTENDED RELEASE ORAL ONCE
Status: COMPLETED | OUTPATIENT
Start: 2025-02-24 | End: 2025-02-24

## 2025-02-24 RX ORDER — ACETAMINOPHEN 325 MG/1
650 TABLET ORAL EVERY 6 HOURS PRN
Status: DISCONTINUED | OUTPATIENT
Start: 2025-02-24 | End: 2025-02-25 | Stop reason: HOSPADM

## 2025-02-24 RX ORDER — INSULIN LISPRO 100 [IU]/ML
1-5 INJECTION, SOLUTION INTRAVENOUS; SUBCUTANEOUS
Status: DISCONTINUED | OUTPATIENT
Start: 2025-02-24 | End: 2025-02-25 | Stop reason: HOSPADM

## 2025-02-24 RX ORDER — SENNOSIDES 8.6 MG
1 TABLET ORAL DAILY
Status: DISCONTINUED | OUTPATIENT
Start: 2025-02-25 | End: 2025-02-25 | Stop reason: HOSPADM

## 2025-02-24 RX ORDER — GABAPENTIN 300 MG/1
300 CAPSULE ORAL 3 TIMES DAILY
Status: DISCONTINUED | OUTPATIENT
Start: 2025-02-24 | End: 2025-02-25 | Stop reason: HOSPADM

## 2025-02-24 RX ORDER — MAGNESIUM SULFATE 1 G/100ML
1 INJECTION INTRAVENOUS ONCE
Status: COMPLETED | OUTPATIENT
Start: 2025-02-24 | End: 2025-02-25

## 2025-02-24 RX ORDER — SPIRONOLACTONE 25 MG/1
25 TABLET ORAL DAILY
Status: DISCONTINUED | OUTPATIENT
Start: 2025-02-25 | End: 2025-02-25 | Stop reason: HOSPADM

## 2025-02-24 RX ADMIN — INSULIN LISPRO 1 UNITS: 100 INJECTION, SOLUTION INTRAVENOUS; SUBCUTANEOUS at 22:53

## 2025-02-24 RX ADMIN — TORSEMIDE 40 MG: 20 TABLET ORAL at 20:39

## 2025-02-24 RX ADMIN — POTASSIUM CHLORIDE 20 MEQ: 1500 TABLET, EXTENDED RELEASE ORAL at 23:42

## 2025-02-24 RX ADMIN — EZETIMIBE 10 MG: 10 TABLET ORAL at 21:04

## 2025-02-24 RX ADMIN — METOPROLOL SUCCINATE 25 MG: 25 TABLET, EXTENDED RELEASE ORAL at 19:00

## 2025-02-24 RX ADMIN — GABAPENTIN 300 MG: 300 CAPSULE ORAL at 20:39

## 2025-02-24 RX ADMIN — MAGNESIUM SULFATE HEPTAHYDRATE 1 G: 1 INJECTION, SOLUTION INTRAVENOUS at 23:42

## 2025-02-24 RX ADMIN — SODIUM CHLORIDE 500 ML: 0.9 INJECTION, SOLUTION INTRAVENOUS at 14:38

## 2025-02-24 RX ADMIN — GABAPENTIN 300 MG: 300 CAPSULE ORAL at 19:00

## 2025-02-24 NOTE — ASSESSMENT & PLAN NOTE
Complicates all facets of care  Given history of diabetes, heart failure, recommend PCP to consider GLP-1 agonist therapy

## 2025-02-24 NOTE — ASSESSMENT & PLAN NOTE
Addended by: ELI MEDRANO on: 2/14/2023 02:19 PM     Modules accepted: Orders     New diagnosis March 2024 after stay for lower back pain  S/p PHU/DCCV in March  Started on flecainide April after readmission for RVR  Flecainide was stopped due to fatigue and dizziness in May  Anticoagulated on Eliquis 5 mg twice daily  S/p PFA ablation of PVI posterior wall with loop recorder implantation  No significant A-fib noted on loop interrogation today  Will decrease Eliquis to 2.5 mg twice daily in the setting of age, and renal function

## 2025-02-24 NOTE — ASSESSMENT & PLAN NOTE
Noted on AM of admission 2/24. Felt strong run of palpitations w pulsations to her ears b/l, denied lightheadedness/dizziness.    EKG in ER showed normal sinus rhythm, no AV block  Also status post 500 mL bolus in ER    Status post device interrogation without any events/abnormal rhythms noted by EP team    Given this occurred right as patient was standing up, suspect this was orthostatic.    Plan:  Admit for obs overnight, maintain on telemetry  Appreciate EP recs  No medication changes at this time  Follow-up TTE  Daily orthostatics

## 2025-02-24 NOTE — ASSESSMENT & PLAN NOTE
Wt Readings from Last 3 Encounters:   12/11/24 104 kg (229 lb 9.6 oz)   10/22/24 104 kg (229 lb)   09/20/24 104 kg (229 lb)       Not in acute exacerbation.  Clinically, appears euvolemic.  Has no relevant complaints.  Last echo 2024 March showed LVEF 60%, possible diastolic dysfunction but limited by quality of study.  RV function was normal.    Plan:  Continue torsemide 40 mg twice daily home dose, spironolactone 25 mg p.o. daily, metoprolol 25 mg twice daily  Only home medication being held is metolazone, last taken 3 days prior to admission as patient states that this caused some overdiuresis  Follow-up echo from this admission

## 2025-02-24 NOTE — ED PROVIDER NOTES
Time reflects when diagnosis was documented in both MDM as applicable and the Disposition within this note       Time User Action Codes Description Comment    2/24/2025  2:56 PM Sharon Ortiz Add [I48.91] Atrial fibrillation (HCC)     2/24/2025  3:21 PM Arturo Dangelo Modify [I48.91] Atrial fibrillation (HCC)     2/24/2025  3:21 PM Arturo Dangelo Add [I50.32] Chronic diastolic CHF (congestive heart failure) (HCC)     2/24/2025  4:13 PM Sharon Ortiz Add [R55] Near syncope     2/24/2025  4:14 PM Sharon Ortiz Add [R53.83] Fatigue           ED Disposition       ED Disposition   Admit    Condition   Stable    Date/Time   Mon Feb 24, 2025  4:09 PM    Comment   Case was discussed with AMARI and the patient's admission status was agreed to be Admission Status:               Assessment & Plan       Medical Decision Making  Patient is a 79 y.o. female  who presents to the ED with presyncopal episode.    Vital signs stable. Exam as listed below.    History and physical exam are most consistent with dehydration or vasovagal episode.  Differential diagnosis also includes but is not limited to CHF, electrolyte abnormality, atrial fibrillation or SVT, other arrhythmia.  Doubt PE.    Plan   CBC to evaluate for anemia, evaluate for leukocytosis as sign of infectious source of symptoms.  CMP to evaluate for electrolyte derangement, assess renal and hepatic function.  Troponin to evaluate for cardiac ischemia, rule out NSTEMI.  BNP to evaluate for CHF and fluid overload.  UA to evaluate for UTI, concentration.  ECG to evaluate for arrhythmia, heart block, ST changes to rule out STEMI, pericarditis.  CXR to evaluate for pneumonia, pneumothorax, pleural effusion, signs of fluid overload.    View ED course below for further discussion on patient workup.     On review of previous records last echo showed EF of 60%.    All labs reviewed and utilized in the medical decision making process  All radiology studies independently  "viewed by me and interpreted by the radiologist.  I reviewed all testing with the patient.     Upon re-evaluation admitted to [facility]; cardiology contacted for device interrogation. Patient stable at time of admission. Without palpitations, lightheadedness, still fatigued.     Portions of the record may have been created with voice recognition software. Occasional wrong word or \"sound a like\" substitutions may have occurred due to the inherent limitations of voice recognition software. Read the chart carefully and recognize, using context, where substitutions have occurred.       Amount and/or Complexity of Data Reviewed  Labs: ordered. Decision-making details documented in ED Course.  Radiology: ordered.    Risk  Decision regarding hospitalization.        ED Course as of 02/24/25 2308   Mon Feb 24, 2025   1348 WBC(!): 15.71   1523 ANION GAP(!): 16   1524 Creatinine(!): 1.71   1524 BUN(!): 61   1524 hs TnI 0hr: 9   1524 BNP: 36  Per EP - does not appear to be SVT or atrial fibrillation episode this morning on interrogation   2259 Admitted to [facility] for presyncopal episode and palpitations; history of a fib s/p ablation and SVT       Medications   sodium chloride 0.9 % bolus 500 mL (0 mL Intravenous Stopped 2/24/25 1540)       ED Risk Strat Scores                            SBIRT 22yo+      Flowsheet Row Most Recent Value   Initial Alcohol Screen: US AUDIT-C     1. How often do you have a drink containing alcohol? 0 Filed at: 02/24/2025 1320   2. How many drinks containing alcohol do you have on a typical day you are drinking?  0 Filed at: 02/24/2025 1320   3a. Male UNDER 65: How often do you have five or more drinks on one occasion? 0 Filed at: 02/24/2025 1320   3b. FEMALE Any Age, or MALE 65+: How often do you have 4 or more drinks on one occassion? 0 Filed at: 02/24/2025 1320   Audit-C Score 0 Filed at: 02/24/2025 1320   SHANNON: How many times in the past year have you...    Used an illegal drug or used a prescription " medication for non-medical reasons? Never Filed at: 02/24/2025 1320                            History of Present Illness       Chief Complaint   Patient presents with    Dizziness     Sudden onset dizziness while eating breakfast. Pt reports feeling palpitations. Hx of Afib, had an ablation last year.  Denies CP, denies SOB.        Past Medical History:   Diagnosis Date    Arthritis     Colorectal polyps     Constipation     Diabetes mellitus (HCC)     Diverticulosis of colon     Hiatal hernia     Hypertension     Increased urinary frequency     Lumbar disc disease     Pressure injury of skin     SOB (shortness of breath)     Stress incontinence     Vitamin D deficiency 07/27/2018      Past Surgical History:   Procedure Laterality Date    BACK SURGERY      CARDIAC ELECTROPHYSIOLOGY PROCEDURE N/A 7/9/2024    Procedure: Cardiac eps/afib ablation PFA;  Surgeon: Jeyson Delaney MD;  Location: [facility];  Service: Cardiology    CARDIAC ELECTROPHYSIOLOGY PROCEDURE N/A 7/9/2024    Procedure: Cardiac loop recorder implant;  Surgeon: Jeyson Delaney MD;  Location: [facility];  Service: Cardiology    CARPAL TUNNEL RELEASE Left     CATARACT EXTRACTION Bilateral     CHOLECYSTECTOMY      COLONOSCOPY W/ ENDOSCOPIC US N/A 2/24/2016    Procedure: ANAL ENDOSCOPIC U/S;  Surgeon: HOLLIS Washington MD;  Location: [facility];  Service:     HERNIA REPAIR      AK COLONOSCOPY FLX DX W/COLLJ SPEC WHEN PFRMD N/A 2/24/2016    Procedure: COLONOSCOPY;  Surgeon: HOLLIS Washington MD;  Location: [facility];  Service: Colorectal    TONSILLECTOMY      TUBAL LIGATION        Family History   Problem Relation Age of Onset    Hypertension Mother     Heart attack Mother     Diabetes Mother     Prostate cancer Father     No Known Problems Brother     Stroke Maternal Grandmother     Stroke Paternal Grandmother     Stroke Paternal Grandfather     Thyroid cancer Neg Hx       Social History     Tobacco Use    Smoking status: Never     Smokeless tobacco: Never   Substance Use Topics    Alcohol use: Never    Drug use: No      E-Cigarette/Vaping      E-Cigarette/Vaping Substances      I have reviewed and agree with the history as documented.     She is an 79-year-old female, past medical history of atrial fibrillation s/p ablation, SVT, history of stroke, CKD, T2DM, HTN, presenting today after a presyncopal episode.  Patient continues to feel fatigued.  Patient denies dizziness/room spinning, states she became significantly lightheaded and felt like she was about to pass out.  This occurred while she was paying her bill at a restaurant this morning.  Patient had not gotten a small amount of blood work drawn, went to the restaurant and had eaten breakfast, drink orange juice and water, and then after standing had the episode of lightheadedness, felt like her heart was racing and pounding, said she could feel her heartbeat in her ears.  This is similar to what she felt like when she was having runs of SVT in the fall.  She takes a large amount of diuretic, states she is using the bathroom upwards of 30 times per day.  She is short of breath at baseline, states that she can barely walk between her bathroom in her living room without becoming short of breath, states that her heart rate jumps to about 120 and stays there for a while after she sits down and relaxes again.    Patient denies nausea, vomiting, diarrhea, constipation, recent URI or GI illness, fever, chills, sweats, chest pain.        History provided by:  Patient and relative  Dizziness      Review of Systems   Neurological:  Positive for dizziness.           Objective       ED Triage Vitals [02/24/25 1315]   Temperature Pulse Blood Pressure Respirations SpO2 Patient Position - Orthostatic VS   97.8 °F (36.6 °C) 89 146/65 20 99 % --      Temp Source Heart Rate Source BP Location FiO2 (%) Pain Score    Oral Monitor -- -- No Pain      Vitals      Date and Time Temp Pulse SpO2 Resp BP Pain  Score FACES Pain Rating User   02/24/25 1530 -- 84 98 % 21 -- No Pain -- JR   02/24/25 1315 97.8 °F (36.6 °C) 89 99 % 20 146/65 No Pain --             Physical Exam  Vitals and nursing note reviewed.   Constitutional:       General: She is not in acute distress.     Appearance: Normal appearance. She is well-developed. She is obese. She is not ill-appearing or diaphoretic.   HENT:      Head: Normocephalic and atraumatic.      Nose: Nose normal.      Mouth/Throat:      Mouth: Mucous membranes are dry.   Eyes:      Extraocular Movements: Extraocular movements intact.      Conjunctiva/sclera: Conjunctivae normal.      Pupils: Pupils are equal, round, and reactive to light.   Cardiovascular:      Rate and Rhythm: Normal rate and regular rhythm.      Pulses: Normal pulses.      Heart sounds: Normal heart sounds. No murmur heard.  Pulmonary:      Effort: Pulmonary effort is normal. No respiratory distress.      Breath sounds: Normal breath sounds. No wheezing, rhonchi or rales.   Abdominal:      General: There is no distension.      Palpations: Abdomen is soft.      Tenderness: There is no abdominal tenderness. There is no right CVA tenderness or left CVA tenderness.   Musculoskeletal:         General: No swelling.      Cervical back: Neck supple.      Right lower leg: No edema.      Left lower leg: No edema.   Skin:     General: Skin is warm and dry.      Capillary Refill: Capillary refill takes less than 2 seconds.   Neurological:      General: No focal deficit present.      Mental Status: She is alert and oriented to person, place, and time.      Sensory: No sensory deficit.      Motor: No weakness.   Psychiatric:         Mood and Affect: Mood normal.         Results Reviewed       Procedure Component Value Units Date/Time    HS Troponin I 2hr [[id number]]  (Normal) Collected: 02/24/25 1540    Lab Status: Final result Specimen: Blood from Arm, Left Updated: 02/24/25 1612     hs TnI 2hr 8 ng/L      Delta 2hr hsTnI -1  ng/L     UA w Reflex to Microscopic w Reflex to Culture [[id number]]  (Abnormal) Collected: 02/24/25 1543    Lab Status: Final result Specimen: Urine, Clean Catch Updated: 02/24/25 1556     Color, UA Yellow     Clarity, UA Turbid     Specific Gravity, UA 1.015     pH, UA 6.0     Leukocytes, UA Large     Nitrite, UA Negative     Protein, UA 30 (1+) mg/dl      Glucose, UA Negative mg/dl      Ketones, UA Negative mg/dl      Urobilinogen, UA <2.0 mg/dl      Bilirubin, UA Negative     Occult Blood, UA Small    Urine Microscopic [[id number]] Collected: 02/24/25 1543    Lab Status: In process Specimen: Urine, Clean Catch Updated: 02/24/25 1556    HS Troponin I 4hr [[id number]]     Lab Status: No result Specimen: Blood     B-Type Natriuretic Peptide(BNP) [[id number]]  (Normal) Collected: 02/24/25 1331    Lab Status: Final result Specimen: Blood from Arm, Right Updated: 02/24/25 1454     BNP 36 pg/mL     Comprehensive metabolic panel [[id number]]  (Abnormal) Collected: 02/24/25 1331    Lab Status: Final result Specimen: Blood from Arm, Right Updated: 02/24/25 1413     Sodium 134 mmol/L      Potassium 4.9 mmol/L      Chloride 93 mmol/L      CO2 25 mmol/L      ANION GAP 16 mmol/L      BUN 61 mg/dL      Creatinine 1.71 mg/dL      Glucose 246 mg/dL      Calcium 9.8 mg/dL      AST 34 U/L      ALT 19 U/L      Alkaline Phosphatase 80 U/L      Total Protein 8.1 g/dL      Albumin 4.2 g/dL      Total Bilirubin 0.55 mg/dL      eGFR 27 ml/min/1.73sq m     Narrative:      [employer] guidelines for Chronic Kidney Disease (CKD):     Stage 1 with normal or high GFR (GFR > 90 mL/min/1.73 square meters)    Stage 2 Mild CKD (GFR = 60-89 mL/min/1.73 square meters)    Stage 3A Moderate CKD (GFR = 45-59 mL/min/1.73 square meters)    Stage 3B Moderate CKD (GFR = 30-44 mL/min/1.73 square meters)    Stage 4 Severe CKD (GFR = 15-29 mL/min/1.73 square meters)    Stage 5 End Stage CKD (GFR <15 mL/min/1.73 square meters)  Note: GFR  calculation is accurate only with a steady state creatinine    HS Troponin 0hr (reflex protocol) [[id number]]  (Normal) Collected: 02/24/25 1331    Lab Status: Final result Specimen: Blood from Arm, Right Updated: 02/24/25 1407     hs TnI 0hr 9 ng/L     CBC and differential [[id number]]  (Abnormal) Collected: 02/24/25 1331    Lab Status: Final result Specimen: Blood from Arm, Right Updated: 02/24/25 1345     WBC 15.71 Thousand/uL      RBC 4.70 Million/uL      Hemoglobin 13.6 g/dL      Hematocrit 41.2 %      MCV 88 fL      MCH 28.9 pg      MCHC 33.0 g/dL      RDW 15.0 %      MPV 8.9 fL      Platelets 466 Thousands/uL      nRBC 0 /100 WBCs      Segmented % 84 %      Immature Grans % 0 %      Lymphocytes % 11 %      Monocytes % 5 %      Eosinophils Relative 0 %      Basophils Relative 0 %      Absolute Neutrophils 13.08 Thousands/µL      Absolute Immature Grans 0.07 Thousand/uL      Absolute Lymphocytes 1.69 Thousands/µL      Absolute Monocytes 0.80 Thousand/µL      Eosinophils Absolute 0.03 Thousand/µL      Basophils Absolute 0.04 Thousands/µL             XR chest 2 views   Final Interpretation by Alec Contreras MD (02/24 1532)      No acute cardiopulmonary disease.            Workstation performed: [id number]             Procedures    ED Medication and Procedure Management   Prior to Admission Medications   Prescriptions Last Dose Informant Patient Reported? Taking?   Cholecalciferol (VITAMIN D3) 1000 units CAPS  Self Yes No   Sig: Take by mouth daily     Multiple Vitamins-Minerals (CENTRUM SILVER PO)  Self Yes No   Sig: multivitamin   apixaban (ELIQUIS) 5 mg  Self No No   Sig: Take 1 tablet (5 mg total) by mouth 2 (two) times a day   bimatoprost (Lumigan) 0.01 % ophthalmic drops  Self Yes No   Sig: Administer 1 drop to both eyes daily at bedtime   cephalexin (KEFLEX) 250 mg capsule  Self Yes No   Sig: Take 250 mg by mouth 3 (three) times a day   Patient not taking: Reported on 12/11/2024   ezetimibe (ZETIA) 10 mg  tablet  Self No No   Sig: Take 1 tablet (10 mg total) by mouth daily at bedtime   gabapentin (NEURONTIN) 100 mg capsule  Self No No   Sig: Take 1 capsule (100 mg total) by mouth 3 (three) times a day   Patient taking differently: Take 300 mg by mouth 3 (three) times a day   glimepiride (AMARYL) 1 mg tablet  Self Yes No   Sig: Take 1 mg by mouth every morning before breakfast   hydrocortisone 2.5 % cream  Self Yes No   Sig: as needed   ketoconazole (NIZORAL) 2 % cream  Self Yes No   Sig: as needed   metolazone (ZAROXOLYN) 5 mg tablet   No No   Sig: TAKE 1 TABLET DAILY AS NEEDED. NOT TO EXCEED 3 TABLETS PER WEEK.   metoprolol succinate (TOPROL-XL) 200 MG 24 hr tablet  Self Yes No   Sig: Take 25 mg by mouth in the morning Twice a day   Patient not taking: Reported on 12/11/2024   metoprolol succinate (TOPROL-XL) 25 mg 24 hr tablet   No No   Sig: Take 1 tablet (25 mg total) by mouth 2 (two) times a day   potassium chloride (Klor-Con M20) 20 mEq tablet   No No   Sig: TAKE 1 TABLET BY MOUTH 3 TIMES A DAY.   spironolactone (ALDACTONE) 25 mg tablet   No No   Sig: Take 1 tablet (25 mg total) by mouth daily   torsemide (DEMADEX) 20 mg tablet  Self No No   Sig: Take 2 tablets (40 mg total) by mouth 2 (two) times a day      Facility-Administered Medications: None     Patient's Medications   Discharge Prescriptions    No medications on file     No discharge procedures on file.  ED SEPSIS DOCUMENTATION   Time reflects when diagnosis was documented in both MDM as applicable and the Disposition within this note       Time User Action Codes Description Comment    2/24/2025  2:56 PM Sharon Ortiz [I48.91] Atrial fibrillation (HCC)     2/24/2025  3:21 PM Arturo Dangeol Modify [I48.91] Atrial fibrillation (HCC)     2/24/2025  3:21 PM Arturo Dangelo Add [I50.32] Chronic diastolic CHF (congestive heart failure) ([facility])     2/24/2025  4:13 PM Sharon Ortiz [R55] Near syncope     2/24/2025  4:14 PM Sharon Ortiz  [R53.83] Fatigue                  Sharon Ortiz, DO  02/25/25 1236

## 2025-02-24 NOTE — ASSESSMENT & PLAN NOTE
Described as above.  Loop interrogation showed no arrhythmias per EP  Status post DCCV in March 2024 for paroxysmal A-fib

## 2025-02-24 NOTE — ED ATTENDING ATTESTATION
2/24/2025  I, Arturo Dangelo MD, saw and evaluated the patient. I have discussed the patient with the resident/non-physician practitioner and agree with the resident's/non-physician practitioner's findings, Plan of Care, and MDM as documented in the resident's/non-physician practitioner's note, except where noted. All available labs and Radiology studies were reviewed.  I was present for key portions of any procedure(s) performed by the resident/non-physician practitioner and I was immediately available to provide assistance.       At this point I agree with the current assessment done in the Emergency Department.  I have conducted an independent evaluation of this patient a history and physical is as follows:    ED Course  ED Course as of 02/24/25 1446   Mon Feb 24, 2025   1411 Per resident h&p 78 YO h/o AF ablated last July implanted event recorder 2 episodes of SVT since ablation presents for an episode this am reminiscent of SVT events; takes diuretics; baseline dyspnea urinating frequently heart rate has been jumping into tachycardias; feeling weak like legs might give out on her O: no volume overload lungs CTA abd soft NT no neuro symptoms legs tender; I/P 1st nurse cardiac w/u     Emergency Department Note- Jessica Jaquez 79 y.o. female MRN: [id number]    Unit/Bed#: ED 29 Encounter: [id number]    Jessica Jaquez is a 79 y.o. female who presents with   Chief Complaint   Patient presents with    Dizziness     Sudden onset dizziness while eating breakfast. Pt reports feeling palpitations. Hx of Afib, had an ablation last year.  Denies CP, denies SOB.          History of Present Illness   HPI:  Jessica Jaquez is a 79 y.o. female who presents for evaluation of:  Abrupt onset of asthenia but not dizziness when she stood up this morning after eating breakfast at a restaurant.  This episode of asthenia is reminiscent of prior episodes of arrhythmia that she has had, supraventricular arrhythmias.  She has a history  of atrial fibrillation ablation done last July.  She denies associated chest pain and dyspnea.  She states compliance with her 3 diuretics; she does note that she is urinating more than 30 times per day and this is making her very fatigued.  She denies any dysuria or hematuria.  She denies leg swelling, orthopnea, and paroxysmal nocturnal dyspnea.  Review of the electronic medical record demonstrates that the patient has a history of an ejection fraction of 60% per her last echocardiogram.    Review of Systems   Constitutional:  Positive for fatigue. Negative for fever.   HENT:  Negative for congestion and sore throat.    Respiratory:  Negative for cough and shortness of breath.    Cardiovascular:  Negative for chest pain and palpitations.   Gastrointestinal:  Negative for abdominal pain and nausea.   Genitourinary:  Negative for flank pain and frequency.   Neurological:  Negative for light-headedness and headaches.   Psychiatric/Behavioral:  Negative for dysphoric mood and hallucinations.    All other systems reviewed and are negative.      Historical Information   Past Medical History:   Diagnosis Date    Arthritis     Colorectal polyps     Constipation     Diabetes mellitus (HCC)     Diverticulosis of colon     Hiatal hernia     Hypertension     Increased urinary frequency     Lumbar disc disease     Pressure injury of skin     SOB (shortness of breath)     Stress incontinence     Vitamin D deficiency 07/27/2018     Past Surgical History:   Procedure Laterality Date    BACK SURGERY      CARDIAC ELECTROPHYSIOLOGY PROCEDURE N/A 7/9/2024    Procedure: Cardiac eps/afib ablation PFA;  Surgeon: Jeyson Delaney MD;  Location: [facility];  Service: Cardiology    CARDIAC ELECTROPHYSIOLOGY PROCEDURE N/A 7/9/2024    Procedure: Cardiac loop recorder implant;  Surgeon: Jeyson Delaney MD;  Location: [facility];  Service: Cardiology    CARPAL TUNNEL RELEASE Left     CATARACT EXTRACTION Bilateral      CHOLECYSTECTOMY      COLONOSCOPY W/ ENDOSCOPIC US N/A 2/24/2016    Procedure: ANAL ENDOSCOPIC U/S;  Surgeon: HOLLIS Washington MD;  Location: [facility];  Service:     HERNIA REPAIR      VT COLONOSCOPY FLX DX W/COLLJ SPEC WHEN PFRMD N/A 2/24/2016    Procedure: COLONOSCOPY;  Surgeon: HOLLIS Washington MD;  Location: [facility];  Service: Colorectal    TONSILLECTOMY      TUBAL LIGATION       Social History   Social History     Substance and Sexual Activity   Alcohol Use Never     Social History     Substance and Sexual Activity   Drug Use No     Social History     Tobacco Use   Smoking Status Never   Smokeless Tobacco Never     Family History:   Family History   Problem Relation Age of Onset    Hypertension Mother     Heart attack Mother     Diabetes Mother     Prostate cancer Father     No Known Problems Brother     Stroke Maternal Grandmother     Stroke Paternal Grandmother     Stroke Paternal Grandfather     Thyroid cancer Neg Hx        Meds/Allergies   PTA meds:   Prior to Admission Medications   Prescriptions Last Dose Informant Patient Reported? Taking?   Cholecalciferol (VITAMIN D3) 1000 units CAPS  Self Yes No   Sig: Take by mouth daily     Multiple Vitamins-Minerals (CENTRUM SILVER PO)  Self Yes No   Sig: multivitamin   apixaban (ELIQUIS) 5 mg  Self No No   Sig: Take 1 tablet (5 mg total) by mouth 2 (two) times a day   bimatoprost (Lumigan) 0.01 % ophthalmic drops  Self Yes No   Sig: Administer 1 drop to both eyes daily at bedtime   cephalexin (KEFLEX) 250 mg capsule  Self Yes No   Sig: Take 250 mg by mouth 3 (three) times a day   Patient not taking: Reported on 12/11/2024   ezetimibe (ZETIA) 10 mg tablet  Self No No   Sig: Take 1 tablet (10 mg total) by mouth daily at bedtime   gabapentin (NEURONTIN) 100 mg capsule  Self No No   Sig: Take 1 capsule (100 mg total) by mouth 3 (three) times a day   Patient taking differently: Take 300 mg by mouth 3 (three) times a day   glimepiride (AMARYL) 1 mg tablet  Self Yes  No   Sig: Take 1 mg by mouth every morning before breakfast   hydrocortisone 2.5 % cream  Self Yes No   Sig: as needed   ketoconazole (NIZORAL) 2 % cream  Self Yes No   Sig: as needed   metolazone (ZAROXOLYN) 5 mg tablet   No No   Sig: TAKE 1 TABLET DAILY AS NEEDED. NOT TO EXCEED 3 TABLETS PER WEEK.   metoprolol succinate (TOPROL-XL) 200 MG 24 hr tablet  Self Yes No   Sig: Take 25 mg by mouth in the morning Twice a day   Patient not taking: Reported on 12/11/2024   metoprolol succinate (TOPROL-XL) 25 mg 24 hr tablet   No No   Sig: Take 1 tablet (25 mg total) by mouth 2 (two) times a day   potassium chloride (Klor-Con M20) 20 mEq tablet   No No   Sig: TAKE 1 TABLET BY MOUTH 3 TIMES A DAY.   spironolactone (ALDACTONE) 25 mg tablet   No No   Sig: Take 1 tablet (25 mg total) by mouth daily   torsemide (DEMADEX) 20 mg tablet  Self No No   Sig: Take 2 tablets (40 mg total) by mouth 2 (two) times a day      Facility-Administered Medications: None     Allergies   Allergen Reactions    Other Cough and Sneezing     Seasonal allergies    Pollen Extract Allergic Rhinitis       Objective   First Vitals:   Blood Pressure: 146/65 (02/24/25 1315)  Pulse: 89 (02/24/25 1315)  Temperature: 97.8 °F (36.6 °C) (02/24/25 1315)  Temp Source: Oral (02/24/25 1315)  Respirations: 20 (02/24/25 1315)  SpO2: 99 % (02/24/25 1315)    Current Vitals:   Blood Pressure: 146/65 (02/24/25 1315)  Pulse: 89 (02/24/25 1315)  Temperature: 97.8 °F (36.6 °C) (02/24/25 1315)  Temp Source: Oral (02/24/25 1315)  Respirations: 20 (02/24/25 1315)  SpO2: 99 % (02/24/25 1315)    No intake or output data in the 24 hours ending 02/24/25 1446    Invasive Devices       Peripheral Intravenous Line  Duration             Peripheral IV 09/02/24 Right Antecubital 175 days    Peripheral IV 02/24/25 Dorsal (posterior);Right Forearm <1 day              Drain  Duration             External Urinary Catheter 229 days    External Urinary Catheter 228 days                     Physical Exam  Vitals and nursing note reviewed.   Constitutional:       General: She is not in acute distress.     Appearance: Normal appearance. She is well-developed.   HENT:      Head: Normocephalic and atraumatic.      Right Ear: External ear normal.      Left Ear: External ear normal.      Nose: Nose normal.      Mouth/Throat:      Pharynx: No oropharyngeal exudate.   Eyes:      Conjunctiva/sclera: Conjunctivae normal.      Pupils: Pupils are equal, round, and reactive to light.   Cardiovascular:      Rate and Rhythm: Normal rate and regular rhythm.   Pulmonary:      Effort: Pulmonary effort is normal. No respiratory distress.   Abdominal:      General: Abdomen is flat. There is no distension.      Palpations: Abdomen is soft.   Musculoskeletal:         General: No deformity. Normal range of motion.      Cervical back: Normal range of motion and neck supple.   Skin:     General: Skin is warm and dry.      Capillary Refill: Capillary refill takes less than 2 seconds.   Neurological:      General: No focal deficit present.      Mental Status: She is alert and oriented to person, place, and time. Mental status is at baseline.      Coordination: Coordination normal.   Psychiatric:         Mood and Affect: Mood normal.         Behavior: Behavior normal.         Thought Content: Thought content normal.         Judgment: Judgment normal.           Medical Decision Makin.  Acute dyspnea:Complete blood count obtained to assess for leukocytosis and anemia; Basic Metabolic profile obtained to assess for electrolyte disturbance, uremia, and disorders of glucose metabolism; electrocardiogram obtained to assess for arrhythmia; Troponin obtained to assess for myocardial infarction and myocarditis.  Chest x-ray rule out congestive heart failure; BNP rule out congestive heart failure episode.    Recent Results (from the past 36 hours)   CBC and differential    Collection Time: 25  1:31 PM   Result Value Ref  "Range    WBC 15.71 (H) 4.31 - 10.16 Thousand/uL    RBC 4.70 3.81 - 5.12 Million/uL    Hemoglobin 13.6 11.5 - 15.4 g/dL    Hematocrit 41.2 34.8 - 46.1 %    MCV 88 82 - 98 fL    MCH 28.9 26.8 - 34.3 pg    MCHC 33.0 31.4 - 37.4 g/dL    RDW 15.0 11.6 - 15.1 %    MPV 8.9 8.9 - 12.7 fL    Platelets 466 (H) 149 - 390 Thousands/uL    nRBC 0 /100 WBCs    Segmented % 84 (H) 43 - 75 %    Immature Grans % 0 0 - 2 %    Lymphocytes % 11 (L) 14 - 44 %    Monocytes % 5 4 - 12 %    Eosinophils Relative 0 0 - 6 %    Basophils Relative 0 0 - 1 %    Absolute Neutrophils 13.08 (H) 1.85 - 7.62 Thousands/µL    Absolute Immature Grans 0.07 0.00 - 0.20 Thousand/uL    Absolute Lymphocytes 1.69 0.60 - 4.47 Thousands/µL    Absolute Monocytes 0.80 0.17 - 1.22 Thousand/µL    Eosinophils Absolute 0.03 0.00 - 0.61 Thousand/µL    Basophils Absolute 0.04 0.00 - 0.10 Thousands/µL   Comprehensive metabolic panel    Collection Time: 02/24/25  1:31 PM   Result Value Ref Range    Sodium 134 (L) 135 - 147 mmol/L    Potassium 4.9 3.5 - 5.3 mmol/L    Chloride 93 (L) 96 - 108 mmol/L    CO2 25 21 - 32 mmol/L    ANION GAP 16 (H) 4 - 13 mmol/L    BUN 61 (H) 5 - 25 mg/dL    Creatinine 1.71 (H) 0.60 - 1.30 mg/dL    Glucose 246 (H) 65 - 140 mg/dL    Calcium 9.8 8.4 - 10.2 mg/dL    AST 34 13 - 39 U/L    ALT 19 7 - 52 U/L    Alkaline Phosphatase 80 34 - 104 U/L    Total Protein 8.1 6.4 - 8.4 g/dL    Albumin 4.2 3.5 - 5.0 g/dL    Total Bilirubin 0.55 0.20 - 1.00 mg/dL    eGFR 27 ml/min/1.73sq m   HS Troponin 0hr (reflex protocol)    Collection Time: 02/24/25  1:31 PM   Result Value Ref Range    hs TnI 0hr 9 \"Refer to ACS Flowchart\"- see link ng/L     XR chest 2 views    (Results Pending)         Portions of the record may have been created with voice recognition software. Occasional wrong word or \"sound a like\" substitutions may have occurred due to the inherent limitations of voice recognition software.  Read the chart carefully and recognize, using context, where " substitutions have occurred.        Critical Care Time  Procedures

## 2025-02-24 NOTE — ASSESSMENT & PLAN NOTE
"Lab Results   Component Value Date    HGBA1C 6.9 (H) 01/22/2024       No results for input(s): \"POCGLU\" in the last 72 hours.    Blood Sugar Average: Last 72 hrs:    At home, patient takes glimepiride 1 mg listed in chart.  However, she has corrected that she takes 2 mg daily, 1 mg twice daily, different from what is in EMR.  Her physician is outside of [facility] so these records have not been updated    Plan:  Initiate weight-based correctional insulin  Inpatient blood glucose target 140-180  Hypoglycemia protocol  Accu-Cheks 3 times daily before meals and nightly  Hold home orals and injectables  We will monitor correctional insulin needs initiate on basal if needed  Carb controlled diet level 2  Continue home Zetia 10 mg p.o. daily for hyperlipidemia  "

## 2025-02-24 NOTE — H&P
"H&P - Hospitalist   Name: Jessica Jaquez 79 y.o. female I MRN: [id number]  Unit/Bed#: ED 29 I Date of Admission: 2/24/2025   Date of Service: 2/24/2025 I Hospital Day: 0     Assessment & Plan  Syncope, near  Noted on AM of admission 2/24. Felt strong run of palpitations w pulsations to her ears b/l, denied lightheadedness/dizziness.    EKG in ER showed normal sinus rhythm, no AV block  Also status post 500 mL bolus in ER    Status post device interrogation without any events/abnormal rhythms noted by EP team    Given this occurred right as patient was standing up, suspect this was orthostatic.    Plan:  Admit for obs overnight, maintain on telemetry  Appreciate EP recs  No medication changes at this time  Follow-up TTE  Daily orthostatics  Palpitations  Described as above.  Loop interrogation showed no arrhythmias per EP  Status post DCCV in March 2024 for paroxysmal A-fib      PAF (paroxysmal atrial fibrillation) (Formerly [facility])  Prior to admission, patient was on Eliquis 5 mg twice daily  Will reduce to 2.5 mg twice daily if needed based on creatinine clearance, but patient appears to be at her baseline renal function without any GIOVANNI      Plan:  Continue metoprolol 25 mg p.o. twice daily succinate  Will switch to 2.5 mg twice daily Eliquis dosing due to age, creatinine clearance  DM II (diabetes mellitus, type II), controlled ([facility]ly [facility])  Lab Results   Component Value Date    HGBA1C 6.9 (H) 01/22/2024       No results for input(s): \"POCGLU\" in the last 72 hours.    Blood Sugar Average: Last 72 hrs:    At home, patient takes glimepiride 1 mg listed in chart.  However, she has corrected that she takes 2 mg daily, 1 mg twice daily, different from what is in EMR.  Her physician is outside of [facility] so these records have not been updated    Plan:  Initiate weight-based correctional insulin  Inpatient blood glucose target 140-180  Hypoglycemia protocol  Accu-Cheks 3 times daily before meals and nightly  Hold home orals and " injectables  We will monitor correctional insulin needs initiate on basal if needed  Carb controlled diet level 2  Continue home Zetia 10 mg p.o. daily for hyperlipidemia  (HFpEF) heart failure with preserved ejection fraction (HCC)  Wt Readings from Last 3 Encounters:   12/11/24 104 kg (229 lb 9.6 oz)   10/22/24 104 kg (229 lb)   09/20/24 104 kg (229 lb)       Not in acute exacerbation.  Clinically, appears euvolemic.  Has no relevant complaints.  Last echo 2024 March showed LVEF 60%, possible diastolic dysfunction but limited by quality of study.  RV function was normal.    Plan:  Continue torsemide 40 mg twice daily home dose, spironolactone 25 mg p.o. daily, metoprolol 25 mg twice daily  Only home medication being held is metolazone, last taken 3 days prior to admission as patient states that this caused some overdiuresis  Follow-up echo from this admission    Class 3 severe obesity without serious comorbidity with body mass index (BMI) of 40.0 to 44.9 in adult (HCC)  Complicates all facets of care  Given history of diabetes, heart failure, recommend PCP to consider GLP-1 agonist therapy        VTE Pharmacologic Prophylaxis:   Moderate Risk (Score 3-4) - Pharmacological DVT Prophylaxis Ordered: apixaban (Eliquis).  Code Status: Level 3 - DNAR and DNI   Discussion with family: Updated  ( and daughter) at bedside.    Anticipated Length of Stay: Patient will be admitted on an observation basis with an anticipated length of stay of less than 2 midnights secondary to presyncope, rule out arrhythmias, follow-up echo, then can likely discharge if workup is negative.    History of Present Illness   Chief Complaint: Presyncope    Jessica Jaquez is a 79 y.o. female with a PMH of paroxysmal A-fib on Eliquis, HFpEF, diabetes 2, obesity BMI 41 who presents with presyncopal symptoms.  Patient was at home this morning when she was ready to stand up, and quickly felt a throbbing/pulsatile sensation from  her chest up to both ears.  She states that this rapidly improved with rest.    Cardiology assessed the patient in the ER, recommended getting an echo up-to-date and monitoring overnight.  Her device interrogation showed no new/significantly abnormal rhythms.    She has had sinus rhythm on EKG in the ER on telemetry.    Remainder of cardiac workup unremarkable, including BNP, troponins.  UA was ordered for unclear reasons, but it is unchanged from several months of data and therefore patient has not been treated for UTI.    Review of Systems   Constitutional:  Negative for chills and fever.   Respiratory:  Negative for cough and shortness of breath.    Cardiovascular:  Positive for palpitations. Negative for chest pain.   Gastrointestinal:  Negative for diarrhea, nausea and vomiting.   Skin:  Negative for rash.   Neurological:  Negative for weakness, light-headedness and numbness.       PCP: Not in Epic/is out-of-network, but sees regularly within past 3 years     Historical Information   Past Medical History:   Diagnosis Date    Arthritis     Colorectal polyps     Constipation     Diabetes mellitus (HCC)     Diverticulosis of colon     Hiatal hernia     Hypertension     Increased urinary frequency     Lumbar disc disease     Pressure injury of skin     SOB (shortness of breath)     Stress incontinence     Vitamin D deficiency 07/27/2018     Past Surgical History:   Procedure Laterality Date    BACK SURGERY      CARDIAC ELECTROPHYSIOLOGY PROCEDURE N/A 7/9/2024    Procedure: Cardiac eps/afib ablation PFA;  Surgeon: Jeyson Delaney MD;  Location: [facility];  Service: Cardiology    CARDIAC ELECTROPHYSIOLOGY PROCEDURE N/A 7/9/2024    Procedure: Cardiac loop recorder implant;  Surgeon: Jeyson Delaney MD;  Location: [facility];  Service: Cardiology    CARPAL TUNNEL RELEASE Left     CATARACT EXTRACTION Bilateral     CHOLECYSTECTOMY      COLONOSCOPY W/ ENDOSCOPIC US N/A 2/24/2016    Procedure: ANAL  ENDOSCOPIC U/S;  Surgeon: HOLLIS Washington MD;  Location: [facility];  Service:     HERNIA REPAIR      MD COLONOSCOPY FLX DX W/COLLJ SPEC WHEN PFRMD N/A 2/24/2016    Procedure: COLONOSCOPY;  Surgeon: HOLLIS Washington MD;  Location: [facility];  Service: Colorectal    TONSILLECTOMY      TUBAL LIGATION       Social History     Tobacco Use    Smoking status: Never    Smokeless tobacco: Never   Substance and Sexual Activity    Alcohol use: Never    Drug use: No    Sexual activity: Not on file     E-Cigarette/Vaping     E-Cigarette/Vaping Substances     Family history non-contributory  Social History:  Marital Status: /Civil Union   Occupation: Retired  Patient Pre-hospital Living Situation: Home  Patient Pre-hospital Level of Mobility: walks  Patient Pre-hospital Diet Restrictions: Cardiac, diabetic    Meds/Allergies   I have reviewed home medications with patient personally.  Prior to Admission medications    Medication Sig Start Date End Date Taking? Authorizing Provider   apixaban (ELIQUIS) 5 mg Take 1 tablet (5 mg total) by mouth 2 (two) times a day 10/15/24  Yes VIJI Díaz   bimatoprost (Lumigan) 0.01 % ophthalmic drops Administer 1 drop to both eyes daily at bedtime   Yes Historical Provider, MD   cephalexin (KEFLEX) 250 mg capsule Take 250 mg by mouth 3 (three) times a day  Patient not taking: Reported on 12/11/2024 9/23/24   Historical Provider, MD   Cholecalciferol (VITAMIN D3) 1000 units CAPS Take by mouth daily     Yes Historical Provider, MD   ezetimibe (ZETIA) 10 mg tablet Take 1 tablet (10 mg total) by mouth daily at bedtime 4/12/24  Yes VIJI Díaz   gabapentin (NEURONTIN) 100 mg capsule Take 1 capsule (100 mg total) by mouth 3 (three) times a day  Patient taking differently: Take 300 mg by mouth 3 (three) times a day 3/11/24  Yes Natalie Dudley MD   glimepiride (AMARYL) 1 mg tablet Take 1 mg by mouth every morning before breakfast   Yes Historical Provider, MD   hydrocortisone  2.5 % cream as needed  Patient not taking: Reported on 2/24/2025 6/11/18   Historical Provider, MD   ketoconazole (NIZORAL) 2 % cream as needed  Patient not taking: Reported on 2/24/2025 6/11/18   Historical Provider, MD   metolazone (ZAROXOLYN) 5 mg tablet TAKE 1 TABLET DAILY AS NEEDED. NOT TO EXCEED 3 TABLETS PER WEEK. 2/11/25  Yes Himanshu Webb MD   metoprolol succinate (TOPROL-XL) 200 MG 24 hr tablet Take 25 mg by mouth in the morning Twice a day  Patient not taking: Reported on 12/11/2024 10/17/24   Historical Provider, MD   metoprolol succinate (TOPROL-XL) 25 mg 24 hr tablet Take 1 tablet (25 mg total) by mouth 2 (two) times a day 1/27/25  Yes Himanshu Webb MD   Multiple Vitamins-Minerals (CENTRUM SILVER PO) multivitamin   Yes Historical Provider, MD   potassium chloride (Klor-Con M20) 20 mEq tablet TAKE 1 TABLET BY MOUTH 3 TIMES A DAY. 2/3/25  Yes Jeyson Delaney MD   spironolactone (ALDACTONE) 25 mg tablet Take 1 tablet (25 mg total) by mouth daily 12/11/24  Yes Himanshu Webb MD   torsemide (DEMADEX) 20 mg tablet Take 2 tablets (40 mg total) by mouth 2 (two) times a day 7/26/24  Yes Himanshu Webb MD     Allergies   Allergen Reactions    Other Cough and Sneezing     Seasonal allergies    Pollen Extract Allergic Rhinitis       Objective :  Temp:  [97.8 °F (36.6 °C)] 97.8 °F (36.6 °C)  HR:  [84-89] 84  BP: (146)/(65) 146/65  Resp:  [20-21] 21  SpO2:  [98 %-99 %] 98 %  O2 Device: None (Room air)    Physical Exam  Vitals reviewed.   Constitutional:       General: She is not in acute distress.     Appearance: She is obese. She is not ill-appearing or diaphoretic.      Comments: Sitting comfortably in bed.  No acute distress   HENT:      Head: Normocephalic and atraumatic.      Mouth/Throat:      Mouth: Mucous membranes are moist.      Pharynx: Oropharynx is clear.   Eyes:      General: No scleral icterus.     Extraocular Movements: Extraocular movements intact.   Cardiovascular:       Rate and Rhythm: Normal rate and regular rhythm.      Heart sounds: No murmur heard.     No friction rub. No gallop.   Pulmonary:      Effort: Pulmonary effort is normal. No respiratory distress.      Breath sounds: No stridor. No wheezing or rales.   Abdominal:      General: There is distension (central obesity).      Palpations: Abdomen is soft. There is no mass.      Tenderness: There is no abdominal tenderness. There is no guarding.   Musculoskeletal:         General: Normal range of motion.      Right lower leg: Edema (1+) present.      Left lower leg: Edema (1+) present.   Skin:     General: Skin is warm and dry.      Findings: No rash.   Neurological:      Mental Status: She is alert and oriented to person, place, and time.   Psychiatric:         Mood and Affect: Mood normal.         Behavior: Behavior normal.         Thought Content: Thought content normal.          Lines/Drains: none            Lab Results: I have reviewed the following results:  Results from last 7 days   Lab Units 02/24/25  1331   WBC Thousand/uL 15.71*   HEMOGLOBIN g/dL 13.6   HEMATOCRIT % 41.2   PLATELETS Thousands/uL 466*   SEGS PCT % 84*   LYMPHO PCT % 11*   MONO PCT % 5   EOS PCT % 0     Results from last 7 days   Lab Units 02/24/25  1331   SODIUM mmol/L 134*   POTASSIUM mmol/L 4.9   CHLORIDE mmol/L 93*   CO2 mmol/L 25   BUN mg/dL 61*   CREATININE mg/dL 1.71*   ANION GAP mmol/L 16*   CALCIUM mg/dL 9.8   ALBUMIN g/dL 4.2   TOTAL BILIRUBIN mg/dL 0.55   ALK PHOS U/L 80   ALT U/L 19   AST U/L 34   GLUCOSE RANDOM mg/dL 246*             Lab Results   Component Value Date    HGBA1C 6.9 (H) 01/22/2024    HGBA1C 6.6 (H) 07/25/2022    HGBA1C 6.7 (H) 02/22/2022           Imaging Results Review: I reviewed radiology reports from this admission including: chest xray.  Other Study Results Review: EKG was reviewed.     Administrative Statements   I have spent a total time of 45 minutes in caring for this patient on the day of the visit/encounter  including Diagnostic results, Prognosis, Risk factor reductions, Impressions, Counseling / Coordination of care, Documenting in the medical record, Reviewing/placing orders in the medical record (including tests, medications, and/or procedures), Obtaining or reviewing history  , and Communicating with other healthcare professionals .    ** Please Note: This note has been constructed using a voice recognition system. **

## 2025-02-24 NOTE — ASSESSMENT & PLAN NOTE
Prior to admission, patient was on Eliquis 5 mg twice daily  Will reduce to 2.5 mg twice daily if needed based on creatinine clearance, but patient appears to be at her baseline renal function without any GIOVANNI      Plan:  Continue metoprolol 25 mg p.o. twice daily succinate  Will switch to 2.5 mg twice daily Eliquis dosing due to age, creatinine clearance

## 2025-02-24 NOTE — ASSESSMENT & PLAN NOTE
Wt Readings from Last 3 Encounters:   12/11/24 104 kg (229 lb 9.6 oz)   10/22/24 104 kg (229 lb)   09/20/24 104 kg (229 lb)   Last echocardiogram showing EF 60%  On significant diuretics with torsemide 20 mg twice daily, Aldactone 25 mg daily, and metolazone as needed  Has not been using as needed metolazone

## 2025-02-24 NOTE — ASSESSMENT & PLAN NOTE
Presented with lesions lasting for approximately 30 to 45 minutes today  Associated symptoms of near syncope however no actual passing out  Loop recorder interrogation shows what appears to be sinus tachycardia with rates right around 100 bpm  She is on metoprolol 25 mg twice daily  Etiology of palpitations unclear, possibly dehydration in the setting of significant diuretic use  Doubt this is rhythm related given no rhythms when symptoms are actively going on on loop recorder  Check echocardiogram to ensure EF is preserved

## 2025-02-24 NOTE — CONSULTS
Assessment & Plan  Palpitations  Presented with lesions lasting for approximately 30 to 45 minutes today  Associated symptoms of near syncope however no actual passing out  Loop recorder interrogation shows what appears to be sinus tachycardia with rates right around 100 bpm  She is on metoprolol 25 mg twice daily  Etiology of palpitations unclear, possibly dehydration in the setting of significant diuretic use  Doubt this is rhythm related given no rhythms when symptoms are actively going on on loop recorder  Check echocardiogram to ensure EF is preserved  PAF (paroxysmal atrial fibrillation) ([facility])  New diagnosis March 2024 after stay for lower back pain  S/p PHU/DCCV in March  Started on flecainide April after readmission for RVR  Flecainide was stopped due to fatigue and dizziness in May  Anticoagulated on Eliquis 5 mg twice daily  S/p PFA ablation of PVI posterior wall with loop recorder implantation  No significant A-fib noted on loop interrogation today  Will decrease Eliquis to 2.5 mg twice daily in the setting of age, and renal function  (HFpEF) heart failure with preserved ejection fraction ([facility])  Wt Readings from Last 3 Encounters:   12/11/24 104 kg (229 lb 9.6 oz)   10/22/24 104 kg (229 lb)   09/20/24 104 kg (229 lb)   Last echocardiogram showing EF 60%  On significant diuretics with torsemide 20 mg twice daily, Aldactone 25 mg daily, and metolazone as needed  Has not been using as needed metolazone    DM II (diabetes mellitus, type II), controlled ([facility])    Lab Results   Component Value Date    HGBA1C 6.9 (H) 01/22/2024     Class 3 severe obesity without serious comorbidity with body mass index (BMI) of 40.0 to 44.9 in adult ([facility])    Consultation - Electrophysiology - Cardiology  Jessica LLUVIA Calvillopayam 79 y.o. female MRN: [id number]  Unit/Bed#: ED 29 Encounter: [id number]      Inpatient consult to Electrophysiology  Consult performed by: Jovanni Tillman PA-C  Consult ordered by: Arturo Dangelo  MD          History of Present Illness   Physician Requesting Consult: Arturo aDngelo MD  Reason for Consult / Principal Problem: palpitations       HPI: Jessica Jaquez is a 79 y.o. year old female with past medical history as mentioned above who presents to [facility] after having palpitations TIA at around 1230 lasting approximately 30 to 45 minutes.  Patient had associated presyncope during the event but never actually passed out.  She reports that this is the first episode of palpitation she has had since ablation she had done for atrial fibrillation.  She has been maintained on metoprolol 25 mg twice daily.  Currently in sinus rhythm with device interrogation showing sinus tachycardia at time of symptoms which patient activated her loop recorder on.  Discussion with daughter reports that patient has been on significant amount of diuretics and is going to the bathroom constantly.  She reports that she may not be taking these diuretics as directed by her outpatient cardiologist.  Last echocardiogram showed preserved ejection fraction of 60%.      Historical Information   Past Medical History:   Diagnosis Date    Arthritis     Colorectal polyps     Constipation     Diabetes mellitus (HCC)     Diverticulosis of colon     Hiatal hernia     Hypertension     Increased urinary frequency     Lumbar disc disease     Pressure injury of skin     SOB (shortness of breath)     Stress incontinence     Vitamin D deficiency 07/27/2018     Past Surgical History:   Procedure Laterality Date    BACK SURGERY      CARDIAC ELECTROPHYSIOLOGY PROCEDURE N/A 7/9/2024    Procedure: Cardiac eps/afib ablation PFA;  Surgeon: Jeyson Delaney MD;  [facility]: [facility];  Service: Cardiology    CARDIAC ELECTROPHYSIOLOGY PROCEDURE N/A 7/9/2024    Procedure: Cardiac loop recorder implant;  Surgeon: Jeyson Delaney MD;  [facility];  Service: Cardiology    CARPAL TUNNEL RELEASE Left     CATARACT EXTRACTION Bilateral      CHOLECYSTECTOMY      COLONOSCOPY W/ ENDOSCOPIC US N/A 2/24/2016    Procedure: ANAL ENDOSCOPIC U/S;  Surgeon: HOLLIS Washington MD;  Location: [facility];  Service:     HERNIA REPAIR      AL COLONOSCOPY FLX DX W/COLLJ SPEC WHEN PFRMD N/A 2/24/2016    Procedure: COLONOSCOPY;  Surgeon: HOLLIS Washington MD;  Location: [facility];  Service: Colorectal    TONSILLECTOMY      TUBAL LIGATION       Social History     Substance and Sexual Activity   Alcohol Use Never     Social History     Substance and Sexual Activity   Drug Use No     Social History     Tobacco Use   Smoking Status Never   Smokeless Tobacco Never     Family History: Family history non-contributory    Meds/Allergies   Hospital Medications: No current facility-administered medications for this encounter.  Home Medications: Not in a hospital admission.    Allergies   Allergen Reactions    Other Cough and Sneezing     Seasonal allergies    Pollen Extract Allergic Rhinitis       Objective   Vitals: Blood pressure 146/65, pulse 84, temperature 97.8 °F (36.6 °C), temperature source Oral, resp. rate 21, SpO2 98%.  Orthostatic Blood Pressures      Flowsheet Row Most Recent Value   Blood Pressure 146/65 filed at 02/24/2025 1315            No intake or output data in the 24 hours ending 02/24/25 1611    Invasive Devices       Peripheral Intravenous Line  Duration             Peripheral IV 09/02/24 Right Antecubital 175 days    Peripheral IV 02/24/25 Dorsal (posterior);Right Forearm <1 day              Drain  Duration             External Urinary Catheter 229 days    External Urinary Catheter 228 days                    Review of Systems:  Review of Systems   Constitutional: Positive for malaise/fatigue. Negative for diaphoresis and fever.   Cardiovascular:  Positive for irregular heartbeat, near-syncope and palpitations. Negative for chest pain, dyspnea on exertion and syncope.   Respiratory:  Negative for shortness of breath.    Neurological:  Negative for dizziness  and light-headedness.   All other systems reviewed and are negative.    ROS as noted above, otherwise 12 point review of systems was performed and is negative.     Physical Exam:   Physical Exam  Vitals and nursing note reviewed.   Constitutional:       General: She is not in acute distress.     Appearance: She is obese.   Cardiovascular:      Rate and Rhythm: Normal rate and regular rhythm.   Pulmonary:      Effort: Pulmonary effort is normal.      Breath sounds: Normal breath sounds.   Musculoskeletal:      Right lower leg: Edema present.      Left lower leg: Edema present.   Skin:     General: Skin is warm and dry.      Coloration: Skin is not jaundiced.   Neurological:      General: No focal deficit present.      Mental Status: She is alert and oriented to person, place, and time.   Psychiatric:         Mood and Affect: Mood normal.         Lab Results: I have personally reviewed pertinent lab results.    Results from last 7 days   Lab Units 25  1331   WBC Thousand/uL 15.71*   HEMOGLOBIN g/dL 13.6   HEMATOCRIT % 41.2   PLATELETS Thousands/uL 466*     Results from last 7 days   Lab Units 25  1331   POTASSIUM mmol/L 4.9   CHLORIDE mmol/L 93*   CO2 mmol/L 25   BUN mg/dL 61*   CREATININE mg/dL 1.71*   CALCIUM mg/dL 9.8               Imaging: Results Review Statement: No pertinent imaging studies reviewed.  ECHO: Results for orders placed during the hospital encounter of 18    Echo complete with contrast if indicated    Narrative  [place] [id number] [phone]    Transthoracic Echocardiogram  2D, M-mode, Doppler, and Color Doppler    Study date:  28-Dec-2018    Patient: HAN CARVAJAL  MR number: [id number]  Account number: [id number]  : 1945  Age: 73 years  Gender: Female  Status: Outpatient  Location: [address] [place] [address] [facility]  Height: 63 in  Weight: 232 lb  BP: 169/ 76 mmHg    Indications: Heart  failure    Diagnoses: I50.9 - Heart failure, unspecified    Sonographer:  DANYELLE Ga  Interpreting Physician:  Kvng Aviles MD  Referring Physician:  Jorden Holt III, MD  Group:  [facility]  Cardiology Fellow:  Von Fuentes MD    SUMMARY    PROCEDURE INFORMATION:  This was a technically difficult study.    LEFT VENTRICLE:  Systolic function was normal. Ejection fraction was estimated to be 60 %.  There were no regional wall motion abnormalities.  Doppler parameters were consistent with abnormal left ventricular relaxation (grade 1 diastolic dysfunction).    MITRAL VALVE:  There was trace regurgitation.    TRICUSPID VALVE:  There was trace regurgitation.  Pulmonary artery systolic pressure was within the normal range.    PULMONIC VALVE:  There was trace regurgitation.    HISTORY: PRIOR HISTORY: Hypertension, hyperlipidemia, diastolic dysfunction, type 2 diabetes, GERD    PROCEDURE: The study was performed in the [address] [place] [address] [facility] [place]. This was a routine study. The transthoracic approach was used. The study included complete 2D imaging, M-mode, complete spectral Doppler, and color Doppler. This  was a technically difficult study.    LEFT VENTRICLE: Size was normal. Systolic function was normal. Ejection fraction was estimated to be 60 %. There were no regional wall motion abnormalities. Wall thickness was normal. DOPPLER: Doppler parameters were consistent with  abnormal left ventricular relaxation (grade 1 diastolic dysfunction).    RIGHT VENTRICLE: The size was normal. Systolic function was normal. Wall thickness was normal.    LEFT ATRIUM: Size was normal.    RIGHT ATRIUM: Size was normal.    MITRAL VALVE: Valve structure was normal. There was normal leaflet separation. DOPPLER: The transmitral velocity was within the normal range. There was no evidence for stenosis. There was trace regurgitation.    AORTIC VALVE: The valve was probably trileaflet. Leaflets  exhibited normal cuspal separation. The valve was not well visualized. DOPPLER: Transaortic velocity was within the normal range. There was no evidence for stenosis. There was no  significant regurgitation.    TRICUSPID VALVE: The valve structure was normal. There was normal leaflet separation. DOPPLER: The transtricuspid velocity was within the normal range. There was no evidence for stenosis. There was trace regurgitation. Pulmonary artery  systolic pressure was within the normal range. Estimated peak PA pressure was 30 mmHg.    PULMONIC VALVE: Not well visualized. DOPPLER: The transpulmonic velocity was within the normal range. There was trace regurgitation.    PERICARDIUM: There was no pericardial effusion. A pericardial fat pad was present. The pericardium was normal in appearance.    AORTA: The root exhibited normal size.    SYSTEMIC VEINS: IVC: The inferior vena cava was normal in size. Respirophasic changes were normal.    SYSTEM MEASUREMENT TABLES    2D  %FS: 31.39 %  Ao Diam: 3.29 cm  EDV(Teich): 100.54 ml  EF(Cube): 67.7 %  EF(Teich): 59.26 %  ESV(Cube): 32.77 ml  ESV(Teich): 40.96 ml  IVSd: 0.99 cm  LA Area: 16.53 cm2  LA Diam: 3.39 cm  LVEDV MOD A4C: 94.35 ml  LVEF MOD A4C: 68.85 %  LVESV MOD A4C: 29.39 ml  LVIDd: 4.66 cm  LVIDs: 3.2 cm  LVLd A4C: 7.19 cm  LVLs A4C: 5.3 cm  LVPWd: 1.08 cm  RA Area: 13.64 cm2  RV Diam.: 2.6 cm  SV MOD A4C: 64.96 ml  SV(Cube): 68.69 ml  SV(Teich): 59.58 ml    CW  TR Vmax: 2.6 m/s  TR maxP.99 mmHg    MM  TAPSE: 1.84 cm    PW  E': 0.06 m/s  E/E': 14.64  MV A Toan: 0.97 m/s  MV Dec Dent: 3.14 m/s2  MV DecT: 267.31 ms  MV E Toan: 0.84 m/s  MV E/A Ratio: 0.86    [facility] Accredited Echocardiography Laboratory    Prepared and electronically signed by    Kvng Aviles MD  Signed 28-Dec-2018 11:05:05       Cardiac testing:   ECHO:   Results for orders placed during the hospital encounter of 18    Echo complete with contrast if  indicated    Narrative  [address] [address]  [phone]    Transthoracic Echocardiogram  2D, M-mode, Doppler, and Color Doppler    Study date:  28-Dec-2018    Patient: HAN CARVAJAL  MR number: [id number]  Account number: [id number]  : 1945  Age: 73 years  Gender: Female  Status: Outpatient  Location: [address] [place] [address]  Height: 63 in  Weight: 232 lb  BP: 169/ 76 mmHg    Indications: Heart failure    Diagnoses: I50.9 - Heart failure, unspecified    Sonographer:  DANYELLE Ga  Interpreting Physician:  Kvng Aviles MD  Referring Physician:  Jorden Holt III, MD  Group:  [facility]  Cardiology Fellow:  Von Fuentes MD    SUMMARY    PROCEDURE INFORMATION:  This was a technically difficult study.    LEFT VENTRICLE:  Systolic function was normal. Ejection fraction was estimated to be 60 %.  There were no regional wall motion abnormalities.  Doppler parameters were consistent with abnormal left ventricular relaxation (grade 1 diastolic dysfunction).    MITRAL VALVE:  There was trace regurgitation.    TRICUSPID VALVE:  There was trace regurgitation.  Pulmonary artery systolic pressure was within the normal range.    PULMONIC VALVE:  There was trace regurgitation.    HISTORY: PRIOR HISTORY: Hypertension, hyperlipidemia, diastolic dysfunction, type 2 diabetes, GERD    PROCEDURE: The study was performed in the [address] [place] [address]. This was a routine study. The transthoracic approach was used. The study included complete 2D imaging, M-mode, complete spectral Doppler, and color Doppler. This  was a technically difficult study.    LEFT VENTRICLE: Size was normal. Systolic function was normal. Ejection fraction was estimated to be 60 %. There were no regional wall motion abnormalities. Wall thickness was normal. DOPPLER: Doppler parameters were consistent with  abnormal left ventricular  relaxation (grade 1 diastolic dysfunction).    RIGHT VENTRICLE: The size was normal. Systolic function was normal. Wall thickness was normal.    LEFT ATRIUM: Size was normal.    RIGHT ATRIUM: Size was normal.    MITRAL VALVE: Valve structure was normal. There was normal leaflet separation. DOPPLER: The transmitral velocity was within the normal range. There was no evidence for stenosis. There was trace regurgitation.    AORTIC VALVE: The valve was probably trileaflet. Leaflets exhibited normal cuspal separation. The valve was not well visualized. DOPPLER: Transaortic velocity was within the normal range. There was no evidence for stenosis. There was no  significant regurgitation.    TRICUSPID VALVE: The valve structure was normal. There was normal leaflet separation. DOPPLER: The transtricuspid velocity was within the normal range. There was no evidence for stenosis. There was trace regurgitation. Pulmonary artery  systolic pressure was within the normal range. Estimated peak PA pressure was 30 mmHg.    PULMONIC VALVE: Not well visualized. DOPPLER: The transpulmonic velocity was within the normal range. There was trace regurgitation.    PERICARDIUM: There was no pericardial effusion. A pericardial fat pad was present. The pericardium was normal in appearance.    AORTA: The root exhibited normal size.    SYSTEMIC VEINS: IVC: The inferior vena cava was normal in size. Respirophasic changes were normal.    SYSTEM MEASUREMENT TABLES    2D  %FS: 31.39 %  Ao Diam: 3.29 cm  EDV(Teich): 100.54 ml  EF(Cube): 67.7 %  EF(Teich): 59.26 %  ESV(Cube): 32.77 ml  ESV(Teich): 40.96 ml  IVSd: 0.99 cm  LA Area: 16.53 cm2  LA Diam: 3.39 cm  LVEDV MOD A4C: 94.35 ml  LVEF MOD A4C: 68.85 %  LVESV MOD A4C: 29.39 ml  LVIDd: 4.66 cm  LVIDs: 3.2 cm  LVLd A4C: 7.19 cm  LVLs A4C: 5.3 cm  LVPWd: 1.08 cm  RA Area: 13.64 cm2  RV Diam.: 2.6 cm  SV MOD A4C: 64.96 ml  SV(Cube): 68.69 ml  SV(Teich): 59.58 ml    CW  TR Vmax: 2.6 m/s  TR maxP.99  mmHg    MM  TAPSE: 1.84 cm    PW  E': 0.06 m/s  E/E': 14.64  MV A Toan: 0.97 m/s  MV Dec Wilbarger: 3.14 m/s2  MV DecT: 267.31 ms  MV E Toan: 0.84 m/s  MV E/A Ratio: 0.86    [facility] Accredited Echocardiography Laboratory    Prepared and electronically signed by    Kvng Aviles MD  Signed 28-Dec-2018 11:05:05    No results found for this or any previous visit.      CATH:  No results found for this or any previous visit.      STRESS TEST:  No results found for this or any previous visit.      VTE Prophylaxis: eliquis

## 2025-02-25 ENCOUNTER — APPOINTMENT (INPATIENT)
Dept: NON INVASIVE DIAGNOSTICS | Facility: HOSPITAL | Age: 80
DRG: 641 | End: 2025-02-25
Payer: COMMERCIAL

## 2025-02-25 VITALS
OXYGEN SATURATION: 97 % | RESPIRATION RATE: 14 BRPM | TEMPERATURE: 97.5 F | DIASTOLIC BLOOD PRESSURE: 63 MMHG | SYSTOLIC BLOOD PRESSURE: 132 MMHG | HEART RATE: 84 BPM | BODY MASS INDEX: 38.67 KG/M2 | HEIGHT: 63 IN | WEIGHT: 218.26 LBS

## 2025-02-25 PROBLEM — R55 SYNCOPE, NEAR: Status: RESOLVED | Noted: 2025-02-24 | Resolved: 2025-02-25

## 2025-02-25 PROBLEM — R00.2 PALPITATIONS: Status: RESOLVED | Noted: 2025-02-24 | Resolved: 2025-02-25

## 2025-02-25 LAB
ALBUMIN SERPL BCG-MCNC: 4 G/DL (ref 3.5–5)
ALP SERPL-CCNC: 83 U/L (ref 34–104)
ALT SERPL W P-5'-P-CCNC: 19 U/L (ref 7–52)
ANION GAP SERPL CALCULATED.3IONS-SCNC: 16 MMOL/L (ref 4–13)
AORTIC ROOT: 3.1 CM
ASCENDING AORTA: 3.3 CM
AST SERPL W P-5'-P-CCNC: 29 U/L (ref 13–39)
ATRIAL RATE: 92 BPM
BASOPHILS # BLD AUTO: 0.03 THOUSANDS/ÂΜL (ref 0–0.1)
BASOPHILS NFR BLD AUTO: 0 % (ref 0–1)
BILIRUB SERPL-MCNC: 0.56 MG/DL (ref 0.2–1)
BSA FOR ECHO PROCEDURE: 2.05 M2
BUN SERPL-MCNC: 54 MG/DL (ref 5–25)
CALCIUM SERPL-MCNC: 9.5 MG/DL (ref 8.4–10.2)
CHLORIDE SERPL-SCNC: 93 MMOL/L (ref 96–108)
CO2 SERPL-SCNC: 28 MMOL/L (ref 21–32)
CREAT SERPL-MCNC: 1.5 MG/DL (ref 0.6–1.3)
DOP CALC LVOT AREA: 2.54 CM2
DOP CALC LVOT DIAMETER: 1.8 CM
E WAVE DECELERATION TIME: 238 MS
E/A RATIO: 0.86
EOSINOPHIL # BLD AUTO: 0.1 THOUSAND/ÂΜL (ref 0–0.61)
EOSINOPHIL NFR BLD AUTO: 1 % (ref 0–6)
ERYTHROCYTE [DISTWIDTH] IN BLOOD BY AUTOMATED COUNT: 15.3 % (ref 11.6–15.1)
FRACTIONAL SHORTENING: 27 (ref 28–44)
GFR SERPL CREATININE-BSD FRML MDRD: 32 ML/MIN/1.73SQ M
GLUCOSE SERPL-MCNC: 161 MG/DL (ref 65–140)
GLUCOSE SERPL-MCNC: 173 MG/DL (ref 65–140)
GLUCOSE SERPL-MCNC: 384 MG/DL (ref 65–140)
HCT VFR BLD AUTO: 39.4 % (ref 34.8–46.1)
HGB BLD-MCNC: 13.1 G/DL (ref 11.5–15.4)
IMM GRANULOCYTES # BLD AUTO: 0.06 THOUSAND/UL (ref 0–0.2)
IMM GRANULOCYTES NFR BLD AUTO: 0 % (ref 0–2)
INTERVENTRICULAR SEPTUM IN DIASTOLE (PARASTERNAL SHORT AXIS VIEW): 1.5 CM
INTERVENTRICULAR SEPTUM: 1.5 CM (ref 0.6–1.1)
LAAS-AP2: 25.7 CM2
LAAS-AP4: 22.9 CM2
LEFT ATRIUM SIZE: 2.5 CM
LEFT ATRIUM VOLUME (MOD BIPLANE): 70 ML
LEFT ATRIUM VOLUME INDEX (MOD BIPLANE): 34.1 ML/M2
LEFT INTERNAL DIMENSION IN SYSTOLE: 2.2 CM (ref 2.1–4)
LEFT VENTRICULAR INTERNAL DIMENSION IN DIASTOLE: 3 CM (ref 3.5–6)
LEFT VENTRICULAR POSTERIOR WALL IN END DIASTOLE: 1.2 CM
LEFT VENTRICULAR STROKE VOLUME: 20 ML
LV EF US.2D.A4C+ESTIMATED: 60 %
LVSV (TEICH): 20 ML
LYMPHOCYTES # BLD AUTO: 2.89 THOUSANDS/ÂΜL (ref 0.6–4.47)
LYMPHOCYTES NFR BLD AUTO: 21 % (ref 14–44)
MAGNESIUM SERPL-MCNC: 2.6 MG/DL (ref 1.9–2.7)
MCH RBC QN AUTO: 28.7 PG (ref 26.8–34.3)
MCHC RBC AUTO-ENTMCNC: 33.2 G/DL (ref 31.4–37.4)
MCV RBC AUTO: 86 FL (ref 82–98)
MONOCYTES # BLD AUTO: 0.97 THOUSAND/ÂΜL (ref 0.17–1.22)
MONOCYTES NFR BLD AUTO: 7 % (ref 4–12)
MV E'TISSUE VEL-SEP: 6 CM/S
MV PEAK A VEL: 0.74 M/S
MV PEAK E VEL: 64 CM/S
MV STENOSIS PRESSURE HALF TIME: 69 MS
MV VALVE AREA P 1/2 METHOD: 3.19
NEUTROPHILS # BLD AUTO: 9.51 THOUSANDS/ÂΜL (ref 1.85–7.62)
NEUTS SEG NFR BLD AUTO: 71 % (ref 43–75)
NRBC BLD AUTO-RTO: 0 /100 WBCS
PLATELET # BLD AUTO: 426 THOUSANDS/UL (ref 149–390)
PMV BLD AUTO: 8.8 FL (ref 8.9–12.7)
POTASSIUM SERPL-SCNC: 3.4 MMOL/L (ref 3.5–5.3)
PROT SERPL-MCNC: 7.7 G/DL (ref 6.4–8.4)
QRS AXIS: -35 DEGREES
QRSD INTERVAL: 122 MS
QT INTERVAL: 402 MS
QTC INTERVAL: 478 MS
RBC # BLD AUTO: 4.57 MILLION/UL (ref 3.81–5.12)
RIGHT ATRIAL 2D VOLUME: 51 ML
RIGHT ATRIUM AREA SYSTOLE A4C: 19.6 CM2
RIGHT VENTRICLE ID DIMENSION: 3.7 CM
SL CV LEFT ATRIUM LENGTH A2C: 6.8 CM
SL CV LV EF: 60
SL CV PED ECHO LEFT VENTRICLE DIASTOLIC VOLUME (MOD BIPLANE) 2D: 36 ML
SL CV PED ECHO LEFT VENTRICLE SYSTOLIC VOLUME (MOD BIPLANE) 2D: 16 ML
SODIUM SERPL-SCNC: 137 MMOL/L (ref 135–147)
T WAVE AXIS: 33 DEGREES
TRICUSPID ANNULAR PLANE SYSTOLIC EXCURSION: 1.9 CM
VENTRICULAR RATE: 85 BPM
WBC # BLD AUTO: 13.56 THOUSAND/UL (ref 4.31–10.16)

## 2025-02-25 PROCEDURE — 83735 ASSAY OF MAGNESIUM: CPT

## 2025-02-25 PROCEDURE — 36415 COLL VENOUS BLD VENIPUNCTURE: CPT

## 2025-02-25 PROCEDURE — 99239 HOSP IP/OBS DSCHRG MGMT >30: CPT | Performed by: PHYSICIAN ASSISTANT

## 2025-02-25 PROCEDURE — 82948 REAGENT STRIP/BLOOD GLUCOSE: CPT

## 2025-02-25 PROCEDURE — 99222 1ST HOSP IP/OBS MODERATE 55: CPT | Performed by: INTERNAL MEDICINE

## 2025-02-25 PROCEDURE — 80053 COMPREHEN METABOLIC PANEL: CPT

## 2025-02-25 PROCEDURE — 85025 COMPLETE CBC W/AUTO DIFF WBC: CPT

## 2025-02-25 PROCEDURE — 93306 TTE W/DOPPLER COMPLETE: CPT | Performed by: INTERNAL MEDICINE

## 2025-02-25 PROCEDURE — 99232 SBSQ HOSP IP/OBS MODERATE 35: CPT | Performed by: PHYSICIAN ASSISTANT

## 2025-02-25 PROCEDURE — C8929 TTE W OR WO FOL WCON,DOPPLER: HCPCS

## 2025-02-25 PROCEDURE — 93010 ELECTROCARDIOGRAM REPORT: CPT | Performed by: INTERNAL MEDICINE

## 2025-02-25 RX ORDER — GABAPENTIN 100 MG/1
300 CAPSULE ORAL 3 TIMES DAILY
Start: 2025-02-25

## 2025-02-25 RX ORDER — POTASSIUM CHLORIDE 1500 MG/1
20 TABLET, EXTENDED RELEASE ORAL ONCE
Status: COMPLETED | OUTPATIENT
Start: 2025-02-25 | End: 2025-02-25

## 2025-02-25 RX ORDER — TORSEMIDE 20 MG/1
20 TABLET ORAL 2 TIMES DAILY
Start: 2025-02-25

## 2025-02-25 RX ADMIN — GABAPENTIN 300 MG: 300 CAPSULE ORAL at 08:17

## 2025-02-25 RX ADMIN — APIXABAN 2.5 MG: 2.5 TABLET, FILM COATED ORAL at 08:18

## 2025-02-25 RX ADMIN — SPIRONOLACTONE 25 MG: 25 TABLET ORAL at 08:18

## 2025-02-25 RX ADMIN — Medication 1000 UNITS: at 08:18

## 2025-02-25 RX ADMIN — METOPROLOL SUCCINATE 25 MG: 25 TABLET, EXTENDED RELEASE ORAL at 08:17

## 2025-02-25 RX ADMIN — PERFLUTREN 0.6 ML/MIN: 6.52 INJECTION, SUSPENSION INTRAVENOUS at 08:26

## 2025-02-25 RX ADMIN — POTASSIUM CHLORIDE 20 MEQ: 1500 TABLET, EXTENDED RELEASE ORAL at 13:01

## 2025-02-25 RX ADMIN — Medication 1 TABLET: at 08:17

## 2025-02-25 RX ADMIN — INSULIN LISPRO 6 UNITS: 100 INJECTION, SOLUTION INTRAVENOUS; SUBCUTANEOUS at 13:01

## 2025-02-25 RX ADMIN — INSULIN LISPRO 1 UNITS: 100 INJECTION, SOLUTION INTRAVENOUS; SUBCUTANEOUS at 08:18

## 2025-02-25 RX ADMIN — BIMATOPROST 1 DROP: 0.3 SOLUTION/ DROPS OPHTHALMIC at 08:18

## 2025-02-25 RX ADMIN — TORSEMIDE 40 MG: 20 TABLET ORAL at 08:17

## 2025-02-25 NOTE — UTILIZATION REVIEW
Initial Clinical Review    Admission: Date/Time/Statement:   Admission Orders (From admission, onward)       Ordered        02/24/25 1624  INPATIENT ADMISSION  Once                          Orders Placed This Encounter   Procedures    INPATIENT ADMISSION     Standing Status:   Standing     Number of Occurrences:   1     Level of Care:   Med Surg [16]     Estimated length of stay:   More than 2 Midnights     Certification:   I certify that inpatient services are medically necessary for this patient for a duration of greater than two midnights. See H&P and MD Progress Notes for additional information about the patient's course of treatment.     ED Arrival Information       Expected   -    Arrival   2/24/2025 13:08    Acuity   Urgent              Means of arrival   Ambulance    Escorted by   [facility]    Service   Hospitalist    Admission type   Emergency              Arrival complaint   Dizziness             Chief Complaint   Patient presents with    Dizziness     Sudden onset dizziness while eating breakfast. Pt reports feeling palpitations. Hx of Afib, had an ablation last year.  Denies CP, denies SOB.        2/24/25 Initial Presentation: 79 y.o. female presents to the ED via EMS with presyncopal episode. Pt. states she became significantly lightheaded and felt like she was about to pass out. This occurred while she was paying her bill at a restaurant this morning. Patient had not gotten a small amount of blood work drawn, went to the restaurant and had eaten breakfast, drink orange juice and water, and then after standing had the episode of lightheadedness, felt like her heart was racing and pounding, said she could feel her heartbeat in her ears. This is similar to what she felt like when she was having runs of SVT in the fall. She takes a large amount of diuretic, states she is using the bathroom upwards of 30 times per day. She is short of breath at baseline, states that she can barely walk between  her bathroom in her living room without becoming short of breath, states that her heart rate jumps to about 120 and stays there for a while after she sits down and relaxes again. Has a Loop recorder. Exam: + dry mucous membranes, + dizziness. PMH: atrial fibrillation s/p ablation, SVT, history of stroke, CKD, T2DM, HTN. Abnormal labs/imaging: WBC 15.71, PLTS 466, , CL 93, Anion Gap 16, BUN 61, creat 1.71, Glucose 246, U/A + leukocytes and protein and occult blood. In the ED pt given, IVF's of  ml, IV MG sulfate and home meds. Plan: admit to inpatient for near syncope, Telemetry monitoring, Electrophysiology consult, TTE, Orthostatic BP's, Loop recorder interrogation, Fingersticks, SSI.     Electrophysiology consult: Loop recorder interrogation shows what appears to be sinus tachycardia with rates right around 100 bpm.Last echocardiogram showed preserved ejection fraction of 60%.  Etiology of palpitations unclear, possibly dehydration in the setting of significant diuretic use. On significant diuretics with torsemide 20 mg twice daily, Aldactone 25 mg daily, and metolazone as needed . Plan: continue telemetry monitoring, Holding Metolazone, I/O's, Recommend inpatient nephrology evaluation to see if she needs adjustment to diuretics , decrease Elqiuis to 2.5 mg due to creat.     Anticipated Length of Stay/Certification Statement: Patient will be admitted on an observation basis with an anticipated length of stay of less than 2 midnights secondary to presyncope, rule out arrhythmias, follow-up echo, then can likely discharge if workup is negative.     Date: 2/25/25   Day 2: Electrophysiology: No arrythmias on telemetry or loop recorder. BP's have been stable she is not orthostatic. Unclear etiology consider anxiety .Stable for discharge with outpatient loop monitoring     Cardiology consult: exam:Pt denies any specific cardiac or respiratory complaints this a.m.  No current supplemental O2 requirements.   -Appears euvolemic on exam.  Patient took metolazone over the weekend for reported weight gain of 5 lbs.  ILR did not show any arrhythmia. Patient is in normal sinus rhythm. She may have been over diuresed from the metolazone. Metalozone has been held,  today creat 1.50 which is improved from admission.  UOP 1800 ml,  lbs , 229 lbs last recorded on 12/11/24. K 3.4 and PO kcl given today. Agree with discontinuing metolazone and continue with torsemide. Continue with anticoagulation for atrial fibrillation. No further inpatient cardiology recommendations.     DC to home today 2/25/25.     ED Treatment-Medication Administration from 02/24/2025 1307 to 02/25/2025 0506         Date/Time Order Dose Route Action     02/24/2025 1438 sodium chloride 0.9 % bolus 500 mL 500 mL Intravenous New Bag     02/24/2025 1900 metoprolol succinate (TOPROL-XL) 24 hr tablet 25 mg 25 mg Oral Given     02/24/2025 1900 gabapentin (NEURONTIN) capsule 300 mg 300 mg Oral Given     02/24/2025 2039 gabapentin (NEURONTIN) capsule 300 mg 300 mg Oral Given     02/24/2025 2104 ezetimibe (ZETIA) tablet 10 mg 10 mg Oral Given     02/24/2025 2039 torsemide (DEMADEX) tablet 40 mg 40 mg Oral Given     02/24/2025 2253 insulin lispro (HumALOG/ADMELOG) 100 units/mL subcutaneous injection 1-5 Units 1 Units Subcutaneous Given     02/24/2025 2342 potassium chloride (Klor-Con M20) CR tablet 20 mEq 20 mEq Oral Given     02/24/2025 2342 magnesium sulfate IVPB (premix) SOLN 1 g 1 g Intravenous New Bag            Scheduled Medications:  apixaban, 2.5 mg, Oral, BID  bimatoprost, 1 drop, Ophthalmic, Daily  Cholecalciferol, 1,000 Units, Oral, Daily  ezetimibe, 10 mg, Oral, HS  gabapentin, 300 mg, Oral, TID  insulin lispro, 1-5 Units, Subcutaneous, HS  insulin lispro, 1-6 Units, Subcutaneous, TID AC  metoprolol succinate, 25 mg, Oral, BID  multivitamin-minerals, 1 tablet, Oral, Daily  senna, 1 tablet, Oral, Daily  spironolactone, 25 mg, Oral, Daily  torsemide, 40  mg, Oral, BID      Continuous IV Infusions: none      PRN Meds:  acetaminophen, 650 mg, Oral, Q6H PRN  aluminum-magnesium hydroxide-simethicone, 30 mL, Oral, Q6H PRN  ondansetron, 4 mg, Intravenous, Q6H PRN      ED Triage Vitals [02/24/25 1315]   Temperature Pulse Respirations Blood Pressure SpO2 Pain Score   97.8 °F (36.6 °C) 89 20 146/65 99 % No Pain     Weight (last 2 days) before discharge       Date/Time Weight    02/25/25 07:48:22 99 (218.26)            Vital Signs (last 3 days) before discharge       Date/Time Temp Pulse Resp BP MAP (mmHg) SpO2 O2 Device Patient Position - Orthostatic VS [place] Coma Scale Score Pain    02/25/25 11:27:40 97.5 °F (36.4 °C) 84 -- 132/63 86 97 % -- -- -- --    02/25/25 0915 -- -- -- 134/71 -- -- -- Sitting - Orthostatic VS -- --    02/25/25 0914 -- -- -- -- -- 10 % -- Standing - Orthostatic VS -- --    02/25/25 0900 -- -- -- 136/73 -- -- -- Lying - Orthostatic VS -- --    02/25/25 07:48:22 98.1 °F (36.7 °C) 82 14 138/74 95 96 % None (Room air) Lying 15 No Pain    02/25/25 0540 -- -- -- -- -- -- -- -- -- No Pain    02/25/25 05:17:55 97.6 °F (36.4 °C) 78 16 142/79 100 97 % -- -- -- --    02/25/25 0310 -- 74 15 168/64 -- 97 % None (Room air) Sitting -- --    02/24/25 2045 -- 80 18 144/67 96 96 % None (Room air) Sitting -- --    02/24/25 1900 -- 78 -- 143/65 -- -- -- -- -- --    02/24/25 1737 -- -- -- -- -- -- -- -- -- No Pain    02/24/25 1530 -- 84 21 -- -- 98 % None (Room air) -- -- No Pain    02/24/25 1317 -- -- -- -- -- -- None (Room air) -- 15 --    02/24/25 1315 97.8 °F (36.6 °C) 89 20 146/65 94 99 % None (Room air) -- -- No Pain              Pertinent Labs/Diagnostic Test Results:   Radiology:  XR chest 2 views   Final Interpretation by Alec Contreras MD (02/24 1532)      No acute cardiopulmonary disease.            Workstation performed: [id number]           Cardiology:  Echo complete w/ contrast if indicated   Final Result by Patty Phillips DO (02/25 0572)        Left  Ventricle: Left ventricular cavity size is normal. Wall thickness    is mildly increased. There is concentric remodeling. The left ventricular    ejection fraction is 60%. Systolic function is normal. Although no    diagnostic regional wall motion abnormality was identified, this    possibility cannot be completely excluded on the basis of this study.    Diastolic function is mildly abnormal, consistent with grade I (abnormal)    relaxation.     Right Ventricle: Right ventricular cavity size is normal. Systolic    function is normal.     Right Atrium: The atrium is mildly dilated.     Mitral Valve: There is mild annular calcification.               Results from last 7 days   Lab Units 02/25/25  0447 02/24/25  1331   WBC Thousand/uL 13.56* 15.71*   HEMOGLOBIN g/dL 13.1 13.6   HEMATOCRIT % 39.4 41.2   PLATELETS Thousands/uL 426* 466*   TOTAL NEUT ABS Thousands/µL 9.51* 13.08*         Results from last 7 days   Lab Units 02/25/25  0447 02/24/25  1331 02/24/25  0956   SODIUM mmol/L 137 134* 137   POTASSIUM mmol/L 3.4* 4.9 3.5   CHLORIDE mmol/L 93* 93* 93*   CO2 mmol/L 28 25 28   ANION GAP mmol/L 16* 16* 16*   BUN mg/dL 54* 61* 57*   CREATININE mg/dL 1.50* 1.71* 1.63*   EGFR ml/min/1.73sq m 32 27 29   CALCIUM mg/dL 9.5 9.8 10.0   MAGNESIUM mg/dL 2.6  --   --      Results from last 7 days   Lab Units 02/25/25  0447 02/24/25  1331   AST U/L 29 34   ALT U/L 19 19   ALK PHOS U/L 83 80   TOTAL PROTEIN g/dL 7.7 8.1   ALBUMIN g/dL 4.0 4.2   TOTAL BILIRUBIN mg/dL 0.56 0.55     Results from last 7 days   Lab Units 02/25/25  1127 02/25/25  0731 02/24/25  2252   POC GLUCOSE mg/dl 384* 161* 170*     Results from last 7 days   Lab Units 02/25/25  0447 02/24/25  1331   GLUCOSE RANDOM mg/dL 173* 246*         Results from last 7 days   Lab Units 02/24/25  0956   HEMOGLOBIN A1C % 8.1*   EAG mg/dl 186               Results from last 7 days   Lab Units 02/24/25  1540 02/24/25  1331   HS TNI 0HR ng/L  --  9   HS TNI 2HR ng/L 8  --    HSTNI  D2 ng/L -1  --              Results from last 7 days   Lab Units 02/24/25  1331   TSH 3RD GENERATON uIU/mL 1.687             Results from last 7 days   Lab Units 02/24/25  1331 02/24/25  0956   BNP pg/mL 36 31               Results from last 7 days   Lab Units 02/24/25  1543   CLARITY UA  Turbid   COLOR UA  Yellow   SPEC GRAV UA  1.015   PH UA  6.0   GLUCOSE UA mg/dl Negative   KETONES UA mg/dl Negative   BLOOD UA  Small*   PROTEIN UA mg/dl 30 (1+)*   NITRITE UA  Negative   BILIRUBIN UA  Negative   UROBILINOGEN UA (BE) mg/dl <2.0   LEUKOCYTES UA  Large*   WBC UA /hpf Innumerable*   RBC UA /hpf 10-20*   BACTERIA UA /hpf Innumerable*   EPITHELIAL CELLS WET PREP /hpf Occasional           Past Medical History:   Diagnosis Date    Arthritis     Colorectal polyps     Constipation     Diabetes mellitus (HCC)     Diverticulosis of colon     Hiatal hernia     Hypertension     Increased urinary frequency     Lumbar disc disease     Pressure injury of skin     SOB (shortness of breath)     Stress incontinence     Vitamin D deficiency 07/27/2018     Present on Admission:   Palpitations   PAF (paroxysmal atrial fibrillation) ([facility])   (HFpEF) heart failure with preserved ejection fraction (HCC)   DM II (diabetes mellitus, type II), controlled ([facility])   Syncope, near      Admitting Diagnosis: Atrial fibrillation (HCC) [I48.91]  Dizziness [R42]  Fatigue [R53.83]  Near syncope [R55]  Chronic diastolic CHF (congestive heart failure) ([facility]) [I50.32]  Age/Sex: 79 y.o. female    Network Utilization Review Department  ATTENTION: Please call with any questions or concerns to [phone] and carefully listen to the prompts so that you are directed to the right person. All voicemails are confidential.   For Discharge needs, contact Care Management DC Support Team at [phone] opt. [phone]  Send all requests for admission clinical reviews, approved or denied determinations and any other requests to dedicated fax number below belonging to the  [place] where the patient is receiving treatment. List of dedicated fax numbers for the Facilities:  FACILITY NAME UR FAX NUMBER   ADMISSION DENIALS (Administrative/Medical Necessity) [phone]   DISCHARGE SUPPORT TEAM (NETWORK) [phone]   PARENT CHILD HEALTH (Maternity/NICU/Pediatrics) [phone]   [facility] [phone]   [facility] [phone]   Critical access hospital [phone]   [facility] [phone]   [facility] [phone]   [facility] [phone]   [facility] [phone]   [facility] [phone]   [facility] [phone]   [facility] [phone]   [facility] [phone]   [facility] [phone]

## 2025-02-25 NOTE — ED NOTES
Pt. Transferred into a hold bed to promote comfort at this time     Dior Hodges RN  02/24/25 2030

## 2025-02-25 NOTE — PLAN OF CARE
Problem: Prexisting or High Potential for Compromised Skin Integrity  Goal: Skin integrity is maintained or improved  Description: INTERVENTIONS:  - Identify patients at risk for skin breakdown  - Assess and monitor skin integrity  - Assess and monitor nutrition and hydration status  - Monitor labs   - Assess for incontinence   - Turn and reposition patient  - Assist with mobility/ambulation  - Relieve pressure over bony prominences  - Avoid friction and shearing  - Provide appropriate hygiene as needed including keeping skin clean and dry  - Evaluate need for skin moisturizer/barrier cream  - Collaborate with interdisciplinary team   - Patient/family teaching  - Consider wound care consult   Outcome: Progressing     Problem: SAFETY ADULT  Goal: Patient will remain free of falls  Description: INTERVENTIONS:  - Educate patient/family on patient safety including physical limitations  - Instruct patient to call for assistance with activity   - Consult OT/PT to assist with strengthening/mobility   - Keep Call bell within reach  - Keep bed low and locked with side rails adjusted as appropriate  - Keep care items and personal belongings within reach  - Initiate and maintain comfort rounds  - Make Fall Risk Sign visible to staff  - Offer Toileting every 2 Hours, in advance of need  - Initiate/Maintain bed alarm  - Obtain necessary fall risk management equipment  - Apply yellow socks and bracelet for high fall risk patients  - Consider moving patient to room near nurses station  Outcome: Progressing  Goal: Maintain or return to baseline ADL function  Description: INTERVENTIONS:  -  Assess patient's ability to carry out ADLs; assess patient's baseline for ADL function and identify physical deficits which impact ability to perform ADLs (bathing, care of mouth/teeth, toileting, grooming, dressing, etc.)  - Assess/evaluate cause of self-care deficits   - Assess range of motion  - Assess patient's mobility; develop plan if  impaired  - Assess patient's need for assistive devices and provide as appropriate  - Encourage maximum independence but intervene and supervise when necessary  - Involve family in performance of ADLs  - Assess for home care needs following discharge   - Consider OT consult to assist with ADL evaluation and planning for discharge  - Provide patient education as appropriate  Outcome: Progressing  Goal: Maintains/Returns to pre admission functional level  Description: INTERVENTIONS:  - Perform AM-PAC 6 Click Basic Mobility/ Daily Activity assessment daily.  - Set and communicate daily mobility goal to care team and patient/family/caregiver.   - Collaborate with rehabilitation services on mobility goals if consulted  - Perform Range of Motion 4 times a day.  - Reposition patient every 2 hours.  - Dangle patient 4 times a day  - Stand patient 3 times a day  - Ambulate patient 3 times a day  - Out of bed to chair 3 times a day   - Out of bed for meals 3 times a day  - Out of bed for toileting  - Record patient progress and toleration of activity level   Outcome: Progressing     Problem: DISCHARGE PLANNING  Goal: Discharge to home or other facility with appropriate resources  Description: INTERVENTIONS:  - Identify barriers to discharge w/patient and caregiver  - Arrange for needed discharge resources and transportation as appropriate  - Identify discharge learning needs (meds, wound care, etc.)  - Arrange for interpretive services to assist at discharge as needed  - Refer to Case Management Department for coordinating discharge planning if the patient needs post-hospital services based on physician/advanced practitioner order or complex needs related to functional status, cognitive ability, or social support system  Outcome: Progressing     Problem: Knowledge Deficit  Goal: Patient/family/caregiver demonstrates understanding of disease process, treatment plan, medications, and discharge instructions  Description:  Complete learning assessment and assess knowledge base.  Interventions:  - Provide teaching at level of understanding  - Provide teaching via preferred learning methods  Outcome: Progressing

## 2025-02-25 NOTE — CONSULTS
Cardiology   Jessica Jaquez 79 y.o. female MRN: [id number]  Unit/Bed#: -01 Encounter: [id number]      Reason for Consult / Principal Problem:    Physician Requesting Consult:  Chan Dash MD    Cardiologist: Dr. Reaves     Assessment  1.  Near syncope/palpitations  -Suspected etiology likely in the context of dehydration related to the administration of multiple diuretic agents (including torsemide, spironolactone and 1 dose of metolazone that was self-administered on 2/21).  BUN/creatinine levels slightly higher than baseline on admission.  Did receive gentle IV fluid resuscitation on admission with noted improvement in renal function.  2. Chronic HFpEF  -Appears stable/compensated.  -TTE 2/25/2025; LVEF 60%, grade 1 DD, RV normal size/stock function, RA mildly dilated.  -Outpatient diuretic regimen; torsemide 20 mg twice daily + spironolactone 25 mg daily + metolazone 5 mg as needed.  -Inpatient diuretic regimen; torsemide 20 mg twice daily + spironolactone 25 mg daily.  -Office weight in mid December 2024 was 229 pounds, currently 218 pounds.  3. Paroxysmal atrial fibrillation  4. ILR in situ.  -PAF was newly diagnosed in 3/2024.  Underwent PHU/CV procedure at that time.  Started on flecainide in April after having been readmitted for A-fib with RVR at that time.  Was taken off of flecainide due to reported intolerance.  Subsequently underwent a PFA ablation with ILR implantation in May 2024.  ILR interrogated during this admission, no evidence of A-fib.  -Maintaining NSR.  -On metoprolol succinate 25 mg twice daily.  -On low-dose apixaban 2.5 mg twice daily -therapeutic given age/renal function creatinine 1.5  5. CKD stage III  -Creatinine around 1.4-1.7  -Creatinine 1.71 on admission, currently 1.5.    Plan  -Pt denies any specific cardiac or respiratory complaints this a.m.  No current supplemental O2 requirements.  -Appears euvolemic on exam.  -Continue torsemide 20 mg twice daily + spironolactone 25  mg daily.  Discontinue metolazone moving forward.  Advised the patient to take an extra dose of torsemide 20 mg if she gains 2 to 3 pounds within 24 hours and to call our office for further direction/possible closer follow-up appointment.  -Continue metoprolol succinate 25 mg daily.  -Continue apixaban, dose decreased from 5 to 2.5 mg daily this admission, therapeutic based off of age/renal function.  -Stable for DC from a cardiac perspective.    HPI: Jessica Jaquez 79 y.o. year old female with a medical history of chronic HFpEF, paroxysmal atrial fibrillation, ILR in situ, CKD stage III and DM type II.  Non-smoker, denies excessive alcohol or recreational/illicit drug use.  She follows routinely with Dr. Reaves with the [facility] service last seen as an outpatient back in mid December 2024.  Overall reported to have been doing well from a cardiac perspective at this time.  Presented to the [facility] campus on 2/24/2025 with complaints of near syncope and palpitations.  Reports taking a dose of metolazone 5 mg (ordered as needed) this past Friday given a reported weight gain of 5 pounds.  She reported a significant urinary response following this.    Family History:   Family History   Problem Relation Age of Onset    Hypertension Mother     Heart attack Mother     Diabetes Mother     Prostate cancer Father     No Known Problems Brother     Stroke Maternal Grandmother     Stroke Paternal Grandmother     Stroke Paternal Grandfather     Thyroid cancer Neg Hx      Historical Information   Past Medical History:   Diagnosis Date    Arthritis     Colorectal polyps     Constipation     Diabetes mellitus (HCC)     Diverticulosis of colon     Hiatal hernia     Hypertension     Increased urinary frequency     Lumbar disc disease     Pressure injury of skin     SOB (shortness of breath)     Stress incontinence     Vitamin D deficiency 07/27/2018     Past Surgical History:   Procedure Laterality Date    BACK SURGERY      CARDIAC  ELECTROPHYSIOLOGY PROCEDURE N/A 7/9/2024    Procedure: Cardiac eps/afib ablation PFA;  Surgeon: Jeyson Delaney MD;  Location: [facility];  Service: Cardiology    CARDIAC ELECTROPHYSIOLOGY PROCEDURE N/A 7/9/2024    Procedure: Cardiac loop recorder implant;  Surgeon: Jeyson Delaney MD;  Location: [facility];  Service: Cardiology    CARPAL TUNNEL RELEASE Left     CATARACT EXTRACTION Bilateral     CHOLECYSTECTOMY      COLONOSCOPY W/ ENDOSCOPIC US N/A 2/24/2016    Procedure: ANAL ENDOSCOPIC U/S;  Surgeon: HOLLIS Washington MD;  Location: [facility];  Service:     HERNIA REPAIR      AK COLONOSCOPY FLX DX W/COLLJ SPEC WHEN PFRMD N/A 2/24/2016    Procedure: COLONOSCOPY;  Surgeon: HOLLIS Washington MD;  Location: [facility];  Service: Colorectal    TONSILLECTOMY      TUBAL LIGATION       Social History   Social History     Substance and Sexual Activity   Alcohol Use Never     Social History     Substance and Sexual Activity   Drug Use No     Social History     Tobacco Use   Smoking Status Never   Smokeless Tobacco Never     Family History:   Family History   Problem Relation Age of Onset    Hypertension Mother     Heart attack Mother     Diabetes Mother     Prostate cancer Father     No Known Problems Brother     Stroke Maternal Grandmother     Stroke Paternal Grandmother     Stroke Paternal Grandfather     Thyroid cancer Neg Hx        Review of Systems:  Review of Systems   Constitutional:  Negative for chills, fatigue and fever.   Eyes:  Negative for visual disturbance.   Respiratory:  Negative for cough, chest tightness and shortness of breath.    Cardiovascular:  Negative for chest pain, palpitations and leg swelling.   Gastrointestinal:  Negative for abdominal pain.   Neurological:  Negative for dizziness, light-headedness and headaches.   All other systems reviewed and are negative.          Scheduled Meds:  Current Facility-Administered Medications   Medication Dose Route Frequency Provider Last  Rate    acetaminophen  650 mg Oral Q6H PRN Mica Burns MD      aluminum-magnesium hydroxide-simethicone  30 mL Oral Q6H PRN Mica Burns MD      apixaban  2.5 mg Oral BID Mica Burns MD      bimatoprost  1 drop Ophthalmic Daily Mica Burns MD      Cholecalciferol  1,000 Units Oral Daily Mica Burns MD      ezetimibe  10 mg Oral HS Mica Burns MD      gabapentin  300 mg Oral TID Mica Burns MD      insulin lispro  1-5 Units Subcutaneous HS Mica Burns MD      insulin lispro  1-6 Units Subcutaneous TID AC Mica Burns MD      metoprolol succinate  25 mg Oral BID Mica Burns MD      multivitamin-minerals  1 tablet Oral Daily Mica Burns MD      ondansetron  4 mg Intravenous Q6H PRN Mica Burns MD      potassium chloride  20 mEq Oral Once Kelly Gutierrez PA-C      senna  1 tablet Oral Daily Mica Burns MD      spironolactone  25 mg Oral Daily Mica Burns MD      torsemide  40 mg Oral BID Mica Burns MD       Continuous Infusions:   PRN Meds:.  acetaminophen    aluminum-magnesium hydroxide-simethicone    ondansetron  all current active meds have been reviewed and current meds:   Current Facility-Administered Medications:     acetaminophen (TYLENOL) tablet 650 mg, Q6H PRN    aluminum-magnesium hydroxide-simethicone (MAALOX) oral suspension 30 mL, Q6H PRN    apixaban (ELIQUIS) tablet 2.5 mg, BID    bimatoprost (LUMIGAN) 0.03 % ophthalmic drops 1 drop, Daily    Cholecalciferol (VITAMIN D3) tablet 1,000 Units, Daily    ezetimibe (ZETIA) tablet 10 mg, HS    gabapentin (NEURONTIN) capsule 300 mg, TID    insulin lispro (HumALOG/ADMELOG) 100 units/mL subcutaneous injection 1-5 Units, HS    insulin lispro (HumALOG/ADMELOG) 100 units/mL subcutaneous injection 1-6 Units, TID AC **AND** Fingerstick Glucose (POCT), TID AC    metoprolol succinate (TOPROL-XL) 24 hr tablet 25 mg, BID    multivitamin-minerals (CENTRUM) tablet 1 tablet,  "Daily    ondansetron (ZOFRAN) injection 4 mg, Q6H PRN    potassium chloride (Klor-Con M20) CR tablet 20 mEq, Once    senna (SENOKOT) tablet 8.6 mg, Daily    spironolactone (ALDACTONE) tablet 25 mg, Daily    torsemide (DEMADEX) tablet 40 mg, BID    Allergies   Allergen Reactions    Other Cough and Sneezing     Seasonal allergies    Pollen Extract Allergic Rhinitis       Objective   Vitals: Blood pressure 134/71, pulse 82, temperature 98.1 °F (36.7 °C), temperature source Oral, resp. rate 14, height 5' 3\" (1.6 m), weight 99 kg (218 lb 4.1 oz), SpO2 (!) 10%., Body mass index is 38.66 kg/m².,   Orthostatic Blood Pressures      Flowsheet Row Most Recent Value   Blood Pressure 134/71 filed at 02/25/2025 0915   Patient Position - Orthostatic VS Sitting - Orthostatic VS filed at 02/25/2025 0915              Intake/Output Summary (Last 24 hours) at 2/25/2025 1110  Last data filed at 2/25/2025 0914  Gross per 24 hour   Intake 240 ml   Output 1800 ml   Net -1560 ml       Invasive Devices       Peripheral Intravenous Line  Duration             Peripheral IV 09/02/24 Right Antecubital 175 days    Peripheral IV 02/24/25 Dorsal (posterior);Right Forearm <1 day              Drain  Duration             External Urinary Catheter 230 days    External Urinary Catheter 229 days                    Physical Exam:  Physical Exam  Vitals and nursing note reviewed.   Constitutional:       General: She is not in acute distress.     Appearance: She is obese. She is not diaphoretic.   HENT:      Head: Normocephalic and atraumatic.   Eyes:      General: No scleral icterus.  Cardiovascular:      Rate and Rhythm: Normal rate.      Pulses: Normal pulses.      Heart sounds: Normal heart sounds.   Pulmonary:      Effort: Pulmonary effort is normal.      Breath sounds: Normal breath sounds.   Abdominal:      Palpations: Abdomen is soft.   Musculoskeletal:      Right lower leg: No edema.      Left lower leg: No edema.   Skin:     General: Skin is warm " and dry.      Capillary Refill: Capillary refill takes less than 2 seconds.   Neurological:      General: No focal deficit present.      Mental Status: She is alert and oriented to person, place, and time.   Psychiatric:         Mood and Affect: Mood normal.       Lab Results:   Recent Results (from the past 24 hours)   CBC and differential    Collection Time: 02/24/25  1:31 PM   Result Value Ref Range    WBC 15.71 (H) 4.31 - 10.16 Thousand/uL    RBC 4.70 3.81 - 5.12 Million/uL    Hemoglobin 13.6 11.5 - 15.4 g/dL    Hematocrit 41.2 34.8 - 46.1 %    MCV 88 82 - 98 fL    MCH 28.9 26.8 - 34.3 pg    MCHC 33.0 31.4 - 37.4 g/dL    RDW 15.0 11.6 - 15.1 %    MPV 8.9 8.9 - 12.7 fL    Platelets 466 (H) 149 - 390 Thousands/uL    nRBC 0 /100 WBCs    Segmented % 84 (H) 43 - 75 %    Immature Grans % 0 0 - 2 %    Lymphocytes % 11 (L) 14 - 44 %    Monocytes % 5 4 - 12 %    Eosinophils Relative 0 0 - 6 %    Basophils Relative 0 0 - 1 %    Absolute Neutrophils 13.08 (H) 1.85 - 7.62 Thousands/µL    Absolute Immature Grans 0.07 0.00 - 0.20 Thousand/uL    Absolute Lymphocytes 1.69 0.60 - 4.47 Thousands/µL    Absolute Monocytes 0.80 0.17 - 1.22 Thousand/µL    Eosinophils Absolute 0.03 0.00 - 0.61 Thousand/µL    Basophils Absolute 0.04 0.00 - 0.10 Thousands/µL   Comprehensive metabolic panel    Collection Time: 02/24/25  1:31 PM   Result Value Ref Range    Sodium 134 (L) 135 - 147 mmol/L    Potassium 4.9 3.5 - 5.3 mmol/L    Chloride 93 (L) 96 - 108 mmol/L    CO2 25 21 - 32 mmol/L    ANION GAP 16 (H) 4 - 13 mmol/L    BUN 61 (H) 5 - 25 mg/dL    Creatinine 1.71 (H) 0.60 - 1.30 mg/dL    Glucose 246 (H) 65 - 140 mg/dL    Calcium 9.8 8.4 - 10.2 mg/dL    AST 34 13 - 39 U/L    ALT 19 7 - 52 U/L    Alkaline Phosphatase 80 34 - 104 U/L    Total Protein 8.1 6.4 - 8.4 g/dL    Albumin 4.2 3.5 - 5.0 g/dL    Total Bilirubin 0.55 0.20 - 1.00 mg/dL    eGFR 27 ml/min/1.73sq m   HS Troponin 0hr (reflex protocol)    Collection Time: 02/24/25  1:31 PM  "  Result Value Ref Range    hs TnI 0hr 9 \"Refer to ACS Flowchart\"- see link ng/L   B-Type Natriuretic Peptide(BNP)    Collection Time: 02/24/25  1:31 PM   Result Value Ref Range    BNP 36 0 - 100 pg/mL   TSH, 3rd generation    Collection Time: 02/24/25  1:31 PM   Result Value Ref Range    TSH 3RD GENERATON 1.687 0.450 - 4.500 uIU/mL   HS Troponin I 2hr    Collection Time: 02/24/25  3:40 PM   Result Value Ref Range    hs TnI 2hr 8 \"Refer to ACS Flowchart\"- see link ng/L    Delta 2hr hsTnI -1 <20 ng/L   UA w Reflex to Microscopic w Reflex to Culture    Collection Time: 02/24/25  3:43 PM    Specimen: Urine, Clean Catch   Result Value Ref Range    Color, UA Yellow     Clarity, UA Turbid     Specific Gravity, UA 1.015 1.003 - 1.030    pH, UA 6.0 4.5, 5.0, 5.5, 6.0, 6.5, 7.0, 7.5, 8.0    Leukocytes, UA Large (A) Negative    Nitrite, UA Negative Negative    Protein, UA 30 (1+) (A) Negative mg/dl    Glucose, UA Negative Negative mg/dl    Ketones, UA Negative Negative mg/dl    Urobilinogen, UA <2.0 <2.0 mg/dl mg/dl    Bilirubin, UA Negative Negative    Occult Blood, UA Small (A) Negative   Urine Microscopic    Collection Time: 02/24/25  3:43 PM   Result Value Ref Range    RBC, UA 10-20 (A) None Seen, 1-2 /hpf    WBC, UA Innumerable (A) None Seen, 1-2 /hpf    Epithelial Cells Occasional None Seen, Occasional /hpf    Bacteria, UA Innumerable (A) None Seen, Occasional /hpf    WBC Clumps Present    Fingerstick Glucose (POCT)    Collection Time: 02/24/25 10:52 PM   Result Value Ref Range    POC Glucose 170 (H) 65 - 140 mg/dl   Comprehensive metabolic panel    Collection Time: 02/25/25  4:47 AM   Result Value Ref Range    Sodium 137 135 - 147 mmol/L    Potassium 3.4 (L) 3.5 - 5.3 mmol/L    Chloride 93 (L) 96 - 108 mmol/L    CO2 28 21 - 32 mmol/L    ANION GAP 16 (H) 4 - 13 mmol/L    BUN 54 (H) 5 - 25 mg/dL    Creatinine 1.50 (H) 0.60 - 1.30 mg/dL    Glucose 173 (H) 65 - 140 mg/dL    Calcium 9.5 8.4 - 10.2 mg/dL    AST 29 13 - 39 " U/L    ALT 19 7 - 52 U/L    Alkaline Phosphatase 83 34 - 104 U/L    Total Protein 7.7 6.4 - 8.4 g/dL    Albumin 4.0 3.5 - 5.0 g/dL    Total Bilirubin 0.56 0.20 - 1.00 mg/dL    eGFR 32 ml/min/1.73sq m   CBC and differential    Collection Time: 02/25/25  4:47 AM   Result Value Ref Range    WBC 13.56 (H) 4.31 - 10.16 Thousand/uL    RBC 4.57 3.81 - 5.12 Million/uL    Hemoglobin 13.1 11.5 - 15.4 g/dL    Hematocrit 39.4 34.8 - 46.1 %    MCV 86 82 - 98 fL    MCH 28.7 26.8 - 34.3 pg    MCHC 33.2 31.4 - 37.4 g/dL    RDW 15.3 (H) 11.6 - 15.1 %    MPV 8.8 (L) 8.9 - 12.7 fL    Platelets 426 (H) 149 - 390 Thousands/uL    nRBC 0 /100 WBCs    Segmented % 71 43 - 75 %    Immature Grans % 0 0 - 2 %    Lymphocytes % 21 14 - 44 %    Monocytes % 7 4 - 12 %    Eosinophils Relative 1 0 - 6 %    Basophils Relative 0 0 - 1 %    Absolute Neutrophils 9.51 (H) 1.85 - 7.62 Thousands/µL    Absolute Immature Grans 0.06 0.00 - 0.20 Thousand/uL    Absolute Lymphocytes 2.89 0.60 - 4.47 Thousands/µL    Absolute Monocytes 0.97 0.17 - 1.22 Thousand/µL    Eosinophils Absolute 0.10 0.00 - 0.61 Thousand/µL    Basophils Absolute 0.03 0.00 - 0.10 Thousands/µL   Magnesium    Collection Time: 02/25/25  4:47 AM   Result Value Ref Range    Magnesium 2.6 1.9 - 2.7 mg/dL   Fingerstick Glucose (POCT)    Collection Time: 02/25/25  7:31 AM   Result Value Ref Range    POC Glucose 161 (H) 65 - 140 mg/dl   Echo complete w/ contrast if indicated    Collection Time: 02/25/25  8:25 AM   Result Value Ref Range    RAA A4C 19.6 cm2    LA Volume Index (BP) 34.1 mL/m2    MV Peak A Toan 0.74 m/s    MV stenosis pressure 1/2 time 69 ms    MV Peak E Toan 64 cm/s    LVOT diameter 1.8 cm    E wave deceleration time 238 ms    E/A ratio 0.86     MV valve area p 1/2 method 3.19     RA 2D Volume 51.0 mL    RVID d 3.7 cm    A4C EF 60 %    Left ventricular stroke volume (2D) 20.00 mL    IVSd 1.50 cm    Tricuspid annular plane systolic excursion 1.90 cm    Ao root 3.10 cm    LVPWd 1.20 cm     LA size 2.5 cm    Asc Ao 3.3 cm    LA volume (BP) 70 mL    FS 27 28 - 44    LVIDS 2.20 cm    IVS 1.5 cm    LVIDd 3.00 cm    LA length (A2C) 6.80 cm    LEFT VENTRICLE SYSTOLIC VOLUME (MOD BIPLANE) 2D 16 mL    LV DIASTOLIC VOLUME (MOD BIPLANE) 2D 36 mL    Left Atrium Area-systolic Four Chamber 22.9 cm2    Left Atrium Area-systolic Apical Two Chamber 25.7 cm2    MV E' Tissue Velocity Septal 6 cm/s    LVSV, 2D 20 mL    BSA 2.05 m2    LVOT area 2.54 cm2    LV EF 60      * Please Note: Fluency DirectDictation voice to text software may have been used in the creation of this document. **

## 2025-02-25 NOTE — APP STUDENT NOTE
ALFONSO STUDENT  Inpatient Progress Note for TRAINING ONLY  Not Part of Legal Medical Record     Progress Note - Jessica Jaquez 79 y.o. female MRN: [id number]  Unit/Bed#: -01 Encounter: [id number]    Assessment:    Principal Problem:    Syncope, near  Active Problems:    DM II (diabetes mellitus, type II), controlled ([facility])    PAF (paroxysmal atrial fibrillation) ([facility])    Palpitations    (HFpEF) heart failure with preserved ejection fraction ([facility])    Class 3 severe obesity without serious comorbidity with body mass index (BMI) of 40.0 to 44.9 in adult ([facility])      Plan:    Syncope, near  Presented c/o sudden onset leg weakness with palpitations when standing up after eating breakfast that occurred twice. Hx A-fib s/p cardioversion  Orthostatic vitals negative, but with mechanism of dizziness, orthostatic hypotension high likelihood  Appreciate EP consult with recommendations  Device interrogation showed no arrhythmias and NSR  Echo completed 2/25  LVEF 60%, normal systolic function, grade I diastolic dysfunction, mild annular mitral calcification  Similar to Echo completed 03/2024    Paroxysmal a-fib  Hx A-fib s/p cardioversion 03/2024  Home Eliquis dose 5 mg BID but with baseline Cr >1.5 and age >80 will dose adjust to 2.5 mg BID now and at discharge  Continue metoprolol 25 mg BID for rate control  Monitor on telemetry    HFpEF  Compliant with diuretics, c/o polyuria and fatigue  May potentially be related to near syncope  Home medication list contains metolazone 5 mg PRN but patient admits to not taking that medication as it adds to her polyuria  Echo completed 2/25  LVEF 60%, normal systolic function, grade I diastolic dysfunction, mild annular mitral calcification  Similar to Echo completed 03/2024  Continue metoprolol, spironolactone, and torsemide with parameters   Plan to consult with cardiology/EP regarding d/c medication plan  Hopeful to continue these medications above at discharge and prevent  overdiuresis by d/c metolazone  Monitor I&Os, daily weights, low sodium diet with fluid restriction    DMII  Diabetes uncontrolled with A1c 8.1% and POC BG all >160 during admission  Apparently takes 2 mg glimepiride daily instead of 1 mg  Accu-checks and SSI  Hypoglycemia protocol  Continue carb controlled diet  Continue home Zetia  With hx of HF, patient would benefit from SGLT2-I, GLP-1, or DPP-4 additive agent  PCP or endocrinology referral on d/c      VTE Pharmacologic Prophylaxis:   Pharmacologic: Apixaban (Eliquis)  Mechanical VTE Prophylaxis in Place: Yes    Patient Centered Rounds: {Patient Centered Rounds:10544}    Discussions with Specialists or Other Care Team Provider: ***    Education and Discussions with Family / Patient: ***    Time Spent for Care: 15 minutes.  More than 50% of total time spent on counseling and coordination of care as described above.    Current Length of Stay: 1 day(s)    Current Patient Status: Inpatient   Certification Statement: The patient will continue to require additional inpatient hospital stay due to cardiology clearance    Discharge Plan: Discharge home in <24 hours to home    Code Status: Level 3 - DNAR and DNI    Subjective:   Patient seen and examined. Patient is feeling well today. States that she was placed on Torsemide before trying to sleep last night and was moved after falling asleep so she is very tired. She has not had any repeated episodes of weakness or palpitations since being admitted to the hospital. She denies chest pain, SHOB, abdominal pain, n/v, leg weakness, edema, dizziness, fevers/chills. She is eager to be discharged today pending medication changes.    Objective:     Vitals:   Temp (24hrs), Av.8 °F (36.6 °C), Min:97.6 °F (36.4 °C), Max:98.1 °F (36.7 °C)    Temp:  [97.6 °F (36.4 °C)-98.1 °F (36.7 °C)] 98.1 °F (36.7 °C)  HR:  [74-89] 82  Resp:  [14-21] 14  BP: (134-168)/(64-79) 134/71  SpO2:  [10 %-99 %] 10 %  Body mass index is 38.66 kg/m².      Input and Output Summary (last 24 hours):       Intake/Output Summary (Last 24 hours) at 2/25/2025 0928  Last data filed at 2/25/2025 0446  Gross per 24 hour   Intake --   Output 1800 ml   Net -1800 ml       Physical Exam:     Physical Exam  Vitals and nursing note reviewed.   Constitutional:       General: She is not in acute distress.     Appearance: She is well-developed. She is obese.   HENT:      Head: Normocephalic and atraumatic.   Eyes:      Conjunctiva/sclera: Conjunctivae normal.   Cardiovascular:      Rate and Rhythm: Normal rate and regular rhythm.      Heart sounds: No murmur heard.  Pulmonary:      Effort: Pulmonary effort is normal. No respiratory distress.      Breath sounds: Normal breath sounds.   Abdominal:      Palpations: Abdomen is soft.      Tenderness: There is no abdominal tenderness.   Musculoskeletal:         General: No swelling.      Cervical back: Neck supple.      Right lower leg: No edema.      Left lower leg: No edema.   Skin:     General: Skin is warm and dry.      Capillary Refill: Capillary refill takes less than 2 seconds.   Neurological:      Mental Status: She is alert.   Psychiatric:         Mood and Affect: Mood normal.           Historical Information   Past Medical History:   Diagnosis Date    Arthritis     Colorectal polyps     Constipation     Diabetes mellitus (HCC)     Diverticulosis of colon     Hiatal hernia     Hypertension     Increased urinary frequency     Lumbar disc disease     Pressure injury of skin     SOB (shortness of breath)     Stress incontinence     Vitamin D deficiency 07/27/2018     Past Surgical History:   Procedure Laterality Date    BACK SURGERY      CARDIAC ELECTROPHYSIOLOGY PROCEDURE N/A 7/9/2024    Procedure: Cardiac eps/afib ablation PFA;  Surgeon: Jeyson Delaney MD;  [facility];  Service: Cardiology    CARDIAC ELECTROPHYSIOLOGY PROCEDURE N/A 7/9/2024    Procedure: Cardiac loop recorder implant;  Surgeon: Jeyson  MD Rhett;  [facility]: [facility];  Service: Cardiology    CARPAL TUNNEL RELEASE Left     CATARACT EXTRACTION Bilateral     CHOLECYSTECTOMY      COLONOSCOPY W/ ENDOSCOPIC US N/A 2/24/2016    Procedure: ANAL ENDOSCOPIC U/S;  Surgeon: HOLLIS Washington MD;  Location: [facility];  Service:     HERNIA REPAIR      AR COLONOSCOPY FLX DX W/COLLJ SPEC WHEN PFRMD N/A 2/24/2016    Procedure: COLONOSCOPY;  Surgeon: HOLLIS Washington MD;  Location: [facility];  Service: Colorectal    TONSILLECTOMY      TUBAL LIGATION       Social History   Social History     Substance and Sexual Activity   Alcohol Use Never     Social History     Substance and Sexual Activity   Drug Use No     Social History     Tobacco Use   Smoking Status Never   Smokeless Tobacco Never     Family History:   Family History   Problem Relation Age of Onset    Hypertension Mother     Heart attack Mother     Diabetes Mother     Prostate cancer Father     No Known Problems Brother     Stroke Maternal Grandmother     Stroke Paternal Grandmother     Stroke Paternal Grandfather     Thyroid cancer Neg Hx        Meds/Allergies   PTA meds:   Prior to Admission Medications   Prescriptions Last Dose Informant Patient Reported? Taking?   Cholecalciferol (VITAMIN D3) 1000 units CAPS 2/23/2025 Self Yes Yes   Sig: Take by mouth daily     Multiple Vitamins-Minerals (CENTRUM SILVER PO) 2/23/2025 Self Yes Yes   Sig: multivitamin   apixaban (ELIQUIS) 5 mg 2/23/2025 Self No Yes   Sig: Take 1 tablet (5 mg total) by mouth 2 (two) times a day   bimatoprost (Lumigan) 0.01 % ophthalmic drops 2/23/2025 Self Yes Yes   Sig: Administer 1 drop to both eyes daily at bedtime   cephalexin (KEFLEX) 250 mg capsule Not Taking Self Yes No   Sig: Take 250 mg by mouth 3 (three) times a day   Patient not taking: Reported on 12/11/2024   ezetimibe (ZETIA) 10 mg tablet 2/23/2025 Self No Yes   Sig: Take 1 tablet (10 mg total) by mouth daily at bedtime   gabapentin (NEURONTIN) 100 mg capsule  2/24/2025 Self No Yes   Sig: Take 1 capsule (100 mg total) by mouth 3 (three) times a day   Patient taking differently: Take 300 mg by mouth 3 (three) times a day   glimepiride (AMARYL) 1 mg tablet 2/24/2025 Self Yes Yes   Sig: Take 1 mg by mouth every morning before breakfast   hydrocortisone 2.5 % cream Not Taking Self Yes No   Sig: as needed   Patient not taking: Reported on 2/24/2025   ketoconazole (NIZORAL) 2 % cream Not Taking Self Yes No   Sig: as needed   Patient not taking: Reported on 2/24/2025   metolazone (ZAROXOLYN) 5 mg tablet Past Week  No Yes   Sig: TAKE 1 TABLET DAILY AS NEEDED. NOT TO EXCEED 3 TABLETS PER WEEK.   metoprolol succinate (TOPROL-XL) 200 MG 24 hr tablet Not Taking Self Yes No   Sig: Take 25 mg by mouth in the morning Twice a day   Patient not taking: Reported on 12/11/2024   metoprolol succinate (TOPROL-XL) 25 mg 24 hr tablet 2/24/2025  No Yes   Sig: Take 1 tablet (25 mg total) by mouth 2 (two) times a day   potassium chloride (Klor-Con M20) 20 mEq tablet 2/23/2025  No Yes   Sig: TAKE 1 TABLET BY MOUTH 3 TIMES A DAY.   spironolactone (ALDACTONE) 25 mg tablet 2/23/2025  No Yes   Sig: Take 1 tablet (25 mg total) by mouth daily   torsemide (DEMADEX) 20 mg tablet 2/23/2025 Self No Yes   Sig: Take 2 tablets (40 mg total) by mouth 2 (two) times a day      Facility-Administered Medications: None     Allergies   Allergen Reactions    Other Cough and Sneezing     Seasonal allergies    Pollen Extract Allergic Rhinitis       Additional Data:     Labs:    Results from last 7 days   Lab Units 02/25/25  0447   WBC Thousand/uL 13.56*   HEMOGLOBIN g/dL 13.1   HEMATOCRIT % 39.4   PLATELETS Thousands/uL 426*   SEGS PCT % 71   LYMPHO PCT % 21   MONO PCT % 7   EOS PCT % 1     Results from last 7 days   Lab Units 02/25/25  0447   SODIUM mmol/L 137   POTASSIUM mmol/L 3.4*   CHLORIDE mmol/L 93*   CO2 mmol/L 28   BUN mg/dL 54*   CREATININE mg/dL 1.50*   ANION GAP mmol/L 16*   CALCIUM mg/dL 9.5   ALBUMIN g/dL  4.0   TOTAL BILIRUBIN mg/dL 0.56   ALK PHOS U/L 83   ALT U/L 19   AST U/L 29   GLUCOSE RANDOM mg/dL 173*         Results from last 7 days   Lab Units 02/25/25  0731 02/24/25  2252   POC GLUCOSE mg/dl 161* 170*     Results from last 7 days   Lab Units 02/24/25  0956   HEMOGLOBIN A1C % 8.1*             * I Have Reviewed All Lab Data Listed Above.  * Additional Pertinent Lab Tests Reviewed: All Labs For Current Hospital Admission Reviewed    Imaging:    Imaging Reports Reviewed Today Include: NA  Imaging Personally Reviewed by Myself Includes:  NA    Recent Cultures (last 7 days):           Last 24 Hours Medication List:   Current Facility-Administered Medications   Medication Dose Route Frequency Provider Last Rate    acetaminophen  650 mg Oral Q6H PRN Mica Burns MD      aluminum-magnesium hydroxide-simethicone  30 mL Oral Q6H PRN Mica Burns MD      apixaban  2.5 mg Oral BID Mica Burns MD      bimatoprost  1 drop Ophthalmic Daily Mica Burns MD      Cholecalciferol  1,000 Units Oral Daily Mica Burns MD      ezetimibe  10 mg Oral HS Mica Burns MD      gabapentin  300 mg Oral TID Mica Burns MD      insulin lispro  1-5 Units Subcutaneous HS Mica Burns MD      insulin lispro  1-6 Units Subcutaneous TID AC Mica Burns MD      metoprolol succinate  25 mg Oral BID Mica Burns MD      multivitamin-minerals  1 tablet Oral Daily Mica Burns MD      ondansetron  4 mg Intravenous Q6H PRN Mica Burns MD      senna  1 tablet Oral Daily Mica Burns MD      spironolactone  25 mg Oral Daily Mica Burns MD      torsemide  40 mg Oral BID Mcia Burns MD          Today, Patient Was Seen By: Bess Joe    ** Please Note: Dictation voice to text software may have been used in the creation of this document. **

## 2025-02-25 NOTE — ASSESSMENT & PLAN NOTE
Lab Results   Component Value Date    HGBA1C 8.1 (H) 02/24/2025       Recent Labs     02/24/25  2252 02/25/25  0731   POCGLU 170* 161*     Blood Sugar Average: Last 72 hrs:  (P) 165.5  Resume PTA regimen on discharge  Outpatient follow-up

## 2025-02-25 NOTE — PROGRESS NOTES
Progress Note - Electrophysiology   Name: Jessica Jaquez 79 y.o. female I MRN: [id number]  Unit/Bed#: -01 I Date of Admission: 2/24/2025   Date of Service: 2/25/2025 I Hospital Day: 1     Assessment & Plan  Palpitations  Presented with lesions lasting for approximately 30 to 45 minutes today  Associated symptoms of near syncope however no actual passing out  Loop recorder interrogation shows what appears to be sinus tachycardia with rates right around 100 bpm  She is on metoprolol 25 mg twice daily  Loop without any significant arrhythmia  Tele overnight with no arrhythmia noted    BP's have been stable she is not orthostatic   Echo stable from prior not significantly overloaded or dry   Unclear etiology consider anxiety   Stable for discharge with outpatient loop monitoring   PAF (paroxysmal atrial fibrillation) ([facility])  New diagnosis March 2024 after stay for lower back pain  S/p PHU/DCCV in March  Started on flecainide April after readmission for RVR  Flecainide was stopped due to fatigue and dizziness in May  Anticoagulated on Eliquis 5 mg twice daily  S/p PFA ablation of PVI posterior wall with loop recorder implantation  No significant A-fib noted on loop interrogation today  Will decrease Eliquis to 2.5 mg twice daily in the setting of age, and renal function  (HFpEF) heart failure with preserved ejection fraction ([facility])  Wt Readings from Last 3 Encounters:   02/25/25 99 kg (218 lb 4.1 oz)   12/11/24 104 kg (229 lb 9.6 oz)   10/22/24 104 kg (229 lb)   Last echocardiogram showing EF 60%  On significant diuretics with torsemide 20 mg twice daily, Aldactone 25 mg daily, and metolazone as needed  Has not been using as needed metolazone    DM II (diabetes mellitus, type II), controlled ([facility])    Lab Results   Component Value Date    HGBA1C 8.1 (H) 02/24/2025     Class 3 severe obesity without serious comorbidity with body mass index (BMI) of 40.0 to 44.9 in adult ([facility])    Syncope, near      Progress Note -  "Electrophysiology - Cardiology  Jessica Jaquez 79 y.o. female MRN: [id number]  Unit/Bed#: -01 Encounter: [id number]      Subjective/Objective   Subjective: she is feeling better today but is feeling a little bit tired. No abnormal rhythms on tele monitoring       Objective:  Vitals: /71   Pulse 82   Temp 98.1 °F (36.7 °C) (Oral)   Resp 14   Ht 5' 3\" (1.6 m)   Wt 99 kg (218 lb 4.1 oz)   SpO2 (!) 10%   BMI 38.66 kg/m²     Vitals:    02/25/25 0748   Weight: 99 kg (218 lb 4.1 oz)     Orthostatic Blood Pressures      Flowsheet Row Most Recent Value   Blood Pressure 134/71 filed at 02/25/2025 0915   Patient Position - Orthostatic VS Sitting - Orthostatic VS filed at 02/25/2025 0915              Intake/Output Summary (Last 24 hours) at 2/25/2025 1121  Last data filed at 2/25/2025 0914  Gross per 24 hour   Intake 240 ml   Output 1800 ml   Net -1560 ml       Invasive Devices       Peripheral Intravenous Line  Duration             Peripheral IV 09/02/24 Right Antecubital 175 days    Peripheral IV 02/24/25 Dorsal (posterior);Right Forearm <1 day              Drain  Duration             External Urinary Catheter 230 days    External Urinary Catheter 229 days                              Scheduled Meds:  Current Facility-Administered Medications   Medication Dose Route Frequency Provider Last Rate    acetaminophen  650 mg Oral Q6H PRN Mica Burns MD      aluminum-magnesium hydroxide-simethicone  30 mL Oral Q6H PRN Mica Burns MD      apixaban  2.5 mg Oral BID Mica Burns MD      bimatoprost  1 drop Ophthalmic Daily Mica Burns MD      Cholecalciferol  1,000 Units Oral Daily Mica Burns MD      ezetimibe  10 mg Oral HS Mica Burns MD      gabapentin  300 mg Oral TID Mica Burns MD      insulin lispro  1-5 Units Subcutaneous HS Mica Burns MD      insulin lispro  1-6 Units Subcutaneous TID AC Mica Burns MD      metoprolol succinate  25 mg Oral BID " Mica Burns MD      multivitamin-minerals  1 tablet Oral Daily Mica Burns MD      ondansetron  4 mg Intravenous Q6H PRN Mica Burns MD      potassium chloride  20 mEq Oral Once Kelly Gutierrez PA-C      senna  1 tablet Oral Daily Mica Burns MD      spironolactone  25 mg Oral Daily Mica Burns MD      torsemide  40 mg Oral BID Mica Burns MD       Continuous Infusions:   PRN Meds:.  acetaminophen    aluminum-magnesium hydroxide-simethicone    ondansetron    Review of Systems:  ROS  ROS as noted above, otherwise 12 point review of systems was performed and is negative.     Physical Exam:   Physical Exam  Vitals and nursing note reviewed.   Constitutional:       General: She is not in acute distress.  HENT:      Head: Normocephalic and atraumatic.   Cardiovascular:      Rate and Rhythm: Normal rate and regular rhythm.   Pulmonary:      Effort: Pulmonary effort is normal. No respiratory distress.   Musculoskeletal:      Right lower leg: No edema.      Left lower leg: No edema.   Skin:     General: Skin is warm and dry.      Coloration: Skin is not jaundiced.   Neurological:      Mental Status: She is alert.                   Lab Results: I have personally reviewed pertinent lab results.    Results from last 7 days   Lab Units 02/25/25  0447 02/24/25  1331   WBC Thousand/uL 13.56* 15.71*   HEMOGLOBIN g/dL 13.1 13.6   HEMATOCRIT % 39.4 41.2   PLATELETS Thousands/uL 426* 466*     Results from last 7 days   Lab Units 02/25/25  0447 02/24/25  1331 02/24/25  0956   POTASSIUM mmol/L 3.4* 4.9 3.5   CHLORIDE mmol/L 93* 93* 93*   CO2 mmol/L 28 25 28   BUN mg/dL 54* 61* 57*   CREATININE mg/dL 1.50* 1.71* 1.63*   CALCIUM mg/dL 9.5 9.8 10.0         Results from last 7 days   Lab Units 02/25/25  0447   MAGNESIUM mg/dL 2.6       Imaging: Results Review Statement: No pertinent imaging studies reviewed.  Results for orders placed during the hospital encounter of 12/28/18    Echo complete with  contrast if indicated    Narrative  [address] [id number] [phone]    Transthoracic Echocardiogram  2D, M-mode, Doppler, and Color Doppler    Study date:  28-Dec-2018    Patient: HAN CARVAJAL  MR number: [id number]  Account number: [id number]  : 1945  Age: 73 years  Gender: Female  Status: Outpatient  Location: [address] [place] [address]  Height: 63 in  Weight: 232 lb  BP: 169/ 76 mmHg    Indications: Heart failure    Diagnoses: I50.9 - Heart failure, unspecified    Sonographer:  DANYELLE Ga  Interpreting Physician:  Kvng Aviles MD  Referring Physician:  Jorden Holt III, MD  Group:  [facility]  Cardiology Fellow:  Von Fuentes MD    SUMMARY    PROCEDURE INFORMATION:  This was a technically difficult study.    LEFT VENTRICLE:  Systolic function was normal. Ejection fraction was estimated to be 60 %.  There were no regional wall motion abnormalities.  Doppler parameters were consistent with abnormal left ventricular relaxation (grade 1 diastolic dysfunction).    MITRAL VALVE:  There was trace regurgitation.    TRICUSPID VALVE:  There was trace regurgitation.  Pulmonary artery systolic pressure was within the normal range.    PULMONIC VALVE:  There was trace regurgitation.    HISTORY: PRIOR HISTORY: Hypertension, hyperlipidemia, diastolic dysfunction, type 2 diabetes, GERD    PROCEDURE: The study was performed in the [address] [place] [address]. This was a routine study. The transthoracic approach was used. The study included complete 2D imaging, M-mode, complete spectral Doppler, and color Doppler. This  was a technically difficult study.    LEFT VENTRICLE: Size was normal. Systolic function was normal. Ejection fraction was estimated to be 60 %. There were no regional wall motion abnormalities. Wall thickness was normal. DOPPLER: Doppler parameters were consistent with  abnormal left  ventricular relaxation (grade 1 diastolic dysfunction).    RIGHT VENTRICLE: The size was normal. Systolic function was normal. Wall thickness was normal.    LEFT ATRIUM: Size was normal.    RIGHT ATRIUM: Size was normal.    MITRAL VALVE: Valve structure was normal. There was normal leaflet separation. DOPPLER: The transmitral velocity was within the normal range. There was no evidence for stenosis. There was trace regurgitation.    AORTIC VALVE: The valve was probably trileaflet. Leaflets exhibited normal cuspal separation. The valve was not well visualized. DOPPLER: Transaortic velocity was within the normal range. There was no evidence for stenosis. There was no  significant regurgitation.    TRICUSPID VALVE: The valve structure was normal. There was normal leaflet separation. DOPPLER: The transtricuspid velocity was within the normal range. There was no evidence for stenosis. There was trace regurgitation. Pulmonary artery  systolic pressure was within the normal range. Estimated peak PA pressure was 30 mmHg.    PULMONIC VALVE: Not well visualized. DOPPLER: The transpulmonic velocity was within the normal range. There was trace regurgitation.    PERICARDIUM: There was no pericardial effusion. A pericardial fat pad was present. The pericardium was normal in appearance.    AORTA: The root exhibited normal size.    SYSTEMIC VEINS: IVC: The inferior vena cava was normal in size. Respirophasic changes were normal.    SYSTEM MEASUREMENT TABLES    2D  %FS: 31.39 %  Ao Diam: 3.29 cm  EDV(Teich): 100.54 ml  EF(Cube): 67.7 %  EF(Teich): 59.26 %  ESV(Cube): 32.77 ml  ESV(Teich): 40.96 ml  IVSd: 0.99 cm  LA Area: 16.53 cm2  LA Diam: 3.39 cm  LVEDV MOD A4C: 94.35 ml  LVEF MOD A4C: 68.85 %  LVESV MOD A4C: 29.39 ml  LVIDd: 4.66 cm  LVIDs: 3.2 cm  LVLd A4C: 7.19 cm  LVLs A4C: 5.3 cm  LVPWd: 1.08 cm  RA Area: 13.64 cm2  RV Diam.: 2.6 cm  SV MOD A4C: 64.96 ml  SV(Cube): 68.69 ml  SV(Teich): 59.58 ml    CW  TR Vmax: 2.6 m/s  TR  maxP.99 mmHg    MM  TAPSE: 1.84 cm    PW  E': 0.06 m/s  E/E': 14.64  MV A [place]: 0.97 m/s  MV [place]: 3.14 m/s2  MV DecT: 267.31 ms  MV E [place]: 0.84 m/s  MV E/A Ratio: 0.86    [facility]    Prepared and electronically signed by    Kvng Aviles MD  Signed 28-Dec-2018 11:05:05      VTE Pharmacologic Prophylaxis: eliquis  VTE Mechanical Prophylaxis: sequential compression device

## 2025-02-25 NOTE — CASE MANAGEMENT
Case Management Assessment & Discharge Planning Note    Patient name Jessica Jaquez  Location /-01 MRN [id number]  : 1945 Date 2025       Current Admission Date: 2025  Current Admission Diagnosis:Syncope, near   Patient Active Problem List    Diagnosis Date Noted Date Diagnosed    Palpitations 2025     (HFpEF) heart failure with preserved ejection fraction ([facility]) 2025     Class 3 severe obesity without serious comorbidity with body mass index (BMI) of 40.0 to 44.9 in adult ([facility]) 2025     Syncope, near 2025     CKD (chronic kidney disease) 2024     Bilateral lower extremity edema 2024     S/P ablation of atrial fibrillation 2024     Chest pain 2024     PAF (paroxysmal atrial fibrillation) ([facility]) 2024     Hyponatremia 2024     Chronic diastolic congestive heart failure ([facility]) 03/15/2024     GIOVANNI (acute kidney injury) ([facility]) 2024     SVT (supraventricular tachycardia) ([facility]) 2024     Lumbar radiculopathy 2024     History of stroke 2024     Open wound of abdomen 2021     Cerebrovascular accident (CVA) due to thrombosis of precerebral artery ([facility]) 2019     Unspecified abnormalities of gait and mobility 2018     Tremor of hands and face 2018     Hyperlipidemia 2018     DM II (diabetes mellitus, type II), controlled ([facility]) 2018     Obesity, Class III, BMI 40-49.9 (morbid obesity) ([facility]) 2018     Essential hypertension 2018     Chronic diastolic CHF (congestive heart failure) ([facility]) 2018       LOS (days): 1  Geometric Mean LOS (GMLOS) (days): 2.6  Days to GMLOS:1.8     OBJECTIVE:    Risk of Unplanned Readmission Score: 21.34         Current admission status: Inpatient       Preferred Pharmacy:   [facility] - [place] - [address]  [address]  [place] [address]  Phone: [phone] Fax: [phone]    [id number] - [place] - [address]  [address] [facility]  [facility] [address]  Phone: [phone] Fax: [phone]    Primary Care Provider: Jorden Holt MD    Primary Insurance: [employer] [facility]  Secondary Insurance:     ASSESSMENT:  Active Health Care Proxies       Keily Jaquez Health Care Representative - Daughter In-Law   Primary Phone: [phone] (Mobile)                 Patient Information  Admitted from:: Home  Mental Status: Alert  During Assessment patient was accompanied by: Not accompanied during assessment  Assessment information provided by:: Patient  Primary Caregiver: Self  Support Systems: Spouse/significant other, Son, Family members, Daughter  Home entry access options. Select all that apply.: Stairs  Number of steps to enter home.: 6  Type of Current Residence: Other (Comment) (Bi-level style home with 6 steps between floors. Stair lift for access to all floor levels)  Living Arrangements: Lives w/ Spouse/significant other    Activities of Daily Living Prior to Admission  Functional Status: Independent  Completes ADLs independently?: Yes  Ambulates independently?: Yes  Does patient use assisted devices?: Yes  Assisted Devices (DME) used: Other (Comment) (\" walking stick\")  Does patient currently own DME?: Yes  What DME does the patient currently own?: Walker, Other (Comment) (\"walking stick\")  Does patient have a history of Outpatient Therapy (PT/OT)?: Yes  Does the patient have a history of Short-Term Rehab?: No  Does patient have a history of HHC?: Yes (-HH)  Does patient currently have HHC?: No    Patient Information Continued  Does patient have prescription coverage?: Yes  Does patient have a history of substance abuse?: No  Does patient have a history of Mental Health Diagnosis?: No    Means of Transportation  Means of Transport to Appts:: Drives Self          DISCHARGE DETAILS:    Other Referral/Resources/Interventions Provided:  Referral Comments: CM intake " assessment completed with pt at bedside. Pt resides with spouse, IPTA, and still drives. Per chart review, pt written for discharge home today, Pt states family will provide transport home, No CM needs identified or reported at this time. CM will remain available as needed.

## 2025-02-25 NOTE — ASSESSMENT & PLAN NOTE
Wt Readings from Last 3 Encounters:   02/25/25 99 kg (218 lb 4.1 oz)   12/11/24 104 kg (229 lb 9.6 oz)   10/22/24 104 kg (229 lb)     Not in acute exacerbation.  Clinically, appears euvolemic.  Patient reported 5 pound weight gain Tuesday through Friday last week and took a dose of metolazone 5 mg on Friday with significant diuretic effect.  Last echo 2024 March showed LVEF 60%, possible diastolic dysfunction but limited by quality of study.  RV function was normal.  Repeat echo this admission with LVEF 60%, G1DD, no significant valvular abnormalities   PTA diuretic listed as torsemide 40 mg twice daily home dose, however patient reports taking 20 mg twice daily; spironolactone 25 mg p.o. daily  Discussed with cardiology, continue torsemide 20 mg twice daily at home and discontinue metolazone; may use additional dose of 20 mg as needed  Outpatient follow-up with cardiology already scheduled for 3/6/2025

## 2025-02-25 NOTE — ASSESSMENT & PLAN NOTE
New diagnosis March 2024 after stay for lower back pain  S/p PHU/DCCV in March  Started on flecainide April after readmission for RVR  Flecainide was stopped due to fatigue and dizziness in May  Anticoagulated on Eliquis 5 mg twice daily  S/p PFA ablation of PVI posterior wall with loop recorder implantation  No significant A-fib noted on loop interrogation today  Will decrease Eliquis to 2.5 mg twice daily in the setting of age, and renal function

## 2025-02-25 NOTE — DISCHARGE SUMMARY
Discharge Summary - Hospitalist   Name: Jessica Jaquez 79 y.o. female I MRN: [id number]  Unit/Bed#: -01 I Date of Admission: 2/24/2025   Date of Service: 2/25/2025 I Hospital Day: 1     Assessment & Plan  Syncope, near (Resolved: 2/25/2025)  Noted on AM of admission 2/24. Felt strong run of palpitations w pulsations to her ears b/l, denied lightheadedness/dizziness.  EKG in ER showed normal sinus rhythm, no AV block  Also status post 500 mL bolus in ER  Status post device interrogation without any events/abnormal rhythms noted by EP team  Given this occurred right as patient was standing up, possibly orthostatic etiology   Patient did take dose of metolazone on Friday with significant diuretic effect  Appreciate EP recs - no medication changes at this time  Echocardiogram unchanged from prior in March 2024  Orthostatic vital signs negative  Telemetry unremarkable  Palpitations (Resolved: 2/25/2025)  Described as above  Loop interrogation showed no arrhythmias per EP  Status post DCCV in March 2024 for paroxysmal A-fib  PAF (paroxysmal atrial fibrillation) (HCC)  Continue metoprolol 25 mg p.o. twice daily succinate  Continue Eliquis 5 mg twice daily on discharge  Recommend discussion with cardiology outpatient regarding dose reduction given age and renal function  (HFpEF) heart failure with preserved ejection fraction (HCC)  Wt Readings from Last 3 Encounters:   02/25/25 99 kg (218 lb 4.1 oz)   12/11/24 104 kg (229 lb 9.6 oz)   10/22/24 104 kg (229 lb)     Not in acute exacerbation.  Clinically, appears euvolemic.  Patient reported 5 pound weight gain Tuesday through Friday last week and took a dose of metolazone 5 mg on Friday with significant diuretic effect.  Last echo 2024 March showed LVEF 60%, possible diastolic dysfunction but limited by quality of study.  RV function was normal.  Repeat echo this admission with LVEF 60%, G1DD, no significant valvular abnormalities   PTA diuretic listed as torsemide 40  mg twice daily home dose, however patient reports taking 20 mg twice daily; spironolactone 25 mg p.o. daily  Discussed with cardiology, continue torsemide 20 mg twice daily at home and discontinue metolazone; may use additional dose of 20 mg as needed  Outpatient follow-up with cardiology already scheduled for 3/6/2025  DM II (diabetes mellitus, type II), controlled ([facility])  Lab Results   Component Value Date    HGBA1C 8.1 (H) 02/24/2025       Recent Labs     02/24/25  2252 02/25/25  0731   POCGLU 170* 161*     Blood Sugar Average: Last 72 hrs:  (P) 165.5  Resume PTA regimen on discharge  Outpatient follow-up  Class 3 severe obesity without serious comorbidity with body mass index (BMI) of 40.0 to 44.9 in adult ([facility])  Complicates all facets of care    CKD (chronic kidney disease)  Lab Results   Component Value Date    EGFR 32 02/25/2025    EGFR 27 02/24/2025    EGFR 29 02/24/2025    CREATININE 1.50 (H) 02/25/2025    CREATININE 1.71 (H) 02/24/2025    CREATININE 1.63 (H) 02/24/2025   Per chart review has baseline creatinine 1.4-1.7 and likely has underlying CKD stage IIIb to stage IV.  Does not appear to follow with nephrology outpatient.  Renal function stable within baseline currently  Diuretic management as outlined above     Medical Problems       Resolved Problems  Date Reviewed: 9/3/2024   None       Discharging Physician / Practitioner: Kelly Gutierrez PA-C  PCP: Jorden Holt MD  Admission Date:   Admission Orders (From admission, onward)       Ordered        02/24/25 1624  INPATIENT ADMISSION  Once                          Discharge Date: 02/25/25    Consultations During Hospital Stay:  EP  Cardiology    Procedures Performed:   None    Significant Findings / Test Results:   Outlined above    Incidental Findings:   None    Test Results Pending at Discharge (will require follow up):   None      Outpatient Tests Requested:  None     Complications:  none     Reason for Admission: presyncope     Hospital Course:  "  Jessica Jaquez is a 79 y.o. female patient who originally presented to the hospital on 2/24/2025 due to palpitations and presyncopal episode.  She has underlying history of atrial fibrillation status post PHU/DCCV in March 2024 as well as PFA ablation with loop recorder implantation.  She also has history of heart failure with preserved ejection fraction, maintained on torsemide for diuresis outpatient.  Loop recorder interrogation showing mild sinus tachycardia with rates around 100 bpm, no arrhythmia noted.  Telemetry monitoring was unremarkable.  Echocardiogram without significant change compared to prior.  Symptoms felt to be possibly due to dehydration/overdiuresis, as patient had taken dose of metolazone 3 days prior to presentation.  She was seen in consultation by cardiology who recommended continuing torsemide 20 mg twice daily and discontinuing metolazone.  Recommended patient take additional dose of torsemide if needed for weight gain extremity swelling.  Of note, there should be consideration for reducing patient's Eliquis from 5 mg to 2.5 mg twice daily based on her age and renal function, will defer this to outpatient cardiology.    Please see above list of diagnoses and related plan for additional information.     Condition at Discharge: good    Discharge Day Visit / Exam:   Subjective: Patient reports feeling well today, eager for discharge.  She denies lightheadedness/dizziness, chest pain or palpitations.  Vitals: Blood Pressure: 132/63 (02/25/25 1127)  Pulse: 84 (02/25/25 1127)  Temperature: 97.5 °F (36.4 °C) (02/25/25 1127)  Temp Source: Oral (02/25/25 0748)  Respirations: 14 (02/25/25 0748)  Height: 5' 3\" (160 cm) (02/25/25 0748)  Weight - Scale: 99 kg (218 lb 4.1 oz) (02/25/25 0748)  SpO2: 97 % (02/25/25 1127)  Physical Exam  Vitals and nursing note reviewed.   Constitutional:       General: She is not in acute distress.     Appearance: She is obese.   Cardiovascular:      Rate and Rhythm: " Normal rate and regular rhythm.   Pulmonary:      Effort: Pulmonary effort is normal. No respiratory distress.   Musculoskeletal:      Right lower leg: Edema present.      Left lower leg: Edema present.      Comments: Trace, chronic, nonpitting bilateral lower extremity edema   Neurological:      Mental Status: She is alert and oriented to person, place, and time.          Discussion with Family: Patient declined call to .     Discharge instructions/Information to patient and family:   See after visit summary for information provided to patient and family.      Provisions for Follow-Up Care:  See after visit summary for information related to follow-up care and any pertinent home health orders.      Mobility at time of Discharge:   Basic Mobility Inpatient Raw Score: 17  JH-HLM Goal: 5: Stand one or more mins  JH-HLM Achieved: 6: Walk 10 steps or more  HLM Goal achieved. Continue to encourage appropriate mobility.     Disposition:   Home    Planned Readmission: no    Discharge Medications:  See after visit summary for reconciled discharge medications provided to patient and/or family.      Administrative Statements   Discharge Statement:  I have spent a total time of 45 minutes in caring for this patient on the day of the visit/encounter.    **Please Note: This note may have been constructed using a voice recognition system**

## 2025-02-25 NOTE — ASSESSMENT & PLAN NOTE
Lab Results   Component Value Date    EGFR 32 02/25/2025    EGFR 27 02/24/2025    EGFR 29 02/24/2025    CREATININE 1.50 (H) 02/25/2025    CREATININE 1.71 (H) 02/24/2025    CREATININE 1.63 (H) 02/24/2025   Per chart review has baseline creatinine 1.4-1.7 and likely has underlying CKD stage IIIb to stage IV.  Does not appear to follow with nephrology outpatient.  Renal function stable within baseline currently  Diuretic management as outlined above

## 2025-02-25 NOTE — ASSESSMENT & PLAN NOTE
Presented with lesions lasting for approximately 30 to 45 minutes today  Associated symptoms of near syncope however no actual passing out  Loop recorder interrogation shows what appears to be sinus tachycardia with rates right around 100 bpm  She is on metoprolol 25 mg twice daily  Loop without any significant arrhythmia  Tele overnight with no arrhythmia noted    BP's have been stable she is not orthostatic   Echo stable from prior not significantly overloaded or dry   Unclear etiology consider anxiety   Stable for discharge with outpatient loop monitoring

## 2025-02-25 NOTE — ASSESSMENT & PLAN NOTE
Described as above  Loop interrogation showed no arrhythmias per EP  Status post DCCV in March 2024 for paroxysmal A-fib

## 2025-02-25 NOTE — ASSESSMENT & PLAN NOTE
Noted on AM of admission 2/24. Felt strong run of palpitations w pulsations to her ears b/l, denied lightheadedness/dizziness.  EKG in ER showed normal sinus rhythm, no AV block  Also status post 500 mL bolus in ER  Status post device interrogation without any events/abnormal rhythms noted by EP team  Given this occurred right as patient was standing up, possibly orthostatic etiology   Patient did take dose of metolazone on Friday with significant diuretic effect  Appreciate EP recs - no medication changes at this time  Echocardiogram unchanged from prior in March 2024  Orthostatic vital signs negative  Telemetry unremarkable

## 2025-02-25 NOTE — ASSESSMENT & PLAN NOTE
Continue metoprolol 25 mg p.o. twice daily succinate  Continue Eliquis 5 mg twice daily on discharge  Recommend discussion with cardiology outpatient regarding dose reduction given age and renal function

## 2025-02-25 NOTE — ASSESSMENT & PLAN NOTE
Wt Readings from Last 3 Encounters:   02/25/25 99 kg (218 lb 4.1 oz)   12/11/24 104 kg (229 lb 9.6 oz)   10/22/24 104 kg (229 lb)   Last echocardiogram showing EF 60%  On significant diuretics with torsemide 20 mg twice daily, Aldactone 25 mg daily, and metolazone as needed  Has not been using as needed metolazone

## 2025-02-26 ENCOUNTER — RESULTS FOLLOW-UP (OUTPATIENT)
Dept: EMERGENCY DEPT | Facility: HOSPITAL | Age: 80
End: 2025-02-26

## 2025-02-26 ENCOUNTER — RESULTS FOLLOW-UP (OUTPATIENT)
Dept: CARDIOLOGY CLINIC | Facility: CLINIC | Age: 80
End: 2025-02-26

## 2025-02-26 DIAGNOSIS — N39.0 UTI (URINARY TRACT INFECTION): Primary | ICD-10-CM

## 2025-02-26 LAB — BACTERIA UR CULT: ABNORMAL

## 2025-02-26 RX ORDER — DOXYCYCLINE 100 MG/1
100 CAPSULE ORAL 2 TIMES DAILY
Qty: 10 CAPSULE | Refills: 0 | Status: SHIPPED | OUTPATIENT
Start: 2025-02-26 | End: 2025-03-03

## 2025-02-28 ENCOUNTER — TELEPHONE (OUTPATIENT)
Age: 80
End: 2025-02-28

## 2025-02-28 DIAGNOSIS — R39.9 UTI SYMPTOMS: Primary | ICD-10-CM

## 2025-02-28 NOTE — TELEPHONE ENCOUNTER
"Attempted to contact patient to advise of AP's note, however, unable to leave message as the \"wireless caller is not available. Please try again later.\"       When patient calls back, please advise of AP's note.   "

## 2025-02-28 NOTE — TELEPHONE ENCOUNTER
Reviewed her urine culture and antibiotic is appropriate.  She should continue with a course of antibiotics.

## 2025-02-28 NOTE — TELEPHONE ENCOUNTER
Patient called in. Patient was in ED on 2/24 for something cardiac but was also having urinary symptoms so urine testing was done. On 2/26 ED provider called patient and put her on 5 day course of Doxycycline. She is on day 3. Patient is stating her symptoms have not improved at all. Her course will finish Sunday. Patient asking if she elle appropriate ABX or if symptoms don't improve over the weekend can she call on Monday and get something prescribed. Reviewed Hydration, avoidance of bladder irritants and ED precautions.

## 2025-03-03 ENCOUNTER — TELEPHONE (OUTPATIENT)
Age: 80
End: 2025-03-03

## 2025-03-03 NOTE — TELEPHONE ENCOUNTER
Patient dropped the call while waiting for triage.     If she calls back, she had concerns of UTI symptoms, same as ED visit on 2/26. She just finished the antibiotic    Unsure of Triage wanted to reach out to the patient

## 2025-03-03 NOTE — TELEPHONE ENCOUNTER
I tried call the patient back to triage her UTI symptoms, phone kept ringing and eventually disconnected.

## 2025-03-03 NOTE — TELEPHONE ENCOUNTER
Pt called stating she had called earlier that she had completed antibiotic and still experiencing burning with urination and running low grade fever all weekend she know's something is not right,I attempted to contact CTS there were 8 calls pending in que,forwarding to office clinical due to time of day.

## 2025-03-03 NOTE — TELEPHONE ENCOUNTER
"Symptoms can sometimes persist following treatment of a UTI with antibiotics.  I recommend increasing water and fluid intake and trying Tylenol and ibuprofen to see if this helps with any discomfort.  Regarding the \"low-grade fever\" how high have her fevers been?  We can always recheck a urinalysis and urine culture to see if the UTI has resolved with the antibiotic she was given.  "

## 2025-03-04 NOTE — TELEPHONE ENCOUNTER
Patient calling in again, complaints burning with urination. Urine is yellow and clear. Denies hematuria, fever, chills.     Finished Doxycycline, last dose 8 pm on Sunday. She will go to the lab tomorrow morning and provide a urine sample    Fluid restrictions 1850 cc daily, discussed making the majority of that water and decrease bladder irritants, call back if new or worsening symptoms, UA and UCX orders placed     #[phone]

## 2025-03-05 ENCOUNTER — APPOINTMENT (OUTPATIENT)
Dept: LAB | Facility: CLINIC | Age: 80
End: 2025-03-05
Payer: COMMERCIAL

## 2025-03-05 DIAGNOSIS — R39.9 UTI SYMPTOMS: ICD-10-CM

## 2025-03-05 LAB
BACTERIA UR QL AUTO: ABNORMAL /HPF
BILIRUB UR QL STRIP: NEGATIVE
CLARITY UR: CLEAR
COLOR UR: ABNORMAL
GLUCOSE UR STRIP-MCNC: NEGATIVE MG/DL
HGB UR QL STRIP.AUTO: NEGATIVE
KETONES UR STRIP-MCNC: NEGATIVE MG/DL
LEUKOCYTE ESTERASE UR QL STRIP: ABNORMAL
NITRITE UR QL STRIP: NEGATIVE
NON-SQ EPI CELLS URNS QL MICRO: ABNORMAL /HPF
PH UR STRIP.AUTO: 6 [PH]
PROT UR STRIP-MCNC: ABNORMAL MG/DL
RBC #/AREA URNS AUTO: ABNORMAL /HPF
SP GR UR STRIP.AUTO: 1.01 (ref 1–1.03)
UROBILINOGEN UR STRIP-ACNC: <2 MG/DL
WBC #/AREA URNS AUTO: ABNORMAL /HPF

## 2025-03-05 PROCEDURE — 81001 URINALYSIS AUTO W/SCOPE: CPT

## 2025-03-05 PROCEDURE — 87086 URINE CULTURE/COLONY COUNT: CPT

## 2025-03-06 ENCOUNTER — OFFICE VISIT (OUTPATIENT)
Dept: CARDIOLOGY CLINIC | Facility: CLINIC | Age: 80
End: 2025-03-06
Payer: COMMERCIAL

## 2025-03-06 VITALS
DIASTOLIC BLOOD PRESSURE: 72 MMHG | HEIGHT: 63 IN | SYSTOLIC BLOOD PRESSURE: 124 MMHG | BODY MASS INDEX: 40.4 KG/M2 | HEART RATE: 80 BPM | OXYGEN SATURATION: 98 % | WEIGHT: 228 LBS

## 2025-03-06 DIAGNOSIS — E87.6 HYPOKALEMIA: ICD-10-CM

## 2025-03-06 DIAGNOSIS — N18.30 STAGE 3 CHRONIC KIDNEY DISEASE, UNSPECIFIED WHETHER STAGE 3A OR 3B CKD (HCC): ICD-10-CM

## 2025-03-06 DIAGNOSIS — Z98.890 S/P ABLATION OF ATRIAL FIBRILLATION: ICD-10-CM

## 2025-03-06 DIAGNOSIS — E11.69 TYPE 2 DIABETES MELLITUS WITH OTHER SPECIFIED COMPLICATION, WITHOUT LONG-TERM CURRENT USE OF INSULIN (HCC): ICD-10-CM

## 2025-03-06 DIAGNOSIS — I50.32 CHRONIC DIASTOLIC CHF (CONGESTIVE HEART FAILURE) (HCC): Primary | ICD-10-CM

## 2025-03-06 DIAGNOSIS — I10 ESSENTIAL HYPERTENSION: ICD-10-CM

## 2025-03-06 DIAGNOSIS — Z86.79 S/P ABLATION OF ATRIAL FIBRILLATION: ICD-10-CM

## 2025-03-06 DIAGNOSIS — I48.0 PAROXYSMAL ATRIAL FIBRILLATION (HCC): ICD-10-CM

## 2025-03-06 DIAGNOSIS — E78.2 MIXED HYPERLIPIDEMIA: ICD-10-CM

## 2025-03-06 DIAGNOSIS — I48.0 PAF (PAROXYSMAL ATRIAL FIBRILLATION) (HCC): ICD-10-CM

## 2025-03-06 PROCEDURE — 99214 OFFICE O/P EST MOD 30 MIN: CPT | Performed by: INTERNAL MEDICINE

## 2025-03-06 RX ORDER — TIZANIDINE 2 MG/1
TABLET ORAL AS NEEDED
COMMUNITY
Start: 2025-01-24

## 2025-03-06 NOTE — PATIENT INSTRUCTIONS
You were seen today in the Cardiology office for follow up evaluation.     Below is a summary of the recommendations based upon today's visit:    1. Dietary modifications - Follow a Mediterranean diet - one that is low in saturated fats (avoid red meat, dairy, cheeses), emphasize chicken, fish, turkey, tofu, vegetables, fruit (moderate quantities); also avoid simple carbs/starch/sugars, use whole wheat/grain/bran/oatmeal, snack on small quantities of nuts and fruits.     2. Exercise is very important. Ideally, AT LEAST four to five times weekly for 30 to 60 minutes per session - both cardiovascular and resistance work is OK. Listen to your body and rest when needed.    3. Continue all present medications.    4. Testing prior to your next visit includes: blood work    5. Remember the ABCDEs to cardiovascular health for patients:  A: Awareness of cardiovascular symptoms  B: Blood pressure control  C: Cholesterol control  C: Cigarette avoidance  D: Diabetes control  D: Dietary choices  E: Exercise    6. Return in 3 months     Any testing ordered in office today will be available for patient review via [employer], and reviewed with me at the follow-up office visit as scheduled.    Thank you for choosing [facility].    Please call our office or use [employer] with any questions.

## 2025-03-06 NOTE — PROGRESS NOTES
[facility]    Name:Jessica Jaquez   DOS: 3/6/2025     Chief Complaint:   Chief Complaint   Patient presents with    Follow-up     3 mo f/u          HISTORY OF PRESENT ILLNESS:    HPI:  Jessica Jaquez is a 79 y.o. female. She  has a past medical history of Arthritis, Colorectal polyps, Constipation, Diabetes mellitus (HCC), Diverticulosis of colon, Hiatal hernia, Hypertension, Increased urinary frequency, Lumbar disc disease, Pressure injury of skin, SOB (shortness of breath), Stress incontinence, and Vitamin D deficiency (07/27/2018).    She presents for follow-up evaluation.  Last saw me in the office on 12/11/2024.  Past medical history significant for:  Chronic HFpEF LVEF 60%  Atrial fibrillation sp PHU DCCV on 3/08/24 [id number]= 8 on Eliquis 5 mg twice daily for stroke prevention  S/P ablation for atrial fibrillation 7/9/24, pulmonary vein isolation with posterior wall isolation and loop implantation.   Device detected SVT  Hypertension  Hyperlipidemia 3/16/24   HDL 45 LDL 85  DM2 HgbA1C 6.9% on 1/22/2024  Hx of remote CVA 15 years ago   Lumbar radiculopathy    Since she last saw me in the office, she has been hospitalized this past February 2025 for near syncope, palpitations.  At that time, interrogation of her implanted loop recorder demonstrated no arrhythmias to correlate with her near syncopal episode.  It was thought that her near syncope was related to dehydration/overdiuresis.  She is presently maintained on torsemide 20 mg twice daily in addition to spironolactone 25 mg daily.  She has historically taken metolazone 5 mg once weekly for breakthrough weight gain, when I saw her last in the office she was not actively using this.    Today, she reports no new cardiopulmonary complaints.  Has had no recurrent near syncopal events.  Reports overall low frequency of palpitations.  She denies chest pain, worsening shortness of breath, diaphoresis, dizziness.  Has had no  orthopnea.  Lower extremity swelling is presently at baseline. Her weight is up 4 lbs from baseline. Review of her most recent CMP from February 25 demonstrates stable renal function, creatinine 1.5 at that time.  She was mildly hypokalemic on that lab, K 3.4. She was recently treated for a UTI.        ROS    ROS: Pertinent positives and negatives as described in History of Present Illness. Remainder of a 14 point review of systems was negative.     Allergies   Allergen Reactions    Other Cough and Sneezing     Seasonal allergies    Pollen Extract Allergic Rhinitis        Current Outpatient Medications on File Prior to Visit   Medication Sig Dispense Refill    apixaban (ELIQUIS) 5 mg Take 1 tablet (5 mg total) by mouth 2 (two) times a day 60 tablet 5    bimatoprost (Lumigan) 0.01 % ophthalmic drops Administer 1 drop to both eyes daily at bedtime      Cholecalciferol (VITAMIN D3) 1000 units CAPS Take by mouth daily        ezetimibe (ZETIA) 10 mg tablet Take 1 tablet (10 mg total) by mouth daily at bedtime 60 tablet 5    gabapentin (NEURONTIN) 100 mg capsule Take 3 capsules (300 mg total) by mouth 3 (three) times a day      glimepiride (AMARYL) 1 mg tablet Take 2 mg by mouth daily with breakfast      metoprolol succinate (TOPROL-XL) 25 mg 24 hr tablet Take 1 tablet (25 mg total) by mouth 2 (two) times a day 180 tablet 1    Multiple Vitamins-Minerals (CENTRUM SILVER PO) multivitamin      potassium chloride (Klor-Con M20) 20 mEq tablet TAKE 1 TABLET BY MOUTH 3 TIMES A DAY. 90 tablet 5    spironolactone (ALDACTONE) 25 mg tablet Take 1 tablet (25 mg total) by mouth daily 30 tablet 11    torsemide (DEMADEX) 20 mg tablet Take 1 tablet (20 mg total) by mouth 2 (two) times a day       No current facility-administered medications on file prior to visit.       Past Medical History:   Diagnosis Date    Arthritis     Colorectal polyps     Constipation     Diabetes mellitus (HCC)     Diverticulosis of colon     Hiatal hernia      Hypertension     Increased urinary frequency     Lumbar disc disease     Pressure injury of skin     SOB (shortness of breath)     Stress incontinence     Vitamin D deficiency 07/27/2018       Past Surgical History:   Procedure Laterality Date    BACK SURGERY      CARDIAC ELECTROPHYSIOLOGY PROCEDURE N/A 7/9/2024    Procedure: Cardiac eps/afib ablation PFA;  Surgeon: Jeyson Delaney MD;  [facility]: [facility];  Service: Cardiology    CARDIAC ELECTROPHYSIOLOGY PROCEDURE N/A 7/9/2024    Procedure: Cardiac loop recorder implant;  Surgeon: Jeyson Delaney MD;  [facility];  Service: Cardiology    CARPAL TUNNEL RELEASE Left     CATARACT EXTRACTION Bilateral     CHOLECYSTECTOMY      COLONOSCOPY W/ ENDOSCOPIC US N/A 2/24/2016    Procedure: ANAL ENDOSCOPIC U/S;  Surgeon: HOLLIS Washington MD;  Location: [facility];  Service:     HERNIA REPAIR      NY COLONOSCOPY FLX DX W/COLLJ SPEC WHEN PFRMD N/A 2/24/2016    Procedure: COLONOSCOPY;  Surgeon: HOLLIS Washington MD;  Location: [facility];  Service: Colorectal    TONSILLECTOMY      TUBAL LIGATION         Family History   Problem Relation Age of Onset    Hypertension Mother     Heart attack Mother     Diabetes Mother     Prostate cancer Father     No Known Problems Brother     Stroke Maternal Grandmother     Stroke Paternal Grandmother     Stroke Paternal Grandfather     Thyroid cancer Neg Hx        Social History     Socioeconomic History    Marital status: /Civil Union     Spouse name: Not on file    Number of children: Not on file    Years of education: Not on file    Highest education level: Not on file   Occupational History    Not on file   Tobacco Use    Smoking status: Never    Smokeless tobacco: Never   Substance and Sexual Activity    Alcohol use: Never    Drug use: No    Sexual activity: Not on file   Other Topics Concern    Not on file   Social History Narrative    Not on file     Social Drivers of Health     Financial Resource  "Strain: Not on file   Food Insecurity: No Food Insecurity (2025)    Nursing - Inadequate Food Risk Classification     Worried About Running Out of Food in the Last Year: Never true     Ran Out of Food in the Last Year: Never true     Ran Out of Food in the Last Year: Never true   Transportation Needs: No Transportation Needs (2025)    Nursing - Transportation Risk Classification     Lack of Transportation: Not on file     Lack of Transportation: No   Physical Activity: Not on file   Stress: Not on file   Social Connections: Not on file   Intimate Partner Violence: Unknown (2025)    Nursing IPS     Feels Physically and Emotionally Safe: Not on file     Physically Hurt by Someone: Not on file     Humiliated or Emotionally Abused by Someone: Not on file     Physically Hurt by Someone: No     Hurt or Threatened by Someone: No   Housing Stability: Unknown (2025)    Nursing: Inadequate Housing Risk Classification     Has Housing: Not on file     Worried About Losing Housing: Not on file     Unable to Get Utilities: Not on file     Unable to Pay for Housing in the Last Year: No     Has Housin       OBJECTIVE:    Ht 5' 3\" (1.6 m)   BMI 38.66 kg/m²      BP Readings from Last 3 Encounters:   25 132/63   24 120/60   10/22/24 132/86       Wt Readings from Last 3 Encounters:   25 99 kg (218 lb 4.1 oz)   24 104 kg (229 lb 9.6 oz)   10/22/24 104 kg (229 lb)         Physical Exam  Vitals reviewed.   Constitutional:       General: She is not in acute distress.     Appearance: Normal appearance. She is not diaphoretic.   HENT:      Head: Normocephalic and atraumatic.   Eyes:      Conjunctiva/sclera: Conjunctivae normal.   Neck:      Vascular: No JVD (Not well visualized).   Cardiovascular:      Rate and Rhythm: Normal rate and regular rhythm.      Pulses: Normal pulses.      Heart sounds: Normal heart sounds. No murmur heard.     No friction rub. No gallop.   Pulmonary:      Effort: " Pulmonary effort is normal.      Breath sounds: Normal breath sounds. No wheezing, rhonchi or rales.   Abdominal:      General: Abdomen is flat. Bowel sounds are normal.   Musculoskeletal:      Right lower leg: Edema present.      Left lower leg: Edema present.   Skin:     General: Skin is warm and dry.   Neurological:      General: No focal deficit present.      Mental Status: She is alert and oriented to person, place, and time.   Psychiatric:         Mood and Affect: Mood normal.         Behavior: Behavior normal.                                                       Cardiac testing:     ECHO 2/25/25  Left Ventricle Left ventricular cavity size is normal. Wall thickness is mildly increased. There is concentric remodeling. The left ventricular ejection fraction is 60%. Systolic function is normal. Although no diagnostic regional wall motion abnormality was identified, this possibility cannot be completely excluded on the basis of this study. Diastolic function is mildly abnormal, consistent with grade I (abnormal) relaxation.   Right Ventricle Right ventricular cavity size is normal. Systolic function is normal. Wall thickness is normal.   Left Atrium The atrium is at the upper limits of normal in size.   Right Atrium The atrium is mildly dilated.   Aortic Valve The aortic valve is trileaflet. The leaflets are mildly thickened. The leaflets are mildly calcified. The leaflets exhibit normal mobility. There is no evidence of regurgitation. The aortic valve has no significant stenosis.   Mitral Valve There is mild annular calcification.  There is trace regurgitation. There is no evidence of stenosis.   Tricuspid Valve Tricuspid valve structure is normal. There is trace regurgitation. There is no evidence of stenosis. Right ventricular systolic pressure could not be assessed.   Pulmonic Valve The pulmonic valve was not well visualized. There is no evidence of regurgitation. There is no evidence of stenosis.    Ascending Aorta The aortic root is normal in size.   IVC/SVC The inferior vena cava is normal in size. Respirophasic changes were normal.   Pericardium There is no pericardial effusion. The pericardium is normal in appearance. A fat pad is present.       Results for orders placed during the hospital encounter of 18    Echo complete with contrast if [place] [address]  [phone]    Transthoracic Echocardiogram  2D, M-mode, Doppler, and Color Doppler    Study date:  28-Dec-2018    Patient: HAN CARVAJAL  MR number: [id number]  Account number: [id number]  : 1945  Age: 73 years  Gender: Female  Status: Outpatient  Location: [address] [place] [address]  Height: 63 in  Weight: 232 lb  BP: 169/ 76 mmHg    Indications: Heart failure    Diagnoses: I50.9 - Heart failure, unspecified    Sonographer:  DANYELLE Ga  Interpreting Physician:  Kvng Aviles MD  Referring Physician:  Jorden Holt III, MD  Group:  [facility]  Cardiology Fellow:  Von Fuentes MD    SUMMARY    PROCEDURE INFORMATION:  This was a technically difficult study.    LEFT VENTRICLE:  Systolic function was normal. Ejection fraction was estimated to be 60 %.  There were no regional wall motion abnormalities.  Doppler parameters were consistent with abnormal left ventricular relaxation (grade 1 diastolic dysfunction).    MITRAL VALVE:  There was trace regurgitation.    TRICUSPID VALVE:  There was trace regurgitation.  Pulmonary artery systolic pressure was within the normal range.    PULMONIC VALVE:  There was trace regurgitation.    HISTORY: PRIOR HISTORY: Hypertension, hyperlipidemia, diastolic dysfunction, type 2 diabetes, GERD    PROCEDURE: The study was performed in the [address] [place] [address]. This was a routine study. The transthoracic approach was used. The study included complete 2D imaging,  M-mode, complete spectral Doppler, and color Doppler. This  was a technically difficult study.    LEFT VENTRICLE: Size was normal. Systolic function was normal. Ejection fraction was estimated to be 60 %. There were no regional wall motion abnormalities. Wall thickness was normal. DOPPLER: Doppler parameters were consistent with  abnormal left ventricular relaxation (grade 1 diastolic dysfunction).    RIGHT VENTRICLE: The size was normal. Systolic function was normal. Wall thickness was normal.    LEFT ATRIUM: Size was normal.    RIGHT ATRIUM: Size was normal.    MITRAL VALVE: Valve structure was normal. There was normal leaflet separation. DOPPLER: The transmitral velocity was within the normal range. There was no evidence for stenosis. There was trace regurgitation.    AORTIC VALVE: The valve was probably trileaflet. Leaflets exhibited normal cuspal separation. The valve was not well visualized. DOPPLER: Transaortic velocity was within the normal range. There was no evidence for stenosis. There was no  significant regurgitation.    TRICUSPID VALVE: The valve structure was normal. There was normal leaflet separation. DOPPLER: The transtricuspid velocity was within the normal range. There was no evidence for stenosis. There was trace regurgitation. Pulmonary artery  systolic pressure was within the normal range. Estimated peak PA pressure was 30 mmHg.    PULMONIC VALVE: Not well visualized. DOPPLER: The transpulmonic velocity was within the normal range. There was trace regurgitation.    PERICARDIUM: There was no pericardial effusion. A pericardial fat pad was present. The pericardium was normal in appearance.    AORTA: The root exhibited normal size.    SYSTEMIC VEINS: IVC: The inferior vena cava was normal in size. Respirophasic changes were normal.    SYSTEM MEASUREMENT TABLES    2D  %FS: 31.39 %  Ao Diam: 3.29 cm  EDV(Teich): 100.54 ml  EF(Cube): 67.7 %  EF(Teich): 59.26 %  ESV(Cube): 32.77 ml  ESV(Teich): 40.96  ml  IVSd: 0.99 cm  LA Area: 16.53 cm2  LA Diam: 3.39 cm  LVEDV MOD A4C: 94.35 ml  LVEF MOD A4C: 68.85 %  LVESV MOD A4C: 29.39 ml  LVIDd: 4.66 cm  LVIDs: 3.2 cm  LVLd A4C: 7.19 cm  LVLs A4C: 5.3 cm  LVPWd: 1.08 cm  RA Area: 13.64 cm2  RV Diam.: 2.6 cm  SV MOD A4C: 64.96 ml  SV(Cube): 68.69 ml  SV(Teich): 59.58 ml    CW  TR Vmax: 2.6 m/s  TR maxP.99 mmHg    MM  TAPSE: 1.84 cm    PW  E': 0.06 m/s  E/E': 14.64  MV A Toan: 0.97 m/s  MV Dec Radford: 3.14 m/s2  MV DecT: 267.31 ms  MV E Toan: 0.84 m/s  MV E/A Ratio: 0.86    [facility] Accredited Echocardiography Laboratory    Prepared and electronically signed by    Kvng Aviles MD  Signed 28-Dec-2018 11:05:05          LABS:  Lab Results   Component Value Date    GLUCOSE 109 2015    BUN 54 (H) 2025    CREATININE 1.50 (H) 2025    CALCIUM 9.5 2025     (L) 2015    K 3.4 (L) 2025    CO2 28 2025    CL 93 (L) 2025    ALKPHOS 83 2025    BILITOT 0.33 2015    PROT 7.6 2015    AST 29 2025    ALT 19 2025    ANIONGAP 5 2015        Lab Results   Component Value Date    WBC 13.56 (H) 2025    HGB 13.1 2025    HCT 39.4 2025    MCV 86 2025     (H) 2025       Lab Results   Component Value Date    CHOL 248 2014    HDL 45 (L) 2024    LDLCALC 85 2024    TRIG 146 2024       Lab Results   Component Value Date    HGBA1C 8.1 (H) 2025       ASSESSMENT/PLAN:  Diagnoses and all orders for this visit:    Chronic diastolic CHF (congestive heart failure) ([facility])  -     Basic metabolic panel; Future    Paroxysmal atrial fibrillation ([facility])    S/P ablation of atrial fibrillation    Essential hypertension    Mixed hyperlipidemia    Stage 3 chronic kidney disease, unspecified whether stage 3a or 3b CKD ([facility])    Hypokalemia  -     Basic metabolic panel; Future    Type 2 diabetes mellitus with other specified complication, without long-term  "current use of insulin (HCC)    PAF (paroxysmal atrial fibrillation) (HCC)  -     apixaban (ELIQUIS) 2.5 mg; Take 1 tablet (2.5 mg total) by mouth 2 (two) times a day    Other orders  -     tiZANidine (ZANAFLEX) 2 mg tablet; if needed  -     metroNIDAZOLE (METROCREAM) 0.75 % cream; if needed    Plan:  Continue Torsemide 20mg BID  Continue Kdur 20mEQ TID  Continue Spironolactone 25mg QD  Repeat BMP now to ensure K level has improved  Continue Eliquis. Reduce dose to 2.5mg BID due to age and renal function.   Continue Ezetimibe 10mg QD.   3 month follow up          Himanshu Webb MD [facility]      Portions of the record may have been created with voice recognition software. Occasional wrong word or \"sound alike\" substitutions may have occurred due to the inherent limitations of voice recognition software. Please review the chart carefully and recognize, using context, where substitutions/typographical errors may have occurred.     "

## 2025-03-07 ENCOUNTER — RESULTS FOLLOW-UP (OUTPATIENT)
Dept: UROLOGY | Facility: CLINIC | Age: 80
End: 2025-03-07

## 2025-03-07 LAB — BACTERIA UR CULT: NORMAL

## 2025-03-07 NOTE — TELEPHONE ENCOUNTER
I was able to call and speak with patient to let her know that her urine culture is negative with low colony count mixed contaminants.  No antibiotics are indicated. Patient understood everything and would like to know why she still has a burning sensation. I stated to patient to drink water patient stated that she has been and she still has this sensation. Stated that I would send this to the provider and we will get back to get             ----- Message from VIJI Garnica sent at 3/7/2025  1:04 PM EST -----  Please let patient know that her urine culture is negative with low colony count mixed contaminants.  No antibiotics are indicated.

## 2025-03-07 NOTE — TELEPHONE ENCOUNTER
"Called/spoke to patient relaying message from VIJI Baig that although he is not known to her, but rather VIJI Simmons, he reviewed her chart/notes and saw where Iris did discuss possible estrogen replacement therapy; she may have some vaginal atrophy (a condition where the vaginal walls become thin, dry and inflamed due to a decline in estrogen levels) that could contribute to some of her symptoms.      To this, she replied \"Estrogen at 79 years old? I don't think so.\" Was spoken in good spirits, not at all in an unfavorable way. States she will discuss with Iris further at her next appointment in April.               ----- Message from VIJI Garnica sent at 3/7/2025  1:39 PM EST -----  Patient is not known to me, was seen in consultation by Iris.  Looks like Iris did discuss possible estrogen replacement therapy, she may have some vaginal atrophy that could contribute to some of her symptoms.  This may be beneficial.  "

## 2025-03-07 NOTE — RESULT ENCOUNTER NOTE
Please let patient know that her urine culture is negative with low colony count mixed contaminants.  No antibiotics are indicated.

## 2025-03-11 ENCOUNTER — APPOINTMENT (OUTPATIENT)
Dept: LAB | Facility: CLINIC | Age: 80
End: 2025-03-11
Payer: COMMERCIAL

## 2025-03-11 DIAGNOSIS — I50.32 CHRONIC DIASTOLIC CHF (CONGESTIVE HEART FAILURE) (HCC): ICD-10-CM

## 2025-03-11 DIAGNOSIS — E87.6 HYPOKALEMIA: ICD-10-CM

## 2025-03-11 LAB
ANION GAP SERPL CALCULATED.3IONS-SCNC: 11 MMOL/L (ref 4–13)
BUN SERPL-MCNC: 55 MG/DL (ref 5–25)
CALCIUM SERPL-MCNC: 10.1 MG/DL (ref 8.4–10.2)
CHLORIDE SERPL-SCNC: 101 MMOL/L (ref 96–108)
CO2 SERPL-SCNC: 25 MMOL/L (ref 21–32)
CREAT SERPL-MCNC: 1.75 MG/DL (ref 0.6–1.3)
GFR SERPL CREATININE-BSD FRML MDRD: 27 ML/MIN/1.73SQ M
GLUCOSE P FAST SERPL-MCNC: 137 MG/DL (ref 65–99)
POTASSIUM SERPL-SCNC: 6 MMOL/L (ref 3.5–5.3)
SODIUM SERPL-SCNC: 137 MMOL/L (ref 135–147)

## 2025-03-11 PROCEDURE — 36415 COLL VENOUS BLD VENIPUNCTURE: CPT

## 2025-03-11 PROCEDURE — 80048 BASIC METABOLIC PNL TOTAL CA: CPT

## 2025-03-12 ENCOUNTER — TELEPHONE (OUTPATIENT)
Age: 80
End: 2025-03-12

## 2025-03-12 ENCOUNTER — DOCUMENTATION (OUTPATIENT)
Dept: CARDIOLOGY CLINIC | Facility: CLINIC | Age: 80
End: 2025-03-12

## 2025-03-12 DIAGNOSIS — I50.32 CHRONIC DIASTOLIC CHF (CONGESTIVE HEART FAILURE) (HCC): Primary | ICD-10-CM

## 2025-03-12 DIAGNOSIS — E87.5 HYPERKALEMIA: ICD-10-CM

## 2025-03-12 NOTE — PROGRESS NOTES
I called and spoke with Jessica in regards to her BMP.  The study demonstrates fairly stable creatinine at 1.7, near her baseline.  She was noted to have mild hyperkalemia with a potassium of 6.0.  Patient is presently taking spironolactone, torsemide, supplemental potassium.  I recommended that she continue torsemide 40 mg twice daily.  Given her history of hypokalemia, I would like her to continue taking 20 mEq of supplemental potassium once a day, noting that she has previously been taking 20 mEq 3 times per day due to hypokalemia historically.  I have also advised that she hold spironolactone until her potassium level comes down.  We will repeat a BMP tomorrow.

## 2025-03-12 NOTE — TELEPHONE ENCOUNTER
Received call from patient stating she is returning our call. No recent note from office, no outstanding test results to relay to patient. Please call patient back if trying to get a hold of her.

## 2025-03-13 ENCOUNTER — APPOINTMENT (OUTPATIENT)
Dept: LAB | Facility: CLINIC | Age: 80
End: 2025-03-13
Payer: COMMERCIAL

## 2025-03-13 ENCOUNTER — RESULTS FOLLOW-UP (OUTPATIENT)
Dept: CARDIOLOGY CLINIC | Facility: CLINIC | Age: 80
End: 2025-03-13

## 2025-03-13 DIAGNOSIS — I50.32 CHRONIC DIASTOLIC CHF (CONGESTIVE HEART FAILURE) (HCC): ICD-10-CM

## 2025-03-13 DIAGNOSIS — E87.5 HYPERKALEMIA: ICD-10-CM

## 2025-03-13 LAB
ANION GAP SERPL CALCULATED.3IONS-SCNC: 13 MMOL/L (ref 4–13)
BUN SERPL-MCNC: 46 MG/DL (ref 5–25)
CALCIUM SERPL-MCNC: 9.5 MG/DL (ref 8.4–10.2)
CHLORIDE SERPL-SCNC: 99 MMOL/L (ref 96–108)
CO2 SERPL-SCNC: 26 MMOL/L (ref 21–32)
CREAT SERPL-MCNC: 1.57 MG/DL (ref 0.6–1.3)
GFR SERPL CREATININE-BSD FRML MDRD: 30 ML/MIN/1.73SQ M
GLUCOSE P FAST SERPL-MCNC: 102 MG/DL (ref 65–99)
POTASSIUM SERPL-SCNC: 4.2 MMOL/L (ref 3.5–5.3)
SODIUM SERPL-SCNC: 138 MMOL/L (ref 135–147)

## 2025-03-13 PROCEDURE — 36415 COLL VENOUS BLD VENIPUNCTURE: CPT

## 2025-03-13 PROCEDURE — 80048 BASIC METABOLIC PNL TOTAL CA: CPT

## 2025-04-20 DIAGNOSIS — E87.6 HYPOKALEMIA: ICD-10-CM

## 2025-04-21 RX ORDER — SPIRONOLACTONE 25 MG/1
25 TABLET ORAL DAILY
Qty: 30 TABLET | Refills: 5 | Status: SHIPPED | OUTPATIENT
Start: 2025-04-21

## 2025-04-23 NOTE — ASSESSMENT & PLAN NOTE
Patient with recurrent and frequent UTIs dating back to February 2024, last positive urine culture in 2/24/2025 positive for E. coli  Discussed diet lifestyle modifications such as increasing water intake, she is on a fluid restriction related to her congestive heart failure, she states she does consume approximately 900 milliliters per day  Discussed implementing supplements such as cranberry, vitamin C, d-mannose, probiotic for UTI prevention  Also discussed role for vaginal estrogen, which will help with both her vaginal atrophy which is most likely contributing to her dysuria as well as UTI prevention  She has had some issues with ongoing dysuria and unfortunately due to her GFR and chronic kidney disease not able to prescribe Pyridium to help with her symptoms, she is understanding of this  Instructed to call our office with any concern for UTI, we can order urine culture and treat accordingly  Follow-up in 3 months    Orders:    estradiol (ESTRACE VAGINAL) 0.1 mg/g vaginal cream; Use twice weekly, apply pea size amount (about 1 gm) into the vagina

## 2025-04-23 NOTE — PROGRESS NOTES
Name: Jessica Jaquez      : 1945      MRN: [id number]  Encounter Provider: VIJI Nicole  Encounter Date: 2025   Encounter department: [facility] [place] [facility] [place]  :  Assessment & Plan  Recurrent UTI  Patient with recurrent and frequent UTIs dating back to 2024, last positive urine culture in 2025 positive for E. coli  Discussed diet lifestyle modifications such as increasing water intake, she is on a fluid restriction related to her congestive heart failure, she states she does consume approximately 900 milliliters per day  Discussed implementing supplements such as cranberry, vitamin C, d-mannose, probiotic for UTI prevention  Also discussed role for vaginal estrogen, which will help with both her vaginal atrophy which is most likely contributing to her dysuria as well as UTI prevention  She has had some issues with ongoing dysuria and unfortunately due to her GFR and chronic kidney disease not able to prescribe Pyridium to help with her symptoms, she is understanding of this  Instructed to call our office with any concern for UTI, we can order urine culture and treat accordingly  Follow-up in 3 months    Orders:    estradiol (ESTRACE VAGINAL) 0.1 mg/g vaginal cream; Use twice weekly, apply pea size amount (about 1 gm) into the vagina        History of Present Illness   Jessica Jaquez is a 79 y.o. female who presents today to the office for follow-up of recurrent UTIs.  She was last seen in 2024.    Today in the office she states that she is overall doing well.  She denies any urinary symptoms or complaints.  She states that back in February she went to the hospital for a cardiac issue but also had some urinary tract symptoms.  She had a urine culture checked and it was positive for bacteria and she was placed on a course of antibiotics.  She states that the dysuria continued for a few more days and that was very uncomfortable for her.    She also states  that around Easter weekend she was having ongoing issues with dysuria and the pain was extremely uncomfortable.  She states that she tried to call into her PCP but he stated to reach out to urology due to us treating her for recurrent UTIs.  She states that she tried to call the office but was not able to get through.  She states that after the weekend the dysuria resolved.    She states that she did not implement any of the supplements that we discussed at her last office visit as she could not remember the names of them.  She states that she will be interested in trying the vaginal estrogen if it would help with her ongoing dysuria.    Review of Systems   Constitutional:  Negative for chills and fever.   Respiratory: Negative.  Negative for cough and shortness of breath.    Cardiovascular:  Negative for chest pain and leg swelling.   Genitourinary:  Negative for dyspareunia, dysuria, flank pain, frequency, hematuria, menstrual problem, pelvic pain, urgency, vaginal bleeding, vaginal discharge and vaginal pain.   Skin:  Negative for rash.   Neurological: Negative.    Hematological:  Negative for adenopathy. Does not bruise/bleed easily.          Objective   There were no vitals taken for this visit.    Physical Exam  Vitals reviewed.   Constitutional:       Appearance: Normal appearance.   HENT:      Head: Normocephalic and atraumatic.   Eyes:      Pupils: Pupils are equal, round, and reactive to light.   Cardiovascular:      Rate and Rhythm: Normal rate.   Pulmonary:      Effort: Pulmonary effort is normal.   Abdominal:      General: Abdomen is flat.      Palpations: Abdomen is soft.   Musculoskeletal:      Cervical back: Normal range of motion.   Skin:     General: Skin is warm and dry.   Neurological:      General: No focal deficit present.      Mental Status: She is alert and oriented to person, place, and time. Mental status is at baseline.   Psychiatric:         Mood and Affect: Mood normal.         Behavior:  "Behavior normal.         Thought Content: Thought content normal.         Judgment: Judgment normal.           Results   No results found for: \"PSA\"  Lab Results   Component Value Date    GLUCOSE 109 06/09/2015    CALCIUM 9.5 03/13/2025     (L) 06/09/2015    K 4.2 03/13/2025    CO2 26 03/13/2025    CL 99 03/13/2025    BUN 46 (H) 03/13/2025    CREATININE 1.57 (H) 03/13/2025     Lab Results   Component Value Date    WBC 13.56 (H) 02/25/2025    HGB 13.1 02/25/2025    HCT 39.4 02/25/2025    MCV 86 02/25/2025     (H) 02/25/2025       Office Urine Dip  No results found for this or any previous visit (from the past hour).      "

## 2025-04-24 ENCOUNTER — OFFICE VISIT (OUTPATIENT)
Dept: UROLOGY | Facility: AMBULATORY SURGERY CENTER | Age: 80
End: 2025-04-24
Payer: COMMERCIAL

## 2025-04-24 VITALS
HEART RATE: 82 BPM | HEIGHT: 63 IN | WEIGHT: 227 LBS | SYSTOLIC BLOOD PRESSURE: 146 MMHG | BODY MASS INDEX: 40.22 KG/M2 | DIASTOLIC BLOOD PRESSURE: 78 MMHG | OXYGEN SATURATION: 97 %

## 2025-04-24 DIAGNOSIS — N39.0 RECURRENT UTI: Primary | ICD-10-CM

## 2025-04-24 PROCEDURE — 99214 OFFICE O/P EST MOD 30 MIN: CPT

## 2025-04-24 RX ORDER — KETOCONAZOLE 20 MG/G
CREAM TOPICAL
COMMUNITY
Start: 2025-04-21

## 2025-04-24 RX ORDER — ESTRADIOL 0.1 MG/G
CREAM VAGINAL
Qty: 42.5 G | Refills: 0 | Status: SHIPPED | OUTPATIENT
Start: 2025-04-24

## 2025-04-25 ENCOUNTER — REMOTE DEVICE CLINIC VISIT (OUTPATIENT)
Dept: CARDIOLOGY CLINIC | Facility: CLINIC | Age: 80
End: 2025-04-25
Payer: COMMERCIAL

## 2025-04-25 ENCOUNTER — RESULTS FOLLOW-UP (OUTPATIENT)
Dept: CARDIOLOGY CLINIC | Facility: CLINIC | Age: 80
End: 2025-04-25

## 2025-04-25 DIAGNOSIS — I48.0 PAROXYSMAL ATRIAL FIBRILLATION (HCC): Primary | ICD-10-CM

## 2025-04-25 PROCEDURE — 93298 REM INTERROG DEV EVAL SCRMS: CPT | Performed by: INTERNAL MEDICINE

## 2025-04-25 NOTE — PROGRESS NOTES
"MDT LNQ22 ILR/ ACTIVE SYSTEM IS MRI CONDITIONAL   CARELINK TRANSMISSION: LOOP RECORDER. PRESENTING RHYTHM NSR @ 60 BPM. BATTERY STATUS \"OK.\"  2 PT ACTIVATED EPISODES PREVIOUSLY ADDRESSED IN ALERT. 4 TACHY EPISODES W/ EGRAMS SHOWING OVERSENSING. 3 DEVICE CLASSIFIED AF EPISODES, LONGEST EPISODE IS 1:02 HOURS, AVAILABLE STRIPS DEMONSTRATE OVERSENSING, NOISY BASELINE. CAN NOT R/O ATRIAL FIBRILLATION. AF BURDEN IS 0.3%. HX OF PAF. PT TAKES ELIQUIS AND METOPROLOL SUCC. NO NEW PATIENT ACTIVATED EPISODES. NORMAL DEVICE FUNCTION. DL   "

## 2025-04-30 ENCOUNTER — TELEPHONE (OUTPATIENT)
Age: 80
End: 2025-04-30

## 2025-04-30 NOTE — TELEPHONE ENCOUNTER
Received a call from Jessica and was asking to speak to Device, has questions about her last device report. Contacted EP and spoke to Lizbet and tianna transferred call.

## 2025-05-02 ENCOUNTER — RESULTS FOLLOW-UP (OUTPATIENT)
Dept: CARDIOLOGY CLINIC | Facility: CLINIC | Age: 80
End: 2025-05-02

## 2025-05-06 ENCOUNTER — OFFICE VISIT (OUTPATIENT)
Dept: OBGYN CLINIC | Facility: CLINIC | Age: 80
End: 2025-05-06
Payer: COMMERCIAL

## 2025-05-06 VITALS — SYSTOLIC BLOOD PRESSURE: 140 MMHG | BODY MASS INDEX: 40.03 KG/M2 | DIASTOLIC BLOOD PRESSURE: 80 MMHG | WEIGHT: 226 LBS

## 2025-05-06 DIAGNOSIS — N90.89 VULVAR IRRITATION: Primary | ICD-10-CM

## 2025-05-06 PROCEDURE — 99202 OFFICE O/P NEW SF 15 MIN: CPT | Performed by: OBSTETRICS & GYNECOLOGY

## 2025-05-06 RX ORDER — TRIAMCINOLONE ACETONIDE 0.25 MG/G
CREAM TOPICAL
Qty: 30 G | Refills: 1 | Status: SHIPPED | OUTPATIENT
Start: 2025-05-06

## 2025-05-06 NOTE — PATIENT INSTRUCTIONS
Patient seen today for vaginal irritation.    Patient counseled on use of estrogen cream. Patient instructed to use one fourth of the applicator on Monday, Wednesday, and Friday for 4 to 6 weeks.    Patient also prescribed low-dose steroid cream to help with inflammation in the area. Patient instructed to use steroid cream on any irritated areas.  Can use cream sparingly 1-2 times per day.    Will follow-up with patient in 1 week by telephone to check in on improvement.

## 2025-05-06 NOTE — PROGRESS NOTES
:  Assessment & Plan  Vulvar irritation    Orders:  •  triamcinolone (KENALOG) 0.025 % cream; Apply sparingly as needed up to twice daily        History of Present Illness     Jessica Jaqeuz is a 80-year-old female with significant past medical history of SVT, paroxysmal atrial fibrillation, essential hypertension, chronic diastolic congestive heart failure, history of CVA, type 2 diabetes, lumbar radiculopathy, CKD, and hyperlipidemia. presenting to the office with symptoms of vaginal burning x 1 month.    Patient explains she has been having intermittent symptoms of vaginal burning and irritation.    She was recently seen by urology and prescribed an estrogen cream but has not started using it.  Patient had concerns about how to insert it into the vagina.  Patient wanted second opinion.    Patient counseled on use of estrogen cream to help strengthen vaginal tissue.  Explained the patient it can help with symptoms of vaginal burning and irritation.    Patient instructed to use one fourth of the applicator on Monday, Wednesday, and Friday for 4 to 6 weeks.    Patient denies any abnormal bleeding, fevers, chills, or dysuria.    Patient also prescribed low-dose steroid cream to help with inflammation in the area.    Patient instructed to use steroid cream on any irritated areas.  Can use cream sparingly 1-2 times per day    Patient expressed understanding at this time.  All questions answered, patient encouraged to reach out with any further questions or concerns.    Will follow-up with patient in a week by telephone.      Total time of today's visit was 20 minutes of which greater than 50% was spent face-to-face counseling the patient as well as coordination of care, review of chart and lab values, physical examination as well as computer entry into the [facility] medical record system.        Review of Systems   Constitutional: Negative.  Negative for appetite change, diaphoresis, fatigue, fever and unexpected weight  change.   HENT: Negative.     Eyes: Negative.    Respiratory: Negative.     Cardiovascular: Negative.         History of SVT, paroxysmal atrial fibrillation, essential hypertension, hyperlipidemia and chronic diastolic congestive heart failure.   Gastrointestinal: Negative.  Negative for abdominal pain, blood in stool, constipation, diarrhea, nausea and vomiting.   Endocrine: Negative.  Negative for cold intolerance and heat intolerance.        Following for type 2 diabetes.   Genitourinary: Negative.  Negative for dysuria, frequency, hematuria, urgency, vaginal bleeding, vaginal discharge and vaginal pain.        Following for CKD   Musculoskeletal: Negative.    Skin: Negative.    Allergic/Immunologic: Negative.    Neurological: Negative.         History of CVA.  Following for lumbar radiculopathy.   Hematological: Negative.  Negative for adenopathy.   Psychiatric/Behavioral: Negative.       Objective   /80   Wt 103 kg (226 lb)   BMI 40.03 kg/m²      Physical Exam  Constitutional:       Appearance: Normal appearance.   HENT:      Head: Normocephalic and atraumatic.   Eyes:      Pupils: Pupils are equal, round, and reactive to light.   Cardiovascular:      Rate and Rhythm: Normal rate and regular rhythm.   Pulmonary:      Effort: Pulmonary effort is normal.      Breath sounds: Normal breath sounds.   Musculoskeletal:         General: Normal range of motion.      Cervical back: Normal range of motion and neck supple.   Skin:     General: Skin is warm and dry.   Neurological:      General: No focal deficit present.      Mental Status: She is alert and oriented to person, place, and time.   Psychiatric:         Mood and Affect: Mood normal.         Behavior: Behavior normal.         Thought Content: Thought content normal.         Judgment: Judgment normal.

## 2025-05-14 ENCOUNTER — NURSE TRIAGE (OUTPATIENT)
Age: 80
End: 2025-05-14

## 2025-05-14 DIAGNOSIS — R39.9 UTI SYMPTOMS: Primary | ICD-10-CM

## 2025-05-14 NOTE — TELEPHONE ENCOUNTER
FOLLOW UP: Advised patient to avoid bladder irritating foods and beverages.  Maintain adequate hydration of water intake, if not on fluid restrictions.    Ordered urine testing-urine micro and culture.    Reviewed ED precautions.    REASON FOR CONVERSATION: Possible UTI    SYMPTOMS: burning with urination, urine frequency and urgency, lower abdominal pain.     OTHER: Denies severe pain, fevers.    Patient cannot go for urine testing today. Patients  has dementia and she cannot leave him alone.     Requesting antibiotic to be sent to pharm .    Please review/advise    #[phone]    DISPOSITION: Order Lab Work (overriding See Within 2 Weeks in Office)    Reason for Disposition   The patient has moderate dysuria with no infection or correlating diagnosis    Answer Assessment - Initial Assessment Questions  1. When did your symptoms start?   -3 days  2. Do you experience pain or burning with every void?   -a lot of burning with urination   3. Do you have any other urinary symptoms such as urinary frequency, urgency, incontinence, blood in your urine?   -urine urgency and frequency every 15 minutes  4. Do you have any abdominal pain or flank pain?  -lower abdominal pain   5. Do you have a fever of 101 or higher? How did you take your temperature?   -Denies  6. Do you have a history of urinary tract infections?   -Yes    Protocols used: Urology-Dysuria-ADULT-[place]

## 2025-05-15 ENCOUNTER — APPOINTMENT (OUTPATIENT)
Dept: LAB | Facility: CLINIC | Age: 80
End: 2025-05-15
Payer: COMMERCIAL

## 2025-05-15 ENCOUNTER — TRANSCRIBE ORDERS (OUTPATIENT)
Dept: LAB | Facility: CLINIC | Age: 80
End: 2025-05-15

## 2025-05-15 DIAGNOSIS — N39.9 DISEASE OF URINARY TRACT: Primary | ICD-10-CM

## 2025-05-15 DIAGNOSIS — R39.9 UTI SYMPTOMS: ICD-10-CM

## 2025-05-15 DIAGNOSIS — I50.9 HEART FAILURE, UNSPECIFIED HF CHRONICITY, UNSPECIFIED HEART FAILURE TYPE (HCC): ICD-10-CM

## 2025-05-15 DIAGNOSIS — E11.9 DIABETES MELLITUS WITHOUT COMPLICATION (HCC): ICD-10-CM

## 2025-05-15 LAB

## 2025-05-15 PROCEDURE — 81001 URINALYSIS AUTO W/SCOPE: CPT

## 2025-05-15 PROCEDURE — 87186 SC STD MICRODIL/AGAR DIL: CPT

## 2025-05-15 PROCEDURE — 87077 CULTURE AEROBIC IDENTIFY: CPT

## 2025-05-15 PROCEDURE — 87086 URINE CULTURE/COLONY COUNT: CPT

## 2025-05-15 NOTE — TELEPHONE ENCOUNTER
Patient reports that burning and lower abdominal discomfort is worse today;  Very cloudy urine today;  did not sleep well last night;     Took urine to lab this morning;   Urinalysis : IN Process  Urine Culture: IN Process    Hydration/ER precautions    Patient understands importance of waiting for urine results;  but is     Requesting antibiotic today if possible.  [facility] pharmacy confirmed.    # [phone];

## 2025-05-16 ENCOUNTER — RESULTS FOLLOW-UP (OUTPATIENT)
Dept: UROLOGY | Facility: AMBULATORY SURGERY CENTER | Age: 80
End: 2025-05-16

## 2025-05-16 DIAGNOSIS — N39.0 RECURRENT UTI: Primary | ICD-10-CM

## 2025-05-16 RX ORDER — CEPHALEXIN 500 MG/1
500 CAPSULE ORAL EVERY 8 HOURS SCHEDULED
Qty: 21 CAPSULE | Refills: 0 | Status: SHIPPED | OUTPATIENT
Start: 2025-05-16 | End: 2025-05-23

## 2025-05-16 NOTE — TELEPHONE ENCOUNTER
Called and left DVM going over note from VIJI.Left call back #, if patient calls back please review not from VIJI.        ----- Message from VIJI Nicole sent at 5/16/2025  7:33 AM EDT -----  Urinalysis suspicious for UTI, will send course of antibiotics due to it being the weekend, antibiotic based off of last urine culture.  Will monitor final susceptibility and adjust antibiotic if   necessary.  ----- Message -----  From: Lab, Background User  Sent: 5/15/2025   4:18 PM EDT  To: VIJI Nicole

## 2025-05-17 LAB — BACTERIA UR CULT: ABNORMAL

## 2025-05-23 PROBLEM — N39.0 RECURRENT UTI: Status: RESOLVED | Noted: 2024-10-22 | Resolved: 2025-05-23

## 2025-06-04 ENCOUNTER — APPOINTMENT (OUTPATIENT)
Dept: LAB | Facility: CLINIC | Age: 80
End: 2025-06-04
Payer: COMMERCIAL

## 2025-06-04 DIAGNOSIS — I48.0 PAF (PAROXYSMAL ATRIAL FIBRILLATION) (HCC): ICD-10-CM

## 2025-06-04 DIAGNOSIS — E11.9 DIABETES MELLITUS WITHOUT COMPLICATION (HCC): ICD-10-CM

## 2025-06-04 DIAGNOSIS — N39.9 DISEASE OF URINARY TRACT: ICD-10-CM

## 2025-06-04 DIAGNOSIS — E87.6 HYPOKALEMIA: ICD-10-CM

## 2025-06-04 DIAGNOSIS — I50.9 HEART FAILURE, UNSPECIFIED HF CHRONICITY, UNSPECIFIED HEART FAILURE TYPE (HCC): ICD-10-CM

## 2025-06-04 LAB
ALBUMIN SERPL BCG-MCNC: 3.7 G/DL (ref 3.5–5)
ALP SERPL-CCNC: 94 U/L (ref 34–104)
ALT SERPL W P-5'-P-CCNC: 16 U/L (ref 7–52)
ANION GAP SERPL CALCULATED.3IONS-SCNC: 12 MMOL/L (ref 4–13)
AST SERPL W P-5'-P-CCNC: 16 U/L (ref 13–39)
BACTERIA UR QL AUTO: ABNORMAL /HPF
BILIRUB SERPL-MCNC: 0.37 MG/DL (ref 0.2–1)
BILIRUB UR QL STRIP: NEGATIVE
BNP SERPL-MCNC: 71 PG/ML (ref 0–100)
BUN SERPL-MCNC: 45 MG/DL (ref 5–25)
CALCIUM SERPL-MCNC: 8.7 MG/DL (ref 8.4–10.2)
CHLORIDE SERPL-SCNC: 99 MMOL/L (ref 96–108)
CLARITY UR: ABNORMAL
CO2 SERPL-SCNC: 27 MMOL/L (ref 21–32)
COLOR UR: ABNORMAL
CREAT SERPL-MCNC: 1.65 MG/DL (ref 0.6–1.3)
EST. AVERAGE GLUCOSE BLD GHB EST-MCNC: 169 MG/DL
GFR SERPL CREATININE-BSD FRML MDRD: 28 ML/MIN/1.73SQ M
GLUCOSE P FAST SERPL-MCNC: 161 MG/DL (ref 65–99)
GLUCOSE UR STRIP-MCNC: NEGATIVE MG/DL
HBA1C MFR BLD: 7.5 %
HGB UR QL STRIP.AUTO: ABNORMAL
HYALINE CASTS #/AREA URNS LPF: ABNORMAL /LPF
KETONES UR STRIP-MCNC: NEGATIVE MG/DL
LEUKOCYTE ESTERASE UR QL STRIP: ABNORMAL
NITRITE UR QL STRIP: NEGATIVE
NON-SQ EPI CELLS URNS QL MICRO: ABNORMAL /HPF
PH UR STRIP.AUTO: 6 [PH]
POTASSIUM SERPL-SCNC: 4 MMOL/L (ref 3.5–5.3)
PROT SERPL-MCNC: 6.8 G/DL (ref 6.4–8.4)
PROT UR STRIP-MCNC: ABNORMAL MG/DL
RBC #/AREA URNS AUTO: ABNORMAL /HPF
SODIUM SERPL-SCNC: 138 MMOL/L (ref 135–147)
SP GR UR STRIP.AUTO: 1.02 (ref 1–1.03)
UROBILINOGEN UR STRIP-ACNC: <2 MG/DL
WBC #/AREA URNS AUTO: ABNORMAL /HPF

## 2025-06-04 PROCEDURE — 81001 URINALYSIS AUTO W/SCOPE: CPT

## 2025-06-04 PROCEDURE — 80053 COMPREHEN METABOLIC PANEL: CPT

## 2025-06-04 PROCEDURE — 83036 HEMOGLOBIN GLYCOSYLATED A1C: CPT

## 2025-06-04 PROCEDURE — 87086 URINE CULTURE/COLONY COUNT: CPT

## 2025-06-04 PROCEDURE — 36415 COLL VENOUS BLD VENIPUNCTURE: CPT

## 2025-06-04 PROCEDURE — 83880 ASSAY OF NATRIURETIC PEPTIDE: CPT

## 2025-06-04 RX ORDER — SPIRONOLACTONE 25 MG/1
25 TABLET ORAL DAILY
Qty: 30 TABLET | Refills: 0 | Status: CANCELLED | OUTPATIENT
Start: 2025-06-04

## 2025-06-06 LAB — BACTERIA UR CULT: NORMAL

## 2025-06-17 ENCOUNTER — APPOINTMENT (OUTPATIENT)
Dept: LAB | Facility: CLINIC | Age: 80
End: 2025-06-17
Payer: COMMERCIAL

## 2025-06-17 ENCOUNTER — OFFICE VISIT (OUTPATIENT)
Dept: OBGYN CLINIC | Facility: CLINIC | Age: 80
End: 2025-06-17
Payer: COMMERCIAL

## 2025-06-17 VITALS
HEIGHT: 63 IN | BODY MASS INDEX: 40.04 KG/M2 | DIASTOLIC BLOOD PRESSURE: 76 MMHG | WEIGHT: 226 LBS | SYSTOLIC BLOOD PRESSURE: 126 MMHG

## 2025-06-17 DIAGNOSIS — N95.2 ATROPHIC VAGINITIS: Primary | ICD-10-CM

## 2025-06-17 DIAGNOSIS — N39.0 RECURRENT UTI: ICD-10-CM

## 2025-06-17 DIAGNOSIS — R30.0 DYSURIA: ICD-10-CM

## 2025-06-17 DIAGNOSIS — Z12.31 ENCOUNTER FOR SCREENING MAMMOGRAM FOR BREAST CANCER: ICD-10-CM

## 2025-06-17 LAB
BACTERIA UR QL AUTO: ABNORMAL /HPF
BILIRUB UR QL STRIP: NEGATIVE
CLARITY UR: ABNORMAL
COLOR UR: YELLOW
GLUCOSE UR STRIP-MCNC: NEGATIVE MG/DL
HGB UR QL STRIP.AUTO: ABNORMAL
KETONES UR STRIP-MCNC: NEGATIVE MG/DL
LEUKOCYTE ESTERASE UR QL STRIP: ABNORMAL
NITRITE UR QL STRIP: NEGATIVE
NON-SQ EPI CELLS URNS QL MICRO: ABNORMAL /HPF
PH UR STRIP.AUTO: 6 [PH]
PROT UR STRIP-MCNC: ABNORMAL MG/DL
RBC #/AREA URNS AUTO: ABNORMAL /HPF
SP GR UR STRIP.AUTO: 1.02 (ref 1–1.03)
UROBILINOGEN UR STRIP-ACNC: <2 MG/DL
WBC #/AREA URNS AUTO: ABNORMAL /HPF
WBC CLUMPS # UR AUTO: PRESENT /UL

## 2025-06-17 PROCEDURE — 87077 CULTURE AEROBIC IDENTIFY: CPT

## 2025-06-17 PROCEDURE — 99214 OFFICE O/P EST MOD 30 MIN: CPT | Performed by: OBSTETRICS & GYNECOLOGY

## 2025-06-17 PROCEDURE — 87086 URINE CULTURE/COLONY COUNT: CPT

## 2025-06-17 PROCEDURE — 81001 URINALYSIS AUTO W/SCOPE: CPT

## 2025-06-17 PROCEDURE — 87186 SC STD MICRODIL/AGAR DIL: CPT

## 2025-06-17 NOTE — PROGRESS NOTES
":  Assessment & Plan  Atrophic vaginitis  - Pt Estrace prescription into the office.   - Administered 1/4 of applicator of Estrace cream vaginally on exam.  - Encouraged patient to seek home help with weekly application of vaginal cream as it is the necessary treatment to relieve her vaginal dryness/burning.   - F/u in 4-6 weeks to assess treatment effectiveness.        Dysuria  - Encouraged pt to provide new urine specimen to lab.   - Advised pt to f/u with urologist, specifically an MD, to discuss alternative options for symptom management.          History of Present Illness     Jessica Jaquez is an 81 yo F with PMH sig for hypertension, hyperlipidemia, chronic diastolic CHF, CVA, type 2 diabetes mellitus, CKD, and obesity presenting to the office with vaginal dryness and burning x2-3 months.     The patient was seen about 5 weeks ago for similar symptoms and was prescribed Estrace cream to use vaginally 1-2x/week. The patient states that she has not been using the cream at all because she cannot physically apply it vaginally as she \"cannot reach.\" As such, her symptoms have not improved at all.    In addition, the patient also reports dysuria x3 months. She states that the pain is so severe that she can barely tolerate urinating. She has been seen by urology and was treated with Keflex despite negative culture results.The patient says that she gets some relief while on the antibiotic, but the UTIs keep recurring \"every 2 weeks\". Pt planning to go to lab with another urine specimen today.    Pt denies vaginal bleeding, discharge, excessive cramping, hematuria, abdominal pain or flank pain.    Administered 1/4 of applicator of Estrace cream vaginally today on exam. Encouraged patient to seek home help with weekly application of vaginal cream as it is the necessary treatment to relieve her vaginal dryness/burning.     Encouraged pt to provide new urine specimen to lab today. Advised pt to f/u with urologist, " "specifically an MD, to discuss alternative options for symptom management.     Follow up in 4-6 weeks to evaluate treatment effectiveness.     Call office in the meantime if any questions, comments, or concerns appear.       Total time of today's visit was 25 minutes of which greater than 50% was spent face-to-face counseling the patient as well as coordination of care, review of chart and lab values, physical examination as well as computer entry into the [facility] medical record system.          Review of Systems   Constitutional: Negative.  Negative for appetite change, diaphoresis, fatigue, fever and unexpected weight change.   HENT: Negative.     Eyes: Negative.    Respiratory: Negative.     Cardiovascular: Negative.         Followed for hypertension and cholesterol as well as several cardiac issues   Gastrointestinal: Negative.  Negative for abdominal pain, blood in stool, constipation, diarrhea, nausea and vomiting.   Endocrine: Negative.  Negative for cold intolerance and heat intolerance.        Followed for diabetes   Genitourinary:  Positive for difficulty urinating, dysuria and vaginal pain (burning). Negative for decreased urine volume, dyspareunia, flank pain, frequency, hematuria, pelvic pain, urgency, vaginal bleeding and vaginal discharge.   Musculoskeletal: Negative.    Skin: Negative.    Allergic/Immunologic: Negative.    Neurological: Negative.    Hematological: Negative.  Negative for adenopathy.   Psychiatric/Behavioral: Negative.     All other systems reviewed and are negative.    Objective   /76   Ht 5' 3\" (1.6 m)   Wt 103 kg (226 lb)   BMI 40.03 kg/m²      Physical Exam  Vitals reviewed. Exam conducted with a chaperone present.   Constitutional:       General: She is not in acute distress.     Appearance: She is well-developed.   HENT:      Head: Normocephalic and atraumatic.     Eyes:      Extraocular Movements: Extraocular movements intact.      Pupils: Pupils are equal, round, and " reactive to light.     Abdominal:      General: Bowel sounds are normal.   Genitourinary:     Exam position: Supine.      Labia:         Right: Rash present. No tenderness or lesion.         Left: Rash present. No tenderness or lesion.       Urethra: No prolapse.      Vagina: Normal. No vaginal discharge, erythema or tenderness.        Comments: Vaginal introitus identified. Inserted 1/4 of applicator of Estrace cream vaginally.     Musculoskeletal:         General: No swelling. Normal range of motion.      Cervical back: Neck supple.   Lymphadenopathy:      Cervical: No cervical adenopathy.     Skin:     General: Skin is warm and dry.     Neurological:      General: No focal deficit present.      Mental Status: She is alert.     Psychiatric:         Mood and Affect: Mood normal.         Behavior: Behavior normal.         Thought Content: Thought content normal.         Judgment: Judgment normal.

## 2025-06-17 NOTE — PATIENT INSTRUCTIONS
Topic: Atrophic vaginitis     Administered 1/4 of applicator of Estrace cream vaginally today on exam. Encouraged patient to seek home help with weekly application of vaginal cream as it is the necessary treatment to relieve her vaginal dryness/burning.     Encouraged pt to provide new urine specimen to lab today. Advised pt to f/u with urologist, specifically an MD, to discuss alternative options for symptom management.     Follow up in 4-6 weeks to evaluate treatment effectiveness.     Call office in the meantime if any questions, comments, or concerns appear.

## 2025-06-18 ENCOUNTER — RESULTS FOLLOW-UP (OUTPATIENT)
Dept: UROLOGY | Facility: AMBULATORY SURGERY CENTER | Age: 80
End: 2025-06-18

## 2025-06-18 DIAGNOSIS — R39.9 UTI SYMPTOMS: Primary | ICD-10-CM

## 2025-06-18 RX ORDER — CEPHALEXIN 500 MG/1
500 CAPSULE ORAL EVERY 8 HOURS SCHEDULED
Qty: 21 CAPSULE | Refills: 0 | Status: SHIPPED | OUTPATIENT
Start: 2025-06-18 | End: 2025-06-25

## 2025-06-18 NOTE — TELEPHONE ENCOUNTER
Called and left DVM for patient going over results and informing her of antibiotic being sent to her pharmacy on file.Left call back number ,if patient calls back please relay message.        ----- Message from VIJI Nicole sent at 6/18/2025  2:09 PM EDT -----  Preliminary results of urine culture positive for bacteria.  Will send course of antibiotics to pharmacy on file.  Will adjust antibiotics if necessary.  ----- Message -----  From: Lab, Background User  Sent: 6/17/2025   7:03 PM EDT  To: VIJI Nicole

## 2025-06-19 ENCOUNTER — NURSE TRIAGE (OUTPATIENT)
Age: 80
End: 2025-06-19

## 2025-06-19 LAB — BACTERIA UR CULT: ABNORMAL

## 2025-06-19 NOTE — TELEPHONE ENCOUNTER
"REASON FOR CONVERSATION: Weight Gain  Patients daughter in law, Keily, calling. Patient is not present for the call.   Patient has been having increased weight gain, foot and leg swelling, other symptoms as below.  Per Keily, patient has had frequent UTIs, at least 2-3 times per month. Was seen by GYN yesterday, who told patient she is dehydrated and ordered Keflex. Patient is compliant with her diuretics, voiding frequently, unsure if she is emptying her bladder completely.     SYMPTOMS: Weight gain, HOUSE- with any walking/activity   Severe lower extremity edema, increasing over past 2-3 weeks, painful to touch  Abdominal distention, weakness, fatigue.     OTHER HEALTH INFORMATION: Last OV 3/6/25  Taking Torsemide 20 mg BID, Spironolactone 25 mg daily  Weight 226 lbs yesterday- per Keily, patient was told to take extra diuretic if over 220 lbs.    PROTOCOL DISPOSITION: Discuss With PCP and Callback by Nurse Today, Discuss with Provider and Call Back Patient    CARE ADVICE PROVIDED: Advised Manuel Garcia II would send message to clinical and HF clinical staff. Patient is not home at this time, will return home this afternoon.     PRACTICE FOLLOW-UP: Keily will have patient call back when she returns home, or patient can be reached this afternoon at her home number.     Reason for Disposition   Weight GAIN > 5 lbs (2 kg) in one week OR 5 lbs (2 kg) over target weight.    Answer Assessment - Initial Assessment Questions  1. MAIN CONCERN OR SYMPTOM: \"What is your main concern right now?\" \"What's the main symptom you're worried about?\" (e.g., breathing difficulty, ankle swelling, weight gain).      Weight gain  2. ONSET: \"When did the  weight gain  start?\"      2-3 weeks ago  3. BREATHING DIFFICULTY: \"Are you having any difficulty breathing?\" If Yes, ask: \"How bad is it?\"  (e.g., none, mild, moderate, severe)  \"Is this worse than usual for you?\"      HOUSE- with any walking/ activity   4. EDEMA - FOOT-LEG SWELLING: \"Do you have " "swelling of your ankles, feet or legs?\" If Yes, ask: \"How bad is the swelling?\" (e.g., localized; mild, moderate, severe)      Severe- increasing over past 2-3 weeks, painful   5. WEIGHT - CURRENT: \"What is your weight today?\"      226 lbs- as of yesterday   6. WEIGHT - TARGET RANGE: \"Do you try to keep your weight in a target (goal) range?\" If Yes, ask: \"What is that range?\"      220 lbs  7. WEIGHT - CHANGE: \"Have you gained (or lost) weight in the past 24 hours? Past week (7 days)?\" If Yes, ask:  \"How much weight?\"      unsure  8. OTHER SYMPTOMS: \"Do you have any other symptoms?\" (e.g., depression, weakness or fatigue, abdomen bloating, hacky cough)      Weakness, fatigue, slight cough, abdominal bloating  9. DIURETICS: \"Are you currently taking water pills?\" (e.g., furosemide [Lasix], hydrochlorothiazide [HCTZ], bumetanide [Bumex], metolazone [Zaroxolyn]) If Yes, ask: \"What medicine are you taking, and how often?\"  \"Any recent change in dose?\"       Torsemide 20 mg IBD; spironolactone 25 mg Daily  10. O2 SATURATION MONITOR: \"Do you use an oxygen saturation monitor (pulse oximeter) at home?\" If Yes, ask: \"What is your reading (oxygen level) today?\" \"What is your usual oxygen saturation reading?\" (e.g., 95%)        Unsure if she is using  11. HEART FAILURE HCP: \"Who treats your heart failure?\"  (e.g., cardiologist or heart specialist, heart failure clinic or center, primary care doctor)        Dr. Webb  12. FLUID and SODIUM RESTRICTIONS: \"Have you been instructed to restrict your daily fluid intake or sodium (salt) intake?\" If Yes, ask: \"What are your daily limits?\"        Tries to follow fluid and sodium restriction    Protocols used: Heart Failure on Treatment Follow-up Call-Adult-[place]    "

## 2025-06-19 NOTE — TELEPHONE ENCOUNTER
"Spoke with Jessica, following up from previous call with her DIL Keily.    Jessica says her \"whole body is swollen\", including her face, feels like she has a spare tire for an abdomen. Last weight was Tuesday 226lbs.    Discouraged she can't lose this fluid, every time she calls about it or sees someone they give her more medicine to take; she's just tired of taking medications and weighing herself.    Offered to transfer to heart failure team, preferred to wait and see what Dr Webb recommends.  "

## 2025-06-19 NOTE — TELEPHONE ENCOUNTER
----- Message from VIJI Nicole sent at 6/18/2025  2:09 PM EDT -----  Preliminary results of urine culture positive for bacteria.  Will send course of antibiotics to pharmacy on file.  Will adjust antibiotics if necessary.  ----- Message -----  From: Lab, Background User  Sent: 6/17/2025   7:03 PM EDT  To: VIJI Nicole

## 2025-06-19 NOTE — TELEPHONE ENCOUNTER
Left message for patient letting her know that the final urine culture result came back (susceptibility) and the Keflex that was prescribed to her is appropriate and she should continue the course. Number for urology left on message.

## 2025-06-20 ENCOUNTER — HOSPITAL ENCOUNTER (INPATIENT)
Facility: HOSPITAL | Age: 80
LOS: 4 days | Discharge: HOME | End: 2025-06-25
Attending: EMERGENCY MEDICINE
Payer: COMMERCIAL

## 2025-06-20 ENCOUNTER — APPOINTMENT (EMERGENCY)
Dept: RADIOLOGY | Facility: HOSPITAL | Age: 80
End: 2025-06-20
Payer: COMMERCIAL

## 2025-06-20 DIAGNOSIS — I50.9 CHF EXACERBATION (HCC): ICD-10-CM

## 2025-06-20 DIAGNOSIS — R06.00 DYSPNEA: ICD-10-CM

## 2025-06-20 DIAGNOSIS — I50.32 CHRONIC DIASTOLIC CHF (CONGESTIVE HEART FAILURE) (HCC): Primary | Chronic | ICD-10-CM

## 2025-06-20 DIAGNOSIS — I50.30 (HFPEF) HEART FAILURE WITH PRESERVED EJECTION FRACTION (HCC): ICD-10-CM

## 2025-06-20 DIAGNOSIS — I50.33 ACUTE ON CHRONIC DIASTOLIC (CONGESTIVE) HEART FAILURE (HCC): ICD-10-CM

## 2025-06-20 DIAGNOSIS — R60.0 BILATERAL LOWER EXTREMITY EDEMA: ICD-10-CM

## 2025-06-20 PROBLEM — N39.0 UTI (URINARY TRACT INFECTION): Status: ACTIVE | Noted: 2025-06-20

## 2025-06-20 LAB
2HR DELTA HS TROPONIN: -1 NG/L
ALBUMIN SERPL BCG-MCNC: 3.8 G/DL (ref 3.5–5)
ALP SERPL-CCNC: 95 U/L (ref 34–104)
ALT SERPL W P-5'-P-CCNC: 14 U/L (ref 7–52)
ANION GAP SERPL CALCULATED.3IONS-SCNC: 12 MMOL/L (ref 4–13)
AST SERPL W P-5'-P-CCNC: 19 U/L (ref 13–39)
BASOPHILS # BLD AUTO: 0.04 THOUSANDS/ÂΜL (ref 0–0.1)
BASOPHILS NFR BLD AUTO: 1 % (ref 0–1)
BILIRUB SERPL-MCNC: 0.42 MG/DL (ref 0.2–1)
BNP SERPL-MCNC: 39 PG/ML (ref 0–100)
BUN SERPL-MCNC: 40 MG/DL (ref 5–25)
CALCIUM SERPL-MCNC: 9.4 MG/DL (ref 8.4–10.2)
CARDIAC TROPONIN I PNL SERPL HS: 7 NG/L (ref ?–50)
CARDIAC TROPONIN I PNL SERPL HS: 8 NG/L (ref ?–50)
CHLORIDE SERPL-SCNC: 100 MMOL/L (ref 96–108)
CO2 SERPL-SCNC: 24 MMOL/L (ref 21–32)
CREAT SERPL-MCNC: 1.8 MG/DL (ref 0.6–1.3)
EOSINOPHIL # BLD AUTO: 0.43 THOUSAND/ÂΜL (ref 0–0.61)
EOSINOPHIL NFR BLD AUTO: 5 % (ref 0–6)
ERYTHROCYTE [DISTWIDTH] IN BLOOD BY AUTOMATED COUNT: 15.2 % (ref 11.6–15.1)
GFR SERPL CREATININE-BSD FRML MDRD: 26 ML/MIN/1.73SQ M
GLUCOSE SERPL-MCNC: 118 MG/DL (ref 65–140)
GLUCOSE SERPL-MCNC: 179 MG/DL (ref 65–140)
GLUCOSE SERPL-MCNC: 219 MG/DL (ref 65–140)
HCT VFR BLD AUTO: 36.4 % (ref 34.8–46.1)
HGB BLD-MCNC: 12 G/DL (ref 11.5–15.4)
IMM GRANULOCYTES # BLD AUTO: 0.08 THOUSAND/UL (ref 0–0.2)
IMM GRANULOCYTES NFR BLD AUTO: 1 % (ref 0–2)
LYMPHOCYTES # BLD AUTO: 1.69 THOUSANDS/ÂΜL (ref 0.6–4.47)
LYMPHOCYTES NFR BLD AUTO: 21 % (ref 14–44)
MCH RBC QN AUTO: 28.6 PG (ref 26.8–34.3)
MCHC RBC AUTO-ENTMCNC: 33 G/DL (ref 31.4–37.4)
MCV RBC AUTO: 87 FL (ref 82–98)
MONOCYTES # BLD AUTO: 0.41 THOUSAND/ÂΜL (ref 0.17–1.22)
MONOCYTES NFR BLD AUTO: 5 % (ref 4–12)
NEUTROPHILS # BLD AUTO: 5.61 THOUSANDS/ÂΜL (ref 1.85–7.62)
NEUTS SEG NFR BLD AUTO: 67 % (ref 43–75)
NRBC BLD AUTO-RTO: 0 /100 WBCS
PLATELET # BLD AUTO: 360 THOUSANDS/UL (ref 149–390)
PMV BLD AUTO: 9 FL (ref 8.9–12.7)
POTASSIUM SERPL-SCNC: 4 MMOL/L (ref 3.5–5.3)
PROT SERPL-MCNC: 7.4 G/DL (ref 6.4–8.4)
RBC # BLD AUTO: 4.2 MILLION/UL (ref 3.81–5.12)
SODIUM SERPL-SCNC: 136 MMOL/L (ref 135–147)
WBC # BLD AUTO: 8.26 THOUSAND/UL (ref 4.31–10.16)

## 2025-06-20 PROCEDURE — 99285 EMERGENCY DEPT VISIT HI MDM: CPT

## 2025-06-20 PROCEDURE — 84484 ASSAY OF TROPONIN QUANT: CPT

## 2025-06-20 PROCEDURE — 99285 EMERGENCY DEPT VISIT HI MDM: CPT | Performed by: EMERGENCY MEDICINE

## 2025-06-20 PROCEDURE — 82948 REAGENT STRIP/BLOOD GLUCOSE: CPT

## 2025-06-20 PROCEDURE — 83880 ASSAY OF NATRIURETIC PEPTIDE: CPT

## 2025-06-20 PROCEDURE — 80053 COMPREHEN METABOLIC PANEL: CPT

## 2025-06-20 PROCEDURE — 36415 COLL VENOUS BLD VENIPUNCTURE: CPT

## 2025-06-20 PROCEDURE — 85025 COMPLETE CBC W/AUTO DIFF WBC: CPT

## 2025-06-20 PROCEDURE — 96374 THER/PROPH/DIAG INJ IV PUSH: CPT

## 2025-06-20 PROCEDURE — 99223 1ST HOSP IP/OBS HIGH 75: CPT | Performed by: INTERNAL MEDICINE

## 2025-06-20 PROCEDURE — 93005 ELECTROCARDIOGRAM TRACING: CPT

## 2025-06-20 PROCEDURE — 71046 X-RAY EXAM CHEST 2 VIEWS: CPT

## 2025-06-20 RX ORDER — METOPROLOL SUCCINATE 25 MG/1
25 TABLET, EXTENDED RELEASE ORAL 2 TIMES DAILY
Status: DISCONTINUED | OUTPATIENT
Start: 2025-06-20 | End: 2025-06-25 | Stop reason: HOSPADM

## 2025-06-20 RX ORDER — POTASSIUM CHLORIDE 1500 MG/1
20 TABLET, EXTENDED RELEASE ORAL 3 TIMES DAILY
Status: DISCONTINUED | OUTPATIENT
Start: 2025-06-20 | End: 2025-06-25 | Stop reason: HOSPADM

## 2025-06-20 RX ORDER — FUROSEMIDE 10 MG/ML
40 INJECTION INTRAMUSCULAR; INTRAVENOUS DAILY
Status: DISCONTINUED | OUTPATIENT
Start: 2025-06-21 | End: 2025-06-22

## 2025-06-20 RX ORDER — SPIRONOLACTONE 25 MG/1
25 TABLET ORAL DAILY
Status: DISCONTINUED | OUTPATIENT
Start: 2025-06-21 | End: 2025-06-25 | Stop reason: HOSPADM

## 2025-06-20 RX ORDER — INSULIN LISPRO 100 [IU]/ML
4-20 INJECTION, SOLUTION INTRAVENOUS; SUBCUTANEOUS
Status: DISCONTINUED | OUTPATIENT
Start: 2025-06-20 | End: 2025-06-21

## 2025-06-20 RX ORDER — CEPHALEXIN 500 MG/1
500 CAPSULE ORAL EVERY 8 HOURS SCHEDULED
Status: COMPLETED | OUTPATIENT
Start: 2025-06-20 | End: 2025-06-25

## 2025-06-20 RX ORDER — GABAPENTIN 300 MG/1
300 CAPSULE ORAL 3 TIMES DAILY
Status: DISCONTINUED | OUTPATIENT
Start: 2025-06-20 | End: 2025-06-25 | Stop reason: HOSPADM

## 2025-06-20 RX ORDER — FUROSEMIDE 10 MG/ML
40 INJECTION INTRAMUSCULAR; INTRAVENOUS ONCE
Status: COMPLETED | OUTPATIENT
Start: 2025-06-20 | End: 2025-06-20

## 2025-06-20 RX ORDER — BIMATOPROST 0.3 MG/ML
1 SOLUTION/ DROPS OPHTHALMIC DAILY
Status: DISCONTINUED | OUTPATIENT
Start: 2025-06-20 | End: 2025-06-21

## 2025-06-20 RX ORDER — ACETAMINOPHEN 325 MG/1
650 TABLET ORAL EVERY 6 HOURS PRN
Status: DISCONTINUED | OUTPATIENT
Start: 2025-06-20 | End: 2025-06-25 | Stop reason: HOSPADM

## 2025-06-20 RX ADMIN — METOPROLOL SUCCINATE 25 MG: 25 TABLET, EXTENDED RELEASE ORAL at 18:47

## 2025-06-20 RX ADMIN — CEPHALEXIN 500 MG: 500 CAPSULE ORAL at 21:31

## 2025-06-20 RX ADMIN — FUROSEMIDE 40 MG: 10 INJECTION, SOLUTION INTRAMUSCULAR; INTRAVENOUS at 14:20

## 2025-06-20 RX ADMIN — CEPHALEXIN 500 MG: 500 CAPSULE ORAL at 18:47

## 2025-06-20 RX ADMIN — GABAPENTIN 300 MG: 300 CAPSULE ORAL at 20:24

## 2025-06-20 RX ADMIN — APIXABAN 2.5 MG: 2.5 TABLET, FILM COATED ORAL at 18:52

## 2025-06-20 RX ADMIN — BIMATOPROST 1 DROP: 0.3 SOLUTION/ DROPS OPHTHALMIC at 21:31

## 2025-06-20 RX ADMIN — POTASSIUM CHLORIDE 20 MEQ: 1500 TABLET, EXTENDED RELEASE ORAL at 20:24

## 2025-06-20 NOTE — ASSESSMENT & PLAN NOTE
"Lab Results   Component Value Date    HGBA1C 7.5 (H) 06/04/2025       No results for input(s): \"POCGLU\" in the last 72 hours.    Blood Sugar Average: Last 72 hrs:  At home on glimepiride, now on hold  Insulin sliding scale with fingersticks daily prior to meals  Diabetic diet    "

## 2025-06-20 NOTE — H&P
"H&P - Hospitalist   Name: Jessica Jaquez 80 y.o. female I MRN: [id number]  Unit/Bed#: ED 29 I Date of Admission: 6/20/2025   Date of Service: 6/20/2025 I Hospital Day: 0     Assessment & Plan  CHF exacerbation (HCC)  Wt Readings from Last 3 Encounters:   06/17/25 103 kg (226 lb)   05/06/25 103 kg (226 lb)   04/24/25 103 kg (227 lb)     Echo February this year: Left Ventricle: Left ventricular cavity size is normal. Wall thickness is mildly increased. There is concentric remodeling. The left ventricular ejection fraction is 60%. Systolic function is normal. Although no diagnostic regional wall motion abnormality was identified, this possibility cannot be completely excluded on the basis of this study. Diastolic function is mildly abnormal, consistent with grade I (abnormal) relaxation.   Strict I&O  Daily weight  Continue Lasix 40 mg IV push  Continue spironolactone 25 mg daily  Continue metoprolol 25 mg.  Cardiology consult      UTI (urinary tract infection)  Started treatment as an outpatient  Continue Keflex  Type 2 diabetes mellitus with other specified complication, without long-term current use of insulin ([facility])  Lab Results   Component Value Date    HGBA1C 7.5 (H) 06/04/2025       No results for input(s): \"POCGLU\" in the last 72 hours.    Blood Sugar Average: Last 72 hrs:  At home on glimepiride, now on hold  Insulin sliding scale with fingersticks daily prior to meals  Diabetic diet    Essential hypertension  Continue metoprolol 25 mg p.o.  Monitor vital signs  PAF (paroxysmal atrial fibrillation) (HCC)  Continue Eliquis 2.5 mg twice daily  Continue metoprolol 25 mg twice daily    VTE Pharmacologic Prophylaxis:   Moderate Risk (Score 3-4) - Pharmacological DVT Prophylaxis Ordered: apixaban (Eliquis).  Code Status: Level 1 - Full Code confirmed with patient  Discussion with family: Updated  (daughter) at bedside.    Anticipated Length of Stay: Patient will be admitted on an observation basis with " an anticipated length of stay of less than 2 midnights secondary to CHF exacerbation.    History of Present Illness   Chief Complaint: Shortness of breath    Jessica Jaquez is a 80 y.o. female with a PMH of CHF, hypertension, A-fib who came in with complaint of shortness of breath.  Patient reports she is easily short of breath on exertion, reports cough, increased lower extremity edema, increasing abdominal girth and gain of 11 pounds in the last several days.  Patient denies any associated chest pain, fever, chills, palpitations.  Has been treated as an outpatient for UTI.  In ED labs significant for creatinine 1.80, glucose 219, troponins x 2 negative, BNP 39, hemoglobin 12.0  Was given Lasix 40 mg IV push in ED  On my evaluation patient appears in no acute distress hemodynamically stable.    Review of Systems   Constitutional:  Positive for activity change. Negative for appetite change, chills, diaphoresis, fatigue, fever and unexpected weight change.   HENT: Negative.     Respiratory:  Positive for cough and shortness of breath. Negative for apnea, choking, chest tightness, wheezing and stridor.    Cardiovascular:  Positive for leg swelling. Negative for chest pain and palpitations.   Gastrointestinal: Negative.    Genitourinary: Negative.    Neurological: Negative.        Historical Information   Past Medical History[1]  Past Surgical History[2]  Social History[3]  E-Cigarette/Vaping     E-Cigarette/Vaping Substances       Social History:  Marital Status: /Civil Union   Occupation:   Patient Pre-hospital Living Situation: Home  Patient Pre-hospital Level of Mobility: walks  Patient Pre-hospital Diet Restrictions:     Meds/Allergies   I have reviewed home medications with patient personally.  Prior to Admission medications    Medication Sig Start Date End Date Taking? Authorizing Provider   apixaban (ELIQUIS) 2.5 mg Take 1 tablet (2.5 mg total) by mouth 2 (two) times a day 3/6/25   Himanshu Webb,  MD   bimatoprost (Lumigan) 0.01 % ophthalmic drops Administer 1 drop to both eyes daily at bedtime    Historical Provider, MD   cephalexin (KEFLEX) 500 mg capsule Take 1 capsule (500 mg total) by mouth every 8 (eight) hours for 7 days 6/18/25 6/25/25  VIJI Nicole   Cholecalciferol (VITAMIN D3) 1000 units CAPS Take by mouth in the morning.    Historical Provider, MD   estradiol (ESTRACE VAGINAL) 0.1 mg/g vaginal cream Use twice weekly, apply pea size amount (about 1 gm) into the vagina 4/24/25   VIJI Nicole   ezetimibe (ZETIA) 10 mg tablet Take 1 tablet (10 mg total) by mouth daily at bedtime  Patient not taking: Reported on 6/17/2025 4/12/24   VIJI Díaz   gabapentin (NEURONTIN) 100 mg capsule Take 3 capsules (300 mg total) by mouth 3 (three) times a day 2/25/25   Kelly Gutierrez PA-C   glimepiride (AMARYL) 1 mg tablet Take 2 mg by mouth daily with breakfast    Historical Provider, MD   ketoconazole (NIZORAL) 2 % cream  4/21/25   Historical Provider, MD   metoprolol succinate (TOPROL-XL) 25 mg 24 hr tablet Take 1 tablet (25 mg total) by mouth 2 (two) times a day 1/27/25   Himanshu Webb MD   metroNIDAZOLE (METROCREAM) 0.75 % cream if needed 3/5/25   Historical Provider, MD   Multiple Vitamins-Minerals (CENTRUM SILVER PO)     Historical Provider, MD   potassium chloride (Klor-Con M20) 20 mEq tablet TAKE 1 TABLET BY MOUTH 3 TIMES A DAY. 2/3/25   Jeyson Delaney MD   spironolactone (ALDACTONE) 25 mg tablet Take 1 tablet (25 mg total) by mouth daily 4/21/25   Himanshu Webb MD   tiZANidine (ZANAFLEX) 2 mg tablet if needed  Patient not taking: Reported on 6/17/2025 1/24/25   Historical Provider, MD   torsemide (DEMADEX) 20 mg tablet Take 1 tablet (20 mg total) by mouth 2 (two) times a day 2/25/25   Kelly E Held, PA-C   triamcinolone (KENALOG) 0.025 % cream Apply sparingly as needed up to twice daily 5/6/25   Kvng Trinidad MD     Allergies   Allergen Reactions    Other  Cough and Sneezing     Seasonal allergies    Pollen Extract Allergic Rhinitis       Objective :  Temp:  [99.4 °F (37.4 °C)] 99.4 °F (37.4 °C)  HR:  [67-91] 67  BP: (125-145)/(60-63) 125/60  Resp:  [16-24] 16  SpO2:  [95 %-97 %] 97 %  O2 Device: None (Room air)    Physical Exam  Constitutional:       General: She is not in acute distress.     Appearance: Normal appearance. She is obese. She is not ill-appearing.   HENT:      Head: Normocephalic and atraumatic.      Nose: Nose normal.     Eyes:      Pupils: Pupils are equal, round, and reactive to light.       Cardiovascular:      Rate and Rhythm: Normal rate and regular rhythm.      Pulses: Normal pulses.      Heart sounds: Normal heart sounds. No murmur heard.     No friction rub. No gallop.   Pulmonary:      Effort: Pulmonary effort is normal. No respiratory distress.      Breath sounds: Normal breath sounds. No stridor. No wheezing, rhonchi or rales.   Chest:      Chest wall: No tenderness.   Abdominal:      General: Abdomen is flat. There is distension.      Palpations: Abdomen is soft. There is mass.      Tenderness: There is no abdominal tenderness. There is no right CVA tenderness, left CVA tenderness, guarding or rebound.      Hernia: No hernia is present.     Musculoskeletal:         General: Normal range of motion.      Cervical back: Normal range of motion.      Right lower leg: Edema present.      Left lower leg: Edema present.     Skin:     General: Skin is warm and dry.      Capillary Refill: Capillary refill takes less than 2 seconds.     Neurological:      General: No focal deficit present.      Mental Status: She is alert. Mental status is at baseline.          Lines/Drains:            Lab Results: I have reviewed the following results:  Results from last 7 days   Lab Units 06/20/25  1248   WBC Thousand/uL 8.26   HEMOGLOBIN g/dL 12.0   HEMATOCRIT % 36.4   PLATELETS Thousands/uL 360   SEGS PCT % 67   LYMPHO PCT % 21   MONO PCT % 5   EOS PCT % 5      Results from last 7 days   Lab Units 06/20/25  1248   SODIUM mmol/L 136   POTASSIUM mmol/L 4.0   CHLORIDE mmol/L 100   CO2 mmol/L 24   BUN mg/dL 40*   CREATININE mg/dL 1.80*   ANION GAP mmol/L 12   CALCIUM mg/dL 9.4   ALBUMIN g/dL 3.8   TOTAL BILIRUBIN mg/dL 0.42   ALK PHOS U/L 95   ALT U/L 14   AST U/L 19   GLUCOSE RANDOM mg/dL 219*             Lab Results   Component Value Date    HGBA1C 7.5 (H) 06/04/2025    HGBA1C 8.1 (H) 02/24/2025    HGBA1C 6.9 (H) 01/22/2024                 Administrative Statements       ** Please Note: This note has been constructed using a voice recognition system. **         [1]   Past Medical History:  Diagnosis Date    Arthritis     Colorectal polyps     Constipation     Diabetes mellitus (HCC)     Diverticulosis of colon     Hiatal hernia     Hypertension     Increased urinary frequency     Lumbar disc disease     Pressure injury of skin     SOB (shortness of breath)     Stress incontinence     Vitamin D deficiency 07/27/2018   [2]   Past Surgical History:  Procedure Laterality Date    BACK SURGERY      CARDIAC ELECTROPHYSIOLOGY PROCEDURE N/A 7/9/2024    Procedure: Cardiac eps/afib ablation PFA;  Surgeon: Jeyson Delaney MD;  Location: [facility];  Service: Cardiology    CARDIAC ELECTROPHYSIOLOGY PROCEDURE N/A 7/9/2024    Procedure: Cardiac loop recorder implant;  Surgeon: Jeyson Delaney MD;  Location: [facility];  Service: Cardiology    CARPAL TUNNEL RELEASE Left     CATARACT EXTRACTION Bilateral     CHOLECYSTECTOMY      COLONOSCOPY W/ ENDOSCOPIC US N/A 2/24/2016    Procedure: ANAL ENDOSCOPIC U/S;  Surgeon: HOLLIS Washington MD;  Location: [facility];  Service:     HERNIA REPAIR      NV COLONOSCOPY FLX DX W/COLLJ SPEC WHEN PFRMD N/A 2/24/2016    Procedure: COLONOSCOPY;  Surgeon: HOLLIS Washington MD;  Location: [facility];  Service: Colorectal    TONSILLECTOMY      TUBAL LIGATION     [3]   Social History  Tobacco Use    Smoking status: Never    Smokeless  tobacco: Never   Substance and Sexual Activity    Alcohol use: Never    Drug use: No

## 2025-06-20 NOTE — LETTER
Thank you for allowing us to participate in the care of your patient, Jessica Jaquez, who was hospitalized 6/20/2025 due to coming in for acute heart failure increased shortness of breath.  Volume overloaded on admission with increased pulmonary congestion and lower leg edema.  As well as being above baseline weight.  Patient was receiving IV diuretics during the inpatient cardiology was on board.  Today patient is at baseline weight has marked improvement with breathing and marked improvement of lower leg edema.  Discussed with cardiology and patient and patient is medically clear for discharge close follow-up with BMP.  Will be discharging on an increased dose of diuretics as noted above.  Patient agreeable for the plan.    Advised torsemide 40 mg in the morning and 20 mg in the afternoon  Close follow-up with cardiology  BMP after 1 week    If you have any additional questions or would like to discuss further, please feel free to contact me.    Ferdinand Singh MD  [facility] Internal Medicine, Hospitalist  [phone]

## 2025-06-20 NOTE — ED PROVIDER NOTES
Time reflects when diagnosis was documented in both MDM as applicable and the Disposition within this note       Time User Action Codes Description Comment    6/20/2025  4:03 PM Sharon Ortiz [I50.32] Chronic diastolic CHF (congestive heart failure) (HCC)     6/20/2025  4:03 PM Sharon Ortiz [R60.0] Bilateral lower extremity edema     6/20/2025  4:03 PM Sharon Ortiz [R06.00] Dyspnea     6/20/2025  4:03 PM Sharon Ortiz [I50.30] (HFpEF) heart failure with preserved ejection fraction (HCC)           ED Disposition       ED Disposition   Admit    Condition   Stable    Date/Time   Fri Jun 20, 2025  4:02 PM    Comment   Case was discussed with AMARI and the patient's admission status was agreed to be Admission Status: inpatient status.               Assessment & Plan       Medical Decision Making  Patient is a 80 y.o. female  who presents to the ED with dyspnea, weight gain of 11 pounds over 4 days.    Vital signs stable. Exam as listed below.    History and physical exam are most consistent with CHF exacerbation    Plan   CBC to evaluate for anemia, evaluate for leukocytosis as sign of infectious source of symptoms.  CMP to evaluate for electrolyte derangement, assess renal and hepatic function.  Troponin to evaluate for cardiac ischemia, rule out NSTEMI.  BNP to evaluate for CHF and fluid overload.  ECG to evaluate for arrhythmia, heart block, ST changes to rule out STEMI, pericarditis.  CXR to evaluate for pneumonia, pneumothorax, pleural effusion, signs of fluid overload.    View ED course below for further discussion on patient workup.     On review of previous records patient has a history of a fib, s/p ablation in 2024; .    All labs reviewed and utilized in the medical decision making process  All radiology studies independently viewed by me and interpreted by the radiologist.  I reviewed all testing with the patient.     Upon re-evaluation patient admitted to [facility] in stable condition  "for further diuresis.    Portions of the record may have been created with voice recognition software. Occasional wrong word or \"sound a like\" substitutions may have occurred due to the inherent limitations of voice recognition software. Read the chart carefully and recognize, using context, where substitutions have occurred.     ED 29/ED 29       Amount and/or Complexity of Data Reviewed  Labs: ordered. Decision-making details documented in ED Course.    Risk  Prescription drug management.  Decision regarding hospitalization.        ED Course as of 06/20/25 1624   Fri Jun 20, 2025   1334 Creatinine(!): 1.80  Baseline 1.5-1.7   1335 BUN(!): 40  Below baseline   1335 hs TnI 0hr: 8   1338 Procedure Note: EKG  Date/Time: 06/20/25 1:38 PM   Indications / Diagnosis: dyspnea  ECG reviewed by me, the ED Provider: yes   The EKG demonstrates:  Rhythm: normal sinus  Intervals: normal intervals  Axis: left axis  QRS/Blocks: RBBB  ST Changes: No acute ST Changes, no STD/CYNTHIA.   1449 BNP: 39   1543 Delta 2hr hsTnI: -1       Medications   furosemide (LASIX) injection 40 mg (40 mg Intravenous Given 6/20/25 1420)       ED Risk Strat Scores                    (ISAR) Identification of Seniors at Risk  Before the illness or injury that brought you to the Emergency, did you need someone to help you on a regular basis?: 0  In the last 24 hours, have you needed more help than usual?: 1  Have you been hospitalized for one or more nights during the past 6 months?: 1  In general, do you see well?: 0  In general, do you have serious problems with your memory?: 0  Do you take more than three different medications every day?: 1  ISAR Score: 3                                History of Present Illness       Chief Complaint   Patient presents with    Leg Swelling     11lb weight increase over the last week, pt also has > b/l leg swelling with SOB        Past Medical History[1]   Past Surgical History[2]   Family History[3]   Social History[4] "   E-Cigarette/Vaping      E-Cigarette/Vaping Substances      I have reviewed and agree with the history as documented.     Patient is an 80-year-old female, past medical history of CHF, presenting today with significant weight gain.  Patient states that she has gained 11 pounds in the past 4 days.  She is having worsening lower extremity edema.  She also endorses shortness of breath with ambulation and conversation.  She states that this is normal for her but her conversational dyspnea has gotten much worse.  She sleeps in a recliner normally but is also having difficulty breathing while lying down.  She denies chest pain.  She does state that she had a run of atrial fibrillation on her watch on Wednesday where her heart rate was in the 150s.  She was able to feel the sensation and states it quickly resolved.  No nausea or vomiting.        History provided by:  Patient      Review of Systems        Objective       ED Triage Vitals [06/20/25 1238]   Temperature Pulse Blood Pressure Respirations SpO2 Patient Position - Orthostatic VS   99.4 °F (37.4 °C) 91 145/63 (!) 24 95 % --      Temp src Heart Rate Source BP Location FiO2 (%) Pain Score    -- -- -- -- 5      Vitals      Date and Time Temp Pulse SpO2 Resp BP Pain Score FACES Pain Rating User   06/20/25 1608 -- 67 97 % 16 125/60 No Pain -- AK   06/20/25 1430 -- 76 97 % 20 -- No Pain -- AK   06/20/25 1238 99.4 °F (37.4 °C) 91 95 % 24 145/63 5 -- HK            Physical Exam  Vitals and nursing note reviewed.   Constitutional:       General: She is not in acute distress.     Appearance: She is well-developed. She is not ill-appearing or diaphoretic.   HENT:      Head: Normocephalic and atraumatic.      Nose: Nose normal.      Mouth/Throat:      Mouth: Mucous membranes are moist.     Eyes:      Conjunctiva/sclera: Conjunctivae normal.       Cardiovascular:      Rate and Rhythm: Normal rate and regular rhythm.      Heart sounds: No murmur heard.  Pulmonary:      Effort:  Pulmonary effort is normal. No respiratory distress.      Breath sounds: Decreased breath sounds present. No wheezing or rhonchi.      Comments: Significant conversational dyspnea  Abdominal:      General: There is no distension.      Palpations: Abdomen is soft.      Tenderness: There is no abdominal tenderness.     Musculoskeletal:         General: No swelling.      Cervical back: Neck supple.      Right lower leg: Edema present.      Left lower leg: Edema present.     Skin:     General: Skin is warm and dry.      Capillary Refill: Capillary refill takes less than 2 seconds.     Neurological:      Mental Status: She is alert.     Psychiatric:         Mood and Affect: Mood normal.         Results Reviewed       Procedure Component Value Units Date/Time    HS Troponin I 2hr [[id number]]  (Normal) Collected: 06/20/25 1429    Lab Status: Final result Specimen: Blood from Arm, Right Updated: 06/20/25 1505     hs TnI 2hr 7 ng/L      Delta 2hr hsTnI -1 ng/L     B-Type Natriuretic Peptide(BNP) [[id number]]  (Normal) Collected: 06/20/25 1248    Lab Status: Final result Specimen: Blood from Arm, Right Updated: 06/20/25 1437     BNP 39 pg/mL     HS Troponin 0hr (reflex protocol) [[id number]]  (Normal) Collected: 06/20/25 1248    Lab Status: Final result Specimen: Blood from Arm, Right Updated: 06/20/25 1327     hs TnI 0hr 8 ng/L     Comprehensive metabolic panel [[id number]]  (Abnormal) Collected: 06/20/25 1248    Lab Status: Final result Specimen: Blood from Arm, Right Updated: 06/20/25 1326     Sodium 136 mmol/L      Potassium 4.0 mmol/L      Chloride 100 mmol/L      CO2 24 mmol/L      ANION GAP 12 mmol/L      BUN 40 mg/dL      Creatinine 1.80 mg/dL      Glucose 219 mg/dL      Calcium 9.4 mg/dL      AST 19 U/L      ALT 14 U/L      Alkaline Phosphatase 95 U/L      Total Protein 7.4 g/dL      Albumin 3.8 g/dL      Total Bilirubin 0.42 mg/dL      eGFR 26 ml/min/1.73sq m     Narrative:      [employer]  guidelines for Chronic Kidney Disease (CKD):     Stage 1 with normal or high GFR (GFR > 90 mL/min/1.73 square meters)    Stage 2 Mild CKD (GFR = 60-89 mL/min/1.73 square meters)    Stage 3A Moderate CKD (GFR = 45-59 mL/min/1.73 square meters)    Stage 3B Moderate CKD (GFR = 30-44 mL/min/1.73 square meters)    Stage 4 Severe CKD (GFR = 15-29 mL/min/1.73 square meters)    Stage 5 End Stage CKD (GFR <15 mL/min/1.73 square meters)  Note: GFR calculation is accurate only with a steady state creatinine    CBC and differential [[id number]]  (Abnormal) Collected: 06/20/25 1248    Lab Status: Final result Specimen: Blood from Arm, Right Updated: 06/20/25 1302     WBC 8.26 Thousand/uL      RBC 4.20 Million/uL      Hemoglobin 12.0 g/dL      Hematocrit 36.4 %      MCV 87 fL      MCH 28.6 pg      MCHC 33.0 g/dL      RDW 15.2 %      MPV 9.0 fL      Platelets 360 Thousands/uL      nRBC 0 /100 WBCs      Segmented % 67 %      Immature Grans % 1 %      Lymphocytes % 21 %      Monocytes % 5 %      Eosinophils Relative 5 %      Basophils Relative 1 %      Absolute Neutrophils 5.61 Thousands/µL      Absolute Immature Grans 0.08 Thousand/uL      Absolute Lymphocytes 1.69 Thousands/µL      Absolute Monocytes 0.41 Thousand/µL      Eosinophils Absolute 0.43 Thousand/µL      Basophils Absolute 0.04 Thousands/µL             XR chest 2 views    (Results Pending)       Procedures    ED Medication and Procedure Management   Prior to Admission Medications   Prescriptions Last Dose Informant Patient Reported? Taking?   Cholecalciferol (VITAMIN D3) 1000 units CAPS  Self Yes No   Sig: Take by mouth in the morning.   Multiple Vitamins-Minerals (CENTRUM SILVER PO)  Self Yes No   apixaban (ELIQUIS) 2.5 mg  Self No No   Sig: Take 1 tablet (2.5 mg total) by mouth 2 (two) times a day   bimatoprost (Lumigan) 0.01 % ophthalmic drops  Self Yes No   Sig: Administer 1 drop to both eyes daily at bedtime   cephalexin (KEFLEX) 500 mg capsule   No No   Sig: Take 1  capsule (500 mg total) by mouth every 8 (eight) hours for 7 days   estradiol (ESTRACE VAGINAL) 0.1 mg/g vaginal cream   No No   Sig: Use twice weekly, apply pea size amount (about 1 gm) into the vagina   ezetimibe (ZETIA) 10 mg tablet  Self No No   Sig: Take 1 tablet (10 mg total) by mouth daily at bedtime   Patient not taking: Reported on 6/17/2025   gabapentin (NEURONTIN) 100 mg capsule  Self No No   Sig: Take 3 capsules (300 mg total) by mouth 3 (three) times a day   glimepiride (AMARYL) 1 mg tablet  Self Yes No   Sig: Take 2 mg by mouth daily with breakfast   ketoconazole (NIZORAL) 2 % cream  Self Yes No   metoprolol succinate (TOPROL-XL) 25 mg 24 hr tablet  Self No No   Sig: Take 1 tablet (25 mg total) by mouth 2 (two) times a day   metroNIDAZOLE (METROCREAM) 0.75 % cream  Self Yes No   Sig: if needed   potassium chloride (Klor-Con M20) 20 mEq tablet  Self No No   Sig: TAKE 1 TABLET BY MOUTH 3 TIMES A DAY.   spironolactone (ALDACTONE) 25 mg tablet  Self No No   Sig: Take 1 tablet (25 mg total) by mouth daily   tiZANidine (ZANAFLEX) 2 mg tablet  Self Yes No   Sig: if needed   Patient not taking: Reported on 6/17/2025   torsemide (DEMADEX) 20 mg tablet  Self No No   Sig: Take 1 tablet (20 mg total) by mouth 2 (two) times a day   triamcinolone (KENALOG) 0.025 % cream   No No   Sig: Apply sparingly as needed up to twice daily      Facility-Administered Medications: None     Patient's Medications   Discharge Prescriptions    No medications on file     No discharge procedures on file.  ED SEPSIS DOCUMENTATION   Time reflects when diagnosis was documented in both MDM as applicable and the Disposition within this note       Time User Action Codes Description Comment    6/20/2025  4:03 PM Sharon Ortiz [I50.32] Chronic diastolic CHF (congestive heart failure) (HCC)     6/20/2025  4:03 PM Sharon Ortiz [R60.0] Bilateral lower extremity edema     6/20/2025  4:03 PM Sharon Ortiz [R06.00] Dyspnea      6/20/2025  4:03 PM Sharon Ortiz Add [I50.30] (HFpEF) heart failure with preserved ejection fraction (HCC)                      [1]   Past Medical History:  Diagnosis Date    Arthritis     Colorectal polyps     Constipation     Diabetes mellitus (HCC)     Diverticulosis of colon     Hiatal hernia     Hypertension     Increased urinary frequency     Lumbar disc disease     Pressure injury of skin     SOB (shortness of breath)     Stress incontinence     Vitamin D deficiency 07/27/2018   [2]   Past Surgical History:  Procedure Laterality Date    BACK SURGERY      CARDIAC ELECTROPHYSIOLOGY PROCEDURE N/A 7/9/2024    Procedure: Cardiac eps/afib ablation PFA;  Surgeon: Jeyson Delaney MD;  Location: [facility];  Service: Cardiology    CARDIAC ELECTROPHYSIOLOGY PROCEDURE N/A 7/9/2024    Procedure: Cardiac loop recorder implant;  Surgeon: Jeyson Delaney MD;  Location: [facility];  Service: Cardiology    CARPAL TUNNEL RELEASE Left     CATARACT EXTRACTION Bilateral     CHOLECYSTECTOMY      COLONOSCOPY W/ ENDOSCOPIC US N/A 2/24/2016    Procedure: ANAL ENDOSCOPIC U/S;  Surgeon: HOLLIS Washington MD;  Location: [facility];  Service:     HERNIA REPAIR      ID COLONOSCOPY FLX DX W/COLLJ SPEC WHEN PFRMD N/A 2/24/2016    Procedure: COLONOSCOPY;  Surgeon: HOLLIS Washington MD;  Location: [facility];  Service: Colorectal    TONSILLECTOMY      TUBAL LIGATION     [3]   Family History  Problem Relation Name Age of Onset    Hypertension Mother      Heart attack Mother      Diabetes Mother      Prostate cancer Father      No Known Problems Brother      Stroke Maternal Grandmother      Stroke Paternal Grandmother      Stroke Paternal Grandfather      Thyroid cancer Neg Hx     [4]   Social History  Tobacco Use    Smoking status: Never    Smokeless tobacco: Never   Substance Use Topics    Alcohol use: Never    Drug use: No        Sharon Ortiz DO  06/20/25 9789

## 2025-06-20 NOTE — ASSESSMENT & PLAN NOTE
Wt Readings from Last 3 Encounters:   06/17/25 103 kg (226 lb)   05/06/25 103 kg (226 lb)   04/24/25 103 kg (227 lb)     Echo February this year: Left Ventricle: Left ventricular cavity size is normal. Wall thickness is mildly increased. There is concentric remodeling. The left ventricular ejection fraction is 60%. Systolic function is normal. Although no diagnostic regional wall motion abnormality was identified, this possibility cannot be completely excluded on the basis of this study. Diastolic function is mildly abnormal, consistent with grade I (abnormal) relaxation.   Strict I&O  Daily weight  Continue Lasix 40 mg IV push  Continue spironolactone 25 mg daily  Continue metoprolol 25 mg.  Cardiology consult

## 2025-06-20 NOTE — TELEPHONE ENCOUNTER
If she does not go to ER, would you recommend her taking Torsemide 40 mg bid/ extra potasium 20 meq.    I can F/u with her on Monday and have her come in for IV lasix if wt is not improved.    Can try to get her in today for iv lasix if you know how much you want to give her?    6/4/25 Cr-1.65(1.57 on 3/13), K-4.0(4.2), Bun -45(46), GFR-28(30)    Called pt no answer, LMOM for her to call back about her torsemide and potassium doses.      Please advise                   Adult

## 2025-06-20 NOTE — TELEPHONE ENCOUNTER
"LIUDMILA Michaud called back today.  She is not with the patient now but is seeing her shortly at her home. She said the patient reports additional weight gain today. Weight today is 237 lbs.  Weight 3 days ago was 226 lbs per DIL and patient.    LIUDMILA states patient's whole body is edematous and \"all that I can see are her toes\".  States patient is very short of breath.    I advised LIUDMILA Michaud patient should go to the ED now.  Keily said patient will refuse but Keily is leaving now to see Jessica in her home and will advise ED as I have recommended and will call 911 if necessary.  "

## 2025-06-20 NOTE — ED ATTENDING ATTESTATION
6/20/2025  I, Skyler Phipps MD, saw and evaluated the patient. I have discussed the patient with the resident/non-physician practitioner and agree with the resident's/non-physician practitioner's findings, Plan of Care, and MDM as documented in the resident's/non-physician practitioner's note, except where noted. All available labs and Radiology studies were reviewed.  I was present for key portions of any procedure(s) performed by the resident/non-physician practitioner and I was immediately available to provide assistance.       At this point I agree with the current assessment done in the Emergency Department.  I have conducted an independent evaluation of this patient a history and physical is as follows:  The patient presents for evaluation of weight gain and leg swelling and dyspnea  The patient is on furosemide 20 mg twice daily and spironolactone  CHF with normal ejection fraction mild diastolic dysfunction on the most recent echo      No history of liver disease no history of kidney disease the patient is currently being treated for UTI  Has had an 11 pound weight gain in the last several days  Dyspnea is worse with exertion  No fever no cough no history of DVT or PE patient is on Eliquis  History of paroxysmal atrial fibs status post ablation in the past  Patient states she had a brief episode of atrial fibrillation several days ago that self resolved she noted this from her Apple Watch she was not particularly symptomatic at that time  Exam the patient is in no acute distress neck no JVD lungs are clear heart is regular with no murmurs gallops or rubs abdomen is soft and nontender extremities bilateral edema pitting      EKG shows normal sinus rhythm right bundle branch block with left anterior hemiblock with no acute ischemia  Similar to old  Plan cardiac workup diuretics evaluation  ED Course     Chest x-ray no active disease poor inspiratory effort    Critical Care Time  Procedures

## 2025-06-20 NOTE — ED NOTES
Purewick placed for strict I&O after IV Lasix and per patient request     Merary Munoz RN  06/20/25 8546

## 2025-06-21 LAB
ANION GAP SERPL CALCULATED.3IONS-SCNC: 11 MMOL/L (ref 4–13)
ATRIAL RATE: 79 BPM
BUN SERPL-MCNC: 38 MG/DL (ref 5–25)
CALCIUM SERPL-MCNC: 9.1 MG/DL (ref 8.4–10.2)
CHLORIDE SERPL-SCNC: 98 MMOL/L (ref 96–108)
CO2 SERPL-SCNC: 27 MMOL/L (ref 21–32)
CREAT SERPL-MCNC: 1.6 MG/DL (ref 0.6–1.3)
ERYTHROCYTE [DISTWIDTH] IN BLOOD BY AUTOMATED COUNT: 15.3 % (ref 11.6–15.1)
GFR SERPL CREATININE-BSD FRML MDRD: 30 ML/MIN/1.73SQ M
GLUCOSE SERPL-MCNC: 158 MG/DL (ref 65–140)
GLUCOSE SERPL-MCNC: 161 MG/DL (ref 65–140)
GLUCOSE SERPL-MCNC: 165 MG/DL (ref 65–140)
GLUCOSE SERPL-MCNC: 167 MG/DL (ref 65–140)
GLUCOSE SERPL-MCNC: 212 MG/DL (ref 65–140)
HCT VFR BLD AUTO: 34.8 % (ref 34.8–46.1)
HGB BLD-MCNC: 11.1 G/DL (ref 11.5–15.4)
MAGNESIUM SERPL-MCNC: 2.3 MG/DL (ref 1.9–2.7)
MCH RBC QN AUTO: 28.3 PG (ref 26.8–34.3)
MCHC RBC AUTO-ENTMCNC: 31.9 G/DL (ref 31.4–37.4)
MCV RBC AUTO: 89 FL (ref 82–98)
P AXIS: 23 DEGREES
PLATELET # BLD AUTO: 363 THOUSANDS/UL (ref 149–390)
PMV BLD AUTO: 9 FL (ref 8.9–12.7)
POTASSIUM SERPL-SCNC: 3.9 MMOL/L (ref 3.5–5.3)
PR INTERVAL: 158 MS
QRS AXIS: -26 DEGREES
QRSD INTERVAL: 122 MS
QT INTERVAL: 404 MS
QTC INTERVAL: 464 MS
RBC # BLD AUTO: 3.92 MILLION/UL (ref 3.81–5.12)
SODIUM SERPL-SCNC: 136 MMOL/L (ref 135–147)
T WAVE AXIS: -7 DEGREES
VENTRICULAR RATE: 79 BPM
WBC # BLD AUTO: 9.75 THOUSAND/UL (ref 4.31–10.16)

## 2025-06-21 PROCEDURE — 99232 SBSQ HOSP IP/OBS MODERATE 35: CPT

## 2025-06-21 PROCEDURE — 93010 ELECTROCARDIOGRAM REPORT: CPT | Performed by: INTERNAL MEDICINE

## 2025-06-21 PROCEDURE — 80048 BASIC METABOLIC PNL TOTAL CA: CPT | Performed by: INTERNAL MEDICINE

## 2025-06-21 PROCEDURE — 85027 COMPLETE CBC AUTOMATED: CPT | Performed by: INTERNAL MEDICINE

## 2025-06-21 PROCEDURE — 99214 OFFICE O/P EST MOD 30 MIN: CPT | Performed by: INTERNAL MEDICINE

## 2025-06-21 PROCEDURE — 83735 ASSAY OF MAGNESIUM: CPT | Performed by: INTERNAL MEDICINE

## 2025-06-21 PROCEDURE — 82948 REAGENT STRIP/BLOOD GLUCOSE: CPT

## 2025-06-21 RX ORDER — INSULIN LISPRO 100 [IU]/ML
1-5 INJECTION, SOLUTION INTRAVENOUS; SUBCUTANEOUS
Status: DISCONTINUED | OUTPATIENT
Start: 2025-06-21 | End: 2025-06-25 | Stop reason: HOSPADM

## 2025-06-21 RX ORDER — BIMATOPROST 0.3 MG/ML
1 SOLUTION/ DROPS OPHTHALMIC DAILY
Status: DISCONTINUED | OUTPATIENT
Start: 2025-06-21 | End: 2025-06-25 | Stop reason: HOSPADM

## 2025-06-21 RX ORDER — INSULIN LISPRO 100 [IU]/ML
1-6 INJECTION, SOLUTION INTRAVENOUS; SUBCUTANEOUS
Status: DISCONTINUED | OUTPATIENT
Start: 2025-06-21 | End: 2025-06-25 | Stop reason: HOSPADM

## 2025-06-21 RX ADMIN — CEPHALEXIN 500 MG: 500 CAPSULE ORAL at 05:04

## 2025-06-21 RX ADMIN — INSULIN LISPRO 2 UNITS: 100 INJECTION, SOLUTION INTRAVENOUS; SUBCUTANEOUS at 11:39

## 2025-06-21 RX ADMIN — SPIRONOLACTONE 25 MG: 25 TABLET ORAL at 08:33

## 2025-06-21 RX ADMIN — POTASSIUM CHLORIDE 20 MEQ: 1500 TABLET, EXTENDED RELEASE ORAL at 15:25

## 2025-06-21 RX ADMIN — CEPHALEXIN 500 MG: 500 CAPSULE ORAL at 21:29

## 2025-06-21 RX ADMIN — METOPROLOL SUCCINATE 25 MG: 25 TABLET, EXTENDED RELEASE ORAL at 08:33

## 2025-06-21 RX ADMIN — INSULIN LISPRO 4 UNITS: 100 INJECTION, SOLUTION INTRAVENOUS; SUBCUTANEOUS at 10:31

## 2025-06-21 RX ADMIN — POTASSIUM CHLORIDE 20 MEQ: 1500 TABLET, EXTENDED RELEASE ORAL at 21:28

## 2025-06-21 RX ADMIN — CEPHALEXIN 500 MG: 500 CAPSULE ORAL at 15:25

## 2025-06-21 RX ADMIN — GABAPENTIN 300 MG: 300 CAPSULE ORAL at 08:33

## 2025-06-21 RX ADMIN — METOPROLOL SUCCINATE 25 MG: 25 TABLET, EXTENDED RELEASE ORAL at 17:05

## 2025-06-21 RX ADMIN — INSULIN LISPRO 1 UNITS: 100 INJECTION, SOLUTION INTRAVENOUS; SUBCUTANEOUS at 21:29

## 2025-06-21 RX ADMIN — FUROSEMIDE 40 MG: 10 INJECTION, SOLUTION INTRAMUSCULAR; INTRAVENOUS at 08:34

## 2025-06-21 RX ADMIN — GABAPENTIN 300 MG: 300 CAPSULE ORAL at 21:28

## 2025-06-21 RX ADMIN — APIXABAN 2.5 MG: 2.5 TABLET, FILM COATED ORAL at 17:05

## 2025-06-21 RX ADMIN — ACETAMINOPHEN 650 MG: 325 TABLET ORAL at 21:31

## 2025-06-21 RX ADMIN — APIXABAN 2.5 MG: 2.5 TABLET, FILM COATED ORAL at 08:34

## 2025-06-21 RX ADMIN — GABAPENTIN 300 MG: 300 CAPSULE ORAL at 15:25

## 2025-06-21 RX ADMIN — POTASSIUM CHLORIDE 20 MEQ: 1500 TABLET, EXTENDED RELEASE ORAL at 08:33

## 2025-06-21 RX ADMIN — BIMATOPROST 1 DROP: 0.3 SOLUTION/ DROPS OPHTHALMIC at 21:30

## 2025-06-21 RX ADMIN — INSULIN LISPRO 1 UNITS: 100 INJECTION, SOLUTION INTRAVENOUS; SUBCUTANEOUS at 17:08

## 2025-06-21 RX ADMIN — INSULIN LISPRO 4 UNITS: 100 INJECTION, SOLUTION INTRAVENOUS; SUBCUTANEOUS at 07:52

## 2025-06-21 NOTE — UTILIZATION REVIEW
Initial Clinical Review    Pt initially admitted as Observation on 6/20/25 converted to Inpatient on 6/21/25.  Pt requiring continued stay due to CHF Exacerbation.     Admission: Date/Time/Statement:   Admission Orders (From admission, onward)       Ordered        06/21/25 1507  INPATIENT ADMISSION  Once            06/20/25 1623  Place in Observation  Once                          Orders Placed This Encounter   Procedures    INPATIENT ADMISSION     Standing Status:   Standing     Number of Occurrences:   1     Level of Care:   Med Surg [16]     Estimated length of stay:   More than 2 Midnights     Certification:   I certify that inpatient services are medically necessary for this patient for a duration of greater than two midnights. See H&P and MD Progress Notes for additional information about the patient's course of treatment.     ED Arrival Information       Expected   6/20/2025     Arrival   6/20/2025 12:33    Acuity   Emergent              Means of arrival   Wheelchair    Escorted by   Family Member    Service   Hospitalist    Admission type   Emergency              Arrival complaint   cardio             Chief Complaint   Patient presents with    Leg Swelling     11lb weight increase over the last week, pt also has > b/l leg swelling with SOB        Initial Presentation: 80 y.o. female with PMHx:  CHF, hypertension, A-fib, who presented on 6/20/25 to ED initially admitted Observation status then converted to Inpatient due to CHF Exacerbation.  Presented due to shortness of breath on exertion, cough, increased lower extremity edema, increasing abdominal girth and gain of 11 pounds in the last several days. In ED labs significant for creatinine 1.80, glucose 219, troponins x 2 negative, BNP 39, hemoglobin 12.0. Given Lasix 40 mg IV push in ED.    6/20/2025 Admit to Observation.  Plan:   med surg, Cardiology consult, strict IO and daily wts, continue IV Lasix, start spironolactone and metoprolol. Continue home  Keflex for UTI. Monitor blood sugars and start SSI. Monitor VS and labs.     6/21/2025 Inpatient Admission:   Per Cardiology: continue diuresis, monitor volume status, less than 2 gm Na diet with fluid restriction.     Date: 6/22  Day 3: Has surpassed a 2nd midnight with active treatments and services. On exam, mild bibasilar crackles at bases and plus 2 BL LE edema noted. Cardiology following, Increase diuretics given low output over the past 24 hours, Increase IV Lasix to 80mg QD. Will need increased home diuretic (suggest torsemide 40mg QD). BMP qAM. Strict IO and daily wts, low Na diet with fluid restriction. Monitor accuchecks, VS and labs, volume status.     ED Treatment-Medication Administration from 06/20/2025 1233 to 06/20/2025 2010         Date/Time Order Dose Route Action     06/20/2025 1420 furosemide (LASIX) injection 40 mg 40 mg Intravenous Given     06/20/2025 1852 apixaban (ELIQUIS) tablet 2.5 mg 2.5 mg Oral Given     06/20/2025 1847 cephalexin (KEFLEX) capsule 500 mg 500 mg Oral Given     06/20/2025 1847 metoprolol succinate (TOPROL-XL) 24 hr tablet 25 mg 25 mg Oral Given            Scheduled Medications:  apixaban, 2.5 mg, Oral, BID  bimatoprost, 1 drop, Ophthalmic, Daily  cephalexin, 500 mg, Oral, Q8H NALINI  furosemide, 80 mg, Intravenous, Daily  gabapentin, 300 mg, Oral, TID  insulin lispro, 1-5 Units, Subcutaneous, HS  insulin lispro, 1-6 Units, Subcutaneous, TID AC  metoprolol succinate, 25 mg, Oral, BID  potassium chloride, 20 mEq, Oral, TID  spironolactone, 25 mg, Oral, Daily      Continuous IV Infusions: none     PRN Meds:  acetaminophen, 650 mg, Oral, Q6H PRN      ED Triage Vitals [06/20/25 1238]   Temperature Pulse Respirations Blood Pressure SpO2 Pain Score   99.4 °F (37.4 °C) 91 (!) 24 145/63 95 % 5     Weight (last 2 days)       Date/Time Weight    06/22/25 0600 106 (232.81)    06/21/25 0546 105 (232.38)    06/21/25 0338 106 (234.13)    06/20/25 2039 107 (235.45)    06/20/25 1900 107  (235.45)            Vital Signs (last 3 days)       Date/Time Temp Pulse Resp BP MAP (mmHg) SpO2 O2 Device Patient Position - Orthostatic VS [place] Coma Scale Score Pain    06/22/25 0904 -- 80 -- 128/73 -- -- -- -- -- --    06/22/25 0755 -- -- -- -- -- -- None (Room air) -- 15 No Pain    06/22/25 07:09:49 97.5 °F (36.4 °C) 86 -- 131/72 92 96 % None (Room air) Lying -- --    06/22/25 02:30:58 97.5 °F (36.4 °C) 76 15 130/73 92 97 % -- -- -- --    06/21/25 22:58:01 97.7 °F (36.5 °C) 77 18 141/76 98 96 % -- -- -- --    06/21/25 2100 -- -- -- -- -- -- -- -- 15 No Pain    06/21/25 17:07:21 -- 82 15 152/76 101 96 % None (Room air) Sitting -- --    06/21/25 1705 -- 82 -- 152/76 -- -- -- -- -- --    06/21/25 14:47:33 98.1 °F (36.7 °C) 93 16 144/94 111 96 % None (Room air) Sitting -- No Pain    06/21/25 10:46:30 97.9 °F (36.6 °C) 79 16 132/71 91 95 % None (Room air) Sitting -- No Pain    06/21/25 08:34:56 -- 80 15 128/71 90 97 % None (Room air) Sitting 15 No Pain    06/21/25 0833 -- 85 -- 128/71 -- -- -- -- -- --    06/21/25 06:47:08 97.6 °F (36.4 °C) 76 15 129/72 91 95 % None (Room air) Sitting -- No Pain    06/21/25 03:31:25 97.9 °F (36.6 °C) 89 20 126/73 91 95 % -- -- -- --    06/20/25 2200 -- -- -- -- -- -- -- -- 15 No Pain    06/20/25 2039 98.1 °F (36.7 °C) 94 18 168/78 -- -- -- Lying -- No Pain    06/20/25 1900 98.1 °F (36.7 °C) -- -- -- -- -- -- -- -- --    06/20/25 1847 -- 94 -- 157/79 -- -- -- -- -- --    06/20/25 1608 -- 67 16 125/60 -- 97 % None (Room air) -- -- No Pain    06/20/25 1430 -- 76 20 -- -- 97 % None (Room air) -- -- No Pain    06/20/25 1321 -- -- -- -- -- -- None (Room air) -- -- --    06/20/25 1238 99.4 °F (37.4 °C) 91 24 145/63 -- 95 % None (Room air) -- -- 5              Pertinent Labs/Diagnostic Test Results:   Radiology:  XR chest 2 views   Final Interpretation by Nneka Clark MD (06/20 1651)      No acute cardiopulmonary disease.            Workstation performed: [id number]            Cardiology:  ECG 12 lead   Final Result by Phu Diaz DO (06/21 0718)   Normal sinus rhythm   RSR' or QR pattern in V1 suggests right ventricular conduction delay   Left ventricular hypertrophy with QRS widening   Nonspecific T wave abnormality   Abnormal ECG   When compared with ECG of 24-Feb-2025 13:21,   Sinus rhythm has replaced Wide QRS rhythm   Confirmed by Phu Diaz ([id number]) on 6/21/2025 7:18:21 AM        GI:  No orders to display           Results from last 7 days   Lab Units 06/22/25  0623 06/21/25 0447 06/20/25  1248   WBC Thousand/uL 8.40 9.75 8.26   HEMOGLOBIN g/dL 11.2* 11.1* 12.0   HEMATOCRIT % 35.3 34.8 36.4   PLATELETS Thousands/uL 340 363 360   TOTAL NEUT ABS Thousands/µL  --   --  5.61         Results from last 7 days   Lab Units 06/22/25  0623 06/21/25 0447 06/20/25  1248   SODIUM mmol/L 135 136 136   POTASSIUM mmol/L 3.8 3.9 4.0   CHLORIDE mmol/L 98 98 100   CO2 mmol/L 30 27 24   ANION GAP mmol/L 7 11 12   BUN mg/dL 36* 38* 40*   CREATININE mg/dL 1.46* 1.60* 1.80*   EGFR ml/min/1.73sq m 33 30 26   CALCIUM mg/dL 9.3 9.1 9.4   MAGNESIUM mg/dL 2.1 2.3  --      Results from last 7 days   Lab Units 06/20/25  1248   AST U/L 19   ALT U/L 14   ALK PHOS U/L 95   TOTAL PROTEIN g/dL 7.4   ALBUMIN g/dL 3.8   TOTAL BILIRUBIN mg/dL 0.42     Results from last 7 days   Lab Units 06/22/25  1102 06/22/25  0602 06/21/25 2055 06/21/25  1529 06/21/25  1035 06/21/25  0603 06/20/25 2038 06/20/25  1846   POC GLUCOSE mg/dl 270* 159* 165* 167* 212* 158* 179* 118     Results from last 7 days   Lab Units 06/22/25  0623 06/21/25 0447 06/20/25  1248   GLUCOSE RANDOM mg/dL 168* 161* 219*       Results from last 7 days   Lab Units 06/20/25  1429 06/20/25  1248   HS TNI 0HR ng/L  --  8   HS TNI 2HR ng/L 7  --    HSTNI D2 ng/L -1  --        Results from last 7 days   Lab Units 06/20/25  1248   BNP pg/mL 39     Results from last 7 days   Lab Units 06/17/25  1119   CLARITY UA  Extra Turbid   COLOR UA   Yellow   SPEC GRAV UA  1.016   PH UA  6.0   GLUCOSE UA mg/dl Negative   KETONES UA mg/dl Negative   BLOOD UA  Moderate*   PROTEIN UA mg/dl 50 (1+)*   NITRITE UA  Negative   BILIRUBIN UA  Negative   UROBILINOGEN UA (BE) mg/dl <2.0   LEUKOCYTES UA  Large*   WBC UA /hpf Innumerable*   RBC UA /hpf Innumerable*   BACTERIA UA /hpf Innumerable*   EPITHELIAL CELLS WET PREP /hpf Occasional       Results from last 7 days   Lab Units 06/17/25  1119   URINE CULTURE  >100,000 cfu/ml Klebsiella pneumoniae*     Past Medical History[1]  Present on Admission:   Type 2 diabetes mellitus with other specified complication, without long-term current use of insulin ([facility])   Essential hypertension   PAF (paroxysmal atrial fibrillation) ([facility])      Admitting Diagnosis: Dyspnea [R06.00]  CHF exacerbation ([facility]) [I50.9]  Bilateral lower extremity edema [R60.0]  Chronic diastolic CHF (congestive heart failure) ([facility]) [I50.32]  Cardio-auditory syndrome [I45.81]  (HFpEF) heart failure with preserved ejection fraction ([facility]) [I50.30]  Age/Sex: 80 y.o. female    Network Utilization Review Department  ATTENTION: Please call with any questions or concerns to [phone] and carefully listen to the prompts so that you are directed to the right person. All voicemails are confidential.   For Discharge needs, contact Care Management DC Support Team at [phone] opt. 2  Send all requests for admission clinical reviews, approved or denied determinations and any other requests to dedicated fax number below belonging to the campus where the patient is receiving treatment. List of dedicated fax numbers for the Facilities:  FACILITY NAME UR FAX NUMBER   ADMISSION DENIALS (Administrative/Medical Necessity) [phone]   DISCHARGE SUPPORT TEAM (NETWORK) [phone]   PARENT CHILD HEALTH (Maternity/NICU/Pediatrics) [phone]   [facility] [phone]   [facility] [phone]   [facility]  [facility] [phone]   [facility] [phone]   [facility] [phone]   [facility] [phone]   [facility] [phone]   [facility] [phone]   [facility] [phone]   Critical access hospital [phone]   [facility] [phone]   [facility] ([place], [place]) [phone]              [1]   Past Medical History:  Diagnosis Date    Arthritis     Colorectal polyps     Constipation     Diabetes mellitus (HCC)     Diverticulosis of colon     Hiatal hernia     Hypertension     Increased urinary frequency     Lumbar disc disease     Pressure injury of skin     SOB (shortness of breath)     Stress incontinence     Vitamin D deficiency 07/27/2018

## 2025-06-21 NOTE — ASSESSMENT & PLAN NOTE
Usually symptomatic. Reports recent episode on Wednesday with HR up to 150s. Lasted a few minutes and resolved. Nothing since.   S/P Cardioversion and ablation with ILR placement  Toprol-XL 25 mg twice daily.  Heart rate average 80s to 90s.  Eliquis 2.5 mg twice daily (meets ABC criteria for reduced dose with age >80 and Cr >1.5)  Telemetry monitoring    Will interrogate ILR to assess AF burden, HRs

## 2025-06-21 NOTE — ASSESSMENT & PLAN NOTE
Wt Readings from Last 3 Encounters:   06/21/25 105 kg (232 lb 6 oz)   06/17/25 103 kg (226 lb)   05/06/25 103 kg (226 lb)     Echo February this year: Left Ventricle: Left ventricular cavity size is normal. Wall thickness is mildly increased. There is concentric remodeling. The left ventricular ejection fraction is 60%. Systolic function is normal. Although no diagnostic regional wall motion abnormality was identified, this possibility cannot be completely excluded on the basis of this study. Diastolic function is mildly abnormal, consistent with grade I (abnormal) relaxation.   Patient is doing significantly better in terms of shortness of breath at this time.  Strict I&O  Daily weight  Continue IV Lasix 40 mg once daily had great urine output last night  Continue spironolactone 25 mg daily  Continue metoprolol 25 mg.  Reviewed cardiology notes they are continuing with IV Lasix for another 24 hours and will integrate loop  Plan for discharge next 24 to 48 hours

## 2025-06-21 NOTE — PROGRESS NOTES
Progress Note - Hospitalist   Name: Jessica Jaquez 80 y.o. female I MRN: [id number]  [facility] I Date of Admission: 6/20/2025   Date of Service: 6/21/2025 I Hospital Day: 0     Assessment & Plan  CHF exacerbation (HCC)  Wt Readings from Last 3 Encounters:   06/21/25 105 kg (232 lb 6 oz)   06/17/25 103 kg (226 lb)   05/06/25 103 kg (226 lb)     Echo February this year: Left Ventricle: Left ventricular cavity size is normal. Wall thickness is mildly increased. There is concentric remodeling. The left ventricular ejection fraction is 60%. Systolic function is normal. Although no diagnostic regional wall motion abnormality was identified, this possibility cannot be completely excluded on the basis of this study. Diastolic function is mildly abnormal, consistent with grade I (abnormal) relaxation.   Patient is doing significantly better in terms of shortness of breath at this time.  Strict I&O  Daily weight  Continue IV Lasix 40 mg once daily had great urine output last night  Continue spironolactone 25 mg daily  Continue metoprolol 25 mg.  Reviewed cardiology notes they are continuing with IV Lasix for another 24 hours and will integrate loop  Plan for discharge next 24 to 48 hours    UTI (urinary tract infection)  Started treatment as an outpatient  Continue Keflex  Type 2 diabetes mellitus with other specified complication, without long-term current use of insulin (HCC)  Lab Results   Component Value Date    HGBA1C 7.5 (H) 06/04/2025       Recent Labs     06/20/25  1846 06/20/25  2038 06/21/25  0603 06/21/25  1035   POCGLU 118 179* 158* 212*       Blood Sugar Average: Last 72 hrs:  (P) 166.75At home on glimepiride, now on hold  Insulin sliding scale with fingersticks daily prior to meals  Diabetic diet    Essential hypertension  Continue metoprolol 25 mg p.o.  Monitor vital signs  PAF (paroxysmal atrial fibrillation) (HCC)  Continue Eliquis 2.5 mg twice daily    VTE Pharmacologic Prophylaxis:   Moderate  Risk (Score 3-4) - Pharmacological DVT Prophylaxis Ordered: apixaban (Eliquis).    Mobility:   Basic Mobility Inpatient Raw Score: 17  JH-HLM Goal: 5: Stand one or more mins  JH-HLM Achieved: 6: Walk 10 steps or more  JH-HLM Goal achieved. Continue to encourage appropriate mobility.    Patient Centered Rounds: I performed bedside rounds with nursing staff today.   Discussions with Specialists or Other Care Team Provider: patiet    Education and Discussions with Family / Patient: Patient declined call to .     Current Length of Stay: 0 day(s)  Current Patient Status: Observation   Certification Statement: The patient will continue to require additional inpatient hospital stay due to Acute chf needs IV diuretic  Discharge Plan: Anticipate discharge in 24-48 hrs to home.    Code Status: Level 3 - DNAR and DNI    Subjective   Patient is feeling significantly better however feels like she can lose another 1 pound or 2 of fluid.    Objective :  Temp:  [97.6 °F (36.4 °C)-98.1 °F (36.7 °C)] 97.9 °F (36.6 °C)  HR:  [67-94] 79  BP: (125-168)/(60-79) 132/71  Resp:  [15-20] 16  SpO2:  [95 %-97 %] 95 %  O2 Device: None (Room air)    Body mass index is 41.16 kg/m².     Input and Output Summary (last 24 hours):     Intake/Output Summary (Last 24 hours) at 6/21/2025 1442  Last data filed at 6/21/2025 1101  Gross per 24 hour   Intake 600 ml   Output 2950 ml   Net -2350 ml       Physical Exam  Vitals and nursing note reviewed.   Constitutional:       General: She is not in acute distress.     Appearance: She is well-developed.   HENT:      Head: Normocephalic and atraumatic.      Nose: Nose normal.      Mouth/Throat:      Mouth: Mucous membranes are moist.     Eyes:      Conjunctiva/sclera: Conjunctivae normal.       Cardiovascular:      Rate and Rhythm: Normal rate and regular rhythm.      Heart sounds: No murmur heard.  Pulmonary:      Effort: Pulmonary effort is normal. No respiratory distress.      Breath sounds:  Normal breath sounds.   Abdominal:      Palpations: Abdomen is soft.      Tenderness: There is no abdominal tenderness.     Musculoskeletal:      Cervical back: Neck supple.      Right lower leg: Edema present.      Left lower leg: Edema present.     Skin:     General: Skin is warm and dry.      Capillary Refill: Capillary refill takes less than 2 seconds.     Neurological:      General: No focal deficit present.      Mental Status: She is alert and oriented to person, place, and time.     Psychiatric:         Mood and Affect: Mood normal.           Lines/Drains:  Lines/Drains/Airways       Active Status       Name Placement date Placement time Site Days    External Urinary Catheter 06/20/25  2100  -- less than 1                      Telemetry:  Telemetry Orders (From admission, onward)               24 Hour Telemetry Monitoring  Continuous x 24 Hours (Telem)        Expiring   Question:  Reason for 24 Hour Telemetry  Answer:  Decompensated CHF- and any one of the following: continuous diuretic infusion or total diuretic dose >200 mg daily, associated electrolyte derangement (I.e. K < 3.0), inotropic drip (continuous infusion), hx of ventricular arrhythmia, or new EF < 35%                     Telemetry Reviewed: Normal Sinus Rhythm  Indication for Continued Telemetry Use: No indication for continued use. Will discontinue.                Lab Results: I have reviewed the following results:   Results from last 7 days   Lab Units 06/21/25  0447 06/20/25  1248   WBC Thousand/uL 9.75 8.26   HEMOGLOBIN g/dL 11.1* 12.0   HEMATOCRIT % 34.8 36.4   PLATELETS Thousands/uL 363 360   SEGS PCT %  --  67   LYMPHO PCT %  --  21   MONO PCT %  --  5   EOS PCT %  --  5     Results from last 7 days   Lab Units 06/21/25  0447 06/20/25  1248   SODIUM mmol/L 136 136   POTASSIUM mmol/L 3.9 4.0   CHLORIDE mmol/L 98 100   CO2 mmol/L 27 24   BUN mg/dL 38* 40*   CREATININE mg/dL 1.60* 1.80*   ANION GAP mmol/L 11 12   CALCIUM mg/dL 9.1 9.4    ALBUMIN g/dL  --  3.8   TOTAL BILIRUBIN mg/dL  --  0.42   ALK PHOS U/L  --  95   ALT U/L  --  14   AST U/L  --  19   GLUCOSE RANDOM mg/dL 161* 219*         Results from last 7 days   Lab Units 06/21/25  1035 06/21/25  0603 06/20/25 2038 06/20/25  1846   POC GLUCOSE mg/dl 212* 158* 179* 118               Recent Cultures (last 7 days):   Results from last 7 days   Lab Units 06/17/25  1119   URINE CULTURE  >100,000 cfu/ml Klebsiella pneumoniae*         Last 24 Hours Medication List:     Current Facility-Administered Medications:     acetaminophen (TYLENOL) tablet 650 mg, Q6H PRN    apixaban (ELIQUIS) tablet 2.5 mg, BID    bimatoprost (LUMIGAN) 0.03 % ophthalmic drops 1 drop, Daily    cephalexin (KEFLEX) capsule 500 mg, Q8H NALINI    furosemide (LASIX) injection 40 mg, Daily    gabapentin (NEURONTIN) capsule 300 mg, TID    insulin lispro (HumALOG/ADMELOG) 100 units/mL subcutaneous injection 1-5 Units, HS    insulin lispro (HumALOG/ADMELOG) 100 units/mL subcutaneous injection 1-6 Units, TID AC    metoprolol succinate (TOPROL-XL) 24 hr tablet 25 mg, BID    potassium chloride (Klor-Con M20) CR tablet 20 mEq, TID    spironolactone (ALDACTONE) tablet 25 mg, Daily    Administrative Statements   Today, Patient Was Seen By: Ferdinand Singh MD      **Please Note: This note may have been constructed using a voice recognition system.**

## 2025-06-21 NOTE — ASSESSMENT & PLAN NOTE
Lab Results   Component Value Date    HGBA1C 7.5 (H) 06/04/2025       Recent Labs     06/20/25  1846 06/20/25 2038 06/21/25  0603   POCGLU 118 179* 158*       Blood Sugar Average: Last 72 hrs:  (P) 151.9656992071504234

## 2025-06-21 NOTE — ASSESSMENT & PLAN NOTE
Wt Readings from Last 3 Encounters:   06/21/25 105 kg (232 lb 6 oz)   06/17/25 103 kg (226 lb)   05/06/25 103 kg (226 lb)   CC: Significant weight gain (11lbs in 4 days), conversational and exertional dyspnea, abdominal distension worsening LE edema, orthopnea    Hs troponin: 8>7>99. Likely non-Mi elevated in the setting of CHF  EKG NSR, RSR or QR pattern in V1 suggestive of RV conduction delay, LVH with QRS widening, nonspecific T wave abnormality.  BNP: 39 (could be false negative in female w/ obesity)  Baseline weight appears to be 226lbs. Weight on admission 234lbs->232lbs today  CXR: No acute cardiopulmonary disease.     Baseline creatinine appears to be 1.4-1.6.  Presented with creatinine 1.65 now 1.60.  Home diuretic: Torsemide 20 mg twice daily + Aldactone 25 mg daily (complaint)  Inpatient diuretic: IV Lasix 40 mg daily + Aldactone 25 mg daily    I/O: -2.0L since admission via pure wick  SPO2 stable on RA -- 97%    Plan:   Continue diuretics at current rate given good output  If urine output tapers. consider increasing diuretics tomorrow  BMP qAM  Daily standing weights  Strict I/O monitoring    <2GM sodium and <2L fluid restriction daily

## 2025-06-21 NOTE — ASSESSMENT & PLAN NOTE
Controlled. Initial /63 -> 128/71 now  Toprol-XL 25 mg twice daily, spironolactone 25 mg daily, IV Lasix 40 mg daily

## 2025-06-21 NOTE — CONSULTS
Consult - Cardiology   Jessica Jaquez 80 y.o. female MRN: [id number]  Unit/Bed#: [facility] 531-01 Encounter: [id number]    Reason For Consult:    Outpatient Cardiologist:            Assessment & Plan  CHF exacerbation (HCC)  Wt Readings from Last 3 Encounters:   06/21/25 105 kg (232 lb 6 oz)   06/17/25 103 kg (226 lb)   05/06/25 103 kg (226 lb)   CC: Significant weight gain (11lbs in 4 days), conversational and exertional dyspnea, abdominal distension worsening LE edema, orthopnea    Hs troponin: 8>7>99. Likely non-Mi elevated in the setting of CHF  EKG NSR, RSR or QR pattern in V1 suggestive of RV conduction delay, LVH with QRS widening, nonspecific T wave abnormality.  BNP: 39 (could be false negative in female w/ obesity)  Baseline weight appears to be 226lbs. Weight on admission 234lbs->232lbs today  CXR: No acute cardiopulmonary disease.     Baseline creatinine appears to be 1.4-1.6.  Presented with creatinine 1.65 now 1.60.  Home diuretic: Torsemide 20 mg twice daily + Aldactone 25 mg daily (complaint)  Inpatient diuretic: IV Lasix 40 mg daily + Aldactone 25 mg daily    I/O: -2.0L since admission via pure wick  SPO2 stable on RA -- 97%    Plan:   Continue diuretics at current rate given good output  If urine output tapers. consider increasing diuretics tomorrow  BMP qAM  Daily standing weights  Strict I/O monitoring    <2GM sodium and <2L fluid restriction daily      Type 2 diabetes mellitus with other specified complication, without long-term current use of insulin (HCC)  Lab Results   Component Value Date    HGBA1C 7.5 (H) 06/04/2025       Recent Labs     06/20/25  1846 06/20/25  2038 06/21/25  0603   POCGLU 118 179* 158*       Blood Sugar Average: Last 72 hrs:  (P) 151.4323370898962319    Essential hypertension  Controlled. Initial /63 -> 128/71 now  Toprol-XL 25 mg twice daily, spironolactone 25 mg daily, IV Lasix 40 mg daily  PAF (paroxysmal atrial fibrillation) (HCC)  Usually symptomatic. Reports  recent episode on Wednesday with HR up to 150s. Lasted a few minutes and resolved. Nothing since.   S/P Cardioversion and ablation with ILR placement  Toprol-XL 25 mg twice daily.  Heart rate average 80s to 90s.  Eliquis 2.5 mg twice daily (meets ABC criteria for reduced dose with age >80 and Cr >1.5)  Telemetry monitoring    Will interrogate ILR to assess AF burden, HRs  UTI (urinary tract infection)  Started treatment as an outpatient  Continue Keflex           History of Present Illness:  Jessica Jaquez is a 80 y.o. year old female with PMH as above who presents to [facility] with 11 pounds of weight gain over the past 4 days.  Notes of worsening LE edema, abdominal distention, mild dyspnea with exertion and laying flat.  Called cardiology office but did not hear back and her daughter urged her to present to the ED for evaluation.  Reports has been compliant with torsemide 20 mg daily and Aldactone without any recent med changes.  Affirms that she is following salt restrictions.  Prior to development of her symptoms she does report of drinking a large amount of fluid.  When she noticed that she was overloaded she cut back significantly on her fluid intake.      Past Medical History:        Past Medical History[1] Past Surgical History[2]     Allergy:        Allergies[3]    Medications:       Prior to Admission medications    Medication Sig Start Date End Date Taking? Authorizing Provider   apixaban (ELIQUIS) 2.5 mg Take 1 tablet (2.5 mg total) by mouth 2 (two) times a day 3/6/25   Himanshu Webb MD   bimatoprost (Lumigan) 0.01 % ophthalmic drops Administer 1 drop to both eyes daily at bedtime    Historical Provider, MD   cephalexin (KEFLEX) 500 mg capsule Take 1 capsule (500 mg total) by mouth every 8 (eight) hours for 7 days 6/18/25 6/25/25  VIJI Nicole   Cholecalciferol (VITAMIN D3) 1000 units CAPS Take by mouth in the morning.    Historical Provider, MD   estradiol (ESTRACE  VAGINAL) 0.1 mg/g vaginal cream Use twice weekly, apply pea size amount (about 1 gm) into the vagina 4/24/25   VIJI Nicole   ezetimibe (ZETIA) 10 mg tablet Take 1 tablet (10 mg total) by mouth daily at bedtime  Patient not taking: Reported on 6/17/2025 4/12/24   VIJI Díaz   gabapentin (NEURONTIN) 100 mg capsule Take 3 capsules (300 mg total) by mouth 3 (three) times a day 2/25/25   Kelly Gutierrez PA-C   glimepiride (AMARYL) 1 mg tablet Take 2 mg by mouth daily with breakfast    Historical Provider, MD   ketoconazole (NIZORAL) 2 % cream  4/21/25   Historical Provider, MD   metoprolol succinate (TOPROL-XL) 25 mg 24 hr tablet Take 1 tablet (25 mg total) by mouth 2 (two) times a day 1/27/25   Himanshu Webb MD   metroNIDAZOLE (METROCREAM) 0.75 % cream if needed 3/5/25   Historical Provider, MD   Multiple Vitamins-Minerals (CENTRUM SILVER PO)     Historical Provider, MD   potassium chloride (Klor-Con M20) 20 mEq tablet TAKE 1 TABLET BY MOUTH 3 TIMES A DAY. 2/3/25   Jeyson Delaney MD   spironolactone (ALDACTONE) 25 mg tablet Take 1 tablet (25 mg total) by mouth daily 4/21/25   Himanshu Webb MD   tiZANidine (ZANAFLEX) 2 mg tablet if needed  Patient not taking: Reported on 6/17/2025 1/24/25   Historical Provider, MD   torsemide (DEMADEX) 20 mg tablet Take 1 tablet (20 mg total) by mouth 2 (two) times a day 2/25/25   Kelly Gutierrez PA-C   triamcinolone (KENALOG) 0.025 % cream Apply sparingly as needed up to twice daily 5/6/25   Kvng Trinidad MD       Family History:     Family History[4]     Social History:       Social History[5]    Weights/BMI:    Wt Readings from Last 3 Encounters:   06/21/25 105 kg (232 lb 6 oz)   06/17/25 103 kg (226 lb)   05/06/25 103 kg (226 lb)   , Body mass index is 41.16 kg/m².      ROS:  14 point ROS negative except as outlined above  Remainder review of systems is negative    Exam:  General: Alert, oriented and in no acute distress, cooperative  Head:  Normocephalic, atraumatic.  Eyes: PERRLA. No icterus. Normal Conjunctiva.   Oropharynx: Moist and normal-appearing mucosa  Neck: Supple, symmetrical, trachea midline, JVD not appreciated.   Heart: RRR, no murmur, rub or gallop, S1 & S2 normal   Lungs: Normal air entry, lungs clear to auscultation and no rales, rhonchi or wheezing   Abdomen: Flat, normal findings: bowel sounds normal and soft, non-tender  Lower Limbs:  No pitting edema, 2+ peripheral pulses, capillary refill within normal limits  Musculoskeletal: ROM grossly normal                 [1]   Past Medical History:  Diagnosis Date    Arthritis     Colorectal polyps     Constipation     Diabetes mellitus (HCC)     Diverticulosis of colon     Hiatal hernia     Hypertension     Increased urinary frequency     Lumbar disc disease     Pressure injury of skin     SOB (shortness of breath)     Stress incontinence     Vitamin D deficiency 07/27/2018   [2]   Past Surgical History:  Procedure Laterality Date    BACK SURGERY      CARDIAC ELECTROPHYSIOLOGY PROCEDURE N/A 7/9/2024    Procedure: Cardiac eps/afib ablation PFA;  Surgeon: Jeyson Delaney MD;  [facility]: [facility];  Service: Cardiology    CARDIAC ELECTROPHYSIOLOGY PROCEDURE N/A 7/9/2024    Procedure: Cardiac loop recorder implant;  Surgeon: Jeyson Delaney MD;  [facility]: [facility];  Service: Cardiology    CARPAL TUNNEL RELEASE Left     CATARACT EXTRACTION Bilateral     CHOLECYSTECTOMY      COLONOSCOPY W/ ENDOSCOPIC US N/A 2/24/2016    Procedure: ANAL ENDOSCOPIC U/S;  Surgeon: HOLLIS Washington MD;  Location: [facility];  Service:     HERNIA REPAIR      NC COLONOSCOPY FLX DX W/COLLJ SPEC WHEN PFRMD N/A 2/24/2016    Procedure: COLONOSCOPY;  Surgeon: HOLLIS Washington MD;  Location: [facility];  Service: Colorectal    TONSILLECTOMY      TUBAL LIGATION     [3]   Allergies  Allergen Reactions    Other Cough and Sneezing     Seasonal allergies    Pollen Extract Allergic Rhinitis   [4]   Family  History  Problem Relation Name Age of Onset    Hypertension Mother      Heart attack Mother      Diabetes Mother      Prostate cancer Father      No Known Problems Brother      Stroke Maternal Grandmother      Stroke Paternal Grandmother      Stroke Paternal Grandfather      Thyroid cancer Neg Hx     [5]   Social History  Socioeconomic History    Marital status: /Civil Union   Tobacco Use    Smoking status: Never    Smokeless tobacco: Never   Substance and Sexual Activity    Alcohol use: Never    Drug use: No     Social Drivers of Health     Food Insecurity: No Food Insecurity (6/20/2025)    Nursing - Inadequate Food Risk Classification     Ran Out of Food in the Last Year: Never true   Transportation Needs: No Transportation Needs (6/20/2025)    Nursing - Transportation Risk Classification     Lack of Transportation: No   Intimate Partner Violence: Unknown (6/20/2025)    Nursing IPS     Physically Hurt by Someone: No     Hurt or Threatened by Someone: No   Housing Stability: Unknown (6/20/2025)    Nursing: Inadequate Housing Risk Classification     Unable to Pay for Housing in the Last Year: No     Has Housing: No

## 2025-06-21 NOTE — ASSESSMENT & PLAN NOTE
Lab Results   Component Value Date    HGBA1C 7.5 (H) 06/04/2025       Recent Labs     06/20/25  1846 06/20/25 2038 06/21/25  0603 06/21/25  1035   POCGLU 118 179* 158* 212*       Blood Sugar Average: Last 72 hrs:  (P) 166.75At home on glimepiride, now on hold  Insulin sliding scale with fingersticks daily prior to meals  Diabetic diet

## 2025-06-22 LAB
ANION GAP SERPL CALCULATED.3IONS-SCNC: 7 MMOL/L (ref 4–13)
BUN SERPL-MCNC: 36 MG/DL (ref 5–25)
CALCIUM SERPL-MCNC: 9.3 MG/DL (ref 8.4–10.2)
CHLORIDE SERPL-SCNC: 98 MMOL/L (ref 96–108)
CO2 SERPL-SCNC: 30 MMOL/L (ref 21–32)
CREAT SERPL-MCNC: 1.46 MG/DL (ref 0.6–1.3)
ERYTHROCYTE [DISTWIDTH] IN BLOOD BY AUTOMATED COUNT: 15.2 % (ref 11.6–15.1)
GFR SERPL CREATININE-BSD FRML MDRD: 33 ML/MIN/1.73SQ M
GLUCOSE SERPL-MCNC: 145 MG/DL (ref 65–140)
GLUCOSE SERPL-MCNC: 159 MG/DL (ref 65–140)
GLUCOSE SERPL-MCNC: 168 MG/DL (ref 65–140)
GLUCOSE SERPL-MCNC: 195 MG/DL (ref 65–140)
GLUCOSE SERPL-MCNC: 270 MG/DL (ref 65–140)
HCT VFR BLD AUTO: 35.3 % (ref 34.8–46.1)
HGB BLD-MCNC: 11.2 G/DL (ref 11.5–15.4)
MAGNESIUM SERPL-MCNC: 2.1 MG/DL (ref 1.9–2.7)
MCH RBC QN AUTO: 28.8 PG (ref 26.8–34.3)
MCHC RBC AUTO-ENTMCNC: 31.7 G/DL (ref 31.4–37.4)
MCV RBC AUTO: 91 FL (ref 82–98)
PLATELET # BLD AUTO: 340 THOUSANDS/UL (ref 149–390)
PMV BLD AUTO: 8.9 FL (ref 8.9–12.7)
POTASSIUM SERPL-SCNC: 3.8 MMOL/L (ref 3.5–5.3)
RBC # BLD AUTO: 3.89 MILLION/UL (ref 3.81–5.12)
SODIUM SERPL-SCNC: 135 MMOL/L (ref 135–147)
WBC # BLD AUTO: 8.4 THOUSAND/UL (ref 4.31–10.16)

## 2025-06-22 PROCEDURE — 85027 COMPLETE CBC AUTOMATED: CPT

## 2025-06-22 PROCEDURE — 99232 SBSQ HOSP IP/OBS MODERATE 35: CPT | Performed by: INTERNAL MEDICINE

## 2025-06-22 PROCEDURE — 99232 SBSQ HOSP IP/OBS MODERATE 35: CPT

## 2025-06-22 PROCEDURE — 82948 REAGENT STRIP/BLOOD GLUCOSE: CPT

## 2025-06-22 PROCEDURE — 80048 BASIC METABOLIC PNL TOTAL CA: CPT

## 2025-06-22 PROCEDURE — 83735 ASSAY OF MAGNESIUM: CPT

## 2025-06-22 RX ORDER — FUROSEMIDE 10 MG/ML
80 INJECTION INTRAMUSCULAR; INTRAVENOUS DAILY
Status: DISCONTINUED | OUTPATIENT
Start: 2025-06-22 | End: 2025-06-24

## 2025-06-22 RX ORDER — DIPHENHYDRAMINE HCL 25 MG
25 TABLET ORAL ONCE
Status: COMPLETED | OUTPATIENT
Start: 2025-06-22 | End: 2025-06-22

## 2025-06-22 RX ORDER — DIPHENHYDRAMINE HCL 25 MG
12.5 TABLET ORAL ONCE
Status: COMPLETED | OUTPATIENT
Start: 2025-06-22 | End: 2025-06-22

## 2025-06-22 RX ADMIN — DIPHENHYDRAMINE HCL 12.5 MG: 25 TABLET ORAL at 07:33

## 2025-06-22 RX ADMIN — DIPHENHYDRAMINE HCL 25 MG: 25 TABLET ORAL at 21:06

## 2025-06-22 RX ADMIN — GABAPENTIN 300 MG: 300 CAPSULE ORAL at 09:04

## 2025-06-22 RX ADMIN — ACETAMINOPHEN 650 MG: 325 TABLET ORAL at 22:01

## 2025-06-22 RX ADMIN — GABAPENTIN 300 MG: 300 CAPSULE ORAL at 17:22

## 2025-06-22 RX ADMIN — BIMATOPROST 1 DROP: 0.3 SOLUTION/ DROPS OPHTHALMIC at 20:30

## 2025-06-22 RX ADMIN — POTASSIUM CHLORIDE 20 MEQ: 1500 TABLET, EXTENDED RELEASE ORAL at 17:22

## 2025-06-22 RX ADMIN — METOPROLOL SUCCINATE 25 MG: 25 TABLET, EXTENDED RELEASE ORAL at 09:04

## 2025-06-22 RX ADMIN — CEPHALEXIN 500 MG: 500 CAPSULE ORAL at 13:34

## 2025-06-22 RX ADMIN — APIXABAN 2.5 MG: 2.5 TABLET, FILM COATED ORAL at 17:22

## 2025-06-22 RX ADMIN — CEPHALEXIN 500 MG: 500 CAPSULE ORAL at 21:06

## 2025-06-22 RX ADMIN — CEPHALEXIN 500 MG: 500 CAPSULE ORAL at 06:17

## 2025-06-22 RX ADMIN — ACETAMINOPHEN 650 MG: 325 TABLET ORAL at 13:39

## 2025-06-22 RX ADMIN — POTASSIUM CHLORIDE 20 MEQ: 1500 TABLET, EXTENDED RELEASE ORAL at 20:29

## 2025-06-22 RX ADMIN — INSULIN LISPRO 1 UNITS: 100 INJECTION, SOLUTION INTRAVENOUS; SUBCUTANEOUS at 21:38

## 2025-06-22 RX ADMIN — APIXABAN 2.5 MG: 2.5 TABLET, FILM COATED ORAL at 09:04

## 2025-06-22 RX ADMIN — INSULIN LISPRO 1 UNITS: 100 INJECTION, SOLUTION INTRAVENOUS; SUBCUTANEOUS at 06:24

## 2025-06-22 RX ADMIN — METOPROLOL SUCCINATE 25 MG: 25 TABLET, EXTENDED RELEASE ORAL at 17:22

## 2025-06-22 RX ADMIN — GABAPENTIN 300 MG: 300 CAPSULE ORAL at 20:29

## 2025-06-22 RX ADMIN — SPIRONOLACTONE 25 MG: 25 TABLET ORAL at 09:04

## 2025-06-22 RX ADMIN — POTASSIUM CHLORIDE 20 MEQ: 1500 TABLET, EXTENDED RELEASE ORAL at 09:04

## 2025-06-22 RX ADMIN — FUROSEMIDE 80 MG: 10 INJECTION, SOLUTION INTRAMUSCULAR; INTRAVENOUS at 09:06

## 2025-06-22 RX ADMIN — INSULIN LISPRO 3 UNITS: 100 INJECTION, SOLUTION INTRAVENOUS; SUBCUTANEOUS at 11:25

## 2025-06-22 NOTE — ASSESSMENT & PLAN NOTE
Lab Results   Component Value Date    HGBA1C 7.5 (H) 06/04/2025       Recent Labs     06/21/25  1035 06/21/25  1529 06/21/25 2055 06/22/25  0602   POCGLU 212* 167* 165* 159*       Blood Sugar Average: Last 72 hrs:  (P) 165.7610620719989187

## 2025-06-22 NOTE — ASSESSMENT & PLAN NOTE
Wt Readings from Last 3 Encounters:   06/22/25 106 kg (232 lb 12.9 oz)   06/17/25 103 kg (226 lb)   05/06/25 103 kg (226 lb)   CC: Significant weight gain (11lbs in 4 days), conversational and exertional dyspnea, abdominal distension worsening LE edema, orthopnea    Hs troponin: 8>7>99. Likely non-Mi elevated in the setting of CHF  EKG NSR, RSR or QR pattern in V1 suggestive of RV conduction delay, LVH with QRS widening, nonspecific T wave abnormality.  BNP: 39 (could be false negative in female w/ obesity)  Baseline weight appears to be 226lbs. Weight on admission 234lbs->232lbs-> 232lbs  CXR: No acute cardiopulmonary disease.     Baseline creatinine appears to be 1.4-1.6.  Presented with creatinine 1.65 now 1.60.  Home diuretic: Torsemide 20 mg twice daily + Aldactone 25 mg daily (complaint)  Inpatient diuretic: IV Lasix 40 mg daily + Aldactone 25 mg daily    I/O: -1.9L since admission via pure wick. Net +100mL/ last 24 hours.   SPO2 stable on RA -- 97%    Plan:   Increase diuretics given low output over the past 24 hours  Increase IV Lasix to 80mg QD  Will need increased home diuretic (suggest torsemide 40mg QD)  BMP qAM  Daily standing weights  Strict I/O monitoring    <2GM sodium and <2L fluid restriction daily

## 2025-06-22 NOTE — CASE MANAGEMENT
Case Management Assessment & Discharge Planning Note    Patient name Jessica Jaquez  Location [facility] 531/[facility] 531-01 MRN [id number]  : 1945 Date 2025       Current Admission Date: 2025  Current Admission Diagnosis:CHF exacerbation (HCC)   Patient Active Problem List    Diagnosis Date Noted    UTI (urinary tract infection) 2025    CHF exacerbation ([facility]) 2025    (HFpEF) heart failure with preserved ejection fraction ([facility]) 2025    Class 3 severe obesity without serious comorbidity with body mass index (BMI) of 40.0 to 44.9 in adult 2025    CKD (chronic kidney disease) 2024    Bilateral lower extremity edema 2024    S/P ablation of atrial fibrillation 2024    Chest pain 2024    PAF (paroxysmal atrial fibrillation) ([facility]) 2024    Hyponatremia 2024    Chronic diastolic congestive heart failure ([facility]) 03/15/2024    GIOVANNI (acute kidney injury) ([facility]) 2024    SVT (supraventricular tachycardia) ([facility]) 2024    Lumbar radiculopathy 2024    History of stroke 2024    Open wound of abdomen 2021    Cerebrovascular accident (CVA) due to thrombosis of precerebral artery ([facility]) 2019    Unspecified abnormalities of gait and mobility 2018    Tremor of hands and face 2018    Hyperlipidemia 2018    Type 2 diabetes mellitus with other specified complication, without long-term current use of insulin ([facility]) 2018    Obesity, Class III, BMI 40-49.9 (morbid obesity) 2018    Essential hypertension 2018    Chronic diastolic CHF (congestive heart failure) ([facility]) 2018      LOS (days): 1  Geometric Mean LOS (GMLOS) (days):   Days to GMLOS:     OBJECTIVE:    Risk of Unplanned Readmission Score: 19.32         Current admission status: Inpatient       Preferred Pharmacy:   [facility] [place] - [place], [place] - [address]  [address]  [place] [address]  Phone:  [phone] Fax: [phone]    Primary Care Provider: Jorden Holt MD    Primary Insurance: [employer] [facility]  Secondary Insurance:     ASSESSMENT:  Active Health Care Proxies       Keily Jaquez [facility] Representative - Daughter In-Law   Primary Phone: [phone] (Mobile)                                Patient Information  Admitted from:: Home  Mental Status: Alert  During Assessment patient was accompanied by: Not accompanied during assessment  Assessment information provided by:: Patient  Primary Caregiver: Self  Support Systems: Spouse/significant other, Son, Family members, Daughter  What city do you live in?: [place]  Home entry access options. Select all that apply.: Stairs  Number of steps to enter home.: 6  Do the steps have railings?: Yes  Type of Current Residence: Bi-level  Upon entering residence, is there a bedroom on the main floor (no further steps)?: No  A bedroom is located on the following floor levels of residence (select all that apply):: 2nd Floor  Living Arrangements: Lives w/ Spouse/significant other  Is patient a ?: No    Activities of Daily Living Prior to Admission  Functional Status: Independent  Completes ADLs independently?: Yes  Ambulates independently?: Yes  Does patient use assisted devices?: Yes  Assisted Devices (DME) used: Walker  Does patient currently own DME?: Yes  What DME does the patient currently own?: Walker  Does patient have a history of Outpatient Therapy (PT/OT)?: No  Does the patient have a history of Short-Term Rehab?: No  Does patient have a history of HHC?: Yes ([facility])  Does patient currently have HHC?: No         Patient Information Continued  Does patient have prescription coverage?: Yes  Can the patient afford their medications and any related supplies (such as glucometers or test strips)?: N/A  Does patient receive dialysis treatments?: No         Means of Transportation  Means of Transport to Hospitals in [place]:: Drives  Self          DISCHARGE DETAILS:    Discharge planning discussed with:: Pt  Freedom of Choice: Yes  Comments - Freedom of Choice: Choice reviewed  CM contacted family/caregiver?: No- see comments (declined)                  Requested Home Health Care         Is the patient interested in HHC at discharge?: No    DME Referral Provided  Referral made for DME?: No                                                              Additional Comments: Initial CM assessment completed w pt at bedside. Pt lives at home w spouse who is dx w dementia. Pt has a large support system through children/grandchildren/greatgrandchildren. Hx of [facility], not interested in home care services again. Pt is the main c/g for spouse, son and daughter-in-law have been able to step in and care for him while she is admitted to the hospital. Pt expects to be dc home with no therapy needs. CM dept available for dcp coordination assistance if needed.

## 2025-06-22 NOTE — ASSESSMENT & PLAN NOTE
Lab Results   Component Value Date    HGBA1C 7.5 (H) 06/04/2025       Recent Labs     06/21/25  1529 06/21/25 2055 06/22/25 0602 06/22/25  1102   POCGLU 167* 165* 159* 270*       Blood Sugar Average: Last 72 hrs:  (P) 178.5At home on glimepiride, now on hold  Insulin sliding scale with fingersticks daily prior to meals  Diabetic diet

## 2025-06-22 NOTE — PROGRESS NOTES
Progress Note - Hospitalist   Name: Jessica Jaquez 80 y.o. female I MRN: [id number]  [facility] I Date of Admission: 6/20/2025   Date of Service: 6/22/2025 I Hospital Day: 1     Assessment & Plan  CHF exacerbation (HCC)  Wt Readings from Last 3 Encounters:   06/22/25 106 kg (232 lb 12.9 oz)   06/17/25 103 kg (226 lb)   05/06/25 103 kg (226 lb)     Echo February this year: Left Ventricle: Left ventricular cavity size is normal. Wall thickness is mildly increased. There is concentric remodeling. The left ventricular ejection fraction is 60%. Systolic function is normal. Although no diagnostic regional wall motion abnormality was identified, this possibility cannot be completely excluded on the basis of this study. Diastolic function is mildly abnormal, consistent with grade I (abnormal) relaxation.   Patient is doing significantly better in terms of shortness of breath at this time.  Strict I&O  Daily weight  Cardiology on board  Increased Lasix to 80 mg once daily  Will monitor today.  Continue spironolactone 25 mg daily  Continue metoprolol 25 mg.  Reviewed cardiology notes they are continuing with IV Lasix for another 24 hours   Possible discharge home tomorrow    UTI (urinary tract infection)  Started treatment as an outpatient  Continue Keflex  Type 2 diabetes mellitus with other specified complication, without long-term current use of insulin ([facility])  Lab Results   Component Value Date    HGBA1C 7.5 (H) 06/04/2025       Recent Labs     06/21/25  1529 06/21/25  2055 06/22/25  0602 06/22/25  1102   POCGLU 167* 165* 159* 270*       Blood Sugar Average: Last 72 hrs:  (P) 178.5At home on glimepiride, now on hold  Insulin sliding scale with fingersticks daily prior to meals  Diabetic diet    Essential hypertension  Continue metoprolol 25 mg p.o.  Monitor vital signs  PAF (paroxysmal atrial fibrillation) (HCC)  Continue Eliquis 2.5 mg twice daily    VTE Pharmacologic Prophylaxis:   Moderate Risk (Score 3-4)  - Pharmacological DVT Prophylaxis Ordered: apixaban (Eliquis).    Mobility:   Basic Mobility Inpatient Raw Score: 18  JH-HLM Goal: 6: Walk 10 steps or more  JH-HLM Achieved: 4: Move to chair/commode  JH-HLM Goal achieved. Continue to encourage appropriate mobility.    Patient Centered Rounds: I performed bedside rounds with nursing staff today.   Discussions with Specialists or Other Care Team Provider: patiet    Education and Discussions with Family / Patient: Patient declined call to .     Current Length of Stay: 1 day(s)  Current Patient Status: Inpatient   Certification Statement: The patient will continue to require additional inpatient hospital stay due to Acute chf needs IV diuretic  Discharge Plan: Anticipate discharge in 24-48 hrs to home.    Code Status: Level 3 - DNAR and DNI    Subjective   Patient is doing significantly well tolerating medication well.  Recheck tomorrow.    Objective :  Temp:  [97.5 °F (36.4 °C)-98.1 °F (36.7 °C)] 97.5 °F (36.4 °C)  HR:  [76-93] 80  BP: (128-152)/(72-94) 128/73  Resp:  [15-18] 15  SpO2:  [96 %-97 %] 96 %  O2 Device: None (Room air)    Body mass index is 41.24 kg/m².     Input and Output Summary (last 24 hours):     Intake/Output Summary (Last 24 hours) at 6/22/2025 1406  Last data filed at 6/22/2025 1116  Gross per 24 hour   Intake 1440 ml   Output 1462 ml   Net -22 ml       Physical Exam  Constitutional:       Appearance: She is not ill-appearing.   HENT:      Head: Atraumatic.     Cardiovascular:      Rate and Rhythm: Normal rate.   Pulmonary:      Effort: No respiratory distress.   Abdominal:      General: There is no distension.     Musculoskeletal:      Right lower leg: Edema present.      Left lower leg: Edema present.           Lines/Drains:  Lines/Drains/Airways       Active Status       Name Placement date Placement time Site Days    External Urinary Catheter 06/20/25 2100  -- 1                      Telemetry:  Telemetry Orders (From admission,  onward)               24 Hour Telemetry Monitoring  Continuous x 24 Hours (Telem)        Expiring   Question:  Reason for 24 Hour Telemetry  Answer:  Decompensated CHF- and any one of the following: continuous diuretic infusion or total diuretic dose >200 mg daily, associated electrolyte derangement (I.e. K < 3.0), inotropic drip (continuous infusion), hx of ventricular arrhythmia, or new EF < 35%                     Telemetry Reviewed: Normal Sinus Rhythm  Indication for Continued Telemetry Use: No indication for continued use. Will discontinue.                Lab Results: I have reviewed the following results:   Results from last 7 days   Lab Units 06/22/25 0623 06/21/25 0447 06/20/25  1248   WBC Thousand/uL 8.40   < > 8.26   HEMOGLOBIN g/dL 11.2*   < > 12.0   HEMATOCRIT % 35.3   < > 36.4   PLATELETS Thousands/uL 340   < > 360   SEGS PCT %  --   --  67   LYMPHO PCT %  --   --  21   MONO PCT %  --   --  5   EOS PCT %  --   --  5    < > = values in this interval not displayed.     Results from last 7 days   Lab Units 06/22/25 0623 06/21/25 0447 06/20/25  1248   SODIUM mmol/L 135   < > 136   POTASSIUM mmol/L 3.8   < > 4.0   CHLORIDE mmol/L 98   < > 100   CO2 mmol/L 30   < > 24   BUN mg/dL 36*   < > 40*   CREATININE mg/dL 1.46*   < > 1.80*   ANION GAP mmol/L 7   < > 12   CALCIUM mg/dL 9.3   < > 9.4   ALBUMIN g/dL  --   --  3.8   TOTAL BILIRUBIN mg/dL  --   --  0.42   ALK PHOS U/L  --   --  95   ALT U/L  --   --  14   AST U/L  --   --  19   GLUCOSE RANDOM mg/dL 168*   < > 219*    < > = values in this interval not displayed.         Results from last 7 days   Lab Units 06/22/25  1102 06/22/25  0602 06/21/25 2055 06/21/25  1529 06/21/25  1035 06/21/25  0603 06/20/25  2038 06/20/25  1846   POC GLUCOSE mg/dl 270* 159* 165* 167* 212* 158* 179* 118               Recent Cultures (last 7 days):   Results from last 7 days   Lab Units 06/17/25  1119   URINE CULTURE  >100,000 cfu/ml Klebsiella pneumoniae*         Last 24  Hours Medication List:     Current Facility-Administered Medications:     acetaminophen (TYLENOL) tablet 650 mg, Q6H PRN    apixaban (ELIQUIS) tablet 2.5 mg, BID    bimatoprost (LUMIGAN) 0.03 % ophthalmic drops 1 drop, Daily    cephalexin (KEFLEX) capsule 500 mg, Q8H NALINI    furosemide (LASIX) injection 80 mg, Daily    gabapentin (NEURONTIN) capsule 300 mg, TID    insulin lispro (HumALOG/ADMELOG) 100 units/mL subcutaneous injection 1-5 Units, HS    insulin lispro (HumALOG/ADMELOG) 100 units/mL subcutaneous injection 1-6 Units, TID AC    metoprolol succinate (TOPROL-XL) 24 hr tablet 25 mg, BID    potassium chloride (Klor-Con M20) CR tablet 20 mEq, TID    spironolactone (ALDACTONE) tablet 25 mg, Daily    Administrative Statements   Today, Patient Was Seen By: Ferdinand Singh MD      **Please Note: This note may have been constructed using a voice recognition system.**

## 2025-06-22 NOTE — PLAN OF CARE
Problem: PAIN - ADULT  Goal: Verbalizes/displays adequate comfort level or baseline comfort level  Description: Interventions:  - Encourage patient to monitor pain and request assistance  - Assess pain using appropriate pain scale  - Administer analgesics as ordered based on type and severity of pain and evaluate response  - Implement non-pharmacological measures as appropriate and evaluate response  - Consider cultural and social influences on pain and pain management  - Notify physician/advanced practitioner if interventions unsuccessful or patient reports new pain  - Educate patient/family on pain management process including their role and importance of  reporting pain   - Provide non-pharmacologic/complimentary pain relief interventions  Outcome: Progressing     Problem: INFECTION - ADULT  Goal: Absence or prevention of progression during hospitalization  Description: INTERVENTIONS:  - Assess and monitor for signs and symptoms of infection  - Monitor lab/diagnostic results  - Monitor all insertion sites, i.e. indwelling lines, tubes, and drains  - Monitor endotracheal if appropriate and nasal secretions for changes in amount and color  - Grahn appropriate cooling/warming therapies per order  - Administer medications as ordered  - Instruct and encourage patient and family to use good hand hygiene technique  - Identify and instruct in appropriate isolation precautions for identified infection/condition  Outcome: Progressing  Goal: Absence of fever/infection during neutropenic period  Description: INTERVENTIONS:  - Monitor WBC  - Perform strict hand hygiene  - Limit to healthy visitors only  - No plants, dried, fresh or silk flowers with madrigal in patient room  Outcome: Progressing     Problem: SAFETY ADULT  Goal: Patient will remain free of falls  Description: INTERVENTIONS:  - Educate patient/family on patient safety including physical limitations  - Instruct patient to call for assistance with activity   -  Consider consulting OT/PT to assist with strengthening/mobility based on AM PAC & JH-HLM score  - Consult OT/PT to assist with strengthening/mobility   - Keep Call bell within reach  - Keep bed low and locked with side rails adjusted as appropriate  - Keep care items and personal belongings within reach  - Initiate and maintain comfort rounds  - Make Fall Risk Sign visible to staff  - Offer Toileting every  Hours, in advance of need  - Initiate/Maintain alarm  - Obtain necessary fall risk management equipment:   - Apply yellow socks and bracelet for high fall risk patients  - Consider moving patient to room near nurses station  Outcome: Progressing  Goal: Maintain or return to baseline ADL function  Description: INTERVENTIONS:  -  Assess patient's ability to carry out ADLs; assess patient's baseline for ADL function and identify physical deficits which impact ability to perform ADLs (bathing, care of mouth/teeth, toileting, grooming, dressing, etc.)  - Assess/evaluate cause of self-care deficits   - Assess range of motion  - Assess patient's mobility; develop plan if impaired  - Assess patient's need for assistive devices and provide as appropriate  - Encourage maximum independence but intervene and supervise when necessary  - Involve family in performance of ADLs  - Assess for home care needs following discharge   - Consider OT consult to assist with ADL evaluation and planning for discharge  - Provide patient education as appropriate  - Monitor functional capacity and physical performance, use of AM PAC & JH-HLM   - Monitor gait, balance and fatigue with ambulation    Outcome: Progressing  Goal: Maintains/Returns to pre admission functional level  Description: INTERVENTIONS:  - Perform AM-PAC 6 Click Basic Mobility/ Daily Activity assessment daily.  - Set and communicate daily mobility goal to care team and patient/family/caregiver.   - Collaborate with rehabilitation services on mobility goals if consulted  -  Perform Range of Motion times a day.  - Reposition patient every hours.  - Dangle patient  times a day  - Stand patient  times a day  - Ambulate patient  times a day  - Out of bed to chair  times a day   - Out of bed for meals  times a day  - Out of bed for toileting  - Record patient progress and toleration of activity level   Outcome: Progressing     Problem: DISCHARGE PLANNING  Goal: Discharge to home or other facility with appropriate resources  Description: INTERVENTIONS:  - Identify barriers to discharge w/patient and caregiver  - Arrange for needed discharge resources and transportation as appropriate  - Identify discharge learning needs (meds, wound care, etc.)  - Arrange for interpretive services to assist at discharge as needed  - Refer to Case Management Department for coordinating discharge planning if the patient needs post-hospital services based on physician/advanced practitioner order or complex needs related to functional status, cognitive ability, or social support system  Outcome: Progressing     Problem: Knowledge Deficit  Goal: Patient/family/caregiver demonstrates understanding of disease process, treatment plan, medications, and discharge instructions  Description: Complete learning assessment and assess knowledge base.  Interventions:  - Provide teaching at level of understanding  - Provide teaching via preferred learning methods  Outcome: Progressing     Problem: Prexisting or High Potential for Compromised Skin Integrity  Goal: Skin integrity is maintained or improved  Description: INTERVENTIONS:  - Identify patients at risk for skin breakdown  - Assess and monitor skin integrity including under and around medical devices   - Assess and monitor nutrition and hydration status  - Monitor labs  - Assess for incontinence   - Turn and reposition patient  - Assist with mobility/ambulation  - Relieve pressure over santiago prominences   - Avoid friction and shearing  - Provide appropriate hygiene as  needed including keeping skin clean and dry  - Evaluate need for skin moisturizer/barrier cream  - Collaborate with interdisciplinary team  - Patient/family teaching  - Consider wound care consult    Assess:  - Review Chris scale daily  - Clean and moisturize skin every   - Inspect skin when repositioning, toileting, and assisting with ADLS  - Assess under medical devices such as  every   - Assess extremities for adequate circulation and sensation     Bed Management:  - Have minimal linens on bed & keep smooth, unwrinkled  - Change linens as needed when moist or perspiring  - Avoid sitting or lying in one position for more than  hours while in bed?Keep HOB atdegrees   - Toileting:  - Offer bedside commode  - Assess for incontinence every   - Use incontinent care products after each incontinent episode such as     Activity:  - Mobilize patient  times a day  - Encourage activity and walks on unit  - Encourage or provide ROM exercises   - Turn and reposition patient every  Hours  - Use appropriate equipment to lift or move patient in bed  - Instruct/ Assist with weight shifting every  when out of bed in chair  - Consider limitation of chair time  hour intervals    Skin Care:  - Avoid use of baby powder, tape, friction and shearing, hot water or constrictive clothing  - Relieve pressure over bony prominences using  - Do not massage red bony areas    Next Steps:  - Teach patient strategies to minimize risks such as   - Consider consults to  interdisciplinary teams such as   Outcome: Progressing

## 2025-06-22 NOTE — ASSESSMENT & PLAN NOTE
Usually symptomatic. Reports recent episode on Wednesday with HR up to 150s. Lasted a few minutes and resolved. Nothing since.   S/P Cardioversion and ablation with ILR placement  Toprol-XL 25 mg twice daily.  Heart rate average 80s to 90s.  Eliquis 2.5 mg twice daily (meets ABC criteria for reduced dose with age >80 and Cr >1.5)  Telemetry monitoring    Will interrogate ILR to assess AF burden -- pending

## 2025-06-22 NOTE — PROGRESS NOTES
Cardiology Progress Note   Jessica Jaquez 80 y.o. female MRN: [id number]    [facility] Encounter: [id number]      Assessment & Plan  CHF exacerbation (HCC)  Wt Readings from Last 3 Encounters:   06/22/25 106 kg (232 lb 12.9 oz)   06/17/25 103 kg (226 lb)   05/06/25 103 kg (226 lb)   CC: Significant weight gain (11lbs in 4 days), conversational and exertional dyspnea, abdominal distension worsening LE edema, orthopnea    Hs troponin: 8>7>99. Likely non-Mi elevated in the setting of CHF  EKG NSR, RSR or QR pattern in V1 suggestive of RV conduction delay, LVH with QRS widening, nonspecific T wave abnormality.  BNP: 39 (could be false negative in female w/ obesity)  Baseline weight appears to be 226lbs. Weight on admission 234lbs->232lbs-> 232lbs  CXR: No acute cardiopulmonary disease.     Baseline creatinine appears to be 1.4-1.6.  Presented with creatinine 1.65 now 1.60.  Home diuretic: Torsemide 20 mg twice daily + Aldactone 25 mg daily (complaint)  Inpatient diuretic: IV Lasix 40 mg daily + Aldactone 25 mg daily    I/O: -1.9L since admission via pure wick. Net +100mL/ last 24 hours.   SPO2 stable on RA -- 97%    Plan:   Increase diuretics given low output over the past 24 hours  Increase IV Lasix to 80mg QD  Will need increased home diuretic (suggest torsemide 40mg QD)  BMP qAM  Daily standing weights  Strict I/O monitoring    <2GM sodium and <2L fluid restriction daily      Type 2 diabetes mellitus with other specified complication, without long-term current use of insulin (HCC)  Lab Results   Component Value Date    HGBA1C 7.5 (H) 06/04/2025       Recent Labs     06/21/25  1035 06/21/25  1529 06/21/25 2055 06/22/25  0602   POCGLU 212* 167* 165* 159*       Blood Sugar Average: Last 72 hrs:  (P) [id number]    Essential hypertension  Controlled. Initial /63 -> 128/71 now  Toprol-XL 25 mg twice daily, spironolactone 25 mg daily, IV Lasix 40 mg daily  PAF (paroxysmal atrial fibrillation)  "(HCC)  Usually symptomatic. Reports recent episode on Wednesday with HR up to 150s. Lasted a few minutes and resolved. Nothing since.   S/P Cardioversion and ablation with ILR placement  Toprol-XL 25 mg twice daily.  Heart rate average 80s to 90s.  Eliquis 2.5 mg twice daily (meets ABC criteria for reduced dose with age >80 and Cr >1.5)  Telemetry monitoring    Will interrogate ILR to assess AF burden -- pending  UTI (urinary tract infection)  Started treatment as an outpatient  Continue Keflex    Subjective:   Patient seen and examined. Overnight events reviewed.    Objective:   Vitals: Blood pressure 131/72, pulse 86, temperature 97.5 °F (36.4 °C), temperature source Oral, resp. rate 15, height 5' 3\" (1.6 m), weight 106 kg (232 lb 12.9 oz), SpO2 96%., Body mass index is 41.24 kg/m².,   Orthostatic Blood Pressures      Flowsheet Row Most Recent Value   Blood Pressure 131/72 filed at 06/22/2025 0709   Patient Position - Orthostatic VS Lying filed at 06/22/2025 0709            Intake/Output Summary (Last 24 hours) at 6/22/2025 0809  Last data filed at 6/22/2025 0709  Gross per 24 hour   Intake 1560 ml   Output 1362 ml   Net 198 ml     Physical Exam:  GEN: Jessica Jaquez appears well, alert and oriented x 3, pleasant and cooperative   HEENT: Sclera anicteric, conjunctivae pink, mucous membranes moist. Oropharynx clear.   NECK: Cannot determine JVD/JVP due to thick neck. supple, no significant JVD, Trachea midline, no thyromegaly.   HEART: regular rhythm, normal S1 and S2, no murmurs, clicks, gallops or rubs   LUNGS: +mild bibasilar crackles at bases.  No increased work of breathing or signs of respiratory distress.   ABDOMEN: Soft, nontender, nondistended  EXTREMITIES: Skin warm and well perfused, no clubbing, cyanosis +2 LE edema.   NEURO: No focal findings. Normal speech. Mood and affect normal.   SKIN: Normal without suspicious lesions on exposed skin.    Medications:    Current Medications[1]     Labs & " Results:        Results from last 7 days   Lab Units 06/22/25  0623 06/21/25 0447 06/20/25  1248   WBC Thousand/uL 8.40 9.75 8.26   HEMOGLOBIN g/dL 11.2* 11.1* 12.0   HEMATOCRIT % 35.3 34.8 36.4   PLATELETS Thousands/uL 340 363 360         Results from last 7 days   Lab Units 06/22/25  0623 06/21/25 0447 06/20/25  1248   POTASSIUM mmol/L 3.8 3.9 4.0   CHLORIDE mmol/L 98 98 100   CO2 mmol/L 30 27 24   BUN mg/dL 36* 38* 40*   CREATININE mg/dL 1.46* 1.60* 1.80*   CALCIUM mg/dL 9.3 9.1 9.4   ALK PHOS U/L  --   --  95   ALT U/L  --   --  14   AST U/L  --   --  19         Results from last 7 days   Lab Units 06/22/25 0623 06/21/25 0447   MAGNESIUM mg/dL 2.1 2.3           [1]   Current Facility-Administered Medications:     acetaminophen (TYLENOL) tablet 650 mg, 650 mg, Oral, Q6H PRN, Letty Pedro MD, 650 mg at 06/21/25 2131    apixaban (ELIQUIS) tablet 2.5 mg, 2.5 mg, Oral, BID, Letty Pedro MD, 2.5 mg at 06/21/25 1705    bimatoprost (LUMIGAN) 0.03 % ophthalmic drops 1 drop, 1 drop, Ophthalmic, Daily, Letty Pedro MD, 1 drop at 06/21/25 2130    cephalexin (KEFLEX) capsule 500 mg, 500 mg, Oral, Q8H NALINI, Letty Pedro MD, 500 mg at 06/22/25 0617    furosemide (LASIX) injection 40 mg, 40 mg, Intravenous, Daily, Letty Pedro MD, 40 mg at 06/21/25 0834    gabapentin (NEURONTIN) capsule 300 mg, 300 mg, Oral, TID, Letty Pedro MD, 300 mg at 06/21/25 2128    insulin lispro (HumALOG/ADMELOG) 100 units/mL subcutaneous injection 1-5 Units, 1-5 Units, Subcutaneous, HS, Ferdinand Singh MD, 1 Units at 06/21/25 2129    insulin lispro (HumALOG/ADMELOG) 100 units/mL subcutaneous injection 1-6 Units, 1-6 Units, Subcutaneous, TID AC, Ferdinand Singh MD, 1 Units at 06/22/25 0624    metoprolol succinate (TOPROL-XL) 24 hr tablet 25 mg, 25 mg, Oral, BID, Letty Pedro MD, 25 mg at 06/21/25 1705    potassium chloride (Klor-Con M20) CR tablet 20 mEq, 20 mEq, Oral, TID, Letty Pedro MD, 20  mEq at 06/21/25 2047    spironolactone (ALDACTONE) tablet 25 mg, 25 mg, Oral, Daily, Letty Pedro MD, 25 mg at 06/21/25 0567

## 2025-06-22 NOTE — ASSESSMENT & PLAN NOTE
Wt Readings from Last 3 Encounters:   06/22/25 106 kg (232 lb 12.9 oz)   06/17/25 103 kg (226 lb)   05/06/25 103 kg (226 lb)     Echo February this year: Left Ventricle: Left ventricular cavity size is normal. Wall thickness is mildly increased. There is concentric remodeling. The left ventricular ejection fraction is 60%. Systolic function is normal. Although no diagnostic regional wall motion abnormality was identified, this possibility cannot be completely excluded on the basis of this study. Diastolic function is mildly abnormal, consistent with grade I (abnormal) relaxation.   Patient is doing significantly better in terms of shortness of breath at this time.  Strict I&O  Daily weight  Cardiology on board  Increased Lasix to 80 mg once daily  Will monitor today.  Continue spironolactone 25 mg daily  Continue metoprolol 25 mg.  Reviewed cardiology notes they are continuing with IV Lasix for another 24 hours   Possible discharge home tomorrow

## 2025-06-23 LAB
ANION GAP SERPL CALCULATED.3IONS-SCNC: 10 MMOL/L (ref 4–13)
BUN SERPL-MCNC: 37 MG/DL (ref 5–25)
CALCIUM SERPL-MCNC: 9.8 MG/DL (ref 8.4–10.2)
CHLORIDE SERPL-SCNC: 97 MMOL/L (ref 96–108)
CO2 SERPL-SCNC: 28 MMOL/L (ref 21–32)
CREAT SERPL-MCNC: 1.62 MG/DL (ref 0.6–1.3)
ERYTHROCYTE [DISTWIDTH] IN BLOOD BY AUTOMATED COUNT: 15 % (ref 11.6–15.1)
GFR SERPL CREATININE-BSD FRML MDRD: 29 ML/MIN/1.73SQ M
GLUCOSE SERPL-MCNC: 138 MG/DL (ref 65–140)
GLUCOSE SERPL-MCNC: 171 MG/DL (ref 65–140)
GLUCOSE SERPL-MCNC: 173 MG/DL (ref 65–140)
GLUCOSE SERPL-MCNC: 194 MG/DL (ref 65–140)
GLUCOSE SERPL-MCNC: 215 MG/DL (ref 65–140)
HCT VFR BLD AUTO: 38.7 % (ref 34.8–46.1)
HGB BLD-MCNC: 12.4 G/DL (ref 11.5–15.4)
MCH RBC QN AUTO: 28.4 PG (ref 26.8–34.3)
MCHC RBC AUTO-ENTMCNC: 32 G/DL (ref 31.4–37.4)
MCV RBC AUTO: 89 FL (ref 82–98)
PLATELET # BLD AUTO: 395 THOUSANDS/UL (ref 149–390)
PMV BLD AUTO: 8.8 FL (ref 8.9–12.7)
POTASSIUM SERPL-SCNC: 4.3 MMOL/L (ref 3.5–5.3)
RBC # BLD AUTO: 4.37 MILLION/UL (ref 3.81–5.12)
SODIUM SERPL-SCNC: 135 MMOL/L (ref 135–147)
WBC # BLD AUTO: 11.02 THOUSAND/UL (ref 4.31–10.16)

## 2025-06-23 PROCEDURE — 82948 REAGENT STRIP/BLOOD GLUCOSE: CPT

## 2025-06-23 PROCEDURE — 99232 SBSQ HOSP IP/OBS MODERATE 35: CPT

## 2025-06-23 PROCEDURE — 85027 COMPLETE CBC AUTOMATED: CPT

## 2025-06-23 PROCEDURE — 99232 SBSQ HOSP IP/OBS MODERATE 35: CPT | Performed by: INTERNAL MEDICINE

## 2025-06-23 PROCEDURE — 80048 BASIC METABOLIC PNL TOTAL CA: CPT

## 2025-06-23 RX ORDER — DIPHENHYDRAMINE HCL 25 MG
25 TABLET ORAL ONCE
Status: COMPLETED | OUTPATIENT
Start: 2025-06-23 | End: 2025-06-23

## 2025-06-23 RX ORDER — DIPHENHYDRAMINE HYDROCHLORIDE, ZINC ACETATE 2; .1 G/100G; G/100G
CREAM TOPICAL 3 TIMES DAILY PRN
Status: DISCONTINUED | OUTPATIENT
Start: 2025-06-23 | End: 2025-06-25 | Stop reason: HOSPADM

## 2025-06-23 RX ADMIN — FUROSEMIDE 80 MG: 10 INJECTION, SOLUTION INTRAMUSCULAR; INTRAVENOUS at 08:48

## 2025-06-23 RX ADMIN — GABAPENTIN 300 MG: 300 CAPSULE ORAL at 21:31

## 2025-06-23 RX ADMIN — ACETAMINOPHEN 650 MG: 325 TABLET ORAL at 14:22

## 2025-06-23 RX ADMIN — INSULIN LISPRO 1 UNITS: 100 INJECTION, SOLUTION INTRAVENOUS; SUBCUTANEOUS at 06:28

## 2025-06-23 RX ADMIN — ACETAMINOPHEN 650 MG: 325 TABLET ORAL at 21:40

## 2025-06-23 RX ADMIN — INSULIN LISPRO 1 UNITS: 100 INJECTION, SOLUTION INTRAVENOUS; SUBCUTANEOUS at 11:37

## 2025-06-23 RX ADMIN — POTASSIUM CHLORIDE 20 MEQ: 1500 TABLET, EXTENDED RELEASE ORAL at 21:31

## 2025-06-23 RX ADMIN — POTASSIUM CHLORIDE 20 MEQ: 1500 TABLET, EXTENDED RELEASE ORAL at 17:06

## 2025-06-23 RX ADMIN — INSULIN LISPRO 1 UNITS: 100 INJECTION, SOLUTION INTRAVENOUS; SUBCUTANEOUS at 21:30

## 2025-06-23 RX ADMIN — APIXABAN 2.5 MG: 2.5 TABLET, FILM COATED ORAL at 08:48

## 2025-06-23 RX ADMIN — GABAPENTIN 300 MG: 300 CAPSULE ORAL at 08:48

## 2025-06-23 RX ADMIN — GABAPENTIN 300 MG: 300 CAPSULE ORAL at 17:06

## 2025-06-23 RX ADMIN — METOPROLOL SUCCINATE 25 MG: 25 TABLET, EXTENDED RELEASE ORAL at 08:48

## 2025-06-23 RX ADMIN — SPIRONOLACTONE 25 MG: 25 TABLET ORAL at 08:48

## 2025-06-23 RX ADMIN — APIXABAN 2.5 MG: 2.5 TABLET, FILM COATED ORAL at 17:06

## 2025-06-23 RX ADMIN — INSULIN LISPRO 2 UNITS: 100 INJECTION, SOLUTION INTRAVENOUS; SUBCUTANEOUS at 17:06

## 2025-06-23 RX ADMIN — POTASSIUM CHLORIDE 20 MEQ: 1500 TABLET, EXTENDED RELEASE ORAL at 08:49

## 2025-06-23 RX ADMIN — CEPHALEXIN 500 MG: 500 CAPSULE ORAL at 05:04

## 2025-06-23 RX ADMIN — CEPHALEXIN 500 MG: 500 CAPSULE ORAL at 14:22

## 2025-06-23 RX ADMIN — METOPROLOL SUCCINATE 25 MG: 25 TABLET, EXTENDED RELEASE ORAL at 17:06

## 2025-06-23 RX ADMIN — BIMATOPROST 1 DROP: 0.3 SOLUTION/ DROPS OPHTHALMIC at 21:31

## 2025-06-23 RX ADMIN — DIPHENHYDRAMINE HCL 25 MG: 25 TABLET ORAL at 05:19

## 2025-06-23 RX ADMIN — CEPHALEXIN 500 MG: 500 CAPSULE ORAL at 21:31

## 2025-06-23 NOTE — ASSESSMENT & PLAN NOTE
Lab Results   Component Value Date    HGBA1C 7.5 (H) 06/04/2025       Recent Labs     06/22/25  2111 06/23/25  0609 06/23/25  1030 06/23/25  1601   POCGLU 195* 173* 171* 215*       Blood Sugar Average: Last 72 hrs:  (P) 179At home on glimepiride, now on hold  Insulin sliding scale with fingersticks daily prior to meals  Diabetic diet

## 2025-06-23 NOTE — PROGRESS NOTES
Progress Note - Cardiology   Name: Jessica Jaquez 80 y.o. female I MRN: [id number]  Unit/Bed#: [facility] 531-01 I Date of Admission: 6/20/2025   Date of Service: 6/23/2025 I Hospital Day: 2     Assessment & Plan  CHF exacerbation (HCC)  Wt Readings from Last 3 Encounters:   06/23/25 105 kg (230 lb 9.6 oz)   06/17/25 103 kg (226 lb)   05/06/25 103 kg (226 lb)   Presented with weight gain (11lbs in 4 days), conversational and exertional dyspnea, abdominal distension, worsening LE edema, orthopnea  BNP: 39 (could be false negative in female w/ obesity)  Home diuretic: Torsemide 20 mg PO BID + spironolactone 25 mg daily  Dry weight: 220-226 lb  Current diuretic: Lasix 80 mg IV daily  Weight: 230 lb by standing scale  I/O: 2400 mL urine output in 24 hours, net negative 3L since admission. Some output not documented due to incontinence/purewick not working  Suspect diet is somewhat contributing to volume status at home  Continue Lasix 80 mg IV daily today  Strict I/O monitoring, daily standing weights, am BMP  Will need higher dose of torsemide at discharge  Type 2 diabetes mellitus with other specified complication, without long-term current use of insulin (HCC)  Lab Results   Component Value Date    HGBA1C 7.5 (H) 06/04/2025   Per primary team  Essential hypertension  Controlled, 24 hour avg /70  Toprol-XL 25 mg twice daily, spironolactone 25 mg daily, IV Lasix 40 mg daily  PAF (paroxysmal atrial fibrillation) (HCC)  Usually symptomatic. Reports recent episode on Wednesday with HR up to 150s. Lasted a few minutes and resolved. Nothing since.   S/P Cardioversion and ablation with ILR placement  Toprol-XL 25 mg twice daily  Eliquis 2.5 mg twice daily   Will interrogate ILR to assess AF burden -- pending  UTI (urinary tract infection)  Started treatment as an outpatient  Continue Keflex    Subjective  Review of Systems   Constitutional: Negative for chills, malaise/fatigue and weight gain.   Cardiovascular:  Negative for  "chest pain, dyspnea on exertion, leg swelling, orthopnea, palpitations and syncope.   Respiratory:  Negative for cough, shortness of breath, sleep disturbances due to breathing and sputum production.    Gastrointestinal:  Negative for bloating, nausea and vomiting.   Neurological:  Negative for dizziness, light-headedness and weakness.   Psychiatric/Behavioral:  Negative for altered mental status.    All other systems reviewed and are negative.      Objective:   Vitals: Blood pressure 126/69, pulse 80, temperature (!) 97.3 °F (36.3 °C), temperature source Oral, resp. rate 17, height 5' 3\" (1.6 m), weight 105 kg (230 lb 9.6 oz), SpO2 96%., Body mass index is 40.85 kg/m².,     Systolic (24hrs), Av , Min:124 , Max:130     Diastolic (24hrs), Av, Min:66, Max:73        Intake/Output Summary (Last 24 hours) at 2025 1029  Last data filed at 2025 0936  Gross per 24 hour   Intake 750 ml   Output 2577 ml   Net -1827 ml     Wt Readings from Last 3 Encounters:   25 105 kg (230 lb 9.6 oz)   25 103 kg (226 lb)   25 103 kg (226 lb)     Telemetry Review: N/a    Physical Exam  Vitals reviewed.   Constitutional:       General: She is not in acute distress.     Appearance: She is obese.   Neck:      Vascular: No hepatojugular reflux or JVD.     Cardiovascular:      Rate and Rhythm: Normal rate and regular rhythm.      Heart sounds: Normal heart sounds. No murmur heard.     No friction rub. No gallop.   Pulmonary:      Effort: Pulmonary effort is normal. No respiratory distress.      Breath sounds: No rales.      Comments: Decreased at bases, room air  Abdominal:      General: Bowel sounds are normal. There is no distension.      Palpations: Abdomen is soft.      Tenderness: There is no abdominal tenderness.     Musculoskeletal:         General: No tenderness. Normal range of motion.      Cervical back: Neck supple.      Right lower leg: Edema present.      Left lower leg: Edema present.     Skin:    "  General: Skin is warm and dry.      Capillary Refill: Capillary refill takes less than 2 seconds.      Findings: No erythema.     Neurological:      Mental Status: She is alert and oriented to person, place, and time.     Psychiatric:         Mood and Affect: Mood normal.         LABORATORY RESULTS      CBC with diff:   Results from last 7 days   Lab Units 06/23/25 0342 06/22/25 0623 06/21/25 0447 06/20/25  1248   WBC Thousand/uL 11.02* 8.40 9.75 8.26   HEMOGLOBIN g/dL 12.4 11.2* 11.1* 12.0   HEMATOCRIT % 38.7 35.3 34.8 36.4   MCV fL 89 91 89 87   PLATELETS Thousands/uL 395* 340 363 360   RBC Million/uL 4.37 3.89 3.92 4.20   MCH pg 28.4 28.8 28.3 28.6   MCHC g/dL 32.0 31.7 31.9 33.0   RDW % 15.0 15.2* 15.3* 15.2*   MPV fL 8.8* 8.9 9.0 9.0   NRBC AUTO /100 WBCs  --   --   --  0       CMP:  Results from last 7 days   Lab Units 06/23/25 0342 06/22/25 0623 06/21/25 0447 06/20/25  1248   POTASSIUM mmol/L 4.3 3.8 3.9 4.0   CHLORIDE mmol/L 97 98 98 100   CO2 mmol/L 28 30 27 24   BUN mg/dL 37* 36* 38* 40*   CREATININE mg/dL 1.62* 1.46* 1.60* 1.80*   CALCIUM mg/dL 9.8 9.3 9.1 9.4   AST U/L  --   --   --  19   ALT U/L  --   --   --  14   ALK PHOS U/L  --   --   --  95   EGFR ml/min/1.73sq m 29 33 30 26       BMP:  Results from last 7 days   Lab Units 06/23/25 0342 06/22/25 0623 06/21/25 0447 06/20/25  1248   POTASSIUM mmol/L 4.3 3.8 3.9 4.0   CHLORIDE mmol/L 97 98 98 100   CO2 mmol/L 28 30 27 24   BUN mg/dL 37* 36* 38* 40*   CREATININE mg/dL 1.62* 1.46* 1.60* 1.80*   CALCIUM mg/dL 9.8 9.3 9.1 9.4       Lab Results   Component Value Date    CREATININE 1.62 (H) 06/23/2025    CREATININE 1.46 (H) 06/22/2025    CREATININE 1.60 (H) 06/21/2025       Lab Results   Component Value Date    NTBNP  11/26/2018      Comment:      This is a corrected result. Previous result was 343 pg/mL on 11/26/2018 at 1721 EST           Results from last 7 days   Lab Units 06/22/25  0623 06/21/25  0447   MAGNESIUM mg/dL 2.1 2.3                            Lipid Profile:   Lab Results   Component Value Date    CHOL 248 03/19/2014     Lab Results   Component Value Date    HDL 45 (L) 03/16/2024    HDL 51 03/10/2021    HDL 46 06/22/2020     Lab Results   Component Value Date    LDLCALC 85 03/16/2024    LDLCALC 154 (H) 03/10/2021    LDLCALC 135 (H) 06/22/2020     Lab Results   Component Value Date    TRIG 146 03/16/2024    TRIG 190 (H) 03/10/2021    TRIG 340 (H) 06/22/2020     Meds/Allergies   all current active meds have been reviewed and current meds:   Current Facility-Administered Medications:     acetaminophen (TYLENOL) tablet 650 mg, Q6H PRN    apixaban (ELIQUIS) tablet 2.5 mg, BID    bimatoprost (LUMIGAN) 0.03 % ophthalmic drops 1 drop, Daily    cephalexin (KEFLEX) capsule 500 mg, Q8H NALINI    furosemide (LASIX) injection 80 mg, Daily    gabapentin (NEURONTIN) capsule 300 mg, TID    insulin lispro (HumALOG/ADMELOG) 100 units/mL subcutaneous injection 1-5 Units, HS    insulin lispro (HumALOG/ADMELOG) 100 units/mL subcutaneous injection 1-6 Units, TID AC    metoprolol succinate (TOPROL-XL) 24 hr tablet 25 mg, BID    potassium chloride (Klor-Con M20) CR tablet 20 mEq, TID    spironolactone (ALDACTONE) tablet 25 mg, Daily      Counseling / Coordination of Care  Total floor / unit time spent today 20 minutes.  Greater than 50% of total time was spent with the patient and / or family counseling and / or coordination of care.      ** Please Note: Dragon 360 Dictation voice to text software may have been used in the creation of this document. **

## 2025-06-23 NOTE — ASSESSMENT & PLAN NOTE
Usually symptomatic. Reports recent episode on Wednesday with HR up to 150s. Lasted a few minutes and resolved. Nothing since.   S/P Cardioversion and ablation with ILR placement  Toprol-XL 25 mg twice daily  Eliquis 2.5 mg twice daily   Will interrogate ILR to assess AF burden -- pending

## 2025-06-23 NOTE — ASSESSMENT & PLAN NOTE
Wt Readings from Last 3 Encounters:   06/23/25 105 kg (230 lb 9.6 oz)   06/17/25 103 kg (226 lb)   05/06/25 103 kg (226 lb)   Presented with weight gain (11lbs in 4 days), conversational and exertional dyspnea, abdominal distension, worsening LE edema, orthopnea  BNP: 39 (could be false negative in female w/ obesity)  Home diuretic: Torsemide 20 mg PO BID + spironolactone 25 mg daily  Dry weight: 220-226 lb  Current diuretic: Lasix 80 mg IV daily  Weight: 230 lb by standing scale  I/O: 2400 mL urine output in 24 hours, net negative 3L since admission. Some output not documented due to incontinence/purewick not working  Suspect diet is somewhat contributing to volume status at home  Continue Lasix 80 mg IV daily today  Strict I/O monitoring, daily standing weights, am BMP  Will need higher dose of torsemide at discharge

## 2025-06-23 NOTE — PROGRESS NOTES
Progress Note - Hospitalist   Name: Jessica Jaquez 80 y.o. female I MRN: [id number]  [facility] I Date of Admission: 6/20/2025   Date of Service: 6/23/2025 I Hospital Day: 2     Assessment & Plan  CHF exacerbation (HCC)  Wt Readings from Last 3 Encounters:   06/23/25 105 kg (230 lb 9.6 oz)   06/17/25 103 kg (226 lb)   05/06/25 103 kg (226 lb)     Echo February this year: Left Ventricle: Left ventricular cavity size is normal. Wall thickness is mildly increased. There is concentric remodeling. The left ventricular ejection fraction is 60%. Systolic function is normal. Although no diagnostic regional wall motion abnormality was identified, this possibility cannot be completely excluded on the basis of this study. Diastolic function is mildly abnormal, consistent with grade I (abnormal) relaxation.   Patient is doing significantly better in terms of shortness of breath at this time.  Strict I&O  Daily weight  Cardiology on board  CW Lasix to 80 mg once daily  Will monitor today.  Continue spironolactone 25 mg daily  Continue metoprolol 25 mg.  Reviewed cardiology notes they are continuing with IV Lasix for another 24 hours   Possible discharge home 24-48    UTI (urinary tract infection)  Started treatment as an outpatient  Continue Keflex  Type 2 diabetes mellitus with other specified complication, without long-term current use of insulin (HCC)  Lab Results   Component Value Date    HGBA1C 7.5 (H) 06/04/2025       Recent Labs     06/22/25  2111 06/23/25  0609 06/23/25  1030 06/23/25  1601   POCGLU 195* 173* 171* 215*       Blood Sugar Average: Last 72 hrs:  (P) 179At home on glimepiride, now on hold  Insulin sliding scale with fingersticks daily prior to meals  Diabetic diet    Essential hypertension  Continue metoprolol 25 mg p.o.  Monitor vital signs  PAF (paroxysmal atrial fibrillation) (HCC)  Continue Eliquis 2.5 mg twice daily    VTE Pharmacologic Prophylaxis:   Moderate Risk (Score 3-4) -  Pharmacological DVT Prophylaxis Ordered: apixaban (Eliquis).    Mobility:   Basic Mobility Inpatient Raw Score: 18  JH-HLM Goal: 6: Walk 10 steps or more  JH-HLM Achieved: 6: Walk 10 steps or more  JH-HLM Goal achieved. Continue to encourage appropriate mobility.    Patient Centered Rounds: I performed bedside rounds with nursing staff today.   Discussions with Specialists or Other Care Team Provider: patiet    Education and Discussions with Family / Patient: Updated  () at bedside.    Current Length of Stay: 2 day(s)  Current Patient Status: Inpatient   Certification Statement: The patient will continue to require additional inpatient hospital stay due to Acute chf needs IV diuretic  Discharge Plan: Anticipate discharge in 24-48 hrs to home.    Code Status: Level 3 - DNAR and DNI    Subjective   Patient doing well    Objective :  Temp:  [97.3 °F (36.3 °C)-98.1 °F (36.7 °C)] 98.1 °F (36.7 °C)  HR:  [62-86] 73  BP: (124-130)/(60-71) 124/60  Resp:  [16-17] 16  SpO2:  [93 %-97 %] 93 %  O2 Device: None (Room air)    Body mass index is 40.85 kg/m².     Input and Output Summary (last 24 hours):     Intake/Output Summary (Last 24 hours) at 6/23/2025 1606  Last data filed at 6/23/2025 1427  Gross per 24 hour   Intake 1588 ml   Output 2227 ml   Net -639 ml       Physical Exam  Vitals and nursing note reviewed.   Constitutional:       General: She is not in acute distress.     Appearance: She is well-developed. She is obese. She is ill-appearing.   HENT:      Head: Normocephalic and atraumatic.      Nose: Nose normal.      Mouth/Throat:      Mouth: Mucous membranes are moist.     Eyes:      Conjunctiva/sclera: Conjunctivae normal.       Cardiovascular:      Rate and Rhythm: Normal rate and regular rhythm.      Heart sounds: No murmur heard.  Pulmonary:      Effort: Pulmonary effort is normal. No respiratory distress.      Breath sounds: Normal breath sounds.   Abdominal:      Palpations: Abdomen is soft.       Tenderness: There is no abdominal tenderness.     Musculoskeletal:      Cervical back: Neck supple.      Right lower leg: Edema present.      Left lower leg: Edema present.     Skin:     General: Skin is warm and dry.      Capillary Refill: Capillary refill takes less than 2 seconds.     Neurological:      General: No focal deficit present.      Mental Status: She is alert and oriented to person, place, and time.     Psychiatric:         Mood and Affect: Mood normal.           Lines/Drains:          Telemetry:  Telemetry Orders (From admission, onward)               24 Hour Telemetry Monitoring  Continuous x 24 Hours (Telem)        Expiring   Question:  Reason for 24 Hour Telemetry  Answer:  Decompensated CHF- and any one of the following: continuous diuretic infusion or total diuretic dose >200 mg daily, associated electrolyte derangement (I.e. K < 3.0), inotropic drip (continuous infusion), hx of ventricular arrhythmia, or new EF < 35%                     Telemetry Reviewed: Normal Sinus Rhythm  Indication for Continued Telemetry Use: No indication for continued use. Will discontinue.                Lab Results: I have reviewed the following results:   Results from last 7 days   Lab Units 06/23/25 0342 06/21/25 0447 06/20/25  1248   WBC Thousand/uL 11.02*   < > 8.26   HEMOGLOBIN g/dL 12.4   < > 12.0   HEMATOCRIT % 38.7   < > 36.4   PLATELETS Thousands/uL 395*   < > 360   SEGS PCT %  --   --  67   LYMPHO PCT %  --   --  21   MONO PCT %  --   --  5   EOS PCT %  --   --  5    < > = values in this interval not displayed.     Results from last 7 days   Lab Units 06/23/25 0342 06/21/25 0447 06/20/25  1248   SODIUM mmol/L 135   < > 136   POTASSIUM mmol/L 4.3   < > 4.0   CHLORIDE mmol/L 97   < > 100   CO2 mmol/L 28   < > 24   BUN mg/dL 37*   < > 40*   CREATININE mg/dL 1.62*   < > 1.80*   ANION GAP mmol/L 10   < > 12   CALCIUM mg/dL 9.8   < > 9.4   ALBUMIN g/dL  --   --  3.8   TOTAL BILIRUBIN mg/dL  --   --  0.42    ALK PHOS U/L  --   --  95   ALT U/L  --   --  14   AST U/L  --   --  19   GLUCOSE RANDOM mg/dL 138   < > 219*    < > = values in this interval not displayed.         Results from last 7 days   Lab Units 06/23/25  1601 06/23/25  1030 06/23/25  0609 06/22/25  2111 06/22/25  1606 06/22/25  1102 06/22/25  0602 06/21/25  2055 06/21/25  1529 06/21/25  1035 06/21/25  0603 06/20/25  2038   POC GLUCOSE mg/dl 215* 171* 173* 195* 145* 270* 159* 165* 167* 212* 158* 179*               Recent Cultures (last 7 days):   Results from last 7 days   Lab Units 06/17/25  1119   URINE CULTURE  >100,000 cfu/ml Klebsiella pneumoniae*         Last 24 Hours Medication List:     Current Facility-Administered Medications:     acetaminophen (TYLENOL) tablet 650 mg, Q6H PRN    apixaban (ELIQUIS) tablet 2.5 mg, BID    bimatoprost (LUMIGAN) 0.03 % ophthalmic drops 1 drop, Daily    cephalexin (KEFLEX) capsule 500 mg, Q8H NALINI    furosemide (LASIX) injection 80 mg, Daily    gabapentin (NEURONTIN) capsule 300 mg, TID    insulin lispro (HumALOG/ADMELOG) 100 units/mL subcutaneous injection 1-5 Units, HS    insulin lispro (HumALOG/ADMELOG) 100 units/mL subcutaneous injection 1-6 Units, TID AC    metoprolol succinate (TOPROL-XL) 24 hr tablet 25 mg, BID    potassium chloride (Klor-Con M20) CR tablet 20 mEq, TID    spironolactone (ALDACTONE) tablet 25 mg, Daily    Administrative Statements   Today, Patient Was Seen By: Ferdinand Singh MD      **Please Note: This note may have been constructed using a voice recognition system.**

## 2025-06-23 NOTE — RESTORATIVE TECHNICIAN NOTE
Restorative Technician Note      Patient Name: Jessica Jaquez     Restorative Tech Visit Date: 06/23/25  Note Type: Mobility  Patient Position Upon Consult: Bedside chair  Activity Performed: Ambulated  Assistive Device: Roller walker  Patient Position at End of Consult: Bedside chair; All needs within reach; Bed/Chair alarm activated

## 2025-06-23 NOTE — ASSESSMENT & PLAN NOTE
Wt Readings from Last 3 Encounters:   06/23/25 105 kg (230 lb 9.6 oz)   06/17/25 103 kg (226 lb)   05/06/25 103 kg (226 lb)     Echo February this year: Left Ventricle: Left ventricular cavity size is normal. Wall thickness is mildly increased. There is concentric remodeling. The left ventricular ejection fraction is 60%. Systolic function is normal. Although no diagnostic regional wall motion abnormality was identified, this possibility cannot be completely excluded on the basis of this study. Diastolic function is mildly abnormal, consistent with grade I (abnormal) relaxation.   Patient is doing significantly better in terms of shortness of breath at this time.  Strict I&O  Daily weight  Cardiology on board  CW Lasix to 80 mg once daily  Will monitor today.  Continue spironolactone 25 mg daily  Continue metoprolol 25 mg.  Reviewed cardiology notes they are continuing with IV Lasix for another 24 hours   Possible discharge home 24-48

## 2025-06-23 NOTE — ASSESSMENT & PLAN NOTE
Controlled, 24 hour avg /70  Toprol-XL 25 mg twice daily, spironolactone 25 mg daily, IV Lasix 40 mg daily

## 2025-06-24 LAB
ANION GAP SERPL CALCULATED.3IONS-SCNC: 12 MMOL/L (ref 4–13)
BUN SERPL-MCNC: 42 MG/DL (ref 5–25)
CALCIUM SERPL-MCNC: 9.7 MG/DL (ref 8.4–10.2)
CHLORIDE SERPL-SCNC: 95 MMOL/L (ref 96–108)
CO2 SERPL-SCNC: 26 MMOL/L (ref 21–32)
CREAT SERPL-MCNC: 1.67 MG/DL (ref 0.6–1.3)
ERYTHROCYTE [DISTWIDTH] IN BLOOD BY AUTOMATED COUNT: 15.1 % (ref 11.6–15.1)
GFR SERPL CREATININE-BSD FRML MDRD: 28 ML/MIN/1.73SQ M
GLUCOSE SERPL-MCNC: 179 MG/DL (ref 65–140)
GLUCOSE SERPL-MCNC: 194 MG/DL (ref 65–140)
GLUCOSE SERPL-MCNC: 287 MG/DL (ref 65–140)
GLUCOSE SERPL-MCNC: 305 MG/DL (ref 65–140)
GLUCOSE SERPL-MCNC: 339 MG/DL (ref 65–140)
HCT VFR BLD AUTO: 39.6 % (ref 34.8–46.1)
HGB BLD-MCNC: 12.8 G/DL (ref 11.5–15.4)
MCH RBC QN AUTO: 28.2 PG (ref 26.8–34.3)
MCHC RBC AUTO-ENTMCNC: 32.3 G/DL (ref 31.4–37.4)
MCV RBC AUTO: 87 FL (ref 82–98)
PLATELET # BLD AUTO: 415 THOUSANDS/UL (ref 149–390)
PMV BLD AUTO: 9 FL (ref 8.9–12.7)
POTASSIUM SERPL-SCNC: 4.6 MMOL/L (ref 3.5–5.3)
RBC # BLD AUTO: 4.54 MILLION/UL (ref 3.81–5.12)
SODIUM SERPL-SCNC: 133 MMOL/L (ref 135–147)
WBC # BLD AUTO: 10.63 THOUSAND/UL (ref 4.31–10.16)

## 2025-06-24 PROCEDURE — 99232 SBSQ HOSP IP/OBS MODERATE 35: CPT | Performed by: INTERNAL MEDICINE

## 2025-06-24 PROCEDURE — 85027 COMPLETE CBC AUTOMATED: CPT

## 2025-06-24 PROCEDURE — 80048 BASIC METABOLIC PNL TOTAL CA: CPT

## 2025-06-24 PROCEDURE — 99232 SBSQ HOSP IP/OBS MODERATE 35: CPT

## 2025-06-24 PROCEDURE — 82948 REAGENT STRIP/BLOOD GLUCOSE: CPT

## 2025-06-24 RX ORDER — DIPHENHYDRAMINE HCL 25 MG
12.5 TABLET ORAL ONCE
Status: COMPLETED | OUTPATIENT
Start: 2025-06-24 | End: 2025-06-24

## 2025-06-24 RX ORDER — DIPHENHYDRAMINE HCL 25 MG
25 TABLET ORAL
Status: DISCONTINUED | OUTPATIENT
Start: 2025-06-24 | End: 2025-06-25 | Stop reason: HOSPADM

## 2025-06-24 RX ORDER — FUROSEMIDE 10 MG/ML
80 INJECTION INTRAMUSCULAR; INTRAVENOUS
Status: DISCONTINUED | OUTPATIENT
Start: 2025-06-24 | End: 2025-06-25

## 2025-06-24 RX ADMIN — METOPROLOL SUCCINATE 25 MG: 25 TABLET, EXTENDED RELEASE ORAL at 17:04

## 2025-06-24 RX ADMIN — BIMATOPROST 1 DROP: 0.3 SOLUTION/ DROPS OPHTHALMIC at 21:19

## 2025-06-24 RX ADMIN — CEPHALEXIN 500 MG: 500 CAPSULE ORAL at 13:31

## 2025-06-24 RX ADMIN — INSULIN LISPRO 5 UNITS: 100 INJECTION, SOLUTION INTRAVENOUS; SUBCUTANEOUS at 12:39

## 2025-06-24 RX ADMIN — CEPHALEXIN 500 MG: 500 CAPSULE ORAL at 05:12

## 2025-06-24 RX ADMIN — SPIRONOLACTONE 25 MG: 25 TABLET ORAL at 09:00

## 2025-06-24 RX ADMIN — POTASSIUM CHLORIDE 20 MEQ: 1500 TABLET, EXTENDED RELEASE ORAL at 09:00

## 2025-06-24 RX ADMIN — ACETAMINOPHEN 650 MG: 325 TABLET ORAL at 16:14

## 2025-06-24 RX ADMIN — ACETAMINOPHEN 650 MG: 325 TABLET ORAL at 22:02

## 2025-06-24 RX ADMIN — DIPHENHYDRAMINE HCL 12.5 MG: 25 TABLET ORAL at 03:12

## 2025-06-24 RX ADMIN — FUROSEMIDE 80 MG: 10 INJECTION, SOLUTION INTRAMUSCULAR; INTRAVENOUS at 09:03

## 2025-06-24 RX ADMIN — ACETAMINOPHEN 650 MG: 325 TABLET ORAL at 05:41

## 2025-06-24 RX ADMIN — CEPHALEXIN 500 MG: 500 CAPSULE ORAL at 21:14

## 2025-06-24 RX ADMIN — GABAPENTIN 300 MG: 300 CAPSULE ORAL at 16:12

## 2025-06-24 RX ADMIN — GABAPENTIN 300 MG: 300 CAPSULE ORAL at 21:14

## 2025-06-24 RX ADMIN — DIPHENHYDRAMINE HCL 25 MG: 25 TABLET ORAL at 22:02

## 2025-06-24 RX ADMIN — APIXABAN 2.5 MG: 2.5 TABLET, FILM COATED ORAL at 09:00

## 2025-06-24 RX ADMIN — DIPHENHYDRAMINE HYDROCHLORIDE, ZINC ACETATE: 2; .1 CREAM TOPICAL at 02:33

## 2025-06-24 RX ADMIN — GABAPENTIN 300 MG: 300 CAPSULE ORAL at 09:00

## 2025-06-24 RX ADMIN — APIXABAN 2.5 MG: 2.5 TABLET, FILM COATED ORAL at 17:04

## 2025-06-24 RX ADMIN — METOPROLOL SUCCINATE 25 MG: 25 TABLET, EXTENDED RELEASE ORAL at 09:00

## 2025-06-24 RX ADMIN — POTASSIUM CHLORIDE 20 MEQ: 1500 TABLET, EXTENDED RELEASE ORAL at 21:14

## 2025-06-24 RX ADMIN — INSULIN LISPRO 2 UNITS: 100 INJECTION, SOLUTION INTRAVENOUS; SUBCUTANEOUS at 21:15

## 2025-06-24 RX ADMIN — FUROSEMIDE 80 MG: 10 INJECTION, SOLUTION INTRAMUSCULAR; INTRAVENOUS at 16:12

## 2025-06-24 RX ADMIN — INSULIN LISPRO 1 UNITS: 100 INJECTION, SOLUTION INTRAVENOUS; SUBCUTANEOUS at 07:16

## 2025-06-24 RX ADMIN — INSULIN LISPRO 4 UNITS: 100 INJECTION, SOLUTION INTRAVENOUS; SUBCUTANEOUS at 16:15

## 2025-06-24 RX ADMIN — POTASSIUM CHLORIDE 20 MEQ: 1500 TABLET, EXTENDED RELEASE ORAL at 16:12

## 2025-06-24 NOTE — PROGRESS NOTES
Progress Note - Hospitalist   Name: Jessica Jaquez 80 y.o. female I MRN: [id number]  [facility] Date of Admission: 6/20/2025   Date of Service: 6/24/2025 I Hospital Day: 3     Assessment & Plan  CHF exacerbation (HCC)  Wt Readings from Last 3 Encounters:   06/24/25 104 kg (229 lb 4.5 oz)   06/17/25 103 kg (226 lb)   05/06/25 103 kg (226 lb)     Echo February this year: Left Ventricle: Left ventricular cavity size is normal. Wall thickness is mildly increased. There is concentric remodeling. The left ventricular ejection fraction is 60%. Systolic function is normal. Although no diagnostic regional wall motion abnormality was identified, this possibility cannot be completely excluded on the basis of this study. Diastolic function is mildly abnormal, consistent with grade I (abnormal) relaxation.   Patient is doing significantly better in terms of shortness of breath at this time.  Strict I&O  Daily weight  Cardiology on board  CW Lasix to 80 mg once daily  Will monitor today.  Continue spironolactone 25 mg daily  Continue metoprolol 25 mg.  Reviewed cardiology notes they are continuing with IV Lasix  Possible discharge home 24-48    UTI (urinary tract infection)  Started treatment as an outpatient  Continue Keflex  Type 2 diabetes mellitus with other specified complication, without long-term current use of insulin ([facility])  Lab Results   Component Value Date    HGBA1C 7.5 (H) 06/04/2025       Recent Labs     06/23/25  1601 06/23/25  2113 06/24/25  0630 06/24/25  1033   POCGLU 215* 194* 194* 339*       Blood Sugar Average: Last 72 hrs:  (P) [id number] home on glimepiride, now on hold  Insulin sliding scale with fingersticks daily prior to meals  Diabetic diet    Essential hypertension  Continue metoprolol 25 mg p.o.  Monitor vital signs  PAF (paroxysmal atrial fibrillation) (HCC)  Continue Eliquis 2.5 mg twice daily        VTE Pharmacologic Prophylaxis:   Moderate Risk (Score 3-4) -  Pharmacological DVT Prophylaxis Ordered: apixaban (Eliquis).    Mobility:   Basic Mobility Inpatient Raw Score: 18  JH-HLM Goal: 6: Walk 10 steps or more  JH-HLM Achieved: 6: Walk 10 steps or more  JH-HLM Goal achieved. Continue to encourage appropriate mobility.    Patient Centered Rounds: I performed bedside rounds with nursing staff today.   Discussions with Specialists or Other Care Team Provider: patiet    Education and Discussions with Family / Patient: Updated  () at bedside.    Current Length of Stay: 3 day(s)  Current Patient Status: Inpatient   Certification Statement: The patient will continue to require additional inpatient hospital stay due to Acute chf needs IV diuretic  Discharge Plan: Anticipate discharge in 24-48 hrs to home.    Code Status: Level 3 - DNAR and DNI    Subjective   Patient doing well    Objective :  Temp:  [97.2 °F (36.2 °C)-98.1 °F (36.7 °C)] 97.5 °F (36.4 °C)  HR:  [61-86] 86  BP: (119-138)/(60-92) 131/72  Resp:  [16-19] 16  SpO2:  [93 %-99 %] 99 %  O2 Device: None (Room air)    Body mass index is 40.61 kg/m².     Input and Output Summary (last 24 hours):     Intake/Output Summary (Last 24 hours) at 6/24/2025 1439  Last data filed at 6/24/2025 1241  Gross per 24 hour   Intake 221 ml   Output 1250 ml   Net -1029 ml       Physical Exam  Vitals and nursing note reviewed.   Constitutional:       General: She is not in acute distress.     Appearance: She is well-developed.   HENT:      Head: Normocephalic and atraumatic.      Nose: Nose normal.      Mouth/Throat:      Mouth: Mucous membranes are moist.     Eyes:      Conjunctiva/sclera: Conjunctivae normal.       Cardiovascular:      Rate and Rhythm: Normal rate and regular rhythm.      Heart sounds: No murmur heard.  Pulmonary:      Effort: Pulmonary effort is normal. No respiratory distress.      Breath sounds: Normal breath sounds.   Abdominal:      Palpations: Abdomen is soft.      Tenderness: There is no  abdominal tenderness.     Musculoskeletal:      Cervical back: Neck supple.      Right lower leg: Edema present.      Left lower leg: Edema present.     Skin:     General: Skin is warm and dry.      Capillary Refill: Capillary refill takes less than 2 seconds.     Neurological:      General: No focal deficit present.      Mental Status: She is alert and oriented to person, place, and time.     Psychiatric:         Mood and Affect: Mood normal.           Lines/Drains:                   Lab Results: I have reviewed the following results:   Results from last 7 days   Lab Units 06/24/25  0622 06/21/25 0447 06/20/25  1248   WBC Thousand/uL 10.63*   < > 8.26   HEMOGLOBIN g/dL 12.8   < > 12.0   HEMATOCRIT % 39.6   < > 36.4   PLATELETS Thousands/uL 415*   < > 360   SEGS PCT %  --   --  67   LYMPHO PCT %  --   --  21   MONO PCT %  --   --  5   EOS PCT %  --   --  5    < > = values in this interval not displayed.     Results from last 7 days   Lab Units 06/24/25  0622 06/21/25 0447 06/20/25  1248   SODIUM mmol/L 133*   < > 136   POTASSIUM mmol/L 4.6   < > 4.0   CHLORIDE mmol/L 95*   < > 100   CO2 mmol/L 26   < > 24   BUN mg/dL 42*   < > 40*   CREATININE mg/dL 1.67*   < > 1.80*   ANION GAP mmol/L 12   < > 12   CALCIUM mg/dL 9.7   < > 9.4   ALBUMIN g/dL  --   --  3.8   TOTAL BILIRUBIN mg/dL  --   --  0.42   ALK PHOS U/L  --   --  95   ALT U/L  --   --  14   AST U/L  --   --  19   GLUCOSE RANDOM mg/dL 179*   < > 219*    < > = values in this interval not displayed.         Results from last 7 days   Lab Units 06/24/25  1033 06/24/25  0630 06/23/25  2113 06/23/25  1601 06/23/25  1030 06/23/25  0609 06/22/25  2111 06/22/25  1606 06/22/25  1102 06/22/25  0602 06/21/25  2055 06/21/25  1529   POC GLUCOSE mg/dl 339* 194* 194* 215* 171* 173* 195* 145* 270* 159* 165* 167*               Recent Cultures (last 7 days):             Last 24 Hours Medication List:     Current Facility-Administered Medications:     acetaminophen (TYLENOL)  tablet 650 mg, Q6H PRN    apixaban (ELIQUIS) tablet 2.5 mg, BID    bimatoprost (LUMIGAN) 0.03 % ophthalmic drops 1 drop, Daily    cephalexin (KEFLEX) capsule 500 mg, Q8H NALINI    diphenhydrAMINE (BENADRYL) tablet 25 mg, HS PRN    diphenhydrAMINE-zinc acetate (BENADRYL) 2-0.1 % cream, TID PRN    furosemide (LASIX) injection 80 mg, BID (diuretic)    gabapentin (NEURONTIN) capsule 300 mg, TID    insulin lispro (HumALOG/ADMELOG) 100 units/mL subcutaneous injection 1-5 Units, HS    insulin lispro (HumALOG/ADMELOG) 100 units/mL subcutaneous injection 1-6 Units, TID AC    metoprolol succinate (TOPROL-XL) 24 hr tablet 25 mg, BID    potassium chloride (Klor-Con M20) CR tablet 20 mEq, TID    spironolactone (ALDACTONE) tablet 25 mg, Daily    Administrative Statements   Today, Patient Was Seen By: Ferdinand Singh MD      **Please Note: This note may have been constructed using a voice recognition system.**

## 2025-06-24 NOTE — ASSESSMENT & PLAN NOTE
Wt Readings from Last 3 Encounters:   06/24/25 104 kg (229 lb 4.5 oz)   06/17/25 103 kg (226 lb)   05/06/25 103 kg (226 lb)   Presented with weight gain (11lbs in 4 days), conversational and exertional dyspnea, abdominal distension, worsening LE edema, orthopnea  BNP: 39 (could be false negative in female w/ obesity)  Home diuretic: Torsemide 20 mg PO BID + spironolactone 25 mg daily  Dry weight: 220-226 lb  Current diuretic: Lasix 80 mg IV daily  Weight: 229 lb by standing scale (down 1 lb in 24 hours)  I/O: 1800 mL urine output in 24 hours, net negative 3.8 L since admission. Some output not documented due to incontinence/purewick not working  Suspect diet is contributing to volume status at home  Increase Lasix to 80 mg IV BID today  Strict I/O monitoring, daily standing weights, am BMP  Will need higher dose of torsemide at discharge

## 2025-06-24 NOTE — ASSESSMENT & PLAN NOTE
Lab Results   Component Value Date    HGBA1C 7.5 (H) 06/04/2025       Recent Labs     06/23/25  1601 06/23/25  2113 06/24/25  0630 06/24/25  1033   POCGLU 215* 194* 194* 339*       Blood Sugar Average: Last 72 hrs:  (P) [id number] home on glimepiride, now on hold  Insulin sliding scale with fingersticks daily prior to meals  Diabetic diet

## 2025-06-24 NOTE — ASSESSMENT & PLAN NOTE
Usually symptomatic. Reports recent episode on Wednesday with HR up to 150s. Lasted a few minutes and resolved. Nothing since.   S/P Cardioversion and ablation with ILR placement  Toprol-XL 25 mg twice daily  Eliquis 2.5 mg twice daily

## 2025-06-24 NOTE — PROGRESS NOTES
Progress Note - Cardiology   Name: Jessica Jaquez 80 y.o. female I MRN: [id number]  Unit/Bed#: [facility] 531-01 I Date of Admission: 6/20/2025   Date of Service: 6/24/2025 I Hospital Day: 3     Assessment & Plan  CHF exacerbation (HCC)  Wt Readings from Last 3 Encounters:   06/24/25 104 kg (229 lb 4.5 oz)   06/17/25 103 kg (226 lb)   05/06/25 103 kg (226 lb)   Presented with weight gain (11lbs in 4 days), conversational and exertional dyspnea, abdominal distension, worsening LE edema, orthopnea  BNP: 39 (could be false negative in female w/ obesity)  Home diuretic: Torsemide 20 mg PO BID + spironolactone 25 mg daily  Dry weight: 220-226 lb  Current diuretic: Lasix 80 mg IV daily  Weight: 229 lb by standing scale (down 1 lb in 24 hours)  I/O: 1800 mL urine output in 24 hours, net negative 3.8 L since admission. Some output not documented due to incontinence/purewick not working  Suspect diet is contributing to volume status at home  Increase Lasix to 80 mg IV BID today  Strict I/O monitoring, daily standing weights, am BMP  Will need higher dose of torsemide at discharge  Type 2 diabetes mellitus with other specified complication, without long-term current use of insulin (HCC)  Lab Results   Component Value Date    HGBA1C 7.5 (H) 06/04/2025   Per primary team  Essential hypertension  Controlled, 24 hour avg /74  Toprol-XL 25 mg twice daily, spironolactone 25 mg daily, IV Lasix 40 mg daily  PAF (paroxysmal atrial fibrillation) (HCC)  Usually symptomatic. Reports recent episode on Wednesday with HR up to 150s. Lasted a few minutes and resolved. Nothing since.   S/P Cardioversion and ablation with ILR placement  Toprol-XL 25 mg twice daily  Eliquis 2.5 mg twice daily   UTI (urinary tract infection)  Started treatment as an outpatient  Continue Keflex    Subjective  Review of Systems   Constitutional: Positive for weight gain. Negative for chills and malaise/fatigue.   Cardiovascular:  Positive for dyspnea on exertion  "and leg swelling. Negative for chest pain, orthopnea, palpitations and syncope.   Respiratory:  Negative for cough, shortness of breath, sleep disturbances due to breathing and sputum production.    Endocrine: Negative.    Hematologic/Lymphatic: Negative.    Skin:  Positive for itching.   Gastrointestinal:  Negative for bloating, nausea and vomiting.   Genitourinary: Negative.    Neurological:  Negative for dizziness, light-headedness and weakness.   Psychiatric/Behavioral:  Negative for altered mental status.    All other systems reviewed and are negative.      Objective:   Vitals: Blood pressure 131/72, pulse 86, temperature 97.5 °F (36.4 °C), temperature source Oral, resp. rate 16, height 5' 3\" (1.6 m), weight 104 kg (229 lb 4.5 oz), SpO2 99%., Body mass index is 40.61 kg/m².,     Systolic (24hrs), Av , Min:119 , Max:138     Diastolic (24hrs), Av, Min:60, Max:92        Intake/Output Summary (Last 24 hours) at 2025 1101  Last data filed at 2025 0709  Gross per 24 hour   Intake 819 ml   Output 1650 ml   Net -831 ml     Wt Readings from Last 3 Encounters:   25 104 kg (229 lb 4.5 oz)   25 103 kg (226 lb)   25 103 kg (226 lb)     Telemetry Review: N/a    Physical Exam  Vitals reviewed.   Constitutional:       General: She is not in acute distress.     Appearance: She is obese.   Neck:      Vascular: No hepatojugular reflux or JVD.     Cardiovascular:      Rate and Rhythm: Normal rate and regular rhythm.      Heart sounds: Normal heart sounds. No murmur heard.     No friction rub. No gallop.   Pulmonary:      Effort: Pulmonary effort is normal. No respiratory distress.      Breath sounds: No rales.      Comments: Diminished at bases, room air  Abdominal:      General: Bowel sounds are normal. There is no distension.      Palpations: Abdomen is soft.      Tenderness: There is no abdominal tenderness.     Musculoskeletal:         General: No tenderness. Normal range of motion.      " Cervical back: Neck supple.      Right lower leg: Edema (trace) present.      Left lower leg: Edema (trace) present.     Skin:     General: Skin is warm and dry.      Findings: No erythema.     Neurological:      Mental Status: She is alert and oriented to person, place, and time.     Psychiatric:         Mood and Affect: Mood normal.         LABORATORY RESULTS      CBC with diff:   Results from last 7 days   Lab Units 06/24/25  0622 06/23/25 0342 06/22/25  0623 06/21/25 0447 06/20/25  1248   WBC Thousand/uL 10.63* 11.02* 8.40 9.75 8.26   HEMOGLOBIN g/dL 12.8 12.4 11.2* 11.1* 12.0   HEMATOCRIT % 39.6 38.7 35.3 34.8 36.4   MCV fL 87 89 91 89 87   PLATELETS Thousands/uL 415* 395* 340 363 360   RBC Million/uL 4.54 4.37 3.89 3.92 4.20   MCH pg 28.2 28.4 28.8 28.3 28.6   MCHC g/dL 32.3 32.0 31.7 31.9 33.0   RDW % 15.1 15.0 15.2* 15.3* 15.2*   MPV fL 9.0 8.8* 8.9 9.0 9.0   NRBC AUTO /100 WBCs  --   --   --   --  0     CMP:  Results from last 7 days   Lab Units 06/24/25  0622 06/23/25 0342 06/22/25  0623 06/21/25 0447 06/20/25  1248   POTASSIUM mmol/L 4.6 4.3 3.8 3.9 4.0   CHLORIDE mmol/L 95* 97 98 98 100   CO2 mmol/L 26 28 30 27 24   BUN mg/dL 42* 37* 36* 38* 40*   CREATININE mg/dL 1.67* 1.62* 1.46* 1.60* 1.80*   CALCIUM mg/dL 9.7 9.8 9.3 9.1 9.4   AST U/L  --   --   --   --  19   ALT U/L  --   --   --   --  14   ALK PHOS U/L  --   --   --   --  95   EGFR ml/min/1.73sq m 28 29 33 30 26       BMP:  Results from last 7 days   Lab Units 06/24/25  0622 06/23/25  0342 06/22/25  0623 06/21/25  0447 06/20/25  1248   POTASSIUM mmol/L 4.6 4.3 3.8 3.9 4.0   CHLORIDE mmol/L 95* 97 98 98 100   CO2 mmol/L 26 28 30 27 24   BUN mg/dL 42* 37* 36* 38* 40*   CREATININE mg/dL 1.67* 1.62* 1.46* 1.60* 1.80*   CALCIUM mg/dL 9.7 9.8 9.3 9.1 9.4       Lab Results   Component Value Date    CREATININE 1.67 (H) 06/24/2025    CREATININE 1.62 (H) 06/23/2025    CREATININE 1.46 (H) 06/22/2025       Lab Results   Component Value Date    NTBNP   11/26/2018      Comment:      This is a corrected result. Previous result was 343 pg/mL on 11/26/2018 at 1721 EST           Results from last 7 days   Lab Units 06/22/25  0623 06/21/25  0447   MAGNESIUM mg/dL 2.1 2.3                           Lipid Profile:   Lab Results   Component Value Date    CHOL 248 03/19/2014     Lab Results   Component Value Date    HDL 45 (L) 03/16/2024    HDL 51 03/10/2021    HDL 46 06/22/2020     Lab Results   Component Value Date    LDLCALC 85 03/16/2024    LDLCALC 154 (H) 03/10/2021    LDLCALC 135 (H) 06/22/2020     Lab Results   Component Value Date    TRIG 146 03/16/2024    TRIG 190 (H) 03/10/2021    TRIG 340 (H) 06/22/2020       Meds/Allergies   all current active meds have been reviewed and current meds:   Current Facility-Administered Medications:     acetaminophen (TYLENOL) tablet 650 mg, Q6H PRN    apixaban (ELIQUIS) tablet 2.5 mg, BID    bimatoprost (LUMIGAN) 0.03 % ophthalmic drops 1 drop, Daily    cephalexin (KEFLEX) capsule 500 mg, Q8H NALINI    diphenhydrAMINE (BENADRYL) tablet 25 mg, HS PRN    diphenhydrAMINE-zinc acetate (BENADRYL) 2-0.1 % cream, TID PRN    furosemide (LASIX) injection 80 mg, BID (diuretic)    gabapentin (NEURONTIN) capsule 300 mg, TID    insulin lispro (HumALOG/ADMELOG) 100 units/mL subcutaneous injection 1-5 Units, HS    insulin lispro (HumALOG/ADMELOG) 100 units/mL subcutaneous injection 1-6 Units, TID AC    metoprolol succinate (TOPROL-XL) 24 hr tablet 25 mg, BID    potassium chloride (Klor-Con M20) CR tablet 20 mEq, TID    spironolactone (ALDACTONE) tablet 25 mg, Daily    Medications Prior to Admission:     apixaban (ELIQUIS) 2.5 mg    bimatoprost (Lumigan) 0.01 % ophthalmic drops    cephalexin (KEFLEX) 500 mg capsule    Cholecalciferol (VITAMIN D3) 1000 units CAPS    estradiol (ESTRACE VAGINAL) 0.1 mg/g vaginal cream    ezetimibe (ZETIA) 10 mg tablet    gabapentin (NEURONTIN) 100 mg capsule    glimepiride (AMARYL) 1 mg tablet    ketoconazole (NIZORAL) 2  % cream    metoprolol succinate (TOPROL-XL) 25 mg 24 hr tablet    metroNIDAZOLE (METROCREAM) 0.75 % cream    Multiple Vitamins-Minerals (CENTRUM SILVER PO)    potassium chloride (Klor-Con M20) 20 mEq tablet    spironolactone (ALDACTONE) 25 mg tablet    tiZANidine (ZANAFLEX) 2 mg tablet    torsemide (DEMADEX) 20 mg tablet    triamcinolone (KENALOG) 0.025 % cream         Counseling / Coordination of Care  Total floor / unit time spent today 20 minutes.  Greater than 50% of total time was spent with the patient and / or family counseling and / or coordination of care.      ** Please Note: Dragon 360 Dictation voice to text software may have been used in the creation of this document. **

## 2025-06-24 NOTE — RESTORATIVE TECHNICIAN NOTE
Restorative Technician Note      Patient Name: Jessica Jaquez     Restorative Tech Visit Date: 06/24/25  Note Type: Mobility  Patient Position Upon Consult: Bedside chair  Activity Performed: Stood; Dangled  Assistive Device: Roller walker  Patient Position at End of Consult: Bedside chair; All needs within reach; Bed/Chair alarm activated

## 2025-06-24 NOTE — WOUND OSTOMY CARE
Wound Care consulted for new wound on labia. Patient with no wounds or skin loss noted. Sacro-Buttocks and Heels are intact and blanchable - preventative orders are placed. Labia is intact with no wounds or skin loss present- refer to media for image.       Orders listed below and wound care will sign off, call or Secure Chat Message with questions.       Skin Care Plan:  1-Preventative Hydraguard to buttocks, sacrum, perineum, and bilateral heels twice a day and as needed.   2-Turn/reposition every 2 hours or when medically stable for pressure re-distribution on skin .  3-Elevate heels to offload pressure  4-Moisturize skin daily with skin nourishing cream  5-Ehob cushion in chair when out of bed.            Cathy Mayo RN, BSN, CWOCN

## 2025-06-24 NOTE — ASSESSMENT & PLAN NOTE
Wt Readings from Last 3 Encounters:   06/24/25 104 kg (229 lb 4.5 oz)   06/17/25 103 kg (226 lb)   05/06/25 103 kg (226 lb)     Echo February this year: Left Ventricle: Left ventricular cavity size is normal. Wall thickness is mildly increased. There is concentric remodeling. The left ventricular ejection fraction is 60%. Systolic function is normal. Although no diagnostic regional wall motion abnormality was identified, this possibility cannot be completely excluded on the basis of this study. Diastolic function is mildly abnormal, consistent with grade I (abnormal) relaxation.   Patient is doing significantly better in terms of shortness of breath at this time.  Strict I&O  Daily weight  Cardiology on board  CW Lasix to 80 mg once daily  Will monitor today.  Continue spironolactone 25 mg daily  Continue metoprolol 25 mg.  Reviewed cardiology notes they are continuing with IV Lasix  Possible discharge home 24-48

## 2025-06-24 NOTE — ASSESSMENT & PLAN NOTE
Controlled, 24 hour avg /74  Toprol-XL 25 mg twice daily, spironolactone 25 mg daily, IV Lasix 40 mg daily

## 2025-06-25 VITALS
HEIGHT: 63 IN | TEMPERATURE: 97.5 F | WEIGHT: 226.85 LBS | HEART RATE: 88 BPM | RESPIRATION RATE: 16 BRPM | SYSTOLIC BLOOD PRESSURE: 137 MMHG | DIASTOLIC BLOOD PRESSURE: 91 MMHG | BODY MASS INDEX: 40.2 KG/M2 | OXYGEN SATURATION: 96 %

## 2025-06-25 PROBLEM — N39.0 UTI (URINARY TRACT INFECTION): Status: RESOLVED | Noted: 2025-06-20 | Resolved: 2025-06-25

## 2025-06-25 LAB
ANION GAP SERPL CALCULATED.3IONS-SCNC: 9 MMOL/L (ref 4–13)
BUN SERPL-MCNC: 49 MG/DL (ref 5–25)
CALCIUM SERPL-MCNC: 9.3 MG/DL (ref 8.4–10.2)
CHLORIDE SERPL-SCNC: 97 MMOL/L (ref 96–108)
CO2 SERPL-SCNC: 30 MMOL/L (ref 21–32)
CREAT SERPL-MCNC: 1.7 MG/DL (ref 0.6–1.3)
ERYTHROCYTE [DISTWIDTH] IN BLOOD BY AUTOMATED COUNT: 15.2 % (ref 11.6–15.1)
GFR SERPL CREATININE-BSD FRML MDRD: 27 ML/MIN/1.73SQ M
GLUCOSE SERPL-MCNC: 188 MG/DL (ref 65–140)
GLUCOSE SERPL-MCNC: 195 MG/DL (ref 65–140)
GLUCOSE SERPL-MCNC: 304 MG/DL (ref 65–140)
HCT VFR BLD AUTO: 37.4 % (ref 34.8–46.1)
HGB BLD-MCNC: 12.1 G/DL (ref 11.5–15.4)
MCH RBC QN AUTO: 28.7 PG (ref 26.8–34.3)
MCHC RBC AUTO-ENTMCNC: 32.4 G/DL (ref 31.4–37.4)
MCV RBC AUTO: 89 FL (ref 82–98)
PLATELET # BLD AUTO: 387 THOUSANDS/UL (ref 149–390)
PMV BLD AUTO: 9.1 FL (ref 8.9–12.7)
POTASSIUM SERPL-SCNC: 4.1 MMOL/L (ref 3.5–5.3)
POTASSIUM SERPL-SCNC: 4.1 MMOL/L (ref 3.5–5.3)
RBC # BLD AUTO: 4.22 MILLION/UL (ref 3.81–5.12)
SODIUM SERPL-SCNC: 136 MMOL/L (ref 135–147)
WBC # BLD AUTO: 10.8 THOUSAND/UL (ref 4.31–10.16)

## 2025-06-25 PROCEDURE — 99232 SBSQ HOSP IP/OBS MODERATE 35: CPT | Performed by: INTERNAL MEDICINE

## 2025-06-25 PROCEDURE — 85027 COMPLETE CBC AUTOMATED: CPT

## 2025-06-25 PROCEDURE — 84132 ASSAY OF SERUM POTASSIUM: CPT | Performed by: STUDENT IN AN ORGANIZED HEALTH CARE EDUCATION/TRAINING PROGRAM

## 2025-06-25 PROCEDURE — 80048 BASIC METABOLIC PNL TOTAL CA: CPT

## 2025-06-25 PROCEDURE — 99239 HOSP IP/OBS DSCHRG MGMT >30: CPT

## 2025-06-25 PROCEDURE — 82948 REAGENT STRIP/BLOOD GLUCOSE: CPT

## 2025-06-25 RX ORDER — TORSEMIDE 20 MG/1
40 TABLET ORAL DAILY
Qty: 240 TABLET | Refills: 0 | Status: SHIPPED | OUTPATIENT
Start: 2025-06-25 | End: 2025-10-23

## 2025-06-25 RX ORDER — TORSEMIDE 20 MG/1
20 TABLET ORAL DAILY
Qty: 90 TABLET | Refills: 0 | Status: SHIPPED | OUTPATIENT
Start: 2025-06-25 | End: 2025-09-23

## 2025-06-25 RX ADMIN — APIXABAN 2.5 MG: 2.5 TABLET, FILM COATED ORAL at 08:00

## 2025-06-25 RX ADMIN — METOPROLOL SUCCINATE 25 MG: 25 TABLET, EXTENDED RELEASE ORAL at 08:00

## 2025-06-25 RX ADMIN — CEPHALEXIN 500 MG: 500 CAPSULE ORAL at 06:20

## 2025-06-25 RX ADMIN — SPIRONOLACTONE 25 MG: 25 TABLET ORAL at 08:00

## 2025-06-25 RX ADMIN — FUROSEMIDE 80 MG: 10 INJECTION, SOLUTION INTRAMUSCULAR; INTRAVENOUS at 07:51

## 2025-06-25 RX ADMIN — POTASSIUM CHLORIDE 20 MEQ: 1500 TABLET, EXTENDED RELEASE ORAL at 08:00

## 2025-06-25 RX ADMIN — INSULIN LISPRO 2 UNITS: 100 INJECTION, SOLUTION INTRAVENOUS; SUBCUTANEOUS at 07:30

## 2025-06-25 RX ADMIN — GABAPENTIN 300 MG: 300 CAPSULE ORAL at 08:00

## 2025-06-25 RX ADMIN — INSULIN LISPRO 4 UNITS: 100 INJECTION, SOLUTION INTRAVENOUS; SUBCUTANEOUS at 10:52

## 2025-06-25 NOTE — RESTORATIVE TECHNICIAN NOTE
Restorative Technician Note      Patient Name: Jessica Jaquez     Restorative Tech Visit Date: 06/25/25  Note Type: Mobility  Patient Position Upon Consult: Bedside chair  Mobility / Activity Provided: pt refused ambulations due to just receiving lasix  Patient Position at End of Consult: Bedside chair; All needs within reach; Bed/Chair alarm activated

## 2025-06-25 NOTE — ASSESSMENT & PLAN NOTE
Lab Results   Component Value Date    HGBA1C 7.5 (H) 06/04/2025       Recent Labs     06/24/25  1551 06/24/25 2050 06/25/25  0651 06/25/25  1019   POCGLU 305* 287* 195* 304*       Blood Sugar Average: Last 72 hrs:  (P) 224.2454212407160166 continue home meds  Follow-up with PCP in the outpatient setting.

## 2025-06-25 NOTE — DISCHARGE SUMMARY
Discharge Summary - Hospitalist   Name: Jessica Jaquez 80 y.o. female I MRN: [id number]  [facility] I Date of Admission: 6/20/2025   Date of Service: 6/25/2025 I Hospital Day: 4     Assessment & Plan  CHF exacerbation (HCC)  Wt Readings from Last 3 Encounters:   06/25/25 103 kg (226 lb 13.7 oz)   06/17/25 103 kg (226 lb)   05/06/25 103 kg (226 lb)     Echo February this year: Left Ventricle: Left ventricular cavity size is normal. Wall thickness is mildly increased. There is concentric remodeling. The left ventricular ejection fraction is 60%. Systolic function is normal. Although no diagnostic regional wall motion abnormality was identified, this possibility cannot be completely excluded on the basis of this study. Diastolic function is mildly abnormal, consistent with grade I (abnormal) relaxation.   Patient is doing significantly better in terms of shortness of breath at this time.  Patient is medically cleared for discharge today per cardiology  Advised torsemide 40 mg in the morning and 20 mg in the afternoon  Close follow-up with cardiology  BMP after 1 week    UTI (urinary tract infection) (Resolved: 6/25/2025)  Completed treatment  Type 2 diabetes mellitus with other specified complication, without long-term current use of insulin (HCC)  Lab Results   Component Value Date    HGBA1C 7.5 (H) 06/04/2025       Recent Labs     06/24/25  1551 06/24/25  2050 06/25/25  0651 06/25/25  1019   POCGLU 305* 287* 195* 304*       Blood Sugar Average: Last 72 hrs:  (P) [id number] continue home meds  Follow-up with PCP in the outpatient setting.  Essential hypertension  Continue metoprolol 25 mg p.o.  Monitor vital signs  PAF (paroxysmal atrial fibrillation) (HCC)  Continue Eliquis 2.5 mg twice daily     Medical Problems       Resolved Problems  Date Reviewed: 6/20/2025          Resolved    UTI (urinary tract infection) 6/25/2025     Resolved by  Ferdinand Singh MD        Discharging Physician  / Practitioner: Ferdinand Singh MD  PCP: Jorden Holt MD  Admission Date:   Admission Orders (From admission, onward)       Ordered        06/21/25 1507  INPATIENT ADMISSION  Once            06/20/25 1623  Place in Observation  Once                          Discharge Date: 06/25/25    Next Steps for Physician/AP Assuming Care:  Advised torsemide 40 mg in the morning and 20 mg in the afternoon  Close follow-up with cardiology  BMP after 1 week    Test Results Pending at Discharge (will require follow up):  none    Medication Changes for Discharge & Rationale:   Advised torsemide 40 mg in the morning and 20 mg in the afternoon  Close follow-up with cardiology  BMP after 1 week  See after visit summary for reconciled discharge medications provided to patient and/or family.     Consultations During Hospital Stay:  Cardiology    Procedures Performed:   none    Significant Findings / Test Results:   XR chest 2 views  Result Date: 6/20/2025  Impression: No acute cardiopulmonary disease. Workstation performed: [id number]       No Chest XR results available for this patient.       Incidental Findings:   None       Hospital Course:   Jessica Jaquez is a 80 y.o. female patient who originally presented to the hospital on 6/20/2025 due to coming in for acute heart failure increased shortness of breath.  Volume overloaded on admission with increased pulmonary congestion and lower leg edema.  As well as being above baseline weight.  Patient was receiving IV diuretics during the inpatient cardiology was on board.  Today patient is at baseline weight has marked improvement with breathing and marked improvement of lower leg edema.  Discussed with cardiology and patient and patient is medically clear for discharge close follow-up with BMP.  Will be discharging on an increased dose of diuretics as noted above.  Patient agreeable for the plan.    Advised torsemide 40 mg in the morning and 20 mg in the afternoon  Close  "follow-up with cardiology  BMP after 1 week  Please see above list of diagnoses and related plan for additional information.     Discharge Day Visit / Exam:   Subjective:  Patient does not report and headaches, shortness of breath, chest pain, abdominal pain, nausea vomiting, lower leg edema. Also reports good sleep    Vitals: Blood Pressure: 137/91 (06/25/25 0721)  Pulse: 88 (06/25/25 0721)  Temperature: 97.5 °F (36.4 °C) (06/25/25 0721)  Temp Source: Oral (06/25/25 0721)  Respirations: 16 (06/25/25 0721)  Height: 5' 3\" (160 cm) (06/20/25 2039)  Weight - Scale: 103 kg (226 lb 13.7 oz) (06/25/25 0629)  SpO2: 96 % (06/25/25 0721)  Physical Exam  Vitals and nursing note reviewed.   Constitutional:       General: She is not in acute distress.     Appearance: She is well-developed.   HENT:      Head: Normocephalic and atraumatic.     Eyes:      Conjunctiva/sclera: Conjunctivae normal.       Cardiovascular:      Rate and Rhythm: Normal rate and regular rhythm.      Heart sounds: No murmur heard.  Pulmonary:      Effort: Pulmonary effort is normal. No respiratory distress.      Breath sounds: Normal breath sounds.   Abdominal:      Palpations: Abdomen is soft.      Tenderness: There is no abdominal tenderness.     Musculoskeletal:         General: No swelling.      Cervical back: Neck supple.     Skin:     General: Skin is warm and dry.      Capillary Refill: Capillary refill takes less than 2 seconds.     Neurological:      Mental Status: She is alert.     Psychiatric:         Mood and Affect: Mood normal.          Discussion with Family: Updated  (daugther in law) at bedside.    Discharge instructions/Information to patient and family:   See after visit summary for information provided to patient and family.      Provisions for Follow-Up Care:  See after visit summary for information related to follow-up care and any pertinent home health orders.      Mobility at time of Discharge:   Basic Mobility " Inpatient Raw Score: 20  JH-HLM Goal: 6: Walk 10 steps or more  JH-HLM Achieved: 6: Walk 10 steps or more  HLM Goal achieved. Continue to encourage appropriate mobility.     Disposition:   Home    Planned Readmission: None    Administrative Statements   Discharge Statement:  I have spent a total time of 40 minutes in caring for this patient on the day of the visit/encounter. >30 minutes of time was spent on: Diagnostic results, Prognosis, Risks and benefits of tx options, Instructions for management, Patient and family education, Importance of tx compliance, Risk factor reductions, Impressions, Counseling / Coordination of care, Documenting in the medical record, Reviewing / ordering tests, medicine, procedures  , and Communicating with other healthcare professionals .    **Please Note: This note may have been constructed using a voice recognition system**

## 2025-06-25 NOTE — ASSESSMENT & PLAN NOTE
Completed treatment   Ftsg Text: The defect edges were debeveled with a #15 scalpel blade.  Given the location of the defect, shape of the defect and the proximity to free margins a full thickness skin graft was deemed most appropriate.  Using a sterile surgical marker, the primary defect shape was transferred to the donor site. The area thus outlined was incised deep to adipose tissue with a #15 scalpel blade.  The harvested graft was then trimmed of adipose tissue until only dermis and epidermis was left.  The skin margins of the secondary defect were undermined to an appropriate distance in all directions utilizing iris scissors.  The secondary defect was closed with interrupted buried subcutaneous sutures.  The skin edges were then re-apposed with running  sutures.  The skin graft was then placed in the primary defect and oriented appropriately.

## 2025-06-25 NOTE — CASE MANAGEMENT
Case Management Discharge Planning Note    Patient name Jessica Jaquez  Location [facility] 531/[facility] 531-01 MRN [id number]  : 1945 Date 2025       Current Admission Date: 2025  Current Admission Diagnosis:CHF exacerbation ([facility])   Patient Active Problem List    Diagnosis Date Noted    UTI (urinary tract infection) 2025    CHF exacerbation ([facility]) 2025    (HFpEF) heart failure with preserved ejection fraction ([facility]) 2025    Class 3 severe obesity without serious comorbidity with body mass index (BMI) of 40.0 to 44.9 in adult 2025    CKD (chronic kidney disease) 2024    Bilateral lower extremity edema 2024    S/P ablation of atrial fibrillation 2024    Chest pain 2024    PAF (paroxysmal atrial fibrillation) ([facility]) 2024    Hyponatremia 2024    Chronic diastolic congestive heart failure ([facility]) 03/15/2024    GIOVANNI (acute kidney injury) ([facility]) 2024    SVT (supraventricular tachycardia) ([facility]) 2024    Lumbar radiculopathy 2024    History of stroke 2024    Open wound of abdomen 2021    Cerebrovascular accident (CVA) due to thrombosis of precerebral artery ([facility]) 2019    Unspecified abnormalities of gait and mobility 2018    Tremor of hands and face 2018    Hyperlipidemia 2018    Type 2 diabetes mellitus with other specified complication, without long-term current use of insulin ([facility]) 2018    Obesity, Class III, BMI 40-49.9 (morbid obesity) 2018    Essential hypertension 2018    Chronic diastolic CHF (congestive heart failure) ([facility]) 2018      LOS (days): 4  Geometric Mean LOS (GMLOS) (days): 3.9  Days to GMLOS:0.1     OBJECTIVE:  Risk of Unplanned Readmission Score: 18.6         Current admission status: Inpatient   Preferred Pharmacy:   [facility] - [place], [place] - [address]  [address]  [place] [address]  Phone: [phone] Fax:  [phone]    Primary Care Provider: Jorden Holt MD    Primary Insurance: [facility]  Secondary Insurance:     DISCHARGE DETAILS:    Discharge planning discussed with:: Patient     Treatment Team Recommendation: Home  Expected Discharge Disposition: Home or Self Care  Additional Discharge Dispositions: Home or Self Care  Transport at Discharge : Family     IMM Given (Date):: 06/25/25  IMM Given to:: Patient       CM reviewed IMM with pt; No questions or concerns. Pt in agreement with rights, and is aware of the right to appeal d/c once medically stable if desired. IMM signed.     CM will continue to follow for DC planning needs

## 2025-06-25 NOTE — ASSESSMENT & PLAN NOTE
Wt Readings from Last 3 Encounters:   06/25/25 103 kg (226 lb 13.7 oz)   06/17/25 103 kg (226 lb)   05/06/25 103 kg (226 lb)   Presented with weight gain (11lbs in 4 days), conversational and exertional dyspnea, abdominal distension, worsening LE edema, orthopnea  BNP: 39 (could be false negative in female w/ obesity)  Home diuretic: Torsemide 20 mg PO BID + spironolactone 25 mg daily  Dry weight: 220-226 lb  Weight: 226 lb by standing scale (down 3 lb in 24 hours)  I/O: 2100 mL urine output in 24 hours, net negative 3.8 L since admission. Some output not documented due to incontinence/purewick not working  Suspect diet is contributing to volume status at home  Got morning dose of IV Lasix. Hold further diuretics today. Start torsemide 40 mg AM and 20 mg PM from tomorrow.  Stable for discharge planning today  Discharge creatinine 1.7. BMP early next week  Outpatient follow up 7/1

## 2025-06-25 NOTE — PROGRESS NOTES
Progress Note - Cardiology   Name: Jessica Jaquez 80 y.o. female I MRN: [id number]  [facility] I Date of Admission: 6/20/2025   Date of Service: 6/25/2025 I Hospital Day: 4     Assessment & Plan  CHF exacerbation (HCC)  Wt Readings from Last 3 Encounters:   06/25/25 103 kg (226 lb 13.7 oz)   06/17/25 103 kg (226 lb)   05/06/25 103 kg (226 lb)   Presented with weight gain (11lbs in 4 days), conversational and exertional dyspnea, abdominal distension, worsening LE edema, orthopnea  BNP: 39 (could be false negative in female w/ obesity)  Home diuretic: Torsemide 20 mg PO BID + spironolactone 25 mg daily  Dry weight: 220-226 lb  Weight: 226 lb by standing scale (down 3 lb in 24 hours)  I/O: 2100 mL urine output in 24 hours, net negative 3.8 L since admission. Some output not documented due to incontinence/purewick not working  Suspect diet is contributing to volume status at home  Got morning dose of IV Lasix. Hold further diuretics today. Start torsemide 40 mg AM and 20 mg PM from tomorrow.  Stable for discharge planning today  Discharge creatinine 1.7. BMP early next week  Outpatient follow up 7/1  Type 2 diabetes mellitus with other specified complication, without long-term current use of insulin (HCC)  Lab Results   Component Value Date    HGBA1C 7.5 (H) 06/04/2025   Per primary team  Essential hypertension  Controlled, 24 hour avg /74  Toprol-XL 25 mg twice daily, spironolactone 25 mg daily, IV Lasix 40 mg daily  PAF (paroxysmal atrial fibrillation) (HCC)  Usually symptomatic. Reports recent episode on Wednesday with HR up to 150s. Lasted a few minutes and resolved. Nothing since.   S/P Cardioversion and ablation with ILR placement  Toprol-XL 25 mg twice daily  Eliquis 2.5 mg twice daily   UTI (urinary tract infection)  Completed oral antibiotics    Subjective  ROS    Objective:   Vitals: Blood pressure 137/91, pulse 88, temperature 97.5 °F (36.4 °C), temperature source Oral, resp. rate 16,  "height 5' 3\" (1.6 m), weight 103 kg (226 lb 13.7 oz), SpO2 96%., Body mass index is 40.19 kg/m².,     Systolic (24hrs), Av , Min:137 , Max:141     Diastolic (24hrs), Av, Min:76, Max:98        Intake/Output Summary (Last 24 hours) at 2025 1026  Last data filed at 2025 0701  Gross per 24 hour   Intake 910 ml   Output 2100 ml   Net -1190 ml     Wt Readings from Last 3 Encounters:   25 103 kg (226 lb 13.7 oz)   25 103 kg (226 lb)   25 103 kg (226 lb)     Telemetry Review: N/a    Physical Exam  Vitals reviewed.   Constitutional:       General: She is not in acute distress.     Appearance: She is obese.   Neck:      Vascular: No hepatojugular reflux or JVD.     Cardiovascular:      Rate and Rhythm: Normal rate and regular rhythm.      Heart sounds: Normal heart sounds. No murmur heard.     No friction rub. No gallop.   Pulmonary:      Effort: Pulmonary effort is normal. No respiratory distress.      Breath sounds: No rales.      Comments: Clear, room air  Abdominal:      General: Bowel sounds are normal. There is no distension.      Palpations: Abdomen is soft.      Tenderness: There is no abdominal tenderness.     Musculoskeletal:         General: No tenderness. Normal range of motion.      Cervical back: Neck supple.      Right lower leg: Edema (trace nonpitting) present.      Left lower leg: Edema (trace nonpitting) present.     Skin:     General: Skin is warm and dry.      Findings: No erythema.     Neurological:      Mental Status: She is alert and oriented to person, place, and time.     Psychiatric:         Mood and Affect: Mood normal.         LABORATORY RESULTS      CBC with diff:   Results from last 7 days   Lab Units 25  0618 25  0622 25  0342 25  0623 25  0447 25  1248   WBC Thousand/uL 10.80* 10.63* 11.02* 8.40 9.75 8.26   HEMOGLOBIN g/dL 12.1 12.8 12.4 11.2* 11.1* 12.0   HEMATOCRIT % 37.4 39.6 38.7 35.3 34.8 36.4   MCV fL 89 87 89 91 89 " 87   PLATELETS Thousands/uL 387 415* 395* 340 363 360   RBC Million/uL 4.22 4.54 4.37 3.89 3.92 4.20   MCH pg 28.7 28.2 28.4 28.8 28.3 28.6   MCHC g/dL 32.4 32.3 32.0 31.7 31.9 33.0   RDW % 15.2* 15.1 15.0 15.2* 15.3* 15.2*   MPV fL 9.1 9.0 8.8* 8.9 9.0 9.0   NRBC AUTO /100 WBCs  --   --   --   --   --  0       CMP:  Results from last 7 days   Lab Units 06/25/25 0618 06/25/25 0009 06/24/25 0622 06/23/25 0342 06/22/25 0623 06/21/25 0447 06/20/25  1248   POTASSIUM mmol/L 4.1 4.1 4.6 4.3 3.8 3.9 4.0   CHLORIDE mmol/L 97  --  95* 97 98 98 100   CO2 mmol/L 30  --  26 28 30 27 24   BUN mg/dL 49*  --  42* 37* 36* 38* 40*   CREATININE mg/dL 1.70*  --  1.67* 1.62* 1.46* 1.60* 1.80*   CALCIUM mg/dL 9.3  --  9.7 9.8 9.3 9.1 9.4   AST U/L  --   --   --   --   --   --  19   ALT U/L  --   --   --   --   --   --  14   ALK PHOS U/L  --   --   --   --   --   --  95   EGFR ml/min/1.73sq m 27  --  28 29 33 30 26       BMP:  Results from last 7 days   Lab Units 06/25/25 0618 06/25/25 0009 06/24/25 0622 06/23/25 0342 06/22/25 0623 06/21/25 0447 06/20/25  1248   POTASSIUM mmol/L 4.1 4.1 4.6 4.3 3.8 3.9 4.0   CHLORIDE mmol/L 97  --  95* 97 98 98 100   CO2 mmol/L 30  --  26 28 30 27 24   BUN mg/dL 49*  --  42* 37* 36* 38* 40*   CREATININE mg/dL 1.70*  --  1.67* 1.62* 1.46* 1.60* 1.80*   CALCIUM mg/dL 9.3  --  9.7 9.8 9.3 9.1 9.4       Lab Results   Component Value Date    CREATININE 1.70 (H) 06/25/2025    CREATININE 1.67 (H) 06/24/2025    CREATININE 1.62 (H) 06/23/2025       Lab Results   Component Value Date    NTBNP  11/26/2018      Comment:      This is a corrected result. Previous result was 343 pg/mL on 11/26/2018 at 1721 EST            Lipid Profile:   Lab Results   Component Value Date    CHOL 248 03/19/2014     Lab Results   Component Value Date    HDL 45 (L) 03/16/2024    HDL 51 03/10/2021    HDL 46 06/22/2020     Lab Results   Component Value Date    LDLCALC 85 03/16/2024    LDLCALC 154 (H) 03/10/2021    LDLCALC 135  (H) 06/22/2020     Lab Results   Component Value Date    TRIG 146 03/16/2024    TRIG 190 (H) 03/10/2021    TRIG 340 (H) 06/22/2020       Meds/Allergies   all current active meds have been reviewed and current meds:   Current Facility-Administered Medications:     acetaminophen (TYLENOL) tablet 650 mg, Q6H PRN    apixaban (ELIQUIS) tablet 2.5 mg, BID    bimatoprost (LUMIGAN) 0.03 % ophthalmic drops 1 drop, Daily    diphenhydrAMINE (BENADRYL) tablet 25 mg, HS PRN    diphenhydrAMINE-zinc acetate (BENADRYL) 2-0.1 % cream, TID PRN    gabapentin (NEURONTIN) capsule 300 mg, TID    insulin lispro (HumALOG/ADMELOG) 100 units/mL subcutaneous injection 1-5 Units, HS    insulin lispro (HumALOG/ADMELOG) 100 units/mL subcutaneous injection 1-6 Units, TID AC    metoprolol succinate (TOPROL-XL) 24 hr tablet 25 mg, BID    potassium chloride (Klor-Con M20) CR tablet 20 mEq, TID    spironolactone (ALDACTONE) tablet 25 mg, Daily    Medications Prior to Admission:     apixaban (ELIQUIS) 2.5 mg    bimatoprost (Lumigan) 0.01 % ophthalmic drops    cephalexin (KEFLEX) 500 mg capsule    Cholecalciferol (VITAMIN D3) 1000 units CAPS    estradiol (ESTRACE VAGINAL) 0.1 mg/g vaginal cream    ezetimibe (ZETIA) 10 mg tablet    gabapentin (NEURONTIN) 100 mg capsule    glimepiride (AMARYL) 1 mg tablet    ketoconazole (NIZORAL) 2 % cream    metoprolol succinate (TOPROL-XL) 25 mg 24 hr tablet    metroNIDAZOLE (METROCREAM) 0.75 % cream    Multiple Vitamins-Minerals (CENTRUM SILVER PO)    potassium chloride (Klor-Con M20) 20 mEq tablet    spironolactone (ALDACTONE) 25 mg tablet    tiZANidine (ZANAFLEX) 2 mg tablet    torsemide (DEMADEX) 20 mg tablet    triamcinolone (KENALOG) 0.025 % cream         Counseling / Coordination of Care  Total floor / unit time spent today 20 minutes.  Greater than 50% of total time was spent with the patient and / or family counseling and / or coordination of care.      ** Please Note: Dragon 360 Dictation voice to text  software may have been used in the creation of this document. **'

## 2025-06-25 NOTE — ASSESSMENT & PLAN NOTE
Wt Readings from Last 3 Encounters:   06/25/25 103 kg (226 lb 13.7 oz)   06/17/25 103 kg (226 lb)   05/06/25 103 kg (226 lb)     Echo February this year: Left Ventricle: Left ventricular cavity size is normal. Wall thickness is mildly increased. There is concentric remodeling. The left ventricular ejection fraction is 60%. Systolic function is normal. Although no diagnostic regional wall motion abnormality was identified, this possibility cannot be completely excluded on the basis of this study. Diastolic function is mildly abnormal, consistent with grade I (abnormal) relaxation.   Patient is doing significantly better in terms of shortness of breath at this time.  Patient is medically cleared for discharge today per cardiology  Advised torsemide 40 mg in the morning and 20 mg in the afternoon  Close follow-up with cardiology  BMP after 1 week

## 2025-06-26 NOTE — UTILIZATION REVIEW
NOTIFICATION OF ADMISSION DISCHARGE   This is a Notification of Discharge from [facility]. Please be advised that this patient has been discharge from our facility. Below you will find the admission and discharge date and time including the patient’s disposition.   UTILIZATION REVIEW CONTACT:  Utilization Review Assistants  Network Utilization Review Department  Phone: [phone] x carefully listen to the prompts. All voicemails are confidential.  Email: [email or web address]     ADMISSION INFORMATION  PRESENTATION DATE: 6/20/2025  1:06 PM  OBERVATION ADMISSION DATE: 06/20/2025 1623  INPATIENT ADMISSION DATE: 6/21/25  3:07 PM   DISCHARGE DATE: 6/25/2025  1:02 PM   DISPOSITION:Home/Self Care    Network Utilization Review Department  ATTENTION: Please call with any questions or concerns to [phone] and carefully listen to the prompts so that you are directed to the right person. All voicemails are confidential.   For Discharge needs, contact Care Management DC Support Team at [phone] opt. 2  Send all requests for admission clinical reviews, approved or denied determinations and any other requests to dedicated fax number below belonging to the campus where the patient is receiving treatment. List of dedicated fax numbers for the Facilities:  FACILITY NAME UR FAX NUMBER   ADMISSION DENIALS (Administrative/Medical Necessity) [phone]   DISCHARGE SUPPORT TEAM ([facility]) [phone]   PARENT CHILD HEALTH (Maternity/NICU/Pediatrics) [phone]   [facility] [phone]   [facility] [phone]   [facility] [phone]   General acute hospital [phone]   [facility] [phone]   [facility] [phone]   [facility] [phone]   [facility] [phone]    [facility] [phone]   [facility] [phone]   [facility] [phone]   [facility] [phone]

## 2025-06-27 ENCOUNTER — TELEPHONE (OUTPATIENT)
Dept: CARDIOLOGY CLINIC | Facility: CLINIC | Age: 80
End: 2025-06-27

## 2025-06-27 NOTE — TELEPHONE ENCOUNTER
Symptoms:  -leg swelling []  -abdominal bloating, distension, pants fitting tighter []    -loss of appetite, feeling full sooner than usual []  -worsening shortness of breath/HOUSE, activity intolerance[]  -difficulty lying flat, waking up a night feeling breathless, using more pillows, sleeping in a recliner []  -palpitations []  -chest pain/pressure []  -new or worsening cough []  -unusual fatigue []    Comments: Denies any symptoms except fatigue        Weight:   -weighs self daily [x]  -dry/target weight _____  -today's weight _227____  -understands what to do for rapid weight gain, ie 3lbs in 24 hours or 5 lbs in one week[x]    Comments:educated on        Diet:   -consuming any high sodium foods (canned soups, lunch meats, fast food/take out, snack food, prepared foods, sport drinks) yes[]no[x]  - fluid consumed per day-           oz   -2g (2000 mg) Sodium, 2L (2000 ml, around 60-64 oz) fluid restriction] reinforced [x]      Comments:she was not drinking much yesterday. Advised to drink her fluid restriction amount. That may help her have more energy and help her not get dehydrated.         Medications:  -med list reviewed [x]   -missed doses or taking a different dose than prescribed?  yes[]no[x]  -still urinates well on current diuretic ? yes[]no[]  -using O2 as directed? yes[]no[]    Comments: it slowed down some, but now this afternoon it seems better      Home vital signs: (if available)  BP ____  HR____  Pulse ox____    Recent labs:   BMP/CMP -   BNP, NT Pro BNP-  CBC-    Device check:   Arrhythmia burden ?     Optivol/Corvue crossed ?no      Other possible triggers ?:none  Recent viral symptoms/infection []  NSAID use []  Steroids []  Anemia []  COPD/hypoxia []  Not using CPAP/BiPAP []    Comments:Self-management/education and teach back:  Primary learner: self  Following low sodium diet: she is trying  Following fluid restriction:yes  Hospital discharge weight: 226 13.7  Weighing daily:       y      Recording:y  1st home [id number]       Weight today:227  Monitoring symptoms: Y  Any current symptoms: fatigue  Knows when to call provider: educated on  Medication reviewed and taking all as prescribed:yes  Knows name of diuretic:yes  Escalation plan: no  HF education reviewed/reinforced including low sodium diet, 64 oz fluid restriction, activity, symptoms of decompensation and when and who to call.     Care Coordination:  Aware of cardiology follow up appointment: 7/1/25  Aware of PCP follow up appointment:  Transportation:yes  Social Support:Y  Insurance/financial concerns:N  Home health care: n  Health literacy:fair  Engagement:fair  Personal Goal:  Additional comments:she is fine with f/u calls.

## 2025-06-29 NOTE — PROGRESS NOTES
Heart Failure Outpatient Progress Note - Jessica Jaquez 80 y.o. female MRN: [id number]    @ Encounter: [id number]      Assessment/Plan:    Patient Active Problem List    Diagnosis Date Noted    CHF exacerbation ([facility]) 06/20/2025    (HFpEF) heart failure with preserved ejection fraction ([facility]) 02/24/2025    Class 3 severe obesity without serious comorbidity with body mass index (BMI) of 40.0 to 44.9 in adult 02/24/2025    CKD (chronic kidney disease) 09/02/2024    Bilateral lower extremity edema 08/29/2024    S/P ablation of atrial fibrillation 07/26/2024    Chest pain 04/18/2024    PAF (paroxysmal atrial fibrillation) ([facility]) 04/17/2024    Hyponatremia 03/19/2024    Chronic diastolic congestive heart failure ([facility]) 03/15/2024    GIOVANNI (acute kidney injury) ([facility]) 03/09/2024    SVT (supraventricular tachycardia) ([facility]) 03/06/2024    Lumbar radiculopathy 03/04/2024    History of stroke 03/04/2024    Open wound of abdomen 05/03/2021    Cerebrovascular accident (CVA) due to thrombosis of precerebral artery ([facility]) 05/23/2019    Unspecified abnormalities of gait and mobility 09/05/2018    Tremor of hands and face 09/05/2018    Hyperlipidemia 07/30/2018    Type 2 diabetes mellitus with other specified complication, without long-term current use of insulin ([facility]) 07/27/2018    Obesity, Class III, BMI 40-49.9 (morbid obesity) 07/27/2018    Essential hypertension 07/27/2018    Chronic diastolic CHF (congestive heart failure) ([facility]) 07/27/2018     Chronic HFpEF  -compensated on regimen below.   -Weight at discharge 226, today 230  -Continue Torsemide 40 mg daily with 20 mg in PM, Spironolactone 25 mg daily ,has had multiple UTIs while being on SGLT2i so will avoid for now.     TTE 2/25/25; LVEF 60%, RV normal,     HTN  -controlled  PAF  S/P Cardioversion and ablation with ILR placement   Rate Metop succinate 25 mg BID  Rhythm   Loop Interrogation 5/1/25:  -Normal device function.   -Brief SVT noted. PAT noted.   OAC Eliquis 2.5 mg  BID    Type II DM    Lab Results   Component Value Date    HGBA1C 7.5 (H) 06/04/2025     CKD  Baseline creat 1.5-1.7    Recent UTI    HPI:   80 year old with PMH as above who presents for hospital follow up. Recently admitted with decompensated CHF. Was diuresed and transitioned to higher dose of oral diuretic than PTA. She is feeling well. Eating better. Making better choices. Feels diuretic dosing is adequate. Cannot take an SGLT2i. Has taken in the past and developed multiple UTIs while on it.       Past Medical History[1]    12 point ROS negative other than that stated in HPI    Allergies[2]  .  Current Medications[3]    Social History     Socioeconomic History    Marital status: /Civil Union     Spouse name: Not on file    Number of children: Not on file    Years of education: Not on file    Highest education level: Not on file   Occupational History    Not on file   Tobacco Use    Smoking status: Never    Smokeless tobacco: Never   Substance and Sexual Activity    Alcohol use: Never    Drug use: No    Sexual activity: Not on file   Other Topics Concern    Not on file   Social History Narrative    Not on file     Social Drivers of Health     Financial Resource Strain: Not on file   Food Insecurity: No Food Insecurity (6/20/2025)    Nursing - Inadequate Food Risk Classification     Worried About Running Out of Food in the Last Year: Not on file     Ran Out of Food in the Last Year: Not on file     Ran Out of Food in the Last Year: Never true   Transportation Needs: No Transportation Needs (6/20/2025)    Nursing - Transportation Risk Classification     Lack of Transportation: Not on file     Lack of Transportation: No   Physical Activity: Not on file   Stress: Not on file   Social Connections: Not on file   Intimate Partner Violence: Unknown (6/20/2025)    Nursing IPS     Feels Physically and Emotionally Safe: Not on file     Physically Hurt by Someone: Not on file     Humiliated or Emotionally Abused by  "Someone: Not on file     Physically Hurt by Someone: No     Hurt or Threatened by Someone: No   Housing Stability: Unknown (6/20/2025)    Nursing: Inadequate Housing Risk Classification     Has Housing: Not on file     Worried About Losing Housing: Not on file     Unable to Get Utilities: Not on file     Unable to Pay for Housing in the Last Year: No     Has Housing: No       Family History[4]    Physical Exam:    Vitals: /70 (BP Location: Right arm, Patient Position: Sitting, Cuff Size: Large)   Pulse 83   Ht 5' 3\" (1.6 m)   Wt 105 kg (230 lb 14.4 oz)   SpO2 96%   BMI 40.90 kg/m²      Wt Readings from Last 3 Encounters:   06/25/25 103 kg (226 lb 13.7 oz)   06/17/25 103 kg (226 lb)   05/06/25 103 kg (226 lb)       GEN: Jessica Jaquez appears well, alert and oriented x 3, pleasant and cooperative   HEENT: pupils equal, round, and reactive to light; extraocular muscles intact  NECK: supple, no carotid bruits   HEART: regular rhythm, normal S1 and S2, no murmurs, clicks, gallops or rubs, no JVD  LUNGS: clear to auscultation bilaterally; no wheezes, rales, or rhonchi   ABDOMEN: normal bowel sounds, soft, no tenderness, no distention  EXTREMITIES: peripheral pulses normal; no clubbing, cyanosis, trace BLLE edema  NEURO: no focal findings   SKIN: normal without suspicious lesions on exposed skin    Labs & Results:  Lab Results   Component Value Date     (L) 06/09/2015    SODIUM 136 06/25/2025    K 4.1 06/25/2025    CL 97 06/25/2025    CO2 30 06/25/2025    ANIONGAP 5 06/09/2015    AGAP 9 06/25/2025    BUN 49 (H) 06/25/2025    CREATININE 1.70 (H) 06/25/2025    GLUC 188 (H) 06/25/2025    GLUF 161 (H) 06/04/2025    CALCIUM 9.3 06/25/2025    AST 19 06/20/2025    ALT 14 06/20/2025    ALKPHOS 95 06/20/2025    PROT 7.6 06/09/2015    TP 7.4 06/20/2025    BILITOT 0.33 06/09/2015    TBILI 0.42 06/20/2025    EGFR 27 06/25/2025     Lab Results   Component Value Date    WBC 10.80 (H) 06/25/2025    HGB 12.1 06/25/2025 "    HCT 37.4 06/25/2025    MCV 89 06/25/2025     06/25/2025     Lab Results   Component Value Date    BNP 39 06/20/2025      Lab Results   Component Value Date    LDLCALC 85 03/16/2024     Lab Results   Component Value Date    IBU4RUCATPDI 1.687 02/24/2025     Lab Results   Component Value Date    HGBA1C 7.5 (H) 06/04/2025         EKG personally reviewed by VIJI Becerra.   No results found for this visit on 07/01/25.     Counseling / Coordination of Care  Total face to face time spent with patient 20 minutes.  An additional 10 minutes was spent for chart/data review and visit preparation.       Thank you for the opportunity to participate in the care of this patient.    VIJI Becerra          [1]   Past Medical History:  Diagnosis Date    Arthritis     Colorectal polyps     Constipation     Diabetes mellitus (HCC)     Diverticulosis of colon     Hiatal hernia     Hypertension     Increased urinary frequency     Lumbar disc disease     Pressure injury of skin     SOB (shortness of breath)     Stress incontinence     Vitamin D deficiency 07/27/2018   [2]   Allergies  Allergen Reactions    Other Cough and Sneezing     Seasonal allergies    Pollen Extract Allergic Rhinitis   [3]   Current Outpatient Medications:     apixaban (ELIQUIS) 2.5 mg, Take 1 tablet (2.5 mg total) by mouth 2 (two) times a day, Disp: 180 tablet, Rfl: 3    bimatoprost (Lumigan) 0.01 % ophthalmic drops, Administer 1 drop to both eyes daily at bedtime, Disp: , Rfl:     Cholecalciferol (VITAMIN D3) 1000 units CAPS, Take by mouth in the morning., Disp: , Rfl:     estradiol (ESTRACE VAGINAL) 0.1 mg/g vaginal cream, Use twice weekly, apply pea size amount (about 1 gm) into the vagina, Disp: 42.5 g, Rfl: 0    gabapentin (NEURONTIN) 100 mg capsule, Take 3 capsules (300 mg total) by mouth 3 (three) times a day, Disp: , Rfl:     glimepiride (AMARYL) 1 mg tablet, Take 2 mg by mouth daily with breakfast, Disp: , Rfl:     ketoconazole  (NIZORAL) 2 % cream, , Disp: , Rfl:     metoprolol succinate (TOPROL-XL) 25 mg 24 hr tablet, Take 1 tablet (25 mg total) by mouth 2 (two) times a day, Disp: 180 tablet, Rfl: 1    metroNIDAZOLE (METROCREAM) 0.75 % cream, if needed, Disp: , Rfl:     Multiple Vitamins-Minerals (CENTRUM SILVER PO), , Disp: , Rfl:     potassium chloride (Klor-Con M20) 20 mEq tablet, TAKE 1 TABLET BY MOUTH 3 TIMES A DAY., Disp: 90 tablet, Rfl: 5    spironolactone (ALDACTONE) 25 mg tablet, Take 1 tablet (25 mg total) by mouth daily, Disp: 30 tablet, Rfl: 5    torsemide (DEMADEX) 20 mg tablet, Take 2 tablets (40 mg total) by mouth in the morning., Disp: 240 tablet, Rfl: 0    torsemide (DEMADEX) 20 mg tablet, Take 1 tablet (20 mg total) by mouth in the evening., Disp: 90 tablet, Rfl: 0    triamcinolone (KENALOG) 0.025 % cream, Apply sparingly as needed up to twice daily, Disp: 30 g, Rfl: 1  [4]   Family History  Problem Relation Name Age of Onset    Hypertension Mother      Heart attack Mother      Diabetes Mother      Prostate cancer Father      No Known Problems Brother      Stroke Maternal Grandmother      Stroke Paternal Grandmother      Stroke Paternal Grandfather      Thyroid cancer Neg Hx

## 2025-07-01 ENCOUNTER — OFFICE VISIT (OUTPATIENT)
Dept: CARDIOLOGY CLINIC | Facility: CLINIC | Age: 80
End: 2025-07-01
Payer: COMMERCIAL

## 2025-07-01 VITALS
HEART RATE: 83 BPM | HEIGHT: 63 IN | WEIGHT: 230.9 LBS | OXYGEN SATURATION: 96 % | DIASTOLIC BLOOD PRESSURE: 70 MMHG | BODY MASS INDEX: 40.91 KG/M2 | SYSTOLIC BLOOD PRESSURE: 102 MMHG

## 2025-07-01 DIAGNOSIS — N18.4 CHRONIC KIDNEY DISEASE, STAGE 4 (SEVERE) (HCC): ICD-10-CM

## 2025-07-01 DIAGNOSIS — I50.33 ACUTE ON CHRONIC DIASTOLIC (CONGESTIVE) HEART FAILURE (HCC): ICD-10-CM

## 2025-07-01 DIAGNOSIS — I50.32 CHRONIC DIASTOLIC CHF (CONGESTIVE HEART FAILURE) (HCC): Chronic | ICD-10-CM

## 2025-07-01 PROCEDURE — 99214 OFFICE O/P EST MOD 30 MIN: CPT | Performed by: NURSE PRACTITIONER

## 2025-07-01 RX ORDER — TORSEMIDE 20 MG/1
40 TABLET ORAL DAILY
Qty: 240 TABLET | Refills: 3 | Status: SHIPPED | OUTPATIENT
Start: 2025-07-01

## 2025-07-01 RX ORDER — TORSEMIDE 20 MG/1
20 TABLET ORAL DAILY
Qty: 90 TABLET | Refills: 3 | Status: SHIPPED | OUTPATIENT
Start: 2025-07-01

## 2025-07-01 NOTE — PATIENT INSTRUCTIONS
Weigh yourself daily  If you gain 3 lbs in one day or 5 lbs in one week, please call the office at [phone] and ask for a nurse or the heart failure nurse  Keep your sodium intake to <2 grams, (2000 mg) per day, and fluids <2 Liters (2000 ml) per day. This is around 6-7, 8 oz glasses of fluid per day

## 2025-07-03 ENCOUNTER — TELEPHONE (OUTPATIENT)
Dept: CARDIOLOGY CLINIC | Facility: CLINIC | Age: 80
End: 2025-07-03

## 2025-07-10 PROBLEM — E87.1 HYPONATREMIA: Status: RESOLVED | Noted: 2024-03-19 | Resolved: 2025-07-10

## 2025-07-10 PROBLEM — I50.9 CHF EXACERBATION (HCC): Status: RESOLVED | Noted: 2025-06-20 | Resolved: 2025-07-10

## 2025-07-10 PROBLEM — N17.9 AKI (ACUTE KIDNEY INJURY) (HCC): Status: RESOLVED | Noted: 2024-03-09 | Resolved: 2025-07-10

## 2025-07-10 PROBLEM — I50.32 CHRONIC HEART FAILURE WITH PRESERVED EJECTION FRACTION (HFPEF, >= 50%) (HCC): Chronic | Status: ACTIVE | Noted: 2024-03-15

## 2025-07-10 PROBLEM — I50.32 CHRONIC HEART FAILURE WITH PRESERVED EJECTION FRACTION (HFPEF, >= 50%) (HCC): Chronic | Status: ACTIVE | Noted: 2018-07-27

## 2025-07-10 PROBLEM — R60.0 BILATERAL LOWER EXTREMITY EDEMA: Status: RESOLVED | Noted: 2024-08-29 | Resolved: 2025-07-10

## 2025-07-10 PROBLEM — I10 ESSENTIAL HYPERTENSION: Chronic | Status: ACTIVE | Noted: 2018-07-27

## 2025-07-10 PROBLEM — R07.9 CHEST PAIN: Status: RESOLVED | Noted: 2024-04-18 | Resolved: 2025-07-10

## 2025-07-10 NOTE — PROGRESS NOTES
General Cardiology Outpatient Visit    Jessica Jaquez 80 y.o. female   MRN: [id number]  Encounter: [id number]    Assessment:  Patient Active Problem List    Diagnosis Date Noted    Class 3 severe obesity without serious comorbidity with body mass index (BMI) of 40.0 to 44.9 in adult 02/24/2025    Chronic kidney disease, stage 4 (severe) ([facility]) 09/02/2024    S/P ablation of atrial fibrillation 07/26/2024    PAF (paroxysmal atrial fibrillation) ([facility]) 04/17/2024    SVT (supraventricular tachycardia) ([facility]) 03/06/2024    Lumbar radiculopathy 03/04/2024    History of stroke 03/04/2024    Open wound of abdomen 05/03/2021    Cerebrovascular accident (CVA) due to thrombosis of precerebral artery ([facility]) 05/23/2019    Unspecified abnormalities of gait and mobility 09/05/2018    Tremor of hands and face 09/05/2018    Hyperlipidemia 07/30/2018    Type 2 diabetes mellitus with other specified complication, without long-term current use of insulin ([facility]) 07/27/2018    Obesity, Class III, BMI 40-49.9 (morbid obesity) 07/27/2018    Essential hypertension 07/27/2018    Chronic heart failure with preserved ejection fraction (HFpEF, >= 50%) ([facility]) 07/27/2018     Today's Plan:  Volume up on exam today with associated 4 lbs weight gain. Advised to increase torsemide to 40 mg BID through the weekend.  Advised to resume daily weights.  Will ask office RN to touch base with patient early next week for update on symptoms/weights and to determine further diuretic dosing going forward.    Plan:  Chronic HFpEF; LVEF 60%   Weight of 230 lbs on 07/01. Today, weighs 234 lbs.    Guideline-Directed Medical Therapy:  --Aldosterone Antagonist: spironolactone 25 mg daily.  --SGLT2 Inhibitor: No, history of UTIs.     Volume Management:  --Diuretic: torsemide 40 mg qAM and 20 mg qPM.   --K supplementation: potassium 20 mEq TID.     Atrial fibrillation, paroxysmal  Hypertension  Chronic kidney disease, stage IIIb  Diabetes mellitus, type II  History of  "stroke    HPI:   Jessica Jaquez is an 80-year-old woman with a PMH as above who presents to the office for follow-up. Follows with Dr. Webb.    07/01/2025 with TH: \"Recently admitted with decompensated CHF. Was diuresed and transitioned to higher dose of oral diuretic than PTA. She is feeling well. Eating better. Making better choices. Feels diuretic dosing is adequate. Cannot take an SGLT2i. Has taken in the past and developed multiple UTIs while on it.\"    07/11/2025: Patient presents for follow-up. Weight up 4 lbs. Complaining of mild worsening of LE swelling and abdominal bloating with associated HOUSE since last visit. Denies any recent dietary indiscretions. Estimates drinking ~1000 mL daily. Does occasional add salt to foods. Has not been completing daily weights this week; wanted to herself a \"break.\"     Past Medical History[1]    Review of Systems   Constitutional:  Positive for unexpected weight change. Negative for activity change, appetite change and fatigue.   Respiratory:  Positive for shortness of breath. Negative for cough and chest tightness.    Cardiovascular:  Positive for leg swelling. Negative for chest pain and palpitations.   Gastrointestinal:  Positive for abdominal distention. Negative for abdominal pain.   Genitourinary:  Positive for frequency (with diuretic).   Neurological:  Negative for dizziness, syncope and light-headedness.   Psychiatric/Behavioral:  Negative for sleep disturbance. The patient is not nervous/anxious.      Allergies[2]    Current Medications[3]    Social History     Socioeconomic History    Marital status: /Civil Union     Spouse name: Not on file    Number of children: Not on file    Years of education: Not on file    Highest education level: Not on file   Occupational History    Not on file   Tobacco Use    Smoking status: Never    Smokeless tobacco: Never   Substance and Sexual Activity    Alcohol use: Never    Drug use: No    Sexual activity: Not on file " "  Other Topics Concern    Not on file   Social History Narrative    Not on file     Social Drivers of Health     Financial Resource Strain: Not on file   Food Insecurity: No Food Insecurity (6/20/2025)    Nursing - Inadequate Food Risk Classification     Worried About Running Out of Food in the Last Year: Not on file     Ran Out of Food in the Last Year: Not on file     Ran Out of Food in the Last Year: Never true   Transportation Needs: No Transportation Needs (6/20/2025)    Nursing - Transportation Risk Classification     Lack of Transportation: Not on file     Lack of Transportation: No   Physical Activity: Not on file   Stress: Not on file   Social Connections: Not on file   Intimate Partner Violence: Unknown (6/20/2025)    Nursing IPS     Feels Physically and Emotionally Safe: Not on file     Physically Hurt by Someone: Not on file     Humiliated or Emotionally Abused by Someone: Not on file     Physically Hurt by Someone: No     Hurt or Threatened by Someone: No   Housing Stability: Unknown (6/20/2025)    Nursing: Inadequate Housing Risk Classification     Has Housing: Not on file     Worried About Losing Housing: Not on file     Unable to Get Utilities: Not on file     Unable to Pay for Housing in the Last Year: No     Has Housing: No     Family History[4]    Vitals:   Blood pressure 110/68, pulse 88, height 5' 3\" (1.6 m), weight 107 kg (234 lb 14.4 oz), SpO2 98%.    Wt Readings from Last 10 Encounters:   07/11/25 107 kg (234 lb 14.4 oz)   07/01/25 105 kg (230 lb 14.4 oz)   06/25/25 103 kg (226 lb 13.7 oz)   06/17/25 103 kg (226 lb)   05/06/25 103 kg (226 lb)   04/24/25 103 kg (227 lb)   03/06/25 103 kg (228 lb)   02/25/25 99 kg (218 lb 4.1 oz)   12/11/24 104 kg (229 lb 9.6 oz)   10/22/24 104 kg (229 lb)     Vitals:    07/11/25 0756   BP: 110/68   BP Location: Left arm   Patient Position: Sitting   Cuff Size: Large   Pulse: 88   SpO2: 98%   Weight: 107 kg (234 lb 14.4 oz)   Height: 5' 3\" (1.6 m) "       Physical Exam  Vitals reviewed.   Constitutional:       General: She is awake. She is not in acute distress.     Appearance: Normal appearance. She is well-developed and overweight. She is not ill-appearing, toxic-appearing or diaphoretic.      Comments: Ambulating with walker   HENT:      Head: Normocephalic.      Nose: Nose normal.   Neck:      Vascular: JVD present.     Cardiovascular:      Rate and Rhythm: Normal rate and regular rhythm.   Pulmonary:      Effort: Pulmonary effort is normal. No tachypnea, bradypnea or respiratory distress.      Breath sounds: Normal air entry. No decreased air movement. No wheezing or rales.   Abdominal:      General: There is no distension.     Musculoskeletal:      Cervical back: Neck supple.      Right lower leg: Edema (trace) present.      Left lower leg: Edema (trace) present.     Skin:     General: Skin is dry.      Coloration: Skin is not jaundiced or pale.     Neurological:      Mental Status: She is alert and oriented to person, place, and time.     Psychiatric:         Attention and Perception: Attention normal.         Mood and Affect: Mood and affect normal.         Speech: Speech normal.         Behavior: Behavior normal. Behavior is cooperative.         Thought Content: Thought content normal.       Labs & Results:  Lab Results   Component Value Date    WBC 10.80 (H) 06/25/2025    HGB 12.1 06/25/2025    HCT 37.4 06/25/2025    MCV 89 06/25/2025     06/25/2025     Lab Results   Component Value Date    SODIUM 136 06/25/2025    K 4.1 06/25/2025    CL 97 06/25/2025    CO2 30 06/25/2025    BUN 49 (H) 06/25/2025    CREATININE 1.70 (H) 06/25/2025    GLUC 188 (H) 06/25/2025    CALCIUM 9.3 06/25/2025     Lab Results   Component Value Date    INR 1.31 (H) 07/09/2024    INR 1.26 (H) 06/17/2024    INR 1.09 03/05/2024    PROTIME 16.1 (H) 07/09/2024    PROTIME 15.6 (H) 06/17/2024    PROTIME 14.0 03/05/2024     Lab Results   Component Value Date    BNP 39 06/20/2025       Minoo Diaz PA-C       [1]   Past Medical History:  Diagnosis Date    GIOVANNI (acute kidney injury) ([facility]) 03/09/2024    Arthritis     CHF exacerbation ([facility]) 06/20/2025    Chronic heart failure with preserved ejection fraction (HFpEF, >= 50%) ([facility]) 03/15/2024    Colorectal polyps     Constipation     Diabetes mellitus ([facility])     Diverticulosis of colon     Hiatal hernia     Hypertension     Increased urinary frequency     Lumbar disc disease     Pressure injury of skin     SOB (shortness of breath)     Stress incontinence     Vitamin D deficiency 07/27/2018   [2]   Allergies  Allergen Reactions    Other Cough and Sneezing     Seasonal allergies    Pollen Extract Allergic Rhinitis   [3]   Current Outpatient Medications:     apixaban (ELIQUIS) 2.5 mg, Take 1 tablet (2.5 mg total) by mouth 2 (two) times a day, Disp: 180 tablet, Rfl: 3    bimatoprost (Lumigan) 0.01 % ophthalmic drops, Administer 1 drop to both eyes daily at bedtime, Disp: , Rfl:     Cholecalciferol (VITAMIN D3) 1000 units CAPS, Take by mouth in the morning., Disp: , Rfl:     estradiol (ESTRACE VAGINAL) 0.1 mg/g vaginal cream, Use twice weekly, apply pea size amount (about 1 gm) into the vagina, Disp: 42.5 g, Rfl: 0    gabapentin (NEURONTIN) 100 mg capsule, Take 3 capsules (300 mg total) by mouth 3 (three) times a day, Disp: , Rfl:     glimepiride (AMARYL) 1 mg tablet, Take 2 mg by mouth daily with breakfast, Disp: , Rfl:     ketoconazole (NIZORAL) 2 % cream, , Disp: , Rfl:     metoprolol succinate (TOPROL-XL) 25 mg 24 hr tablet, Take 1 tablet (25 mg total) by mouth 2 (two) times a day, Disp: 180 tablet, Rfl: 1    metroNIDAZOLE (METROCREAM) 0.75 % cream, if needed, Disp: , Rfl:     Multiple Vitamins-Minerals (CENTRUM SILVER PO), , Disp: , Rfl:     potassium chloride (Klor-Con M20) 20 mEq tablet, TAKE 1 TABLET BY MOUTH 3 TIMES A DAY., Disp: 90 tablet, Rfl: 5    spironolactone (ALDACTONE) 25 mg tablet, Take 1 tablet (25 mg total) by mouth daily, Disp: 30  tablet, Rfl: 5    torsemide (DEMADEX) 20 mg tablet, Take 2 tablets (40 mg total) by mouth in the morning., Disp: 240 tablet, Rfl: 3    torsemide (DEMADEX) 20 mg tablet, Take 1 tablet (20 mg total) by mouth in the evening., Disp: 90 tablet, Rfl: 3    triamcinolone (KENALOG) 0.025 % cream, Apply sparingly as needed up to twice daily, Disp: 30 g, Rfl: 1  [4]   Family History  Problem Relation Name Age of Onset    Hypertension Mother      Heart attack Mother      Diabetes Mother      Prostate cancer Father      No Known Problems Brother      Stroke Maternal Grandmother      Stroke Paternal Grandmother      Stroke Paternal Grandfather      Thyroid cancer Neg Hx

## 2025-07-11 ENCOUNTER — OFFICE VISIT (OUTPATIENT)
Dept: CARDIOLOGY CLINIC | Facility: CLINIC | Age: 80
End: 2025-07-11
Payer: COMMERCIAL

## 2025-07-11 ENCOUNTER — TELEPHONE (OUTPATIENT)
Dept: CARDIOLOGY CLINIC | Facility: CLINIC | Age: 80
End: 2025-07-11

## 2025-07-11 VITALS
WEIGHT: 234.9 LBS | OXYGEN SATURATION: 98 % | DIASTOLIC BLOOD PRESSURE: 68 MMHG | HEART RATE: 88 BPM | SYSTOLIC BLOOD PRESSURE: 110 MMHG | HEIGHT: 63 IN | BODY MASS INDEX: 41.62 KG/M2

## 2025-07-11 DIAGNOSIS — I50.32 CHRONIC HEART FAILURE WITH PRESERVED EJECTION FRACTION (HFPEF, >= 50%) (HCC): Primary | Chronic | ICD-10-CM

## 2025-07-11 PROCEDURE — 99214 OFFICE O/P EST MOD 30 MIN: CPT | Performed by: PHYSICIAN ASSISTANT

## 2025-07-11 NOTE — TELEPHONE ENCOUNTER
F/U call to patient. Left a message with c/b number & request for patient to return call to office with any questions or concerns. Will f.u with \patient next week, as per Minoo Diaz PA-C OV Noted form today.

## 2025-07-11 NOTE — PATIENT INSTRUCTIONS
Increase torsemide to 40 mg twice daily for next few days (through the weekend) to get extra fluid off.   Restart daily weights.   Will ask office RN to touch base early next week for update on your symptoms.    Please weigh yourself every day (after emptying your bladder) and keep a detailed log of weights.   Contact Cardiology at [phone] if you gain 3+ lbs overnight or 5+ lbs in 5-7 days.  Limit daily sodium/salt intake to 2000 mg daily to prevent fluid retention.  Avoid canned foods, fast food/Chinese food, and processed meats (hot dogs, lunch meat, and sausage etc.). Caution with condiments.  Limit fluid intake to 2000 mL or 2 liters (about 60-65 ounces) daily.  Avoid electrolyte replacement drinks (such as Gatorade, Pedialyte, Propel, Liquid IV, etc.).  Bring complete list of medications and log of daily weights to your follow-up appointment.

## 2025-07-14 ENCOUNTER — TELEPHONE (OUTPATIENT)
Dept: CARDIOLOGY CLINIC | Facility: CLINIC | Age: 80
End: 2025-07-14

## 2025-07-14 NOTE — TELEPHONE ENCOUNTER
Message  Received: Today   Call patient  [facility] Clinical Support Staff  Patient seen in office late last week. Advised to increase torsemide to 40 mg twice daily through the weekend.  Please contact patient for update on symptoms and weights.    Can we do a deep dive into in their daily fluid and sodium intake, and provide education on this as appropriate?    Thanks!  Minoo

## 2025-07-14 NOTE — TELEPHONE ENCOUNTER
----- Message from Minoo Diaz PA-C sent at 7/11/2025  9:16 AM EDT -----  Patient seen in office late last week. Advised to increase torsemide to 40 mg twice daily through the weekend.  Please contact patient for update on symptoms and weights.     Can we do a deep dive into in their daily fluid and sodium intake, and provide education on this as appropriate?     Thanks!  Minoo

## 2025-07-14 NOTE — TELEPHONE ENCOUNTER
I spoke with the patient. She is following fluid and sodium restrictions. She is down 5 lbs since Saturday, 235 lbs, Sunday 230.4 lbs, today 230 lbs. She has resumed torsemide 20 mg daily. Will continue to monitor daily weights.

## 2025-07-16 ENCOUNTER — LAB REQUISITION (OUTPATIENT)
Dept: LAB | Facility: HOSPITAL | Age: 80
End: 2025-07-16
Payer: COMMERCIAL

## 2025-07-16 DIAGNOSIS — I50.9 HEART FAILURE, UNSPECIFIED (HCC): ICD-10-CM

## 2025-07-16 DIAGNOSIS — E11.9 TYPE 2 DIABETES MELLITUS WITHOUT COMPLICATIONS (HCC): ICD-10-CM

## 2025-07-16 LAB
ANION GAP SERPL CALCULATED.3IONS-SCNC: 15 MMOL/L (ref 4–13)
BUN SERPL-MCNC: 62 MG/DL (ref 5–25)
CALCIUM SERPL-MCNC: 9.6 MG/DL (ref 8.4–10.2)
CHLORIDE SERPL-SCNC: 96 MMOL/L (ref 96–108)
CO2 SERPL-SCNC: 26 MMOL/L (ref 21–32)
CREAT SERPL-MCNC: 1.66 MG/DL (ref 0.6–1.3)
GFR SERPL CREATININE-BSD FRML MDRD: 28 ML/MIN/1.73SQ M
GLUCOSE SERPL-MCNC: 179 MG/DL (ref 65–140)
POTASSIUM SERPL-SCNC: 4.6 MMOL/L (ref 3.5–5.3)
SODIUM SERPL-SCNC: 137 MMOL/L (ref 135–147)

## 2025-07-16 PROCEDURE — 80048 BASIC METABOLIC PNL TOTAL CA: CPT | Performed by: INTERNAL MEDICINE

## 2025-07-17 ENCOUNTER — TELEPHONE (OUTPATIENT)
Dept: CARDIOLOGY CLINIC | Facility: CLINIC | Age: 80
End: 2025-07-17

## 2025-07-17 ENCOUNTER — REMOTE DEVICE CLINIC VISIT (OUTPATIENT)
Dept: CARDIOLOGY CLINIC | Facility: CLINIC | Age: 80
End: 2025-07-17
Payer: COMMERCIAL

## 2025-07-17 DIAGNOSIS — I48.0 PAF (PAROXYSMAL ATRIAL FIBRILLATION) (HCC): Primary | ICD-10-CM

## 2025-07-17 PROCEDURE — 93298 REM INTERROG DEV EVAL SCRMS: CPT | Performed by: INTERNAL MEDICINE

## 2025-07-17 NOTE — TELEPHONE ENCOUNTER
Spoke with pt. She is down and disgusted alittle. Wt has been stable,so she is taking a break from daily wts and will weigh herself again tomorrow.    She did see her PCP yesterday and had a bmp drawn. CR- stable. She told me her DIL stated CR is out of whack. Advised her it was stable.    She id taking torsemide 40 mg in am/ 20 mg in afternoon. She has some edema L>R. Left always has some edema. She has F/u next week with Dr. Webb on 7/23/25.  I will f/u with her on Monday.    She knows to call office with any worsening of CHF symptoms or rapid wt gain.

## 2025-07-17 NOTE — PROGRESS NOTES
"MDT LNQ22 ILR/ ACTIVE SYSTEM IS MRI CONDITIONAL   CARELINK TRANSMISSION: LOOP RECORDER. PRESENTING RHYTHM NSR @ 76 BPM. BATTERY STATUS \"OK.\" 5 TACHY EPISODES W/ EGRAMS SHOWING SVT, PAT, T WAVE OVERSENSING AND MYOPOTENTIAL OVERSENSING. 1 PAUSE EPISODE W/ EGRAM SHWING UNDERSENSING. 10 DEVICE CLASSIFIED AF EPISODES, LONGEST EPISODE IS 52 MINS, AVAILABLE STRIPS DEMONSTRATE ATRIAL FIBRILLATION. SOME EPISODES PREVIOUSLY ADDRESSED IN ALERT. AF BURDEN IS 1.4%. PT TAKES ELIQUIS AND METOPROLOL SUCC. NO PATIENT ACTIVATED EPISODES. NORMAL DEVICE FUNCTION. DL   "

## 2025-07-19 DIAGNOSIS — I50.32 CHRONIC DIASTOLIC CHF (CONGESTIVE HEART FAILURE) (HCC): Chronic | ICD-10-CM

## 2025-07-19 DIAGNOSIS — I48.91 ATRIAL FIBRILLATION WITH RVR (HCC): ICD-10-CM

## 2025-07-19 DIAGNOSIS — I10 ESSENTIAL HYPERTENSION: ICD-10-CM

## 2025-07-21 ENCOUNTER — TELEPHONE (OUTPATIENT)
Dept: CARDIOLOGY CLINIC | Facility: CLINIC | Age: 80
End: 2025-07-21

## 2025-07-21 RX ORDER — POTASSIUM CHLORIDE 1500 MG/1
20 TABLET, EXTENDED RELEASE ORAL 3 TIMES DAILY
Qty: 270 TABLET | Refills: 1 | Status: SHIPPED | OUTPATIENT
Start: 2025-07-21

## 2025-07-21 NOTE — TELEPHONE ENCOUNTER
Spoke with pt. Her wt is 230 lbs. She can not get the last 4 lbs off.  She is better at 226 lbs.    She has edema of blle. And is HOUSE mostly with steps.    She does have a f/u appt on 7/23/25 with you.     Taking:  torsemide 20 mg bid                 Aldactone 25 mg qd                  Potassium 20 meq tid    7/16/25  CR-1.66(1.70 on 6/25), K-4.6(4.1),bun-62(49),GFR-28(27)    Do you want to make any changes now or wait until the OV on Wednesday?      Please advise

## 2025-07-24 DIAGNOSIS — I48.0 PAF (PAROXYSMAL ATRIAL FIBRILLATION) (HCC): ICD-10-CM

## 2025-07-25 ENCOUNTER — TELEPHONE (OUTPATIENT)
Dept: CARDIOLOGY CLINIC | Facility: CLINIC | Age: 80
End: 2025-07-25

## 2025-07-25 RX ORDER — METOPROLOL SUCCINATE 25 MG/1
25 TABLET, EXTENDED RELEASE ORAL 2 TIMES DAILY
Qty: 180 TABLET | Refills: 1 | Status: SHIPPED | OUTPATIENT
Start: 2025-07-25

## 2025-07-25 NOTE — TELEPHONE ENCOUNTER
Spoke with pt. She is doing ok. Down 2 lbs since Wednesday. She is urinating a lot. Denies any worsening of CHF symptoms. She will call the office with any cardaic questions or concerns.

## 2025-07-25 NOTE — TELEPHONE ENCOUNTER
Jessica returned a call to Luke Cheng busy when warm transfer attempted.     Patient is asking for Miguelina to try to call her again at [phone].

## 2025-07-28 ENCOUNTER — APPOINTMENT (OUTPATIENT)
Dept: LAB | Facility: CLINIC | Age: 80
End: 2025-07-28
Payer: COMMERCIAL

## 2025-07-28 ENCOUNTER — OFFICE VISIT (OUTPATIENT)
Dept: OBGYN CLINIC | Facility: CLINIC | Age: 80
End: 2025-07-28
Payer: COMMERCIAL

## 2025-07-28 VITALS — BODY MASS INDEX: 40 KG/M2 | SYSTOLIC BLOOD PRESSURE: 110 MMHG | WEIGHT: 225.8 LBS | DIASTOLIC BLOOD PRESSURE: 60 MMHG

## 2025-07-28 DIAGNOSIS — N90.89 VULVAR IRRITATION: ICD-10-CM

## 2025-07-28 DIAGNOSIS — I50.33 ACUTE ON CHRONIC DIASTOLIC (CONGESTIVE) HEART FAILURE (HCC): ICD-10-CM

## 2025-07-28 DIAGNOSIS — N39.0 RECURRENT UTI: ICD-10-CM

## 2025-07-28 DIAGNOSIS — I50.9 CONGESTIVE HEART FAILURE, UNSPECIFIED HF CHRONICITY, UNSPECIFIED HEART FAILURE TYPE (HCC): ICD-10-CM

## 2025-07-28 DIAGNOSIS — N94.89 VAGINAL BURNING: Primary | ICD-10-CM

## 2025-07-28 DIAGNOSIS — N18.30 STAGE 3 CHRONIC KIDNEY DISEASE, UNSPECIFIED WHETHER STAGE 3A OR 3B CKD (HCC): ICD-10-CM

## 2025-07-28 LAB
ANION GAP SERPL CALCULATED.3IONS-SCNC: 13 MMOL/L (ref 4–13)
BUN SERPL-MCNC: 46 MG/DL (ref 5–25)
CALCIUM SERPL-MCNC: 9.5 MG/DL (ref 8.4–10.2)
CHLORIDE SERPL-SCNC: 97 MMOL/L (ref 96–108)
CO2 SERPL-SCNC: 25 MMOL/L (ref 21–32)
CREAT SERPL-MCNC: 1.92 MG/DL (ref 0.6–1.3)
GFR SERPL CREATININE-BSD FRML MDRD: 24 ML/MIN/1.73SQ M
GLUCOSE P FAST SERPL-MCNC: 193 MG/DL (ref 65–99)
POTASSIUM SERPL-SCNC: 5.2 MMOL/L (ref 3.5–5.3)
SODIUM SERPL-SCNC: 135 MMOL/L (ref 135–147)

## 2025-07-28 PROCEDURE — 99213 OFFICE O/P EST LOW 20 MIN: CPT | Performed by: OBSTETRICS & GYNECOLOGY

## 2025-07-28 PROCEDURE — 80048 BASIC METABOLIC PNL TOTAL CA: CPT

## 2025-07-28 PROCEDURE — 36415 COLL VENOUS BLD VENIPUNCTURE: CPT

## 2025-07-28 RX ORDER — ESTRADIOL 0.1 MG/G
CREAM VAGINAL
Qty: 42.5 G | Refills: 3 | Status: SHIPPED | OUTPATIENT
Start: 2025-07-28

## 2025-07-29 ENCOUNTER — OFFICE VISIT (OUTPATIENT)
Dept: UROLOGY | Facility: AMBULATORY SURGERY CENTER | Age: 80
End: 2025-07-29
Payer: COMMERCIAL

## 2025-07-29 ENCOUNTER — RESULTS FOLLOW-UP (OUTPATIENT)
Dept: CARDIOLOGY CLINIC | Facility: CLINIC | Age: 80
End: 2025-07-29

## 2025-07-29 ENCOUNTER — TELEPHONE (OUTPATIENT)
Dept: OBGYN CLINIC | Facility: CLINIC | Age: 80
End: 2025-07-29

## 2025-07-29 VITALS
OXYGEN SATURATION: 98 % | WEIGHT: 225 LBS | HEART RATE: 97 BPM | BODY MASS INDEX: 39.87 KG/M2 | DIASTOLIC BLOOD PRESSURE: 72 MMHG | HEIGHT: 63 IN | SYSTOLIC BLOOD PRESSURE: 118 MMHG

## 2025-07-29 DIAGNOSIS — N39.0 RECURRENT UTI: Primary | ICD-10-CM

## 2025-07-29 LAB
POST-VOID RESIDUAL VOLUME, ML POC: 40 ML
SL AMB  POCT GLUCOSE, UA: 500
SL AMB LEUKOCYTE ESTERASE,UA: NORMAL
SL AMB POCT BILIRUBIN,UA: NORMAL
SL AMB POCT BLOOD,UA: NORMAL
SL AMB POCT CLARITY,UA: NORMAL
SL AMB POCT COLOR,UA: NORMAL
SL AMB POCT KETONES,UA: NORMAL
SL AMB POCT NITRITE,UA: NORMAL
SL AMB POCT PH,UA: 6.5
SL AMB POCT SPECIFIC GRAVITY,UA: 1.01
SL AMB POCT URINE PROTEIN: NORMAL
SL AMB POCT UROBILINOGEN: NORMAL

## 2025-07-29 PROCEDURE — 99214 OFFICE O/P EST MOD 30 MIN: CPT

## 2025-07-29 PROCEDURE — 51798 US URINE CAPACITY MEASURE: CPT

## 2025-07-29 PROCEDURE — 87186 SC STD MICRODIL/AGAR DIL: CPT

## 2025-07-29 PROCEDURE — 87077 CULTURE AEROBIC IDENTIFY: CPT

## 2025-07-29 PROCEDURE — 87086 URINE CULTURE/COLONY COUNT: CPT

## 2025-07-29 PROCEDURE — 81002 URINALYSIS NONAUTO W/O SCOPE: CPT

## 2025-07-29 PROCEDURE — 81001 URINALYSIS AUTO W/SCOPE: CPT

## 2025-07-30 LAB
BACTERIA UR QL AUTO: ABNORMAL /HPF
BILIRUB UR QL STRIP: NEGATIVE
CLARITY UR: ABNORMAL
COLOR UR: ABNORMAL
GLUCOSE UR STRIP-MCNC: ABNORMAL MG/DL
HGB UR QL STRIP.AUTO: ABNORMAL
KETONES UR STRIP-MCNC: NEGATIVE MG/DL
LEUKOCYTE ESTERASE UR QL STRIP: ABNORMAL
NITRITE UR QL STRIP: NEGATIVE
NON-SQ EPI CELLS URNS QL MICRO: ABNORMAL /HPF
PH UR STRIP.AUTO: 6.5 [PH]
PROT UR STRIP-MCNC: ABNORMAL MG/DL
RBC #/AREA URNS AUTO: ABNORMAL /HPF
SP GR UR STRIP.AUTO: 1.01 (ref 1–1.03)
TRANS CELLS #/AREA URNS HPF: PRESENT /[HPF]
UROBILINOGEN UR STRIP-ACNC: <2 MG/DL
WBC #/AREA URNS AUTO: ABNORMAL /HPF
WBC CLUMPS # UR AUTO: PRESENT /UL

## 2025-07-31 ENCOUNTER — RESULTS FOLLOW-UP (OUTPATIENT)
Dept: UROLOGY | Facility: AMBULATORY SURGERY CENTER | Age: 80
End: 2025-07-31

## 2025-07-31 DIAGNOSIS — N39.0 RECURRENT UTI: Primary | ICD-10-CM

## 2025-07-31 LAB — BACTERIA UR CULT: ABNORMAL

## 2025-08-01 RX ORDER — SULFAMETHOXAZOLE AND TRIMETHOPRIM 800; 160 MG/1; MG/1
1 TABLET ORAL EVERY 12 HOURS SCHEDULED
Qty: 14 TABLET | Refills: 0 | Status: SHIPPED | OUTPATIENT
Start: 2025-08-01 | End: 2025-08-08

## 2025-08-04 ENCOUNTER — APPOINTMENT (OUTPATIENT)
Dept: LAB | Facility: CLINIC | Age: 80
End: 2025-08-04
Payer: COMMERCIAL

## 2025-08-04 DIAGNOSIS — N17.9 AKI (ACUTE KIDNEY INJURY) (HCC): ICD-10-CM

## 2025-08-04 LAB
ANION GAP SERPL CALCULATED.3IONS-SCNC: 13 MMOL/L (ref 4–13)
BUN SERPL-MCNC: 41 MG/DL (ref 5–25)
CALCIUM SERPL-MCNC: 9.2 MG/DL (ref 8.4–10.2)
CHLORIDE SERPL-SCNC: 98 MMOL/L (ref 96–108)
CO2 SERPL-SCNC: 26 MMOL/L (ref 21–32)
CREAT SERPL-MCNC: 2.21 MG/DL (ref 0.6–1.3)
GFR SERPL CREATININE-BSD FRML MDRD: 20 ML/MIN/1.73SQ M
GLUCOSE P FAST SERPL-MCNC: 180 MG/DL (ref 65–99)
POTASSIUM SERPL-SCNC: 4.2 MMOL/L (ref 3.5–5.3)
SODIUM SERPL-SCNC: 137 MMOL/L (ref 135–147)

## 2025-08-04 PROCEDURE — 36415 COLL VENOUS BLD VENIPUNCTURE: CPT

## 2025-08-04 PROCEDURE — 80048 BASIC METABOLIC PNL TOTAL CA: CPT

## 2025-08-13 ENCOUNTER — APPOINTMENT (OUTPATIENT)
Dept: LAB | Facility: CLINIC | Age: 80
End: 2025-08-13
Payer: COMMERCIAL

## 2025-08-13 DIAGNOSIS — N18.30 STAGE 3 CHRONIC KIDNEY DISEASE, UNSPECIFIED WHETHER STAGE 3A OR 3B CKD (HCC): ICD-10-CM

## 2025-08-13 DIAGNOSIS — I50.33 ACUTE ON CHRONIC DIASTOLIC (CONGESTIVE) HEART FAILURE (HCC): ICD-10-CM

## 2025-08-13 LAB
ANION GAP SERPL CALCULATED.3IONS-SCNC: 13 MMOL/L (ref 4–13)
BUN SERPL-MCNC: 46 MG/DL (ref 5–25)
CALCIUM SERPL-MCNC: 9.1 MG/DL (ref 8.4–10.2)
CHLORIDE SERPL-SCNC: 98 MMOL/L (ref 96–108)
CO2 SERPL-SCNC: 25 MMOL/L (ref 21–32)
CREAT SERPL-MCNC: 1.86 MG/DL (ref 0.6–1.3)
GFR SERPL CREATININE-BSD FRML MDRD: 25 ML/MIN/1.73SQ M
GLUCOSE P FAST SERPL-MCNC: 175 MG/DL (ref 65–99)
POTASSIUM SERPL-SCNC: 4 MMOL/L (ref 3.5–5.3)
SODIUM SERPL-SCNC: 136 MMOL/L (ref 135–147)

## 2025-08-13 PROCEDURE — 36415 COLL VENOUS BLD VENIPUNCTURE: CPT

## 2025-08-13 PROCEDURE — 80048 BASIC METABOLIC PNL TOTAL CA: CPT

## 2025-08-19 ENCOUNTER — NURSE TRIAGE (OUTPATIENT)
Age: 80
End: 2025-08-19

## 2025-08-19 ENCOUNTER — APPOINTMENT (OUTPATIENT)
Dept: LAB | Facility: CLINIC | Age: 80
End: 2025-08-19
Payer: COMMERCIAL

## 2025-08-19 DIAGNOSIS — R39.9 UTI SYMPTOMS: ICD-10-CM

## 2025-08-19 DIAGNOSIS — R39.9 UTI SYMPTOMS: Primary | ICD-10-CM

## 2025-08-19 LAB
BACTERIA UR QL AUTO: ABNORMAL /HPF
BILIRUB UR QL STRIP: NEGATIVE
CLARITY UR: ABNORMAL
COLOR UR: ABNORMAL
GLUCOSE UR STRIP-MCNC: NEGATIVE MG/DL
HGB UR QL STRIP.AUTO: ABNORMAL
KETONES UR STRIP-MCNC: NEGATIVE MG/DL
LEUKOCYTE ESTERASE UR QL STRIP: ABNORMAL
NITRITE UR QL STRIP: NEGATIVE
NON-SQ EPI CELLS URNS QL MICRO: ABNORMAL /HPF
PH UR STRIP.AUTO: 6 [PH]
PROT UR STRIP-MCNC: NEGATIVE MG/DL
RBC #/AREA URNS AUTO: ABNORMAL /HPF
SP GR UR STRIP.AUTO: 1.01 (ref 1–1.03)
UROBILINOGEN UR STRIP-ACNC: <2 MG/DL
WBC #/AREA URNS AUTO: ABNORMAL /HPF

## 2025-08-19 PROCEDURE — 87086 URINE CULTURE/COLONY COUNT: CPT

## 2025-08-19 PROCEDURE — 81001 URINALYSIS AUTO W/SCOPE: CPT

## 2025-08-20 ENCOUNTER — RESULTS FOLLOW-UP (OUTPATIENT)
Dept: UROLOGY | Facility: AMBULATORY SURGERY CENTER | Age: 80
End: 2025-08-20

## 2025-08-20 DIAGNOSIS — R39.9 UTI SYMPTOMS: Primary | ICD-10-CM

## 2025-08-20 LAB — BACTERIA UR CULT: ABNORMAL

## 2025-08-20 RX ORDER — SULFAMETHOXAZOLE AND TRIMETHOPRIM 800; 160 MG/1; MG/1
1 TABLET ORAL EVERY 12 HOURS SCHEDULED
Qty: 14 TABLET | Refills: 0 | Status: SHIPPED | OUTPATIENT
Start: 2025-08-20 | End: 2025-08-27

## 2025-08-27 PROBLEM — N39.0 RECURRENT UTI: Status: RESOLVED | Noted: 2024-10-22 | Resolved: 2025-08-27

## (undated) DEVICE — PINNACLE INTRODUCER SHEATH: Brand: PINNACLE

## (undated) DEVICE — SHEATH STEERABLE FARADRIVE

## (undated) DEVICE — SOUNDSTAR ECO 8F G CATHETER: Brand: SOUNDSTAR

## (undated) DEVICE — Device: Brand: REFERENCE PATCH CARTO 3

## (undated) DEVICE — CABLE CATH CONNECTION FARASTAR

## (undated) DEVICE — CATH ABLATION FARAWAVE PULSED FIELD 31MM

## (undated) RX ORDER — NEOMYCIN SULFATE, POLYMYXIN B SULFATE AND DEXAMETHASONE 3.5; 10000; 1 MG/G; [USP'U]/G; MG/G: 1/4 OINTMENT OPHTHALMIC TWICE A DAY